# Patient Record
Sex: MALE | Race: WHITE | NOT HISPANIC OR LATINO | Employment: FULL TIME | URBAN - METROPOLITAN AREA
[De-identification: names, ages, dates, MRNs, and addresses within clinical notes are randomized per-mention and may not be internally consistent; named-entity substitution may affect disease eponyms.]

---

## 2017-01-04 ENCOUNTER — OFFICE VISIT (OUTPATIENT)
Dept: CARDIOLOGY | Facility: MEDICAL CENTER | Age: 52
End: 2017-01-04
Payer: COMMERCIAL

## 2017-01-04 VITALS
BODY MASS INDEX: 33.18 KG/M2 | OXYGEN SATURATION: 93 % | HEIGHT: 69 IN | HEART RATE: 82 BPM | WEIGHT: 224 LBS | DIASTOLIC BLOOD PRESSURE: 90 MMHG | SYSTOLIC BLOOD PRESSURE: 150 MMHG

## 2017-01-04 DIAGNOSIS — E78.00 PURE HYPERCHOLESTEROLEMIA: ICD-10-CM

## 2017-01-04 DIAGNOSIS — I21.4 NON-STEMI (NON-ST ELEVATED MYOCARDIAL INFARCTION) (HCC): ICD-10-CM

## 2017-01-04 DIAGNOSIS — I48.0 PAROXYSMAL ATRIAL FIBRILLATION (HCC): ICD-10-CM

## 2017-01-04 DIAGNOSIS — R06.02 SOB (SHORTNESS OF BREATH): ICD-10-CM

## 2017-01-04 DIAGNOSIS — R06.83 SNORING: ICD-10-CM

## 2017-01-04 DIAGNOSIS — K50.00 CROHN'S DISEASE OF SMALL INTESTINE WITHOUT COMPLICATION (HCC): ICD-10-CM

## 2017-01-04 DIAGNOSIS — I10 ESSENTIAL HYPERTENSION: ICD-10-CM

## 2017-01-04 DIAGNOSIS — I26.09 OTHER PULMONARY EMBOLISM WITH ACUTE COR PULMONALE (HCC): ICD-10-CM

## 2017-01-04 DIAGNOSIS — I50.21 ACUTE SYSTOLIC CHF (CONGESTIVE HEART FAILURE), NYHA CLASS 3 (HCC): ICD-10-CM

## 2017-01-04 DIAGNOSIS — I49.3 PVC (PREMATURE VENTRICULAR CONTRACTION): ICD-10-CM

## 2017-01-04 PROCEDURE — 94620 PR PULMONARY STRESS TESTING,SIMPLE: CPT | Performed by: INTERNAL MEDICINE

## 2017-01-04 PROCEDURE — 99214 OFFICE O/P EST MOD 30 MIN: CPT | Mod: 25 | Performed by: INTERNAL MEDICINE

## 2017-01-04 RX ORDER — ROSUVASTATIN CALCIUM 5 MG/1
5 TABLET, COATED ORAL EVERY EVENING
COMMUNITY
End: 2017-03-09

## 2017-01-04 RX ORDER — ASPIRIN 325 MG
325 TABLET ORAL DAILY
COMMUNITY
End: 2019-09-12

## 2017-01-04 NOTE — MR AVS SNAPSHOT
"        Shaka Riley   2017 3:00 PM   Office Visit   MRN: [id number]    Department:  [facility]   Dept Phone:  [phone]    Description:  Male : 1965   Provider:  Jorge A Le M.D.           Reason for Visit     New Patient           Allergies as of 2017     No Known Allergies      You were diagnosed with     Essential hypertension   [4726582]       Acute systolic CHF (congestive heart failure), NYHA class 3 ([facility])   [393138]       Non-STEMI (non-ST elevated myocardial infarction) ([facility])   [501197]       Other pulmonary embolism with acute cor pulmonale ([facility])   [3623136]       Paroxysmal atrial fibrillation ([facility])   [511719]       Pure hypercholesterolemia   [272.0.ICD-9-CM]       PVC (premature ventricular contraction)   [660358]       Crohn's disease of small intestine without complication ([facility])   [312062]       SOB (shortness of breath)   [523332]       Snoring   [389133]         Vital Signs     Blood Pressure Pulse Height Weight Body Mass Index Oxygen Saturation    150/90 mmHg 82 1.753 m (5' 9.02\") 101.606 kg (224 lb) 33.06 kg/m2 93%    Smoking Status                   Never Smoker            Basic Information     Date Of Birth Sex Race Ethnicity Preferred Language    1965 Male White Non- English      Your appointments     2017  2:00 PM   New Patient with Dylan Arreola RD   [employer] [facility])    [address]  [address]  [facility] [address]   [phone]           It is the patient's responsibility to check with your Insurance for benefit coverage for visit / visits.  24 hours notice is required for all appointment changes or cancellation.  Please arrive 20 min. before your appointment time  Please bring the following with you: 1)Picture Id 2) Insurance card 3) Completed Forms if New Patient  If scheduled for DIABETES VISIT please also brin) Medications 2) Meter 3) Blood glucose logs 4) Any recent labs if you have them  If " scheduled for NUTRITION VISIT please also brin) 2-3 days of detailed food intake logs 2) Blood glucose monitor and blood glucose logs (if you have them)            2017  8:00 AM   PET SCAN with PET TECH   [facility] (--)    [address] [address] [address], [address]  [place] [address]   [phone]           Cardiac PET Prep:  1. Testing Location is [address] [place] [address] 2. No caffeine products including decaf, chocolate and no medications that contain caffeine for 18 hours.  3. NPO for 4 hours, except water.  4. No Viagra, Levitra, or Cialis for 48 hours prior.  5. Arrive 15 minutes before appointment time for check in.  6. Wear comfortable clothing with no metal over the chest area. 7. The procedure will last approximately 45 minutes to an hour.            2017  2:20 PM   New Patient with Valery Jasmine M.D.   [facility] (--)    [address]  [place] [id number] [phone]           Please bring enclosed paperwork completed along with your insurance card and photo ID.              Problem List              ICD-10-CM Priority Class Noted - Resolved    HTN (hypertension) I10 Medium  2014 - Present    Snoring R06.83 Medium  2015 - Present    PVC (premature ventricular contraction) I49.3 Medium  2015 - Present    Acute systolic CHF (congestive heart failure), NYHA class 3 (HCC) I50.21 Medium  2015 - Present    Non-STEMI (non-ST elevated myocardial infarction) (HCC) I21.4 Medium  2015 - Present    Pleurisy R09.1 Low  2015 - Present    Pulmonary embolism (HCC) I26.99 Medium  2015 - Present    Crohn disease (HCC) K50.90 Low  2015 - Present    Paroxysmal atrial fibrillation (HCC) I48.0 Medium  8/3/2015 - Present    Syncope and collapse R55 Medium  2016 - Present    Elevated blood sugar R73.9 Medium  2016 - Present    Obesity (BMI 30-39.9) E66.9 Medium  2016 - Present    Pure hypercholesterolemia E78.00 Medium   12/6/2016 - Present    SOB (shortness of breath) R06.02   1/4/2017 - Present      Health Maintenance        Date Due Completion Dates    IMM DTaP/Tdap/Td Vaccine (1 - Tdap) 4/6/1983 ---    COLONOSCOPY 4/6/2014 ---    IMM INFLUENZA (1) 9/1/2016 5/5/2014            Current Immunizations     Influenza TIV (IM) 5/5/2014 10:30 PM    Pneumococcal polysaccharide vaccine (PPSV-23) 7/14/2015 10:57 AM      Below and/or attached are the medications your provider expects you to take. Review all of your home medications and newly ordered medications with your provider and/or pharmacist. Follow medication instructions as directed by your provider and/or pharmacist. Please keep your medication list with you and share with your provider. Update the information when medications are discontinued, doses are changed, or new medications (including over-the-counter products) are added; and carry medication information at all times in the event of emergency situations     Allergies:  No Known Allergies          Medications  Valid as of: January 04, 2017 -  4:01 PM    Generic Name Brand Name Tablet Size Instructions for use    Albuterol Sulfate (Aero Soln) albuterol 108 (90 BASE) MCG/ACT Inhale 2 Puffs by mouth every four hours as needed for Shortness of Breath.        Aspirin (Tab)  MG Take 325 mg by mouth every day.        Carvedilol (Tab) COREG 25 MG Take 1 Tab by mouth 2 times a day, with meals.        Furosemide (Tab) LASIX 40 MG Take 1 Tab by mouth 2 Times a Day.        Losartan Potassium (Tab) COZAAR 50 MG Take 1 Tab by mouth 2 Times a Day.        Potassium Chloride Carrie CR (Tab CR) K-DUR 20 MEQ Take 1 Tab by mouth every day.        Rosuvastatin Calcium (Tab) CRESTOR 5 MG Take 1 Tab by mouth every evening.        Rosuvastatin Calcium (Tab) CRESTOR 5 MG Take 5 mg by mouth every evening.        .                 Medicines prescribed today were sent to:     [facility] [address] [place] [address]    [address]  [place] [address]    Phone: [phone] Fax: [phone]    Open 24 Hours?: No      Medication refill instructions:       If your prescription bottle indicates you have medication refills left, it is not necessary to call your provider’s office. Please contact your pharmacy and they will refill your medication.    If your prescription bottle indicates you do not have any refills left, you may request refills at any time through one of the following ways: The online Paymate system (except Urgent Care), by calling your provider’s office, or by asking your pharmacy to contact your provider’s office with a refill request. Medication refills are processed only during regular business hours and may not be available until the next business day. Your provider may request additional information or to have a follow-up visit with you prior to refilling your medication.   *Please Note: Medication refills are assigned a new Rx number when refilled electronically. Your pharmacy may indicate that no refills were authorized even though a new prescription for the same medication is available at the pharmacy. Please request the medicine by name with the pharmacy before contacting your provider for a refill.        Your To Do List     Future Labs/Procedures Complete By Expires    HOLTER MONITOR STUDY  As directed 1/4/2018    NM-CARDIAC PET  As directed 1/4/2018      Referral     A referral request has been sent to our patient care coordination department. Please allow 3-5 business days for us to process this request and contact you either by phone or mail. If you do not hear from us by the 5th business day, please call us at [phone].           Paymate Status: Patient Declined

## 2017-01-04 NOTE — Clinical Note
[facility]   [address]  [place] [address]  Phone: [phone]  Fax: [phone]              Shaka Riley  1965    Encounter Date: 1/4/2017    Jorge A Le M.D.          PROGRESS NOTE:  Subjective:   Shaka Riley is a 51 y.o. male who presents today as a new consultation heart failure. His long-standing history of noncompliance and heart failure relating back to pulmonary was in atrial fibrillation were EF was found to be in 30s. He is also had issues with medical noncompliance EF returned to normal and  stopped all his cardiac medications and went back and heart failure. He's never had a workup for ischemia and his CT scan performed for the recent PE a few months back showed some calcifications in his LAD. He is mostly complaining of issues with polyuria and nocturia. This is one reason that he is poorly compliant with his medications. Lately he has become more compliant with his losartan and carvedilol.    Past Medical History   Diagnosis Date   • Crohn disease (HCC)    • Congestive heart failure (HCC) 7/2015     EF 30-35%; Dilated R atrium; LVH; Mild MR; Mild AI; Mild TR   • PAF (paroxysmal atrial fibrillation) ([facility])    • Hypertension    • Clotting disorder ([facility])      PE   • Sleep apnea      un-diagnosed and pending sleep study     History reviewed. No pertinent past surgical history.  Family History   Problem Relation Age of Onset   • Cancer Mother    • Heart Disease Paternal Grandmother    • Heart Disease Paternal Grandfather      History   Smoking status   • Never Smoker    Smokeless tobacco   • Never Used     No Known Allergies  Outpatient Encounter Prescriptions as of 1/4/2017   Medication Sig Dispense Refill   • aspirin (ASA) 325 MG Tab Take 325 mg by mouth every day.     • rosuvastatin (CRESTOR) 5 MG Tab Take 5 mg by mouth every evening.     • losartan (COZAAR) 50 MG Tab Take 1 Tab by mouth 2 Times a Day. 60 Tab 11    "  • carvedilol (COREG) 25 MG Tab Take 1 Tab by mouth 2 times a day, with meals. 180 Tab 3   • albuterol 108 (90 BASE) MCG/ACT Aero Soln inhalation aerosol Inhale 2 Puffs by mouth every four hours as needed for Shortness of Breath. 1 Inhaler 1   • furosemide (LASIX) 40 MG Tab Take 1 Tab by mouth 2 Times a Day. 180 Tab 3   • rosuvastatin (CRESTOR) 5 MG Tab Take 1 Tab by mouth every evening. 90 Tab 0   • potassium chloride SA (K-DUR) 20 MEQ Tab CR Take 1 Tab by mouth every day. (Patient not taking: Reported on 1/4/2017) 90 Tab 3     No facility-administered encounter medications on file as of 1/4/2017.     Review of Systems   Constitutional: Negative.  Negative for fever, chills and malaise/fatigue.   HENT: Negative.  Negative for sore throat.    Eyes: Negative.    Respiratory: Negative.  Negative for cough, hemoptysis, sputum production, shortness of breath, wheezing and stridor.    Cardiovascular: Negative.  Negative for chest pain, palpitations, orthopnea, claudication, leg swelling and PND.   Gastrointestinal: Negative.    Genitourinary: Negative.    Musculoskeletal: Negative.    Skin: Negative.    Neurological: Negative.  Negative for dizziness, loss of consciousness and weakness.   Endo/Heme/Allergies: Negative.  Does not bruise/bleed easily.   All other systems reviewed and are negative.       Objective:   /90 mmHg  Pulse 82  Ht 1.753 m (5' 9.02\")  Wt 101.606 kg (224 lb)  BMI 33.06 kg/m2  SpO2 93%    Physical Exam   Constitutional: He is oriented to person, place, and time. He appears well-developed and well-nourished. No distress.   HENT:   Head: Normocephalic.   Mouth/Throat: Oropharynx is clear and moist.   Eyes: EOM are normal. Pupils are equal, round, and reactive to light. Right eye exhibits no discharge. Left eye exhibits no discharge. No scleral icterus.   Neck: Normal range of motion. Neck supple. No JVD present. No tracheal deviation present.   Cardiovascular: Normal rate, regular rhythm, S1 " normal, S2 normal, normal heart sounds, intact distal pulses and normal pulses.  Exam reveals no gallop, no S3, no S4 and no friction rub.    No murmur heard.   No systolic murmur is present    No diastolic murmur is present   Pulses:       Carotid pulses are 2+ on the right side, and 2+ on the left side.       Radial pulses are 2+ on the right side, and 2+ on the left side.        Dorsalis pedis pulses are 2+ on the right side, and 2+ on the left side.        Posterior tibial pulses are 2+ on the right side, and 2+ on the left side.   Pulmonary/Chest: Effort normal and breath sounds normal. No respiratory distress. He has no wheezes. He has no rales.   Abdominal: Soft. Bowel sounds are normal. He exhibits no distension and no mass. There is no tenderness. There is no rebound and no guarding.   Musculoskeletal: He exhibits no edema.   Neurological: He is alert and oriented to person, place, and time. No cranial nerve deficit.   Skin: Skin is warm and dry. He is not diaphoretic. No pallor.   Psychiatric: He has a normal mood and affect. His behavior is normal. Judgment and thought content normal.   Nursing note and vitals reviewed.      Assessment:     1. Essential hypertension     2. Acute systolic CHF (congestive heart failure), NYHA class 3 ([facility])  NM-CARDIAC PET    HOLTER MONITOR STUDY   3. Non-STEMI (non-ST elevated myocardial infarction) ([facility])  REFERRAL TO INTENSIVE CARDIAC REHAB/CARDIAC REHAB    NM-CARDIAC PET   4. Other pulmonary embolism with acute cor pulmonale ([facility])  REFERRAL TO INTENSIVE CARDIAC REHAB/CARDIAC REHAB    NM-CARDIAC PET   5. Paroxysmal atrial fibrillation ([facility])  REFERRAL TO INTENSIVE CARDIAC REHAB/CARDIAC REHAB    HOLTER MONITOR STUDY   6. Pure hypercholesterolemia  REFERRAL TO INTENSIVE CARDIAC REHAB/CARDIAC REHAB    HOLTER MONITOR STUDY   7. PVC (premature ventricular contraction)  REFERRAL TO INTENSIVE CARDIAC REHAB/CARDIAC REHAB   8. Crohn's disease of small intestine without complication  ([facility])  HOLTER MONITOR STUDY   9. SOB (shortness of breath)     10. Snoring         Medical Decision Making:  Today's Assessment / Status / Plan:     50 y/o M with CHFrEF, cause unknown with poor compliance.  Today will go ahead and get a sleep study which we are awaiting. I will also obtain a nuclear stress test to rule out ischemia as a possible contributor factor for his heart failure. Otherwise we'll keep him on the same cardiac medications and see him back in a few weeks for further evaluation.    1. CHFrEF 40%, NYHA X, Stage X, Hemo Pro    - cont coreg 25 BID    - cont losartan 50 BID    - not on spironolactone    - ICD - not candidate    - BiV - not candidate    - cardiac PET    2. HTN    - consider starting spironolactone    3. HLD    - cont rosuva    Thank for you allowing me to take part in your patient's care, please call should you have any questions or would like to discuss this patient.      Qiana Howe M.D.  [address] [place] [address] [place] [address]  VIA In Basket

## 2017-01-05 ASSESSMENT — ENCOUNTER SYMPTOMS
ORTHOPNEA: 0
MUSCULOSKELETAL NEGATIVE: 1
DIZZINESS: 0
WEAKNESS: 0
FEVER: 0
CLAUDICATION: 0
SHORTNESS OF BREATH: 0
RESPIRATORY NEGATIVE: 1
GASTROINTESTINAL NEGATIVE: 1
WHEEZING: 0
PALPITATIONS: 0
CONSTITUTIONAL NEGATIVE: 1
HEMOPTYSIS: 0
SPUTUM PRODUCTION: 0
CARDIOVASCULAR NEGATIVE: 1
SORE THROAT: 0
NEUROLOGICAL NEGATIVE: 1
COUGH: 0
EYES NEGATIVE: 1
STRIDOR: 0
LOSS OF CONSCIOUSNESS: 0
CHILLS: 0
PND: 0
BRUISES/BLEEDS EASILY: 0

## 2017-01-05 NOTE — PROGRESS NOTES
Subjective:   Shaka Riley is a 51 y.o. male who presents today as a new consultation heart failure. His long-standing history of noncompliance and heart failure relating back to pulmonary was in atrial fibrillation were EF was found to be in 30s. He is also had issues with medical noncompliance EF returned to normal and  stopped all his cardiac medications and went back and heart failure. He's never had a workup for ischemia and his CT scan performed for the recent PE a few months back showed some calcifications in his LAD. He is mostly complaining of issues with polyuria and nocturia. This is one reason that he is poorly compliant with his medications. Lately he has become more compliant with his losartan and carvedilol.    Past Medical History   Diagnosis Date   • Crohn disease ([facility])    • Congestive heart failure ([facility]) 7/2015     EF 30-35%; Dilated R atrium; LVH; Mild MR; Mild AI; Mild TR   • PAF (paroxysmal atrial fibrillation) ([facility])    • Hypertension    • Clotting disorder ([facility])      PE   • Sleep apnea      un-diagnosed and pending sleep study     History reviewed. No pertinent past surgical history.  Family History   Problem Relation Age of Onset   • Cancer Mother    • Heart Disease Paternal Grandmother    • Heart Disease Paternal Grandfather      History   Smoking status   • Never Smoker    Smokeless tobacco   • Never Used     No Known Allergies  Outpatient Encounter Prescriptions as of 1/4/2017   Medication Sig Dispense Refill   • aspirin (ASA) 325 MG Tab Take 325 mg by mouth every day.     • rosuvastatin (CRESTOR) 5 MG Tab Take 5 mg by mouth every evening.     • losartan (COZAAR) 50 MG Tab Take 1 Tab by mouth 2 Times a Day. 60 Tab 11   • carvedilol (COREG) 25 MG Tab Take 1 Tab by mouth 2 times a day, with meals. 180 Tab 3   • albuterol 108 (90 BASE) MCG/ACT Aero Soln inhalation aerosol Inhale 2 Puffs by mouth every four hours as needed for Shortness of Breath. 1 Inhaler 1   • furosemide (LASIX) 40 MG  "Tab Take 1 Tab by mouth 2 Times a Day. 180 Tab 3   • rosuvastatin (CRESTOR) 5 MG Tab Take 1 Tab by mouth every evening. 90 Tab 0   • potassium chloride SA (K-DUR) 20 MEQ Tab CR Take 1 Tab by mouth every day. (Patient not taking: Reported on 1/4/2017) 90 Tab 3     No facility-administered encounter medications on file as of 1/4/2017.     Review of Systems   Constitutional: Negative.  Negative for fever, chills and malaise/fatigue.   HENT: Negative.  Negative for sore throat.    Eyes: Negative.    Respiratory: Negative.  Negative for cough, hemoptysis, sputum production, shortness of breath, wheezing and stridor.    Cardiovascular: Negative.  Negative for chest pain, palpitations, orthopnea, claudication, leg swelling and PND.   Gastrointestinal: Negative.    Genitourinary: Negative.    Musculoskeletal: Negative.    Skin: Negative.    Neurological: Negative.  Negative for dizziness, loss of consciousness and weakness.   Endo/Heme/Allergies: Negative.  Does not bruise/bleed easily.   All other systems reviewed and are negative.       Objective:   /90 mmHg  Pulse 82  Ht 1.753 m (5' 9.02\")  Wt 101.606 kg (224 lb)  BMI 33.06 kg/m2  SpO2 93%    Physical Exam   Constitutional: He is oriented to person, place, and time. He appears well-developed and well-nourished. No distress.   HENT:   Head: Normocephalic.   Mouth/Throat: Oropharynx is clear and moist.   Eyes: EOM are normal. Pupils are equal, round, and reactive to light. Right eye exhibits no discharge. Left eye exhibits no discharge. No scleral icterus.   Neck: Normal range of motion. Neck supple. No JVD present. No tracheal deviation present.   Cardiovascular: Normal rate, regular rhythm, S1 normal, S2 normal, normal heart sounds, intact distal pulses and normal pulses.  Exam reveals no gallop, no S3, no S4 and no friction rub.    No murmur heard.   No systolic murmur is present    No diastolic murmur is present   Pulses:       Carotid pulses are 2+ on the " right side, and 2+ on the left side.       Radial pulses are 2+ on the right side, and 2+ on the left side.        Dorsalis pedis pulses are 2+ on the right side, and 2+ on the left side.        Posterior tibial pulses are 2+ on the right side, and 2+ on the left side.   Pulmonary/Chest: Effort normal and breath sounds normal. No respiratory distress. He has no wheezes. He has no rales.   Abdominal: Soft. Bowel sounds are normal. He exhibits no distension and no mass. There is no tenderness. There is no rebound and no guarding.   Musculoskeletal: He exhibits no edema.   Neurological: He is alert and oriented to person, place, and time. No cranial nerve deficit.   Skin: Skin is warm and dry. He is not diaphoretic. No pallor.   Psychiatric: He has a normal mood and affect. His behavior is normal. Judgment and thought content normal.   Nursing note and vitals reviewed.      Assessment:     1. Essential hypertension     2. Acute systolic CHF (congestive heart failure), NYHA class 3 ([facility])  NM-CARDIAC PET    HOLTER MONITOR STUDY   3. Non-STEMI (non-ST elevated myocardial infarction) ([facility])  REFERRAL TO INTENSIVE CARDIAC REHAB/CARDIAC REHAB    NM-CARDIAC PET   4. Other pulmonary embolism with acute cor pulmonale ([facility])  REFERRAL TO INTENSIVE CARDIAC REHAB/CARDIAC REHAB    NM-CARDIAC PET   5. Paroxysmal atrial fibrillation ([facility])  REFERRAL TO INTENSIVE CARDIAC REHAB/CARDIAC REHAB    HOLTER MONITOR STUDY   6. Pure hypercholesterolemia  REFERRAL TO INTENSIVE CARDIAC REHAB/CARDIAC REHAB    HOLTER MONITOR STUDY   7. PVC (premature ventricular contraction)  REFERRAL TO INTENSIVE CARDIAC REHAB/CARDIAC REHAB   8. Crohn's disease of small intestine without complication ([facility])  HOLTER MONITOR STUDY   9. SOB (shortness of breath)     10. Snoring         Medical Decision Making:  Today's Assessment / Status / Plan:     52 y/o M with CHFrEF, cause unknown with poor compliance.  Today will go ahead and get a sleep study which we are  awaiting. I will also obtain a nuclear stress test to rule out ischemia as a possible contributor factor for his heart failure. Otherwise we'll keep him on the same cardiac medications and see him back in a few weeks for further evaluation.    1. CHFrEF 40%, NYHA X, Stage X, Hemo Pro    - cont coreg 25 BID    - cont losartan 50 BID    - not on spironolactone    - ICD - not candidate    - BiV - not candidate    - cardiac PET    2. HTN    - consider starting spironolactone    3. HLD    - cont rosuva    Thank for you allowing me to take part in your patient's care, please call should you have any questions or would like to discuss this patient.

## 2017-01-17 NOTE — PROGRESS NOTES
6MWT- 423 meters  MLWHF- 55        Reviewed anatomy and physiology of heart failure with patient. Went over heart failure worksheet and patient's individual HF diagnosis, EF, risk factors, general medication classes and indications, as well as personal goals.  Goals: Patient's personal goal is improve his EF.    Discussed daily weights, sodium restriction, worsening signs and symptoms to report to physician, heart medications, and importance of adherence to medication regimen. Patient understands the basic anatomy and physiology of heart failure, but still has trouble with compliance. He v/u for need to weigh daily, but will not commit at this time.     Reviewed dietary handouts, advance directive planning handout, and informed patient of HF new appointment follow-up phone call. Patient states he has does not salt his food, but eats a lot of processed food. Attempted to discuss simple ideas for cooking more whole, unprocessed foods, but he is not able to afford a lot of food at this time.     Patient states he is ready to be compliant with his medications, but isn't ready to change his diet at this time-he doesn't feel it is that bad and that as long as he takes his medications he will improve. Will attempt to reinforce further education at a later date.     Invited patient and family members/friends to HF support group and encouraged patient to call Heart Failure clinic during normal business hours with any questions.        Patient states full understanding of all information given.     Charlene HF RN  [phone]

## 2017-01-25 ENCOUNTER — HOSPITAL ENCOUNTER (OUTPATIENT)
Dept: CARDIOLOGY | Facility: MEDICAL CENTER | Age: 52
End: 2017-01-25
Attending: INTERNAL MEDICINE
Payer: COMMERCIAL

## 2017-01-25 DIAGNOSIS — I21.4 NON-STEMI (NON-ST ELEVATED MYOCARDIAL INFARCTION) (HCC): ICD-10-CM

## 2017-01-25 DIAGNOSIS — I26.09 OTHER PULMONARY EMBOLISM WITH ACUTE COR PULMONALE (HCC): ICD-10-CM

## 2017-01-25 DIAGNOSIS — I50.21 ACUTE SYSTOLIC CHF (CONGESTIVE HEART FAILURE), NYHA CLASS 3 (HCC): ICD-10-CM

## 2017-01-25 PROCEDURE — 700111 HCHG RX REV CODE 636 W/ 250 OVERRIDE (IP)

## 2017-01-25 PROCEDURE — 93017 CV STRESS TEST TRACING ONLY: CPT

## 2017-01-25 PROCEDURE — A9555 RB82 RUBIDIUM: HCPCS

## 2017-01-25 PROCEDURE — 78492 MYOCRD IMG PET MLT RST&STRS: CPT

## 2017-01-26 NOTE — PROCEDURES
REFERRING PHYSICIAN:  Jorge A Le MD    AGE:  51.  GENDER:  Male.  HEIGHT:  5 feet 9 inches tall.  WEIGHT:  224   pounds.  BMI:  33.1.    INDICATIONS:  Cardiomyopathy.    PROCEDURE:  The patient reviewed and signed the acknowledgement for testing   form.  The patient was in a fasting state and was properly prepared for   testing.  An intravenous line was inserted and a flush of normal saline   followed to insure line patency.     A transmission scan was acquired for attenuation correction using the internal   Germanium sources.  The patient was then administered 25.6 mCi at 0810 of   Rubidium-82.  Approximately 90 seconds after the infusion, resting imagine   were obtained with ECG-gating.  Following the resting series, the patient   administered 58 mg of dipyridamole over four minutes at 0820.  The blood   pressure, heart rate and ECG were monitored and recorded.  After the   dipyridamole infusion was completed, another transmission scan for attenuation   correction was obtained.  The patient was then administered 25.6 mCi of   Rubidium-82 at 0825.  Approximately 90 seconds after the infusion, Peak stress   images were obtained with ECG-gating.    CLINICAL RESPONSE:  Resting blood pressure was 163.106 with a heart rate of   74.  Immediately post-dipyridamole infusion the blood pressure was 149/71 with   a heart rate of 78.  After a recovery period the blood pressure was 151/85   with a heart rate of 76.    The patient experienced no symptoms during testing.    Aminophylline 0 mg was administered following the scan.    ELECTROCARDIOGRAPHIC FINDINGS:  Resting left ventricular ejection fraction was   39%.  Stress left ventricular ejection fraction of 48%.    SCINTOGRAPHIC FINDINGS:  The QC data for the scan was reviewed and was within   acceptable limits.      CONCLUSIONS AND IMPRESSIONS:  At baseline, EKG shows evidence of sinus rhythm.    As stress, there is no evidence of coronary ischemia seen on EKG  tracing.    In terms of myocardial PET scan stress test images, there is no evidence of   coronary ischemia seen.  Overall, this is a negative myocardial PET scan   stress test for coronary ischemia.       ____________________________________  CLARE Mcmillan / NORMAN    DD:  01/25/2017 19:37:17  DT:  01/25/2017 23:12:13    D#:  [id number]  Job#:  [id number]

## 2017-01-31 ENCOUNTER — ANTICOAGULATION MONITORING (OUTPATIENT)
Dept: VASCULAR LAB | Facility: MEDICAL CENTER | Age: 52
End: 2017-01-31

## 2017-01-31 DIAGNOSIS — I27.82 CHRONIC SEPTIC PULMONARY EMBOLISM WITH ACUTE COR PULMONALE (HCC): ICD-10-CM

## 2017-01-31 DIAGNOSIS — I48.0 PAROXYSMAL ATRIAL FIBRILLATION (HCC): ICD-10-CM

## 2017-01-31 DIAGNOSIS — I26.01 CHRONIC SEPTIC PULMONARY EMBOLISM WITH ACUTE COR PULMONALE (HCC): ICD-10-CM

## 2017-01-31 NOTE — PROGRESS NOTES
Discharged from [facility].  Therapy completed 12/14/2016 by Dr. Serena Albert.  Angelina Pedroza, PHARMD

## 2017-02-01 ENCOUNTER — OFFICE VISIT (OUTPATIENT)
Dept: CARDIOLOGY | Facility: MEDICAL CENTER | Age: 52
End: 2017-02-01
Payer: COMMERCIAL

## 2017-02-01 VITALS
WEIGHT: 227 LBS | HEART RATE: 84 BPM | OXYGEN SATURATION: 93 % | DIASTOLIC BLOOD PRESSURE: 104 MMHG | BODY MASS INDEX: 33.62 KG/M2 | HEIGHT: 69 IN | SYSTOLIC BLOOD PRESSURE: 180 MMHG

## 2017-02-01 DIAGNOSIS — I26.01 CHRONIC SEPTIC PULMONARY EMBOLISM WITH ACUTE COR PULMONALE (HCC): ICD-10-CM

## 2017-02-01 DIAGNOSIS — R55 SYNCOPE AND COLLAPSE: ICD-10-CM

## 2017-02-01 DIAGNOSIS — G25.81 RESTLESS LEG SYNDROME: ICD-10-CM

## 2017-02-01 DIAGNOSIS — I48.0 PAROXYSMAL ATRIAL FIBRILLATION (HCC): ICD-10-CM

## 2017-02-01 DIAGNOSIS — I49.3 PVC (PREMATURE VENTRICULAR CONTRACTION): ICD-10-CM

## 2017-02-01 DIAGNOSIS — I50.21 ACUTE SYSTOLIC CHF (CONGESTIVE HEART FAILURE), NYHA CLASS 3 (HCC): ICD-10-CM

## 2017-02-01 DIAGNOSIS — E78.00 PURE HYPERCHOLESTEROLEMIA: ICD-10-CM

## 2017-02-01 DIAGNOSIS — R06.02 SOB (SHORTNESS OF BREATH): ICD-10-CM

## 2017-02-01 DIAGNOSIS — I27.82 CHRONIC SEPTIC PULMONARY EMBOLISM WITH ACUTE COR PULMONALE (HCC): ICD-10-CM

## 2017-02-01 DIAGNOSIS — R06.83 SNORING: ICD-10-CM

## 2017-02-01 DIAGNOSIS — I21.4 NON-STEMI (NON-ST ELEVATED MYOCARDIAL INFARCTION) (HCC): ICD-10-CM

## 2017-02-01 DIAGNOSIS — I10 ESSENTIAL HYPERTENSION: ICD-10-CM

## 2017-02-01 PROCEDURE — 99214 OFFICE O/P EST MOD 30 MIN: CPT | Performed by: INTERNAL MEDICINE

## 2017-02-01 RX ORDER — ROPINIROLE 0.5 MG/1
0.5 TABLET, FILM COATED ORAL EVERY EVENING
Qty: 30 TAB | Refills: 11 | Status: SHIPPED | OUTPATIENT
Start: 2017-02-01 | End: 2017-03-21 | Stop reason: SDUPTHER

## 2017-02-01 RX ORDER — ROSUVASTATIN CALCIUM 5 MG/1
5 TABLET, COATED ORAL EVERY EVENING
Qty: 90 TAB | Refills: 3 | Status: SHIPPED | OUTPATIENT
Start: 2017-02-01 | End: 2018-01-12

## 2017-02-01 ASSESSMENT — ENCOUNTER SYMPTOMS
SORE THROAT: 0
BRUISES/BLEEDS EASILY: 0
SHORTNESS OF BREATH: 0
PND: 0
STRIDOR: 0
DIZZINESS: 0
GASTROINTESTINAL NEGATIVE: 1
CLAUDICATION: 0
CHILLS: 0
EYES NEGATIVE: 1
ORTHOPNEA: 0
RESPIRATORY NEGATIVE: 1
CONSTITUTIONAL NEGATIVE: 1
WHEEZING: 0
PALPITATIONS: 0
CARDIOVASCULAR NEGATIVE: 1
NEUROLOGICAL NEGATIVE: 1
LOSS OF CONSCIOUSNESS: 0
HEMOPTYSIS: 0
COUGH: 0
SPUTUM PRODUCTION: 0
FEVER: 0
MUSCULOSKELETAL NEGATIVE: 1
WEAKNESS: 0

## 2017-02-01 NOTE — MR AVS SNAPSHOT
"        Shaka Riley   2017 3:45 PM   Office Visit   MRN: [id number]    Department:  [facility]   Dept Phone:  [phone]    Description:  Male : 1965   Provider:  Jorge A Le M.D.           Reason for Visit     Follow-Up           Allergies as of 2017     No Known Allergies      You were diagnosed with     SOB (shortness of breath)   [921906]       Syncope and collapse   [780.2.ICD-9-CM]       Paroxysmal atrial fibrillation (CMS-HCC)   [179853]       Chronic septic pulmonary embolism with acute cor pulmonale (CMS-HCC)   [8290845]       Non-STEMI (non-ST elevated myocardial infarction) (CMS-HCC)   [156967]       Acute systolic CHF (congestive heart failure), NYHA class 3 (CMS-HCC)   [233679]       PVC (premature ventricular contraction)   [566860]       Snoring   [474946]       Essential hypertension   [1645141]       Pure hypercholesterolemia   [272.0.ICD-9-CM]       Restless leg syndrome   [916321]         Vital Signs     Blood Pressure Pulse Height Weight Body Mass Index Oxygen Saturation    180/104 mmHg 84 1.753 m (5' 9.02\") 102.967 kg (227 lb) 33.51 kg/m2 93%    Smoking Status                   Never Smoker            Basic Information     Date Of Birth Sex Race Ethnicity Preferred Language    1965 Male White Non- English      Your appointments     2017  2:20 PM   New Patient with Valery Jasmine M.D.   [facility] (--)    [address] [place] [address] [id number] [phone]           Please bring enclosed paperwork completed along with your insurance card and photo ID.              Problem List              ICD-10-CM Priority Class Noted - Resolved    HTN (hypertension) I10 Medium  2014 - Present    Snoring R06.83 Medium  2015 - Present    PVC (premature ventricular contraction) I49.3 Medium  2015 - Present    Acute systolic CHF (congestive heart failure), NYHA class 3 (CMS-HCC) I50.21 Medium  2015 - " Present    Non-STEMI (non-ST elevated myocardial infarction) (CMS-HCC) I21.4 Medium  7/13/2015 - Present    Pleurisy R09.1 Low  7/20/2015 - Present    Pulmonary embolism (CMS-HCC) I26.99 Medium  7/22/2015 - Present    Crohn disease (CMS-HCC) K50.90 Low  7/23/2015 - Present    Paroxysmal atrial fibrillation (CMS-HCC) I48.0 Medium  8/3/2015 - Present    Syncope and collapse R55 Medium  7/20/2016 - Present    Elevated blood sugar R73.9 Medium  7/20/2016 - Present    Obesity (BMI 30-39.9) E66.9 Medium  12/6/2016 - Present    Pure hypercholesterolemia E78.00 Medium  12/6/2016 - Present    SOB (shortness of breath) R06.02   1/4/2017 - Present    Restless leg syndrome G25.81   2/1/2017 - Present      Health Maintenance        Date Due Completion Dates    IMM DTaP/Tdap/Td Vaccine (1 - Tdap) 4/6/1983 ---    COLONOSCOPY 4/6/2014 ---    IMM INFLUENZA (1) 9/1/2016 5/5/2014            Current Immunizations     Influenza TIV (IM) 5/5/2014 10:30 PM    Pneumococcal polysaccharide vaccine (PPSV-23) 7/14/2015 10:57 AM      Below and/or attached are the medications your provider expects you to take. Review all of your home medications and newly ordered medications with your provider and/or pharmacist. Follow medication instructions as directed by your provider and/or pharmacist. Please keep your medication list with you and share with your provider. Update the information when medications are discontinued, doses are changed, or new medications (including over-the-counter products) are added; and carry medication information at all times in the event of emergency situations     Allergies:  No Known Allergies          Medications  Valid as of: February 01, 2017 -  4:16 PM    Generic Name Brand Name Tablet Size Instructions for use    Albuterol Sulfate (Aero Soln) albuterol 108 (90 BASE) MCG/ACT Inhale 2 Puffs by mouth every four hours as needed for Shortness of Breath.        Aspirin (Tab)  MG Take 325 mg by mouth every day.          Carvedilol (Tab) COREG 25 MG Take 1 Tab by mouth 2 times a day, with meals.        Furosemide (Tab) LASIX 40 MG Take 1 Tab by mouth 2 Times a Day.        Losartan Potassium (Tab) COZAAR 50 MG Take 1 Tab by mouth 2 Times a Day.        Potassium Chloride Carrie CR (Tab CR) Kdur 20 MEQ Take 1 Tab by mouth every day.        ROPINIRole HCl (Tab) REQUIP 0.5 MG Take 1 Tab by mouth every evening.        Rosuvastatin Calcium (Tab) CRESTOR 5 MG Take 5 mg by mouth every evening.        Rosuvastatin Calcium (Tab) CRESTOR 5 MG Take 1 Tab by mouth every evening.        .                 Medicines prescribed today were sent to:     [facility] [address] [place] [address] (S), [place] - [address]    [facility] [facility] ([place] [place] [address]    Phone: [phone] Fax: [phone]    Open 24 Hours?: No      Medication refill instructions:       If your prescription bottle indicates you have medication refills left, it is not necessary to call your provider’s office. Please contact your pharmacy and they will refill your medication.    If your prescription bottle indicates you do not have any refills left, you may request refills at any time through one of the following ways: The online [employer] system (except Urgent Care), by calling your provider’s office, or by asking your pharmacy to contact your provider’s office with a refill request. Medication refills are processed only during regular business hours and may not be available until the next business day. Your provider may request additional information or to have a follow-up visit with you prior to refilling your medication.   *Please Note: Medication refills are assigned a new Rx number when refilled electronically. Your pharmacy may indicate that no refills were authorized even though a new prescription for the same medication is available at the pharmacy. Please request the medicine by name with the pharmacy before contacting your provider for a refill.        Your To Do List     Future  Labs/Procedures Complete By Expires    IRON/TOTAL IRON BIND (FEIBC)  As directed 2/1/2018    METANEPHRINES PLASMA  As directed 2/1/2018    TSH WITH REFLEX TO FT4  As directed 2/2/2018         MyChart Status: Patient Declined

## 2017-02-01 NOTE — Clinical Note
[facility]   [address]  [place] [address]  Phone: [phone]  Fax: [phone]              Shaka Riley  1965    Encounter Date: 2/1/2017    Jorge A Le M.D.          PROGRESS NOTE:  Subjective:   Shaka Riley is a 51 y.o. male who presents today as follow-up for his nonischemic cardio myopathy. We are still awaiting his sleep study. That is scheduled to complete his every 22nd. He still notices that his blood pressures will spike throughout the day usually once per day in the 180s. He'll wake up in the morning or on days off and feel poorly with low energy and check his blood pressure notices that it's quite low. He denies illicit substances but does say he occasionally drinks alcohol. He's never been evaluated for secondary causes hypertension.    Past Medical History   Diagnosis Date   • Crohn disease (CMS-HCC)    • Congestive heart failure (CMS-HCC) 7/2015     EF 30-35%; Dilated R atrium; LVH; Mild MR; Mild AI; Mild TR   • PAF (paroxysmal atrial fibrillation) (CMS-HCC)    • Hypertension    • Clotting disorder (CMS-HCC)      PE   • Sleep apnea      un-diagnosed and pending sleep study     History reviewed. No pertinent past surgical history.  Family History   Problem Relation Age of Onset   • Cancer Mother    • Heart Disease Paternal Grandmother    • Heart Disease Paternal Grandfather      History   Smoking status   • Never Smoker    Smokeless tobacco   • Never Used     No Known Allergies  Outpatient Encounter Prescriptions as of 2/1/2017   Medication Sig Dispense Refill   • rosuvastatin (CRESTOR) 5 MG Tab Take 1 Tab by mouth every evening. 90 Tab 3   • ropinirole (REQUIP) 0.5 MG Tab Take 1 Tab by mouth every evening. 30 Tab 11   • aspirin (ASA) 325 MG Tab Take 325 mg by mouth every day.     • rosuvastatin (CRESTOR) 5 MG Tab Take 5 mg by mouth every evening.     • losartan (COZAAR) 50 MG Tab Take 1 Tab by mouth 2 Times a  "Day. 60 Tab 11   • carvedilol (COREG) 25 MG Tab Take 1 Tab by mouth 2 times a day, with meals. 180 Tab 3   • albuterol 108 (90 BASE) MCG/ACT Aero Soln inhalation aerosol Inhale 2 Puffs by mouth every four hours as needed for Shortness of Breath. 1 Inhaler 1   • furosemide (LASIX) 40 MG Tab Take 1 Tab by mouth 2 Times a Day. 180 Tab 3   • [DISCONTINUED] rosuvastatin (CRESTOR) 5 MG Tab Take 1 Tab by mouth every evening. 90 Tab 0   • potassium chloride SA (K-DUR) 20 MEQ Tab CR Take 1 Tab by mouth every day. (Patient not taking: Reported on 1/4/2017) 90 Tab 3     No facility-administered encounter medications on file as of 2/1/2017.     Review of Systems   Constitutional: Negative.  Negative for fever, chills and malaise/fatigue.   HENT: Negative.  Negative for sore throat.    Eyes: Negative.    Respiratory: Negative.  Negative for cough, hemoptysis, sputum production, shortness of breath, wheezing and stridor.    Cardiovascular: Negative.  Negative for chest pain, palpitations, orthopnea, claudication, leg swelling and PND.   Gastrointestinal: Negative.    Genitourinary: Negative.    Musculoskeletal: Negative.    Skin: Negative.    Neurological: Negative.  Negative for dizziness, loss of consciousness and weakness.   Endo/Heme/Allergies: Negative.  Does not bruise/bleed easily.   All other systems reviewed and are negative.       Objective:   /104 mmHg  Pulse 84  Ht 1.753 m (5' 9.02\")  Wt 102.967 kg (227 lb)  BMI 33.51 kg/m2  SpO2 93%    Physical Exam   Constitutional: He is oriented to person, place, and time. He appears well-developed and well-nourished. No distress.   HENT:   Head: Normocephalic.   Mouth/Throat: Oropharynx is clear and moist.   Eyes: EOM are normal. Pupils are equal, round, and reactive to light. Right eye exhibits no discharge. Left eye exhibits no discharge. No scleral icterus.   Neck: Normal range of motion. Neck supple. No JVD present. No tracheal deviation present.   Cardiovascular: " Normal rate, regular rhythm, S1 normal, S2 normal, normal heart sounds, intact distal pulses and normal pulses.  Exam reveals no gallop, no S3, no S4 and no friction rub.    No murmur heard.   No systolic murmur is present    No diastolic murmur is present   Pulses:       Carotid pulses are 2+ on the right side, and 2+ on the left side.       Radial pulses are 2+ on the right side, and 2+ on the left side.        Dorsalis pedis pulses are 2+ on the right side, and 2+ on the left side.        Posterior tibial pulses are 2+ on the right side, and 2+ on the left side.   Pulmonary/Chest: Effort normal and breath sounds normal. No respiratory distress. He has no wheezes. He has no rales.   Abdominal: Soft. Bowel sounds are normal. He exhibits no distension and no mass. There is no tenderness. There is no rebound and no guarding.   Musculoskeletal: He exhibits no edema.   Neurological: He is alert and oriented to person, place, and time. No cranial nerve deficit.   Skin: Skin is warm and dry. He is not diaphoretic. No pallor.   Psychiatric: He has a normal mood and affect. His behavior is normal. Judgment and thought content normal.   Nursing note and vitals reviewed.      Assessment:     1. SOB (shortness of breath)  TSH WITH REFLEX TO FT4    RENIN ACTIVITY AND ALDOSTERONE    METANEPHRINES PLASMA   2. Syncope and collapse  RENIN ACTIVITY AND ALDOSTERONE    IRON/TOTAL IRON BIND (FEIBC)   3. Paroxysmal atrial fibrillation (CMS-[facility])  METANEPHRINES PLASMA   4. Chronic septic pulmonary embolism with acute cor pulmonale (CMS-[facility])  TSH WITH REFLEX TO FT4    RENIN ACTIVITY AND ALDOSTERONE    METANEPHRINES PLASMA    IRON/TOTAL IRON BIND (FEIBC)    ropinirole (REQUIP) 0.5 MG Tab   5. Non-STEMI (non-ST elevated myocardial infarction) (CMS-[facility])  TSH WITH REFLEX TO FT4    RENIN ACTIVITY AND ALDOSTERONE   6. Acute systolic CHF (congestive heart failure), NYHA class 3 (CMS-[facility])  TSH WITH REFLEX TO FT4    ropinirole (REQUIP) 0.5 MG Tab     7. PVC (premature ventricular contraction)  METANEPHRINES PLASMA   8. Snoring  IRON/TOTAL IRON BIND (FEIBC)    ropinirole (REQUIP) 0.5 MG Tab   9. Essential hypertension  METANEPHRINES PLASMA    IRON/TOTAL IRON BIND (FEIBC)   10. Pure hypercholesterolemia  METANEPHRINES PLASMA    rosuvastatin (CRESTOR) 5 MG Tab   11. Restless leg syndrome         Medical Decision Making:  Today's Assessment / Status / Plan:     50 y/o M with CHFrEF, cause unknown with poor compliance. We are awaiting the results of the sleep study. I will also send some labs for evaluation of a secondary cause of hypertension. I will see him back in one month. In the meantime I did start him on ropinirole for his restless leg syndrome.     1. CHFrEF 40%, NYHA I, Stage C, Hemo Pro A    - cont coreg 25 BID    - cont losartan 50 BID    - not on spironolactone    - ICD - not candidate    - BiV - not candidate    - cardiac PET    2. HTN    - consider starting spironolactone    3. HLD    - cont rosuva    4. Restless leg syndrome    - iron studies    - start ropinerole 0.5 mg at bedtime    Thank for you allowing me to take part in your patient's care, please call should you have any questions or would like to discuss this patient.      Qiana Howe M.D.  [address] [place] [address] Dr Andrew NASSAR [id number]  VIA In Basket

## 2017-02-02 NOTE — PROGRESS NOTES
Subjective:   Shaka Riley is a 51 y.o. male who presents today as follow-up for his nonischemic cardio myopathy. We are still awaiting his sleep study. That is scheduled to complete his every 22nd. He still notices that his blood pressures will spike throughout the day usually once per day in the 180s. He'll wake up in the morning or on days off and feel poorly with low energy and check his blood pressure notices that it's quite low. He denies illicit substances but does say he occasionally drinks alcohol. He's never been evaluated for secondary causes hypertension.    Past Medical History   Diagnosis Date   • Crohn disease (CMS-HCC)    • Congestive heart failure (CMS-HCC) 7/2015     EF 30-35%; Dilated R atrium; LVH; Mild MR; Mild AI; Mild TR   • PAF (paroxysmal atrial fibrillation) (CMS-HCC)    • Hypertension    • Clotting disorder (CMS-HCC)      PE   • Sleep apnea      un-diagnosed and pending sleep study     History reviewed. No pertinent past surgical history.  Family History   Problem Relation Age of Onset   • Cancer Mother    • Heart Disease Paternal Grandmother    • Heart Disease Paternal Grandfather      History   Smoking status   • Never Smoker    Smokeless tobacco   • Never Used     No Known Allergies  Outpatient Encounter Prescriptions as of 2/1/2017   Medication Sig Dispense Refill   • rosuvastatin (CRESTOR) 5 MG Tab Take 1 Tab by mouth every evening. 90 Tab 3   • ropinirole (REQUIP) 0.5 MG Tab Take 1 Tab by mouth every evening. 30 Tab 11   • aspirin (ASA) 325 MG Tab Take 325 mg by mouth every day.     • rosuvastatin (CRESTOR) 5 MG Tab Take 5 mg by mouth every evening.     • losartan (COZAAR) 50 MG Tab Take 1 Tab by mouth 2 Times a Day. 60 Tab 11   • carvedilol (COREG) 25 MG Tab Take 1 Tab by mouth 2 times a day, with meals. 180 Tab 3   • albuterol 108 (90 BASE) MCG/ACT Aero Soln inhalation aerosol Inhale 2 Puffs by mouth every four hours as needed for Shortness of Breath. 1 Inhaler 1   •  "furosemide (LASIX) 40 MG Tab Take 1 Tab by mouth 2 Times a Day. 180 Tab 3   • [DISCONTINUED] rosuvastatin (CRESTOR) 5 MG Tab Take 1 Tab by mouth every evening. 90 Tab 0   • potassium chloride SA (K-DUR) 20 MEQ Tab CR Take 1 Tab by mouth every day. (Patient not taking: Reported on 1/4/2017) 90 Tab 3     No facility-administered encounter medications on file as of 2/1/2017.     Review of Systems   Constitutional: Negative.  Negative for fever, chills and malaise/fatigue.   HENT: Negative.  Negative for sore throat.    Eyes: Negative.    Respiratory: Negative.  Negative for cough, hemoptysis, sputum production, shortness of breath, wheezing and stridor.    Cardiovascular: Negative.  Negative for chest pain, palpitations, orthopnea, claudication, leg swelling and PND.   Gastrointestinal: Negative.    Genitourinary: Negative.    Musculoskeletal: Negative.    Skin: Negative.    Neurological: Negative.  Negative for dizziness, loss of consciousness and weakness.   Endo/Heme/Allergies: Negative.  Does not bruise/bleed easily.   All other systems reviewed and are negative.       Objective:   /104 mmHg  Pulse 84  Ht 1.753 m (5' 9.02\")  Wt 102.967 kg (227 lb)  BMI 33.51 kg/m2  SpO2 93%    Physical Exam   Constitutional: He is oriented to person, place, and time. He appears well-developed and well-nourished. No distress.   HENT:   Head: Normocephalic.   Mouth/Throat: Oropharynx is clear and moist.   Eyes: EOM are normal. Pupils are equal, round, and reactive to light. Right eye exhibits no discharge. Left eye exhibits no discharge. No scleral icterus.   Neck: Normal range of motion. Neck supple. No JVD present. No tracheal deviation present.   Cardiovascular: Normal rate, regular rhythm, S1 normal, S2 normal, normal heart sounds, intact distal pulses and normal pulses.  Exam reveals no gallop, no S3, no S4 and no friction rub.    No murmur heard.   No systolic murmur is present    No diastolic murmur is present "   Pulses:       Carotid pulses are 2+ on the right side, and 2+ on the left side.       Radial pulses are 2+ on the right side, and 2+ on the left side.        Dorsalis pedis pulses are 2+ on the right side, and 2+ on the left side.        Posterior tibial pulses are 2+ on the right side, and 2+ on the left side.   Pulmonary/Chest: Effort normal and breath sounds normal. No respiratory distress. He has no wheezes. He has no rales.   Abdominal: Soft. Bowel sounds are normal. He exhibits no distension and no mass. There is no tenderness. There is no rebound and no guarding.   Musculoskeletal: He exhibits no edema.   Neurological: He is alert and oriented to person, place, and time. No cranial nerve deficit.   Skin: Skin is warm and dry. He is not diaphoretic. No pallor.   Psychiatric: He has a normal mood and affect. His behavior is normal. Judgment and thought content normal.   Nursing note and vitals reviewed.      Assessment:     1. SOB (shortness of breath)  TSH WITH REFLEX TO FT4    RENIN ACTIVITY AND ALDOSTERONE    METANEPHRINES PLASMA   2. Syncope and collapse  RENIN ACTIVITY AND ALDOSTERONE    IRON/TOTAL IRON BIND (FEIBC)   3. Paroxysmal atrial fibrillation (CMS-[facility])  METANEPHRINES PLASMA   4. Chronic septic pulmonary embolism with acute cor pulmonale (CMS-[facility])  TSH WITH REFLEX TO FT4    RENIN ACTIVITY AND ALDOSTERONE    METANEPHRINES PLASMA    IRON/TOTAL IRON BIND (FEIBC)    ropinirole (REQUIP) 0.5 MG Tab   5. Non-STEMI (non-ST elevated myocardial infarction) (CMS-HCC)  TSH WITH REFLEX TO FT4    RENIN ACTIVITY AND ALDOSTERONE   6. Acute systolic CHF (congestive heart failure), NYHA class 3 (CMS-[facility])  TSH WITH REFLEX TO FT4    ropinirole (REQUIP) 0.5 MG Tab   7. PVC (premature ventricular contraction)  METANEPHRINES PLASMA   8. Snoring  IRON/TOTAL IRON BIND (FEIBC)    ropinirole (REQUIP) 0.5 MG Tab   9. Essential hypertension  METANEPHRINES PLASMA    IRON/TOTAL IRON BIND (FEIBC)   10. Pure hypercholesterolemia   METANEPHRINES PLASMA    rosuvastatin (CRESTOR) 5 MG Tab   11. Restless leg syndrome         Medical Decision Making:  Today's Assessment / Status / Plan:     50 y/o M with CHFrEF, cause unknown with poor compliance. We are awaiting the results of the sleep study. I will also send some labs for evaluation of a secondary cause of hypertension. I will see him back in one month. In the meantime I did start him on ropinirole for his restless leg syndrome.     1. CHFrEF 40%, NYHA I, Stage C, Hemo Pro A    - cont coreg 25 BID    - cont losartan 50 BID    - not on spironolactone    - ICD - not candidate    - BiV - not candidate    - cardiac PET    2. HTN    - consider starting spironolactone    3. HLD    - cont rosuva    4. Restless leg syndrome    - iron studies    - start ropinerole 0.5 mg at bedtime    Thank for you allowing me to take part in your patient's care, please call should you have any questions or would like to discuss this patient.

## 2017-02-22 ENCOUNTER — SLEEP CENTER VISIT (OUTPATIENT)
Dept: SLEEP MEDICINE | Facility: MEDICAL CENTER | Age: 52
End: 2017-02-22
Payer: COMMERCIAL

## 2017-02-22 VITALS
HEIGHT: 69 IN | SYSTOLIC BLOOD PRESSURE: 142 MMHG | HEART RATE: 85 BPM | RESPIRATION RATE: 16 BRPM | OXYGEN SATURATION: 92 % | WEIGHT: 227 LBS | DIASTOLIC BLOOD PRESSURE: 86 MMHG | BODY MASS INDEX: 33.62 KG/M2 | TEMPERATURE: 97.3 F

## 2017-02-22 DIAGNOSIS — I50.21 ACUTE SYSTOLIC CHF (CONGESTIVE HEART FAILURE), NYHA CLASS 3 (HCC): ICD-10-CM

## 2017-02-22 DIAGNOSIS — G25.81 RESTLESS LEG SYNDROME: ICD-10-CM

## 2017-02-22 DIAGNOSIS — G47.33 OSA (OBSTRUCTIVE SLEEP APNEA): ICD-10-CM

## 2017-02-22 DIAGNOSIS — I48.0 PAROXYSMAL ATRIAL FIBRILLATION (HCC): ICD-10-CM

## 2017-02-22 DIAGNOSIS — E66.9 OBESITY (BMI 30-39.9): ICD-10-CM

## 2017-02-22 PROCEDURE — 99204 OFFICE O/P NEW MOD 45 MIN: CPT | Performed by: INTERNAL MEDICINE

## 2017-02-22 RX ORDER — CARVEDILOL 12.5 MG/1
TABLET ORAL
COMMUNITY
Start: 2016-12-06 | End: 2017-02-28

## 2017-02-22 RX ORDER — DOXYCYCLINE HYCLATE 100 MG/1
CAPSULE ORAL
COMMUNITY
Start: 2016-11-28 | End: 2017-08-01

## 2017-02-22 RX ORDER — ZOLPIDEM TARTRATE 5 MG/1
5 TABLET ORAL NIGHTLY PRN
Qty: 3 TAB | Refills: 0 | Status: SHIPPED | OUTPATIENT
Start: 2017-02-22 | End: 2017-08-04 | Stop reason: SDUPTHER

## 2017-02-22 NOTE — MR AVS SNAPSHOT
"Shaka Riley   2017 2:20 PM   Sleep Center Visit   MRN: [id number]    Department:  [facility]   Dept Phone:  [phone]    Description:  Male : 1965   Provider:  Valery Jasmine M.D.           Reason for Visit     New Patient           Allergies as of 2017     No Known Allergies      You were diagnosed with     ANGELITA (obstructive sleep apnea)   [904471]       Acute systolic CHF (congestive heart failure), NYHA class 3 (CMS-[facility])   [537810]       Paroxysmal atrial fibrillation (CMS-[facility])   [815099]       Restless leg syndrome   [141144]       Obesity (BMI 30-39.9)   [282339]         Vital Signs     Blood Pressure Pulse Temperature Respirations Height Weight    142/86 mmHg 85 36.3 °C (97.3 °F) 16 1.753 m (5' 9.02\") 102.967 kg (227 lb)    Body Mass Index Oxygen Saturation Smoking Status             33.51 kg/m2 92% Never Smoker          Basic Information     Date Of Birth Sex Race Ethnicity Preferred Language    1965 Male White Non- English      Your appointments     Mar 02, 2017  2:15 PM   Heart Failure Established with Jorge A Le M.D.   [facility] (--)    [address], [address]  [place] [address]   [phone]            2017  8:00 PM   Sleep Study Diagnostic with SLEEP TECH   [facility] (--)    [address] [facility] [address]  [place] [address] [phone]            2017  2:00 PM   Follow UP with BEL Lamb   [facility] (--)    [address] [facility]  [address]  [place] [address]   [phone]              Problem List              ICD-10-CM Priority Class Noted - Resolved    HTN (hypertension) I10 Medium  2014 - Present    Snoring R06.83 Medium  2015 - Present    PVC (premature ventricular contraction) I49.3 Medium  2015 - Present    Acute systolic CHF (congestive heart failure), NYHA class 3 (CMS-HCC) I50.21 Medium  " 7/13/2015 - Present    Non-STEMI (non-ST elevated myocardial infarction) (CMS-HCC) I21.4 Medium  7/13/2015 - Present    Pleurisy R09.1 Low  7/20/2015 - Present    Pulmonary embolism (CMS-HCC) I26.99 Medium  7/22/2015 - Present    Crohn disease (CMS-HCC) K50.90 Low  7/23/2015 - Present    Paroxysmal atrial fibrillation (CMS-HCC) I48.0 Medium  8/3/2015 - Present    Syncope and collapse R55 Medium  7/20/2016 - Present    Elevated blood sugar R73.9 Medium  7/20/2016 - Present    Obesity (BMI 30-39.9) E66.9 Medium  12/6/2016 - Present    Pure hypercholesterolemia E78.00 Medium  12/6/2016 - Present    SOB (shortness of breath) R06.02   1/4/2017 - Present    Restless leg syndrome G25.81   2/1/2017 - Present      Health Maintenance        Date Due Completion Dates    IMM DTaP/Tdap/Td Vaccine (1 - Tdap) 4/6/1983 ---    COLONOSCOPY 4/6/2014 ---    IMM INFLUENZA (1) 9/1/2016 5/5/2014            Current Immunizations     Influenza TIV (IM) 5/5/2014 10:30 PM    Pneumococcal polysaccharide vaccine (PPSV-23) 7/14/2015 10:57 AM      Below and/or attached are the medications your provider expects you to take. Review all of your home medications and newly ordered medications with your provider and/or pharmacist. Follow medication instructions as directed by your provider and/or pharmacist. Please keep your medication list with you and share with your provider. Update the information when medications are discontinued, doses are changed, or new medications (including over-the-counter products) are added; and carry medication information at all times in the event of emergency situations     Allergies:  No Known Allergies          Medications  Valid as of: February 22, 2017 -  2:52 PM    Generic Name Brand Name Tablet Size Instructions for use    Albuterol Sulfate (Aero Soln) albuterol 108 (90 BASE) MCG/ACT Inhale 2 Puffs by mouth every four hours as needed for Shortness of Breath.        Aspirin (Tab)  MG Take 325 mg by mouth  every day.        Carvedilol (Tab) COREG 25 MG Take 1 Tab by mouth 2 times a day, with meals.        Carvedilol (Tab) COREG 12.5 MG         Doxycycline Hyclate (Cap) VIBRAMYCIN 100 MG         Furosemide (Tab) LASIX 40 MG Take 1 Tab by mouth 2 Times a Day.        Losartan Potassium (Tab) COZAAR 50 MG Take 1 Tab by mouth 2 Times a Day.        Potassium Chloride Carrie CR (Tab CR) Kdur 20 MEQ Take 1 Tab by mouth every day.        ROPINIRole HCl (Tab) REQUIP 0.5 MG Take 1 Tab by mouth every evening.        Rosuvastatin Calcium (Tab) CRESTOR 5 MG Take 5 mg by mouth every evening.        Rosuvastatin Calcium (Tab) CRESTOR 5 MG Take 1 Tab by mouth every evening.        Zolpidem Tartrate (Tab) AMBIEN 5 MG Take 1 Tab by mouth at bedtime as needed for Sleep (1 to 3 po qhs prn insomnia/sleep study. Bring to sleep study.).        .                 Medicines prescribed today were sent to:     [facility] [address] [place] [address] (S), [place] [employer] [address]    [facility] [place] ([place]) [place] [address]    Phone: [phone] Fax: [phone]    Open 24 Hours?: No      Medication refill instructions:       If your prescription bottle indicates you have medication refills left, it is not necessary to call your provider’s office. Please contact your pharmacy and they will refill your medication.    If your prescription bottle indicates you do not have any refills left, you may request refills at any time through one of the following ways: The online All My Data system (except Urgent Care), by calling your provider’s office, or by asking your pharmacy to contact your provider’s office with a refill request. Medication refills are processed only during regular business hours and may not be available until the next business day. Your provider may request additional information or to have a follow-up visit with you prior to refilling your medication.   *Please Note: Medication refills are assigned a new Rx number when refilled electronically. Your  pharmacy may indicate that no refills were authorized even though a new prescription for the same medication is available at the pharmacy. Please request the medicine by name with the pharmacy before contacting your provider for a refill.        Your To Do List     Future Labs/Procedures Complete By Expires    POLYSOMNOGRAPHY, 4 OR MORE  As directed 2/22/2018         [employer] Access Code: [email or web address]  Expires: 3/24/2017  2:52 PM    Engage Resources  A secure, online tool to manage your health information     [employer]’s Engage Resources® is a secure, online tool that connects you to your personalized health information from the privacy of your home -- day or night - making it very easy for you to manage your healthcare. Once the activation process is completed, you can even access your medical information using the Eng[employer] Resources stephani, which is available for free in the Apple Stephani store or Google Play store.     Eng[employer] Resources provides the following levels of access (as shown below):   My Chart Features   [facility] Primary Care Doctor [facility]  Specialists [facility]  Urgent  Care [facility]  Primary Care  Doctor   Email your healthcare team securely and privately 24/7 X X X    Manage appointments: schedule your next appointment; view details of past/upcoming appointments X      Request prescription refills. X      View recent personal medical records, including lab and immunizations X X X X   View health record, including health history, allergies, medications X X X X   Read reports about your outpatient visits, procedures, consult and ER notes X X X X   See your discharge summary, which is a recap of your hospital and/or ER visit that includes your diagnosis, lab results, and care plan. X X       How to register for Engage Resources:  1. Go to  [email or web address].  2. Click on the Sign Up Now box, which takes you to the New Member Sign Up page. You will need to provide the following information:  a. Enter your Eng[employer] Resources Access Code exactly as it  appears at the top of this page. (You will not need to use this code after you’ve completed the sign-up process. If you do not sign up before the expiration date, you must request a new code.)   b. Enter your date of birth.   c. Enter your home email address.   d. Click Submit, and follow the next screen’s instructions.  3. Create a [employer] ID. This will be your [employer] login ID and cannot be changed, so think of one that is secure and easy to remember.  4. Create a [employer] password. You can change your password at any time.  5. Enter your Password Reset Question and Answer. This can be used at a later time if you forget your password.   6. Enter your e-mail address. This allows you to receive e-mail notifications when new information is available in [employer].  7. Click Sign Up. You can now view your health information.    For assistance activating your [employer] account, call [phone]

## 2017-02-22 NOTE — PROGRESS NOTES
Chief Complaint   Patient presents with   • New Patient       HPI: This patient is a 51 y.o. Male who is referred for sleep apnea evaluation. He has a history of labile hypertension, systolic congestive heart failure, and paroxysmal atrial fibrillation, and follows closely with cardiology. The patient is unaware of any sleep issues however his wife notes snoring. He has restless legs symptoms which have improved since starting Requip 2 weeks ago. In terms of sleep habits, he typically goes to bed by 9 PM, estimates 2-3 nighttime awakenings for nocturia, and wakes up at 7 AM, feeling unrested. His wife notes that he is very restless in his sleep. He denies daytime napping or hypersomnolence.  He has gained 30 pounds over the past year with a BMI 33. He attributes this to decreased activity level following pulmonary embolism and congestive heart failure in October 2015.      Past Medical History   Diagnosis Date   • Crohn disease (CMS-HCC)    • Congestive heart failure (CMS-HCC) 7/2015     EF 30-35%; Dilated R atrium; LVH; Mild MR; Mild AI; Mild TR   • PAF (paroxysmal atrial fibrillation) (CMS-HCC)    • Hypertension    • Clotting disorder (CMS-HCC)      PE   • Sleep apnea      un-diagnosed and pending sleep study       Social History     Social History   • Marital Status:      Spouse Name: N/A   • Number of Children: N/A   • Years of Education: N/A     Occupational History   • Not on file.     Social History Main Topics   • Smoking status: Never Smoker    • Smokeless tobacco: Never Used   • Alcohol Use: 1.2 oz/week     2 Glasses of wine per week      Comment: Quit in 1994 - previous six beer after work   • Drug Use: No      Comment: Quit in 2012, used for 5 years   • Sexual Activity:     Partners: Female     Other Topics Concern   • Not on file     Social History Narrative       Family History   Problem Relation Age of Onset   • Cancer Mother    • Heart Disease Paternal Grandmother    • Heart Disease Paternal  "Grandfather        Current Outpatient Prescriptions on File Prior to Visit   Medication Sig Dispense Refill   • rosuvastatin (CRESTOR) 5 MG Tab Take 1 Tab by mouth every evening. 90 Tab 3   • ropinirole (REQUIP) 0.5 MG Tab Take 1 Tab by mouth every evening. 30 Tab 11   • aspirin (ASA) 325 MG Tab Take 325 mg by mouth every day.     • rosuvastatin (CRESTOR) 5 MG Tab Take 5 mg by mouth every evening.     • losartan (COZAAR) 50 MG Tab Take 1 Tab by mouth 2 Times a Day. 60 Tab 11   • carvedilol (COREG) 25 MG Tab Take 1 Tab by mouth 2 times a day, with meals. 180 Tab 3   • albuterol 108 (90 BASE) MCG/ACT Aero Soln inhalation aerosol Inhale 2 Puffs by mouth every four hours as needed for Shortness of Breath. 1 Inhaler 1   • furosemide (LASIX) 40 MG Tab Take 1 Tab by mouth 2 Times a Day. 180 Tab 3   • potassium chloride SA (K-DUR) 20 MEQ Tab CR Take 1 Tab by mouth every day. (Patient not taking: Reported on 1/4/2017) 90 Tab 3     No current facility-administered medications on file prior to visit.       Allergies: Review of patient's allergies indicates no known allergies.    ROS:   Constitutional: Denies fevers, chills, night sweats, fatigue or weight loss  Eyes: Denies vision loss, pain, drainage, double vision  Ears, Nose, Throat: Denies earache, difficulty hearing, tinnitus, nasal congestion, hoarseness  Cardiovascular: Denies chest pain, tightness, palpitations, orthopnea or edema  Respiratory: Denies shortness of breath, cough, wheezing, hemoptysis  Sleep: As in history of present illness  GI: Denies heartburn, dysphagia, nausea, abdominal pain, diarrhea or constipation  : Denies frequent urination, hematuria, discharge or painful urination  Musculoskeletal: Denies back pain, painful joints, sore muscles  Neurological: Denies weakness or headaches  Skin: No rashes    Blood pressure 142/86, pulse 85, temperature 36.3 °C (97.3 °F), resp. rate 16, height 1.753 m (5' 9.02\"), weight 102.967 kg (227 lb), SpO2 92 " %.  Multi-Ox Readings  Multi Ox #1     O2 sat % at rest     O2 sat % on exertion     O2 sat average on exertion     Multi Ox #2     O2 sat % at rest     O2 sat % on exertion     O2 sat average on exertion       Oxygen Use     Oxygen Frequency     Duration of need     Is the patient mobile within the home?     CPAP Use?     BIPAP Use?     Servo Titration         Physical Exam:  Appearance: Well-nourished, well-developed, in no acute distress  HEENT: Normocephalic, atraumatic, white sclera, PERRLA, oropharynx clear, Mallampati 3  Neck: No adenopathy or masses  Respiratory: no intercostal retractions or accessory muscle use  Lungs auscultation: Clear to auscultation bilaterally  Cardiovascular: Regular rate rhythm. No murmurs, rubs or gallops.  No LE edema  Abdomen: soft, nondistended  Gait: Normal  Digits: No clubbing, cyanosis  Motor: No focal deficits  Orientation: Oriented to time, person and place    Diagnosis:  1. ANGELITA (obstructive sleep apnea)  POLYSOMNOGRAPHY, 4 OR MORE    zolpidem (AMBIEN) 5 MG Tab   2. Acute systolic CHF (congestive heart failure), NYHA class 3 (CMS-[facility])     3. Paroxysmal atrial fibrillation (CMS-[facility])     4. Restless leg syndrome     5. Obesity (BMI 30-39.9)     6.      Hypertension      Plan:  The patient has snoring, a crowded airway, and obesity, with high clinical suspicion for obstructive sleep apnea syndrome. He also has significant cardiac comorbidities including labile hypertension, arrhythmias, and systolic heart failure, which can be exacerbated by untreated sleep-disordered breathing.  We discussed the pathophysiology of sleep apnea as well as treatment with CPAP/BIPAP therapy. Treatment would be expected to improve both sleep consolidation as well as cardiovascular comorbidities, and even his restless leg symptoms.  We will arrange for polysomnography in the sleep laboratory for evaluation with close follow-up.  Return for after testing.

## 2017-02-27 ENCOUNTER — HOSPITAL ENCOUNTER (OUTPATIENT)
Dept: LAB | Facility: MEDICAL CENTER | Age: 52
End: 2017-02-27
Attending: INTERNAL MEDICINE
Payer: COMMERCIAL

## 2017-02-27 DIAGNOSIS — I50.21 ACUTE SYSTOLIC CHF (CONGESTIVE HEART FAILURE), NYHA CLASS 3 (HCC): ICD-10-CM

## 2017-02-27 DIAGNOSIS — I26.01 CHRONIC SEPTIC PULMONARY EMBOLISM WITH ACUTE COR PULMONALE (HCC): ICD-10-CM

## 2017-02-27 DIAGNOSIS — R55 SYNCOPE AND COLLAPSE: ICD-10-CM

## 2017-02-27 DIAGNOSIS — R06.83 SNORING: ICD-10-CM

## 2017-02-27 DIAGNOSIS — I27.82 CHRONIC SEPTIC PULMONARY EMBOLISM WITH ACUTE COR PULMONALE (HCC): ICD-10-CM

## 2017-02-27 DIAGNOSIS — E78.00 PURE HYPERCHOLESTEROLEMIA: ICD-10-CM

## 2017-02-27 DIAGNOSIS — R06.02 SOB (SHORTNESS OF BREATH): ICD-10-CM

## 2017-02-27 DIAGNOSIS — I10 ESSENTIAL HYPERTENSION: ICD-10-CM

## 2017-02-27 DIAGNOSIS — I21.4 NON-STEMI (NON-ST ELEVATED MYOCARDIAL INFARCTION) (HCC): ICD-10-CM

## 2017-02-27 DIAGNOSIS — I48.0 PAROXYSMAL ATRIAL FIBRILLATION (HCC): ICD-10-CM

## 2017-02-27 DIAGNOSIS — I49.3 PVC (PREMATURE VENTRICULAR CONTRACTION): ICD-10-CM

## 2017-02-27 LAB
IRON SATN MFR SERPL: 26 % (ref 15–55)
IRON SERPL-MCNC: 113 UG/DL (ref 50–180)
TIBC SERPL-MCNC: 428 UG/DL (ref 250–450)
TSH SERPL DL<=0.005 MIU/L-ACNC: 0.64 UIU/ML (ref 0.3–3.7)

## 2017-02-27 PROCEDURE — 83540 ASSAY OF IRON: CPT

## 2017-02-27 PROCEDURE — 84443 ASSAY THYROID STIM HORMONE: CPT

## 2017-02-27 PROCEDURE — 36415 COLL VENOUS BLD VENIPUNCTURE: CPT

## 2017-02-27 PROCEDURE — 83550 IRON BINDING TEST: CPT

## 2017-02-27 PROCEDURE — 84244 ASSAY OF RENIN: CPT

## 2017-02-27 PROCEDURE — 82088 ASSAY OF ALDOSTERONE: CPT

## 2017-02-27 PROCEDURE — 83835 ASSAY OF METANEPHRINES: CPT

## 2017-02-28 ENCOUNTER — TELEPHONE (OUTPATIENT)
Dept: CARDIOLOGY | Facility: MEDICAL CENTER | Age: 52
End: 2017-02-28

## 2017-02-28 NOTE — TELEPHONE ENCOUNTER
"Spoke with Radha, spouse who reports that patients BP prior to medication running 180/115, 175/109 range.  After medication not much better.  140/95, 135/85 reported as the lowest.  Symptoms reported are \"anxiousness\" and right sided chest discomfort with rest.  Not exacerbated with activity.  Medications confirmed as per list.  He has an appt with Dr. Le 3/9 but requesting sooner follow up.  Scheduled with Layla JAIN for tomorrow 3/1/17 @ 3:40pm.  He will bring his medications or updated list to his appointment tomorrow.  "

## 2017-02-28 NOTE — TELEPHONE ENCOUNTER
----- Message from Candace Raya sent at 2/28/2017 10:39 AM PST -----  Regarding: wife wants to discuss changing his medication  BISI/Alejandrina        Patient's wife Radha called and wants to discuss changing his medications. She can be reached at [phone].

## 2017-03-01 LAB
ALDOST SERPL-MCNC: 5 NG/DL
RENIN PLAS-CCNC: 1.2 NG/ML/HR

## 2017-03-02 LAB
METANEPHS SERPL-SCNC: 0.22 NMOL/L (ref 0–0.49)
NORMETANEPHRINE SERPL-SCNC: 0.39 NMOL/L (ref 0–0.89)

## 2017-03-09 ENCOUNTER — TELEPHONE (OUTPATIENT)
Dept: CARDIOLOGY | Facility: MEDICAL CENTER | Age: 52
End: 2017-03-09

## 2017-03-09 ENCOUNTER — OFFICE VISIT (OUTPATIENT)
Dept: CARDIOLOGY | Facility: MEDICAL CENTER | Age: 52
End: 2017-03-09
Payer: COMMERCIAL

## 2017-03-09 VITALS
HEART RATE: 69 BPM | DIASTOLIC BLOOD PRESSURE: 110 MMHG | BODY MASS INDEX: 34.07 KG/M2 | SYSTOLIC BLOOD PRESSURE: 170 MMHG | WEIGHT: 230 LBS | OXYGEN SATURATION: 90 % | HEIGHT: 69 IN

## 2017-03-09 DIAGNOSIS — I48.0 PAROXYSMAL ATRIAL FIBRILLATION (HCC): ICD-10-CM

## 2017-03-09 DIAGNOSIS — G25.81 RESTLESS LEG SYNDROME: ICD-10-CM

## 2017-03-09 DIAGNOSIS — I26.01 CHRONIC SEPTIC PULMONARY EMBOLISM WITH ACUTE COR PULMONALE (HCC): ICD-10-CM

## 2017-03-09 DIAGNOSIS — I50.21 ACUTE SYSTOLIC CHF (CONGESTIVE HEART FAILURE), NYHA CLASS 3 (HCC): ICD-10-CM

## 2017-03-09 DIAGNOSIS — I27.82 CHRONIC SEPTIC PULMONARY EMBOLISM WITH ACUTE COR PULMONALE (HCC): ICD-10-CM

## 2017-03-09 DIAGNOSIS — K50.00 CROHN'S DISEASE OF SMALL INTESTINE WITHOUT COMPLICATION (HCC): ICD-10-CM

## 2017-03-09 DIAGNOSIS — I49.3 PVC (PREMATURE VENTRICULAR CONTRACTION): ICD-10-CM

## 2017-03-09 DIAGNOSIS — I50.22 STAGE C CHRONIC SYSTOLIC CONGESTIVE HEART FAILURE (HCC): ICD-10-CM

## 2017-03-09 DIAGNOSIS — R06.02 SOB (SHORTNESS OF BREATH): ICD-10-CM

## 2017-03-09 DIAGNOSIS — I21.4 NON-STEMI (NON-ST ELEVATED MYOCARDIAL INFARCTION) (HCC): ICD-10-CM

## 2017-03-09 DIAGNOSIS — E78.00 PURE HYPERCHOLESTEROLEMIA: ICD-10-CM

## 2017-03-09 DIAGNOSIS — I10 ESSENTIAL HYPERTENSION: ICD-10-CM

## 2017-03-09 PROCEDURE — 99215 OFFICE O/P EST HI 40 MIN: CPT | Performed by: INTERNAL MEDICINE

## 2017-03-09 RX ORDER — SPIRONOLACTONE 25 MG/1
25 TABLET ORAL DAILY
Qty: 30 TAB | Refills: 11 | Status: SHIPPED | OUTPATIENT
Start: 2017-03-09 | End: 2018-04-02 | Stop reason: SDUPTHER

## 2017-03-09 ASSESSMENT — ENCOUNTER SYMPTOMS
FEVER: 0
MUSCULOSKELETAL NEGATIVE: 1
BRUISES/BLEEDS EASILY: 0
DIZZINESS: 0
CARDIOVASCULAR NEGATIVE: 1
SHORTNESS OF BREATH: 0
GASTROINTESTINAL NEGATIVE: 1
SPUTUM PRODUCTION: 0
CHILLS: 0
WHEEZING: 0
HEMOPTYSIS: 0
ORTHOPNEA: 0
RESPIRATORY NEGATIVE: 1
CLAUDICATION: 0
STRIDOR: 0
PALPITATIONS: 0
LOSS OF CONSCIOUSNESS: 0
PND: 0
NEUROLOGICAL NEGATIVE: 1
EYES NEGATIVE: 1
WEAKNESS: 0
CONSTITUTIONAL NEGATIVE: 1
SORE THROAT: 0
COUGH: 0

## 2017-03-09 NOTE — TELEPHONE ENCOUNTER
PAR approved:  Shaka Riley Coredro: [id number] - PA Case ID: [id number]   Outcome   Approvedtoday   CaseId:[id number];Product Name:ST Entresto 75 - [place] National and Medicaid and Preferred and Essential and FAVIOLA;Status:Approved;Coverage Start Date:03/09/2017;Coverage End Date:03/09/2018;   DrugEntresto 49-51MG tablets   FormAnthem Blue Cross and Blue Shield Electronic PA Form

## 2017-03-09 NOTE — PROGRESS NOTES
Subjective:   Shaka Riley is a 51 y.o. male who presents today as a follow-up for his heart failure with reduced ejection fraction. In the interim he underwent a cardiac PET CT which was negative for ischemia. His EF was 39% during that test. His main complaint lately has been fatigue. We did start him on ropinirole for restless leg syndrome which seems to be working quite well. He is pending a sleep study in a couple of weeks. Otherwise he has no lower extremity edema and no nakul shortness of breath with exertion. He does report that he is rather sedentary.    Past Medical History   Diagnosis Date   • Crohn disease (CMS-HCC)    • Congestive heart failure (CMS-HCC) 7/2015     EF 30-35%; Dilated R atrium; LVH; Mild MR; Mild AI; Mild TR   • PAF (paroxysmal atrial fibrillation) (CMS-HCC)    • Hypertension    • Clotting disorder (CMS-HCC)      PE   • Sleep apnea      un-diagnosed and pending sleep study     History reviewed. No pertinent past surgical history.  Family History   Problem Relation Age of Onset   • Cancer Mother    • Heart Disease Paternal Grandmother    • Heart Disease Paternal Grandfather      History   Smoking status   • Never Smoker    Smokeless tobacco   • Never Used     No Known Allergies  Outpatient Encounter Prescriptions as of 3/9/2017   Medication Sig Dispense Refill   • spironolactone (ALDACTONE) 25 MG Tab Take 1 Tab by mouth every day. 30 Tab 11   • sacubitril-valsartan (ENTRESTO) 49-51 MG Tab tablet Take 1 Tab by mouth 2 Times a Day. 60 Tab 11   • ropinirole (REQUIP) 0.5 MG Tab Take 1 Tab by mouth every evening. 30 Tab 11   • aspirin (ASA) 325 MG Tab Take 325 mg by mouth every day.     • losartan (COZAAR) 50 MG Tab Take 1 Tab by mouth 2 Times a Day. 60 Tab 11   • carvedilol (COREG) 25 MG Tab Take 1 Tab by mouth 2 times a day, with meals. 180 Tab 3   • albuterol 108 (90 BASE) MCG/ACT Aero Soln inhalation aerosol Inhale 2 Puffs by mouth every four hours as needed for Shortness of Breath.  "1 Inhaler 1   • doxycycline (VIBRAMYCIN) 100 MG Cap      • zolpidem (AMBIEN) 5 MG Tab Take 1 Tab by mouth at bedtime as needed for Sleep (1 to 3 po qhs prn insomnia/sleep study. Bring to sleep study.). 3 Tab 0   • rosuvastatin (CRESTOR) 5 MG Tab Take 1 Tab by mouth every evening. 90 Tab 3   • [DISCONTINUED] rosuvastatin (CRESTOR) 5 MG Tab Take 5 mg by mouth every evening.     • [DISCONTINUED] furosemide (LASIX) 40 MG Tab Take 1 Tab by mouth 2 Times a Day. 180 Tab 3   • [DISCONTINUED] potassium chloride SA (K-DUR) 20 MEQ Tab CR Take 1 Tab by mouth every day. 90 Tab 3     No facility-administered encounter medications on file as of 3/9/2017.     Review of Systems   Constitutional: Negative.  Negative for fever, chills and malaise/fatigue.   HENT: Negative.  Negative for sore throat.    Eyes: Negative.    Respiratory: Negative.  Negative for cough, hemoptysis, sputum production, shortness of breath, wheezing and stridor.    Cardiovascular: Negative.  Negative for chest pain, palpitations, orthopnea, claudication, leg swelling and PND.   Gastrointestinal: Negative.    Genitourinary: Negative.    Musculoskeletal: Negative.    Skin: Negative.    Neurological: Negative.  Negative for dizziness, loss of consciousness and weakness.   Endo/Heme/Allergies: Negative.  Does not bruise/bleed easily.   All other systems reviewed and are negative.       Objective:   /110 mmHg  Pulse 69  Ht 1.753 m (5' 9.02\")  Wt 104.327 kg (230 lb)  BMI 33.95 kg/m2  SpO2 90%    Physical Exam   Constitutional: He is oriented to person, place, and time. He appears well-developed and well-nourished. No distress.   HENT:   Head: Normocephalic.   Mouth/Throat: Oropharynx is clear and moist.   Eyes: EOM are normal. Pupils are equal, round, and reactive to light. Right eye exhibits no discharge. Left eye exhibits no discharge. No scleral icterus.   Neck: Normal range of motion. Neck supple. No JVD present. No tracheal deviation present. "   Cardiovascular: Normal rate, regular rhythm, S1 normal, S2 normal, normal heart sounds, intact distal pulses and normal pulses.  Exam reveals no gallop, no S3, no S4 and no friction rub.    No murmur heard.   No systolic murmur is present    No diastolic murmur is present   Pulses:       Carotid pulses are 2+ on the right side, and 2+ on the left side.       Radial pulses are 2+ on the right side, and 2+ on the left side.        Dorsalis pedis pulses are 2+ on the right side, and 2+ on the left side.        Posterior tibial pulses are 2+ on the right side, and 2+ on the left side.   Pulmonary/Chest: Effort normal and breath sounds normal. No respiratory distress. He has no wheezes. He has no rales.   Abdominal: Soft. Bowel sounds are normal. He exhibits no distension and no mass. There is no tenderness. There is no rebound and no guarding.   Musculoskeletal: He exhibits no edema.   Neurological: He is alert and oriented to person, place, and time. No cranial nerve deficit.   Skin: Skin is warm and dry. He is not diaphoretic. No pallor.   Psychiatric: He has a normal mood and affect. His behavior is normal. Judgment and thought content normal.   Nursing note and vitals reviewed.      Assessment:     1. Acute systolic CHF (congestive heart failure), NYHA class 3 (CMS-HCC)  spironolactone (ALDACTONE) 25 MG Tab   2. Crohn's disease of small intestine without complication (CMS-HCC)     3. Essential hypertension     4. Non-STEMI (non-ST elevated myocardial infarction) (CMS-HCC)     5. Chronic septic pulmonary embolism with acute cor pulmonale (CMS-HCC)     6. SOB (shortness of breath)     7. Pure hypercholesterolemia     8. Paroxysmal atrial fibrillation (CMS-HCC)     9. Restless leg syndrome  spironolactone (ALDACTONE) 25 MG Tab   10. PVC (premature ventricular contraction)     11. Stage C chronic systolic congestive heart failure (CMS-HCC)  B TYPE NATRIURETIC    sacubitril-valsartan (ENTRESTO) 49-51 MG Tab tablet        Medical Decision Making:  Today's Assessment / Status / Plan:     50 y/o M with CHFrEF, cause unknown with poor compliance. We are awaiting the results of the sleep study. I will also send some labs for evaluation of a secondary cause of hypertension. I will see him back in one month. In the meantime I did start him on ropinirole for his restless leg syndrome.      1. CHFrEF 40%, non-ischemic, NYHA I, Stage C, Hemo Pro A    - cont coreg 25 BID    - stop losartan 50 BID    - start Entresto 49/51    - start spironolactone 25    - ICD - not candidate    - BiV - not candidate    - cardiac PET negative for ischemia    2. HTN    - start spironolactone 25    3. HLD, Cor Ca on CT    - hold rosuva for now    4. Restless leg syndrome    - iron studies    Thank for you allowing me to take part in your patient's care, please call should you have any questions or would like to discuss this patient.    - start ropinerole 0.5 mg at bedtime

## 2017-03-09 NOTE — MR AVS SNAPSHOT
"        Shaka Riley   3/9/2017 8:30 AM   Office Visit   MRN: [id number]    Department:  [facility]   Dept Phone:  [phone]    Description:  Male : 1965   Provider:  Jorge A Le M.D.           Reason for Visit     Follow-Up           Allergies as of 3/9/2017     No Known Allergies      You were diagnosed with     Acute systolic CHF (congestive heart failure), NYHA class 3 (CMS-HCC)   [839531]       Crohn's disease of small intestine without complication (CMS-HCC)   [087772]       Essential hypertension   [2332945]       Non-STEMI (non-ST elevated myocardial infarction) (CMS-HCC)   [948568]       Chronic septic pulmonary embolism with acute cor pulmonale (CMS-HCC)   [0772698]       SOB (shortness of breath)   [015759]       Pure hypercholesterolemia   [272.0.ICD-9-CM]       Paroxysmal atrial fibrillation (CMS-HCC)   [898610]       Restless leg syndrome   [090313]       PVC (premature ventricular contraction)   [588557]       Stage C chronic systolic congestive heart failure (CMS-HCC)   [9506671]         Vital Signs     Blood Pressure Pulse Height Weight Body Mass Index Oxygen Saturation    170/110 mmHg 69 1.753 m (5' 9.02\") 104.327 kg (230 lb) 33.95 kg/m2 90%    Smoking Status                   Never Smoker            Basic Information     Date Of Birth Sex Race Ethnicity Preferred Language    1965 Male White Non- English      Your appointments     2017  8:00 PM   Sleep Study Diagnostic with SLEEP TECH   [facility] (--)    [address]  [facility]  [place] [phone]   [phone]            2017  2:00 PM   Follow UP with BEL Lamb   [facility] (--)    [address]  [facility]  [place] [phone]   [phone]              Problem List              ICD-10-CM Priority Class Noted - Resolved    HTN (hypertension) I10 Medium  2014 - Present    Snoring R06.83 Medium  2015 - " Present    PVC (premature ventricular contraction) I49.3 Medium  7/7/2015 - Present    Acute systolic CHF (congestive heart failure), NYHA class 3 (CMS-HCC) I50.21 Medium  7/13/2015 - Present    Non-STEMI (non-ST elevated myocardial infarction) (CMS-HCC) I21.4 Medium  7/13/2015 - Present    Pleurisy R09.1 Low  7/20/2015 - Present    Pulmonary embolism (CMS-HCC) I26.99 Medium  7/22/2015 - Present    Crohn disease (CMS-HCC) K50.90 Low  7/23/2015 - Present    Paroxysmal atrial fibrillation (CMS-HCC) I48.0 Medium  8/3/2015 - Present    Syncope and collapse R55 Medium  7/20/2016 - Present    Elevated blood sugar R73.9 Medium  7/20/2016 - Present    Obesity (BMI 30-39.9) E66.9 Medium  12/6/2016 - Present    Pure hypercholesterolemia E78.00 Medium  12/6/2016 - Present    SOB (shortness of breath) R06.02   1/4/2017 - Present    Restless leg syndrome G25.81   2/1/2017 - Present    Stage C chronic systolic congestive heart failure (CMS-HCC) I50.22   3/9/2017 - Present      Health Maintenance        Date Due Completion Dates    IMM DTaP/Tdap/Td Vaccine (1 - Tdap) 4/6/1983 ---    COLONOSCOPY 4/6/2014 ---    IMM INFLUENZA (1) 9/1/2016 5/5/2014            Current Immunizations     Influenza TIV (IM) 5/5/2014 10:30 PM    Pneumococcal polysaccharide vaccine (PPSV-23) 7/14/2015 10:57 AM      Below and/or attached are the medications your provider expects you to take. Review all of your home medications and newly ordered medications with your provider and/or pharmacist. Follow medication instructions as directed by your provider and/or pharmacist. Please keep your medication list with you and share with your provider. Update the information when medications are discontinued, doses are changed, or new medications (including over-the-counter products) are added; and carry medication information at all times in the event of emergency situations     Allergies:  No Known Allergies          Medications  Valid as of: March 09, 2017 -  9:02  AM    Generic Name Brand Name Tablet Size Instructions for use    Albuterol Sulfate (Aero Soln) albuterol 108 (90 BASE) MCG/ACT Inhale 2 Puffs by mouth every four hours as needed for Shortness of Breath.        Aspirin (Tab)  MG Take 325 mg by mouth every day.        Carvedilol (Tab) COREG 25 MG Take 1 Tab by mouth 2 times a day, with meals.        Doxycycline Hyclate (Cap) VIBRAMYCIN 100 MG         Losartan Potassium (Tab) COZAAR 50 MG Take 1 Tab by mouth 2 Times a Day.        ROPINIRole HCl (Tab) REQUIP 0.5 MG Take 1 Tab by mouth every evening.        Rosuvastatin Calcium (Tab) CRESTOR 5 MG Take 1 Tab by mouth every evening.        Sacubitril-Valsartan (Tab) ENTRESTO 49-51 MG Take 1 Tab by mouth 2 Times a Day.        Spironolactone (Tab) ALDACTONE 25 MG Take 1 Tab by mouth every day.        Zolpidem Tartrate (Tab) AMBIEN 5 MG Take 1 Tab by mouth at bedtime as needed for Sleep (1 to 3 po qhs prn insomnia/sleep study. Bring to sleep study.).        .                 Medicines prescribed today were sent to:     [facility] [address] [place] [address] (S), [place] - [address] [address] [facility] (S) [place] [address]    Phone: [phone] Fax: [phone]    Open 24 Hours?: No      Medication refill instructions:       If your prescription bottle indicates you have medication refills left, it is not necessary to call your provider’s office. Please contact your pharmacy and they will refill your medication.    If your prescription bottle indicates you do not have any refills left, you may request refills at any time through one of the following ways: The online [employer] system (except Urgent Care), by calling your provider’s office, or by asking your pharmacy to contact your provider’s office with a refill request. Medication refills are processed only during regular business hours and may not be available until the next business day. Your provider may request additional information or to have a follow-up visit with you  prior to refilling your medication.   *Please Note: Medication refills are assigned a new Rx number when refilled electronically. Your pharmacy may indicate that no refills were authorized even though a new prescription for the same medication is available at the pharmacy. Please request the medicine by name with the pharmacy before contacting your provider for a refill.        Instructions    Stop losartan  Start Entresto  Stop lasix          Talend Access Code: [email or web address]  Expires: 3/24/2017  2:52 PM    Talend  A secure, online tool to manage your health information     [employer]’s Talend® is a secure, online tool that connects you to your personalized health information from the privacy of your home -- day or night - making it very easy for you to manage your healthcare. Once the activation process is completed, you can even access your medical information using the Talend stephani, which is available for free in the Apple Stephani store or Google Play store.     Talend provides the following levels of access (as shown below):   My Chart Features   [facility] Primary Care Doctor [facility]  Specialists [facility]  Urgent  Care Non-[facility]  Primary Care  Doctor   Email your healthcare team securely and privately 24/7 X X X    Manage appointments: schedule your next appointment; view details of past/upcoming appointments X      Request prescription refills. X      View recent personal medical records, including lab and immunizations X X X X   View health record, including health history, allergies, medications X X X X   Read reports about your outpatient visits, procedures, consult and ER notes X X X X   See your discharge summary, which is a recap of your hospital and/or ER visit that includes your diagnosis, lab results, and care plan. X X       How to register for Talend:  1. Go to  [email or web address].  2. Click on the Sign Up Now box, which takes you to the New Member Sign Up page. You will need to provide  the following information:  a. Enter your Expanite Access Code exactly as it appears at the top of this page. (You will not need to use this code after you’ve completed the sign-up process. If you do not sign up before the expiration date, you must request a new code.)   b. Enter your date of birth.   c. Enter your home email address.   d. Click Submit, and follow the next screen’s instructions.  3. Create a Expanite ID. This will be your Expanite login ID and cannot be changed, so think of one that is secure and easy to remember.  4. Create a Expanite password. You can change your password at any time.  5. Enter your Password Reset Question and Answer. This can be used at a later time if you forget your password.   6. Enter your e-mail address. This allows you to receive e-mail notifications when new information is available in Expanite.  7. Click Sign Up. You can now view your health information.    For assistance activating your Expanite account, call [phone]

## 2017-03-09 NOTE — Clinical Note
[facility]   [address]  [place] [address]  Phone: [phone]  Fax: [phone]              Shaka Riley  1965    Encounter Date: 3/9/2017    Jorge A Le M.D.          PROGRESS NOTE:  Subjective:   Shaka Riley is a 51 y.o. male who presents today as a follow-up for his heart failure with reduced ejection fraction. In the interim he underwent a cardiac PET CT which was negative for ischemia. His EF was 39% during that test. His main complaint lately has been fatigue. We did start him on ropinirole for restless leg syndrome which seems to be working quite well. He is pending a sleep study in a couple of weeks. Otherwise he has no lower extremity edema and no nakul shortness of breath with exertion. He does report that he is rather sedentary.    Past Medical History   Diagnosis Date   • Crohn disease (CMS-HCC)    • Congestive heart failure (CMS-HCC) 7/2015     EF 30-35%; Dilated R atrium; LVH; Mild MR; Mild AI; Mild TR   • PAF (paroxysmal atrial fibrillation) (CMS-HCC)    • Hypertension    • Clotting disorder (CMS-HCC)      PE   • Sleep apnea      un-diagnosed and pending sleep study     History reviewed. No pertinent past surgical history.  Family History   Problem Relation Age of Onset   • Cancer Mother    • Heart Disease Paternal Grandmother    • Heart Disease Paternal Grandfather      History   Smoking status   • Never Smoker    Smokeless tobacco   • Never Used     No Known Allergies  Outpatient Encounter Prescriptions as of 3/9/2017   Medication Sig Dispense Refill   • spironolactone (ALDACTONE) 25 MG Tab Take 1 Tab by mouth every day. 30 Tab 11   • sacubitril-valsartan (ENTRESTO) 49-51 MG Tab tablet Take 1 Tab by mouth 2 Times a Day. 60 Tab 11   • ropinirole (REQUIP) 0.5 MG Tab Take 1 Tab by mouth every evening. 30 Tab 11   • aspirin (ASA) 325 MG Tab Take 325 mg by mouth every day.     • losartan (COZAAR) 50 MG Tab Take 1  "Tab by mouth 2 Times a Day. 60 Tab 11   • carvedilol (COREG) 25 MG Tab Take 1 Tab by mouth 2 times a day, with meals. 180 Tab 3   • albuterol 108 (90 BASE) MCG/ACT Aero Soln inhalation aerosol Inhale 2 Puffs by mouth every four hours as needed for Shortness of Breath. 1 Inhaler 1   • doxycycline (VIBRAMYCIN) 100 MG Cap      • zolpidem (AMBIEN) 5 MG Tab Take 1 Tab by mouth at bedtime as needed for Sleep (1 to 3 po qhs prn insomnia/sleep study. Bring to sleep study.). 3 Tab 0   • rosuvastatin (CRESTOR) 5 MG Tab Take 1 Tab by mouth every evening. 90 Tab 3   • [DISCONTINUED] rosuvastatin (CRESTOR) 5 MG Tab Take 5 mg by mouth every evening.     • [DISCONTINUED] furosemide (LASIX) 40 MG Tab Take 1 Tab by mouth 2 Times a Day. 180 Tab 3   • [DISCONTINUED] potassium chloride SA (K-DUR) 20 MEQ Tab CR Take 1 Tab by mouth every day. 90 Tab 3     No facility-administered encounter medications on file as of 3/9/2017.     Review of Systems   Constitutional: Negative.  Negative for fever, chills and malaise/fatigue.   HENT: Negative.  Negative for sore throat.    Eyes: Negative.    Respiratory: Negative.  Negative for cough, hemoptysis, sputum production, shortness of breath, wheezing and stridor.    Cardiovascular: Negative.  Negative for chest pain, palpitations, orthopnea, claudication, leg swelling and PND.   Gastrointestinal: Negative.    Genitourinary: Negative.    Musculoskeletal: Negative.    Skin: Negative.    Neurological: Negative.  Negative for dizziness, loss of consciousness and weakness.   Endo/Heme/Allergies: Negative.  Does not bruise/bleed easily.   All other systems reviewed and are negative.       Objective:   /110 mmHg  Pulse 69  Ht 1.753 m (5' 9.02\")  Wt 104.327 kg (230 lb)  BMI 33.95 kg/m2  SpO2 90%    Physical Exam   Constitutional: He is oriented to person, place, and time. He appears well-developed and well-nourished. No distress.   HENT:   Head: Normocephalic.   Mouth/Throat: Oropharynx is " clear and moist.   Eyes: EOM are normal. Pupils are equal, round, and reactive to light. Right eye exhibits no discharge. Left eye exhibits no discharge. No scleral icterus.   Neck: Normal range of motion. Neck supple. No JVD present. No tracheal deviation present.   Cardiovascular: Normal rate, regular rhythm, S1 normal, S2 normal, normal heart sounds, intact distal pulses and normal pulses.  Exam reveals no gallop, no S3, no S4 and no friction rub.    No murmur heard.   No systolic murmur is present    No diastolic murmur is present   Pulses:       Carotid pulses are 2+ on the right side, and 2+ on the left side.       Radial pulses are 2+ on the right side, and 2+ on the left side.        Dorsalis pedis pulses are 2+ on the right side, and 2+ on the left side.        Posterior tibial pulses are 2+ on the right side, and 2+ on the left side.   Pulmonary/Chest: Effort normal and breath sounds normal. No respiratory distress. He has no wheezes. He has no rales.   Abdominal: Soft. Bowel sounds are normal. He exhibits no distension and no mass. There is no tenderness. There is no rebound and no guarding.   Musculoskeletal: He exhibits no edema.   Neurological: He is alert and oriented to person, place, and time. No cranial nerve deficit.   Skin: Skin is warm and dry. He is not diaphoretic. No pallor.   Psychiatric: He has a normal mood and affect. His behavior is normal. Judgment and thought content normal.   Nursing note and vitals reviewed.      Assessment:     1. Acute systolic CHF (congestive heart failure), NYHA class 3 (CMS-HCC)  spironolactone (ALDACTONE) 25 MG Tab   2. Crohn's disease of small intestine without complication (CMS-HCC)     3. Essential hypertension     4. Non-STEMI (non-ST elevated myocardial infarction) (CMS-HCC)     5. Chronic septic pulmonary embolism with acute cor pulmonale (CMS-HCC)     6. SOB (shortness of breath)     7. Pure hypercholesterolemia     8. Paroxysmal atrial fibrillation  (CMS-HCC)     9. Restless leg syndrome  spironolactone (ALDACTONE) 25 MG Tab   10. PVC (premature ventricular contraction)     11. Stage C chronic systolic congestive heart failure (CMS-[facility])  B TYPE NATRIURETIC    sacubitril-valsartan (ENTRESTO) 49-51 MG Tab tablet       Medical Decision Making:  Today's Assessment / Status / Plan:     50 y/o M with CHFrEF, cause unknown with poor compliance. We are awaiting the results of the sleep study. I will also send some labs for evaluation of a secondary cause of hypertension. I will see him back in one month. In the meantime I did start him on ropinirole for his restless leg syndrome.      1. CHFrEF 40%, non-ischemic, NYHA I, Stage C, Hemo Pro A    - cont coreg 25 BID    - stop losartan 50 BID    - start Entresto 49/51    - start spironolactone 25    - ICD - not candidate    - BiV - not candidate    - cardiac PET negative for ischemia    2. HTN    - start spironolactone 25    3. HLD, Cor Ca on CT    - hold rosuva for now    4. Restless leg syndrome    - iron studies    Thank for you allowing me to take part in your patient's care, please call should you have any questions or would like to discuss this patient.    - start ropinerole 0.5 mg at bedtime      Qiana Howe M.D.  [facility] [address] [place] [address]  VIA In Basket

## 2017-03-21 ENCOUNTER — TELEPHONE (OUTPATIENT)
Dept: CARDIOLOGY | Facility: MEDICAL CENTER | Age: 52
End: 2017-03-21

## 2017-03-21 DIAGNOSIS — I50.22 STAGE C CHRONIC SYSTOLIC CONGESTIVE HEART FAILURE (HCC): ICD-10-CM

## 2017-03-21 DIAGNOSIS — I26.01 CHRONIC SEPTIC PULMONARY EMBOLISM WITH ACUTE COR PULMONALE (HCC): ICD-10-CM

## 2017-03-21 DIAGNOSIS — R06.83 SNORING: ICD-10-CM

## 2017-03-21 DIAGNOSIS — I27.82 CHRONIC SEPTIC PULMONARY EMBOLISM WITH ACUTE COR PULMONALE (HCC): ICD-10-CM

## 2017-03-21 DIAGNOSIS — G25.81 RESTLESS LEG SYNDROME: ICD-10-CM

## 2017-03-21 DIAGNOSIS — I50.21 ACUTE SYSTOLIC CHF (CONGESTIVE HEART FAILURE), NYHA CLASS 3 (HCC): ICD-10-CM

## 2017-03-21 RX ORDER — ROPINIROLE 0.5 MG/1
1 TABLET, FILM COATED ORAL EVERY EVENING
Qty: 60 TAB | Refills: 11 | OUTPATIENT
Start: 2017-03-21 | End: 2018-08-22

## 2017-03-21 NOTE — TELEPHONE ENCOUNTER
Spoke with patients spouse who states he does not get paid for 2 weeks and just needs enough until then.  Samples left at  [address].

## 2017-03-21 NOTE — TELEPHONE ENCOUNTER
Message left on  for Pauline to call.  PAR for Entresto 49-51 approved 3/9/17 per notes from Radha Pantoja.

## 2017-03-21 NOTE — TELEPHONE ENCOUNTER
----- Message from Alejandrina Man L.P.N. sent at 3/21/2017  3:10 PM PDT -----  Regarding: FW: RX refill/change  Spoke with spouse who states he has been taking ropinerole 1.0 mg HS for his restless leg syndrome.  Rx called to [facility]   ----- Message -----     From: Alejandrina Man L.P.N.     Sent: 3/21/2017   1:46 PM       To: Alejandrina Man L.P.N.  Subject: FW: RX refill/change                             Message left for spouse to call.  Dr. Le's note from 3/9/17 states to start ropinerole 0.5 mg HS  ----- Message -----     From: Radha Pantoja, Med Ass't     Sent: 3/21/2017  12:48 PM       To: Alejandrina Man L.P.N.  Subject: FW: RX refill/change                             I didn't read that the patient is suppose to take the medication at a BID dose    ----- Message -----     From: Fermin Richardson     Sent: 3/21/2017   8:22 AM       To: Radha Pantoja, Med Ass't  Subject: RX refill/change                                   Terence Garcia,    Patient of RO is requesting a refill/ change on directions to his RX for ropinirole 0.5 mg to twice a day and changing quantity to 60 tabs. States last visit 3/9 RO told him he could take medication twice a day. Any question's he can be reached at [phone].      Thanks,  Kimberly

## 2017-03-21 NOTE — TELEPHONE ENCOUNTER
----- Message from Fermin Richardson sent at 3/21/2017  9:04 AM PDT -----  Regarding: Requesting more samples of Entresto  RO/Alejandrina,    Patient wife Pauline is calling to request samples of Entresto for . States he will be out tomorrow. She can be reached [phone] for a call back.

## 2017-04-07 ENCOUNTER — TELEPHONE (OUTPATIENT)
Dept: CARDIOLOGY | Facility: MEDICAL CENTER | Age: 52
End: 2017-04-07

## 2017-04-07 NOTE — TELEPHONE ENCOUNTER
LVM asking patient to call back into office to find out if he ever had the blood work done that was ordered by RO on 3/9/17. Patient has a FV with RO on 4/11/17 @ 9:15am*ISMAEL

## 2017-04-19 ENCOUNTER — TELEPHONE (OUTPATIENT)
Dept: CARDIOLOGY | Facility: MEDICAL CENTER | Age: 52
End: 2017-04-19

## 2017-04-19 DIAGNOSIS — I50.22 STAGE C CHRONIC SYSTOLIC CONGESTIVE HEART FAILURE (HCC): ICD-10-CM

## 2017-04-19 NOTE — TELEPHONE ENCOUNTER
Pt's spouse came in for samples.  States he cannot afford the co-pay this month.  Given # 56 samples.

## 2017-05-18 ENCOUNTER — TELEPHONE (OUTPATIENT)
Dept: CARDIOLOGY | Facility: MEDICAL CENTER | Age: 52
End: 2017-05-18

## 2017-05-18 DIAGNOSIS — I50.22 STAGE C CHRONIC SYSTOLIC CONGESTIVE HEART FAILURE (HCC): ICD-10-CM

## 2017-05-18 NOTE — TELEPHONE ENCOUNTER
Wife in office requesting Entresto samples. Month supply of samples given with coupon cards for month supply and $10 co-pay card. Wife very appreciative.

## 2017-06-05 ENCOUNTER — TELEPHONE (OUTPATIENT)
Dept: PULMONOLOGY | Facility: HOSPICE | Age: 52
End: 2017-06-05

## 2017-06-12 NOTE — TELEPHONE ENCOUNTER
Left message advised no showed SS and can call [phone] to reschedule or is more then welcome to come in and see Kita Delgado, APRN

## 2017-07-26 ENCOUNTER — TELEPHONE (OUTPATIENT)
Dept: CARDIOLOGY | Facility: MEDICAL CENTER | Age: 52
End: 2017-07-26

## 2017-07-26 NOTE — TELEPHONE ENCOUNTER
I called [phone] and spoke with the wife (Pauline)- okay to speak with & she's an emergency contact. The wife said the patient did have labs done either at [facility] or [facility] & the sleep study is scheduled for next month. I will check with [facility] (says collected in chart DOS 3/9/17) & [facility] (no labs there) to see where the labs were done at. I did confirm appointment with wife for CHANTAL Matt 8/1/17 @ 0900 check-in. SAH.

## 2017-07-31 ENCOUNTER — TELEPHONE (OUTPATIENT)
Dept: CARDIOLOGY | Facility: MEDICAL CENTER | Age: 52
End: 2017-07-31

## 2017-07-31 DIAGNOSIS — I10 ESSENTIAL HYPERTENSION, BENIGN: ICD-10-CM

## 2017-07-31 NOTE — TELEPHONE ENCOUNTER
Pts wife calls again.  warm transfers call to me. Discussion of Entresto samples. Pt took last dose last evening. Advised to always give 4-5 days lead time especially when a weekend is involved. Pauline states understanding. Informed the APRN that will see pt tomorrow will sign for samples and she can come  Rx and a map to [facility] pharmacy will be provided for her at that time. Pauline states understanding.

## 2017-07-31 NOTE — TELEPHONE ENCOUNTER
Question about getting samples of Entresto  Received: Today       VINCE Wyatt/Pauline     Patient's wife, Pauline, wants a call back at [phone]. She wants to find out if her  can get samples of Entresto.      Returned call. No answer. Mailbox full. Unable to leave voicemail. X 2

## 2017-07-31 NOTE — TELEPHONE ENCOUNTER
Patient's wife returning call  Received: Today       VINCE Wyatt     Patient's wife, Pauline, returned your call from earlier and can be reached at [phone].       Returned call. No answer. LVM with contact information.

## 2017-08-01 ENCOUNTER — OFFICE VISIT (OUTPATIENT)
Dept: CARDIOLOGY | Facility: MEDICAL CENTER | Age: 52
End: 2017-08-01
Payer: COMMERCIAL

## 2017-08-01 VITALS
OXYGEN SATURATION: 91 % | BODY MASS INDEX: 34.27 KG/M2 | DIASTOLIC BLOOD PRESSURE: 86 MMHG | HEART RATE: 70 BPM | RESPIRATION RATE: 12 BRPM | WEIGHT: 231.4 LBS | SYSTOLIC BLOOD PRESSURE: 138 MMHG | HEIGHT: 69 IN

## 2017-08-01 DIAGNOSIS — I50.21 ACUTE SYSTOLIC CHF (CONGESTIVE HEART FAILURE), NYHA CLASS 3 (HCC): ICD-10-CM

## 2017-08-01 DIAGNOSIS — I50.22 STAGE C CHRONIC SYSTOLIC CONGESTIVE HEART FAILURE (HCC): ICD-10-CM

## 2017-08-01 DIAGNOSIS — I42.8 NONISCHEMIC CARDIOMYOPATHY (HCC): ICD-10-CM

## 2017-08-01 PROCEDURE — 99214 OFFICE O/P EST MOD 30 MIN: CPT | Performed by: NURSE PRACTITIONER

## 2017-08-01 RX ORDER — FUROSEMIDE 40 MG/1
TABLET ORAL
Qty: 90 TAB | Refills: 3 | Status: SHIPPED | OUTPATIENT
Start: 2017-08-01 | End: 2018-08-22

## 2017-08-01 RX ORDER — FUROSEMIDE 40 MG/1
40 TABLET ORAL PRN
COMMUNITY
Start: 2017-06-06 | End: 2017-08-01

## 2017-08-01 ASSESSMENT — MINNESOTA LIVING WITH HEART FAILURE QUESTIONNAIRE (MLHF)
DIFFICULTY WORKING TO EARN A LIVING: 4
MAKING YOU FEEL DEPRESSED: 2
EATING LESS FOODS YOU LIKE: 1
GIVING YOU SIDE EFFECTS FROM TREATMENTS: 1
LOSS OF SELF CONTROL IN YOUR LIFE: 0
COSTING YOU MONEY FOR MEDICAL CARE: 2
MAKING YOU WORRY: 3
DIFFICULTY TO CONCENTRATE OR REMEMBERING THINGS: 4
FEELING LIKE A BURDEN TO FAMILY AND FRIENDS: 1
DIFFICULTY SOCIALIZING WITH FAMILY OR FRIENDS: 2
MAKING YOU SHORT OF BREATH: 3
WALKING ABOUT OR CLIMBING STAIRS DIFFICULT: 4
DIFFICULTY SLEEPING WELL AT NIGHT: 3
SWELLING IN ANKLES OR LEGS: 4
DIFFICULTY WITH RECREATIONAL PASTIMES, SPORTS, HOBBIES: 3
WORKING AROUND THE HOUSE OR YARD DIFFICULT: 4
DIFFICULTY WITH SEXUAL ACTIVITIES: 1
MAKING YOU STAY IN A HOSPITAL: 0
TOTAL_SCORE: 51
HAVING TO SIT OR LIE DOWN DURING THE DAY: 3
DIFFICULTY GOING AWAY FROM HOME: 2
TIRED, FATIGUED OR LOW ON ENERGY: 4

## 2017-08-01 ASSESSMENT — ENCOUNTER SYMPTOMS
COUGH: 0
DIZZINESS: 0
PND: 0
SHORTNESS OF BREATH: 1
ABDOMINAL PAIN: 0
CLAUDICATION: 0
PALPITATIONS: 0
MYALGIAS: 0
ORTHOPNEA: 0
FEVER: 0

## 2017-08-01 ASSESSMENT — NEW YORK HEART ASSOCIATION (NYHA) CLASSIFICATION: NYHA FUNCTIONAL CLASS: CLASS III

## 2017-08-01 ASSESSMENT — 6 MINUTE WALK TEST (6MWT): TOTAL DISTANCE WALKED (METERS): 411.4

## 2017-08-01 NOTE — MR AVS SNAPSHOT
"        Shaka Riley   2017 9:00 AM   Office Visit   MRN: [id number]    Department:  Heart [facility]   Dept Phone:  [phone]    Description:  Male : 1965   Provider:  BEL Talley           Reason for Visit     Follow-Up           Allergies as of 2017     No Known Allergies      You were diagnosed with     Stage C chronic systolic congestive heart failure (CMS-HCC)   [3973577]       Acute systolic CHF (congestive heart failure), NYHA class 3 (CMS-HCC)   [903740]         Vital Signs     Blood Pressure Pulse Respirations Height Weight Body Mass Index    138/86 mmHg 70 12 1.753 m (5' 9.02\") 104.962 kg (231 lb 6.4 oz) 34.16 kg/m2    Oxygen Saturation Smoking Status                91% Never Smoker           Basic Information     Date Of Birth Sex Race Ethnicity Preferred Language    1965 Male White Non- English      Your appointments     Aug 06, 2017  7:05 PM   Sleep Study Diagnostic with SLEEP TECH   [facility] (--)    [address]  [place] [address]   [phone]            Aug 16, 2017  2:40 PM   Follow UP with BEL Lamb   [facility] (--)    [address]  [place] [address]   [phone]              Problem List              ICD-10-CM Priority Class Noted - Resolved    HTN (hypertension) I10 Medium  2014 - Present    Snoring R06.83 Medium  2015 - Present    PVC (premature ventricular contraction) I49.3 Medium  2015 - Present    Acute systolic CHF (congestive heart failure), NYHA class 3 (CMS-HCC) I50.21 Medium  2015 - Present    Non-STEMI (non-ST elevated myocardial infarction) (CMS-[facility]) I21.4 Medium  2015 - Present    Pleurisy R09.1 Low  2015 - Present    Pulmonary embolism (CMS-[facility]) I26.99 Medium  2015 - Present    Crohn disease (CMS-[facility]) K50.90 Low  2015 - Present    Paroxysmal atrial fibrillation (CMS-[facility]) I48.0 Medium  8/3/2015 " - Present    Syncope and collapse R55 Medium  7/20/2016 - Present    Elevated blood sugar R73.9 Medium  7/20/2016 - Present    Obesity (BMI 30-39.9) E66.9 Medium  12/6/2016 - Present    Pure hypercholesterolemia E78.00 Medium  12/6/2016 - Present    SOB (shortness of breath) R06.02   1/4/2017 - Present    Restless leg syndrome G25.81   2/1/2017 - Present    Stage C chronic systolic congestive heart failure (CMS-HCC) I50.22   3/9/2017 - Present      Health Maintenance        Date Due Completion Dates    IMM DTaP/Tdap/Td Vaccine (1 - Tdap) 4/6/1983 ---    COLONOSCOPY 4/6/2014 ---    IMM INFLUENZA (1) 9/1/2017 5/5/2014            Current Immunizations     Influenza TIV (IM) 5/5/2014 10:30 PM    Pneumococcal polysaccharide vaccine (PPSV-23) 7/14/2015 10:57 AM      Below and/or attached are the medications your provider expects you to take. Review all of your home medications and newly ordered medications with your provider and/or pharmacist. Follow medication instructions as directed by your provider and/or pharmacist. Please keep your medication list with you and share with your provider. Update the information when medications are discontinued, doses are changed, or new medications (including over-the-counter products) are added; and carry medication information at all times in the event of emergency situations     Allergies:  No Known Allergies          Medications  Valid as of: August 01, 2017 - 10:11 AM    Generic Name Brand Name Tablet Size Instructions for use    Albuterol Sulfate (Aero Soln) albuterol 108 (90 BASE) MCG/ACT Inhale 2 Puffs by mouth every four hours as needed for Shortness of Breath.        Aspirin (Tab)  MG Take 325 mg by mouth every day.        Carvedilol (Tab) COREG 25 MG Take 1 Tab by mouth 2 times a day, with meals.        Furosemide (Tab) LASIX 40 MG Take 40 mg by mouth as needed. FOR EDEMA        ROPINIRole HCl (Tab) REQUIP 0.5 MG Take 2 Tabs by mouth every evening.         Rosuvastatin Calcium (Tab) CRESTOR 5 MG Take 1 Tab by mouth every evening.        Sacubitril-Valsartan (Tab) ENTRESTO  MG Take 1 Tab by mouth 2 Times a Day.        Spironolactone (Tab) ALDACTONE 25 MG Take 1 Tab by mouth every day.        Zolpidem Tartrate (Tab) AMBIEN 5 MG Take 1 Tab by mouth at bedtime as needed for Sleep (1 to 3 po qhs prn insomnia/sleep study. Bring to sleep study.).        .                 Medicines prescribed today were sent to:     [facility]  [place] [email or web address]    [address] [place] [address]    Phone: [phone] Fax: [phone]    Open 24 Hours?: No      Medication refill instructions:       If your prescription bottle indicates you have medication refills left, it is not necessary to call your provider’s office. Please contact your pharmacy and they will refill your medication.    If your prescription bottle indicates you do not have any refills left, you may request refills at any time through one of the following ways: The online Magazinga system (except Urgent Care), by calling your provider’s office, or by asking your pharmacy to contact your provider’s office with a refill request. Medication refills are processed only during regular business hours and may not be available until the next business day. Your provider may request additional information or to have a follow-up visit with you prior to refilling your medication.   *Please Note: Medication refills are assigned a new Rx number when refilled electronically. Your pharmacy may indicate that no refills were authorized even though a new prescription for the same medication is available at the pharmacy. Please request the medicine by name with the pharmacy before contacting your provider for a refill.        Your To Do List     Future Labs/Procedures Complete By Expires    BASIC METABOLIC PANEL  As directed 8/1/2018      Instructions    Increase Entresto  mg Twice a day.   BMP lab in  1.5 weeks          Genterpret Access Code: [email or web address]  Expires: 8/3/2017  4:14 AM    Genterpret  A secure, online tool to manage your health information     [employer]’s Genterpret® is a secure, online tool that connects you to your personalized health information from the privacy of your home -- day or night - making it very easy for you to manage your healthcare. Once the activation process is completed, you can even access your medical information using the Genterpret stephani, which is available for free in the Apple Stephani store or Google Play store.     Genterpret provides the following levels of access (as shown below):   My Chart Features   [facility] Primary Care Doctor [facility]  Specialists [facility]  Urgent  Care Non-[facility]  Primary Care  Doctor   Email your healthcare team securely and privately 24/7 X X X    Manage appointments: schedule your next appointment; view details of past/upcoming appointments X      Request prescription refills. X      View recent personal medical records, including lab and immunizations X X X X   View health record, including health history, allergies, medications X X X X   Read reports about your outpatient visits, procedures, consult and ER notes X X X X   See your discharge summary, which is a recap of your hospital and/or ER visit that includes your diagnosis, lab results, and care plan. X X       How to register for Genterpret:  1. Go to  [email or web address].  2. Click on the Sign Up Now box, which takes you to the New Member Sign Up page. You will need to provide the following information:  a. Enter your Genterpret Access Code exactly as it appears at the top of this page. (You will not need to use this code after you’ve completed the sign-up process. If you do not sign up before the expiration date, you must request a new code.)   b. Enter your date of birth.   c. Enter your home email address.   d. Click Submit, and follow the next screen’s instructions.  3. Create a Genterpret ID. This will  be your SVXR login ID and cannot be changed, so think of one that is secure and easy to remember.  4. Create a SVXR password. You can change your password at any time.  5. Enter your Password Reset Question and Answer. This can be used at a later time if you forget your password.   6. Enter your e-mail address. This allows you to receive e-mail notifications when new information is available in SVXR.  7. Click Sign Up. You can now view your health information.    For assistance activating your SVXR account, call [phone]

## 2017-08-01 NOTE — PROGRESS NOTES
Subjective:   Shaka Riley is a 52 y.o. male who presents today for Follow up on his Heart Failure.    Patient of Dr. Le. Patient was last seen 3/9/17. Over the past few months, Pt has been feeling tired. 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 Pt reports some SOB with exertion and at times with ADLs. Pt also has some LE edema in which he takes furosemide about 2-3 times per week.      He does not take his Home weights Regularly but ranges between 220-225 lbs.     Patient denies chest pain, shortness of breath at rest, palpitations, dizziness/lightheadedness, orthopnea, PND.    Pt would like to start riding his bike at home for exercise. Pt is scheduled for his sleep study next week.    At 6 minute walk test re-evaluation, patient was able to complete 411 m during his 6 minute walk test. His O2 saturation at baseline was 91 % and at the end of the test, the O2 saturation was 93 %. He reported level 3 of dyspnea on Dane scale. MLWHF 51    Additonally, patient has the following medical problems:    -Paroxysmal A fib    -Hyperlipidemia: Taking Crestor 5 mg daily.     -Restless leg syndrome    Past Medical History   Diagnosis Date   • Crohn disease (CMS-HCC)    • Congestive heart failure (CMS-HCC) 7/2015     EF 30-35%; Dilated R atrium; LVH; Mild MR; Mild AI; Mild TR   • PAF (paroxysmal atrial fibrillation) (CMS-HCC)    • Hypertension    • Clotting disorder (CMS-HCC)      PE   • Sleep apnea      un-diagnosed and pending sleep study     History reviewed. No pertinent past surgical history.  Family History   Problem Relation Age of Onset   • Cancer Mother    • Heart Disease Paternal Grandmother    • Heart Disease Paternal Grandfather    • Cancer Father      paralysis from accident     History   Smoking status   • Never Smoker    Smokeless tobacco   • Never Used     No Known Allergies  Outpatient Encounter Prescriptions as of 8/1/2017   Medication Sig Dispense Refill   • sacubitril-valsartan  "(ENTRESTO)  MG Tab tablet Take 1 Tab by mouth 2 Times a Day. 60 Tab 11   • [DISCONTINUED] furosemide (LASIX) 40 MG Tab Take 40 mg by mouth as needed. FOR EDEMA     • ropinirole (REQUIP) 0.5 MG Tab Take 2 Tabs by mouth every evening. 60 Tab 11   • spironolactone (ALDACTONE) 25 MG Tab Take 1 Tab by mouth every day. 30 Tab 11   • zolpidem (AMBIEN) 5 MG Tab Take 1 Tab by mouth at bedtime as needed for Sleep (1 to 3 po qhs prn insomnia/sleep study. Bring to sleep study.). 3 Tab 0   • aspirin (ASA) 325 MG Tab Take 325 mg by mouth every day.     • carvedilol (COREG) 25 MG Tab Take 1 Tab by mouth 2 times a day, with meals. 180 Tab 3   • albuterol 108 (90 BASE) MCG/ACT Aero Soln inhalation aerosol Inhale 2 Puffs by mouth every four hours as needed for Shortness of Breath. 1 Inhaler 1   • [DISCONTINUED] sacubitril-valsartan (ENTRESTO) 49-51 MG Tab tablet Take 1 Tab by mouth 2 Times a Day. 56 Tab 0   • [DISCONTINUED] doxycycline (VIBRAMYCIN) 100 MG Cap      • rosuvastatin (CRESTOR) 5 MG Tab Take 1 Tab by mouth every evening. (Patient not taking: Reported on 8/1/2017) 90 Tab 3   • [DISCONTINUED] losartan (COZAAR) 50 MG Tab Take 1 Tab by mouth 2 Times a Day. (Patient not taking: Reported on 8/1/2017) 60 Tab 11     No facility-administered encounter medications on file as of 8/1/2017.     Review of Systems   Constitutional: Positive for malaise/fatigue. Negative for fever.   Respiratory: Positive for shortness of breath. Negative for cough.    Cardiovascular: Positive for leg swelling. Negative for chest pain, palpitations, orthopnea, claudication and PND.   Gastrointestinal: Negative for abdominal pain.   Musculoskeletal: Negative for myalgias.   Neurological: Negative for dizziness.   All other systems reviewed and are negative.       Objective:   /86 mmHg  Pulse 70  Resp 12  Ht 1.753 m (5' 9.02\")  Wt 104.962 kg (231 lb 6.4 oz)  BMI 34.16 kg/m2  SpO2 91%    Physical Exam   Constitutional: He is oriented to " person, place, and time. He appears well-developed and well-nourished.   HENT:   Head: Normocephalic and atraumatic.   Eyes: EOM are normal.   Neck: Normal range of motion. Neck supple. No JVD present.   Cardiovascular: Normal rate, regular rhythm, normal heart sounds and intact distal pulses.    No murmur heard.  Pulmonary/Chest: Effort normal and breath sounds normal. No respiratory distress. He has no wheezes. He has no rales.   Abdominal: Soft. Bowel sounds are normal.   Musculoskeletal: Normal range of motion. He exhibits edema (trace Bilateral LE edema).   Neurological: He is alert and oriented to person, place, and time.   Skin: Skin is warm and dry.   Psychiatric: He has a normal mood and affect.   Nursing note and vitals reviewed.    Lab Results   Component Value Date/Time    CHOLESTEROL, 07/14/2015 03:41 AM    LDL 83 07/14/2015 03:41 AM    HDL 34* 07/14/2015 03:41 AM    TRIGLYCERIDES 192* 07/14/2015 03:41 AM       Lab Results   Component Value Date/Time    SODIUM 138 11/26/2016 09:54 AM    POTASSIUM 4.5 11/26/2016 09:54 AM    CHLORIDE 102 11/26/2016 09:54 AM    CO2 31 11/26/2016 09:54 AM    GLUCOSE 91 11/26/2016 09:54 AM    BUN 17 11/26/2016 09:54 AM    CREATININE 0.94 11/26/2016 09:54 AM     Lab Results   Component Value Date/Time    ALKALINE PHOSPHATASE 97 11/26/2016 09:54 AM    AST(SGOT) 30 11/26/2016 09:54 AM    ALT(SGPT) 38 11/26/2016 09:54 AM    TOTAL BILIRUBIN 0.5 11/26/2016 09:54 AM      PET stress test 1/25/17  ELECTROCARDIOGRAPHIC FINDINGS:  Resting left ventricular ejection fraction was   39%.  Stress left ventricular ejection fraction of 48%.    SCINTOGRAPHIC FINDINGS:  The QC data for the scan was reviewed and was within    acceptable limits.      CONCLUSIONS AND IMPRESSIONS:  At baseline, EKG shows evidence of sinus rhythm.    As stress, there is no evidence of coronary ischemia seen on EKG tracing.     In terms of myocardial PET scan stress test images, there is no evidence of     coronary ischemia seen.  Overall, this is a negative myocardial PET scan    stress test for coronary ischemia.    Transthoracic Echo Report 7/13/15  Moderate to severely reduced left ventricular systolic function.  Left ventricular ejection fraction is 30% to 35%.  Global hypokinesis.  Severely dilated left atrium.  Left atrial volume index is 46ml/sq m.  Mild mitral regurgitation.  Trace aortic insufficiency.  Mild tricuspid regurgitation.  Right ventricular systolic pressure is estimated to be 40-45 mmHg.  Trace pulmonic insufficiency.       Transthoracic Echo Report 12/12/16  Moderately reduced left ventricular systolic function.  Left ventricular ejection fraction is visually estimated to be 40%.  Global hypokinesis.  Grade I diastolic dysfunction.  The right ventricle was normal in size and function.  Mild mitral regurgitation.    Compared to the images of the prior study done - 07-, there has   been no significant change.    Assessment:     1. Stage C chronic systolic congestive heart failure (CMS-HCC)  sacubitril-valsartan (ENTRESTO)  MG Tab tablet    BASIC METABOLIC PANEL   2. Acute systolic CHF (congestive heart failure), NYHA class 3 (CMS-HCC)  sacubitril-valsartan (ENTRESTO)  MG Tab tablet    BASIC METABOLIC PANEL   3. Nonischemic cardiomyopathy (CMS-HCC)         Medical Decision Making:  Today's Assessment / Status / Plan:   1.HFrEF, Stage C, Class 2-3:   -Increase Entresto to  mg BID  -Continue Carvedilol 25 mg BID   -Continue Furosemide 40 mg daily/BID as needed  -Continue Spironolactone 25 mg daily  -BMP in 1.5 weeks  -Reinforced s/sx of worsening heart failure with patient and weight monitoring. Pt verbalizes understanding. Pt to call office or RTC if present.     FU in clinic in 2 weeks with Labs. Sooner if needed.    Patient verbalizes understanding and agrees with the plan of care.     Collaborating MD: Shubham Joshi MD

## 2017-08-02 ENCOUNTER — TELEPHONE (OUTPATIENT)
Dept: CARDIOLOGY | Facility: MEDICAL CENTER | Age: 52
End: 2017-08-02

## 2017-08-02 NOTE — TELEPHONE ENCOUNTER
PAR approved:  Shaka Riley Cordero: [id number] - PA Case ID: [id number]   Outcome   Approvedtoday   CaseId:[id number];Product Name:ST: Entresto - [place] National and Medicaid and Preferred and Essential and FAVIOLA;Status:Approved;Coverage Start Date:08/02/2017;Coverage End Date:08/02/2018;   DrugEntresto 97-103MG tablets   FormAnthem Blue Cross and Blue Shield Electronic PA Form

## 2017-08-04 ENCOUNTER — TELEPHONE (OUTPATIENT)
Dept: PULMONOLOGY | Facility: HOSPICE | Age: 52
End: 2017-08-04

## 2017-08-04 DIAGNOSIS — G47.33 OSA (OBSTRUCTIVE SLEEP APNEA): ICD-10-CM

## 2017-08-04 RX ORDER — ZOLPIDEM TARTRATE 5 MG/1
5 TABLET ORAL NIGHTLY PRN
Qty: 3 TAB | Refills: 0 | Status: SHIPPED
Start: 2017-08-04 | End: 2018-01-12

## 2017-08-04 NOTE — TELEPHONE ENCOUNTER
Pauline the pt's wife called requesting Ambien for the pt's ss on 8/6/17.    Last OV: 2/22/17  Next OV: 8/16/17  SOB

## 2017-08-05 NOTE — PROGRESS NOTES
"Reevaluation of 6MWT/MLWHF Questionnaire    Date: 2017  6MWT: 423 meters  MLWHF: 55    Date: 2017  6MWT: 411.4 meters  MLWHF: 51    OP Heart Failure  Vitals  Appointment Type: Heart Failure Established  Weight: 104.962 kg (231 lb 6.4 oz)  How Weight Obtained: Stand Up Scale  Height: 175.3 cm (5' 9.02\")  BMI (Calculated): 34.16  Blood Pressure: 138/86 mmHg (BP AFTER 6 MWT )  Pulse: 70    System Assessment  NYHA Functional Class Assessment: Class III (class 2-3)  ACC/AHA HF Stage: C    Smoking Hx  Have you Ever Smoked: Never     Alcohol Hx  Do you Drink?: No     Illicit Drug Hx  Illicit Drug History: No    [place] Living with Heart Failure  Swelling in Ankles or Legs: 4  Having to Sit or Lie Down During the Day: 3  Walking About or Climbing Stairs Difficult: 4  Working Around the House or Yard Difficult: 4  Difficulty Going Away from Home: 2  Difficulty Sleeping Well at Night: 3  Difficulty Socializing with Family or Friends: 2  Difficulty Working to Earn a Livin  Difficulty with Recreational Pastimes, Sports, Hobbies: 3  Difficulty with Sexual Activities: 1  Eating Less Foods You Like: 1  Making you Short of Breath: 3  Tired, Fatigued or Low on Energy: 4  Making you Stay in a Hospital: 0  Costing you Money for Medical Care?: 2  Giving you Side Effects from Treatments: 1  Feeling like a Leo to Family and Friends: 1  Loss of Self Control in your Life: 0  Making You Worry: 3  Difficulty to Concentrate or Remembering Things: 4  Making you Feel Depressed: 2  MLHF Total Score : 51    6 Minute Walk Test  Baseline to end of test: 6:00  Total meters walked: 411.4       CardioMEMS  CardioMEMS Implant: No       Charlene HF RN  [phone]  "

## 2017-08-06 ENCOUNTER — APPOINTMENT (OUTPATIENT)
Dept: SLEEP MEDICINE | Facility: MEDICAL CENTER | Age: 52
End: 2017-08-06
Payer: COMMERCIAL

## 2017-08-09 ENCOUNTER — TELEPHONE (OUTPATIENT)
Dept: CARDIOLOGY | Facility: MEDICAL CENTER | Age: 52
End: 2017-08-09

## 2017-08-09 NOTE — TELEPHONE ENCOUNTER
I called [phone] (H) and [phone] (M) left voicemails on both contact listed numbers re: labs for the upcoming appointment. I asked the patient to call me back with this information. KYA.

## 2017-08-23 ENCOUNTER — TELEPHONE (OUTPATIENT)
Dept: CARDIOLOGY | Facility: MEDICAL CENTER | Age: 52
End: 2017-08-23

## 2017-08-23 NOTE — TELEPHONE ENCOUNTER
I called the patient at [phone] and [phone] & left voicemails regarding visit with Dr. Le on 8/29/17 @ 8789 check-in to confirm the appointment and to see if the patient had the labs done. I asked the patient for a call back.KYA.

## 2017-08-28 ENCOUNTER — TELEPHONE (OUTPATIENT)
Dept: CARDIOLOGY | Facility: MEDICAL CENTER | Age: 52
End: 2017-08-28

## 2017-08-28 NOTE — TELEPHONE ENCOUNTER
----- Message -----   From: Yany Hansen   Sent: 8/28/2017   9:17 AM   To: Radha Pantoja, Med Ass't   Subject: entresto samples                                 RO/radha     Pt's wife Rosa calling for Entresto samples, insurance won't cover. Please send script and call Rosa when completed, [phone]    Returned patient call. Spoke with Rosa. She states they cannot afford medication so they want to stay at lower dose so they can get samples. I explained max benefit at max therapy and long term samples are not the answer. I encouraged her to come in and sign a patient support enrollment form. I also informed her that I would check with RO and find out what to do about dose and samples and would call her back.

## 2017-08-28 NOTE — TELEPHONE ENCOUNTER
Problem with headache   Received: Today   Message Contents   VINCE Wyatt/Pauline     Patient is having problems with a headache and wants to find out what she can take since she has heart problems. She can be reached at [phone].

## 2017-08-29 ENCOUNTER — TELEPHONE (OUTPATIENT)
Dept: CARDIOLOGY | Facility: MEDICAL CENTER | Age: 52
End: 2017-08-29

## 2017-08-29 NOTE — TELEPHONE ENCOUNTER
Pt wife calling to follow up in regards to entresto samples   Received: Today   Message Contents   Fermin Castellanos R.N.   Phone Number: [phone]             BISI/Pauline     Pt wife, Rosa is calling to follow up on yesterday's call in regards to 's entresto samples. Per wife he is out of medication, please call [phone].      Returned call. Informed pts wife of Rx waiting for her. Administration instructions. She states understanding by repeating back to me. Also asked her to complete an Entresto patient assistance enrollment form. Left both at  with Shyanne for patients arrival.

## 2017-09-25 ENCOUNTER — HOSPITAL ENCOUNTER (OUTPATIENT)
Dept: LAB | Facility: MEDICAL CENTER | Age: 52
End: 2017-09-25
Attending: NURSE PRACTITIONER
Payer: COMMERCIAL

## 2017-09-25 DIAGNOSIS — I50.21 ACUTE SYSTOLIC CHF (CONGESTIVE HEART FAILURE), NYHA CLASS 3 (HCC): ICD-10-CM

## 2017-09-25 DIAGNOSIS — I50.22 STAGE C CHRONIC SYSTOLIC CONGESTIVE HEART FAILURE (HCC): ICD-10-CM

## 2017-09-25 LAB
ANION GAP SERPL CALC-SCNC: 11 MMOL/L (ref 0–11.9)
BUN SERPL-MCNC: 20 MG/DL (ref 8–22)
CALCIUM SERPL-MCNC: 9.7 MG/DL (ref 8.5–10.5)
CHLORIDE SERPL-SCNC: 101 MMOL/L (ref 96–112)
CO2 SERPL-SCNC: 25 MMOL/L (ref 20–33)
CREAT SERPL-MCNC: 1.1 MG/DL (ref 0.5–1.4)
GFR SERPL CREATININE-BSD FRML MDRD: >60 ML/MIN/1.73 M 2
GLUCOSE SERPL-MCNC: 101 MG/DL (ref 65–99)
POTASSIUM SERPL-SCNC: 4 MMOL/L (ref 3.6–5.5)
SODIUM SERPL-SCNC: 137 MMOL/L (ref 135–145)

## 2017-09-25 PROCEDURE — 36415 COLL VENOUS BLD VENIPUNCTURE: CPT

## 2017-09-25 PROCEDURE — 80048 BASIC METABOLIC PNL TOTAL CA: CPT

## 2017-09-27 ENCOUNTER — TELEPHONE (OUTPATIENT)
Dept: CARDIOLOGY | Facility: MEDICAL CENTER | Age: 52
End: 2017-09-27

## 2017-09-27 ENCOUNTER — OFFICE VISIT (OUTPATIENT)
Dept: CARDIOLOGY | Facility: MEDICAL CENTER | Age: 52
End: 2017-09-27
Payer: COMMERCIAL

## 2017-09-27 VITALS
WEIGHT: 232 LBS | HEIGHT: 69 IN | HEART RATE: 82 BPM | BODY MASS INDEX: 34.36 KG/M2 | SYSTOLIC BLOOD PRESSURE: 102 MMHG | OXYGEN SATURATION: 90 % | DIASTOLIC BLOOD PRESSURE: 78 MMHG

## 2017-09-27 DIAGNOSIS — E78.00 PURE HYPERCHOLESTEROLEMIA: ICD-10-CM

## 2017-09-27 DIAGNOSIS — K50.00 CROHN'S DISEASE OF SMALL INTESTINE WITHOUT COMPLICATION (HCC): ICD-10-CM

## 2017-09-27 DIAGNOSIS — I42.8 NONISCHEMIC CARDIOMYOPATHY (HCC): ICD-10-CM

## 2017-09-27 DIAGNOSIS — R06.02 SOB (SHORTNESS OF BREATH): ICD-10-CM

## 2017-09-27 DIAGNOSIS — I50.22 STAGE C CHRONIC SYSTOLIC CONGESTIVE HEART FAILURE (HCC): ICD-10-CM

## 2017-09-27 DIAGNOSIS — I26.01 CHRONIC SEPTIC PULMONARY EMBOLISM WITH ACUTE COR PULMONALE (HCC): ICD-10-CM

## 2017-09-27 DIAGNOSIS — I21.4 NON-STEMI (NON-ST ELEVATED MYOCARDIAL INFARCTION) (HCC): ICD-10-CM

## 2017-09-27 DIAGNOSIS — R55 SYNCOPE AND COLLAPSE: ICD-10-CM

## 2017-09-27 DIAGNOSIS — I27.82 CHRONIC SEPTIC PULMONARY EMBOLISM WITH ACUTE COR PULMONALE (HCC): ICD-10-CM

## 2017-09-27 DIAGNOSIS — I48.0 PAROXYSMAL ATRIAL FIBRILLATION (HCC): ICD-10-CM

## 2017-09-27 DIAGNOSIS — R06.83 SNORING: ICD-10-CM

## 2017-09-27 DIAGNOSIS — R09.1 PLEURISY: ICD-10-CM

## 2017-09-27 DIAGNOSIS — I49.3 PVC (PREMATURE VENTRICULAR CONTRACTION): ICD-10-CM

## 2017-09-27 DIAGNOSIS — I50.21 ACUTE SYSTOLIC CHF (CONGESTIVE HEART FAILURE), NYHA CLASS 3 (HCC): ICD-10-CM

## 2017-09-27 DIAGNOSIS — I10 ESSENTIAL HYPERTENSION: ICD-10-CM

## 2017-09-27 PROCEDURE — 99215 OFFICE O/P EST HI 40 MIN: CPT | Performed by: INTERNAL MEDICINE

## 2017-09-27 RX ORDER — DIGOXIN 125 MCG
125 TABLET ORAL DAILY
Qty: 30 TAB | Refills: 11 | Status: SHIPPED | OUTPATIENT
Start: 2017-09-27 | End: 2018-08-22

## 2017-09-27 ASSESSMENT — ENCOUNTER SYMPTOMS
HEMOPTYSIS: 0
PND: 0
RESPIRATORY NEGATIVE: 1
LOSS OF CONSCIOUSNESS: 0
DIZZINESS: 0
BRUISES/BLEEDS EASILY: 0
PALPITATIONS: 0
SORE THROAT: 0
CHILLS: 0
SHORTNESS OF BREATH: 0
WEAKNESS: 0
ORTHOPNEA: 0
COUGH: 0
SPUTUM PRODUCTION: 0
FEVER: 0
NEUROLOGICAL NEGATIVE: 1
WHEEZING: 0
MUSCULOSKELETAL NEGATIVE: 1
CONSTITUTIONAL NEGATIVE: 1
EYES NEGATIVE: 1
CLAUDICATION: 0
GASTROINTESTINAL NEGATIVE: 1
CARDIOVASCULAR NEGATIVE: 1
STRIDOR: 0

## 2017-09-27 NOTE — PROGRESS NOTES
Subjective:   Shaka Riley is a 52 y.o. male who presents today his follow-up for his presumed nonischemic cardiac myopathy. He is only complaining of a cough. His blood pressure well controlled. He is tolerating the higher dose of the ARNI.  He is welcome plan with medical therapy.he thinks that his cough is perhaps related to spironolactone as he may have had this in the past.    Past Medical History:   Diagnosis Date   • Congestive heart failure (CMS-HCC) 7/2015    EF 30-35%; Dilated R atrium; LVH; Mild MR; Mild AI; Mild TR   • Clotting disorder (CMS-HCC)     PE   • Crohn disease (CMS-HCC)    • Hypertension    • PAF (paroxysmal atrial fibrillation) (CMS-HCC)    • Sleep apnea     un-diagnosed and pending sleep study     No past surgical history on file.  Family History   Problem Relation Age of Onset   • Cancer Mother    • Heart Disease Paternal Grandmother    • Heart Disease Paternal Grandfather    • Cancer Father      paralysis from accident     History   Smoking Status   • Never Smoker   Smokeless Tobacco   • Never Used     No Known Allergies  Outpatient Encounter Prescriptions as of 9/27/2017   Medication Sig Dispense Refill   • digoxin (LANOXIN) 125 MCG Tab Take 1 Tab by mouth every day. 30 Tab 11   • sacubitril-valsartan (ENTRESTO)  MG Tab tablet Take 1 Tab by mouth 2 Times a Day. 60 Tab 0   • zolpidem (AMBIEN) 5 MG Tab Take 1 Tab by mouth at bedtime as needed for Sleep (1 to 3 po qhs prn insomnia/sleep study. Bring to sleep study.). 3 Tab 0   • furosemide (LASIX) 40 MG Tab TAKE ONE TABLET BY MOUTH TWICE DAILY 90 Tab 3   • ropinirole (REQUIP) 0.5 MG Tab Take 2 Tabs by mouth every evening. 60 Tab 11   • spironolactone (ALDACTONE) 25 MG Tab Take 1 Tab by mouth every day. 30 Tab 11   • aspirin (ASA) 325 MG Tab Take 325 mg by mouth every day.     • carvedilol (COREG) 25 MG Tab Take 1 Tab by mouth 2 times a day, with meals. 180 Tab 3   • albuterol 108 (90 BASE) MCG/ACT Aero Soln inhalation aerosol  "Inhale 2 Puffs by mouth every four hours as needed for Shortness of Breath. 1 Inhaler 1   • [DISCONTINUED] sacubitril-valsartan (ENTRESTO)  MG Tab tablet Take 1 Tab by mouth 2 Times a Day. 60 Tab 11   • rosuvastatin (CRESTOR) 5 MG Tab Take 1 Tab by mouth every evening. (Patient not taking: Reported on 8/1/2017) 90 Tab 3     No facility-administered encounter medications on file as of 9/27/2017.      Review of Systems   Constitutional: Negative.  Negative for chills, fever and malaise/fatigue.   HENT: Negative.  Negative for sore throat.    Eyes: Negative.    Respiratory: Negative.  Negative for cough, hemoptysis, sputum production, shortness of breath, wheezing and stridor.    Cardiovascular: Negative.  Negative for chest pain, palpitations, orthopnea, claudication, leg swelling and PND.   Gastrointestinal: Negative.    Genitourinary: Negative.    Musculoskeletal: Negative.    Skin: Negative.    Neurological: Negative.  Negative for dizziness, loss of consciousness and weakness.   Endo/Heme/Allergies: Negative.  Does not bruise/bleed easily.   All other systems reviewed and are negative.       Objective:   /78   Pulse 82   Ht 1.753 m (5' 9\")   Wt 105.2 kg (232 lb)   SpO2 90%   BMI 34.26 kg/m²     Physical Exam   Constitutional: He is oriented to person, place, and time. He appears well-developed and well-nourished. No distress.   HENT:   Head: Normocephalic.   Mouth/Throat: Oropharynx is clear and moist.   Eyes: EOM are normal. Pupils are equal, round, and reactive to light. Right eye exhibits no discharge. Left eye exhibits no discharge. No scleral icterus.   Neck: Normal range of motion. Neck supple. No JVD present. No tracheal deviation present.   Cardiovascular: Normal rate, regular rhythm, S1 normal, S2 normal, normal heart sounds, intact distal pulses and normal pulses.  Exam reveals no gallop, no S3, no S4 and no friction rub.    No murmur heard.   No systolic murmur is present    No " diastolic murmur is present   Pulses:       Carotid pulses are 2+ on the right side, and 2+ on the left side.       Radial pulses are 2+ on the right side, and 2+ on the left side.        Dorsalis pedis pulses are 2+ on the right side, and 2+ on the left side.        Posterior tibial pulses are 2+ on the right side, and 2+ on the left side.   Pulmonary/Chest: Effort normal and breath sounds normal. No respiratory distress. He has no wheezes. He has no rales.   Abdominal: Soft. Bowel sounds are normal. He exhibits no distension and no mass. There is no tenderness. There is no rebound and no guarding.   Musculoskeletal: He exhibits no edema.   Neurological: He is alert and oriented to person, place, and time. No cranial nerve deficit.   Skin: Skin is warm and dry. He is not diaphoretic. No pallor.   Psychiatric: He has a normal mood and affect. His behavior is normal. Judgment and thought content normal.   Nursing note and vitals reviewed.      Assessment:     1. Acute systolic CHF (congestive heart failure), NYHA class 3 (CMS-HCC)  ECHOCARDIOGRAM COMP W/O CONT    digoxin (LANOXIN) 125 MCG Tab    sacubitril-valsartan (ENTRESTO)  MG Tab tablet   2. Crohn's disease of small intestine without complication (CMS-HCC)     3. Essential hypertension     4. Nonischemic cardiomyopathy (CMS-HCC)     5. Non-STEMI (non-ST elevated myocardial infarction) (CMS-HCC)  ECHOCARDIOGRAM COMP W/O CONT    digoxin (LANOXIN) 125 MCG Tab   6. Paroxysmal atrial fibrillation (CMS-HCC)     7. Chronic septic pulmonary embolism with acute cor pulmonale (CMS-HCC)  digoxin (LANOXIN) 125 MCG Tab   8. Pleurisy  digoxin (LANOXIN) 125 MCG Tab   9. Pure hypercholesterolemia     10. PVC (premature ventricular contraction)     11. Snoring     12. SOB (shortness of breath)     13. Stage C chronic systolic congestive heart failure (CMS-[facility])  sacubitril-valsartan (ENTRESTO)  MG Tab tablet   14. Syncope and collapse         Medical Decision Making:   Today's Assessment / Status / Plan:     51-year-old male with heart failure with reduced ejection fraction with no good medical compliance. At this point we will get a repeat echocardiogram. We will see him back in a couple of months.I did advise him that he could if he thinks that his cough  Is caused by the spironolactone, to stop this for one week.      1. CHFrEF 40%, non-ischemic, NYHA I, Stage C, Hemo Pro A    - cont coreg 25 BID    - start Entresto 97/103    - cont spironolactone 25    - start dig 0.125    - ICD - not candidate    - BiV - not candidate    - cardiac PET negative for ischemia     2. HTN    - start spironolactone 25     3. HLD, Cor Ca on CT    - hold rosuva for now     4. Restless leg syndrome    - iron studies

## 2017-09-27 NOTE — TELEPHONE ENCOUNTER
Pt called per Dr. Le's request re: Entresto     Pt informed of $10 co pay card available. Pt states he will have his wife .    Informed patient of sample Rx, that dose not available and pharmacy wont fill. Pt will have Rx filled through normal pharmacy with co-pay card and will call us for any difficulties in obtaining medication.

## 2017-09-27 NOTE — LETTER
[facility]   [address], [facility] [address]  [place] [address]  Phone: [phone]  Fax: [phone]              Shaka Riley  1965    Encounter Date: 9/27/2017    Jorge A Le M.D.          PROGRESS NOTE:  Subjective:   Shaka Riley is a 52 y.o. male who presents today his follow-up for his presumed nonischemic cardiac myopathy. He is only complaining of a cough. His blood pressure well controlled. He is tolerating the higher dose of the ARNI.  He is welcome plan with medical therapy.he thinks that his cough is perhaps related to spironolactone as he may have had this in the past.    Past Medical History:   Diagnosis Date   • Congestive heart failure (CMS-HCC) 7/2015    EF 30-35%; Dilated R atrium; LVH; Mild MR; Mild AI; Mild TR   • Clotting disorder (CMS-HCC)     PE   • Crohn disease (CMS-HCC)    • Hypertension    • PAF (paroxysmal atrial fibrillation) (CMS-HCC)    • Sleep apnea     un-diagnosed and pending sleep study     No past surgical history on file.  Family History   Problem Relation Age of Onset   • Cancer Mother    • Heart Disease Paternal Grandmother    • Heart Disease Paternal Grandfather    • Cancer Father      paralysis from accident     History   Smoking Status   • Never Smoker   Smokeless Tobacco   • Never Used     No Known Allergies  Outpatient Encounter Prescriptions as of 9/27/2017   Medication Sig Dispense Refill   • digoxin (LANOXIN) 125 MCG Tab Take 1 Tab by mouth every day. 30 Tab 11   • sacubitril-valsartan (ENTRESTO)  MG Tab tablet Take 1 Tab by mouth 2 Times a Day. 60 Tab 0   • zolpidem (AMBIEN) 5 MG Tab Take 1 Tab by mouth at bedtime as needed for Sleep (1 to 3 po qhs prn insomnia/sleep study. Bring to sleep study.). 3 Tab 0   • furosemide (LASIX) 40 MG Tab TAKE ONE TABLET BY MOUTH TWICE DAILY 90 Tab 3   • ropinirole (REQUIP) 0.5 MG Tab Take 2 Tabs by mouth every evening. 60 Tab 11   • spironolactone (ALDACTONE)  "25 MG Tab Take 1 Tab by mouth every day. 30 Tab 11   • aspirin (ASA) 325 MG Tab Take 325 mg by mouth every day.     • carvedilol (COREG) 25 MG Tab Take 1 Tab by mouth 2 times a day, with meals. 180 Tab 3   • albuterol 108 (90 BASE) MCG/ACT Aero Soln inhalation aerosol Inhale 2 Puffs by mouth every four hours as needed for Shortness of Breath. 1 Inhaler 1   • [DISCONTINUED] sacubitril-valsartan (ENTRESTO)  MG Tab tablet Take 1 Tab by mouth 2 Times a Day. 60 Tab 11   • rosuvastatin (CRESTOR) 5 MG Tab Take 1 Tab by mouth every evening. (Patient not taking: Reported on 8/1/2017) 90 Tab 3     No facility-administered encounter medications on file as of 9/27/2017.      Review of Systems   Constitutional: Negative.  Negative for chills, fever and malaise/fatigue.   HENT: Negative.  Negative for sore throat.    Eyes: Negative.    Respiratory: Negative.  Negative for cough, hemoptysis, sputum production, shortness of breath, wheezing and stridor.    Cardiovascular: Negative.  Negative for chest pain, palpitations, orthopnea, claudication, leg swelling and PND.   Gastrointestinal: Negative.    Genitourinary: Negative.    Musculoskeletal: Negative.    Skin: Negative.    Neurological: Negative.  Negative for dizziness, loss of consciousness and weakness.   Endo/Heme/Allergies: Negative.  Does not bruise/bleed easily.   All other systems reviewed and are negative.       Objective:   /78   Pulse 82   Ht 1.753 m (5' 9\")   Wt 105.2 kg (232 lb)   SpO2 90%   BMI 34.26 kg/m²      Physical Exam   Constitutional: He is oriented to person, place, and time. He appears well-developed and well-nourished. No distress.   HENT:   Head: Normocephalic.   Mouth/Throat: Oropharynx is clear and moist.   Eyes: EOM are normal. Pupils are equal, round, and reactive to light. Right eye exhibits no discharge. Left eye exhibits no discharge. No scleral icterus.   Neck: Normal range of motion. Neck supple. No JVD present. No tracheal " deviation present.   Cardiovascular: Normal rate, regular rhythm, S1 normal, S2 normal, normal heart sounds, intact distal pulses and normal pulses.  Exam reveals no gallop, no S3, no S4 and no friction rub.    No murmur heard.   No systolic murmur is present    No diastolic murmur is present   Pulses:       Carotid pulses are 2+ on the right side, and 2+ on the left side.       Radial pulses are 2+ on the right side, and 2+ on the left side.        Dorsalis pedis pulses are 2+ on the right side, and 2+ on the left side.        Posterior tibial pulses are 2+ on the right side, and 2+ on the left side.   Pulmonary/Chest: Effort normal and breath sounds normal. No respiratory distress. He has no wheezes. He has no rales.   Abdominal: Soft. Bowel sounds are normal. He exhibits no distension and no mass. There is no tenderness. There is no rebound and no guarding.   Musculoskeletal: He exhibits no edema.   Neurological: He is alert and oriented to person, place, and time. No cranial nerve deficit.   Skin: Skin is warm and dry. He is not diaphoretic. No pallor.   Psychiatric: He has a normal mood and affect. His behavior is normal. Judgment and thought content normal.   Nursing note and vitals reviewed.      Assessment:     1. Acute systolic CHF (congestive heart failure), NYHA class 3 (CMS-HCC)  ECHOCARDIOGRAM COMP W/O CONT    digoxin (LANOXIN) 125 MCG Tab    sacubitril-valsartan (ENTRESTO)  MG Tab tablet   2. Crohn's disease of small intestine without complication (CMS-HCC)     3. Essential hypertension     4. Nonischemic cardiomyopathy (CMS-HCC)     5. Non-STEMI (non-ST elevated myocardial infarction) (CMS-HCC)  ECHOCARDIOGRAM COMP W/O CONT    digoxin (LANOXIN) 125 MCG Tab   6. Paroxysmal atrial fibrillation (CMS-HCC)     7. Chronic septic pulmonary embolism with acute cor pulmonale (CMS-HCC)  digoxin (LANOXIN) 125 MCG Tab   8. Pleurisy  digoxin (LANOXIN) 125 MCG Tab   9. Pure hypercholesterolemia     10. PVC  (premature ventricular contraction)     11. Snoring     12. SOB (shortness of breath)     13. Stage C chronic systolic congestive heart failure (CMS-HCC)  sacubitril-valsartan (ENTRESTO)  MG Tab tablet   14. Syncope and collapse         Medical Decision Making:  Today's Assessment / Status / Plan:     51-year-old male with heart failure with reduced ejection fraction with no good medical compliance. At this point we will get a repeat echocardiogram. We will see him back in a couple of months.I did advise him that he could if he thinks that his cough  Is caused by the spironolactone, to stop this for one week.      1. CHFrEF 40%, non-ischemic, NYHA I, Stage C, Hemo Pro A    - cont coreg 25 BID    - start Entresto 97/103    - cont spironolactone 25    - start dig 0.125    - ICD - not candidate    - BiV - not candidate    - cardiac PET negative for ischemia     2. HTN    - start spironolactone 25     3. HLD, Cor Ca on CT    - hold rosuva for now     4. Restless leg syndrome    - iron studies      Qiana Howe M.D.  [address] [place] [address]  VIA In Basket

## 2017-11-29 ENCOUNTER — HOSPITAL ENCOUNTER (OUTPATIENT)
Dept: CARDIOLOGY | Facility: MEDICAL CENTER | Age: 52
End: 2017-11-29
Attending: INTERNAL MEDICINE
Payer: COMMERCIAL

## 2017-11-29 DIAGNOSIS — I21.4 NON-STEMI (NON-ST ELEVATED MYOCARDIAL INFARCTION) (HCC): ICD-10-CM

## 2017-11-29 DIAGNOSIS — I50.21 ACUTE SYSTOLIC CHF (CONGESTIVE HEART FAILURE), NYHA CLASS 3 (HCC): ICD-10-CM

## 2017-11-29 PROCEDURE — 93306 TTE W/DOPPLER COMPLETE: CPT | Mod: 26 | Performed by: INTERNAL MEDICINE

## 2017-11-29 PROCEDURE — 93306 TTE W/DOPPLER COMPLETE: CPT

## 2017-11-30 LAB
LV EJECT FRACT  99904: 65
LV EJECT FRACT MOD 2C 99903: 56.68
LV EJECT FRACT MOD 4C 99902: 73.57
LV EJECT FRACT MOD BP 99901: 65.72

## 2017-12-13 ENCOUNTER — OFFICE VISIT (OUTPATIENT)
Dept: MEDICAL GROUP | Age: 52
End: 2017-12-13
Payer: COMMERCIAL

## 2017-12-13 VITALS
OXYGEN SATURATION: 95 % | HEART RATE: 68 BPM | BODY MASS INDEX: 34.66 KG/M2 | TEMPERATURE: 97.7 F | DIASTOLIC BLOOD PRESSURE: 90 MMHG | HEIGHT: 69 IN | SYSTOLIC BLOOD PRESSURE: 148 MMHG | WEIGHT: 234 LBS

## 2017-12-13 DIAGNOSIS — L57.0 AK (ACTINIC KERATOSIS): ICD-10-CM

## 2017-12-13 DIAGNOSIS — M54.40 CHRONIC LOW BACK PAIN WITH SCIATICA, SCIATICA LATERALITY UNSPECIFIED, UNSPECIFIED BACK PAIN LATERALITY: ICD-10-CM

## 2017-12-13 DIAGNOSIS — G89.29 CHRONIC LOW BACK PAIN WITH SCIATICA, SCIATICA LATERALITY UNSPECIFIED, UNSPECIFIED BACK PAIN LATERALITY: ICD-10-CM

## 2017-12-13 DIAGNOSIS — Z23 NEED FOR VACCINATION: ICD-10-CM

## 2017-12-13 DIAGNOSIS — Z00.00 PREVENTATIVE HEALTH CARE: ICD-10-CM

## 2017-12-13 DIAGNOSIS — R06.83 SNORING: ICD-10-CM

## 2017-12-13 DIAGNOSIS — I50.22 STAGE C CHRONIC SYSTOLIC CONGESTIVE HEART FAILURE (HCC): ICD-10-CM

## 2017-12-13 DIAGNOSIS — I10 ESSENTIAL HYPERTENSION: ICD-10-CM

## 2017-12-13 DIAGNOSIS — Z12.11 SCREENING FOR COLON CANCER: ICD-10-CM

## 2017-12-13 PROBLEM — M54.9 CHRONIC BACK PAIN: Status: ACTIVE | Noted: 2017-12-13

## 2017-12-13 PROCEDURE — 90472 IMMUNIZATION ADMIN EACH ADD: CPT | Performed by: FAMILY MEDICINE

## 2017-12-13 PROCEDURE — 17000 DESTRUCT PREMALG LESION: CPT | Performed by: FAMILY MEDICINE

## 2017-12-13 PROCEDURE — 90686 IIV4 VACC NO PRSV 0.5 ML IM: CPT | Performed by: FAMILY MEDICINE

## 2017-12-13 PROCEDURE — 99214 OFFICE O/P EST MOD 30 MIN: CPT | Mod: 25 | Performed by: FAMILY MEDICINE

## 2017-12-13 PROCEDURE — 17003 DESTRUCT PREMALG LES 2-14: CPT | Performed by: FAMILY MEDICINE

## 2017-12-13 PROCEDURE — 90715 TDAP VACCINE 7 YRS/> IM: CPT | Performed by: FAMILY MEDICINE

## 2017-12-13 PROCEDURE — 90471 IMMUNIZATION ADMIN: CPT | Performed by: FAMILY MEDICINE

## 2017-12-13 RX ORDER — METHYLPREDNISOLONE 4 MG/1
TABLET ORAL
Qty: 21 TAB | Refills: 0 | Status: SHIPPED | OUTPATIENT
Start: 2017-12-13 | End: 2018-01-12

## 2017-12-13 RX ORDER — HYDROCODONE BITARTRATE AND ACETAMINOPHEN 10; 325 MG/1; MG/1
1 TABLET ORAL
Qty: 30 TAB | Refills: 0 | Status: SHIPPED | OUTPATIENT
Start: 2017-12-13 | End: 2018-01-12

## 2017-12-13 NOTE — ASSESSMENT & PLAN NOTE
The patient states that he's been seeing a chiropractor for over a year and a half now for chronic low back pain. He states that he's had several adjustments however the pain is not resolved pain in his mid back radiates down his right gluteus maximum is, sharp pain, relieved with rest and worsens with activity and bending. Denies any possible bilateral bowel function and no numbness feeling in the saddle region.

## 2017-12-13 NOTE — ASSESSMENT & PLAN NOTE
Carvedilol 25 mg by mouth twice a day  Aspirin 325 mg by mouth daily  Patient stopped taking rosuvastatin?  The patient has been tollerating the BP meds without any issues. No tunnel vision, no cough, no changes in vision, no lightheadedness, no fatigue, no syncopal or presyncopal episodes, no edema, no new rashes.

## 2017-12-13 NOTE — PROGRESS NOTES
This medical record contains text that has been entered with the assistance of computer voice recognition and dictation software.  Therefore, it may contain unintended errors in text, spelling, punctuation, or grammar    Chief Complaint   Patient presents with   • Back Pain     lower back pain x 6 months       Sheba Meraz is a 52 y.o. male here evaluation and management of: routine visit, HTN, CHF, AK, back pain, preventive medicine      HPI:     Stage C chronic systolic congestive heart failure (CMS-HCC)  Managed by cardiology  Significant improvement with medical management  Entresto 97/103mg   Carvedilol 25 mg 2 twice a day  Digoxin 120 9N by mouth daily  Lasix 40 mg by mouth daily  Aldactone 25 mg daily    Open Hard Copy Result Report (Order #[id number] - ECHOCARDIOGRAM COMP W/O CONT)   Narrative     Transthoracic  Echo Report      Echocardiography Laboratory    CONCLUSIONS  Prior Echo - 12/12/16; compared to the images of the study done - there   has been improvement of LVSF.   Normal left ventricular systolic function.  Left ventricular ejection fraction is visually estimated to be 65%.  Normal diastolic function.  No significant valve abnormalities.   Estimated right ventricular systolic pressure is 20 mmHg.    SHEBA MERAZ  Exam Date:         11/29/2017                      07:02  Exam Location:     Out Patient  Priority:          Routine    Ordering Physician:        REHANA GIBSON                               ([id number])  Referring Physician:       [id number] Rose         [facility]  The patient is due for colonoscopy  Tdap and flu vaccine          Chronic back pain  The patient states that he's been seeing a chiropractor for over a year and a half now for chronic low back pain. He states that he's had several adjustments however the pain is not resolved pain in his mid back radiates down his right gluteus maximum is, sharp pain, relieved with rest and worsens with activity and  bending. Denies any possible bilateral bowel function and no numbness feeling in the saddle region.    HTN (hypertension)  Carvedilol 25 mg by mouth twice a day  Aspirin 325 mg by mouth daily  Patient stopped taking rosuvastatin?  The patient has been tollerating the BP meds without any issues. No tunnel vision, no cough, no changes in vision, no lightheadedness, no fatigue, no syncopal or presyncopal episodes, no edema, no new rashes.       Current medicines (including changes today)  Current Outpatient Prescriptions   Medication Sig Dispense Refill   • hydrocodone/acetaminophen (NORCO)  MG Tab Take 1 Tab by mouth every 24 hours as needed for up to 60 days. 30 Tab 0   • MethylPREDNISolone (MEDROL DOSEPAK) 4 MG Tablet Therapy Pack As directed on the packaging label. 21 Tab 0   • zolpidem (AMBIEN) 5 MG Tab Take 1 Tab by mouth at bedtime as needed for Sleep (1 to 3 po qhs prn insomnia/sleep study. Bring to sleep study.). 3 Tab 0   • furosemide (LASIX) 40 MG Tab TAKE ONE TABLET BY MOUTH TWICE DAILY 90 Tab 3   • ropinirole (REQUIP) 0.5 MG Tab Take 2 Tabs by mouth every evening. 60 Tab 11   • spironolactone (ALDACTONE) 25 MG Tab Take 1 Tab by mouth every day. 30 Tab 11   • aspirin (ASA) 325 MG Tab Take 325 mg by mouth every day.     • carvedilol (COREG) 25 MG Tab Take 1 Tab by mouth 2 times a day, with meals. 180 Tab 3   • albuterol 108 (90 BASE) MCG/ACT Aero Soln inhalation aerosol Inhale 2 Puffs by mouth every four hours as needed for Shortness of Breath. 1 Inhaler 1   • digoxin (LANOXIN) 125 MCG Tab Take 1 Tab by mouth every day. 30 Tab 11   • sacubitril-valsartan (ENTRESTO)  MG Tab tablet Take 1 Tab by mouth 2 Times a Day. 60 Tab 0   • rosuvastatin (CRESTOR) 5 MG Tab Take 1 Tab by mouth every evening. (Patient not taking: Reported on 8/1/2017) 90 Tab 3     No current facility-administered medications for this visit.      He  has a past medical history of Clotting disorder (CMS-HCC); Congestive heart  "failure (CMS-HCC) (2015); Crohn disease (CMS-HCC); Hypertension; PAF (paroxysmal atrial fibrillation) (CMS-HCC); and Sleep apnea.  He  has no past surgical history on file.  Social History   Substance Use Topics   • Smoking status: Never Smoker   • Smokeless tobacco: Never Used   • Alcohol use 1.2 oz/week     2 Glasses of wine per week      Comment: Quit in  - previous six beer after work, 2-3 beers per day currently     Social History     Social History Narrative   • No narrative on file     Family History   Problem Relation Age of Onset   • Cancer Mother    • Heart Disease Paternal Grandmother    • Heart Disease Paternal Grandfather    • Cancer Father      paralysis from accident     Family Status   Relation Status   • Mother  at age 20s    Cancer   • Paternal Grandmother    • Paternal Grandfather    • Father Alive   • Brother  at age 40s    complications from DM         ROS    Please see hpi     All other systems reviewed and are negative     Objective:     Blood pressure 148/90, pulse 68, temperature 36.5 °C (97.7 °F), height 1.753 m (5' 9.02\"), weight 106.1 kg (234 lb), SpO2 95 %. Body mass index is 34.54 kg/m².  Physical Exam:    Constitutional: Alert, no distress.  Skin: Warm, dry, good turgor, no rashes in visible areas.  Eye: Equal, round and reactive, conjunctiva clear, lids normal.  ENMT: Lips without lesions, good dentition, oropharynx clear.  Neck: Trachea midline, no masses, no thyromegaly. No cervical or supraclavicular lymphadenopathy.  Respiratory: Unlabored respiratory effort, lungs clear to auscultation, no wheezes, no ronchi.  Cardiovascular: Normal S1, S2, no murmur, no edema.  Abdomen: Soft, non-tender, no masses, no hepatosplenomegaly.  Psych: Alert and oriented x3, normal affect and mood.      Thoracic and Lumbar Spine  Inspection of back and posture -revealed a normal exam  Range of motion = 180 degrees bilaterally  Palpation of the spine =NTT  no spasms " noted, no lumbar spine tenderness, no saddle anasthesia, able to dorsiflex bilateral toes, 2+DTRs (patellar) bilaterally,  negative straight leg raise bilaterally both supine and seated,  Nontender bilateral ere  SKIN EXAM      multiple lesions on bilateral forearms, hands, and chest, with evidence of of solar damage present , spotty hyperpigmentation, scattered telangiectasias, and  Xerosis      PROCEDURE: CRYOTHERAPY  Discussed risks and benefits of cryotherapy including but not limited to scarring, hyperpigmentation, hypopigmentation, hypertrophic scarring, keiloid scarring, incomplete or no resolution of lesions treated,pain, undesirable cosemetic result, blistering, potential need for additional treatment including more invasive treatment. Patient expresses understanding and verbally acknowledges risks and consent to treatment. 2  applications of cryotherapy with 3 second freeze thaw cycle was applied to  8 AK's . Patient tolerated procedure well. There were no complications. Aftercare instructions given.            Assessment and Plan:   The following treatment plan was discussed      1. Stage C chronic systolic congestive heart failure (CMS-HCC)  The patient was educated on the importance of  daily weights (obtain dry weight)  Limit fluid intake 2L per day   Limit Sodium intake <2 grams  Continue current regimen  Continue cardiac risk reduction    Obtain sleep study, ordered      2. Snoring  Clinically suspicious for obstructive sleep apnea  [place] Sleepiness Scale score (ESS) of 10  We will proceed with  Polysomnography    We discussed all pathologies related to untreated sleep apnea  Including but not limited to pulmonary hypertension, stroke, cardiovascular disease, difficulty losing weight, headaches, daytime somnolence, etc...    - REFERRAL TO PULMONOLOGY    3. Essential hypertension  Elevated today from Back pain  Patient was given instructions to make a two-week blood pressure log  To measure his  blood pressure morning and evening same time  Then send results back to us  We will consider specific management at that time      4. Chronic low back pain with sciatica, sciatica laterality unspecified, unspecified back pain laterality    Patient informed that overall prognosis of back pain is favorable, he is to use ICE x 2 days, then switch to heat,  Nsaids,  and to ease back into normal activity as quickly as possible,  also to use proper lifting techniques (use legs not back), no clinical indication for imaging. Also counseled patient on low back stretching, hamstring stretching, core strengthening once no longer in acute pain.     - DX-LUMBAR SPINE-2 OR 3 VIEWS; Future  - hydrocodone/acetaminophen (NORCO)  MG Tab; Take 1 Tab by mouth every 24 hours as needed for up to 60 days.  Dispense: 30 Tab; Refill: 0  - MethylPREDNISolone (MEDROL DOSEPAK) 4 MG Tablet Therapy Pack; As directed on the packaging label.  Dispense: 21 Tab; Refill: 0    5. Preventative health care      6. Screening for colon cancer  Due for colonoscopy    - REFERRAL TO GI FOR COLONOSCOPY    7. Need for vaccination  Given today  Up to date on PPSV 23  - TDAP VACCINE =>6YO IM  - INFLUENZA VACCINE QUAD INJ >3Y(PF)    8. AK (actinic keratosis)    Patient tollerrated procedure well  There were no adverse events  Patient was given post procedure precautions           HEALTH MAINTENANCE:    Instructed to Follow up in clinic or ER for worsening symptoms, difficulty breathing, lack of expected recovery, or should new symptoms or problems arise.    Followup: Return in about 4 weeks (around 1/10/2018) for Reevaluation.       Once again this medical record contains text that has been entered with the assistance of computer voice recognition and dictation software.  Therefore, it may contain unintended errors in text, spelling, punctuation, or grammar

## 2017-12-13 NOTE — ASSESSMENT & PLAN NOTE
Managed by cardiology  Significant improvement with medical management  Entresto 97/103mg   Carvedilol 25 mg 2 twice a day  Digoxin 120 9N by mouth daily  Lasix 40 mg by mouth daily  Aldactone 25 mg daily    Open Hard Copy Result Report (Order #[id number] - ECHOCARDIOGRAM COMP W/O CONT)   Narrative     Transthoracic  Echo Report      Echocardiography Laboratory    CONCLUSIONS  Prior Echo - 12/12/16; compared to the images of the study done - there   has been improvement of LVSF.   Normal left ventricular systolic function.  Left ventricular ejection fraction is visually estimated to be 65%.  Normal diastolic function.  No significant valve abnormalities.   Estimated right ventricular systolic pressure is 20 mmHg.    SHEBA MERAZ  Exam Date:         11/29/2017                      07:02  Exam Location:     Out Patient  Priority:          Routine    Ordering Physician:        REHANA GIBSON                               ([id number])  Referring Physician:       [id number] Rose

## 2017-12-26 ENCOUNTER — HOSPITAL ENCOUNTER (OUTPATIENT)
Dept: RADIOLOGY | Facility: MEDICAL CENTER | Age: 52
End: 2017-12-26
Attending: FAMILY MEDICINE
Payer: COMMERCIAL

## 2017-12-26 DIAGNOSIS — G89.29 CHRONIC LOW BACK PAIN WITH SCIATICA, SCIATICA LATERALITY UNSPECIFIED, UNSPECIFIED BACK PAIN LATERALITY: ICD-10-CM

## 2017-12-26 DIAGNOSIS — M54.40 CHRONIC LOW BACK PAIN WITH SCIATICA, SCIATICA LATERALITY UNSPECIFIED, UNSPECIFIED BACK PAIN LATERALITY: ICD-10-CM

## 2017-12-26 PROCEDURE — 72100 X-RAY EXAM L-S SPINE 2/3 VWS: CPT

## 2017-12-28 DIAGNOSIS — I10 ESSENTIAL HYPERTENSION: ICD-10-CM

## 2017-12-28 RX ORDER — CARVEDILOL 25 MG/1
25 TABLET ORAL 2 TIMES DAILY WITH MEALS
Qty: 180 TAB | Refills: 0 | OUTPATIENT
Start: 2017-12-28 | End: 2018-03-26 | Stop reason: SDUPTHER

## 2018-01-02 ENCOUNTER — OFFICE VISIT (OUTPATIENT)
Dept: MEDICAL GROUP | Age: 53
End: 2018-01-02
Payer: COMMERCIAL

## 2018-01-02 VITALS
TEMPERATURE: 97.9 F | OXYGEN SATURATION: 95 % | HEART RATE: 72 BPM | HEIGHT: 69 IN | WEIGHT: 233 LBS | DIASTOLIC BLOOD PRESSURE: 84 MMHG | SYSTOLIC BLOOD PRESSURE: 140 MMHG | BODY MASS INDEX: 34.51 KG/M2

## 2018-01-02 DIAGNOSIS — M54.40 CHRONIC LOW BACK PAIN WITH SCIATICA, SCIATICA LATERALITY UNSPECIFIED, UNSPECIFIED BACK PAIN LATERALITY: ICD-10-CM

## 2018-01-02 DIAGNOSIS — M54.16 LUMBAR RADICULOPATHY: ICD-10-CM

## 2018-01-02 DIAGNOSIS — G89.29 CHRONIC LOW BACK PAIN WITH SCIATICA, SCIATICA LATERALITY UNSPECIFIED, UNSPECIFIED BACK PAIN LATERALITY: ICD-10-CM

## 2018-01-02 PROCEDURE — 20552 NJX 1/MLT TRIGGER POINT 1/2: CPT | Performed by: FAMILY MEDICINE

## 2018-01-02 RX ORDER — TRAMADOL HYDROCHLORIDE 100 MG/1
TABLET, EXTENDED RELEASE ORAL
Qty: 60 TAB | Refills: 0 | Status: SHIPPED | OUTPATIENT
Start: 2018-01-02 | End: 2018-01-12

## 2018-01-02 RX ORDER — ALPRAZOLAM 0.5 MG/1
0.5 TABLET ORAL NIGHTLY PRN
Qty: 30 TAB | Refills: 0 | Status: SHIPPED | OUTPATIENT
Start: 2018-01-02 | End: 2018-02-01

## 2018-01-03 ENCOUNTER — TELEPHONE (OUTPATIENT)
Dept: MEDICAL GROUP | Age: 53
End: 2018-01-03

## 2018-01-03 DIAGNOSIS — G89.29 CHRONIC LOW BACK PAIN WITH SCIATICA, SCIATICA LATERALITY UNSPECIFIED, UNSPECIFIED BACK PAIN LATERALITY: ICD-10-CM

## 2018-01-03 DIAGNOSIS — M54.40 CHRONIC LOW BACK PAIN WITH SCIATICA, SCIATICA LATERALITY UNSPECIFIED, UNSPECIFIED BACK PAIN LATERALITY: ICD-10-CM

## 2018-01-03 RX ORDER — TRAMADOL HYDROCHLORIDE 100 MG/1
100 TABLET, EXTENDED RELEASE ORAL
Qty: 30 TAB | Refills: 0 | Status: SHIPPED
Start: 2018-01-03 | End: 2018-01-15 | Stop reason: SDUPTHER

## 2018-01-03 NOTE — ASSESSMENT & PLAN NOTE
The patient is a 52-year-old male who returns to clinic with a chief complaint of low back pain which radiates down his right gluteus jameson. The pain is sharp 5 out of 10 radiates down to his gluteus jameson. He was given prednisone in the past Norco 10 mg which barely this to pain. The pain is worse when sitting down or bending, specifically when he is walking uphill he notices worse. 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 He denies any loss of bladder or bowel function and he has seen a chiropractor for this for over a year and a half now with little benefit.

## 2018-01-03 NOTE — TELEPHONE ENCOUNTER
Fax Received From:   [facility] [address] [place] [address] (S), [place] - [address]  [facility]  [place] ([place]) [place] [address]  Phone: [phone] Fax: [phone]    Message: Fax from pharmacy states that for tramadol (ULTRAM-ER) 100 MG tablet insurance will only cover 1 tab per day. Other wise a prior authorization will need to be started.    MEDICATION PRIOR AUTHORIZATION NEEDED:    1. Name of Medication: tramadol (ULTRAM-ER) 100 MG tablet    2. Requested By (Name of Pharmacy):   [facility] [address] [place] [address] ([place]), [place] - [address]  [address] [place]) [place] [address]  Phone: [phone] Fax: [phone]     3. Is insurance on file current? Yes     4. What is the name & phone number of the 3rd party payor? [phone]    Would you like a PAR started?

## 2018-01-03 NOTE — TELEPHONE ENCOUNTER
1. Caller Name: Shaka Riley                                         Call Back Number: [phone] (home)         Patient approves a detailed voicemail message: no    Patient called stating the pharmacy was charging $205 for tramadol (ULTRAM-ER) 100 MG tablet prescribed yesterday, he is asking if it can be changed to regular tramadol, please advise.

## 2018-01-04 NOTE — TELEPHONE ENCOUNTER
Quinten Kiran M.D.  [facility]   Caller: Unspecified (Today, 10:35 AM)             Ultram 100mg once daily ordered, and printed out for faxing.       Quinten Howe MD   Diplomat, [facility]   [address] [place] [id number]

## 2018-01-12 ENCOUNTER — TELEPHONE (OUTPATIENT)
Dept: MEDICAL GROUP | Age: 53
End: 2018-01-12

## 2018-01-12 ENCOUNTER — OFFICE VISIT (OUTPATIENT)
Dept: CARDIOLOGY | Facility: MEDICAL CENTER | Age: 53
End: 2018-01-12
Payer: COMMERCIAL

## 2018-01-12 VITALS
DIASTOLIC BLOOD PRESSURE: 92 MMHG | SYSTOLIC BLOOD PRESSURE: 154 MMHG | HEART RATE: 76 BPM | HEIGHT: 69 IN | WEIGHT: 235 LBS | OXYGEN SATURATION: 99 % | BODY MASS INDEX: 34.8 KG/M2

## 2018-01-12 DIAGNOSIS — I42.8 NONISCHEMIC CARDIOMYOPATHY (HCC): ICD-10-CM

## 2018-01-12 DIAGNOSIS — I27.82 CHRONIC SEPTIC PULMONARY EMBOLISM WITH ACUTE COR PULMONALE (HCC): ICD-10-CM

## 2018-01-12 DIAGNOSIS — K50.00 CROHN'S DISEASE OF SMALL INTESTINE WITHOUT COMPLICATION (HCC): ICD-10-CM

## 2018-01-12 DIAGNOSIS — Z79.899 HIGH RISK MEDICATION USE: ICD-10-CM

## 2018-01-12 DIAGNOSIS — I21.4 NON-STEMI (NON-ST ELEVATED MYOCARDIAL INFARCTION) (HCC): ICD-10-CM

## 2018-01-12 DIAGNOSIS — I50.22 STAGE C CHRONIC SYSTOLIC CONGESTIVE HEART FAILURE (HCC): ICD-10-CM

## 2018-01-12 DIAGNOSIS — I50.21 ACUTE SYSTOLIC CHF (CONGESTIVE HEART FAILURE), NYHA CLASS 3 (HCC): ICD-10-CM

## 2018-01-12 DIAGNOSIS — I48.0 PAROXYSMAL ATRIAL FIBRILLATION (HCC): ICD-10-CM

## 2018-01-12 DIAGNOSIS — R06.02 SOB (SHORTNESS OF BREATH): ICD-10-CM

## 2018-01-12 DIAGNOSIS — I10 ESSENTIAL HYPERTENSION: ICD-10-CM

## 2018-01-12 DIAGNOSIS — I26.01 CHRONIC SEPTIC PULMONARY EMBOLISM WITH ACUTE COR PULMONALE (HCC): ICD-10-CM

## 2018-01-12 DIAGNOSIS — E78.00 PURE HYPERCHOLESTEROLEMIA: ICD-10-CM

## 2018-01-12 PROBLEM — Z23 NEED FOR VACCINATION: Status: RESOLVED | Noted: 2017-12-13 | Resolved: 2018-01-12

## 2018-01-12 PROCEDURE — 99214 OFFICE O/P EST MOD 30 MIN: CPT | Performed by: INTERNAL MEDICINE

## 2018-01-12 ASSESSMENT — ENCOUNTER SYMPTOMS
RESPIRATORY NEGATIVE: 1
SPUTUM PRODUCTION: 0
EYES NEGATIVE: 1
PND: 0
PALPITATIONS: 0
SORE THROAT: 0
CLAUDICATION: 0
HEMOPTYSIS: 0
DIZZINESS: 0
LOSS OF CONSCIOUSNESS: 0
CHILLS: 0
STRIDOR: 0
NEUROLOGICAL NEGATIVE: 1
CARDIOVASCULAR NEGATIVE: 1
WEAKNESS: 0
FEVER: 0
WHEEZING: 0
ORTHOPNEA: 0
MUSCULOSKELETAL NEGATIVE: 1
COUGH: 0
SHORTNESS OF BREATH: 0
GASTROINTESTINAL NEGATIVE: 1
BRUISES/BLEEDS EASILY: 0
CONSTITUTIONAL NEGATIVE: 1

## 2018-01-12 NOTE — TELEPHONE ENCOUNTER
----- Message from Allison Reddy sent at 1/11/2018  3:19 PM PST -----  Contact: [phone]  Pts needs his prescription to be sent to [facility] [address] [facility]. The medication is tramadol (ULTRAM ER) 100 MG tablet  Pt is in a lot of pain and would like this done today.

## 2018-01-12 NOTE — LETTER
[facility]   [address] [address]  [place] [address]  Phone: [phone]  Fax: [phone]              Shaka Riley  1965    Encounter Date: 1/12/2018    Jorge A Le M.D.          PROGRESS NOTE:  Subjective:   Shaka Riley is a 52 y.o. male who presents today as a follow-up for his nonischemic cardiac myopathy. His EF is now normalized. He is doing extremely well. He is having pain from his back which is causing his blood pressure be elevated but otherwise his average systolic is running in the 130s. He is not having lower extremity edema chest pain palpitations or PND. He is contemplating lower back surgery for mechanical back pain.    Chief Complaint: CHF, HTN, HLD, P-AFIb    Past Medical History:   Diagnosis Date   • Clotting disorder (CMS-HCC)     PE   • Congestive heart failure (CMS-HCC) 7/2015    EF 30-35%; Dilated R atrium; LVH; Mild MR; Mild AI; Mild TR   • Crohn disease (CMS-HCC)    • Hypertension    • PAF (paroxysmal atrial fibrillation) (CMS-HCC)    • Sleep apnea     un-diagnosed and pending sleep study     History reviewed. No pertinent surgical history.  Family History   Problem Relation Age of Onset   • Cancer Mother    • Heart Disease Paternal Grandmother    • Heart Disease Paternal Grandfather    • Cancer Father      paralysis from accident     History   Smoking Status   • Never Smoker   Smokeless Tobacco   • Never Used     No Known Allergies  Outpatient Encounter Prescriptions as of 1/12/2018   Medication Sig Dispense Refill   • tramadol (ULTRAM ER) 100 MG tablet Take 1 Tab by mouth 1 time daily as needed for up to 30 days. 30 Tab 0   • alprazolam (XANAX) 0.5 MG Tab Take 1 Tab by mouth at bedtime as needed for Sleep for up to 30 days. 30 Tab 0   • carvedilol (COREG) 25 MG Tab Take 1 Tab by mouth 2 times a day, with meals. 180 Tab 0   • digoxin (LANOXIN) 125 MCG Tab Take 1 Tab by mouth every day. 30 Tab 11   •  sacubitril-valsartan (ENTRESTO)  MG Tab tablet Take 1 Tab by mouth 2 Times a Day. 60 Tab 0   • furosemide (LASIX) 40 MG Tab TAKE ONE TABLET BY MOUTH TWICE DAILY 90 Tab 3   • ropinirole (REQUIP) 0.5 MG Tab Take 2 Tabs by mouth every evening. 60 Tab 11   • spironolactone (ALDACTONE) 25 MG Tab Take 1 Tab by mouth every day. 30 Tab 11   • aspirin (ASA) 325 MG Tab Take 325 mg by mouth every day.     • albuterol 108 (90 BASE) MCG/ACT Aero Soln inhalation aerosol Inhale 2 Puffs by mouth every four hours as needed for Shortness of Breath. 1 Inhaler 1   • [DISCONTINUED] tramadol (ULTRAM-ER) 100 MG tablet TAKE ONE TAB PO BID PRN 60 Tab 0   • [DISCONTINUED] hydrocodone/acetaminophen (NORCO)  MG Tab Take 1 Tab by mouth every 24 hours as needed for up to 60 days. (Patient not taking: Reported on 1/12/2018) 30 Tab 0   • [DISCONTINUED] MethylPREDNISolone (MEDROL DOSEPAK) 4 MG Tablet Therapy Pack As directed on the packaging label. (Patient not taking: Reported on 1/12/2018) 21 Tab 0   • [DISCONTINUED] zolpidem (AMBIEN) 5 MG Tab Take 1 Tab by mouth at bedtime as needed for Sleep (1 to 3 po qhs prn insomnia/sleep study. Bring to sleep study.). (Patient not taking: Reported on 1/12/2018) 3 Tab 0   • [DISCONTINUED] rosuvastatin (CRESTOR) 5 MG Tab Take 1 Tab by mouth every evening. (Patient not taking: Reported on 8/1/2017) 90 Tab 3     No facility-administered encounter medications on file as of 1/12/2018.      Review of Systems   Constitutional: Negative.  Negative for chills, fever and malaise/fatigue.   HENT: Negative.  Negative for sore throat.    Eyes: Negative.    Respiratory: Negative.  Negative for cough, hemoptysis, sputum production, shortness of breath, wheezing and stridor.    Cardiovascular: Negative.  Negative for chest pain, palpitations, orthopnea, claudication, leg swelling and PND.   Gastrointestinal: Negative.    Genitourinary: Negative.    Musculoskeletal: Negative.    Skin: Negative.    Neurological:  "Negative.  Negative for dizziness, loss of consciousness and weakness.   Endo/Heme/Allergies: Negative.  Does not bruise/bleed easily.   All other systems reviewed and are negative.       Objective:   /92   Pulse 76   Ht 1.753 m (5' 9\")   Wt 106.6 kg (235 lb)   SpO2 99%   BMI 34.70 kg/m²      Physical Exam    Assessment:     1. Acute systolic CHF (congestive heart failure), NYHA class 3 (CMS-[facility])     2. Crohn's disease of small intestine without complication (CMS-[facility])     3. Essential hypertension     4. Non-STEMI (non-ST elevated myocardial infarction) (CMS-[facility])     5. Nonischemic cardiomyopathy (CMS-[facility])     6. Paroxysmal atrial fibrillation (CMS-[facility])     7. Chronic septic pulmonary embolism with acute cor pulmonale (CMS-[facility])     8. Pure hypercholesterolemia     9. SOB (shortness of breath)     10. Stage C chronic systolic congestive heart failure (CMS-[facility])     11. High risk medication use         Medical Decision Making:  Today's Assessment / Status / Plan:     51-year-old male with heart failure with reduced ejection fraction.  His EF is now normalized and I congratulated him on his good medical compliance and good outcome. We did discuss the risks and benefits of stopping the medication stopped his EF is normalized. I would not recommend doing this and he agreed. I will see him back in 6 months.        1. CHFrEF 40%, non-ischemic, NYHA I, Stage C, Hemo Pro A    - cont coreg 25 BID    - cont Entresto 97/103    - cont spironolactone 25    - start dig 0.125    - ICD - not candidate    - BiV - not candidate    - cardiac PET negative for ischemia     2. HTN    - cont spironolactone 25     3. HLD, Cor Ca on CT    - hold rosuva for now    Thank for you allowing me to take part in your patient's care, please call should you have any questions or would like to discuss this patient.      Quinten Kiran M.D.  [address] [place] [address] Dr Andrew NASSAR [id number]  VIA In Basket                 "

## 2018-01-13 NOTE — PROGRESS NOTES
Subjective:   Shaka Riley is a 52 y.o. male who presents today as a follow-up for his nonischemic cardiac myopathy. His EF is now normalized. He is doing extremely well. He is having pain from his back which is causing his blood pressure be elevated but otherwise his average systolic is running in the 130s. He is not having lower extremity edema chest pain palpitations or PND. He is contemplating lower back surgery for mechanical back pain.    Chief Complaint: CHF, HTN, HLD, P-AFIb    Past Medical History:   Diagnosis Date   • Clotting disorder (CMS-HCC)     PE   • Congestive heart failure (CMS-HCC) 7/2015    EF 30-35%; Dilated R atrium; LVH; Mild MR; Mild AI; Mild TR   • Crohn disease (CMS-HCC)    • Hypertension    • PAF (paroxysmal atrial fibrillation) (CMS-HCC)    • Sleep apnea     un-diagnosed and pending sleep study     History reviewed. No pertinent surgical history.  Family History   Problem Relation Age of Onset   • Cancer Mother    • Heart Disease Paternal Grandmother    • Heart Disease Paternal Grandfather    • Cancer Father      paralysis from accident     History   Smoking Status   • Never Smoker   Smokeless Tobacco   • Never Used     No Known Allergies  Outpatient Encounter Prescriptions as of 1/12/2018   Medication Sig Dispense Refill   • tramadol (ULTRAM ER) 100 MG tablet Take 1 Tab by mouth 1 time daily as needed for up to 30 days. 30 Tab 0   • alprazolam (XANAX) 0.5 MG Tab Take 1 Tab by mouth at bedtime as needed for Sleep for up to 30 days. 30 Tab 0   • carvedilol (COREG) 25 MG Tab Take 1 Tab by mouth 2 times a day, with meals. 180 Tab 0   • digoxin (LANOXIN) 125 MCG Tab Take 1 Tab by mouth every day. 30 Tab 11   • sacubitril-valsartan (ENTRESTO)  MG Tab tablet Take 1 Tab by mouth 2 Times a Day. 60 Tab 0   • furosemide (LASIX) 40 MG Tab TAKE ONE TABLET BY MOUTH TWICE DAILY 90 Tab 3   • ropinirole (REQUIP) 0.5 MG Tab Take 2 Tabs by mouth every evening. 60 Tab 11   • spironolactone  "(ALDACTONE) 25 MG Tab Take 1 Tab by mouth every day. 30 Tab 11   • aspirin (ASA) 325 MG Tab Take 325 mg by mouth every day.     • albuterol 108 (90 BASE) MCG/ACT Aero Soln inhalation aerosol Inhale 2 Puffs by mouth every four hours as needed for Shortness of Breath. 1 Inhaler 1   • [DISCONTINUED] tramadol (ULTRAM-ER) 100 MG tablet TAKE ONE TAB PO BID PRN 60 Tab 0   • [DISCONTINUED] hydrocodone/acetaminophen (NORCO)  MG Tab Take 1 Tab by mouth every 24 hours as needed for up to 60 days. (Patient not taking: Reported on 1/12/2018) 30 Tab 0   • [DISCONTINUED] MethylPREDNISolone (MEDROL DOSEPAK) 4 MG Tablet Therapy Pack As directed on the packaging label. (Patient not taking: Reported on 1/12/2018) 21 Tab 0   • [DISCONTINUED] zolpidem (AMBIEN) 5 MG Tab Take 1 Tab by mouth at bedtime as needed for Sleep (1 to 3 po qhs prn insomnia/sleep study. Bring to sleep study.). (Patient not taking: Reported on 1/12/2018) 3 Tab 0   • [DISCONTINUED] rosuvastatin (CRESTOR) 5 MG Tab Take 1 Tab by mouth every evening. (Patient not taking: Reported on 8/1/2017) 90 Tab 3     No facility-administered encounter medications on file as of 1/12/2018.      Review of Systems   Constitutional: Negative.  Negative for chills, fever and malaise/fatigue.   HENT: Negative.  Negative for sore throat.    Eyes: Negative.    Respiratory: Negative.  Negative for cough, hemoptysis, sputum production, shortness of breath, wheezing and stridor.    Cardiovascular: Negative.  Negative for chest pain, palpitations, orthopnea, claudication, leg swelling and PND.   Gastrointestinal: Negative.    Genitourinary: Negative.    Musculoskeletal: Negative.    Skin: Negative.    Neurological: Negative.  Negative for dizziness, loss of consciousness and weakness.   Endo/Heme/Allergies: Negative.  Does not bruise/bleed easily.   All other systems reviewed and are negative.       Objective:   /92   Pulse 76   Ht 1.753 m (5' 9\")   Wt 106.6 kg (235 lb)   SpO2 " 99%   BMI 34.70 kg/m²     Physical Exam    Assessment:     1. Acute systolic CHF (congestive heart failure), NYHA class 3 (CMS-HCC)     2. Crohn's disease of small intestine without complication (CMS-HCC)     3. Essential hypertension     4. Non-STEMI (non-ST elevated myocardial infarction) (CMS-HCC)     5. Nonischemic cardiomyopathy (CMS-HCC)     6. Paroxysmal atrial fibrillation (CMS-HCC)     7. Chronic septic pulmonary embolism with acute cor pulmonale (CMS-HCC)     8. Pure hypercholesterolemia     9. SOB (shortness of breath)     10. Stage C chronic systolic congestive heart failure (CMS-[facility])     11. High risk medication use         Medical Decision Making:  Today's Assessment / Status / Plan:     51-year-old male with heart failure with reduced ejection fraction.  His EF is now normalized and I congratulated him on his good medical compliance and good outcome. We did discuss the risks and benefits of stopping the medication stopped his EF is normalized. I would not recommend doing this and he agreed. I will see him back in 6 months.        1. CHFrEF 40%, non-ischemic, NYHA I, Stage C, Hemo Pro A    - cont coreg 25 BID    - cont Entresto 97/103    - cont spironolactone 25    - start dig 0.125    - ICD - not candidate    - BiV - not candidate    - cardiac PET negative for ischemia     2. HTN    - cont spironolactone 25     3. HLD, Cor Ca on CT    - hold sandrauvcullen for now    Thank for you allowing me to take part in your patient's care, please call should you have any questions or would like to discuss this patient.

## 2018-01-15 DIAGNOSIS — G89.29 CHRONIC LOW BACK PAIN WITH SCIATICA, SCIATICA LATERALITY UNSPECIFIED, UNSPECIFIED BACK PAIN LATERALITY: ICD-10-CM

## 2018-01-15 DIAGNOSIS — M54.40 CHRONIC LOW BACK PAIN WITH SCIATICA, SCIATICA LATERALITY UNSPECIFIED, UNSPECIFIED BACK PAIN LATERALITY: ICD-10-CM

## 2018-01-16 ENCOUNTER — HOSPITAL ENCOUNTER (OUTPATIENT)
Dept: RADIOLOGY | Facility: MEDICAL CENTER | Age: 53
End: 2018-01-16
Attending: FAMILY MEDICINE
Payer: COMMERCIAL

## 2018-01-16 DIAGNOSIS — M54.16 LUMBAR RADICULOPATHY: ICD-10-CM

## 2018-01-16 PROCEDURE — 72158 MRI LUMBAR SPINE W/O & W/DYE: CPT

## 2018-01-16 PROCEDURE — 700117 HCHG RX CONTRAST REV CODE 255: Performed by: FAMILY MEDICINE

## 2018-01-16 PROCEDURE — A9579 GAD-BASE MR CONTRAST NOS,1ML: HCPCS | Performed by: FAMILY MEDICINE

## 2018-01-16 RX ORDER — TRAMADOL HYDROCHLORIDE 100 MG/1
100 TABLET, EXTENDED RELEASE ORAL
Qty: 30 TAB | Refills: 0 | Status: SHIPPED
Start: 2018-01-16 | End: 2018-02-15

## 2018-01-16 RX ADMIN — GADODIAMIDE 20 ML: 287 INJECTION INTRAVENOUS at 14:21

## 2018-01-17 DIAGNOSIS — M51.26 POSTERIOR LUMBAR DISC PROLAPSE: ICD-10-CM

## 2018-01-17 NOTE — PROGRESS NOTES
I discussed results and plan with patient, who stated understanding.   SPINE NEVADA  Consult placed.      Quinten Howe MD  [employer], [address] [address]

## 2018-01-30 ENCOUNTER — HOSPITAL ENCOUNTER (OUTPATIENT)
Dept: LAB | Facility: MEDICAL CENTER | Age: 53
End: 2018-01-30
Attending: ANESTHESIOLOGY
Payer: COMMERCIAL

## 2018-01-30 LAB
ANION GAP SERPL CALC-SCNC: 8 MMOL/L (ref 0–11.9)
APPEARANCE UR: CLEAR
APTT PPP: 26.9 SEC (ref 24.7–36)
BASOPHILS # BLD AUTO: 0.4 % (ref 0–1.8)
BASOPHILS # BLD: 0.03 K/UL (ref 0–0.12)
BILIRUB UR QL STRIP.AUTO: NEGATIVE
BUN SERPL-MCNC: 29 MG/DL (ref 8–22)
CALCIUM SERPL-MCNC: 9.4 MG/DL (ref 8.5–10.5)
CHLORIDE SERPL-SCNC: 104 MMOL/L (ref 96–112)
CO2 SERPL-SCNC: 24 MMOL/L (ref 20–33)
COLOR UR: YELLOW
CREAT SERPL-MCNC: 1.07 MG/DL (ref 0.5–1.4)
CULTURE IF INDICATED INDCX: NO UA CULTURE
EOSINOPHIL # BLD AUTO: 0.18 K/UL (ref 0–0.51)
EOSINOPHIL NFR BLD: 2.7 % (ref 0–6.9)
ERYTHROCYTE [DISTWIDTH] IN BLOOD BY AUTOMATED COUNT: 41.2 FL (ref 35.9–50)
GLUCOSE SERPL-MCNC: 104 MG/DL (ref 65–99)
GLUCOSE UR STRIP.AUTO-MCNC: NEGATIVE MG/DL
HCT VFR BLD AUTO: 40.9 % (ref 42–52)
HGB BLD-MCNC: 14 G/DL (ref 14–18)
IMM GRANULOCYTES # BLD AUTO: 0.03 K/UL (ref 0–0.11)
IMM GRANULOCYTES NFR BLD AUTO: 0.4 % (ref 0–0.9)
INR PPP: 1 (ref 0.87–1.13)
KETONES UR STRIP.AUTO-MCNC: NEGATIVE MG/DL
LEUKOCYTE ESTERASE UR QL STRIP.AUTO: NEGATIVE
LYMPHOCYTES # BLD AUTO: 1.13 K/UL (ref 1–4.8)
LYMPHOCYTES NFR BLD: 16.7 % (ref 22–41)
MCH RBC QN AUTO: 30.8 PG (ref 27–33)
MCHC RBC AUTO-ENTMCNC: 34.2 G/DL (ref 33.7–35.3)
MCV RBC AUTO: 90.1 FL (ref 81.4–97.8)
MICRO URNS: NORMAL
MONOCYTES # BLD AUTO: 0.46 K/UL (ref 0–0.85)
MONOCYTES NFR BLD AUTO: 6.8 % (ref 0–13.4)
NEUTROPHILS # BLD AUTO: 4.94 K/UL (ref 1.82–7.42)
NEUTROPHILS NFR BLD: 73 % (ref 44–72)
NITRITE UR QL STRIP.AUTO: NEGATIVE
NRBC # BLD AUTO: 0 K/UL
NRBC BLD-RTO: 0 /100 WBC
PH UR STRIP.AUTO: 5.5 [PH]
PLATELET # BLD AUTO: 243 K/UL (ref 164–446)
PMV BLD AUTO: 11.1 FL (ref 9–12.9)
POTASSIUM SERPL-SCNC: 4 MMOL/L (ref 3.6–5.5)
PROT UR QL STRIP: NEGATIVE MG/DL
PROTHROMBIN TIME: 12.9 SEC (ref 12–14.6)
RBC # BLD AUTO: 4.54 M/UL (ref 4.7–6.1)
RBC UR QL AUTO: NEGATIVE
SODIUM SERPL-SCNC: 136 MMOL/L (ref 135–145)
SP GR UR STRIP.AUTO: 1.01
UROBILINOGEN UR STRIP.AUTO-MCNC: 0.2 MG/DL
WBC # BLD AUTO: 6.8 K/UL (ref 4.8–10.8)

## 2018-01-30 PROCEDURE — 81003 URINALYSIS AUTO W/O SCOPE: CPT

## 2018-01-30 PROCEDURE — 80048 BASIC METABOLIC PNL TOTAL CA: CPT

## 2018-01-30 PROCEDURE — 36415 COLL VENOUS BLD VENIPUNCTURE: CPT

## 2018-01-30 PROCEDURE — 85610 PROTHROMBIN TIME: CPT

## 2018-01-30 PROCEDURE — 85025 COMPLETE CBC W/AUTO DIFF WBC: CPT

## 2018-01-30 PROCEDURE — 85730 THROMBOPLASTIN TIME PARTIAL: CPT

## 2018-04-02 DIAGNOSIS — G25.81 RESTLESS LEG SYNDROME: ICD-10-CM

## 2018-04-02 DIAGNOSIS — I50.21 ACUTE SYSTOLIC CHF (CONGESTIVE HEART FAILURE), NYHA CLASS 3 (HCC): ICD-10-CM

## 2018-04-02 RX ORDER — SPIRONOLACTONE 25 MG/1
TABLET ORAL
Qty: 90 TAB | Refills: 3 | Status: SHIPPED | OUTPATIENT
Start: 2018-04-02 | End: 2018-08-22

## 2018-07-23 ENCOUNTER — TELEPHONE (OUTPATIENT)
Dept: CARDIOLOGY | Facility: MEDICAL CENTER | Age: 53
End: 2018-07-23

## 2018-07-23 NOTE — TELEPHONE ENCOUNTER
No PAR needed at this time:  Shaka Riley (Cordero: [id number])   Entresto 97-103MG OR TABS   Form  [facility] and [employer] Electronic PA Form     Submit Clinical Questions   Determination   N/A   Message from Plan  Drug is covered by current benefit plan. No further PA activity needed.

## 2018-08-22 ENCOUNTER — OFFICE VISIT (OUTPATIENT)
Dept: CARDIOLOGY | Facility: MEDICAL CENTER | Age: 53
End: 2018-08-22
Payer: COMMERCIAL

## 2018-08-22 VITALS
OXYGEN SATURATION: 90 % | BODY MASS INDEX: 34.21 KG/M2 | SYSTOLIC BLOOD PRESSURE: 128 MMHG | DIASTOLIC BLOOD PRESSURE: 84 MMHG | HEIGHT: 69 IN | HEART RATE: 70 BPM | WEIGHT: 231 LBS

## 2018-08-22 DIAGNOSIS — Z79.899 HIGH RISK MEDICATION USE: ICD-10-CM

## 2018-08-22 DIAGNOSIS — G25.81 RESTLESS LEG SYNDROME: ICD-10-CM

## 2018-08-22 DIAGNOSIS — R55 SYNCOPE AND COLLAPSE: ICD-10-CM

## 2018-08-22 DIAGNOSIS — I50.22 STAGE C CHRONIC SYSTOLIC CONGESTIVE HEART FAILURE (HCC): ICD-10-CM

## 2018-08-22 DIAGNOSIS — I42.8 NONISCHEMIC CARDIOMYOPATHY (HCC): ICD-10-CM

## 2018-08-22 DIAGNOSIS — E78.00 PURE HYPERCHOLESTEROLEMIA: ICD-10-CM

## 2018-08-22 DIAGNOSIS — I27.82 CHRONIC SEPTIC PULMONARY EMBOLISM WITH ACUTE COR PULMONALE (HCC): ICD-10-CM

## 2018-08-22 DIAGNOSIS — I10 ESSENTIAL HYPERTENSION: ICD-10-CM

## 2018-08-22 DIAGNOSIS — I26.01 CHRONIC SEPTIC PULMONARY EMBOLISM WITH ACUTE COR PULMONALE (HCC): ICD-10-CM

## 2018-08-22 DIAGNOSIS — I50.21 ACUTE SYSTOLIC CHF (CONGESTIVE HEART FAILURE), NYHA CLASS 3 (HCC): ICD-10-CM

## 2018-08-22 DIAGNOSIS — R06.02 SOB (SHORTNESS OF BREATH): ICD-10-CM

## 2018-08-22 DIAGNOSIS — I21.4 NON-STEMI (NON-ST ELEVATED MYOCARDIAL INFARCTION) (HCC): ICD-10-CM

## 2018-08-22 DIAGNOSIS — R06.83 SNORING: ICD-10-CM

## 2018-08-22 PROCEDURE — 99214 OFFICE O/P EST MOD 30 MIN: CPT | Mod: 25 | Performed by: INTERNAL MEDICINE

## 2018-08-22 PROCEDURE — 94618 PULMONARY STRESS TESTING: CPT | Performed by: INTERNAL MEDICINE

## 2018-08-22 RX ORDER — SPIRONOLACTONE 25 MG/1
25 TABLET ORAL DAILY
Qty: 90 TAB | Refills: 3
Start: 2018-08-22 | End: 2019-04-17 | Stop reason: SDUPTHER

## 2018-08-22 RX ORDER — ROPINIROLE 0.5 MG/1
1 TABLET, FILM COATED ORAL EVERY EVENING
Qty: 60 TAB | Refills: 11 | Status: SHIPPED | OUTPATIENT
Start: 2018-08-22 | End: 2019-08-09 | Stop reason: SDUPTHER

## 2018-08-22 RX ORDER — LOSARTAN POTASSIUM 100 MG/1
100 TABLET ORAL DAILY
Qty: 30 TAB | Refills: 11 | Status: SHIPPED | OUTPATIENT
Start: 2018-08-22 | End: 2018-12-21

## 2018-08-22 ASSESSMENT — MINNESOTA LIVING WITH HEART FAILURE QUESTIONNAIRE (MLHF)
MAKING YOU FEEL DEPRESSED: 2
DIFFICULTY SOCIALIZING WITH FAMILY OR FRIENDS: 3
WALKING ABOUT OR CLIMBING STAIRS DIFFICULT: 3
DIFFICULTY GOING AWAY FROM HOME: 3
MAKING YOU STAY IN A HOSPITAL: 0
DIFFICULTY WITH RECREATIONAL PASTIMES, SPORTS, HOBBIES: 3
MAKING YOU WORRY: 2
TIRED, FATIGUED OR LOW ON ENERGY: 3
COSTING YOU MONEY FOR MEDICAL CARE: 2
DIFFICULTY WORKING TO EARN A LIVING: 3
FEELING LIKE A BURDEN TO FAMILY AND FRIENDS: 1
DIFFICULTY TO CONCENTRATE OR REMEMBERING THINGS: 3
MAKING YOU SHORT OF BREATH: 2
DIFFICULTY WITH SEXUAL ACTIVITIES: 3
WORKING AROUND THE HOUSE OR YARD DIFFICULT: 3
SWELLING IN ANKLES OR LEGS: 0
DIFFICULTY SLEEPING WELL AT NIGHT: 3
GIVING YOU SIDE EFFECTS FROM TREATMENTS: 0
HAVING TO SIT OR LIE DOWN DURING THE DAY: 3
EATING LESS FOODS YOU LIKE: 1
LOSS OF SELF CONTROL IN YOUR LIFE: 1
TOTAL_SCORE: 44

## 2018-08-22 ASSESSMENT — ENCOUNTER SYMPTOMS
COUGH: 1
SORE THROAT: 0
EYES NEGATIVE: 1
PND: 0
HEMOPTYSIS: 0
CONSTITUTIONAL NEGATIVE: 1
STRIDOR: 0
SPUTUM PRODUCTION: 0
WEAKNESS: 0
DIZZINESS: 0
WHEEZING: 0
GASTROINTESTINAL NEGATIVE: 1
CHILLS: 0
LOSS OF CONSCIOUSNESS: 0
ORTHOPNEA: 0
NEUROLOGICAL NEGATIVE: 1
CARDIOVASCULAR NEGATIVE: 1
FEVER: 0
MUSCULOSKELETAL NEGATIVE: 1
CLAUDICATION: 0
BRUISES/BLEEDS EASILY: 0
PALPITATIONS: 0
SHORTNESS OF BREATH: 0

## 2018-08-22 ASSESSMENT — 6 MINUTE WALK TEST (6MWT): TOTAL DISTANCE WALKED (METERS): 424

## 2018-08-22 NOTE — LETTER
[facility]   [address], [address]  [place] [address]  Phone: [phone]  Fax: [phone]              Shaka Riley  1965    Encounter Date: 8/22/2018    Jorge A Le M.D.          PROGRESS NOTE:  Chief Complaint   Patient presents with   • HTN (Controlled)       Subjective:   Shaka Riley is a 53 y.o. male who presents today as a follow-up for his heart failure with reduced ejection fraction now with normalized EF.  Since he was last seen he continues to do well.  He has NYHA class I symptoms.  He did very well and completed 424 m of his 6 minute walk test today.  His big issue has been some shortness of breath when he climbs stairs which is stable.  He is also been having a cough.  The cough is worse at the getting in the morning but present all day.  Otherwise his blood pressure is well controlled.  He continues on digoxin.    Past Medical History:   Diagnosis Date   • Clotting disorder ([facility])     PE   • Congestive heart failure (HCC) 7/2015    EF 30-35%; Dilated R atrium; LVH; Mild MR; Mild AI; Mild TR   • Crohn disease ([facility])    • Hypertension    • PAF (paroxysmal atrial fibrillation) ([facility])    • Sleep apnea     un-diagnosed and pending sleep study     History reviewed. No pertinent surgical history.  Family History   Problem Relation Age of Onset   • Cancer Mother    • Heart Disease Paternal Grandmother    • Heart Disease Paternal Grandfather    • Cancer Father         paralysis from accident     Social History     Social History   • Marital status:      Spouse name: N/A   • Number of children: N/A   • Years of education: N/A     Occupational History   • Not on file.     Social History Main Topics   • Smoking status: Never Smoker   • Smokeless tobacco: Never Used   • Alcohol use 1.2 oz/week     2 Glasses of wine per week      Comment: Quit in 1994 - previous six beer after work, 2-3 beers per day currently   • Drug use: No    Comment: Quit in 2012, used for 5 years   • Sexual activity: Yes     Partners: Female     Other Topics Concern   • Not on file     Social History Narrative   • No narrative on file     No Known Allergies  Outpatient Encounter Prescriptions as of 8/22/2018   Medication Sig Dispense Refill   • Methocarbamol (ROBAXIN-750 PO) Take 750 mg by mouth 2 Times a Day.     • GABAPENTIN PO Take  by mouth 2 Times a Day. Unsure of dose     • spironolactone (ALDACTONE) 25 MG Tab Take 1 Tab by mouth every day. 90 Tab 3   • losartan (COZAAR) 100 MG Tab Take 1 Tab by mouth every day. 30 Tab 11   • ROPINIRole (REQUIP) 0.5 MG Tab Take 2 Tabs by mouth every evening. 60 Tab 11   • carvedilol (COREG) 25 MG Tab Take 1 Tab by mouth 2 times a day, with meals. 180 Tab 3   • aspirin (ASA) 325 MG Tab Take 325 mg by mouth every day.     • [DISCONTINUED] spironolactone (ALDACTONE) 25 MG Tab TAKE ONE TABLET BY MOUTH ONCE DAILY 90 Tab 3   • [DISCONTINUED] digoxin (LANOXIN) 125 MCG Tab Take 1 Tab by mouth every day. 30 Tab 11   • [DISCONTINUED] sacubitril-valsartan (ENTRESTO)  MG Tab tablet Take 1 Tab by mouth 2 Times a Day. 60 Tab 0   • [DISCONTINUED] furosemide (LASIX) 40 MG Tab TAKE ONE TABLET BY MOUTH TWICE DAILY 90 Tab 3   • [DISCONTINUED] ropinirole (REQUIP) 0.5 MG Tab Take 2 Tabs by mouth every evening. 60 Tab 11   • albuterol 108 (90 BASE) MCG/ACT Aero Soln inhalation aerosol Inhale 2 Puffs by mouth every four hours as needed for Shortness of Breath. 1 Inhaler 1     No facility-administered encounter medications on file as of 8/22/2018.      Review of Systems   Constitutional: Negative.  Negative for chills, fever and malaise/fatigue.   HENT: Negative.  Negative for sore throat.    Eyes: Negative.    Respiratory: Positive for cough. Negative for hemoptysis, sputum production, shortness of breath, wheezing and stridor.    Cardiovascular: Negative.  Negative for chest pain, palpitations, orthopnea, claudication, leg swelling and PND.    "  Gastrointestinal: Negative.    Genitourinary: Negative.    Musculoskeletal: Negative.    Skin: Negative.    Neurological: Negative.  Negative for dizziness, loss of consciousness and weakness.   Endo/Heme/Allergies: Negative.  Does not bruise/bleed easily.   All other systems reviewed and are negative.       Objective:   /84   Pulse 70   Ht 1.753 m (5' 9\")   Wt 104.8 kg (231 lb)   SpO2 90%   BMI 34.11 kg/m²      Physical Exam   Constitutional: He appears well-developed and well-nourished. No distress.   HENT:   Head: Normocephalic and atraumatic.   Right Ear: External ear normal.   Left Ear: External ear normal.   Nose: Nose normal.   Mouth/Throat: No oropharyngeal exudate.   Eyes: Pupils are equal, round, and reactive to light. Conjunctivae and EOM are normal. Right eye exhibits no discharge. Left eye exhibits no discharge. No scleral icterus.   Neck: Neck supple. No JVD present.   Cardiovascular: Normal rate, regular rhythm and intact distal pulses.  Exam reveals no gallop and no friction rub.    No murmur heard.  Pulmonary/Chest: Effort normal. No stridor. No respiratory distress. He has no wheezes. He has no rales. He exhibits no tenderness.   Abdominal: Soft. He exhibits no distension. There is no guarding.   Musculoskeletal: Normal range of motion. He exhibits no edema, tenderness or deformity.   Neurological: He is alert. He has normal reflexes. He displays normal reflexes. No cranial nerve deficit. He exhibits normal muscle tone. Coordination normal.   Skin: Skin is warm and dry. No rash noted. He is not diaphoretic. No erythema. No pallor.   Psychiatric: He has a normal mood and affect. His behavior is normal. Judgment and thought content normal.   Nursing note and vitals reviewed.      Assessment:     1. High risk medication use     2. Essential hypertension     3. Acute systolic CHF (congestive heart failure), NYHA class 3 ([facility])  ECHOCARDIOGRAM COMP W/O CONT    spironolactone (ALDACTONE) 25 " MG Tab    losartan (COZAAR) 100 MG Tab    ROPINIRole (REQUIP) 0.5 MG Tab   4. Nonischemic cardiomyopathy (HCC)     5. Non-STEMI (non-ST elevated myocardial infarction) ([facility])  ECHOCARDIOGRAM COMP W/O CONT   6. Chronic septic pulmonary embolism with acute cor pulmonale (HCC)  ROPINIRole (REQUIP) 0.5 MG Tab   7. Pure hypercholesterolemia     8. Restless leg syndrome  spironolactone (ALDACTONE) 25 MG Tab    ROPINIRole (REQUIP) 0.5 MG Tab   9. SOB (shortness of breath)     10. Stage C chronic systolic congestive heart failure (HCC)     11. Syncope and collapse     12. Snoring  ROPINIRole (REQUIP) 0.5 MG Tab       Medical Decision Making:  Today's Assessment / Status / Plan:     53-year-old male with heart failure with reduced ejection fraction now with normalized function.  We will stop the digoxin today.  I will also stop the Entresto given his cough to see if this helps with resolution of it.  In the meantime I will switch him to losartan 100 mg per day.  We will also repeat an echocardiogram.  I will see him back in 3 months.    1. CHFrEF 40%, non-ischemic, NYHA I, Stage C, Hemo Pro A, now normalized    - cont coreg 25 BID    - stop Entresto 97/103    - start losartan 100    - cont spironolactone 25    - stop dig 0.125    - ICD - not candidate    - BiV - not candidate    - repeat TTE     2. HTN    - cont spironolactone 25     3. HLD, Cor Ca on CT    - hold rosuva for now    Thank for you allowing me to take part in your patient's care, please call should you have any questions or would like to discuss this patient.      Quinten Kiran M.D.  [place] [address]  VIA In Basket

## 2018-08-22 NOTE — PROGRESS NOTES
Chief Complaint   Patient presents with   • HTN (Controlled)       Subjective:   Shaka Riley is a 53 y.o. male who presents today as a follow-up for his heart failure with reduced ejection fraction now with normalized EF.  Since he was last seen he continues to do well.  He has NYHA class I symptoms.  He did very well and completed 424 m of his 6 minute walk test today.  His big issue has been some shortness of breath when he climbs stairs which is stable.  He is also been having a cough.  The cough is worse at the getting in the morning but present all day.  Otherwise his blood pressure is well controlled.  He continues on digoxin.    Past Medical History:   Diagnosis Date   • Clotting disorder ([facility])     PE   • Congestive heart failure ([facility]) 7/2015    EF 30-35%; Dilated R atrium; LVH; Mild MR; Mild AI; Mild TR   • Crohn disease ([facility])    • Hypertension    • PAF (paroxysmal atrial fibrillation) ([facility])    • Sleep apnea     un-diagnosed and pending sleep study     History reviewed. No pertinent surgical history.  Family History   Problem Relation Age of Onset   • Cancer Mother    • Heart Disease Paternal Grandmother    • Heart Disease Paternal Grandfather    • Cancer Father         paralysis from accident     Social History     Social History   • Marital status:      Spouse name: N/A   • Number of children: N/A   • Years of education: N/A     Occupational History   • Not on file.     Social History Main Topics   • Smoking status: Never Smoker   • Smokeless tobacco: Never Used   • Alcohol use 1.2 oz/week     2 Glasses of wine per week      Comment: Quit in 1994 - previous six beer after work, 2-3 beers per day currently   • Drug use: No      Comment: Quit in 2012, used for 5 years   • Sexual activity: Yes     Partners: Female     Other Topics Concern   • Not on file     Social History Narrative   • No narrative on file     No Known Allergies  Outpatient Encounter Prescriptions as of 8/22/2018   Medication  Sig Dispense Refill   • Methocarbamol (ROBAXIN-750 PO) Take 750 mg by mouth 2 Times a Day.     • GABAPENTIN PO Take  by mouth 2 Times a Day. Unsure of dose     • spironolactone (ALDACTONE) 25 MG Tab Take 1 Tab by mouth every day. 90 Tab 3   • losartan (COZAAR) 100 MG Tab Take 1 Tab by mouth every day. 30 Tab 11   • ROPINIRole (REQUIP) 0.5 MG Tab Take 2 Tabs by mouth every evening. 60 Tab 11   • carvedilol (COREG) 25 MG Tab Take 1 Tab by mouth 2 times a day, with meals. 180 Tab 3   • aspirin (ASA) 325 MG Tab Take 325 mg by mouth every day.     • [DISCONTINUED] spironolactone (ALDACTONE) 25 MG Tab TAKE ONE TABLET BY MOUTH ONCE DAILY 90 Tab 3   • [DISCONTINUED] digoxin (LANOXIN) 125 MCG Tab Take 1 Tab by mouth every day. 30 Tab 11   • [DISCONTINUED] sacubitril-valsartan (ENTRESTO)  MG Tab tablet Take 1 Tab by mouth 2 Times a Day. 60 Tab 0   • [DISCONTINUED] furosemide (LASIX) 40 MG Tab TAKE ONE TABLET BY MOUTH TWICE DAILY 90 Tab 3   • [DISCONTINUED] ropinirole (REQUIP) 0.5 MG Tab Take 2 Tabs by mouth every evening. 60 Tab 11   • albuterol 108 (90 BASE) MCG/ACT Aero Soln inhalation aerosol Inhale 2 Puffs by mouth every four hours as needed for Shortness of Breath. 1 Inhaler 1     No facility-administered encounter medications on file as of 8/22/2018.      Review of Systems   Constitutional: Negative.  Negative for chills, fever and malaise/fatigue.   HENT: Negative.  Negative for sore throat.    Eyes: Negative.    Respiratory: Positive for cough. Negative for hemoptysis, sputum production, shortness of breath, wheezing and stridor.    Cardiovascular: Negative.  Negative for chest pain, palpitations, orthopnea, claudication, leg swelling and PND.   Gastrointestinal: Negative.    Genitourinary: Negative.    Musculoskeletal: Negative.    Skin: Negative.    Neurological: Negative.  Negative for dizziness, loss of consciousness and weakness.   Endo/Heme/Allergies: Negative.  Does not bruise/bleed easily.   All other  "systems reviewed and are negative.       Objective:   /84   Pulse 70   Ht 1.753 m (5' 9\")   Wt 104.8 kg (231 lb)   SpO2 90%   BMI 34.11 kg/m²     Physical Exam   Constitutional: He appears well-developed and well-nourished. No distress.   HENT:   Head: Normocephalic and atraumatic.   Right Ear: External ear normal.   Left Ear: External ear normal.   Nose: Nose normal.   Mouth/Throat: No oropharyngeal exudate.   Eyes: Pupils are equal, round, and reactive to light. Conjunctivae and EOM are normal. Right eye exhibits no discharge. Left eye exhibits no discharge. No scleral icterus.   Neck: Neck supple. No JVD present.   Cardiovascular: Normal rate, regular rhythm and intact distal pulses.  Exam reveals no gallop and no friction rub.    No murmur heard.  Pulmonary/Chest: Effort normal. No stridor. No respiratory distress. He has no wheezes. He has no rales. He exhibits no tenderness.   Abdominal: Soft. He exhibits no distension. There is no guarding.   Musculoskeletal: Normal range of motion. He exhibits no edema, tenderness or deformity.   Neurological: He is alert. He has normal reflexes. He displays normal reflexes. No cranial nerve deficit. He exhibits normal muscle tone. Coordination normal.   Skin: Skin is warm and dry. No rash noted. He is not diaphoretic. No erythema. No pallor.   Psychiatric: He has a normal mood and affect. His behavior is normal. Judgment and thought content normal.   Nursing note and vitals reviewed.      Assessment:     1. High risk medication use     2. Essential hypertension     3. Acute systolic CHF (congestive heart failure), NYHA class 3 ([facility])  ECHOCARDIOGRAM COMP W/O CONT    spironolactone (ALDACTONE) 25 MG Tab    losartan (COZAAR) 100 MG Tab    ROPINIRole (REQUIP) 0.5 MG Tab   4. Nonischemic cardiomyopathy ([facility])     5. Non-STEMI (non-ST elevated myocardial infarction) ([facility])  ECHOCARDIOGRAM COMP W/O CONT   6. Chronic septic pulmonary embolism with acute cor pulmonale ([facility]) "  ROPINIRole (REQUIP) 0.5 MG Tab   7. Pure hypercholesterolemia     8. Restless leg syndrome  spironolactone (ALDACTONE) 25 MG Tab    ROPINIRole (REQUIP) 0.5 MG Tab   9. SOB (shortness of breath)     10. Stage C chronic systolic congestive heart failure (HCC)     11. Syncope and collapse     12. Snoring  ROPINIRole (REQUIP) 0.5 MG Tab       Medical Decision Making:  Today's Assessment / Status / Plan:     53-year-old male with heart failure with reduced ejection fraction now with normalized function.  We will stop the digoxin today.  I will also stop the Entresto given his cough to see if this helps with resolution of it.  In the meantime I will switch him to losartan 100 mg per day.  We will also repeat an echocardiogram.  I will see him back in 3 months.    1. CHFrEF 40%, non-ischemic, NYHA I, Stage C, Hemo Pro A, now normalized    - cont coreg 25 BID    - stop Entresto 97/103    - start losartan 100    - cont spironolactone 25    - stop dig 0.125    - ICD - not candidate    - BiV - not candidate    - repeat TTE     2. HTN    - cont spironolactone 25     3. HLD, Cor Ca on CT    - hold rosuva for now    Thank for you allowing me to take part in your patient's care, please call should you have any questions or would like to discuss this patient.

## 2018-09-05 ENCOUNTER — HOSPITAL ENCOUNTER (OUTPATIENT)
Dept: CARDIOLOGY | Facility: MEDICAL CENTER | Age: 53
End: 2018-09-05
Attending: INTERNAL MEDICINE
Payer: COMMERCIAL

## 2018-09-05 DIAGNOSIS — I21.4 NON-STEMI (NON-ST ELEVATED MYOCARDIAL INFARCTION) (HCC): ICD-10-CM

## 2018-09-05 DIAGNOSIS — I50.21 ACUTE SYSTOLIC CHF (CONGESTIVE HEART FAILURE), NYHA CLASS 3 (HCC): ICD-10-CM

## 2018-09-05 PROCEDURE — 93306 TTE W/DOPPLER COMPLETE: CPT

## 2018-09-05 PROCEDURE — 93308 TTE F-UP OR LMTD: CPT | Mod: 26 | Performed by: INTERNAL MEDICINE

## 2018-09-06 LAB
LV EJECT FRACT  99904: 65
LV EJECT FRACT MOD 2C 99903: 68.61
LV EJECT FRACT MOD 4C 99902: 62.95
LV EJECT FRACT MOD BP 99901: 62.14

## 2018-09-07 ENCOUNTER — TELEPHONE (OUTPATIENT)
Dept: CARDIOLOGY | Facility: MEDICAL CENTER | Age: 53
End: 2018-09-07

## 2018-09-07 NOTE — TELEPHONE ENCOUNTER
Received Cardiac Clearance from [facility] for Bilateral lower extremity vein ablations of the great and small saphenous veins.    To RO for recommendations and advise.    Placed in RO's folder.

## 2018-10-30 ENCOUNTER — DOCUMENTATION (OUTPATIENT)
Dept: CARDIOLOGY | Facility: MEDICAL CENTER | Age: 53
End: 2018-10-30

## 2018-10-30 ENCOUNTER — OFFICE VISIT (OUTPATIENT)
Dept: CARDIOLOGY | Facility: MEDICAL CENTER | Age: 53
End: 2018-10-30
Payer: COMMERCIAL

## 2018-10-30 VITALS
HEIGHT: 69 IN | HEART RATE: 72 BPM | DIASTOLIC BLOOD PRESSURE: 80 MMHG | SYSTOLIC BLOOD PRESSURE: 140 MMHG | OXYGEN SATURATION: 91 % | BODY MASS INDEX: 35.4 KG/M2 | WEIGHT: 239 LBS

## 2018-10-30 DIAGNOSIS — R73.9 ELEVATED BLOOD SUGAR: ICD-10-CM

## 2018-10-30 DIAGNOSIS — I27.82 CHRONIC SEPTIC PULMONARY EMBOLISM WITH ACUTE COR PULMONALE (HCC): ICD-10-CM

## 2018-10-30 DIAGNOSIS — I50.22 STAGE C CHRONIC SYSTOLIC CONGESTIVE HEART FAILURE (HCC): ICD-10-CM

## 2018-10-30 DIAGNOSIS — I21.4 NON-STEMI (NON-ST ELEVATED MYOCARDIAL INFARCTION) (HCC): ICD-10-CM

## 2018-10-30 DIAGNOSIS — I50.21 ACUTE SYSTOLIC CHF (CONGESTIVE HEART FAILURE), NYHA CLASS 3 (HCC): ICD-10-CM

## 2018-10-30 DIAGNOSIS — I48.0 PAROXYSMAL ATRIAL FIBRILLATION (HCC): ICD-10-CM

## 2018-10-30 DIAGNOSIS — R06.02 SOB (SHORTNESS OF BREATH): ICD-10-CM

## 2018-10-30 DIAGNOSIS — I42.8 NONISCHEMIC CARDIOMYOPATHY (HCC): ICD-10-CM

## 2018-10-30 DIAGNOSIS — Z79.899 HIGH RISK MEDICATION USE: ICD-10-CM

## 2018-10-30 DIAGNOSIS — I26.01 CHRONIC SEPTIC PULMONARY EMBOLISM WITH ACUTE COR PULMONALE (HCC): ICD-10-CM

## 2018-10-30 DIAGNOSIS — E78.00 PURE HYPERCHOLESTEROLEMIA: ICD-10-CM

## 2018-10-30 DIAGNOSIS — R06.83 SNORING: ICD-10-CM

## 2018-10-30 DIAGNOSIS — I10 ESSENTIAL HYPERTENSION: ICD-10-CM

## 2018-10-30 DIAGNOSIS — R55 SYNCOPE AND COLLAPSE: ICD-10-CM

## 2018-10-30 PROCEDURE — 99214 OFFICE O/P EST MOD 30 MIN: CPT | Performed by: INTERNAL MEDICINE

## 2018-10-30 ASSESSMENT — ENCOUNTER SYMPTOMS
WEAKNESS: 0
NEUROLOGICAL NEGATIVE: 1
PND: 0
SORE THROAT: 0
CLAUDICATION: 0
CARDIOVASCULAR NEGATIVE: 1
DIZZINESS: 0
WHEEZING: 0
STRIDOR: 0
EYES NEGATIVE: 1
HEMOPTYSIS: 0
SPUTUM PRODUCTION: 0
MUSCULOSKELETAL NEGATIVE: 1
CONSTITUTIONAL NEGATIVE: 1
FEVER: 0
CHILLS: 0
ORTHOPNEA: 0
LOSS OF CONSCIOUSNESS: 0
PALPITATIONS: 0
COUGH: 0
SHORTNESS OF BREATH: 0
RESPIRATORY NEGATIVE: 1
GASTROINTESTINAL NEGATIVE: 1
BRUISES/BLEEDS EASILY: 0

## 2018-10-30 NOTE — PROGRESS NOTES
Chief Complaint   Patient presents with   • CHF (Systolic)     F/V: 2 MO       Subjective:   Shaka Riley is a 53 y.o. male who presents today follows up today for his heart failure with reduced ejection fraction.  Since we last saw him his ejection fraction is normalized.  We had to switch him at his last visit to valsartan however his blood pressures been higher than before and he would like to switch back.  His cough never really resolved with this.  Otherwise he is able to do whatever he needs to do.  He does snore.  He continues to have elevated blood pressures in the morning which normalized by the early morning to afternoon.  He only has trace lower extremity edema occasionally.      Past Medical History:   Diagnosis Date   • Clotting disorder ([facility])     PE   • Congestive heart failure (HCC) 7/2015    EF 30-35%; Dilated R atrium; LVH; Mild MR; Mild AI; Mild TR   • Crohn disease ([facility])    • Hypertension    • PAF (paroxysmal atrial fibrillation) ([facility])    • Sleep apnea     un-diagnosed and pending sleep study     History reviewed. No pertinent surgical history.  Family History   Problem Relation Age of Onset   • Cancer Mother    • Heart Disease Paternal Grandmother    • Heart Disease Paternal Grandfather    • Cancer Father         paralysis from accident     Social History     Social History   • Marital status:      Spouse name: N/A   • Number of children: N/A   • Years of education: N/A     Occupational History   • Not on file.     Social History Main Topics   • Smoking status: Never Smoker   • Smokeless tobacco: Never Used   • Alcohol use 1.2 oz/week     2 Glasses of wine per week      Comment: Quit in 1994 - previous six beer after work, 2-3 beers per day currently   • Drug use: No      Comment: Quit in 2012, used for 5 years   • Sexual activity: Yes     Partners: Female     Other Topics Concern   • Not on file     Social History Narrative   • No narrative on file     No Known  "Allergies  Outpatient Encounter Prescriptions as of 10/30/2018   Medication Sig Dispense Refill   • sacubitril-valsartan (ENTRESTO)  MG Tab tablet Take 1 Tab by mouth 2 Times a Day. 60 Tab 11   • Methocarbamol (ROBAXIN-750 PO) Take 750 mg by mouth 2 Times a Day.     • GABAPENTIN PO Take  by mouth 2 Times a Day. Unsure of dose     • spironolactone (ALDACTONE) 25 MG Tab Take 1 Tab by mouth every day. 90 Tab 3   • losartan (COZAAR) 100 MG Tab Take 1 Tab by mouth every day. 30 Tab 11   • ROPINIRole (REQUIP) 0.5 MG Tab Take 2 Tabs by mouth every evening. 60 Tab 11   • carvedilol (COREG) 25 MG Tab Take 1 Tab by mouth 2 times a day, with meals. 180 Tab 3   • aspirin (ASA) 325 MG Tab Take 325 mg by mouth every day.     • albuterol 108 (90 BASE) MCG/ACT Aero Soln inhalation aerosol Inhale 2 Puffs by mouth every four hours as needed for Shortness of Breath. (Patient not taking: Reported on 10/30/2018) 1 Inhaler 1     No facility-administered encounter medications on file as of 10/30/2018.      Review of Systems   Constitutional: Negative.  Negative for chills, fever and malaise/fatigue.   HENT: Negative.  Negative for sore throat.    Eyes: Negative.    Respiratory: Negative.  Negative for cough, hemoptysis, sputum production, shortness of breath, wheezing and stridor.    Cardiovascular: Negative.  Negative for chest pain, palpitations, orthopnea, claudication, leg swelling and PND.   Gastrointestinal: Negative.    Genitourinary: Negative.    Musculoskeletal: Negative.    Skin: Negative.    Neurological: Negative.  Negative for dizziness, loss of consciousness and weakness.   Endo/Heme/Allergies: Negative.  Does not bruise/bleed easily.   All other systems reviewed and are negative.       Objective:   /80 (BP Location: Left arm, Patient Position: Sitting, BP Cuff Size: Adult)   Pulse 72   Ht 1.753 m (5' 9\")   Wt 108.4 kg (239 lb)   SpO2 91%   BMI 35.29 kg/m²     Physical Exam   Constitutional: He appears " well-developed and well-nourished. No distress.   HENT:   Head: Normocephalic and atraumatic.   Right Ear: External ear normal.   Left Ear: External ear normal.   Nose: Nose normal.   Mouth/Throat: No oropharyngeal exudate.   Eyes: Pupils are equal, round, and reactive to light. Conjunctivae and EOM are normal. Right eye exhibits no discharge. Left eye exhibits no discharge. No scleral icterus.   Neck: Neck supple. No JVD present.   Cardiovascular: Normal rate, regular rhythm and intact distal pulses.  Exam reveals no gallop and no friction rub.    No murmur heard.  Pulmonary/Chest: Effort normal. No stridor. No respiratory distress. He has no wheezes. He has no rales. He exhibits no tenderness.   Abdominal: Soft. He exhibits no distension. There is no guarding.   Musculoskeletal: Normal range of motion. He exhibits no edema, tenderness or deformity.   Neurological: He is alert. He has normal reflexes. He displays normal reflexes. No cranial nerve deficit. He exhibits normal muscle tone. Coordination normal.   Skin: Skin is warm and dry. No rash noted. He is not diaphoretic. No erythema. No pallor.   Psychiatric: He has a normal mood and affect. His behavior is normal. Judgment and thought content normal.   Nursing note and vitals reviewed.      Assessment:     1. Acute systolic CHF (congestive heart failure), NYHA class 3 ([facility])  sacubitril-valsartan (ENTRESTO)  MG Tab tablet   2. Elevated blood sugar     3. High risk medication use     4. Essential hypertension     5. Nonischemic cardiomyopathy (HCC)  sacubitril-valsartan (ENTRESTO)  MG Tab tablet   6. Non-STEMI (non-ST elevated myocardial infarction) ([facility])     7. Paroxysmal atrial fibrillation ([facility])     8. Chronic septic pulmonary embolism with acute cor pulmonale ([facility])     9. Pure hypercholesterolemia     10. SOB (shortness of breath)     11. Snoring  OVERNIGHT PULSE OXIMETRY   12. Stage C chronic systolic congestive heart failure (HCC)   sacubitril-valsartan (ENTRESTO)  MG Tab tablet    OVERNIGHT PULSE OXIMETRY   13. Syncope and collapse  OVERNIGHT PULSE OXIMETRY       Medical Decision Making:  Today's Assessment / Status / Plan:     53-year-old male with heart failure with reduced ejection fraction with mostly normalized function now.  We will go ahead and switch him back to Entresto.  1. CHFrEF 40%, non-ischemic, NYHA I, Stage C, Hemo Pro A, now normalized    - cont coreg 25 BID    - restart Entresto 97/103    - stop losartan 100    - cont spironolactone 25    - stop dig 0.125    - ICD - not candidate    - BiV - not candidate       2. HTN    - cont spironolactone 25     3. HLD, Cor Ca on CT    - hold rosuva for now    4. Snoring    - OPO    Thank for you allowing me to take part in your patient's care, please call should you have any questions or would like to discuss this patient.

## 2018-10-30 NOTE — LETTER
[facility]   [address]  [place] [address]  Phone: [phone]  Fax: [phone]              Shaka Riley  1965    Encounter Date: 10/30/2018    Jorge A Le M.D.          PROGRESS NOTE:  Chief Complaint   Patient presents with   • CHF (Systolic)     F/V: 2 MO       Subjective:   Shaka Riley is a 53 y.o. male who presents today follows up today for his heart failure with reduced ejection fraction.  Since we last saw him his ejection fraction is normalized.  We had to switch him at his last visit to valsartan however his blood pressures been higher than before and he would like to switch back.  His cough never really resolved with this.  Otherwise he is able to do whatever he needs to do.  He does snore.  He continues to have elevated blood pressures in the morning which normalized by the early morning to afternoon.  He only has trace lower extremity edema occasionally.      Past Medical History:   Diagnosis Date   • Clotting disorder ([facility])     PE   • Congestive heart failure (HCC) 7/2015    EF 30-35%; Dilated R atrium; LVH; Mild MR; Mild AI; Mild TR   • Crohn disease ([facility])    • Hypertension    • PAF (paroxysmal atrial fibrillation) ([facility])    • Sleep apnea     un-diagnosed and pending sleep study     History reviewed. No pertinent surgical history.  Family History   Problem Relation Age of Onset   • Cancer Mother    • Heart Disease Paternal Grandmother    • Heart Disease Paternal Grandfather    • Cancer Father         paralysis from accident     Social History     Social History   • Marital status:      Spouse name: N/A   • Number of children: N/A   • Years of education: N/A     Occupational History   • Not on file.     Social History Main Topics   • Smoking status: Never Smoker   • Smokeless tobacco: Never Used   • Alcohol use 1.2 oz/week     2 Glasses of wine per week      Comment: Quit in 1994 - previous six beer after  work, 2-3 beers per day currently   • Drug use: No      Comment: Quit in 2012, used for 5 years   • Sexual activity: Yes     Partners: Female     Other Topics Concern   • Not on file     Social History Narrative   • No narrative on file     No Known Allergies  Outpatient Encounter Prescriptions as of 10/30/2018   Medication Sig Dispense Refill   • sacubitril-valsartan (ENTRESTO)  MG Tab tablet Take 1 Tab by mouth 2 Times a Day. 60 Tab 11   • Methocarbamol (ROBAXIN-750 PO) Take 750 mg by mouth 2 Times a Day.     • GABAPENTIN PO Take  by mouth 2 Times a Day. Unsure of dose     • spironolactone (ALDACTONE) 25 MG Tab Take 1 Tab by mouth every day. 90 Tab 3   • losartan (COZAAR) 100 MG Tab Take 1 Tab by mouth every day. 30 Tab 11   • ROPINIRole (REQUIP) 0.5 MG Tab Take 2 Tabs by mouth every evening. 60 Tab 11   • carvedilol (COREG) 25 MG Tab Take 1 Tab by mouth 2 times a day, with meals. 180 Tab 3   • aspirin (ASA) 325 MG Tab Take 325 mg by mouth every day.     • albuterol 108 (90 BASE) MCG/ACT Aero Soln inhalation aerosol Inhale 2 Puffs by mouth every four hours as needed for Shortness of Breath. (Patient not taking: Reported on 10/30/2018) 1 Inhaler 1     No facility-administered encounter medications on file as of 10/30/2018.      Review of Systems   Constitutional: Negative.  Negative for chills, fever and malaise/fatigue.   HENT: Negative.  Negative for sore throat.    Eyes: Negative.    Respiratory: Negative.  Negative for cough, hemoptysis, sputum production, shortness of breath, wheezing and stridor.    Cardiovascular: Negative.  Negative for chest pain, palpitations, orthopnea, claudication, leg swelling and PND.   Gastrointestinal: Negative.    Genitourinary: Negative.    Musculoskeletal: Negative.    Skin: Negative.    Neurological: Negative.  Negative for dizziness, loss of consciousness and weakness.   Endo/Heme/Allergies: Negative.  Does not bruise/bleed easily.   All other systems reviewed and are  "negative.       Objective:   /80 (BP Location: Left arm, Patient Position: Sitting, BP Cuff Size: Adult)   Pulse 72   Ht 1.753 m (5' 9\")   Wt 108.4 kg (239 lb)   SpO2 91%   BMI 35.29 kg/m²      Physical Exam   Constitutional: He appears well-developed and well-nourished. No distress.   HENT:   Head: Normocephalic and atraumatic.   Right Ear: External ear normal.   Left Ear: External ear normal.   Nose: Nose normal.   Mouth/Throat: No oropharyngeal exudate.   Eyes: Pupils are equal, round, and reactive to light. Conjunctivae and EOM are normal. Right eye exhibits no discharge. Left eye exhibits no discharge. No scleral icterus.   Neck: Neck supple. No JVD present.   Cardiovascular: Normal rate, regular rhythm and intact distal pulses.  Exam reveals no gallop and no friction rub.    No murmur heard.  Pulmonary/Chest: Effort normal. No stridor. No respiratory distress. He has no wheezes. He has no rales. He exhibits no tenderness.   Abdominal: Soft. He exhibits no distension. There is no guarding.   Musculoskeletal: Normal range of motion. He exhibits no edema, tenderness or deformity.   Neurological: He is alert. He has normal reflexes. He displays normal reflexes. No cranial nerve deficit. He exhibits normal muscle tone. Coordination normal.   Skin: Skin is warm and dry. No rash noted. He is not diaphoretic. No erythema. No pallor.   Psychiatric: He has a normal mood and affect. His behavior is normal. Judgment and thought content normal.   Nursing note and vitals reviewed.      Assessment:     1. Acute systolic CHF (congestive heart failure), NYHA class 3 ([facility])  sacubitril-valsartan (ENTRESTO)  MG Tab tablet   2. Elevated blood sugar     3. High risk medication use     4. Essential hypertension     5. Nonischemic cardiomyopathy (HCC)  sacubitril-valsartan (ENTRESTO)  MG Tab tablet   6. Non-STEMI (non-ST elevated myocardial infarction) ([facility])     7. Paroxysmal atrial fibrillation ([facility])     8. " Chronic septic pulmonary embolism with acute cor pulmonale (HCC)     9. Pure hypercholesterolemia     10. SOB (shortness of breath)     11. Snoring  OVERNIGHT PULSE OXIMETRY   12. Stage C chronic systolic congestive heart failure (HCC)  sacubitril-valsartan (ENTRESTO)  MG Tab tablet    OVERNIGHT PULSE OXIMETRY   13. Syncope and collapse  OVERNIGHT PULSE OXIMETRY       Medical Decision Making:  Today's Assessment / Status / Plan:     53-year-old male with heart failure with reduced ejection fraction with mostly normalized function now.  We will go ahead and switch him back to Entresto.  1. CHFrEF 40%, non-ischemic, NYHA I, Stage C, Hemo Pro A, now normalized    - cont coreg 25 BID    - restart Entresto 97/103    -  stop losartan 100    - cont spironolactone 25    - stop dig 0.125    - ICD - not candidate    - BiV - not candidate       2. HTN    - cont spironolactone 25     3. HLD, Cor Ca on CT    - hold rosuva for now    4. Snoring    - OPO    Thank for you allowing me to take part in your patient's care, please call should you have any questions or would like to discuss this patient.      Quinten Kiran M.D.  [address] [place] [address] [place] [address]  VIA In Basket

## 2018-11-07 ENCOUNTER — APPOINTMENT (OUTPATIENT)
Dept: MEDICAL GROUP | Age: 53
End: 2018-11-07
Payer: COMMERCIAL

## 2018-11-20 ENCOUNTER — TELEPHONE (OUTPATIENT)
Dept: CARDIOLOGY | Facility: MEDICAL CENTER | Age: 53
End: 2018-11-20

## 2018-11-20 DIAGNOSIS — R06.02 SOB (SHORTNESS OF BREATH): ICD-10-CM

## 2018-11-20 DIAGNOSIS — R06.83 SNORING: ICD-10-CM

## 2018-11-20 NOTE — TELEPHONE ENCOUNTER
----- Message from Jorge A Le M.D. sent at 11/20/2018 12:06 PM PST -----  Pretty frankly positive for ANGELITA.  Can you call him and let him know we will be referring to a sleep study.  Thanks    ----- Message -----  From: uLis Reina R.N.  Sent: 11/20/2018  11:35 AM  To: Jorge A Le M.D.    FV 12/21/18 with Dr. Le

## 2018-11-20 NOTE — TELEPHONE ENCOUNTER
Called pt with results and recommendations. Pt states understanding. Referral to sleep study and pulmonology placed.

## 2018-11-30 DIAGNOSIS — I10 ESSENTIAL HYPERTENSION, BENIGN: ICD-10-CM

## 2018-11-30 RX ORDER — FUROSEMIDE 40 MG/1
TABLET ORAL
Qty: 180 TAB | Refills: 3 | Status: SHIPPED | OUTPATIENT
Start: 2018-11-30 | End: 2019-04-17

## 2018-12-12 ENCOUNTER — APPOINTMENT (OUTPATIENT)
Dept: MEDICAL GROUP | Age: 53
End: 2018-12-12
Payer: COMMERCIAL

## 2018-12-21 ENCOUNTER — OFFICE VISIT (OUTPATIENT)
Dept: CARDIOLOGY | Facility: MEDICAL CENTER | Age: 53
End: 2018-12-21
Payer: COMMERCIAL

## 2018-12-21 VITALS
WEIGHT: 241 LBS | HEART RATE: 68 BPM | BODY MASS INDEX: 35.7 KG/M2 | DIASTOLIC BLOOD PRESSURE: 96 MMHG | SYSTOLIC BLOOD PRESSURE: 150 MMHG | OXYGEN SATURATION: 94 % | HEIGHT: 69 IN

## 2018-12-21 DIAGNOSIS — I48.0 PAROXYSMAL ATRIAL FIBRILLATION (HCC): ICD-10-CM

## 2018-12-21 DIAGNOSIS — I49.3 PVC (PREMATURE VENTRICULAR CONTRACTION): ICD-10-CM

## 2018-12-21 DIAGNOSIS — Z79.899 HIGH RISK MEDICATION USE: ICD-10-CM

## 2018-12-21 DIAGNOSIS — I50.22 STAGE C CHRONIC SYSTOLIC CONGESTIVE HEART FAILURE (HCC): ICD-10-CM

## 2018-12-21 DIAGNOSIS — I26.01 CHRONIC SEPTIC PULMONARY EMBOLISM WITH ACUTE COR PULMONALE (HCC): ICD-10-CM

## 2018-12-21 DIAGNOSIS — I10 ESSENTIAL HYPERTENSION: ICD-10-CM

## 2018-12-21 DIAGNOSIS — R55 SYNCOPE AND COLLAPSE: ICD-10-CM

## 2018-12-21 DIAGNOSIS — I21.4 NON-STEMI (NON-ST ELEVATED MYOCARDIAL INFARCTION) (HCC): ICD-10-CM

## 2018-12-21 DIAGNOSIS — G25.81 RESTLESS LEG SYNDROME: ICD-10-CM

## 2018-12-21 DIAGNOSIS — E78.00 PURE HYPERCHOLESTEROLEMIA: ICD-10-CM

## 2018-12-21 DIAGNOSIS — K50.00 CROHN'S DISEASE OF SMALL INTESTINE WITHOUT COMPLICATION (HCC): ICD-10-CM

## 2018-12-21 DIAGNOSIS — I50.21 ACUTE SYSTOLIC CHF (CONGESTIVE HEART FAILURE), NYHA CLASS 3 (HCC): ICD-10-CM

## 2018-12-21 DIAGNOSIS — I27.82 CHRONIC SEPTIC PULMONARY EMBOLISM WITH ACUTE COR PULMONALE (HCC): ICD-10-CM

## 2018-12-21 DIAGNOSIS — R06.02 SOB (SHORTNESS OF BREATH): ICD-10-CM

## 2018-12-21 DIAGNOSIS — I42.8 NONISCHEMIC CARDIOMYOPATHY (HCC): ICD-10-CM

## 2018-12-21 PROCEDURE — 99214 OFFICE O/P EST MOD 30 MIN: CPT | Performed by: INTERNAL MEDICINE

## 2018-12-21 ASSESSMENT — ENCOUNTER SYMPTOMS
BRUISES/BLEEDS EASILY: 0
FEVER: 0
GASTROINTESTINAL NEGATIVE: 1
DIZZINESS: 0
WHEEZING: 0
RESPIRATORY NEGATIVE: 1
PND: 0
CONSTITUTIONAL NEGATIVE: 1
HEMOPTYSIS: 0
EYES NEGATIVE: 1
LOSS OF CONSCIOUSNESS: 0
SORE THROAT: 0
PALPITATIONS: 0
NEUROLOGICAL NEGATIVE: 1
CHILLS: 0
MUSCULOSKELETAL NEGATIVE: 1
CLAUDICATION: 0
STRIDOR: 0
SHORTNESS OF BREATH: 0
WEAKNESS: 0
COUGH: 0
ORTHOPNEA: 0
SPUTUM PRODUCTION: 0
CARDIOVASCULAR NEGATIVE: 1

## 2018-12-21 NOTE — PROGRESS NOTES
Chief Complaint   Patient presents with   • Congestive Heart Failure     F/V: 2 MO       Subjective:   Shaka Riley is a 53 y.o. male who presents today as a follow-up for his nonischemic myopathy.  He continues to struggle with issues of blood pressure.  He notices it with the up and down throughout the day.  He also has a variable fluid intake as well as a variable salt intake.  He is admitting is not following a low-salt diet.  His blood pressures at time will be 150-160 in the next day or 2 will drift down to the 80s over 50s.  He is wondering if he should stop or change some of his medications.  Otherwise he has no shortness of breath chest pain palpitations PND or lower extremity edema.    Past Medical History:   Diagnosis Date   • Clotting disorder ([facility])     PE   • Congestive heart failure ([facility]) 7/2015    EF 30-35%; Dilated R atrium; LVH; Mild MR; Mild AI; Mild TR   • Crohn disease ([facility])    • Hypertension    • PAF (paroxysmal atrial fibrillation) ([facility])    • Sleep apnea     un-diagnosed and pending sleep study     History reviewed. No pertinent surgical history.  Family History   Problem Relation Age of Onset   • Cancer Mother    • Heart Disease Paternal Grandmother    • Heart Disease Paternal Grandfather    • Cancer Father         paralysis from accident     Social History     Social History   • Marital status:      Spouse name: N/A   • Number of children: N/A   • Years of education: N/A     Occupational History   • Not on file.     Social History Main Topics   • Smoking status: Never Smoker   • Smokeless tobacco: Never Used   • Alcohol use 1.2 oz/week     2 Glasses of wine per week      Comment: Quit in 1994 - previous six beer after work, 2-3 beers per day currently   • Drug use: No      Comment: Quit in 2012, used for 5 years   • Sexual activity: Yes     Partners: Female     Other Topics Concern   • Not on file     Social History Narrative   • No narrative on file     No Known  Allergies  Outpatient Encounter Prescriptions as of 12/21/2018   Medication Sig Dispense Refill   • furosemide (LASIX) 40 MG Tab TAKE ONE TABLET BY MOUTH TWICE DAILY (Patient taking differently: TAKE ONE TABLET BY MOUTH DAILY) 180 Tab 3   • sacubitril-valsartan (ENTRESTO)  MG Tab tablet Take 1 Tab by mouth 2 Times a Day. 60 Tab 11   • Methocarbamol (ROBAXIN-750 PO) Take 750 mg by mouth 2 Times a Day.     • GABAPENTIN PO Take  by mouth 2 Times a Day. Unsure of dose     • spironolactone (ALDACTONE) 25 MG Tab Take 1 Tab by mouth every day. 90 Tab 3   • ROPINIRole (REQUIP) 0.5 MG Tab Take 2 Tabs by mouth every evening. 60 Tab 11   • carvedilol (COREG) 25 MG Tab Take 1 Tab by mouth 2 times a day, with meals. 180 Tab 3   • aspirin (ASA) 325 MG Tab Take 325 mg by mouth every day.     • [DISCONTINUED] losartan (COZAAR) 100 MG Tab Take 1 Tab by mouth every day. (Patient not taking: Reported on 12/21/2018) 30 Tab 11   • albuterol 108 (90 BASE) MCG/ACT Aero Soln inhalation aerosol Inhale 2 Puffs by mouth every four hours as needed for Shortness of Breath. (Patient not taking: Reported on 10/30/2018) 1 Inhaler 1     No facility-administered encounter medications on file as of 12/21/2018.      Review of Systems   Constitutional: Negative.  Negative for chills, fever and malaise/fatigue.   HENT: Negative.  Negative for sore throat.    Eyes: Negative.    Respiratory: Negative.  Negative for cough, hemoptysis, sputum production, shortness of breath, wheezing and stridor.    Cardiovascular: Negative.  Negative for chest pain, palpitations, orthopnea, claudication, leg swelling and PND.   Gastrointestinal: Negative.    Genitourinary: Negative.    Musculoskeletal: Negative.    Skin: Negative.    Neurological: Negative.  Negative for dizziness, loss of consciousness and weakness.   Endo/Heme/Allergies: Negative.  Does not bruise/bleed easily.   All other systems reviewed and are negative.       Objective:   /96 (BP  "Location: Left arm, Patient Position: Sitting, BP Cuff Size: Adult)   Pulse 68   Ht 1.753 m (5' 9\")   Wt 109.3 kg (241 lb)   SpO2 94%   BMI 35.59 kg/m²     Physical Exam   Constitutional: He appears well-developed and well-nourished. No distress.   HENT:   Head: Normocephalic and atraumatic.   Right Ear: External ear normal.   Left Ear: External ear normal.   Nose: Nose normal.   Mouth/Throat: No oropharyngeal exudate.   Eyes: Pupils are equal, round, and reactive to light. Conjunctivae and EOM are normal. Right eye exhibits no discharge. Left eye exhibits no discharge. No scleral icterus.   Neck: Neck supple. No JVD present.   Cardiovascular: Normal rate, regular rhythm and intact distal pulses.  Exam reveals no gallop and no friction rub.    No murmur heard.  Pulmonary/Chest: Effort normal. No stridor. No respiratory distress. He has no wheezes. He has no rales. He exhibits no tenderness.   Abdominal: Soft. He exhibits no distension. There is no guarding.   Musculoskeletal: Normal range of motion. He exhibits no edema, tenderness or deformity.   Neurological: He is alert. He has normal reflexes. He displays normal reflexes. No cranial nerve deficit. He exhibits normal muscle tone. Coordination normal.   Skin: Skin is warm and dry. No rash noted. He is not diaphoretic. No erythema. No pallor.   Psychiatric: He has a normal mood and affect. His behavior is normal. Judgment and thought content normal.   Nursing note and vitals reviewed.      Assessment:     1. Acute systolic CHF (congestive heart failure), NYHA class 3 ([facility])     2. Crohn's disease of small intestine without complication ([facility])     3. High risk medication use  BASIC METABOLIC PANEL   4. Essential hypertension     5. Nonischemic cardiomyopathy (HCC)  BASIC METABOLIC PANEL   6. Non-STEMI (non-ST elevated myocardial infarction) ([facility])     7. Paroxysmal atrial fibrillation (HCC)     8. Chronic septic pulmonary embolism with acute cor pulmonale ([facility])   "   9. Pure hypercholesterolemia     10. PVC (premature ventricular contraction)     11. Restless leg syndrome     12. SOB (shortness of breath)     13. Stage C chronic systolic congestive heart failure (HCC)     14. Syncope and collapse         Medical Decision Making:  Today's Assessment / Status / Plan:     53-year-old male with heart failure with reduced ejection fraction with normalized function.  For his blood pressure I told him to follow a low-salt diet and keep track of what his salt and fluid intake.  He will attempt this and check his blood pressures.  I do not want to increase his medications because he is been having paroxysms of low blood pressure.  We will see him back in 3 months.  We are awaiting a sleep study.    1. CHFrEF 40%, non-ischemic, NYHA I, Stage C, Hemo Pro A, now normalized.      - cont coreg 25 BID    - cont Entresto 97/103    - cont spironolactone 25    - stop dig 0.125    - ICD - not candidate    - BiV - not candidate     2. HTN    - cont spironolactone 25     3. HLD, Cor Ca on CT    - hold rosuva for now     4. Snoring    - awaiting sleep study    Thank for you allowing me to take part in your patient's care, please call should you have any questions or would like to discuss this patient.

## 2018-12-21 NOTE — LETTER
[facility]   [address] [place] [address] [address]  [place] [address]  Phone: [phone]  Fax: [phone]              Shaka Riley  1965    Encounter Date: 12/21/2018    Jorge A Le M.D.          PROGRESS NOTE:  Chief Complaint   Patient presents with   • Congestive Heart Failure     F/V: 2 MO       Subjective:   Shaka Riley is a 53 y.o. male who presents today as a follow-up for his nonischemic myopathy.  He continues to struggle with issues of blood pressure.  He notices it with the up and down throughout the day.  He also has a variable fluid intake as well as a variable salt intake.  He is admitting is not following a low-salt diet.  His blood pressures at time will be 150-160 in the next day or 2 will drift down to the 80s over 50s.  He is wondering if he should stop or change some of his medications.  Otherwise he has no shortness of breath chest pain palpitations PND or lower extremity edema.    Past Medical History:   Diagnosis Date   • Clotting disorder (HCC)     PE   • Congestive heart failure (HCC) 7/2015    EF 30-35%; Dilated R atrium; LVH; Mild MR; Mild AI; Mild TR   • Crohn disease (HCC)    • Hypertension    • PAF (paroxysmal atrial fibrillation) ([facility])    • Sleep apnea     un-diagnosed and pending sleep study     History reviewed. No pertinent surgical history.  Family History   Problem Relation Age of Onset   • Cancer Mother    • Heart Disease Paternal Grandmother    • Heart Disease Paternal Grandfather    • Cancer Father         paralysis from accident     Social History     Social History   • Marital status:      Spouse name: N/A   • Number of children: N/A   • Years of education: N/A     Occupational History   • Not on file.     Social History Main Topics   • Smoking status: Never Smoker   • Smokeless tobacco: Never Used   • Alcohol use 1.2 oz/week     2 Glasses of wine per week      Comment: Quit in 1994 - previous six beer  after work, 2-3 beers per day currently   • Drug use: No      Comment: Quit in 2012, used for 5 years   • Sexual activity: Yes     Partners: Female     Other Topics Concern   • Not on file     Social History Narrative   • No narrative on file     No Known Allergies  Outpatient Encounter Prescriptions as of 12/21/2018   Medication Sig Dispense Refill   • furosemide (LASIX) 40 MG Tab TAKE ONE TABLET BY MOUTH TWICE DAILY (Patient taking differently: TAKE ONE TABLET BY MOUTH DAILY) 180 Tab 3   • sacubitril-valsartan (ENTRESTO)  MG Tab tablet Take 1 Tab by mouth 2 Times a Day. 60 Tab 11   • Methocarbamol (ROBAXIN-750 PO) Take 750 mg by mouth 2 Times a Day.     • GABAPENTIN PO Take  by mouth 2 Times a Day. Unsure of dose     • spironolactone (ALDACTONE) 25 MG Tab Take 1 Tab by mouth every day. 90 Tab 3   • ROPINIRole (REQUIP) 0.5 MG Tab Take 2 Tabs by mouth every evening. 60 Tab 11   • carvedilol (COREG) 25 MG Tab Take 1 Tab by mouth 2 times a day, with meals. 180 Tab 3   • aspirin (ASA) 325 MG Tab Take 325 mg by mouth every day.     • [DISCONTINUED] losartan (COZAAR) 100 MG Tab Take 1 Tab by mouth every day. (Patient not taking: Reported on 12/21/2018) 30 Tab 11   • albuterol 108 (90 BASE) MCG/ACT Aero Soln inhalation aerosol Inhale 2 Puffs by mouth every four hours as needed for Shortness of Breath. (Patient not taking: Reported on 10/30/2018) 1 Inhaler 1     No facility-administered encounter medications on file as of 12/21/2018.      Review of Systems   Constitutional: Negative.  Negative for chills, fever and malaise/fatigue.   HENT: Negative.  Negative for sore throat.    Eyes: Negative.    Respiratory: Negative.  Negative for cough, hemoptysis, sputum production, shortness of breath, wheezing and stridor.    Cardiovascular: Negative.  Negative for chest pain, palpitations, orthopnea, claudication, leg swelling and PND.   Gastrointestinal: Negative.    Genitourinary: Negative.    Musculoskeletal: Negative.     "  Skin: Negative.    Neurological: Negative.  Negative for dizziness, loss of consciousness and weakness.   Endo/Heme/Allergies: Negative.  Does not bruise/bleed easily.   All other systems reviewed and are negative.       Objective:   /96 (BP Location: Left arm, Patient Position: Sitting, BP Cuff Size: Adult)   Pulse 68   Ht 1.753 m (5' 9\")   Wt 109.3 kg (241 lb)   SpO2 94%   BMI 35.59 kg/m²      Physical Exam   Constitutional: He appears well-developed and well-nourished. No distress.   HENT:   Head: Normocephalic and atraumatic.   Right Ear: External ear normal.   Left Ear: External ear normal.   Nose: Nose normal.   Mouth/Throat: No oropharyngeal exudate.   Eyes: Pupils are equal, round, and reactive to light. Conjunctivae and EOM are normal. Right eye exhibits no discharge. Left eye exhibits no discharge. No scleral icterus.   Neck: Neck supple. No JVD present.   Cardiovascular: Normal rate, regular rhythm and intact distal pulses.  Exam reveals no gallop and no friction rub.    No murmur heard.  Pulmonary/Chest: Effort normal. No stridor. No respiratory distress. He has no wheezes. He has no rales. He exhibits no tenderness.   Abdominal: Soft. He exhibits no distension. There is no guarding.   Musculoskeletal: Normal range of motion. He exhibits no edema, tenderness or deformity.   Neurological: He is alert. He has normal reflexes. He displays normal reflexes. No cranial nerve deficit. He exhibits normal muscle tone. Coordination normal.   Skin: Skin is warm and dry. No rash noted. He is not diaphoretic. No erythema. No pallor.   Psychiatric: He has a normal mood and affect. His behavior is normal. Judgment and thought content normal.   Nursing note and vitals reviewed.      Assessment:     1. Acute systolic CHF (congestive heart failure), NYHA class 3 ([facility])     2. Crohn's disease of small intestine without complication (HCC)     3. High risk medication use  BASIC METABOLIC PANEL   4. Essential " hypertension     5. Nonischemic cardiomyopathy (HCC)  BASIC METABOLIC PANEL   6. Non-STEMI (non-ST elevated myocardial infarction) (HCC)     7. Paroxysmal atrial fibrillation (HCC)     8. Chronic septic pulmonary embolism with acute cor pulmonale (HCC)     9. Pure hypercholesterolemia     10. PVC (premature ventricular contraction)     11. Restless leg syndrome     12. SOB (shortness of breath)     13. Stage C chronic systolic congestive heart failure (HCC)     14. Syncope and collapse         Medical Decision Making:  Today's Assessment / Status / Plan:     53-year-old male with heart failure with reduced ejection fraction with normalized function.  For his blood pressure I told him to follow a low-salt diet and keep track of what his salt and fluid intake.  He will attempt this and check his blood pressures.  I do not want to increase his medications because he is been having paroxysms of low blood pressure.  We will see him back in 3 months.  We are awaiting a sleep study.    1. CHFrEF 40%, non-ischemic, NYHA I, Stage C, Hemo Pro A, now normalized.      - cont coreg 25 BID    - cont Entresto 97/103    - cont spironolactone 25    - stop dig 0.125    - ICD - not candidate    - BiV - not candidate     2. HTN    - cont spironolactone 25     3. HLD, Cor Ca on CT    - hold rosuva for now     4. Snoring    -  awaiting sleep study    Thank for you allowing me to take part in your patient's care, please call should you have any questions or would like to discuss this patient.      [place] [address] [id number]  VIA In Basket

## 2019-01-28 ENCOUNTER — TELEPHONE (OUTPATIENT)
Dept: CARDIOLOGY | Facility: MEDICAL CENTER | Age: 54
End: 2019-01-28

## 2019-01-28 DIAGNOSIS — I50.22 STAGE C CHRONIC SYSTOLIC CONGESTIVE HEART FAILURE (HCC): ICD-10-CM

## 2019-01-28 DIAGNOSIS — I50.21 ACUTE SYSTOLIC CHF (CONGESTIVE HEART FAILURE), NYHA CLASS 3 (HCC): ICD-10-CM

## 2019-01-28 DIAGNOSIS — I42.8 NONISCHEMIC CARDIOMYOPATHY (HCC): ICD-10-CM

## 2019-01-28 NOTE — TELEPHONE ENCOUNTER
Rx for samples sent to [facility]. Attempted to call pt's wife. No answer and voicemail full. Will attempt to call again.

## 2019-01-28 NOTE — TELEPHONE ENCOUNTER
----- Message from Candace Raya sent at 1/28/2019 10:55 AM PST -----  Regarding: samples of Entresto  RO/Luis      Patient's wife Melina is requesting samples of Entresto. She said pharmacy is trying to charge him $75 with his insurance for the medication, and they can't afford it. She can be reached at [phone].

## 2019-02-01 ENCOUNTER — TELEPHONE (OUTPATIENT)
Dept: CARDIOLOGY | Facility: MEDICAL CENTER | Age: 54
End: 2019-02-01

## 2019-02-01 DIAGNOSIS — I50.21 ACUTE SYSTOLIC CHF (CONGESTIVE HEART FAILURE), NYHA CLASS 3 (HCC): ICD-10-CM

## 2019-02-01 DIAGNOSIS — I50.22 STAGE C CHRONIC SYSTOLIC CONGESTIVE HEART FAILURE (HCC): ICD-10-CM

## 2019-02-01 RX ORDER — SACUBITRIL AND VALSARTAN 97; 103 MG/1; MG/1
TABLET, FILM COATED ORAL
Qty: 60 TAB | Refills: 0 | Status: SHIPPED | OUTPATIENT
Start: 2019-02-01 | End: 2019-04-17

## 2019-02-02 NOTE — TELEPHONE ENCOUNTER
Received call from [facility] informing us that they do not have samples of Entresto for the pt. They advised the pt try the Entresto Co-pay card.

## 2019-04-08 DIAGNOSIS — I10 ESSENTIAL HYPERTENSION: ICD-10-CM

## 2019-04-08 RX ORDER — CARVEDILOL 25 MG/1
25 TABLET ORAL 2 TIMES DAILY WITH MEALS
Qty: 180 TAB | Refills: 3 | Status: SHIPPED | OUTPATIENT
Start: 2019-04-08 | End: 2020-01-01 | Stop reason: SDUPTHER

## 2019-04-10 ENCOUNTER — TELEPHONE (OUTPATIENT)
Dept: SLEEP MEDICINE | Facility: MEDICAL CENTER | Age: 54
End: 2019-04-10

## 2019-04-10 NOTE — TELEPHONE ENCOUNTER
"Vm; received message from wife regarding cost of co pay. Call back number [phone]     I spoke to the patient's wife informed we will not have copay cost until check in encouraged to look at insurance care \"specialty care.\" informed reason for visit reevaluate sleep, consult   "

## 2019-04-16 DIAGNOSIS — I50.21 ACUTE SYSTOLIC CHF (CONGESTIVE HEART FAILURE), NYHA CLASS 3 (HCC): ICD-10-CM

## 2019-04-16 DIAGNOSIS — G25.81 RESTLESS LEG SYNDROME: ICD-10-CM

## 2019-04-17 ENCOUNTER — OFFICE VISIT (OUTPATIENT)
Dept: CARDIOLOGY | Facility: MEDICAL CENTER | Age: 54
End: 2019-04-17
Payer: COMMERCIAL

## 2019-04-17 VITALS
WEIGHT: 234.4 LBS | HEART RATE: 73 BPM | BODY MASS INDEX: 34.72 KG/M2 | HEIGHT: 69 IN | SYSTOLIC BLOOD PRESSURE: 124 MMHG | OXYGEN SATURATION: 94 % | DIASTOLIC BLOOD PRESSURE: 80 MMHG

## 2019-04-17 DIAGNOSIS — I42.8 NONISCHEMIC CARDIOMYOPATHY (HCC): ICD-10-CM

## 2019-04-17 DIAGNOSIS — I48.0 PAROXYSMAL ATRIAL FIBRILLATION (HCC): ICD-10-CM

## 2019-04-17 DIAGNOSIS — R55 SYNCOPE AND COLLAPSE: ICD-10-CM

## 2019-04-17 DIAGNOSIS — E78.00 PURE HYPERCHOLESTEROLEMIA: ICD-10-CM

## 2019-04-17 DIAGNOSIS — Z79.899 HIGH RISK MEDICATION USE: ICD-10-CM

## 2019-04-17 DIAGNOSIS — I27.82 CHRONIC SEPTIC PULMONARY EMBOLISM WITH ACUTE COR PULMONALE (HCC): ICD-10-CM

## 2019-04-17 DIAGNOSIS — I50.22 STAGE C CHRONIC SYSTOLIC CONGESTIVE HEART FAILURE (HCC): ICD-10-CM

## 2019-04-17 DIAGNOSIS — I50.21 ACUTE SYSTOLIC CHF (CONGESTIVE HEART FAILURE), NYHA CLASS 3 (HCC): ICD-10-CM

## 2019-04-17 DIAGNOSIS — G25.81 RESTLESS LEG SYNDROME: ICD-10-CM

## 2019-04-17 DIAGNOSIS — I26.01 CHRONIC SEPTIC PULMONARY EMBOLISM WITH ACUTE COR PULMONALE (HCC): ICD-10-CM

## 2019-04-17 DIAGNOSIS — I10 ESSENTIAL HYPERTENSION: ICD-10-CM

## 2019-04-17 DIAGNOSIS — I21.4 NON-STEMI (NON-ST ELEVATED MYOCARDIAL INFARCTION) (HCC): ICD-10-CM

## 2019-04-17 DIAGNOSIS — R73.9 ELEVATED BLOOD SUGAR: ICD-10-CM

## 2019-04-17 PROCEDURE — 99214 OFFICE O/P EST MOD 30 MIN: CPT | Performed by: INTERNAL MEDICINE

## 2019-04-17 RX ORDER — VALSARTAN 160 MG/1
160 TABLET ORAL DAILY
Qty: 30 TAB | Refills: 11 | Status: SHIPPED | OUTPATIENT
Start: 2019-04-17 | End: 2019-07-26

## 2019-04-17 RX ORDER — SPIRONOLACTONE 25 MG/1
25 TABLET ORAL DAILY
Qty: 90 TAB | Refills: 3 | Status: SHIPPED | OUTPATIENT
Start: 2019-04-17 | End: 2020-01-01 | Stop reason: SDUPTHER

## 2019-04-17 RX ORDER — SPIRONOLACTONE 25 MG/1
TABLET ORAL
Qty: 90 TAB | Refills: 2 | Status: SHIPPED | OUTPATIENT
Start: 2019-04-17 | End: 2019-04-17

## 2019-04-17 ASSESSMENT — ENCOUNTER SYMPTOMS
CONSTITUTIONAL NEGATIVE: 1
CHILLS: 0
SPUTUM PRODUCTION: 0
WHEEZING: 0
LOSS OF CONSCIOUSNESS: 0
NEUROLOGICAL NEGATIVE: 1
PALPITATIONS: 0
EYES NEGATIVE: 1
SORE THROAT: 0
SHORTNESS OF BREATH: 0
WEAKNESS: 0
PND: 0
DIZZINESS: 0
MUSCULOSKELETAL NEGATIVE: 1
COUGH: 1
FEVER: 0
ORTHOPNEA: 0
CARDIOVASCULAR NEGATIVE: 1
CLAUDICATION: 0
BRUISES/BLEEDS EASILY: 0
GASTROINTESTINAL NEGATIVE: 1
STRIDOR: 0
HEMOPTYSIS: 0

## 2019-04-17 NOTE — LETTER
[facility]   [address] [facility] [address]  [place] [address]  Phone: [phone]  Fax: [phone]              Shaka Riley  1965    Encounter Date: 4/17/2019    Jorge A Le M.D.          PROGRESS NOTE:  Chief Complaint   Patient presents with   • Congestive Heart Failure       Subjective:   Shaka Riley is a 54 y.o. male who presents today as a follow-up for his A. fib and heart failure with reduced ejection fraction with normalized function.  He continues on the Entresto.  He is been having some paroxysms of low blood pressure.  He is also been having dizziness.  He is been complaining of a cough ever since his dose of Entresto was increased.  His blood pressures otherwise now better controlled.  He started the DASH diet and significantly reduced his salt intake.  He reports that his blood pressure is now averaging anywhere from the 130s to the 140s instead of spiking to the 200s as before.    Past Medical History:   Diagnosis Date   • Clotting disorder (HCC)     PE   • Congestive heart failure (HCC) 7/2015    EF 30-35%; Dilated R atrium; LVH; Mild MR; Mild AI; Mild TR   • Crohn disease (HCC)    • Hypertension    • PAF (paroxysmal atrial fibrillation) (HCC)    • Sleep apnea     un-diagnosed and pending sleep study     History reviewed. No pertinent surgical history.  Family History   Problem Relation Age of Onset   • Cancer Mother    • Heart Disease Paternal Grandmother    • Heart Disease Paternal Grandfather    • Cancer Father         paralysis from accident     Social History     Social History   • Marital status:      Spouse name: N/A   • Number of children: N/A   • Years of education: N/A     Occupational History   • Not on file.     Social History Main Topics   • Smoking status: Never Smoker   • Smokeless tobacco: Never Used   • Alcohol use 1.2 oz/week     2 Glasses of wine per week      Comment: Quit in 1994 - previous six beer  after work, 2-3 beers per day currently   • Drug use: No      Comment: Quit in 2012, used for 5 years   • Sexual activity: Yes     Partners: Female     Other Topics Concern   • Not on file     Social History Narrative   • No narrative on file     No Known Allergies  Outpatient Encounter Prescriptions as of 4/17/2019   Medication Sig Dispense Refill   • valsartan (DIOVAN) 160 MG Tab Take 1 Tab by mouth every day. 30 Tab 11   • spironolactone (ALDACTONE) 25 MG Tab Take 1 Tab by mouth every day. 90 Tab 3   • carvedilol (COREG) 25 MG Tab Take 1 Tab by mouth 2 times a day, with meals. 180 Tab 3   • GABAPENTIN PO Take  by mouth 2 Times a Day. Unsure of dose     • ROPINIRole (REQUIP) 0.5 MG Tab Take 2 Tabs by mouth every evening. 60 Tab 11   • aspirin (ASA) 325 MG Tab Take 325 mg by mouth every day.     • [DISCONTINUED] spironolactone (ALDACTONE) 25 MG Tab TAKE 1 TABLET BY MOUTH ONCE DAILY (Patient not taking: Reported on 4/17/2019) 90 Tab 2   • [DISCONTINUED] ENTRESTO  MG Tab tablet TAKE 1 TABLET ORALLY 2 TIMES DAILY 60 Tab 0   • [DISCONTINUED] sacubitril-valsartan (ENTRESTO) 49-51 MG Tab tablet Take 2 Tabs by mouth 2 Times a Day. (Patient not taking: Reported on 4/17/2019) 120 Tab 0   • [DISCONTINUED] furosemide (LASIX) 40 MG Tab TAKE ONE TABLET BY MOUTH TWICE DAILY (Patient taking differently: TAKE ONE TABLET BY MOUTH DAILY) 180 Tab 3   • [DISCONTINUED] sacubitril-valsartan (ENTRESTO)  MG Tab tablet Take 1 Tab by mouth 2 Times a Day. 60 Tab 11   • Methocarbamol (ROBAXIN-750 PO) Take 750 mg by mouth 2 Times a Day.     • [DISCONTINUED] spironolactone (ALDACTONE) 25 MG Tab Take 1 Tab by mouth every day. 90 Tab 3   • [DISCONTINUED] albuterol 108 (90 BASE) MCG/ACT Aero Soln inhalation aerosol Inhale 2 Puffs by mouth every four hours as needed for Shortness of Breath. (Patient not taking: Reported on 10/30/2018) 1 Inhaler 1     No facility-administered encounter medications on file as of 4/17/2019.      Review of  "Systems   Constitutional: Negative.  Negative for chills, fever and malaise/fatigue.   HENT: Negative.  Negative for sore throat.    Eyes: Negative.    Respiratory: Positive for cough. Negative for hemoptysis, sputum production, shortness of breath, wheezing and stridor.    Cardiovascular: Negative.  Negative for chest pain, palpitations, orthopnea, claudication, leg swelling and PND.   Gastrointestinal: Negative.    Genitourinary: Negative.    Musculoskeletal: Negative.    Skin: Negative.    Neurological: Negative.  Negative for dizziness, loss of consciousness and weakness.   Endo/Heme/Allergies: Negative.  Does not bruise/bleed easily.   All other systems reviewed and are negative.       Objective:   /80 (BP Location: Left arm, Patient Position: Sitting, BP Cuff Size: Adult)   Pulse 73   Ht 1.753 m (5' 9\")   Wt 106.3 kg (234 lb 6.4 oz)   SpO2 94%   BMI 34.61 kg/m²      Physical Exam   Constitutional: He appears well-developed and well-nourished. No distress.   HENT:   Head: Normocephalic and atraumatic.   Right Ear: External ear normal.   Left Ear: External ear normal.   Nose: Nose normal.   Mouth/Throat: No oropharyngeal exudate.   Eyes: Pupils are equal, round, and reactive to light. Conjunctivae and EOM are normal. Right eye exhibits no discharge. Left eye exhibits no discharge. No scleral icterus.   Neck: Neck supple. No JVD present.   Cardiovascular: Normal rate, regular rhythm and intact distal pulses.  Exam reveals no gallop and no friction rub.    No murmur heard.  Pulmonary/Chest: Effort normal. No stridor. No respiratory distress. He has no wheezes. He has no rales. He exhibits no tenderness.   Abdominal: Soft. He exhibits no distension. There is no guarding.   Musculoskeletal: Normal range of motion. He exhibits no edema, tenderness or deformity.   Neurological: He is alert. He has normal reflexes. He displays normal reflexes. No cranial nerve deficit. He exhibits normal muscle tone. " Coordination normal.   Skin: Skin is warm and dry. No rash noted. He is not diaphoretic. No erythema. No pallor.   Psychiatric: He has a normal mood and affect. His behavior is normal. Judgment and thought content normal.   Nursing note and vitals reviewed.      Assessment:     1. Acute systolic CHF (congestive heart failure), NYHA class 3 ([facility])  spironolactone (ALDACTONE) 25 MG Tab   2. Elevated blood sugar     3. High risk medication use     4. Essential hypertension     5. Nonischemic cardiomyopathy (HCC)  valsartan (DIOVAN) 160 MG Tab   6. Non-STEMI (non-ST elevated myocardial infarction) ([facility])     7. Paroxysmal atrial fibrillation (HCC)     8. Chronic septic pulmonary embolism with acute cor pulmonale ([facility])     9. Pure hypercholesterolemia     10. Stage C chronic systolic congestive heart failure (HCC)     11. Syncope and collapse     12. Restless leg syndrome  spironolactone (ALDACTONE) 25 MG Tab       Medical Decision Making:  Today's Assessment / Status / Plan:     54-year-old male with heart failure with reduced ejection fraction as well as hypertension hyperlipidemia and likely sleep apnea.  I have stopped his Entresto due to the cough.  We will switch him to valsartan at 160 mg/day.  I have asked him to watch his blood pressure and possibly reduce this to half a pill at 80 mg/day if he gets any symptomatic low blood pressure.  We will also await his sleep study.  If his cough is not resolved off the Entresto we will consider a referral however for now he is doing fine.       1. CHFrEF 40%, non-ischemic, NYHA I, Stage C, Hemo Pro A, now normalized.      - cont coreg 25 BID    - stop Entresto 97/103    - start valsartan 160 daily    - cont spironolactone 25    - ICD - not candidate    - BiV - not candidate     2. HTN    - cont spironolactone 25     3. HLD, Cor Ca on CT    - hold rosuva for now     4. Snoring    - awaiting sleep study    Thank for you allowing me to take part in your patient's care, please  call should you have any questions or would like to discuss this patient.      Quinten Kiran M.D.  [address] [place] [address] [address] [place] [id number]  VIA In Basket

## 2019-04-17 NOTE — PROGRESS NOTES
Chief Complaint   Patient presents with   • Congestive Heart Failure       Subjective:   Shaka Riley is a 54 y.o. male who presents today as a follow-up for his A. fib and heart failure with reduced ejection fraction with normalized function.  He continues on the Entresto.  He is been having some paroxysms of low blood pressure.  He is also been having dizziness.  He is been complaining of a cough ever since his dose of Entresto was increased.  His blood pressures otherwise now better controlled.  He started the DASH diet and significantly reduced his salt intake.  He reports that his blood pressure is now averaging anywhere from the 130s to the 140s instead of spiking to the 200s as before.    Past Medical History:   Diagnosis Date   • Clotting disorder (HCC)     PE   • Congestive heart failure (HCC) 7/2015    EF 30-35%; Dilated R atrium; LVH; Mild MR; Mild AI; Mild TR   • Crohn disease (HCC)    • Hypertension    • PAF (paroxysmal atrial fibrillation) ([facility])    • Sleep apnea     un-diagnosed and pending sleep study     History reviewed. No pertinent surgical history.  Family History   Problem Relation Age of Onset   • Cancer Mother    • Heart Disease Paternal Grandmother    • Heart Disease Paternal Grandfather    • Cancer Father         paralysis from accident     Social History     Social History   • Marital status:      Spouse name: N/A   • Number of children: N/A   • Years of education: N/A     Occupational History   • Not on file.     Social History Main Topics   • Smoking status: Never Smoker   • Smokeless tobacco: Never Used   • Alcohol use 1.2 oz/week     2 Glasses of wine per week      Comment: Quit in 1994 - previous six beer after work, 2-3 beers per day currently   • Drug use: No      Comment: Quit in 2012, used for 5 years   • Sexual activity: Yes     Partners: Female     Other Topics Concern   • Not on file     Social History Narrative   • No narrative on file     No Known  Allergies  Outpatient Encounter Prescriptions as of 4/17/2019   Medication Sig Dispense Refill   • valsartan (DIOVAN) 160 MG Tab Take 1 Tab by mouth every day. 30 Tab 11   • spironolactone (ALDACTONE) 25 MG Tab Take 1 Tab by mouth every day. 90 Tab 3   • carvedilol (COREG) 25 MG Tab Take 1 Tab by mouth 2 times a day, with meals. 180 Tab 3   • GABAPENTIN PO Take  by mouth 2 Times a Day. Unsure of dose     • ROPINIRole (REQUIP) 0.5 MG Tab Take 2 Tabs by mouth every evening. 60 Tab 11   • aspirin (ASA) 325 MG Tab Take 325 mg by mouth every day.     • [DISCONTINUED] spironolactone (ALDACTONE) 25 MG Tab TAKE 1 TABLET BY MOUTH ONCE DAILY (Patient not taking: Reported on 4/17/2019) 90 Tab 2   • [DISCONTINUED] ENTRESTO  MG Tab tablet TAKE 1 TABLET ORALLY 2 TIMES DAILY 60 Tab 0   • [DISCONTINUED] sacubitril-valsartan (ENTRESTO) 49-51 MG Tab tablet Take 2 Tabs by mouth 2 Times a Day. (Patient not taking: Reported on 4/17/2019) 120 Tab 0   • [DISCONTINUED] furosemide (LASIX) 40 MG Tab TAKE ONE TABLET BY MOUTH TWICE DAILY (Patient taking differently: TAKE ONE TABLET BY MOUTH DAILY) 180 Tab 3   • [DISCONTINUED] sacubitril-valsartan (ENTRESTO)  MG Tab tablet Take 1 Tab by mouth 2 Times a Day. 60 Tab 11   • Methocarbamol (ROBAXIN-750 PO) Take 750 mg by mouth 2 Times a Day.     • [DISCONTINUED] spironolactone (ALDACTONE) 25 MG Tab Take 1 Tab by mouth every day. 90 Tab 3   • [DISCONTINUED] albuterol 108 (90 BASE) MCG/ACT Aero Soln inhalation aerosol Inhale 2 Puffs by mouth every four hours as needed for Shortness of Breath. (Patient not taking: Reported on 10/30/2018) 1 Inhaler 1     No facility-administered encounter medications on file as of 4/17/2019.      Review of Systems   Constitutional: Negative.  Negative for chills, fever and malaise/fatigue.   HENT: Negative.  Negative for sore throat.    Eyes: Negative.    Respiratory: Positive for cough. Negative for hemoptysis, sputum production, shortness of breath,  "wheezing and stridor.    Cardiovascular: Negative.  Negative for chest pain, palpitations, orthopnea, claudication, leg swelling and PND.   Gastrointestinal: Negative.    Genitourinary: Negative.    Musculoskeletal: Negative.    Skin: Negative.    Neurological: Negative.  Negative for dizziness, loss of consciousness and weakness.   Endo/Heme/Allergies: Negative.  Does not bruise/bleed easily.   All other systems reviewed and are negative.       Objective:   /80 (BP Location: Left arm, Patient Position: Sitting, BP Cuff Size: Adult)   Pulse 73   Ht 1.753 m (5' 9\")   Wt 106.3 kg (234 lb 6.4 oz)   SpO2 94%   BMI 34.61 kg/m²     Physical Exam   Constitutional: He appears well-developed and well-nourished. No distress.   HENT:   Head: Normocephalic and atraumatic.   Right Ear: External ear normal.   Left Ear: External ear normal.   Nose: Nose normal.   Mouth/Throat: No oropharyngeal exudate.   Eyes: Pupils are equal, round, and reactive to light. Conjunctivae and EOM are normal. Right eye exhibits no discharge. Left eye exhibits no discharge. No scleral icterus.   Neck: Neck supple. No JVD present.   Cardiovascular: Normal rate, regular rhythm and intact distal pulses.  Exam reveals no gallop and no friction rub.    No murmur heard.  Pulmonary/Chest: Effort normal. No stridor. No respiratory distress. He has no wheezes. He has no rales. He exhibits no tenderness.   Abdominal: Soft. He exhibits no distension. There is no guarding.   Musculoskeletal: Normal range of motion. He exhibits no edema, tenderness or deformity.   Neurological: He is alert. He has normal reflexes. He displays normal reflexes. No cranial nerve deficit. He exhibits normal muscle tone. Coordination normal.   Skin: Skin is warm and dry. No rash noted. He is not diaphoretic. No erythema. No pallor.   Psychiatric: He has a normal mood and affect. His behavior is normal. Judgment and thought content normal.   Nursing note and vitals " reviewed.      Assessment:     1. Acute systolic CHF (congestive heart failure), NYHA class 3 (HCC)  spironolactone (ALDACTONE) 25 MG Tab   2. Elevated blood sugar     3. High risk medication use     4. Essential hypertension     5. Nonischemic cardiomyopathy (HCC)  valsartan (DIOVAN) 160 MG Tab   6. Non-STEMI (non-ST elevated myocardial infarction) (HCC)     7. Paroxysmal atrial fibrillation (HCC)     8. Chronic septic pulmonary embolism with acute cor pulmonale (HCC)     9. Pure hypercholesterolemia     10. Stage C chronic systolic congestive heart failure (HCC)     11. Syncope and collapse     12. Restless leg syndrome  spironolactone (ALDACTONE) 25 MG Tab       Medical Decision Making:  Today's Assessment / Status / Plan:     54-year-old male with heart failure with reduced ejection fraction as well as hypertension hyperlipidemia and likely sleep apnea.  I have stopped his Entresto due to the cough.  We will switch him to valsartan at 160 mg/day.  I have asked him to watch his blood pressure and possibly reduce this to half a pill at 80 mg/day if he gets any symptomatic low blood pressure.  We will also await his sleep study.  If his cough is not resolved off the Entresto we will consider a referral however for now he is doing fine.       1. CHFrEF 40%, non-ischemic, NYHA I, Stage C, Hemo Pro A, now normalized.      - cont coreg 25 BID    - stop Entresto 97/103    - start valsartan 160 daily    - cont spironolactone 25    - ICD - not candidate    - BiV - not candidate     2. HTN    - cont spironolactone 25     3. HLD, Cor Ca on CT    - hold rosuva for now     4. Snoring    - awaiting sleep study    Thank for you allowing me to take part in your patient's care, please call should you have any questions or would like to discuss this patient.

## 2019-04-19 ENCOUNTER — APPOINTMENT (OUTPATIENT)
Dept: SLEEP MEDICINE | Facility: MEDICAL CENTER | Age: 54
End: 2019-04-19
Payer: COMMERCIAL

## 2019-05-31 ENCOUNTER — APPOINTMENT (OUTPATIENT)
Dept: MEDICAL GROUP | Age: 54
End: 2019-05-31
Payer: COMMERCIAL

## 2019-07-26 ENCOUNTER — OFFICE VISIT (OUTPATIENT)
Dept: CARDIOLOGY | Facility: MEDICAL CENTER | Age: 54
End: 2019-07-26
Payer: COMMERCIAL

## 2019-07-26 VITALS
HEIGHT: 69 IN | HEART RATE: 76 BPM | DIASTOLIC BLOOD PRESSURE: 80 MMHG | BODY MASS INDEX: 35.25 KG/M2 | OXYGEN SATURATION: 91 % | WEIGHT: 238 LBS | SYSTOLIC BLOOD PRESSURE: 132 MMHG

## 2019-07-26 DIAGNOSIS — E78.00 PURE HYPERCHOLESTEROLEMIA: ICD-10-CM

## 2019-07-26 DIAGNOSIS — I42.8 NONISCHEMIC CARDIOMYOPATHY (HCC): ICD-10-CM

## 2019-07-26 DIAGNOSIS — I50.22 STAGE C CHRONIC SYSTOLIC CONGESTIVE HEART FAILURE (HCC): ICD-10-CM

## 2019-07-26 DIAGNOSIS — I50.21 ACUTE SYSTOLIC CHF (CONGESTIVE HEART FAILURE), NYHA CLASS 3 (HCC): ICD-10-CM

## 2019-07-26 DIAGNOSIS — I48.0 PAROXYSMAL ATRIAL FIBRILLATION (HCC): ICD-10-CM

## 2019-07-26 DIAGNOSIS — I26.01 CHRONIC SEPTIC PULMONARY EMBOLISM WITH ACUTE COR PULMONALE (HCC): ICD-10-CM

## 2019-07-26 DIAGNOSIS — Z79.899 HIGH RISK MEDICATION USE: ICD-10-CM

## 2019-07-26 DIAGNOSIS — I10 ESSENTIAL HYPERTENSION: ICD-10-CM

## 2019-07-26 DIAGNOSIS — I27.82 CHRONIC SEPTIC PULMONARY EMBOLISM WITH ACUTE COR PULMONALE (HCC): ICD-10-CM

## 2019-07-26 DIAGNOSIS — K50.00 CROHN'S DISEASE OF SMALL INTESTINE WITHOUT COMPLICATION (HCC): ICD-10-CM

## 2019-07-26 DIAGNOSIS — R55 SYNCOPE AND COLLAPSE: ICD-10-CM

## 2019-07-26 DIAGNOSIS — I49.3 PVC (PREMATURE VENTRICULAR CONTRACTION): ICD-10-CM

## 2019-07-26 PROCEDURE — 99214 OFFICE O/P EST MOD 30 MIN: CPT | Performed by: INTERNAL MEDICINE

## 2019-07-26 RX ORDER — SACUBITRIL AND VALSARTAN 97; 103 MG/1; MG/1
TABLET, FILM COATED ORAL
COMMUNITY
Start: 2019-07-12 | End: 2019-07-26

## 2019-07-26 RX ORDER — FUROSEMIDE 40 MG/1
TABLET ORAL
COMMUNITY
Start: 2019-07-18 | End: 2019-07-26

## 2019-07-26 ASSESSMENT — ENCOUNTER SYMPTOMS
DIZZINESS: 0
CHILLS: 0
GASTROINTESTINAL NEGATIVE: 1
BRUISES/BLEEDS EASILY: 0
LOSS OF CONSCIOUSNESS: 0
CLAUDICATION: 0
EYES NEGATIVE: 1
STRIDOR: 0
MUSCULOSKELETAL NEGATIVE: 1
COUGH: 1
PALPITATIONS: 0
SPUTUM PRODUCTION: 0
SORE THROAT: 0
PND: 0
CARDIOVASCULAR NEGATIVE: 1
SHORTNESS OF BREATH: 0
FEVER: 0
ORTHOPNEA: 0
NEUROLOGICAL NEGATIVE: 1
WHEEZING: 0
WEAKNESS: 0
HEMOPTYSIS: 0
CONSTITUTIONAL NEGATIVE: 1

## 2019-07-26 ASSESSMENT — MINNESOTA LIVING WITH HEART FAILURE QUESTIONNAIRE (MLHF)
MAKING YOU WORRY: 4
EATING LESS FOODS YOU LIKE: 3
WORKING AROUND THE HOUSE OR YARD DIFFICULT: 4
MAKING YOU FEEL DEPRESSED: 4
TOTAL_SCORE: 58
WALKING ABOUT OR CLIMBING STAIRS DIFFICULT: 4
DIFFICULTY TO CONCENTRATE OR REMEMBERING THINGS: 4
COSTING YOU MONEY FOR MEDICAL CARE: 3
DIFFICULTY WORKING TO EARN A LIVING: 4
MAKING YOU STAY IN A HOSPITAL: 0
SWELLING IN ANKLES OR LEGS: 1
HAVING TO SIT OR LIE DOWN DURING THE DAY: 4
DIFFICULTY SOCIALIZING WITH FAMILY OR FRIENDS: 1
DIFFICULTY SLEEPING WELL AT NIGHT: 3
GIVING YOU SIDE EFFECTS FROM TREATMENTS: 0
FEELING LIKE A BURDEN TO FAMILY AND FRIENDS: 1
DIFFICULTY WITH RECREATIONAL PASTIMES, SPORTS, HOBBIES: 3
DIFFICULTY WITH SEXUAL ACTIVITIES: 3
MAKING YOU SHORT OF BREATH: 4
LOSS OF SELF CONTROL IN YOUR LIFE: 1
DIFFICULTY GOING AWAY FROM HOME: 2
TIRED, FATIGUED OR LOW ON ENERGY: 5

## 2019-07-26 ASSESSMENT — 6 MINUTE WALK TEST (6MWT): TOTAL DISTANCE WALKED (METERS): 371.85

## 2019-07-26 NOTE — LETTER
[facility]   [address] [place] [address] [address]  [address] [address]  Phone: [phone]  Fax: [phone]              Shaka Riley  1965    Encounter Date: 7/26/2019    Jorge A Le M.D.          PROGRESS NOTE:  Chief Complaint   Patient presents with   • CHF (Systolic)       Subjective:   Shaka Riley is a 54 y.o. male who presents today as a follow-up for his heart rhythm with reduced ejection fraction with normal heart function.  Now that he is EF is back to normal.  H he is checking his blood pressure if worse in the morning which is elevated.  He does think that perhaps he has sleep apnea.  After he takes medication his blood pressure goes into the 70s over 30s to 80s over 40s.  He does not do that every day of the week.  He sometimes following a high salt diet.  He is complaining mostly of a cough.  Otherwise he is having no lower extremity edema no chest pain or PND.    Past Medical History:   Diagnosis Date   • Clotting disorder (HCC)     PE   • Congestive heart failure (HCC) 7/2015    EF 30-35%; Dilated R atrium; LVH; Mild MR; Mild AI; Mild TR   • Crohn disease (HCC)    • Hypertension    • PAF (paroxysmal atrial fibrillation) (HCC)    • Sleep apnea     un-diagnosed and pending sleep study     History reviewed. No pertinent surgical history.  Family History   Problem Relation Age of Onset   • Cancer Mother    • Heart Disease Paternal Grandmother    • Heart Disease Paternal Grandfather    • Cancer Father         paralysis from accident     Social History     Social History   • Marital status:      Spouse name: N/A   • Number of children: N/A   • Years of education: N/A     Occupational History   • Not on file.     Social History Main Topics   • Smoking status: Never Smoker   • Smokeless tobacco: Never Used   • Alcohol use 1.2 oz/week     2 Glasses of wine per week      Comment: Quit in 1994 - previous six beer after work, 2-3 beers per  day currently   • Drug use: No      Comment: Quit in 2012, used for 5 years   • Sexual activity: Yes     Partners: Female     Other Topics Concern   • Not on file     Social History Narrative   • No narrative on file     No Known Allergies  Outpatient Encounter Prescriptions as of 7/26/2019   Medication Sig Dispense Refill   • sacubitril-valsartan (ENTRESTO) 49-51 MG Tab tablet Take 1 Tab by mouth 2 Times a Day. 60 Tab 11   • spironolactone (ALDACTONE) 25 MG Tab Take 1 Tab by mouth every day. 90 Tab 3   • carvedilol (COREG) 25 MG Tab Take 1 Tab by mouth 2 times a day, with meals. 180 Tab 3   • Methocarbamol (ROBAXIN-750 PO) Take 750 mg by mouth 2 Times a Day.     • GABAPENTIN PO Take  by mouth 2 Times a Day. Unsure of dose     • ROPINIRole (REQUIP) 0.5 MG Tab Take 2 Tabs by mouth every evening. 60 Tab 11   • aspirin (ASA) 325 MG Tab Take 325 mg by mouth every day.     • [DISCONTINUED] furosemide (LASIX) 40 MG Tab      • [DISCONTINUED] ENTRESTO  MG Tab tablet      • [DISCONTINUED] valsartan (DIOVAN) 160 MG Tab Take 1 Tab by mouth every day. 30 Tab 11     No facility-administered encounter medications on file as of 7/26/2019.      Review of Systems   Constitutional: Negative.  Negative for chills, fever and malaise/fatigue.   HENT: Negative.  Negative for sore throat.    Eyes: Negative.    Respiratory: Positive for cough. Negative for hemoptysis, sputum production, shortness of breath, wheezing and stridor.    Cardiovascular: Negative.  Negative for chest pain, palpitations, orthopnea, claudication, leg swelling and PND.   Gastrointestinal: Negative.    Genitourinary: Negative.    Musculoskeletal: Negative.    Skin: Negative.    Neurological: Negative.  Negative for dizziness, loss of consciousness and weakness.   Endo/Heme/Allergies: Negative.  Does not bruise/bleed easily.   All other systems reviewed and are negative.       Objective:   /80 (BP Location: Left arm, Patient Position: Sitting, BP Cuff  "Size: Adult)   Pulse 76   Ht 1.753 m (5' 9\")   Wt 108 kg (238 lb)   SpO2 91%   BMI 35.15 kg/m²      Physical Exam   Constitutional: He appears well-developed and well-nourished. No distress.   HENT:   Head: Normocephalic and atraumatic.   Right Ear: External ear normal.   Left Ear: External ear normal.   Nose: Nose normal.   Mouth/Throat: No oropharyngeal exudate.   Eyes: Pupils are equal, round, and reactive to light. Conjunctivae and EOM are normal. Right eye exhibits no discharge. Left eye exhibits no discharge. No scleral icterus.   Neck: Neck supple. No JVD present.   Cardiovascular: Normal rate, regular rhythm and intact distal pulses.  Exam reveals no gallop and no friction rub.    No murmur heard.  Pulmonary/Chest: Effort normal. No stridor. No respiratory distress. He has no wheezes. He has no rales. He exhibits no tenderness.   Abdominal: Soft. He exhibits no distension. There is no guarding.   Musculoskeletal: Normal range of motion. He exhibits no edema, tenderness or deformity.   Neurological: He is alert. He has normal reflexes. He displays normal reflexes. No cranial nerve deficit. He exhibits normal muscle tone. Coordination normal.   Skin: Skin is warm and dry. No rash noted. He is not diaphoretic. No erythema. No pallor.   Psychiatric: He has a normal mood and affect. His behavior is normal. Judgment and thought content normal.   Nursing note and vitals reviewed.      Assessment:     1. Acute systolic CHF (congestive heart failure), NYHA class 3 (HCC)  Basic Metabolic Panel    proBrain Natriuretic Peptide, NT    CBC WITH DIFFERENTIAL   2. Crohn's disease of small intestine without complication (HCC)     3. High risk medication use     4. Essential hypertension     5. Nonischemic cardiomyopathy (HCC)  Basic Metabolic Panel    sacubitril-valsartan (ENTRESTO) 49-51 MG Tab tablet   6. Paroxysmal atrial fibrillation (HCC)  Basic Metabolic Panel    proBrain Natriuretic Peptide, NT    CBC WITH " DIFFERENTIAL   7. Chronic septic pulmonary embolism with acute cor pulmonale (HCC)  proBrain Natriuretic Peptide, NT    CBC WITH DIFFERENTIAL   8. Pure hypercholesterolemia     9. PVC (premature ventricular contraction)     10. Stage C chronic systolic congestive heart failure (HCC)     11. Syncope and collapse         Medical Decision Making:  Today's Assessment / Status / Plan:     54-year-old male with heart failure with reduced ejection fraction now with normalized function with a history of paroxysmal A. fib and PEs.  Given his cough low blood pressure and symptoms of fatigue we will reduce his medications taking to his low blood pressure throughout the day is probably overmedication.  Therefore I have asked him to stop his Lasix.  Have also cut his Entresto in half.  I have also asked him to stop his valsartan.  He reports he was not taking this anyway.  He will check his blood pressures throughout the day.  We will see him back in a month.  If his cough is continuing we will consider a cardiac MRI for possible sarcoid.    Thank for you allowing me to take part in your patient's care, please call should you have any questions or would like to discuss this patient.      Quinten Kiran M.D.  [address] [place] [address] Dr [place] [id number]  VIA In Basket

## 2019-07-27 NOTE — PROGRESS NOTES
Chief Complaint   Patient presents with   • CHF (Systolic)       Subjective:   Shaka Riley is a 54 y.o. male who presents today as a follow-up for his heart rhythm with reduced ejection fraction with normal heart function.  Now that he is EF is back to normal.  H he is checking his blood pressure if worse in the morning which is elevated.  He does think that perhaps he has sleep apnea.  After he takes medication his blood pressure goes into the 70s over 30s to 80s over 40s.  He does not do that every day of the week.  He sometimes following a high salt diet.  He is complaining mostly of a cough.  Otherwise he is having no lower extremity edema no chest pain or PND.    Past Medical History:   Diagnosis Date   • Clotting disorder (HCC)     PE   • Congestive heart failure (HCC) 7/2015    EF 30-35%; Dilated R atrium; LVH; Mild MR; Mild AI; Mild TR   • Crohn disease (HCC)    • Hypertension    • PAF (paroxysmal atrial fibrillation) ([facility])    • Sleep apnea     un-diagnosed and pending sleep study     History reviewed. No pertinent surgical history.  Family History   Problem Relation Age of Onset   • Cancer Mother    • Heart Disease Paternal Grandmother    • Heart Disease Paternal Grandfather    • Cancer Father         paralysis from accident     Social History     Social History   • Marital status:      Spouse name: N/A   • Number of children: N/A   • Years of education: N/A     Occupational History   • Not on file.     Social History Main Topics   • Smoking status: Never Smoker   • Smokeless tobacco: Never Used   • Alcohol use 1.2 oz/week     2 Glasses of wine per week      Comment: Quit in 1994 - previous six beer after work, 2-3 beers per day currently   • Drug use: No      Comment: Quit in 2012, used for 5 years   • Sexual activity: Yes     Partners: Female     Other Topics Concern   • Not on file     Social History Narrative   • No narrative on file     No Known Allergies  Outpatient Encounter  "Prescriptions as of 7/26/2019   Medication Sig Dispense Refill   • sacubitril-valsartan (ENTRESTO) 49-51 MG Tab tablet Take 1 Tab by mouth 2 Times a Day. 60 Tab 11   • spironolactone (ALDACTONE) 25 MG Tab Take 1 Tab by mouth every day. 90 Tab 3   • carvedilol (COREG) 25 MG Tab Take 1 Tab by mouth 2 times a day, with meals. 180 Tab 3   • Methocarbamol (ROBAXIN-750 PO) Take 750 mg by mouth 2 Times a Day.     • GABAPENTIN PO Take  by mouth 2 Times a Day. Unsure of dose     • ROPINIRole (REQUIP) 0.5 MG Tab Take 2 Tabs by mouth every evening. 60 Tab 11   • aspirin (ASA) 325 MG Tab Take 325 mg by mouth every day.     • [DISCONTINUED] furosemide (LASIX) 40 MG Tab      • [DISCONTINUED] ENTRESTO  MG Tab tablet      • [DISCONTINUED] valsartan (DIOVAN) 160 MG Tab Take 1 Tab by mouth every day. 30 Tab 11     No facility-administered encounter medications on file as of 7/26/2019.      Review of Systems   Constitutional: Negative.  Negative for chills, fever and malaise/fatigue.   HENT: Negative.  Negative for sore throat.    Eyes: Negative.    Respiratory: Positive for cough. Negative for hemoptysis, sputum production, shortness of breath, wheezing and stridor.    Cardiovascular: Negative.  Negative for chest pain, palpitations, orthopnea, claudication, leg swelling and PND.   Gastrointestinal: Negative.    Genitourinary: Negative.    Musculoskeletal: Negative.    Skin: Negative.    Neurological: Negative.  Negative for dizziness, loss of consciousness and weakness.   Endo/Heme/Allergies: Negative.  Does not bruise/bleed easily.   All other systems reviewed and are negative.       Objective:   /80 (BP Location: Left arm, Patient Position: Sitting, BP Cuff Size: Adult)   Pulse 76   Ht 1.753 m (5' 9\")   Wt 108 kg (238 lb)   SpO2 91%   BMI 35.15 kg/m²     Physical Exam   Constitutional: He appears well-developed and well-nourished. No distress.   HENT:   Head: Normocephalic and atraumatic.   Right Ear: External ear " normal.   Left Ear: External ear normal.   Nose: Nose normal.   Mouth/Throat: No oropharyngeal exudate.   Eyes: Pupils are equal, round, and reactive to light. Conjunctivae and EOM are normal. Right eye exhibits no discharge. Left eye exhibits no discharge. No scleral icterus.   Neck: Neck supple. No JVD present.   Cardiovascular: Normal rate, regular rhythm and intact distal pulses.  Exam reveals no gallop and no friction rub.    No murmur heard.  Pulmonary/Chest: Effort normal. No stridor. No respiratory distress. He has no wheezes. He has no rales. He exhibits no tenderness.   Abdominal: Soft. He exhibits no distension. There is no guarding.   Musculoskeletal: Normal range of motion. He exhibits no edema, tenderness or deformity.   Neurological: He is alert. He has normal reflexes. He displays normal reflexes. No cranial nerve deficit. He exhibits normal muscle tone. Coordination normal.   Skin: Skin is warm and dry. No rash noted. He is not diaphoretic. No erythema. No pallor.   Psychiatric: He has a normal mood and affect. His behavior is normal. Judgment and thought content normal.   Nursing note and vitals reviewed.      Assessment:     1. Acute systolic CHF (congestive heart failure), NYHA class 3 ([facility])  Basic Metabolic Panel    proBrain Natriuretic Peptide, NT    CBC WITH DIFFERENTIAL   2. Crohn's disease of small intestine without complication ([facility])     3. High risk medication use     4. Essential hypertension     5. Nonischemic cardiomyopathy ([facility])  Basic Metabolic Panel    sacubitril-valsartan (ENTRESTO) 49-51 MG Tab tablet   6. Paroxysmal atrial fibrillation ([facility])  Basic Metabolic Panel    proBrain Natriuretic Peptide, NT    CBC WITH DIFFERENTIAL   7. Chronic septic pulmonary embolism with acute cor pulmonale ([facility])  proBrain Natriuretic Peptide, NT    CBC WITH DIFFERENTIAL   8. Pure hypercholesterolemia     9. PVC (premature ventricular contraction)     10. Stage C chronic systolic congestive heart  failure (HCC)     11. Syncope and collapse         Medical Decision Making:  Today's Assessment / Status / Plan:     54-year-old male with heart failure with reduced ejection fraction now with normalized function with a history of paroxysmal A. fib and PEs.  Given his cough low blood pressure and symptoms of fatigue we will reduce his medications taking to his low blood pressure throughout the day is probably overmedication.  Therefore I have asked him to stop his Lasix.  Have also cut his Entresto in half.  I have also asked him to stop his valsartan.  He reports he was not taking this anyway.  He will check his blood pressures throughout the day.  We will see him back in a month.  If his cough is continuing we will consider a cardiac MRI for possible sarcoid.    Thank for you allowing me to take part in your patient's care, please call should you have any questions or would like to discuss this patient.

## 2019-08-09 DIAGNOSIS — I26.01 CHRONIC SEPTIC PULMONARY EMBOLISM WITH ACUTE COR PULMONALE (HCC): ICD-10-CM

## 2019-08-09 DIAGNOSIS — I50.21 ACUTE SYSTOLIC CHF (CONGESTIVE HEART FAILURE), NYHA CLASS 3 (HCC): ICD-10-CM

## 2019-08-09 DIAGNOSIS — I27.82 CHRONIC SEPTIC PULMONARY EMBOLISM WITH ACUTE COR PULMONALE (HCC): ICD-10-CM

## 2019-08-09 DIAGNOSIS — R06.83 SNORING: ICD-10-CM

## 2019-08-09 DIAGNOSIS — G25.81 RESTLESS LEG SYNDROME: ICD-10-CM

## 2019-08-09 RX ORDER — ROPINIROLE 0.5 MG/1
TABLET, FILM COATED ORAL
Qty: 60 TAB | Refills: 11 | Status: SHIPPED | OUTPATIENT
Start: 2019-08-09 | End: 2020-01-01 | Stop reason: SDUPTHER

## 2019-08-20 ENCOUNTER — TELEPHONE (OUTPATIENT)
Dept: CARDIOLOGY | Facility: MEDICAL CENTER | Age: 54
End: 2019-08-20

## 2019-08-20 NOTE — TELEPHONE ENCOUNTER
S/w patient about non-fasting labs, he stated that he has not yet, but he will be completing them before his appt with Deneen Hartley PA-C on 08/27.     Patient thanked me for the call.

## 2019-09-04 ENCOUNTER — TELEPHONE (OUTPATIENT)
Dept: CARDIOLOGY | Facility: MEDICAL CENTER | Age: 54
End: 2019-09-04

## 2019-09-09 ENCOUNTER — HOSPITAL ENCOUNTER (OUTPATIENT)
Dept: LAB | Facility: MEDICAL CENTER | Age: 54
End: 2019-09-09
Attending: INTERNAL MEDICINE
Payer: COMMERCIAL

## 2019-09-09 DIAGNOSIS — I50.21 ACUTE SYSTOLIC CHF (CONGESTIVE HEART FAILURE), NYHA CLASS 3 (HCC): ICD-10-CM

## 2019-09-09 DIAGNOSIS — I42.8 NONISCHEMIC CARDIOMYOPATHY (HCC): ICD-10-CM

## 2019-09-09 DIAGNOSIS — I48.0 PAROXYSMAL ATRIAL FIBRILLATION (HCC): ICD-10-CM

## 2019-09-09 DIAGNOSIS — I26.01 CHRONIC SEPTIC PULMONARY EMBOLISM WITH ACUTE COR PULMONALE (HCC): ICD-10-CM

## 2019-09-09 DIAGNOSIS — I27.82 CHRONIC SEPTIC PULMONARY EMBOLISM WITH ACUTE COR PULMONALE (HCC): ICD-10-CM

## 2019-09-09 LAB
ANION GAP SERPL CALC-SCNC: 9 MMOL/L (ref 0–11.9)
BASOPHILS # BLD AUTO: 0.7 % (ref 0–1.8)
BASOPHILS # BLD: 0.04 K/UL (ref 0–0.12)
BUN SERPL-MCNC: 18 MG/DL (ref 8–22)
CALCIUM SERPL-MCNC: 9.8 MG/DL (ref 8.5–10.5)
CHLORIDE SERPL-SCNC: 102 MMOL/L (ref 96–112)
CO2 SERPL-SCNC: 28 MMOL/L (ref 20–33)
CREAT SERPL-MCNC: 1.09 MG/DL (ref 0.5–1.4)
EOSINOPHIL # BLD AUTO: 0.19 K/UL (ref 0–0.51)
EOSINOPHIL NFR BLD: 3.4 % (ref 0–6.9)
ERYTHROCYTE [DISTWIDTH] IN BLOOD BY AUTOMATED COUNT: 42.5 FL (ref 35.9–50)
GLUCOSE SERPL-MCNC: 106 MG/DL (ref 65–99)
HCT VFR BLD AUTO: 44.8 % (ref 42–52)
HGB BLD-MCNC: 14.6 G/DL (ref 14–18)
IMM GRANULOCYTES # BLD AUTO: 0.01 K/UL (ref 0–0.11)
IMM GRANULOCYTES NFR BLD AUTO: 0.2 % (ref 0–0.9)
LYMPHOCYTES # BLD AUTO: 1.15 K/UL (ref 1–4.8)
LYMPHOCYTES NFR BLD: 20.3 % (ref 22–41)
MCH RBC QN AUTO: 30.9 PG (ref 27–33)
MCHC RBC AUTO-ENTMCNC: 32.6 G/DL (ref 33.7–35.3)
MCV RBC AUTO: 94.7 FL (ref 81.4–97.8)
MONOCYTES # BLD AUTO: 0.42 K/UL (ref 0–0.85)
MONOCYTES NFR BLD AUTO: 7.4 % (ref 0–13.4)
NEUTROPHILS # BLD AUTO: 3.86 K/UL (ref 1.82–7.42)
NEUTROPHILS NFR BLD: 68 % (ref 44–72)
NRBC # BLD AUTO: 0 K/UL
NRBC BLD-RTO: 0 /100 WBC
NT-PROBNP SERPL IA-MCNC: 122 PG/ML (ref 0–125)
PLATELET # BLD AUTO: 243 K/UL (ref 164–446)
PMV BLD AUTO: 11.2 FL (ref 9–12.9)
POTASSIUM SERPL-SCNC: 4.3 MMOL/L (ref 3.6–5.5)
RBC # BLD AUTO: 4.73 M/UL (ref 4.7–6.1)
SODIUM SERPL-SCNC: 139 MMOL/L (ref 135–145)
WBC # BLD AUTO: 5.7 K/UL (ref 4.8–10.8)

## 2019-09-09 PROCEDURE — 83880 ASSAY OF NATRIURETIC PEPTIDE: CPT

## 2019-09-09 PROCEDURE — 85025 COMPLETE CBC W/AUTO DIFF WBC: CPT

## 2019-09-09 PROCEDURE — 80048 BASIC METABOLIC PNL TOTAL CA: CPT

## 2019-09-09 PROCEDURE — 36415 COLL VENOUS BLD VENIPUNCTURE: CPT

## 2019-09-12 ENCOUNTER — OFFICE VISIT (OUTPATIENT)
Dept: CARDIOLOGY | Facility: MEDICAL CENTER | Age: 54
End: 2019-09-12
Payer: COMMERCIAL

## 2019-09-12 VITALS
SYSTOLIC BLOOD PRESSURE: 122 MMHG | BODY MASS INDEX: 35.25 KG/M2 | OXYGEN SATURATION: 94 % | HEIGHT: 69 IN | WEIGHT: 238 LBS | HEART RATE: 81 BPM | DIASTOLIC BLOOD PRESSURE: 80 MMHG

## 2019-09-12 DIAGNOSIS — I26.99 PULMONARY INFARCTION (HCC): ICD-10-CM

## 2019-09-12 DIAGNOSIS — R06.89 DYSPNEA AND RESPIRATORY ABNORMALITIES: ICD-10-CM

## 2019-09-12 DIAGNOSIS — I42.8 NONISCHEMIC CARDIOMYOPATHY (HCC): ICD-10-CM

## 2019-09-12 DIAGNOSIS — R06.00 DYSPNEA AND RESPIRATORY ABNORMALITIES: ICD-10-CM

## 2019-09-12 DIAGNOSIS — I10 ESSENTIAL HYPERTENSION: ICD-10-CM

## 2019-09-12 DIAGNOSIS — R05.9 COUGH: ICD-10-CM

## 2019-09-12 PROCEDURE — 99214 OFFICE O/P EST MOD 30 MIN: CPT | Performed by: PHYSICIAN ASSISTANT

## 2019-09-12 ASSESSMENT — ENCOUNTER SYMPTOMS
CHILLS: 0
SHORTNESS OF BREATH: 1
SPUTUM PRODUCTION: 0
NAUSEA: 0
PND: 0
FEVER: 0
WEIGHT LOSS: 0
DIZZINESS: 0
PALPITATIONS: 0
HEMOPTYSIS: 0
BLOOD IN STOOL: 0
ORTHOPNEA: 0
COUGH: 1
DEPRESSION: 0

## 2019-09-12 NOTE — PROGRESS NOTES
Chief Complaint   Patient presents with   • Hypertension       Subjective:   Shaka Riley is a 54 y.o. male who presents today for follow up.     Patient of Dr. Le.  He has a history of heart failure with reduced ejection fraction, 25 to 30%, etiology is not clear.  About 1 year later in 2015 he presented to the hospital with acute PE, subsegmental branch of right lower lobe with evidence of pulmonary infarction and atrial fibrillation.  He has since had a recovery in his ejection fraction, most recent echo September 2018 showed normal LV systolic function.  He was seen recently and was having problems with hypotension, his Entresto was cut in half.    Today he tells me his blood pressures are much better, systolic usually in the 130s.  He has not had any more dizzy spells since reducing the Entresto.    He continues to have a persistent dry cough.  This is been ongoing since his pulmonary embolism, before he started Entresto.  The cough occurs during the day or at night.  He becomes very winded with minimal activity, he feels he should be able to do much more however his breathing limits him.  He does not have any orthopnea or PND.    He has lower extremity edema, mostly at the end of the day.  He has been told he has venous insufficiency in the past.        Past Medical History:   Diagnosis Date   • Clotting disorder (HCC)     PE   • Congestive heart failure (HCC) 7/2015    EF 30-35%; Dilated R atrium; LVH; Mild MR; Mild AI; Mild TR   • Crohn disease (HCC)    • Hypertension    • PAF (paroxysmal atrial fibrillation) ([facility])    • Sleep apnea     un-diagnosed and pending sleep study     History reviewed. No pertinent surgical history.  Family History   Problem Relation Age of Onset   • Cancer Mother    • Heart Disease Paternal Grandmother    • Heart Disease Paternal Grandfather    • Cancer Father         paralysis from accident     Social History     Socioeconomic History   • Marital status:       Spouse name: Not on file   • Number of children: Not on file   • Years of education: Not on file   • Highest education level: Not on file   Occupational History   • Not on file   Social Needs   • Financial resource strain: Not on file   • Food insecurity:     Worry: Not on file     Inability: Not on file   • Transportation needs:     Medical: Not on file     Non-medical: Not on file   Tobacco Use   • Smoking status: Never Smoker   • Smokeless tobacco: Never Used   Substance and Sexual Activity   • Alcohol use: Yes     Alcohol/week: 1.2 oz     Types: 2 Glasses of wine per week     Comment: Quit in 1994 - previous six beer after work, 2-3 beers per day currently   • Drug use: No     Types: Methamphetamines     Comment: Quit in 2012, used for 5 years   • Sexual activity: Yes     Partners: Female   Lifestyle   • Physical activity:     Days per week: Not on file     Minutes per session: Not on file   • Stress: Not on file   Relationships   • Social connections:     Talks on phone: Not on file     Gets together: Not on file     Attends Scientologist service: Not on file     Active member of club or organization: Not on file     Attends meetings of clubs or organizations: Not on file     Relationship status: Not on file   • Intimate partner violence:     Fear of current or ex partner: Not on file     Emotionally abused: Not on file     Physically abused: Not on file     Forced sexual activity: Not on file   Other Topics Concern   • Not on file   Social History Narrative   • Not on file     No Known Allergies  Outpatient Encounter Medications as of 9/12/2019   Medication Sig Dispense Refill   • ROPINIRole (REQUIP) 0.5 MG Tab TAKE 2 TABLETS BY MOUTH IN THE EVENING 60 Tab 11   • sacubitril-valsartan (ENTRESTO) 49-51 MG Tab tablet Take 1 Tab by mouth 2 Times a Day. 60 Tab 11   • spironolactone (ALDACTONE) 25 MG Tab Take 1 Tab by mouth every day. 90 Tab 3   • carvedilol (COREG) 25 MG Tab Take 1 Tab by mouth 2 times a day, with  "meals. 180 Tab 3   • Methocarbamol (ROBAXIN-750 PO) Take 750 mg by mouth 2 Times a Day.     • GABAPENTIN PO Take  by mouth 2 Times a Day. Unsure of dose     • [DISCONTINUED] aspirin (ASA) 325 MG Tab Take 325 mg by mouth every day.       No facility-administered encounter medications on file as of 9/12/2019.      Review of Systems   Constitutional: Negative for chills, fever and weight loss.   HENT: Negative for nosebleeds.    Respiratory: Positive for cough and shortness of breath. Negative for hemoptysis and sputum production.    Cardiovascular: Positive for leg swelling. Negative for chest pain, palpitations, orthopnea and PND.   Gastrointestinal: Negative for blood in stool, melena and nausea.   Genitourinary: Negative for hematuria.   Skin: Negative for rash.   Neurological: Negative for dizziness.        Restless legs   Endo/Heme/Allergies:        Negative work-up for hypercoagulability   Psychiatric/Behavioral: Negative for depression.        Objective:   /80 (BP Location: Left arm, Patient Position: Sitting, BP Cuff Size: Adult)   Pulse 81   Ht 1.753 m (5' 9\")   Wt 108 kg (238 lb)   SpO2 94%   BMI 35.15 kg/m²     Physical Exam   Constitutional: He is oriented to person, place, and time. He appears well-developed and well-nourished. No distress.   Obese male, BMI 35   HENT:   Head: Normocephalic and atraumatic.   Eyes: Conjunctivae are normal. No scleral icterus.   Neck: No JVD present.   Large neck circumference   Cardiovascular: Normal rate, regular rhythm and intact distal pulses.   No murmur heard.  Pulmonary/Chest: Effort normal and breath sounds normal. No respiratory distress. He has no wheezes. He has no rales.   Abdominal: Soft. Bowel sounds are normal. He exhibits no distension. There is no tenderness.   Musculoskeletal: He exhibits no edema.   Neurological: He is alert and oriented to person, place, and time. Gait normal.   Skin: Skin is warm and dry. He is not diaphoretic. No cyanosis. "   Psychiatric: Judgment normal.   Vitals reviewed.      Cardiac PET 2/6/17  CONCLUSIONS AND IMPRESSIONS:  At baseline, EKG shows evidence of sinus rhythm.    As stress, there is no evidence of coronary ischemia seen on EKG tracing.    In terms of myocardial PET scan stress test images, there is no evidence of   coronary ischemia seen.  Overall, this is a negative myocardial PET scan   stress test for coronary ischemia.    TTE 9/5/18  Normal TTE  Normal left ventricular size and systolic function. Moderate concentric   left ventricular hypertrophy. Normal left ventricular systolic   function. Normal regional wall motion. Left ventricular ejection   fraction is visually estimated to be 65%. Normal diastolic function.    Normal right ventricular size and systolic function.    Normal right atrial size. Normal inferior vena cava size and   inspiratory collapse.    Normal left atrial size.    Structurally normal mitral valve. Mild mitral regurgitation. No mitral   stenosis.    Structurally normal aortic valve without significant stenosis or   regurgitation.    Structurally normal tricuspid valve. Right atrial pressure is estimated   to be 3 mmHg. Estimated right ventricular systolic pressure  is 35   mmHg.    Structurally normal pulmonic valve. Trace pulmonic insufficiency. No   pulmonic stenosis.    Normal pericardium without effusion.    The aortic root is normal.    Assessment:     1. Cough  PULMONARY FUNCTION TESTS -Test requested: Spirometry with-out & with Bronchodilator (plus cardiopulmonary exercise trest testing); Include MIPS/MEPS? No   2. Dyspnea and respiratory abnormalities  PULMONARY FUNCTION TESTS -Test requested: Spirometry with-out & with Bronchodilator (plus cardiopulmonary exercise trest testing); Include MIPS/MEPS? No   3. Essential hypertension     4. Pulmonary infarction (HCC)     5. Nonischemic cardiomyopathy (HCC)         Medical Decision Making:  Today's Assessment / Status / Plan:    Cough  Dyspnea out of proportion to exam findings  -I will order pulmonary function test and cardiopulmonary exercise testing to establish an etiology of his dyspnea, whether it is due to obstructive/restrictive disease or deconditioning.  Dr. Le did mention concern for sarcoid which we can prefer to pursue this work-up depending on PFTs.  He does report heavy dust insulation which may indicate a restrictive process.  I cannot tell if the pulmonary infarct was clinically signficant  but is possible this is affecting him as well.  -His cough was still present when he was on losartan.    Nonischemic cardiomyopathy, now normalized LV systolic function  -Continue Entresto 49-51 twice daily  -Continue Coreg 25 mg twice daily  -Continue spironolactone 25 mg daily    Hypertension, controlled  -Labs as above    Primary prevention  -Condition continue aspirin as there is no history of atherosclerosis    Plan: PFTs, cardiopulmonary testing. Return in 6 weeks to review, sooner if problems.     Collaborating MD: Dr. Le.

## 2019-12-27 ENCOUNTER — APPOINTMENT (OUTPATIENT)
Dept: PULMONOLOGY | Facility: MEDICAL CENTER | Age: 54
End: 2019-12-27
Attending: PHYSICIAN ASSISTANT
Payer: COMMERCIAL

## 2019-12-31 ENCOUNTER — APPOINTMENT (OUTPATIENT)
Dept: PULMONOLOGY | Facility: MEDICAL CENTER | Age: 54
End: 2019-12-31
Attending: PHYSICIAN ASSISTANT
Payer: COMMERCIAL

## 2020-01-01 ENCOUNTER — APPOINTMENT (OUTPATIENT)
Dept: RADIOLOGY | Facility: MEDICAL CENTER | Age: 55
End: 2020-01-01
Attending: EMERGENCY MEDICINE
Payer: COMMERCIAL

## 2020-01-01 ENCOUNTER — ANESTHESIA (OUTPATIENT)
Dept: SURGERY | Facility: MEDICAL CENTER | Age: 55
End: 2020-01-01
Payer: COMMERCIAL

## 2020-01-01 ENCOUNTER — TELEMEDICINE (OUTPATIENT)
Dept: MEDICAL GROUP | Age: 55
End: 2020-01-01
Payer: COMMERCIAL

## 2020-01-01 ENCOUNTER — ANESTHESIA EVENT (OUTPATIENT)
Dept: SURGERY | Facility: MEDICAL CENTER | Age: 55
End: 2020-01-01
Payer: COMMERCIAL

## 2020-01-01 ENCOUNTER — OFFICE VISIT (OUTPATIENT)
Dept: CARDIOLOGY | Facility: MEDICAL CENTER | Age: 55
End: 2020-01-01
Payer: COMMERCIAL

## 2020-01-01 ENCOUNTER — HOSPITAL ENCOUNTER (EMERGENCY)
Facility: MEDICAL CENTER | Age: 55
End: 2020-07-29
Attending: EMERGENCY MEDICINE | Admitting: SURGERY
Payer: COMMERCIAL

## 2020-01-01 VITALS
SYSTOLIC BLOOD PRESSURE: 104 MMHG | RESPIRATION RATE: 18 BRPM | HEART RATE: 78 BPM | TEMPERATURE: 97.9 F | DIASTOLIC BLOOD PRESSURE: 62 MMHG | WEIGHT: 236.99 LBS | BODY MASS INDEX: 35.1 KG/M2 | OXYGEN SATURATION: 95 % | HEIGHT: 69 IN

## 2020-01-01 VITALS — HEIGHT: 69 IN | BODY MASS INDEX: 33.33 KG/M2 | WEIGHT: 225 LBS

## 2020-01-01 VITALS
SYSTOLIC BLOOD PRESSURE: 120 MMHG | BODY MASS INDEX: 36.83 KG/M2 | WEIGHT: 243 LBS | HEART RATE: 80 BPM | OXYGEN SATURATION: 94 % | DIASTOLIC BLOOD PRESSURE: 68 MMHG | HEIGHT: 68 IN

## 2020-01-01 DIAGNOSIS — I50.21 ACUTE SYSTOLIC CHF (CONGESTIVE HEART FAILURE), NYHA CLASS 3 (HCC): ICD-10-CM

## 2020-01-01 DIAGNOSIS — I26.01 CHRONIC SEPTIC PULMONARY EMBOLISM WITH ACUTE COR PULMONALE (HCC): ICD-10-CM

## 2020-01-01 DIAGNOSIS — G25.81 RESTLESS LEG SYNDROME: ICD-10-CM

## 2020-01-01 DIAGNOSIS — D72.828 OTHER ELEVATED WHITE BLOOD CELL (WBC) COUNT: ICD-10-CM

## 2020-01-01 DIAGNOSIS — R06.83 SNORING: ICD-10-CM

## 2020-01-01 DIAGNOSIS — I10 ESSENTIAL HYPERTENSION, BENIGN: ICD-10-CM

## 2020-01-01 DIAGNOSIS — Z79.899 HIGH RISK MEDICATION USE: ICD-10-CM

## 2020-01-01 DIAGNOSIS — E66.9 OBESITY (BMI 30-39.9): ICD-10-CM

## 2020-01-01 DIAGNOSIS — Z00.00 ANNUAL PHYSICAL EXAM: ICD-10-CM

## 2020-01-01 DIAGNOSIS — Z13.220 SCREENING FOR HYPERLIPIDEMIA: ICD-10-CM

## 2020-01-01 DIAGNOSIS — I50.22 STAGE C CHRONIC SYSTOLIC CONGESTIVE HEART FAILURE (HCC): ICD-10-CM

## 2020-01-01 DIAGNOSIS — I27.82 CHRONIC SEPTIC PULMONARY EMBOLISM WITH ACUTE COR PULMONALE (HCC): ICD-10-CM

## 2020-01-01 DIAGNOSIS — Z11.59 NEED FOR HEPATITIS C SCREENING TEST: ICD-10-CM

## 2020-01-01 DIAGNOSIS — I50.9 HEART FAILURE, NYHA CLASS 1 (HCC): ICD-10-CM

## 2020-01-01 DIAGNOSIS — K81.9 CHOLECYSTITIS: ICD-10-CM

## 2020-01-01 DIAGNOSIS — I42.8 NONISCHEMIC CARDIOMYOPATHY (HCC): ICD-10-CM

## 2020-01-01 LAB
ALBUMIN SERPL BCP-MCNC: 3.8 G/DL (ref 3.2–4.9)
ALBUMIN/GLOB SERPL: 1 G/DL
ALP SERPL-CCNC: 61 U/L (ref 30–99)
ALT SERPL-CCNC: 9 U/L (ref 2–50)
ANION GAP SERPL CALC-SCNC: 14 MMOL/L (ref 7–16)
APPEARANCE UR: CLEAR
APTT PPP: 26.6 SEC (ref 24.7–36)
AST SERPL-CCNC: 13 U/L (ref 12–45)
BASOPHILS # BLD AUTO: 0.3 % (ref 0–1.8)
BASOPHILS # BLD: 0.05 K/UL (ref 0–0.12)
BILIRUB SERPL-MCNC: 0.5 MG/DL (ref 0.1–1.5)
BILIRUB UR QL STRIP.AUTO: NEGATIVE
BUN SERPL-MCNC: 7 MG/DL (ref 8–22)
CALCIUM SERPL-MCNC: 9.4 MG/DL (ref 8.5–10.5)
CHLORIDE SERPL-SCNC: 93 MMOL/L (ref 96–112)
CO2 SERPL-SCNC: 23 MMOL/L (ref 20–33)
COLOR UR: YELLOW
COVID ORDER STATUS COVID19: NORMAL
CREAT SERPL-MCNC: 0.87 MG/DL (ref 0.5–1.4)
EOSINOPHIL # BLD AUTO: 0.28 K/UL (ref 0–0.51)
EOSINOPHIL NFR BLD: 1.9 % (ref 0–6.9)
ERYTHROCYTE [DISTWIDTH] IN BLOOD BY AUTOMATED COUNT: 40.2 FL (ref 35.9–50)
EXPOSED MRN EXMRN: NORMAL
GLOBULIN SER CALC-MCNC: 3.7 G/DL (ref 1.9–3.5)
GLUCOSE SERPL-MCNC: 111 MG/DL (ref 65–99)
GLUCOSE UR STRIP.AUTO-MCNC: NEGATIVE MG/DL
HBV SURFACE AG SER QL: NORMAL
HCT VFR BLD AUTO: 43.1 % (ref 42–52)
HCV AB SER QL: NORMAL
HGB BLD-MCNC: 14.7 G/DL (ref 14–18)
HIV 1+2 AB+HIV1 P24 AG SERPL QL IA: NORMAL
IMM GRANULOCYTES # BLD AUTO: 0.06 K/UL (ref 0–0.11)
IMM GRANULOCYTES NFR BLD AUTO: 0.4 % (ref 0–0.9)
INR PPP: 0.98 (ref 0.87–1.13)
KETONES UR STRIP.AUTO-MCNC: 15 MG/DL
LEUKOCYTE ESTERASE UR QL STRIP.AUTO: NEGATIVE
LIPASE SERPL-CCNC: 19 U/L (ref 11–82)
LYMPHOCYTES # BLD AUTO: 1.13 K/UL (ref 1–4.8)
LYMPHOCYTES NFR BLD: 7.8 % (ref 22–41)
MCH RBC QN AUTO: 29.8 PG (ref 27–33)
MCHC RBC AUTO-ENTMCNC: 34.1 G/DL (ref 33.7–35.3)
MCV RBC AUTO: 87.4 FL (ref 81.4–97.8)
MICRO URNS: ABNORMAL
MONOCYTES # BLD AUTO: 0.94 K/UL (ref 0–0.85)
MONOCYTES NFR BLD AUTO: 6.5 % (ref 0–13.4)
NEUTROPHILS # BLD AUTO: 12.02 K/UL (ref 1.82–7.42)
NEUTROPHILS NFR BLD: 83.1 % (ref 44–72)
NITRITE UR QL STRIP.AUTO: NEGATIVE
NRBC # BLD AUTO: 0 K/UL
NRBC BLD-RTO: 0 /100 WBC
PATHOLOGY CONSULT NOTE: NORMAL
PH UR STRIP.AUTO: 6 [PH] (ref 5–8)
PLATELET # BLD AUTO: 280 K/UL (ref 164–446)
PMV BLD AUTO: 10.2 FL (ref 9–12.9)
POTASSIUM SERPL-SCNC: 3.6 MMOL/L (ref 3.6–5.5)
PROT SERPL-MCNC: 7.5 G/DL (ref 6–8.2)
PROT UR QL STRIP: NEGATIVE MG/DL
PROTHROMBIN TIME: 13.3 SEC (ref 12–14.6)
RBC # BLD AUTO: 4.93 M/UL (ref 4.7–6.1)
RBC UR QL AUTO: NEGATIVE
SARS-COV-2 RDRP RESP QL NAA+PROBE: NOTDETECTED
SODIUM SERPL-SCNC: 130 MMOL/L (ref 135–145)
SP GR UR STRIP.AUTO: <=1.005
SPECIMEN SOURCE: NORMAL
UROBILINOGEN UR STRIP.AUTO-MCNC: 0.2 MG/DL
WBC # BLD AUTO: 14.5 K/UL (ref 4.8–10.8)

## 2020-01-01 PROCEDURE — 700105 HCHG RX REV CODE 258: Performed by: EMERGENCY MEDICINE

## 2020-01-01 PROCEDURE — 160041 HCHG SURGERY MINUTES - EA ADDL 1 MIN LEVEL 4: Performed by: SURGERY

## 2020-01-01 PROCEDURE — 85610 PROTHROMBIN TIME: CPT

## 2020-01-01 PROCEDURE — 700105 HCHG RX REV CODE 258: Performed by: ANESTHESIOLOGY

## 2020-01-01 PROCEDURE — 501838 HCHG SUTURE GENERAL: Performed by: SURGERY

## 2020-01-01 PROCEDURE — 80053 COMPREHEN METABOLIC PANEL: CPT

## 2020-01-01 PROCEDURE — 99291 CRITICAL CARE FIRST HOUR: CPT

## 2020-01-01 PROCEDURE — 700111 HCHG RX REV CODE 636 W/ 250 OVERRIDE (IP): Performed by: EMERGENCY MEDICINE

## 2020-01-01 PROCEDURE — 96367 TX/PROPH/DG ADDL SEQ IV INF: CPT

## 2020-01-01 PROCEDURE — 85025 COMPLETE CBC W/AUTO DIFF WBC: CPT

## 2020-01-01 PROCEDURE — 700102 HCHG RX REV CODE 250 W/ 637 OVERRIDE(OP): Performed by: ANESTHESIOLOGY

## 2020-01-01 PROCEDURE — 93005 ELECTROCARDIOGRAM TRACING: CPT | Performed by: EMERGENCY MEDICINE

## 2020-01-01 PROCEDURE — 700111 HCHG RX REV CODE 636 W/ 250 OVERRIDE (IP)

## 2020-01-01 PROCEDURE — 160048 HCHG OR STATISTICAL LEVEL 1-5: Performed by: SURGERY

## 2020-01-01 PROCEDURE — 96365 THER/PROPH/DIAG IV INF INIT: CPT

## 2020-01-01 PROCEDURE — 160002 HCHG RECOVERY MINUTES (STAT): Performed by: SURGERY

## 2020-01-01 PROCEDURE — 501582 HCHG TROCAR, THRD BLADED: Performed by: SURGERY

## 2020-01-01 PROCEDURE — 700111 HCHG RX REV CODE 636 W/ 250 OVERRIDE (IP): Performed by: ANESTHESIOLOGY

## 2020-01-01 PROCEDURE — 71045 X-RAY EXAM CHEST 1 VIEW: CPT

## 2020-01-01 PROCEDURE — 160029 HCHG SURGERY MINUTES - 1ST 30 MINS LEVEL 4: Performed by: SURGERY

## 2020-01-01 PROCEDURE — 700111 HCHG RX REV CODE 636 W/ 250 OVERRIDE (IP): Performed by: SURGERY

## 2020-01-01 PROCEDURE — 700101 HCHG RX REV CODE 250: Performed by: ANESTHESIOLOGY

## 2020-01-01 PROCEDURE — A9270 NON-COVERED ITEM OR SERVICE: HCPCS | Performed by: ANESTHESIOLOGY

## 2020-01-01 PROCEDURE — 85730 THROMBOPLASTIN TIME PARTIAL: CPT

## 2020-01-01 PROCEDURE — 500514 HCHG ENDOCLIP: Performed by: SURGERY

## 2020-01-01 PROCEDURE — 501574 HCHG TROCAR, SMTH CAN&SEAL 5: Performed by: SURGERY

## 2020-01-01 PROCEDURE — 81003 URINALYSIS AUTO W/O SCOPE: CPT

## 2020-01-01 PROCEDURE — U0004 COV-19 TEST NON-CDC HGH THRU: HCPCS

## 2020-01-01 PROCEDURE — 96375 TX/PRO/DX INJ NEW DRUG ADDON: CPT

## 2020-01-01 PROCEDURE — 36415 COLL VENOUS BLD VENIPUNCTURE: CPT

## 2020-01-01 PROCEDURE — 83690 ASSAY OF LIPASE: CPT

## 2020-01-01 PROCEDURE — 99214 OFFICE O/P EST MOD 30 MIN: CPT | Performed by: PHYSICIAN ASSISTANT

## 2020-01-01 PROCEDURE — 160025 RECOVERY II MINUTES (STATS): Performed by: SURGERY

## 2020-01-01 PROCEDURE — 502571 HCHG PACK, LAP CHOLE: Performed by: SURGERY

## 2020-01-01 PROCEDURE — 700105 HCHG RX REV CODE 258: Performed by: SURGERY

## 2020-01-01 PROCEDURE — 160009 HCHG ANES TIME/MIN: Performed by: SURGERY

## 2020-01-01 PROCEDURE — C9803 HOPD COVID-19 SPEC COLLECT: HCPCS | Performed by: EMERGENCY MEDICINE

## 2020-01-01 PROCEDURE — 500868 HCHG NEEDLE, SURGI(VARES): Performed by: SURGERY

## 2020-01-01 PROCEDURE — 88304 TISSUE EXAM BY PATHOLOGIST: CPT

## 2020-01-01 PROCEDURE — 501583 HCHG TROCAR, THRD CAN&SEAL 5X100: Performed by: SURGERY

## 2020-01-01 PROCEDURE — 160046 HCHG PACU - 1ST 60 MINS PHASE II: Performed by: SURGERY

## 2020-01-01 PROCEDURE — 700101 HCHG RX REV CODE 250: Performed by: EMERGENCY MEDICINE

## 2020-01-01 PROCEDURE — 160035 HCHG PACU - 1ST 60 MINS PHASE I: Performed by: SURGERY

## 2020-01-01 PROCEDURE — 76705 ECHO EXAM OF ABDOMEN: CPT

## 2020-01-01 PROCEDURE — 99212 OFFICE O/P EST SF 10 MIN: CPT | Mod: 95,CR | Performed by: FAMILY MEDICINE

## 2020-01-01 PROCEDURE — 96376 TX/PRO/DX INJ SAME DRUG ADON: CPT

## 2020-01-01 RX ORDER — ONDANSETRON 2 MG/ML
4 INJECTION INTRAMUSCULAR; INTRAVENOUS
Status: DISCONTINUED | OUTPATIENT
Start: 2020-01-01 | End: 2020-01-01 | Stop reason: HOSPADM

## 2020-01-01 RX ORDER — SODIUM CHLORIDE, SODIUM LACTATE, POTASSIUM CHLORIDE, CALCIUM CHLORIDE 600; 310; 30; 20 MG/100ML; MG/100ML; MG/100ML; MG/100ML
INJECTION, SOLUTION INTRAVENOUS
Status: COMPLETED | OUTPATIENT
Start: 2020-01-01 | End: 2020-01-01

## 2020-01-01 RX ORDER — LIDOCAINE HYDROCHLORIDE 20 MG/ML
INJECTION, SOLUTION EPIDURAL; INFILTRATION; INTRACAUDAL; PERINEURAL PRN
Status: DISCONTINUED | OUTPATIENT
Start: 2020-01-01 | End: 2020-01-01 | Stop reason: HOSPADM

## 2020-01-01 RX ORDER — ROPINIROLE 0.5 MG/1
1.5 TABLET, FILM COATED ORAL
COMMUNITY
End: 2020-01-01 | Stop reason: SDUPTHER

## 2020-01-01 RX ORDER — DEXTROSE AND SODIUM CHLORIDE 5; .45 G/100ML; G/100ML
INJECTION, SOLUTION INTRAVENOUS CONTINUOUS
Status: DISCONTINUED | OUTPATIENT
Start: 2020-01-01 | End: 2020-01-01

## 2020-01-01 RX ORDER — ROPINIROLE 0.5 MG/1
1 TABLET, FILM COATED ORAL EVERY EVENING
Qty: 60 TAB | Refills: 11 | Status: SHIPPED | OUTPATIENT
Start: 2020-01-01 | End: 2020-01-01 | Stop reason: SDUPTHER

## 2020-01-01 RX ORDER — HYDROXYZINE HYDROCHLORIDE 25 MG/1
25 TABLET, FILM COATED ORAL
COMMUNITY
End: 2020-01-01 | Stop reason: SDUPTHER

## 2020-01-01 RX ORDER — ONDANSETRON 2 MG/ML
INJECTION INTRAMUSCULAR; INTRAVENOUS PRN
Status: DISCONTINUED | OUTPATIENT
Start: 2020-01-01 | End: 2020-01-01 | Stop reason: SURG

## 2020-01-01 RX ORDER — SPIRONOLACTONE 25 MG/1
25 TABLET ORAL DAILY
Qty: 30 TAB | Refills: 2 | Status: SHIPPED | OUTPATIENT
Start: 2020-01-01 | End: 2020-01-01 | Stop reason: SDUPTHER

## 2020-01-01 RX ORDER — CARVEDILOL 25 MG/1
25 TABLET ORAL EVERY EVENING
Status: ON HOLD | COMMUNITY
End: 2021-01-01

## 2020-01-01 RX ORDER — HYDROXYZINE HYDROCHLORIDE 25 MG/1
25 TABLET, FILM COATED ORAL
Qty: 90 TAB | Refills: 0 | Status: ON HOLD | OUTPATIENT
Start: 2020-01-01 | End: 2021-01-01

## 2020-01-01 RX ORDER — ROPINIROLE 0.5 MG/1
1.5 TABLET, FILM COATED ORAL
Qty: 90 TAB | Refills: 0 | Status: SHIPPED | OUTPATIENT
Start: 2020-01-01 | End: 2020-01-01

## 2020-01-01 RX ORDER — ROPINIROLE 0.5 MG/1
TABLET, FILM COATED ORAL
Qty: 180 TAB | Refills: 0 | Status: SHIPPED | OUTPATIENT
Start: 2020-01-01 | End: 2020-01-01

## 2020-01-01 RX ORDER — ROPINIROLE 0.5 MG/1
TABLET, FILM COATED ORAL
Qty: 90 TAB | Refills: 0 | Status: ON HOLD | OUTPATIENT
Start: 2020-01-01 | End: 2021-01-01

## 2020-01-01 RX ORDER — ROPINIROLE 0.5 MG/1
TABLET, FILM COATED ORAL
Qty: 120 TAB | Refills: 3 | Status: ON HOLD | OUTPATIENT
Start: 2020-01-01 | End: 2021-01-01

## 2020-01-01 RX ORDER — CEFOTETAN DISODIUM 2 G/20ML
INJECTION, POWDER, FOR SOLUTION INTRAMUSCULAR; INTRAVENOUS PRN
Status: DISCONTINUED | OUTPATIENT
Start: 2020-01-01 | End: 2020-01-01 | Stop reason: SURG

## 2020-01-01 RX ORDER — PHENYLEPHRINE HYDROCHLORIDE 10 MG/ML
INJECTION, SOLUTION INTRAMUSCULAR; INTRAVENOUS; SUBCUTANEOUS PRN
Status: DISCONTINUED | OUTPATIENT
Start: 2020-01-01 | End: 2020-01-01 | Stop reason: SURG

## 2020-01-01 RX ORDER — MORPHINE SULFATE 4 MG/ML
INJECTION, SOLUTION INTRAMUSCULAR; INTRAVENOUS
Status: COMPLETED
Start: 2020-01-01 | End: 2020-01-01

## 2020-01-01 RX ORDER — SODIUM CHLORIDE, SODIUM LACTATE, POTASSIUM CHLORIDE, CALCIUM CHLORIDE 600; 310; 30; 20 MG/100ML; MG/100ML; MG/100ML; MG/100ML
INJECTION, SOLUTION INTRAVENOUS
Status: DISCONTINUED | OUTPATIENT
Start: 2020-01-01 | End: 2020-01-01 | Stop reason: SURG

## 2020-01-01 RX ORDER — MORPHINE SULFATE 4 MG/ML
4 INJECTION, SOLUTION INTRAMUSCULAR; INTRAVENOUS ONCE
Status: COMPLETED | OUTPATIENT
Start: 2020-01-01 | End: 2020-01-01

## 2020-01-01 RX ORDER — ROPINIROLE 0.5 MG/1
1 TABLET, FILM COATED ORAL 3 TIMES DAILY
Qty: 90 TAB | Refills: 3 | Status: SHIPPED | OUTPATIENT
Start: 2020-01-01 | End: 2020-01-01

## 2020-01-01 RX ORDER — SACUBITRIL AND VALSARTAN 49; 51 MG/1; MG/1
1 TABLET, FILM COATED ORAL 2 TIMES DAILY
Qty: 60 TAB | Refills: 11 | Status: ON HOLD | OUTPATIENT
Start: 2020-01-01 | End: 2021-01-01

## 2020-01-01 RX ORDER — SPIRONOLACTONE 25 MG/1
25 TABLET ORAL
COMMUNITY
End: 2020-01-01 | Stop reason: SDUPTHER

## 2020-01-01 RX ORDER — BUPIVACAINE HYDROCHLORIDE 2.5 MG/ML
INJECTION, SOLUTION EPIDURAL; INFILTRATION; INTRACAUDAL
Status: DISCONTINUED | OUTPATIENT
Start: 2020-01-01 | End: 2020-01-01 | Stop reason: HOSPADM

## 2020-01-01 RX ORDER — CARVEDILOL 25 MG/1
25 TABLET ORAL 2 TIMES DAILY WITH MEALS
Qty: 180 TAB | Refills: 3 | Status: SHIPPED | OUTPATIENT
Start: 2020-01-01 | End: 2020-01-01

## 2020-01-01 RX ORDER — FUROSEMIDE 40 MG/1
TABLET ORAL
Qty: 90 TAB | Refills: 3 | Status: SHIPPED | OUTPATIENT
Start: 2020-01-01 | End: 2020-01-01

## 2020-01-01 RX ORDER — DEXMEDETOMIDINE HYDROCHLORIDE 100 UG/ML
INJECTION, SOLUTION INTRAVENOUS PRN
Status: DISCONTINUED | OUTPATIENT
Start: 2020-01-01 | End: 2020-01-01 | Stop reason: SURG

## 2020-01-01 RX ORDER — SPIRONOLACTONE 25 MG/1
25 TABLET ORAL
Qty: 30 TAB | Refills: 11 | Status: ON HOLD | OUTPATIENT
Start: 2020-01-01 | End: 2021-01-01

## 2020-01-01 RX ORDER — SODIUM CHLORIDE 9 MG/ML
INJECTION, SOLUTION INTRAVENOUS CONTINUOUS
Status: DISCONTINUED | OUTPATIENT
Start: 2020-01-01 | End: 2020-01-01 | Stop reason: HOSPADM

## 2020-01-01 RX ORDER — ONDANSETRON 2 MG/ML
4 INJECTION INTRAMUSCULAR; INTRAVENOUS ONCE
Status: COMPLETED | OUTPATIENT
Start: 2020-01-01 | End: 2020-01-01

## 2020-01-01 RX ORDER — HALOPERIDOL 5 MG/ML
1 INJECTION INTRAMUSCULAR
Status: DISCONTINUED | OUTPATIENT
Start: 2020-01-01 | End: 2020-01-01 | Stop reason: HOSPADM

## 2020-01-01 RX ORDER — SPIRONOLACTONE 25 MG/1
25 TABLET ORAL DAILY
Qty: 90 TAB | Refills: 3 | Status: SHIPPED | OUTPATIENT
Start: 2020-01-01 | End: 2020-01-01

## 2020-01-01 RX ORDER — FUROSEMIDE 40 MG/1
40 TABLET ORAL EVERY MORNING
Status: ON HOLD | COMMUNITY
End: 2021-01-01

## 2020-01-01 RX ORDER — HYDROCODONE BITARTRATE AND ACETAMINOPHEN 5; 325 MG/1; MG/1
1-2 TABLET ORAL EVERY 6 HOURS PRN
Status: DISCONTINUED | OUTPATIENT
Start: 2020-01-01 | End: 2020-01-01 | Stop reason: HOSPADM

## 2020-01-01 RX ORDER — ROPINIROLE 0.5 MG/1
TABLET, FILM COATED ORAL
Qty: 90 TAB | Refills: 0 | Status: SHIPPED | OUTPATIENT
Start: 2020-01-01 | End: 2020-01-01 | Stop reason: SDUPTHER

## 2020-01-01 RX ORDER — DEXAMETHASONE SODIUM PHOSPHATE 4 MG/ML
INJECTION, SOLUTION INTRA-ARTICULAR; INTRALESIONAL; INTRAMUSCULAR; INTRAVENOUS; SOFT TISSUE PRN
Status: DISCONTINUED | OUTPATIENT
Start: 2020-01-01 | End: 2020-01-01 | Stop reason: SURG

## 2020-01-01 RX ADMIN — MORPHINE SULFATE 4 MG: 4 INJECTION, SOLUTION INTRAMUSCULAR; INTRAVENOUS at 11:09

## 2020-01-01 RX ADMIN — FENTANYL CITRATE 100 MCG: 50 INJECTION INTRAMUSCULAR; INTRAVENOUS at 14:27

## 2020-01-01 RX ADMIN — METRONIDAZOLE 500 MG: 500 INJECTION, SOLUTION INTRAVENOUS at 12:05

## 2020-01-01 RX ADMIN — PHENYLEPHRINE HYDROCHLORIDE 200 MCG: 10 INJECTION INTRAVENOUS at 13:56

## 2020-01-01 RX ADMIN — LIDOCAINE HYDROCHLORIDE 100 MG: 20 INJECTION, SOLUTION EPIDURAL; INFILTRATION; INTRACAUDAL at 13:49

## 2020-01-01 RX ADMIN — SODIUM CHLORIDE, POTASSIUM CHLORIDE, SODIUM LACTATE AND CALCIUM CHLORIDE: 600; 310; 30; 20 INJECTION, SOLUTION INTRAVENOUS at 13:44

## 2020-01-01 RX ADMIN — ONDANSETRON 4 MG: 2 INJECTION INTRAMUSCULAR; INTRAVENOUS at 09:54

## 2020-01-01 RX ADMIN — DEXMEDETOMIDINE HYDROCHLORIDE 50 MCG: 100 INJECTION, SOLUTION INTRAVENOUS at 13:52

## 2020-01-01 RX ADMIN — HYDROCODONE BITARTRATE AND ACETAMINOPHEN 15 ML: 7.5; 325 SOLUTION ORAL at 14:44

## 2020-01-01 RX ADMIN — DEXAMETHASONE SODIUM PHOSPHATE 4 MG: 4 INJECTION, SOLUTION INTRA-ARTICULAR; INTRALESIONAL; INTRAMUSCULAR; INTRAVENOUS; SOFT TISSUE at 14:14

## 2020-01-01 RX ADMIN — ONDANSETRON 4 MG: 2 INJECTION INTRAMUSCULAR; INTRAVENOUS at 14:14

## 2020-01-01 RX ADMIN — MORPHINE SULFATE 4 MG: 4 INJECTION INTRAVENOUS at 09:54

## 2020-01-01 RX ADMIN — CEFOTETAN DISODIUM 2 G: 2 INJECTION, POWDER, FOR SOLUTION INTRAMUSCULAR; INTRAVENOUS at 13:49

## 2020-01-01 RX ADMIN — CEFTRIAXONE SODIUM 2 G: 2 INJECTION, POWDER, FOR SOLUTION INTRAMUSCULAR; INTRAVENOUS at 11:32

## 2020-01-01 RX ADMIN — SUGAMMADEX 200 MG: 100 INJECTION, SOLUTION INTRAVENOUS at 14:27

## 2020-01-01 RX ADMIN — MORPHINE SULFATE 4 MG: 4 INJECTION INTRAVENOUS at 11:09

## 2020-01-01 RX ADMIN — ROCURONIUM BROMIDE 50 MG: 10 INJECTION, SOLUTION INTRAVENOUS at 13:49

## 2020-01-01 RX ADMIN — PROPOFOL 300 MG: 10 INJECTION, EMULSION INTRAVENOUS at 13:49

## 2020-01-01 ASSESSMENT — ENCOUNTER SYMPTOMS
COUGH: 1
PND: 0
DIZZINESS: 0
PALPITATIONS: 0
SHORTNESS OF BREATH: 0
ORTHOPNEA: 0
HEARTBURN: 1
WEIGHT LOSS: 0
SPUTUM PRODUCTION: 0

## 2020-01-01 ASSESSMENT — LIFESTYLE VARIABLES
TOTAL SCORE: 0
EVER HAD A DRINK FIRST THING IN THE MORNING TO STEADY YOUR NERVES TO GET RID OF A HANGOVER: NO
DO YOU DRINK ALCOHOL: YES
HAVE PEOPLE ANNOYED YOU BY CRITICIZING YOUR DRINKING: NO
HAVE YOU EVER FELT YOU SHOULD CUT DOWN ON YOUR DRINKING: NO
CONSUMPTION TOTAL: INCOMPLETE
EVER FELT BAD OR GUILTY ABOUT YOUR DRINKING: NO

## 2020-01-01 ASSESSMENT — FIBROSIS 4 INDEX: FIB4 SCORE: .8666666666666666667

## 2020-02-21 ENCOUNTER — OFFICE VISIT (OUTPATIENT)
Dept: MEDICAL GROUP | Age: 55
End: 2020-02-21
Payer: COMMERCIAL

## 2020-02-21 VITALS
OXYGEN SATURATION: 97 % | TEMPERATURE: 98.2 F | HEART RATE: 75 BPM | WEIGHT: 242.6 LBS | BODY MASS INDEX: 36.77 KG/M2 | DIASTOLIC BLOOD PRESSURE: 80 MMHG | HEIGHT: 68 IN | SYSTOLIC BLOOD PRESSURE: 152 MMHG

## 2020-02-21 DIAGNOSIS — F41.9 ANXIETY: ICD-10-CM

## 2020-02-21 DIAGNOSIS — I50.22 STAGE C CHRONIC SYSTOLIC CONGESTIVE HEART FAILURE (HCC): ICD-10-CM

## 2020-02-21 DIAGNOSIS — Z00.00 ANNUAL PHYSICAL EXAM: ICD-10-CM

## 2020-02-21 DIAGNOSIS — I48.0 PAROXYSMAL ATRIAL FIBRILLATION (HCC): ICD-10-CM

## 2020-02-21 DIAGNOSIS — G25.81 RESTLESS LEG SYNDROME: ICD-10-CM

## 2020-02-21 PROCEDURE — 99396 PREV VISIT EST AGE 40-64: CPT | Performed by: FAMILY MEDICINE

## 2020-02-21 RX ORDER — HYDROXYZINE HYDROCHLORIDE 25 MG/1
25 TABLET, FILM COATED ORAL 3 TIMES DAILY PRN
Qty: 90 TAB | Refills: 0 | Status: SHIPPED | OUTPATIENT
Start: 2020-02-21 | End: 2020-01-01

## 2020-02-21 ASSESSMENT — PATIENT HEALTH QUESTIONNAIRE - PHQ9: CLINICAL INTERPRETATION OF PHQ2 SCORE: 0

## 2020-02-21 NOTE — PROGRESS NOTES
This medical record contains text that has been entered with the assistance of computer voice recognition and dictation software. Therefore, it may contain unintended errors in text, spelling, punctuation or grammar.    Chief Complaint   Patient presents with   • Annual Exam   • Requesting Labs       HPI:   Shaka Riley is a 54 y.o. male here evaluation and management of:     Annual physical exam    Today, the patient denied chest pain, shortness of breath, abdominal pain, bowel changes, hematochezia, melena or urinary symptoms.     Past medical history--- Patient has a history of hypertension, paroxysmal atrial fibrillation, elevated blood sugar, obesity, hypercholesterolemia, restless leg syndrome, congestive heart failure, nonischemic cardiomyopathy and Crohn's disease.    Family History of Cancer--- Prior history of cancer in mother and father.    Family History of CAD--- History of cardiac disease in paternal grandmother and grandfather.      Social History  Occupation---- Patient is employed at a [employer] group.    Exercise--- Patient denies regular exercise.    Colonoscopy--- Colonoscopy completed on 05/09/18 by Dr. Isacc [facility], was normal.    Paroxysmal atrial fibrillation (HCC)  Stage C chronic systolic congestive heart failure (HCC)  Chronic history    Patient is followed by Dr. Le, Cardiology, for a chronic history of paroxysmal atrial fibrillation and congestive heart failure. Patient is taking Entresto 49--51 mg twice daily, Coreg 25 mg twice daily and Spironolactone 25 mg once daily. No medication side effects were reported and denies any acute cardiac symptoms.    Restless leg syndrome  Chronic history    Patient has a history of chronic restless leg syndrome which is treated with Requip 0.5 mg twice daily. The medications are no longer effective as he is experiencing recurrent movement of his lower extremities throughout the day and night. His symptoms are  causing difficulty sleeping.     Anxiety  New diagnosis    The patient complains of increased anxiety which is causing difficulty sleeping. Currently, he is not treating his symptoms with any medications but would like to start medications. Negative for suicidal ideations or depression.    Current medicines (including changes today)  Current Outpatient Medications   Medication Sig Dispense Refill   • hydrOXYzine HCl (ATARAX) 25 MG Tab Take 1 Tab by mouth 3 times a day as needed for Itching. 90 Tab 0   • furosemide (LASIX) 40 MG Tab TAKE ONE TABLET BY MOUTH DAILY 30 Tab 0   • ROPINIRole (REQUIP) 0.5 MG Tab TAKE 2 TABLETS BY MOUTH IN THE EVENING 60 Tab 11   • sacubitril-valsartan (ENTRESTO) 49-51 MG Tab tablet Take 1 Tab by mouth 2 Times a Day. 60 Tab 11   • spironolactone (ALDACTONE) 25 MG Tab Take 1 Tab by mouth every day. 90 Tab 3   • carvedilol (COREG) 25 MG Tab Take 1 Tab by mouth 2 times a day, with meals. 180 Tab 3   • Methocarbamol (ROBAXIN-750 PO) Take 750 mg by mouth 2 Times a Day.     • GABAPENTIN PO Take  by mouth 2 Times a Day. Unsure of dose       No current facility-administered medications for this visit.      He  has a past medical history of Clotting disorder ([facility]), Congestive heart failure (HCC) (7/2015), Crohn disease (HCC), Hypertension, PAF (paroxysmal atrial fibrillation) ([facility]), and Sleep apnea.  He  has no past surgical history on file.  Social History     Tobacco Use   • Smoking status: Never Smoker   • Smokeless tobacco: Never Used   Substance Use Topics   • Alcohol use: Yes     Alcohol/week: 1.2 oz     Types: 2 Glasses of wine per week     Comment: Quit in 1994 - previous six beer after work, 2-3 beers per day currently   • Drug use: No     Types: Methamphetamines     Comment: Quit in 2012, used for 5 years     Social History     Social History Narrative   • Not on file     Family History   Problem Relation Age of Onset   • Cancer Mother    • Heart Disease Paternal Grandmother    • Heart  "Disease Paternal Grandfather    • Cancer Father         paralysis from accident     Family Status   Relation Name Status   • Mo   at age 20s        Cancer   • PGMo     • PGFa     • Fa  Alive   • Bro   at age 40s        complications from DM       ROS    Please see HPI for further details.     All other systems reviewed and are negative.     Objective:     /80 (BP Location: Left arm, Patient Position: Sitting, BP Cuff Size: Adult)   Pulse 75   Temp 36.8 °C (98.2 °F) (Temporal)   Ht 1.727 m (5' 8\")   Wt 110 kg (242 lb 9.6 oz)   SpO2 97%  Body mass index is 36.89 kg/m².    Physical Exam:    Constitutional: Alert, no distress.  Skin: Warm, dry, good turgor, no rashes in visible areas.  Eye: Equal, round and reactive, conjunctiva clear, lids normal.  ENMT: Lips without lesions, good dentition, oropharynx clear.  Neck: Trachea midline, no masses, no thyromegaly. No cervical or supraclavicular lymphadenopathy.  Respiratory: Unlabored respiratory effort, lungs clear to auscultation, no wheezes, no ronchi.  Cardiovascular: Normal S1, S2, no murmur, no edema.  Psych: Alert and oriented x3, normal affect and mood.      Assessment and Plan:   The following treatment plan was discussed:    1. Annual physical exam    This is a 54 year old male here today for a preventative exam.  Previous medical history, healthcare maintenance and immunization status reviewed.  Patient is up to date. Annual labwork ordered. See discussion of anticipatory guidance below.     Care has been established  We new baseline labs to establish a clinical profile  We will then consider daily prophylactic aspirin   In order to weigh  the benefits vs risk of GI bleed    Age-appropriate preventive services recommended by USPTF guidelines and ACIP were discussed today.  These services included influenza vaccination Shingrix vaccination updated labs including hep C screening.    The USPSTF recommends initiating " low-dose aspirin use for the primary prevention of cardiovascular disease (CVD) and colorectal cancer (CRC) in adults aged 50 to 59 years who have a 10% or greater 10-year CVD risk, are not at increased risk for bleeding, have a life expectancy of at least 10 years, and are willing to take low-dose aspirin daily for at least 10 years.    Requested any outside Medical records to be sent to us  Denies intimate partner viloence  Discussed seat belt safety    - Comp Metabolic Panel; Future  - CBC WITHOUT DIFFERENTIAL; Future  - Lipid Profile; Future  - PROSTATE SPECIFIC AG DIAGNOSTIC; Future    2. Paroxysmal atrial fibrillation (HCC)    Chronic in nature and stable. Followed by Dr. Le, Cardiology. Plan to continue current medication regimen of Entresto 49--51 mg twice daily, Coreg 25 mg twice daily and Spironolactone 25 mg once daily. No changes today.    3. Stage C chronic systolic congestive heart failure (HCC)    Chronic in nature and stable. Followed by Dr. Le, Cardiology. Plan to continue current medication regimen of Entresto 49--51 mg twice daily, Coreg 25 mg twice daily and Spironolactone 25 mg once daily. No changes today.    4. Restless leg syndrome    Chronic in nature. Poorly controlled with current medication regimen of Requip 0.5 mg twice daily. Plan to continue Requip and will be started on Atarax 25 mg three times daily. Medication side effects, limitations and benefits were discussed with the patient.    5. Anxiety    New diagnosis. Patient was prescribed Atarax 25 mg three times daily. Medication side effects, limitations and benefits were discussed with the patient. No acute suicidal ideations. Patient agrees to seek immediate medical attention for worsening anxiety, depression or suicidal thoughts.    - hydrOXYzine HCl (ATARAX) 25 MG Tab; Take 1 Tab by mouth 3 times a day as needed for Itching.  Dispense: 90 Tab; Refill: 0       HEALTH MAINTENANCE: Due for hepatitis C screening, zoster and  influenza vaccines.    Instructed to follow up in clinic or ER for worsening symptoms, difficulty breathing, lack of expected recovery or should new symptoms or problems arise.    -- Follow up in six months with routine labs. Instructed to follow up in clinic or ER for worsening symptoms, difficulty breathing, lack of expected recovery or should new symptoms or problems arise.      Once again this medical record contains text that has been entered with the assistance of computer voice recognition, dictation software and medical scribes. Therefore, it may contain unintended errors in text, spelling, punctuation or grammar.    IElizabeth (Scribe), am scribing for, and in the presence of, Quinten Howe M.D.    Electronically signed by: Elizabeth Vásquez (Scribe), 2/21/2020     Quinten ZENG M.D. personally performed the services described in this documentation, as scribed by Elizabeth Vásquez in my presence, and it is both accurate and complete.

## 2020-04-09 NOTE — TELEPHONE ENCOUNTER
Received request via: Patient    Was the patient seen in the last year in this department? Yes    Does the patient have an active prescription (recently filled or refills available) for medication(s) requested? No       PT HAS BEEN TAKING 3 A DAY AND STATES THIS IS WHAT YOU ADVISED HIM TO DO. CAN YOU PLEASE CONFIRM THIS RX AND FIX THE SIG

## 2020-07-07 NOTE — PROGRESS NOTES
Chief Complaint   Patient presents with   • Premature Ventricular Contractions (PVCs)   • Atrial Fibrillation   • MI (Non ST Segment Elevation MI)       Subjective:   Shaka Riley is a 54 y.o. male who presents today for follow up.     Patient of Dr. Le.  Chronic medical problems include heart failure with reduced ejection fraction, 25 to 30%, nonischemic cardiomyopathy, etiology is not clear, diagnosed in 2014 with recovery in EF. He also has a history of PE (?provoked from hospitalization) and atrial fibrillation at the time of acute PE diagnosis, without recurrence and not on OAC.      Last seen Sept 2019, no med changes were made, reported NYHA class I symptoms.     Since stopping the gabapentin his breathing has been much better.  He is active at his job walking around job sites all day and can walk up a flight of stairs without dyspnea or chest pressure.    No orthopnea, PND, leg swelling, weight gain.  Blood pressures are elevated in the morning but after taking his medications very low sometimes systolic in the 100s.    No snoring or apneic episodes per his wife.    He is interested in weight loss.     Past Medical History:   Diagnosis Date   • Clotting disorder (HCC)     PE   • Congestive heart failure (HCC) 7/2015    EF 30-35%; Dilated R atrium; LVH; Mild MR; Mild AI; Mild TR   • Crohn disease ([facility])    • Hypertension    • PAF (paroxysmal atrial fibrillation) ([facility])    • Sleep apnea     un-diagnosed and pending sleep study     History reviewed. No pertinent surgical history.  Family History   Problem Relation Age of Onset   • Cancer Mother    • Heart Disease Paternal Grandmother    • Heart Disease Paternal Grandfather    • Cancer Father         paralysis from accident     Social History     Socioeconomic History   • Marital status:      Spouse name: Not on file   • Number of children: Not on file   • Years of education: Not on file   • Highest education level: Not on file   Occupational  History   • Not on file   Social Needs   • Financial resource strain: Not on file   • Food insecurity     Worry: Not on file     Inability: Not on file   • Transportation needs     Medical: Not on file     Non-medical: Not on file   Tobacco Use   • Smoking status: Never Smoker   • Smokeless tobacco: Never Used   Substance and Sexual Activity   • Alcohol use: Yes     Alcohol/week: 1.2 oz     Types: 2 Glasses of wine per week     Comment: Quit in 1994 - previous six beer after work, 2-3 beers per day currently   • Drug use: No     Types: Methamphetamines     Comment: Quit in 2012, used for 5 years   • Sexual activity: Yes     Partners: Female   Lifestyle   • Physical activity     Days per week: Not on file     Minutes per session: Not on file   • Stress: Not on file   Relationships   • Social connections     Talks on phone: Not on file     Gets together: Not on file     Attends Hinduism service: Not on file     Active member of club or organization: Not on file     Attends meetings of clubs or organizations: Not on file     Relationship status: Not on file   • Intimate partner violence     Fear of current or ex partner: Not on file     Emotionally abused: Not on file     Physically abused: Not on file     Forced sexual activity: Not on file   Other Topics Concern   • Not on file   Social History Narrative   • Not on file     No Known Allergies  Outpatient Encounter Medications as of 7/9/2020   Medication Sig Dispense Refill   • sacubitril-valsartan (ENTRESTO) 49-51 MG Tab tablet Take 1 Tab by mouth 2 Times a Day. 60 Tab 11   • furosemide (LASIX) 40 MG Tab Take 1 tablet by mouth once daily 90 Tab 3   • carvedilol (COREG) 25 MG Tab Take 1 Tab by mouth 2 times a day, with meals. 180 Tab 3   • spironolactone (ALDACTONE) 25 MG Tab Take 1 Tab by mouth every day. 90 Tab 3   • ROPINIRole (REQUIP) 0.5 MG Tab TAKE 2 TABLETS BY MOUTH THREE TIMES DAILY 180 Tab 0   • hydrOXYzine HCl (ATARAX) 25 MG Tab TAKE 1 TABLET BY MOUTH THREE  "TIMES DAILY AS NEEDED FOR ITCHING 90 Tab 3   • [DISCONTINUED] spironolactone (ALDACTONE) 25 MG Tab Take 1 Tab by mouth every day. Needs to be seen for further refills. Thank you 30 Tab 2   • [DISCONTINUED] furosemide (LASIX) 40 MG Tab Take 1 tablet by mouth once daily 90 Tab 0   • [DISCONTINUED] sacubitril-valsartan (ENTRESTO) 49-51 MG Tab tablet Take 1 Tab by mouth 2 Times a Day. 60 Tab 11   • [DISCONTINUED] carvedilol (COREG) 25 MG Tab Take 1 Tab by mouth 2 times a day, with meals. 180 Tab 3   • [DISCONTINUED] Methocarbamol (ROBAXIN-750 PO) Take 750 mg by mouth 2 Times a Day.     • [DISCONTINUED] GABAPENTIN PO Take  by mouth 2 Times a Day. Unsure of dose       No facility-administered encounter medications on file as of 7/9/2020.      Review of Systems   Constitutional: Negative for malaise/fatigue and weight loss.   Respiratory: Positive for cough (Dry cough). Negative for sputum production and shortness of breath.    Cardiovascular: Negative for chest pain, palpitations, orthopnea, leg swelling and PND.   Gastrointestinal: Positive for heartburn.   Genitourinary: Positive for frequency (With diuretics).   Neurological: Negative for dizziness.        Objective:   /68 (BP Location: Left arm, Patient Position: Sitting, BP Cuff Size: Adult)   Pulse 80   Ht 1.727 m (5' 8\")   Wt 110.2 kg (243 lb)   SpO2 94%   BMI 36.95 kg/m²     Physical Exam   Constitutional: He is oriented to person, place, and time. He appears well-developed and well-nourished. No distress.   Obese male, BMI 36   HENT:   Head: Normocephalic and atraumatic.   Eyes: Conjunctivae are normal. No scleral icterus.   Neck: No JVD present.   Large neck circumference   Cardiovascular: Normal rate, regular rhythm and intact distal pulses.   No murmur heard.  Radial pulses 2+ bilaterally  PT pulses 2+ bilaterally     Pulmonary/Chest: Effort normal and breath sounds normal. No respiratory distress. He has no rales.   Rhonchi in upper lung fields " bilaterally   Abdominal: Soft. He exhibits no distension.   Central adiposity   Musculoskeletal:         General: No edema.   Neurological: He is alert and oriented to person, place, and time.   Skin: Skin is warm and dry. He is not diaphoretic.   Psychiatric: Judgment normal.   Vitals reviewed.      Cardiac PET 2/6/17  CONCLUSIONS AND IMPRESSIONS:  At baseline, EKG shows evidence of sinus rhythm.    As stress, there is no evidence of coronary ischemia seen on EKG tracing.    In terms of myocardial PET scan stress test images, there is no evidence of   coronary ischemia seen.  Overall, this is a negative myocardial PET scan   stress test for coronary ischemia.    TTE 9/5/18  Normal TTE  Normal left ventricular size and systolic function. Moderate concentric   left ventricular hypertrophy. Normal left ventricular systolic   function. Normal regional wall motion. Left ventricular ejection   fraction is visually estimated to be 65%. Normal diastolic function.    Normal right ventricular size and systolic function.    Normal right atrial size. Normal inferior vena cava size and   inspiratory collapse.    Normal left atrial size.    Structurally normal mitral valve. Mild mitral regurgitation. No mitral   stenosis.    Structurally normal aortic valve without significant stenosis or   regurgitation.    Structurally normal tricuspid valve. Right atrial pressure is estimated   to be 3 mmHg. Estimated right ventricular systolic pressure  is 35   mmHg.    Structurally normal pulmonic valve. Trace pulmonic insufficiency. No   pulmonic stenosis.    Normal pericardium without effusion.    The aortic root is normal.    Assessment:     1. High risk medication use  Basic Metabolic Panel    MAGNESIUM   2. Screening for hyperlipidemia  LIPID PANEL   3. Stage C chronic systolic congestive heart failure (HCC)     4. Nonischemic cardiomyopathy (HCC)  sacubitril-valsartan (ENTRESTO) 49-51 MG Tab tablet   5. Heart failure, NYHA class 1  (HCC)     6. Essential hypertension, benign  furosemide (LASIX) 40 MG Tab   7. Obesity (BMI 30-39.9)         Medical Decision Making:  Today's Assessment / Status / Plan:   Stage C HFrEF, now normalized LV systolic function, NYHA class I  Nonischemic cardiomyopathy   -refilled Entresto 49-51 twice daily  -refilled Coreg 25 mg twice daily  -refilled spironolactone 25 mg daily  - refilled lasix 40mg daily  - check BMP and Mag    Hypertension, controlled  -Labs as above    Primary prevention  Obesity  -check lipid panel to assess ASCVD risk   - weight loss counseling, goal to lose 8lbs  - denies symptoms of ANGELITA, previously referred for sleep study but this is too expensive    History of Atrial fibrillation in the setting of acute PE  - denies palpitations, rapid heart rates    Plan: labs, RTC in 6mo, sooner if problems  Collaborating MD: Dr. Le.

## 2020-07-29 PROBLEM — K81.0 ACUTE CHOLECYSTITIS: Status: ACTIVE | Noted: 2020-01-01

## 2020-07-29 NOTE — ANESTHESIA PREPROCEDURE EVALUATION
Relevant Problems   PULMONARY   (+) SOB (shortness of breath)      CARDIAC   (+) HTN (hypertension)   (+) Paroxysmal atrial fibrillation (HCC)   (+) Stage C chronic systolic congestive heart failure (HCC)       Physical Exam    Airway   Mallampati: III  TM distance: <3 FB  Neck ROM: full       Cardiovascular   Rhythm: regular  Rate: normal     Dental    Pulmonary   Breath sounds clear to auscultation     Abdominal    Neurological - normal exam                 Anesthesia Plan    ASA 2       Plan - general       Airway plan will be ETT        Induction: intravenous      Pertinent diagnostic labs and testing reviewed    Informed Consent:    Anesthetic plan and risks discussed with patient.

## 2020-07-29 NOTE — DISCHARGE INSTRUCTIONS
ACTIVITY: Rest and take it easy for the first 24 hours.  A responsible adult is recommended to remain with you during that time.  It is normal to feel sleepy.  We encourage you to not do anything that requires balance, judgment or coordination.    MILD FLU-LIKE SYMPTOMS ARE NORMAL. YOU MAY EXPERIENCE GENERALIZED MUSCLE ACHES, THROAT IRRITATION, HEADACHE AND/OR SOME NAUSEA.    FOR 24 HOURS DO NOT:  Drive, operate machinery or run household appliances.  Drink beer or alcoholic beverages.   Make important decisions or sign legal documents.    SPECIAL INSTRUCTIONS:   Laparoscopic Cholecystectomy D/C instructions:    DIET: Upon discharge from the hospital you may resume your normal preoperative diet. Depending on how you are feeling and whether you have nausea or not, you may wish to stay with a bland diet for the first few days. However, you can advance this as quickly as you feel ready.    ACTIVITIES: After discharge from the hospital, you may resume full routine activities. However, there should be no heavy lifting (greater than 15 pounds) and no strenuous activities until after your follow-up visit. Otherwise, routine activities of daily living are acceptable.    DRIVING: You may drive whenever you are off pain medications and are able to perform the activities needed to drive, i.e. turning, bending, twisting, etc.    BATHING: You may get the wound wet at any time after leaving the hospital. You may shower, but do not submerge in a bath for at least a week. Dressings may come off after 48 hours.    BOWEL FUNCTION: A few patients, after this operation, will develop either frequent or loose stools after meals. This usually corrects itself after a few days, to a few weeks. If this occurs, do not worry; it is not unusual and will resolve. Much more common than loose stools, is constipation. The combination of pain medication and decreased activity level can cause constipation in otherwise normal patients. If you feel  this is occurring, take a laxative (Milk of Magnesia, Ex-Lax, Senokot, etc.) until the problem has resolved.    PAIN MEDICATION: You will be given a prescription for pain medication at discharge. Please take these as directed. It is important to remember not to take medications on an empty stomach as this may cause nausea.    CALL IF YOU HAVE: (1) Fevers to more than 101 F, (2) Unusual chest or leg pain, (3) Drainage or fluid from incision that may be foul smelling, increased tenderness or soreness at the wound or the wound edges are no longer together, redness or swelling at the incision site. Please do not hesitate to call with any other questions.     APPOINTMENT: Contact our office at [phone] for a follow-up appointment in 1 to 2 weeks following your procedure.    If you have any additional questions, please do not hesitate to call the office.     Office address:  [address] [place] [address], [address] [place] [address]    DIET: To avoid nausea, slowly advance diet as tolerated, avoiding spicy or greasy foods for the first day.  Add more substantial food to your diet according to your physician's instructions.  Babies can be fed formula or breast milk as soon as they are hungry.  INCREASE FLUIDS AND FIBER TO AVOID CONSTIPATION.    SURGICAL DRESSING/BATHING: See above instructions.    FOLLOW-UP APPOINTMENT:  A follow-up appointment should be arranged with your doctor; call to schedule.    You should CALL YOUR PHYSICIAN if you develop:  Fever greater than 101 degrees F.  Pain not relieved by medication, or persistent nausea or vomiting.  Excessive bleeding (blood soaking through dressing) or unexpected drainage from the wound.  Extreme redness or swelling around the incision site, drainage of pus or foul smelling drainage.  Inability to urinate or empty your bladder within 8 hours.  Problems with breathing or chest pain.    You should call 911 if you develop problems with breathing or chest pain.  If you are unable to  contact your doctor or surgical center, you should go to the nearest emergency room or urgent care center.  Physician's telephone #: Dr. Skelton [phone].    If any questions arise, call your doctor.  If your doctor is not available, please feel free to call the Surgical Center at [phone].  The Center is open Monday through Friday from 7AM to 7PM.  You can also call the HEALTH HOTLINE open 24 hours/day, 7 days/week and speak to a nurse at [phone], or toll free at [phone].    A registered nurse may call you a few days after your surgery to see how you are doing after your procedure.    MEDICATIONS: Resume taking daily medication.  Take prescribed pain medication with food.  If no medication is prescribed, you may take non-aspirin pain medication if needed.  PAIN MEDICATION CAN BE VERY CONSTIPATING.  Take a stool softener or laxative such as senokot, pericolace, or milk of magnesia if needed.    Prescription given for Norco.  Last pain medication given at 2:44pm.    If your physician has prescribed pain medication that includes Acetaminophen (Tylenol), do not take additional Acetaminophen (Tylenol) while taking the prescribed medication.    Depression / Suicide Risk    As you are discharged from this [facility] facility, it is important to learn how to keep safe from harming yourself.    Recognize the warning signs:  · Abrupt changes in personality, positive or negative- including increase in energy   · Giving away possessions  · Change in eating patterns- significant weight changes-  positive or negative  · Change in sleeping patterns- unable to sleep or sleeping all the time   · Unwillingness or inability to communicate  · Depression  · Unusual sadness, discouragement and loneliness  · Talk of wanting to die  · Neglect of personal appearance   · Rebelliousness- reckless behavior  · Withdrawal from people/activities they love  · Confusion- inability to concentrate     If you or a loved one  observes any of these behaviors or has concerns about self-harm, here's what you can do:  · Talk about it- your feelings and reasons for harming yourself  · Remove any means that you might use to hurt yourself (examples: pills, rope, extension cords, firearm)  · Get professional help from the community (Mental Health, Substance Abuse, psychological counseling)  · Do not be alone:Call your Safe Contact- someone whom you trust who will be there for you.  · Call your local CRISIS HOTLINE 653-7155 or 195-840-5623  · Call your local Children's Mobile Crisis Response Team [place] (937) 011-9813 or www.IntroMaps  · Call the toll free National Suicide Prevention Hotlines   · National Suicide Prevention Lifeline 309-949-CTMI (8782)  · National Hope Line Network 800-SUICIDE (715-1003)

## 2020-07-29 NOTE — ED NOTES
Patient arrives with cc of epigastric abdominal pain x 3 days with nausea and vomiting. Pt took pepto bismol and his wifes tramadol without any relief from symptoms. Pt also reporting increased abdominal distention and firmness, reports normal BMs and denies  complaints.

## 2020-07-29 NOTE — ED TRIAGE NOTES
Chief Complaint   Patient presents with   • Abdominal Pain     upper abdominal pain onset Sunday   • N/V   pt reports constant upper abd pain, he is in obvious discomfort in triage.

## 2020-07-29 NOTE — ED NOTES
Pt updated on poc and admit plans. PIV remains CDI and patent.   Room checked and all pt belongings transported with pt. Pt to pre-op now with transport via gurney.

## 2020-07-29 NOTE — ED NOTES
Pt swabbed for covid and sent to lab using rapid test. Pt reports pain improved, 4/10 and he is comfortable.

## 2020-07-29 NOTE — OR NURSING
Assumed care of patient from PACU, see flow sheets for vital signs. Patient alert and oriented times four. Assessment completed. Surgical incision assessed, dressing clean, dry and intact. Pain is controlled and tolerable for patient. Patient updated of plan of care for discharge. Family at bedside with patient. Discharge instructions reviewed and all questions answered. All needs met, patient dressed and safe to discharge home.   Patient tolerating fluids and food with no nausea.    1615: Patient ready to go, last vital signs in flow sheet. PIV removed. Patient taken to car in wheelchair. Patient safe to discharge.

## 2020-07-29 NOTE — OP REPORT
DATE OF SERVICE:  07/29/2020    PREOPERATIVE DIAGNOSIS:  Acute cholecystitis with cholelithiasis.    POSTOPERATIVE DIAGNOSIS:  Acute cholecystitis with cholelithiasis.    PROCEDURE:  Laparoscopic cholecystectomy.    SURGEON:  Caroline Skelton MD.    ASSISTANT:  SUSY Weiss.    ANESTHESIA:  General endotracheal.    ANESTHESIOLOGIST:  Horacio Nur MD.    INDICATIONS:  The patient is a 55-year-old gentleman who presented to the   emergency room with a 3-day history of right upper quadrant epigastric   abdominal pain and his workup found him to have a mildly elevated white count   and ultrasound showing cholecystitis, but his liver functions are normal.  He   is being brought at this time for laparoscopic cholecystectomy.    FINDINGS:  Acute cholecystitis with cholelithiasis and there is a small area   of necrosis on the fundus of the gallbladder; single duct, single artery   identified.    DESCRIPTION OF PROCEDURE:  After the patient was identified and consented, he   was brought to the operating room and placed in supine position.  The patient   underwent general endotracheal anesthetic clearance.  The patient's abdomen   was prepped and draped in sterile fashion.  Periumbilical area was   anesthetized with 0.25% Marcaine and 1 cm incision was made.  The abdominal   wall was lifted up and Veress needle inserted into the abdominal cavity.    After positive drop test, pneumoperitoneum was obtained.  The Veress needle   was removed.  A 5 mm trocar was placed.  Under laparoscopic guidance, a 10 mm   trocar was placed in epigastric position and two 5 mm trocars were placed in   right subcostal position.  The gallbladder required needle decompression with   needle cystostomy, which was performed and the gallbladder was lifted up.  The   duct, artery, and surrounding tissues were doubly clipped and transected.    The gallbladder was excised from the liver bed using electrocautery.  It was   entered in the process  of excision.  It was brought out through the epigastric   port through an EndoCatch and liver bed was irrigated and hemostasis secured.    The port sites were visualized.  The epigastric site was closed in 0 Vicryl   for the fascia.  All skin incisions closed with 4-0 Vicryls.  Op-Site dressing   placed on the wounds.  The patient was extubated and taken to recovery in   stable condition.  All sponge and needle counts were correct.       ____________________________________     MELLO CHARLES MD    [facility] / \[facility]\""    DD:  07/29/2020 14:21:18  DT:  07/29/2020 16:57:14    D#:  [id number]  Job#:  [id number]    cc: JOSR GREENE MD

## 2020-07-29 NOTE — ED PROVIDER NOTES
ED Provider Note    CHIEF COMPLAINT  Chief Complaint   Patient presents with   • Abdominal Pain     upper abdominal pain onset Sunday   • N/V       HPI  Shaka Riley is a 55 y.o. male who presents history of CHF, Crohn's disease, hypertension, paroxysmal A. fib, sleep apnea, the patient reports he has severe epigastric pain that radiates to his back for the last 3 days.  The pain is worse after eating.  He reports never having pain like this before.  He had a normal bowel movement yesterday, he denies diarrhea.  He denies fever cough.  He reports he drinks about 2 alcoholic drinks per day.    REVIEW OF SYSTEMS  See HPI for further details. All other systems are negative.     PAST MEDICAL HISTORY   has a past medical history of Clotting disorder (HCC), Congestive heart failure (HCC) (7/2015), Crohn disease (HCC), Hypertension, PAF (paroxysmal atrial fibrillation) ([facility]), and Sleep apnea.    SOCIAL HISTORY  Social History     Tobacco Use   • Smoking status: Never Smoker   • Smokeless tobacco: Never Used   Substance and Sexual Activity   • Alcohol use: Yes     Alcohol/week: 1.2 oz     Types: 2 Glasses of wine per week     Comment: Quit in 1994 - previous six beer after work, 2-3 beers per day currently   • Drug use: No     Types: Methamphetamines     Comment: Quit in 2012, used for 5 years   • Sexual activity: Yes     Partners: Female       SURGICAL HISTORY  patient denies any surgical history    CURRENT MEDICATIONS  Home Medications     Reviewed by Barbara Rogers (Pharmacy Tech) on 07/29/20 at 1138  Med List Status: Complete   Medication Last Dose Status   carvedilol (COREG) 25 MG Tab 7/28/2020 Active   furosemide (LASIX) 40 MG Tab 7/28/2020 Active   hydrOXYzine HCl (ATARAX) 25 MG Tab 7/28/2020 Active   ROPINIRole (REQUIP) 0.5 MG Tab 7/28/2020 Active   sacubitril-valsartan (ENTRESTO) 49-51 MG Tab tablet 7/28/2020 Active   spironolactone (ALDACTONE) 25 MG Tab 7/28/2020 Active                ALLERGIES  No  "Known Allergies    PHYSICAL EXAM  VITAL SIGNS: /78   Pulse 93   Temp 36.4 °C (97.5 °F) (Temporal)   Resp 16   Ht 1.753 m (5' 9\")   Wt 107.5 kg (236 lb 15.9 oz)   SpO2 93%   BMI 35.00 kg/m²  @RONY[[id number]::@   Pulse ox interpretation: I interpret this pulse ox as normal.  Constitutional: Alert.  HENT: No signs of trauma, Bilateral external ears normal, Nose normal.   Eyes: Pupils are equal and reactive, Conjunctiva normal, Non-icteric.   Neck: Normal range of motion, No tenderness, Supple, No stridor.   Lymphatic: No lymphadenopathy noted.   Cardiovascular: Regular rate and rhythm, no murmurs.   Thorax & Lungs: Normal breath sounds, No respiratory distress, No wheezing, No chest tenderness.   Abdomen: Bowel sounds normal, Soft, right upper quadrant tenderness, No masses, No pulsatile masses. No peritoneal signs.  Skin: Warm, Dry, No erythema, No rash.   Back: No bony tenderness, No CVA tenderness.   Extremities: Intact distal pulses, No edema, No tenderness, No cyanosis.  Musculoskeletal: Good range of motion in all major joints. No tenderness to palpation or major deformities noted.   Neurologic: Alert , Normal motor function, Normal sensory function, No focal deficits noted.   Psychiatric: Affect normal, Judgment normal, Mood normal.       DIAGNOSTIC STUDIES / PROCEDURES    EKG  This is a 12-lead EKG interpretation by myself.  It is normal sinus rhythm at a rate of 88.  The axis is LAD -50 degrees.  The intervals are normal.  There is no ST elevation or depression.  There is poor R wave progression anteriorly.  My impression of this EKG, it does not meet STEMI criteria at this time.    LABS  Labs Reviewed   CBC WITH DIFFERENTIAL - Abnormal; Notable for the following components:       Result Value    WBC 14.5 (*)     Neutrophils-Polys 83.10 (*)     Lymphocytes 7.80 (*)     Neutrophils (Absolute) 12.02 (*)     Monos (Absolute) 0.94 (*)     All other components within normal limits   COMP METABOLIC PANEL - " Abnormal; Notable for the following components:    Sodium 130 (*)     Chloride 93 (*)     Glucose 111 (*)     Bun 7 (*)     Globulin 3.7 (*)     All other components within normal limits   URINALYSIS,CULTURE IF INDICATED - Abnormal; Notable for the following components:    Ketones 15 (*)     All other components within normal limits   LIPASE   ESTIMATED GFR   APTT    Narrative:     Indicate which anticoagulants the patient is on:->NONE   COVID/SARS COV-2   PROTHROMBIN TIME    Narrative:     Indicate which anticoagulants the patient is on:->NONE         RADIOLOGY  US-RUQ   Final Result      Cholelithiasis and findings suggesting acute cholecystitis.      DX-CHEST-PORTABLE (1 VIEW)   Final Result      No acute cardiopulmonary abnormality.              COURSE & MEDICAL DECISION MAKING  Pertinent Labs & Imaging studies reviewed. (See chart for details)    Differential diagnosis: Pancreatitis, gallbladder disease, gastritis    Patient was given IV morphine 4 mg and IV Zofran 4 mg.  He is kept n.p.o.    The patient's ultrasound is consistent with cholecystitis.  His bilirubin is normal, he does not have evidence of common duct stone.  He has an elevated white blood count of 14,000.  I have ordered Rocephin and Flagyl IV.    11:36 AM I have paged the general surgeon on-call.    11:39 AM I spoke with Dr. Caroline Skelton who is on-call for general surgery, she will take the patient to the OR today.  I have asked the nurse to have the lab run the stat COVID test.  The patient reports that he is not on any blood thinning medications.  I will order maintenance fluids for the patient, he is n.p.o. and this will maximize him for surgery.        FINAL IMPRESSION  1. Cholecystitis     2. Other elevated white blood cell (WBC) count                Electronically signed by: Maikel Patel M.D., 7/29/2020 9:50 AM

## 2020-07-29 NOTE — OR NURSING
Pt to PACU, resting with eyes closed, on room air, O2Sat 92%, pt educated on deep breathing/coughting technique. Dressings x4 with scant drainage intact, ice pack to the site. Family updated on Pt status.

## 2020-07-29 NOTE — ANESTHESIA TIME REPORT
Anesthesia Start and Stop Event Times     Date Time Event    7/29/2020 1340 Ready for Procedure     1344 Anesthesia Start     1434 Anesthesia Stop        Responsible Staff  07/29/20    Name Role Begin End    Horacio Nur M.D. Anesth 1344 1434        Preop Diagnosis (Free Text):  Pre-op Diagnosis     Acute cholecystitis with cholelithiasis        Preop Diagnosis (Codes):    Post op Diagnosis  Acute cholecystitis      Premium Reason  Non-Premium    Comments:

## 2020-07-29 NOTE — ED NOTES
Pain level improved from 8 down to a 4, worsening however with ultrasound in progress pain increasing again. Will reassess when exam is complete.

## 2020-07-29 NOTE — H&P
CHIEF COMPLAINT: cholelithiasis     HISTORY OF PRESENT ILLNESS: The patient is a 55 y.o. male, who presents to the Emergency Department a 3 day history of severe epigastric abdominal pain. The pain is associated with eating. He admits any food intolerance.  He has had an attack or two in the past that were self limited. The patient denies any recent or intercurrent illness. The patient denies any history of previous abdominal surgery.    PAST MEDICAL HISTORY:  has a past medical history of Clotting disorder ([facility]), Congestive heart failure ([facility]) (7/2015), Crohn disease ([facility]), Hypertension, PAF (paroxysmal atrial fibrillation) ([facility]), and Sleep apnea.    PAST SURGICAL HISTORY:   Lumbar minidiscectomy     ALLERGIES: No Known Allergies     CURRENT MEDICATIONS:   Home Medications     Reviewed by Barbara Rogers (Pharmacy Tech) on 07/29/20 at 1138  Med List Status: Complete   Medication Last Dose Status   carvedilol (COREG) 25 MG Tab 7/28/2020 Active   furosemide (LASIX) 40 MG Tab 7/28/2020 Active   hydrOXYzine HCl (ATARAX) 25 MG Tab 7/28/2020 Active   ROPINIRole (REQUIP) 0.5 MG Tab 7/28/2020 Active   sacubitril-valsartan (ENTRESTO) 49-51 MG Tab tablet 7/28/2020 Active   spironolactone (ALDACTONE) 25 MG Tab 7/28/2020 Active                FAMILY HISTORY:   Family History   Problem Relation Age of Onset   • Cancer Mother    • Heart Disease Paternal Grandmother    • Heart Disease Paternal Grandfather    • Cancer Father         paralysis from accident       SOCIAL HISTORY:   Social History     Tobacco Use   • Smoking status: Never Smoker   • Smokeless tobacco: Never Used   Substance and Sexual Activity   • Alcohol use: Yes     Alcohol/week: 1.2 oz     Types: 2 Glasses of wine per week     Comment: Quit in 1994 - previous six beer after work, 2-3 beers per day currently   • Drug use: No     Types: Methamphetamines     Comment: Quit in 2012, used for 5 years   • Sexual activity: Yes     Partners: Female       REVIEW OF  "SYSTEMS: Comprehensive review of systems is negative with the exception of the aforementioned HPI, PMH, and PSH bullets in accordance with CMS guidelines.     PHYSICAL EXAMINATION:   CONSTITUTIONAL:     Vital Signs: /92   Pulse 86   Temp 36.4 °C (97.5 °F) (Temporal)   Resp 18   Ht 1.753 m (5' 9\")   Wt 107.5 kg (236 lb 15.9 oz)   SpO2 97%   General Appearance: appears stated age.  HEAD AND NECK: Aniceteric    NECK: Supple.     RESPIRATORY:   Inspection: Unlabored respirations, no intercostal retractions, paradoxical motion, or accessory muscle use.    CARDIOVASCULAR:   Inspection: The skin is warm.    ABDOMEN:   Inspection: Abdominal inspection reveals no scars.   Palpation: Palpation is remarkable for moderate tenderness in the right upper quadrant  region Positive Garfield sign..   EXTREMITIES: Examination of the upper and lower extremities demonstrates no cyanosis edema or clubbing.  NEUROLOGIC: Alert & oriented x 3, Normal motor function, Normal sensory function, No focal deficits noted.      LABORATORY VALUES:   Recent Labs     07/29/20  0929   WBC 14.5*   RBC 4.93   HEMOGLOBIN 14.7   HEMATOCRIT 43.1   MCV 87.4   MCH 29.8   MCHC 34.1   RDW 40.2   PLATELETCT 280   MPV 10.2     Recent Labs     07/29/20  0929   SODIUM 130*   POTASSIUM 3.6   CHLORIDE 93*   CO2 23   GLUCOSE 111*   BUN 7*   CREATININE 0.87   CALCIUM 9.4     Recent Labs     07/29/20  0929   ASTSGOT 13   ALTSGPT 9   TBILIRUBIN 0.5   ALKPHOSPHAT 61   GLOBULIN 3.7*            IMAGING:   US-RUQ   Final Result      Cholelithiasis and findings suggesting acute cholecystitis.      DX-CHEST-PORTABLE (1 VIEW)   Final Result      No acute cardiopulmonary abnormality.           IMPRESSION AND PLAN:  Acute cholecystitis with cholelithiasis    The patient has Grade III (severe) acute cholecystitis. Plan: laparoscopic cholecystectomy     The patient will be taken to the operating room for laparoscopic cholecystectomy.  The surgical conduct was discussed " in detail. Potential complications including, but not limited to, infection, bleeding, damage to adjacent structures, bile duct injury, need to convert to an open procedure, and anesthetic complications were discussed. Questions were elicited and answered to the patient's satisfaction. Operative consent signed.     ____________________________________     Caroline Skelton M.D.    DD: 7/29/2020  11:41 AM

## 2020-07-29 NOTE — ED NOTES
Pharmacy Medication Reconciliation    Medication reconciliation has been completed by interviewing patient with consent to do so with family/visitors in the room.   Allergies were reviewed  No ORAL antibiotics within the past 14 days  Pt home pharmacy:[facility]

## 2020-07-29 NOTE — ED NOTES
IMPRESSION:   viral wheeze/wheezing associated respiratory infections    PLAN:  CXR today  Control Medication:  Flovent 44 mcg, 1 puffs, twice a day (with chamber)    Rescue medication: For wheeze and difficulty breathing:  As needed Albuterol 90, 1-2 puffs, every four hours as needed (with chamber) OR  As needed Albuterol 1 vial, every four hours as needed, by nebulization    Additional:  Avoidance of viral contacts and respiratory irritants (including cigarette smoke) as much as reasonably possible.     FUTURE:  Follow Up Dr Toño Emerson 5 months or earlier if required (repeated exacerbations, concerns) Pt was medicated per ERP order for pain and nausea.

## 2020-07-29 NOTE — ANESTHESIA POSTPROCEDURE EVALUATION
Patient: Shaka Riley    Procedure Summary     Date:  07/29/20 Room / Location:  [place] [address] / SURGERY [facility]    Anesthesia Start:  1344 Anesthesia Stop:  1434    Procedure:  CHOLECYSTECTOMY, LAPAROSCOPIC (Abdomen) Diagnosis:  (Acute cholecystitis with cholelithiasis)    Surgeon:  Caroline Skelton M.D. Responsible Provider:  Horacio Nur M.D.    Anesthesia Type:  general ASA Status:  2          Final Anesthesia Type: general  Last vitals  BP   Blood Pressure: 135/89    Temp   36.2 °C (97.1 °F)    Pulse   Pulse: 75   Resp   16    SpO2   95 %      Anesthesia Post Evaluation    Patient location during evaluation: PACU  Patient participation: complete - patient participated  Level of consciousness: awake and alert    Airway patency: patent  Anesthetic complications: no  Cardiovascular status: hemodynamically stable  Respiratory status: acceptable  Hydration status: euvolemic    PONV: none           Nurse Pain Score: 8 (NPRS)

## 2020-07-29 NOTE — ANESTHESIA PROCEDURE NOTES
Airway    Date/Time: 7/29/2020 1:49 PM  Performed by: Horacio Nur M.D.  Authorized by: Horacio Nur M.D.     Location:  OR  Urgency:  Elective  Indications for Airway Management:  Anesthesia      Spontaneous Ventilation: absent    Sedation Level:  Deep  Preoxygenated: Yes    Patient Position:  Sniffing  Final Airway Type:  Endotracheal airway  Final Endotracheal Airway:  ETT  Cuffed: Yes    Technique Used for Successful ETT Placement:  Direct laryngoscopy    Insertion Site:  Oral  Blade Type:  Riley  Laryngoscope Blade/Videolaryngoscope Blade Size:  3  ETT Size (mm):  7.0  Measured from:  Teeth  ETT to Teeth (cm):  24  Placement Verified by: auscultation and capnometry    Cormack-Lehane Classification:  Grade I - full view of glottis  Number of Attempts at Approach:  1

## 2020-08-19 NOTE — TELEPHONE ENCOUNTER
Phone Number Called: [phone]    Call outcome: Spoke to patient regarding message below.    Message: Patient is scheduled for 11/12 at 10:00am.

## 2020-11-12 NOTE — PROGRESS NOTES
Discharge and f/u instructions discussed and understanding verbalized.  Ambulatory out of ED.  RX given.   Virtual Visit: Established Patient   This visit was conducted via Zoom using secure and encrypted videoconferencing technology. The patient was in a private location in the state of [place].    The patient's identity was confirmed and verbal consent was obtained for this virtual visit.    Subjective:   CC:   Chief Complaint   Patient presents with   • Medication Refill     Ropinirole refill   • Follow-Up       Shaka Riley is a 55 y.o. male presenting for evaluation and management of:    Subjective:     CC:   Chief Complaint   Patient presents with   • Medication Refill     Ropinirole refill   • Follow-Up       HPI:   Shaka Riley is a 55 y.o. male who presents for annual exam    Last Colorectal Cancer Screenin  Last Tdap: 2017  Received HPV series: Aged out  Hx STDs: No    Exercise: no regular exercise, sedentary  Diet: regular      He  has a past medical history of Clotting disorder (HCC), Congestive heart failure (HCC) (2015), Crohn disease (HCC), Hypertension, PAF (paroxysmal atrial fibrillation) (HCC), and Sleep apnea.  He  has a past surgical history that includes monico by laparoscopy (2020).    Family History   Problem Relation Age of Onset   • Cancer Mother    • Heart Disease Paternal Grandmother    • Heart Disease Paternal Grandfather    • Cancer Father         paralysis from accident     Social History     Tobacco Use   • Smoking status: Never Smoker   • Smokeless tobacco: Never Used   Substance Use Topics   • Alcohol use: Yes     Alcohol/week: 1.2 oz     Types: 2 Glasses of wine per week     Comment: Quit in  - previous six beer after work, 2-3 beers per day currently   • Drug use: No     Types: Methamphetamines     Comment: Quit in , used for 5 years     He  reports being sexually active and has had partner(s) who are Female.    Patient Active Problem List    Diagnosis Date Noted   • Acute cholecystitis 2020     Priority: High   • Obesity (BMI 30-39.9) 2016      Priority: Medium   • Pure hypercholesterolemia 12/06/2016     Priority: Medium   • Syncope and collapse 07/20/2016     Priority: Medium   • Elevated blood sugar 07/20/2016     Priority: Medium   • Paroxysmal atrial fibrillation (HCC) 08/03/2015     Priority: Medium   • Snoring 07/07/2015     Priority: Medium   • HTN (hypertension) 05/06/2014     Priority: Medium   • Crohn disease (HCC) 07/23/2015     Priority: Low   • Pleurisy 07/20/2015     Priority: Low   • Anxiety 02/21/2020   • High risk medication use 01/12/2018   • Lumbar radiculopathy 01/02/2018   • Chronic back pain 12/13/2017   • Preventative health care 12/13/2017   • Screening for colon cancer 12/13/2017   • AK (actinic keratosis) 12/13/2017   • Nonischemic cardiomyopathy (HCC) 08/01/2017   • Stage C chronic systolic congestive heart failure (HCC) 03/09/2017   • Restless leg syndrome 02/01/2017   • SOB (shortness of breath) 01/04/2017     Current Outpatient Medications   Medication Sig Dispense Refill   • ROPINIRole (REQUIP) 0.5 MG Tab Take 4 tablets po qhs 120 Tab 3   • hydrOXYzine HCl (ATARAX) 25 MG Tab Take 1 Tab by mouth every bedtime. 90 Tab 0   • ROPINIRole (REQUIP) 0.5 MG Tab TAKE 2 TABLETS BY MOUTH THREE TIMES DAILY 90 Tab 0   • spironolactone (ALDACTONE) 25 MG Tab Take 1 Tab by mouth every bedtime. 30 Tab 11   • carvedilol (COREG) 25 MG Tab Take 25 mg by mouth 2 Times a Day.     • furosemide (LASIX) 40 MG Tab Take 40 mg by mouth every morning.     • sacubitril-valsartan (ENTRESTO) 49-51 MG Tab tablet Take 1 Tab by mouth 2 Times a Day. 60 Tab 11     No current facility-administered medications for this visit.      No Known Allergies    Review of Systems   Constitutional: Negative for fever, chills and malaise/fatigue.   HENT: Negative for congestion.    Eyes: Negative for pain.   Respiratory: Negative for cough and shortness of breath.    Cardiovascular: Negative for chest pain and leg swelling.   Gastrointestinal: Negative for nausea,  "vomiting, abdominal pain and diarrhea.   Genitourinary: Negative for dysuria and hematuria.   Skin: Negative for rash.   Neurological: Negative for dizziness, focal weakness and headaches.   Endo/Heme/Allergies: Does not bruise/bleed easily.   Psychiatric/Behavioral: Negative for depression.  The patient is not nervous/anxious.      Objective:   Ht 1.753 m (5' 9\") Comment: pt states  Wt 102.1 kg (225 lb) Comment: pt states  BMI 33.23 kg/m²      Wt Readings from Last 4 Encounters:   20 102.1 kg (225 lb)   20 107.5 kg (236 lb 15.9 oz)   20 110.2 kg (243 lb)   20 110 kg (242 lb 9.6 oz)     Physical Exam:  Constitutional: Alert, no distress, well-groomed.  Skin: No rashes in visible areas.  Eye: Round. Conjunctiva clear, lids normal. No icterus.   ENMT: Lips pink without lesions, good dentition, moist mucous membranes. Phonation normal.  Neck: No masses, no thyromegaly. Moves freely without pain.  Respiratory: Unlabored respiratory effort, no cough or audible wheeze  Psych: Alert and oriented x3, normal affect and mood.       Assessment and Plan:     1. Annual physical exam    Health maintenance:     Labs per orders  Immunizations per orders  Patient counseled about skin care, diet, supplements, and exercise.  Discussed diet and exercise- Comp Metabolic Panel; Future      - CBC WITHOUT DIFFERENTIAL; Future  - Lipid Profile; Future  - PROSTATE SPECIFIC AG DIAGNOSTIC; Future  - TSH+FREE T4  - T3 FREE; Future  - VITAMIN D,25 HYDROXY; Future    2. Need for hepatitis C screening test  - HCV Scrn ( [id number] [employer]); Future    3. Restless leg syndrome  - ROPINIRole (REQUIP) 0.5 MG Tab; Take 4 tablets po qhs  Dispense: 120 Tab; Refill: 3          Follow-up: Return in about 6 months (around 2021) for Reevaluation, labs.    "

## 2021-01-01 ENCOUNTER — APPOINTMENT (OUTPATIENT)
Dept: RADIOLOGY | Facility: MEDICAL CENTER | Age: 56
DRG: 438 | End: 2021-01-01
Attending: INTERNAL MEDICINE
Payer: COMMERCIAL

## 2021-01-01 ENCOUNTER — APPOINTMENT (OUTPATIENT)
Dept: RADIOLOGY | Facility: MEDICAL CENTER | Age: 56
DRG: 438 | End: 2021-01-01
Attending: NURSE PRACTITIONER
Payer: COMMERCIAL

## 2021-01-01 ENCOUNTER — HOSPITAL ENCOUNTER (INPATIENT)
Facility: MEDICAL CENTER | Age: 56
LOS: 2 days | DRG: 377 | End: 2021-03-29
Attending: EMERGENCY MEDICINE | Admitting: INTERNAL MEDICINE
Payer: COMMERCIAL

## 2021-01-01 ENCOUNTER — APPOINTMENT (OUTPATIENT)
Dept: RADIOLOGY | Facility: MEDICAL CENTER | Age: 56
DRG: 438 | End: 2021-01-01
Attending: STUDENT IN AN ORGANIZED HEALTH CARE EDUCATION/TRAINING PROGRAM
Payer: COMMERCIAL

## 2021-01-01 ENCOUNTER — APPOINTMENT (OUTPATIENT)
Dept: RADIOLOGY | Facility: MEDICAL CENTER | Age: 56
DRG: 438 | End: 2021-01-01
Attending: PSYCHIATRY & NEUROLOGY
Payer: COMMERCIAL

## 2021-01-01 ENCOUNTER — HOSPITAL ENCOUNTER (INPATIENT)
Facility: REHABILITATION | Age: 56
End: 2021-01-01
Attending: PHYSICAL MEDICINE & REHABILITATION | Admitting: PHYSICAL MEDICINE & REHABILITATION
Payer: COMMERCIAL

## 2021-01-01 ENCOUNTER — APPOINTMENT (OUTPATIENT)
Dept: RADIOLOGY | Facility: MEDICAL CENTER | Age: 56
DRG: 377 | End: 2021-01-01
Attending: EMERGENCY MEDICINE
Payer: COMMERCIAL

## 2021-01-01 ENCOUNTER — APPOINTMENT (OUTPATIENT)
Dept: RADIOLOGY | Facility: MEDICAL CENTER | Age: 56
DRG: 438 | End: 2021-01-01
Attending: EMERGENCY MEDICINE
Payer: COMMERCIAL

## 2021-01-01 ENCOUNTER — APPOINTMENT (OUTPATIENT)
Dept: CARDIOLOGY | Facility: MEDICAL CENTER | Age: 56
DRG: 438 | End: 2021-01-01
Attending: STUDENT IN AN ORGANIZED HEALTH CARE EDUCATION/TRAINING PROGRAM
Payer: COMMERCIAL

## 2021-01-01 ENCOUNTER — APPOINTMENT (OUTPATIENT)
Dept: RADIOLOGY | Facility: MEDICAL CENTER | Age: 56
DRG: 438 | End: 2021-01-01
Attending: HOSPITALIST
Payer: COMMERCIAL

## 2021-01-01 ENCOUNTER — HOSPITAL ENCOUNTER (INPATIENT)
Facility: MEDICAL CENTER | Age: 56
LOS: 22 days | DRG: 438 | End: 2021-02-05
Attending: EMERGENCY MEDICINE | Admitting: STUDENT IN AN ORGANIZED HEALTH CARE EDUCATION/TRAINING PROGRAM
Payer: COMMERCIAL

## 2021-01-01 VITALS
TEMPERATURE: 96.8 F | BODY MASS INDEX: 36.59 KG/M2 | SYSTOLIC BLOOD PRESSURE: 144 MMHG | RESPIRATION RATE: 18 BRPM | HEART RATE: 84 BPM | WEIGHT: 241.4 LBS | DIASTOLIC BLOOD PRESSURE: 69 MMHG | OXYGEN SATURATION: 94 % | HEIGHT: 68 IN

## 2021-01-01 VITALS
OXYGEN SATURATION: 100 % | HEIGHT: 69 IN | TEMPERATURE: 97.7 F | SYSTOLIC BLOOD PRESSURE: 116 MMHG | DIASTOLIC BLOOD PRESSURE: 33 MMHG | WEIGHT: 205.91 LBS | BODY MASS INDEX: 30.5 KG/M2

## 2021-01-01 DIAGNOSIS — N17.9 SEPSIS DUE TO METHICILLIN SUSCEPTIBLE STAPHYLOCOCCUS AUREUS (MSSA) WITH ACUTE RENAL FAILURE WITHOUT SEPTIC SHOCK, UNSPECIFIED ACUTE RENAL FAILURE TYPE (HCC): ICD-10-CM

## 2021-01-01 DIAGNOSIS — K92.2 GASTROINTESTINAL HEMORRHAGE, UNSPECIFIED GASTROINTESTINAL HEMORRHAGE TYPE: ICD-10-CM

## 2021-01-01 DIAGNOSIS — Z23 NEED FOR VACCINATION: ICD-10-CM

## 2021-01-01 DIAGNOSIS — N17.0 ACUTE RENAL FAILURE WITH TUBULAR NECROSIS (HCC): ICD-10-CM

## 2021-01-01 DIAGNOSIS — J18.9 PNEUMONIA OF RIGHT LOWER LOBE DUE TO INFECTIOUS ORGANISM: ICD-10-CM

## 2021-01-01 DIAGNOSIS — J96.02 ACUTE HYPERCAPNIC RESPIRATORY FAILURE (HCC): ICD-10-CM

## 2021-01-01 DIAGNOSIS — A41.9 SEPTIC SHOCK (HCC): ICD-10-CM

## 2021-01-01 DIAGNOSIS — J96.02 ACUTE RESPIRATORY FAILURE WITH HYPOXIA AND HYPERCAPNIA (HCC): ICD-10-CM

## 2021-01-01 DIAGNOSIS — R65.20 SEPSIS DUE TO METHICILLIN SUSCEPTIBLE STAPHYLOCOCCUS AUREUS (MSSA) WITH ACUTE RENAL FAILURE WITHOUT SEPTIC SHOCK, UNSPECIFIED ACUTE RENAL FAILURE TYPE (HCC): ICD-10-CM

## 2021-01-01 DIAGNOSIS — R65.21 SEPTIC SHOCK (HCC): ICD-10-CM

## 2021-01-01 DIAGNOSIS — K85.90 ACUTE PANCREATITIS, UNSPECIFIED COMPLICATION STATUS, UNSPECIFIED PANCREATITIS TYPE: ICD-10-CM

## 2021-01-01 DIAGNOSIS — K85.80 OTHER ACUTE PANCREATITIS, UNSPECIFIED COMPLICATION STATUS: ICD-10-CM

## 2021-01-01 DIAGNOSIS — J96.01 ACUTE RESPIRATORY FAILURE WITH HYPOXIA AND HYPERCAPNIA (HCC): ICD-10-CM

## 2021-01-01 DIAGNOSIS — A41.01 SEPSIS DUE TO METHICILLIN SUSCEPTIBLE STAPHYLOCOCCUS AUREUS (MSSA) WITH ACUTE RENAL FAILURE WITHOUT SEPTIC SHOCK, UNSPECIFIED ACUTE RENAL FAILURE TYPE (HCC): ICD-10-CM

## 2021-01-01 LAB
ABO + RH BLD: NORMAL
ABO GROUP BLD: NORMAL
ACTION RANGE TRIGGERED IACRT: NO
ACTION RANGE TRIGGERED IACRT: NO
ACTION RANGE TRIGGERED IACRT: YES
ACTION RANGE TRIGGERED IACRT: YES
ALBUMIN SERPL BCP-MCNC: 1.5 G/DL (ref 3.2–4.9)
ALBUMIN SERPL BCP-MCNC: 1.7 G/DL (ref 3.2–4.9)
ALBUMIN SERPL BCP-MCNC: 1.9 G/DL (ref 3.2–4.9)
ALBUMIN SERPL BCP-MCNC: 2 G/DL (ref 3.2–4.9)
ALBUMIN SERPL BCP-MCNC: 2.1 G/DL (ref 3.2–4.9)
ALBUMIN SERPL BCP-MCNC: 2.2 G/DL (ref 3.2–4.9)
ALBUMIN SERPL BCP-MCNC: 2.3 G/DL (ref 3.2–4.9)
ALBUMIN SERPL BCP-MCNC: 2.5 G/DL (ref 3.2–4.9)
ALBUMIN SERPL BCP-MCNC: 2.5 G/DL (ref 3.2–4.9)
ALBUMIN SERPL BCP-MCNC: 2.6 G/DL (ref 3.2–4.9)
ALBUMIN SERPL BCP-MCNC: 2.8 G/DL (ref 3.2–4.9)
ALBUMIN SERPL BCP-MCNC: 3 G/DL (ref 3.2–4.9)
ALBUMIN SERPL BCP-MCNC: 3 G/DL (ref 3.2–4.9)
ALBUMIN SERPL BCP-MCNC: 3.1 G/DL (ref 3.2–4.9)
ALBUMIN SERPL BCP-MCNC: 3.4 G/DL (ref 3.2–4.9)
ALBUMIN SERPL BCP-MCNC: 3.5 G/DL (ref 3.2–4.9)
ALBUMIN SERPL BCP-MCNC: 4.1 G/DL (ref 3.2–4.9)
ALBUMIN SERPL BCP-MCNC: 4.2 G/DL (ref 3.2–4.9)
ALBUMIN/GLOB SERPL: 0.4 G/DL
ALBUMIN/GLOB SERPL: 0.5 G/DL
ALBUMIN/GLOB SERPL: 0.6 G/DL
ALBUMIN/GLOB SERPL: 0.7 G/DL
ALBUMIN/GLOB SERPL: 0.8 G/DL
ALBUMIN/GLOB SERPL: 0.9 G/DL
ALBUMIN/GLOB SERPL: 1 G/DL
ALBUMIN/GLOB SERPL: 1.2 G/DL
ALBUMIN/GLOB SERPL: 1.3 G/DL
ALP SERPL-CCNC: 100 U/L (ref 30–99)
ALP SERPL-CCNC: 102 U/L (ref 30–99)
ALP SERPL-CCNC: 103 U/L (ref 30–99)
ALP SERPL-CCNC: 106 U/L (ref 30–99)
ALP SERPL-CCNC: 112 U/L (ref 30–99)
ALP SERPL-CCNC: 113 U/L (ref 30–99)
ALP SERPL-CCNC: 119 U/L (ref 30–99)
ALP SERPL-CCNC: 122 U/L (ref 30–99)
ALP SERPL-CCNC: 128 U/L (ref 30–99)
ALP SERPL-CCNC: 59 U/L (ref 30–99)
ALP SERPL-CCNC: 61 U/L (ref 30–99)
ALP SERPL-CCNC: 66 U/L (ref 30–99)
ALP SERPL-CCNC: 67 U/L (ref 30–99)
ALP SERPL-CCNC: 68 U/L (ref 30–99)
ALP SERPL-CCNC: 70 U/L (ref 30–99)
ALP SERPL-CCNC: 72 U/L (ref 30–99)
ALP SERPL-CCNC: 72 U/L (ref 30–99)
ALP SERPL-CCNC: 74 U/L (ref 30–99)
ALP SERPL-CCNC: 78 U/L (ref 30–99)
ALP SERPL-CCNC: 81 U/L (ref 30–99)
ALP SERPL-CCNC: 82 U/L (ref 30–99)
ALP SERPL-CCNC: 83 U/L (ref 30–99)
ALP SERPL-CCNC: 83 U/L (ref 30–99)
ALP SERPL-CCNC: 85 U/L (ref 30–99)
ALP SERPL-CCNC: 86 U/L (ref 30–99)
ALP SERPL-CCNC: 87 U/L (ref 30–99)
ALP SERPL-CCNC: 92 U/L (ref 30–99)
ALP SERPL-CCNC: 94 U/L (ref 30–99)
ALP SERPL-CCNC: 98 U/L (ref 30–99)
ALT SERPL-CCNC: 11 U/L (ref 2–50)
ALT SERPL-CCNC: 122 U/L (ref 2–50)
ALT SERPL-CCNC: 13 U/L (ref 2–50)
ALT SERPL-CCNC: 16 U/L (ref 2–50)
ALT SERPL-CCNC: 17 U/L (ref 2–50)
ALT SERPL-CCNC: 170 U/L (ref 2–50)
ALT SERPL-CCNC: 18 U/L (ref 2–50)
ALT SERPL-CCNC: 18 U/L (ref 2–50)
ALT SERPL-CCNC: 20 U/L (ref 2–50)
ALT SERPL-CCNC: 20 U/L (ref 2–50)
ALT SERPL-CCNC: 24 U/L (ref 2–50)
ALT SERPL-CCNC: 26 U/L (ref 2–50)
ALT SERPL-CCNC: 26 U/L (ref 2–50)
ALT SERPL-CCNC: 27 U/L (ref 2–50)
ALT SERPL-CCNC: 27 U/L (ref 2–50)
ALT SERPL-CCNC: 31 U/L (ref 2–50)
ALT SERPL-CCNC: 32 U/L (ref 2–50)
ALT SERPL-CCNC: 35 U/L (ref 2–50)
ALT SERPL-CCNC: 37 U/L (ref 2–50)
ALT SERPL-CCNC: 39 U/L (ref 2–50)
ALT SERPL-CCNC: 6 U/L (ref 2–50)
ALT SERPL-CCNC: 62 U/L (ref 2–50)
ALT SERPL-CCNC: 62 U/L (ref 2–50)
ALT SERPL-CCNC: 76 U/L (ref 2–50)
ALT SERPL-CCNC: <5 U/L (ref 2–50)
AMMONIA PLAS-SCNC: 36 UMOL/L (ref 11–45)
AMORPH CRY #/AREA URNS HPF: PRESENT /HPF
AMYLASE SERPL-CCNC: 827 U/L (ref 20–103)
ANION GAP SERPL CALC-SCNC: 10 MMOL/L (ref 7–16)
ANION GAP SERPL CALC-SCNC: 11 MMOL/L (ref 7–16)
ANION GAP SERPL CALC-SCNC: 12 MMOL/L (ref 7–16)
ANION GAP SERPL CALC-SCNC: 13 MMOL/L (ref 7–16)
ANION GAP SERPL CALC-SCNC: 14 MMOL/L (ref 7–16)
ANION GAP SERPL CALC-SCNC: 15 MMOL/L (ref 7–16)
ANION GAP SERPL CALC-SCNC: 15 MMOL/L (ref 7–16)
ANION GAP SERPL CALC-SCNC: 7 MMOL/L (ref 7–16)
ANION GAP SERPL CALC-SCNC: 7 MMOL/L (ref 7–16)
ANION GAP SERPL CALC-SCNC: 8 MMOL/L (ref 7–16)
ANION GAP SERPL CALC-SCNC: 9 MMOL/L (ref 7–16)
ANISOCYTOSIS BLD QL SMEAR: ABNORMAL
ANISOCYTOSIS BLD QL SMEAR: NORMAL
APPEARANCE UR: ABNORMAL
APTT PPP: 29.4 SEC (ref 24.7–36)
AST SERPL-CCNC: 12 U/L (ref 12–45)
AST SERPL-CCNC: 15 U/L (ref 12–45)
AST SERPL-CCNC: 15 U/L (ref 12–45)
AST SERPL-CCNC: 17 U/L (ref 12–45)
AST SERPL-CCNC: 19 U/L (ref 12–45)
AST SERPL-CCNC: 19 U/L (ref 12–45)
AST SERPL-CCNC: 20 U/L (ref 12–45)
AST SERPL-CCNC: 20 U/L (ref 12–45)
AST SERPL-CCNC: 22 U/L (ref 12–45)
AST SERPL-CCNC: 23 U/L (ref 12–45)
AST SERPL-CCNC: 23 U/L (ref 12–45)
AST SERPL-CCNC: 27 U/L (ref 12–45)
AST SERPL-CCNC: 28 U/L (ref 12–45)
AST SERPL-CCNC: 28 U/L (ref 12–45)
AST SERPL-CCNC: 30 U/L (ref 12–45)
AST SERPL-CCNC: 328 U/L (ref 12–45)
AST SERPL-CCNC: 38 U/L (ref 12–45)
AST SERPL-CCNC: 39 U/L (ref 12–45)
AST SERPL-CCNC: 40 U/L (ref 12–45)
AST SERPL-CCNC: 41 U/L (ref 12–45)
AST SERPL-CCNC: 44 U/L (ref 12–45)
AST SERPL-CCNC: 45 U/L (ref 12–45)
AST SERPL-CCNC: 47 U/L (ref 12–45)
AST SERPL-CCNC: 48 U/L (ref 12–45)
AST SERPL-CCNC: 5 U/L (ref 12–45)
AST SERPL-CCNC: 53 U/L (ref 12–45)
AST SERPL-CCNC: 82 U/L (ref 12–45)
AST SERPL-CCNC: <5 U/L (ref 12–45)
AST SERPL-CCNC: <5 U/L (ref 12–45)
BACTERIA #/AREA URNS HPF: NEGATIVE /HPF
BACTERIA BLD CULT: ABNORMAL
BACTERIA BLD CULT: NORMAL
BARCODED ABORH UBTYP: 5100
BARCODED ABORH UBTYP: 6200
BARCODED ABORH UBTYP: 8400
BARCODED PRD CODE UBPRD: NORMAL
BARCODED UNIT NUM UBUNT: NORMAL
BASE EXCESS BLDA CALC-SCNC: -2 MMOL/L (ref -4–3)
BASE EXCESS BLDA CALC-SCNC: -3 MMOL/L (ref -4–3)
BASE EXCESS BLDA CALC-SCNC: 2 MMOL/L (ref -4–3)
BASE EXCESS BLDA CALC-SCNC: 4 MMOL/L (ref -4–3)
BASE EXCESS BLDA CALC-SCNC: 5 MMOL/L (ref -4–3)
BASE EXCESS BLDA CALC-SCNC: 6 MMOL/L (ref -4–3)
BASOPHILS # BLD AUTO: 0 % (ref 0–1.8)
BASOPHILS # BLD AUTO: 0.3 % (ref 0–1.8)
BASOPHILS # BLD AUTO: 0.7 % (ref 0–1.8)
BASOPHILS # BLD AUTO: 0.9 % (ref 0–1.8)
BASOPHILS # BLD AUTO: 0.9 % (ref 0–1.8)
BASOPHILS # BLD AUTO: 1.8 % (ref 0–1.8)
BASOPHILS # BLD AUTO: 2.6 % (ref 0–1.8)
BASOPHILS # BLD: 0 K/UL (ref 0–0.12)
BASOPHILS # BLD: 0.03 K/UL (ref 0–0.12)
BASOPHILS # BLD: 0.05 K/UL (ref 0–0.12)
BASOPHILS # BLD: 0.05 K/UL (ref 0–0.12)
BASOPHILS # BLD: 0.06 K/UL (ref 0–0.12)
BASOPHILS # BLD: 0.08 K/UL (ref 0–0.12)
BASOPHILS # BLD: 0.17 K/UL (ref 0–0.12)
BILIRUB SERPL-MCNC: 0.3 MG/DL (ref 0.1–1.5)
BILIRUB SERPL-MCNC: 0.4 MG/DL (ref 0.1–1.5)
BILIRUB SERPL-MCNC: 0.5 MG/DL (ref 0.1–1.5)
BILIRUB SERPL-MCNC: 0.6 MG/DL (ref 0.1–1.5)
BILIRUB SERPL-MCNC: 0.7 MG/DL (ref 0.1–1.5)
BILIRUB SERPL-MCNC: 0.7 MG/DL (ref 0.1–1.5)
BILIRUB SERPL-MCNC: 0.8 MG/DL (ref 0.1–1.5)
BILIRUB SERPL-MCNC: 0.9 MG/DL (ref 0.1–1.5)
BILIRUB SERPL-MCNC: 0.9 MG/DL (ref 0.1–1.5)
BILIRUB SERPL-MCNC: 1.1 MG/DL (ref 0.1–1.5)
BILIRUB UR QL STRIP.AUTO: NEGATIVE
BLD GP AB SCN SERPL QL: NORMAL
BODY TEMPERATURE: ABNORMAL CENTIGRADE
BODY TEMPERATURE: ABNORMAL CENTIGRADE
BODY TEMPERATURE: ABNORMAL DEGREES
BUN SERPL-MCNC: 17 MG/DL (ref 8–22)
BUN SERPL-MCNC: 20 MG/DL (ref 8–22)
BUN SERPL-MCNC: 23 MG/DL (ref 8–22)
BUN SERPL-MCNC: 28 MG/DL (ref 8–22)
BUN SERPL-MCNC: 30 MG/DL (ref 8–22)
BUN SERPL-MCNC: 31 MG/DL (ref 8–22)
BUN SERPL-MCNC: 38 MG/DL (ref 8–22)
BUN SERPL-MCNC: 41 MG/DL (ref 8–22)
BUN SERPL-MCNC: 42 MG/DL (ref 8–22)
BUN SERPL-MCNC: 45 MG/DL (ref 8–22)
BUN SERPL-MCNC: 48 MG/DL (ref 8–22)
BUN SERPL-MCNC: 50 MG/DL (ref 8–22)
BUN SERPL-MCNC: 52 MG/DL (ref 8–22)
BUN SERPL-MCNC: 56 MG/DL (ref 8–22)
BUN SERPL-MCNC: 58 MG/DL (ref 8–22)
BUN SERPL-MCNC: 62 MG/DL (ref 8–22)
BUN SERPL-MCNC: 62 MG/DL (ref 8–22)
BUN SERPL-MCNC: 64 MG/DL (ref 8–22)
BUN SERPL-MCNC: 64 MG/DL (ref 8–22)
BUN SERPL-MCNC: 65 MG/DL (ref 8–22)
BUN SERPL-MCNC: 67 MG/DL (ref 8–22)
BUN SERPL-MCNC: 67 MG/DL (ref 8–22)
BUN SERPL-MCNC: 70 MG/DL (ref 8–22)
BUN SERPL-MCNC: 70 MG/DL (ref 8–22)
BUN SERPL-MCNC: 72 MG/DL (ref 8–22)
BUN SERPL-MCNC: 74 MG/DL (ref 8–22)
BUN SERPL-MCNC: 75 MG/DL (ref 8–22)
BUN SERPL-MCNC: 80 MG/DL (ref 8–22)
BUN SERPL-MCNC: 84 MG/DL (ref 8–22)
BUN SERPL-MCNC: 88 MG/DL (ref 8–22)
C3 SERPL-MCNC: 95.8 MG/DL (ref 87–200)
C4 SERPL-MCNC: 6.5 MG/DL (ref 19–52)
CA-I BLD ISE-SCNC: 1.18 MMOL/L (ref 1.1–1.3)
CA-I SERPL-SCNC: 0.9 MMOL/L (ref 1.1–1.3)
CA-I SERPL-SCNC: 0.9 MMOL/L (ref 1.1–1.3)
CA-I SERPL-SCNC: 1.1 MMOL/L (ref 1.1–1.3)
CA-I SERPL-SCNC: 1.2 MMOL/L (ref 1.1–1.3)
CALCIUM SERPL-MCNC: 6.8 MG/DL (ref 8.5–10.5)
CALCIUM SERPL-MCNC: 6.9 MG/DL (ref 8.5–10.5)
CALCIUM SERPL-MCNC: 7 MG/DL (ref 8.5–10.5)
CALCIUM SERPL-MCNC: 7 MG/DL (ref 8.5–10.5)
CALCIUM SERPL-MCNC: 7.2 MG/DL (ref 8.5–10.5)
CALCIUM SERPL-MCNC: 7.3 MG/DL (ref 8.5–10.5)
CALCIUM SERPL-MCNC: 7.5 MG/DL (ref 8.5–10.5)
CALCIUM SERPL-MCNC: 7.6 MG/DL (ref 8.5–10.5)
CALCIUM SERPL-MCNC: 7.8 MG/DL (ref 8.5–10.5)
CALCIUM SERPL-MCNC: 8 MG/DL (ref 8.5–10.5)
CALCIUM SERPL-MCNC: 8.1 MG/DL (ref 8.5–10.5)
CALCIUM SERPL-MCNC: 8.1 MG/DL (ref 8.5–10.5)
CALCIUM SERPL-MCNC: 8.2 MG/DL (ref 8.5–10.5)
CALCIUM SERPL-MCNC: 8.3 MG/DL (ref 8.5–10.5)
CALCIUM SERPL-MCNC: 8.4 MG/DL (ref 8.5–10.5)
CALCIUM SERPL-MCNC: 8.6 MG/DL (ref 8.5–10.5)
CALCIUM SERPL-MCNC: 8.7 MG/DL (ref 8.5–10.5)
CALCIUM SERPL-MCNC: 8.8 MG/DL (ref 8.5–10.5)
CALCIUM SERPL-MCNC: 8.9 MG/DL (ref 8.5–10.5)
CALCIUM SERPL-MCNC: 9.2 MG/DL (ref 8.5–10.5)
CALCIUM SERPL-MCNC: 9.3 MG/DL (ref 8.5–10.5)
CALCIUM SERPL-MCNC: 9.5 MG/DL (ref 8.5–10.5)
CALCIUM SERPL-MCNC: 9.6 MG/DL (ref 8.5–10.5)
CFT BLD TEG: 4.5 MIN (ref 5–10)
CHLORIDE SERPL-SCNC: 100 MMOL/L (ref 96–112)
CHLORIDE SERPL-SCNC: 101 MMOL/L (ref 96–112)
CHLORIDE SERPL-SCNC: 104 MMOL/L (ref 96–112)
CHLORIDE SERPL-SCNC: 104 MMOL/L (ref 96–112)
CHLORIDE SERPL-SCNC: 106 MMOL/L (ref 96–112)
CHLORIDE SERPL-SCNC: 109 MMOL/L (ref 96–112)
CHLORIDE SERPL-SCNC: 91 MMOL/L (ref 96–112)
CHLORIDE SERPL-SCNC: 92 MMOL/L (ref 96–112)
CHLORIDE SERPL-SCNC: 93 MMOL/L (ref 96–112)
CHLORIDE SERPL-SCNC: 94 MMOL/L (ref 96–112)
CHLORIDE SERPL-SCNC: 95 MMOL/L (ref 96–112)
CHLORIDE SERPL-SCNC: 95 MMOL/L (ref 96–112)
CHLORIDE SERPL-SCNC: 96 MMOL/L (ref 96–112)
CHLORIDE SERPL-SCNC: 97 MMOL/L (ref 96–112)
CHLORIDE SERPL-SCNC: 98 MMOL/L (ref 96–112)
CHLORIDE SERPL-SCNC: 99 MMOL/L (ref 96–112)
CHOLEST SERPL-MCNC: 119 MG/DL (ref 100–199)
CHOLEST SERPL-MCNC: 224 MG/DL (ref 100–199)
CK SERPL-CCNC: 70 U/L (ref 0–154)
CLOT ANGLE BLD TEG: 58.7 DEGREES (ref 53–72)
CLOT LYSIS 30M P MA LENFR BLD TEG: 0 % (ref 0–8)
CO2 BLDA-SCNC: 26 MMOL/L (ref 20–33)
CO2 BLDA-SCNC: 30 MMOL/L (ref 20–33)
CO2 BLDA-SCNC: 30 MMOL/L (ref 20–33)
CO2 BLDA-SCNC: 31 MMOL/L (ref 20–33)
CO2 SERPL-SCNC: 18 MMOL/L (ref 20–33)
CO2 SERPL-SCNC: 21 MMOL/L (ref 20–33)
CO2 SERPL-SCNC: 21 MMOL/L (ref 20–33)
CO2 SERPL-SCNC: 22 MMOL/L (ref 20–33)
CO2 SERPL-SCNC: 23 MMOL/L (ref 20–33)
CO2 SERPL-SCNC: 24 MMOL/L (ref 20–33)
CO2 SERPL-SCNC: 25 MMOL/L (ref 20–33)
CO2 SERPL-SCNC: 25 MMOL/L (ref 20–33)
CO2 SERPL-SCNC: 26 MMOL/L (ref 20–33)
CO2 SERPL-SCNC: 27 MMOL/L (ref 20–33)
CO2 SERPL-SCNC: 28 MMOL/L (ref 20–33)
CO2 SERPL-SCNC: 28 MMOL/L (ref 20–33)
CO2 SERPL-SCNC: 29 MMOL/L (ref 20–33)
CO2 SERPL-SCNC: 29 MMOL/L (ref 20–33)
CO2 SERPL-SCNC: 30 MMOL/L (ref 20–33)
CO2 SERPL-SCNC: 30 MMOL/L (ref 20–33)
CO2 SERPL-SCNC: 31 MMOL/L (ref 20–33)
CO2 SERPL-SCNC: 32 MMOL/L (ref 20–33)
COLOR UR: ABNORMAL
COMPONENT F 8504F: NORMAL
COMPONENT P 8504P: NORMAL
COMPONENT R 8504R: NORMAL
CREAT SERPL-MCNC: 1.13 MG/DL (ref 0.5–1.4)
CREAT SERPL-MCNC: 1.58 MG/DL (ref 0.5–1.4)
CREAT SERPL-MCNC: 1.58 MG/DL (ref 0.5–1.4)
CREAT SERPL-MCNC: 1.61 MG/DL (ref 0.5–1.4)
CREAT SERPL-MCNC: 1.64 MG/DL (ref 0.5–1.4)
CREAT SERPL-MCNC: 1.73 MG/DL (ref 0.5–1.4)
CREAT SERPL-MCNC: 1.81 MG/DL (ref 0.5–1.4)
CREAT SERPL-MCNC: 2.05 MG/DL (ref 0.5–1.4)
CREAT SERPL-MCNC: 2.09 MG/DL (ref 0.5–1.4)
CREAT SERPL-MCNC: 2.24 MG/DL (ref 0.5–1.4)
CREAT SERPL-MCNC: 2.46 MG/DL (ref 0.5–1.4)
CREAT SERPL-MCNC: 2.48 MG/DL (ref 0.5–1.4)
CREAT SERPL-MCNC: 2.54 MG/DL (ref 0.5–1.4)
CREAT SERPL-MCNC: 2.62 MG/DL (ref 0.5–1.4)
CREAT SERPL-MCNC: 2.75 MG/DL (ref 0.5–1.4)
CREAT SERPL-MCNC: 3.05 MG/DL (ref 0.5–1.4)
CREAT SERPL-MCNC: 3.16 MG/DL (ref 0.5–1.4)
CREAT SERPL-MCNC: 3.36 MG/DL (ref 0.5–1.4)
CREAT SERPL-MCNC: 3.54 MG/DL (ref 0.5–1.4)
CREAT SERPL-MCNC: 3.61 MG/DL (ref 0.5–1.4)
CREAT SERPL-MCNC: 3.84 MG/DL (ref 0.5–1.4)
CREAT SERPL-MCNC: 3.85 MG/DL (ref 0.5–1.4)
CREAT SERPL-MCNC: 3.9 MG/DL (ref 0.5–1.4)
CREAT SERPL-MCNC: 3.92 MG/DL (ref 0.5–1.4)
CREAT SERPL-MCNC: 4.04 MG/DL (ref 0.5–1.4)
CREAT SERPL-MCNC: 4.16 MG/DL (ref 0.5–1.4)
CREAT SERPL-MCNC: 4.17 MG/DL (ref 0.5–1.4)
CREAT SERPL-MCNC: 4.94 MG/DL (ref 0.5–1.4)
CREAT SERPL-MCNC: 4.95 MG/DL (ref 0.5–1.4)
CREAT SERPL-MCNC: 5.93 MG/DL (ref 0.5–1.4)
CREAT SERPL-MCNC: 6.09 MG/DL (ref 0.5–1.4)
CREAT SERPL-MCNC: 6.3 MG/DL (ref 0.5–1.4)
CREAT UR-MCNC: 274.12 MG/DL
CREAT UR-MCNC: 297.96 MG/DL
CT.EXTRINSIC BLD ROTEM: 2.7 MIN (ref 1–3)
D DIMER PPP IA.FEU-MCNC: 4.84 UG/ML (FEU) (ref 0–0.5)
D DIMER PPP IA.FEU-MCNC: 6.35 UG/ML (FEU) (ref 0–0.5)
DOHLE BOD BLD QL SMEAR: NORMAL
EKG IMPRESSION: NORMAL
EOSINOPHIL # BLD AUTO: 0 K/UL (ref 0–0.51)
EOSINOPHIL # BLD AUTO: 0.02 K/UL (ref 0–0.51)
EOSINOPHIL # BLD AUTO: 0.03 K/UL (ref 0–0.51)
EOSINOPHIL # BLD AUTO: 0.04 K/UL (ref 0–0.51)
EOSINOPHIL # BLD AUTO: 0.05 K/UL (ref 0–0.51)
EOSINOPHIL # BLD AUTO: 0.05 K/UL (ref 0–0.51)
EOSINOPHIL # BLD AUTO: 0.06 K/UL (ref 0–0.51)
EOSINOPHIL # BLD AUTO: 0.06 K/UL (ref 0–0.51)
EOSINOPHIL # BLD AUTO: 0.07 K/UL (ref 0–0.51)
EOSINOPHIL # BLD AUTO: 0.08 K/UL (ref 0–0.51)
EOSINOPHIL # BLD AUTO: 0.09 K/UL (ref 0–0.51)
EOSINOPHIL # BLD AUTO: 0.09 K/UL (ref 0–0.51)
EOSINOPHIL # BLD AUTO: 0.11 K/UL (ref 0–0.51)
EOSINOPHIL # BLD AUTO: 0.12 K/UL (ref 0–0.51)
EOSINOPHIL # BLD AUTO: 0.13 K/UL (ref 0–0.51)
EOSINOPHIL # BLD AUTO: 0.19 K/UL (ref 0–0.51)
EOSINOPHIL # BLD AUTO: 0.29 K/UL (ref 0–0.51)
EOSINOPHIL # BLD AUTO: 0.32 K/UL (ref 0–0.51)
EOSINOPHIL # BLD AUTO: 0.35 K/UL (ref 0–0.51)
EOSINOPHIL NFR BLD: 0 % (ref 0–6.9)
EOSINOPHIL NFR BLD: 0.2 % (ref 0–6.9)
EOSINOPHIL NFR BLD: 0.9 % (ref 0–6.9)
EOSINOPHIL NFR BLD: 1.7 % (ref 0–6.9)
EOSINOPHIL NFR BLD: 1.7 % (ref 0–6.9)
EOSINOPHIL NFR BLD: 1.8 % (ref 0–6.9)
EOSINOPHIL NFR BLD: 1.8 % (ref 0–6.9)
EOSINOPHIL NFR BLD: 2.6 % (ref 0–6.9)
EOSINOPHIL NFR BLD: 3.5 % (ref 0–6.9)
EOSINOPHIL NFR BLD: 4.3 % (ref 0–6.9)
EOSINOPHIL NFR BLD: 4.4 % (ref 0–6.9)
EPI CELLS #/AREA URNS HPF: ABNORMAL /HPF
ERYTHROCYTE [DISTWIDTH] IN BLOOD BY AUTOMATED COUNT: 39.4 FL (ref 35.9–50)
ERYTHROCYTE [DISTWIDTH] IN BLOOD BY AUTOMATED COUNT: 43.8 FL (ref 35.9–50)
ERYTHROCYTE [DISTWIDTH] IN BLOOD BY AUTOMATED COUNT: 44.3 FL (ref 35.9–50)
ERYTHROCYTE [DISTWIDTH] IN BLOOD BY AUTOMATED COUNT: 44.6 FL (ref 35.9–50)
ERYTHROCYTE [DISTWIDTH] IN BLOOD BY AUTOMATED COUNT: 44.8 FL (ref 35.9–50)
ERYTHROCYTE [DISTWIDTH] IN BLOOD BY AUTOMATED COUNT: 44.9 FL (ref 35.9–50)
ERYTHROCYTE [DISTWIDTH] IN BLOOD BY AUTOMATED COUNT: 46.5 FL (ref 35.9–50)
ERYTHROCYTE [DISTWIDTH] IN BLOOD BY AUTOMATED COUNT: 47.2 FL (ref 35.9–50)
ERYTHROCYTE [DISTWIDTH] IN BLOOD BY AUTOMATED COUNT: 47.6 FL (ref 35.9–50)
ERYTHROCYTE [DISTWIDTH] IN BLOOD BY AUTOMATED COUNT: 47.7 FL (ref 35.9–50)
ERYTHROCYTE [DISTWIDTH] IN BLOOD BY AUTOMATED COUNT: 48.3 FL (ref 35.9–50)
ERYTHROCYTE [DISTWIDTH] IN BLOOD BY AUTOMATED COUNT: 48.3 FL (ref 35.9–50)
ERYTHROCYTE [DISTWIDTH] IN BLOOD BY AUTOMATED COUNT: 49 FL (ref 35.9–50)
ERYTHROCYTE [DISTWIDTH] IN BLOOD BY AUTOMATED COUNT: 49.1 FL (ref 35.9–50)
ERYTHROCYTE [DISTWIDTH] IN BLOOD BY AUTOMATED COUNT: 49.6 FL (ref 35.9–50)
ERYTHROCYTE [DISTWIDTH] IN BLOOD BY AUTOMATED COUNT: 50.1 FL (ref 35.9–50)
ERYTHROCYTE [DISTWIDTH] IN BLOOD BY AUTOMATED COUNT: 50.4 FL (ref 35.9–50)
ERYTHROCYTE [DISTWIDTH] IN BLOOD BY AUTOMATED COUNT: 50.8 FL (ref 35.9–50)
ERYTHROCYTE [DISTWIDTH] IN BLOOD BY AUTOMATED COUNT: 52.3 FL (ref 35.9–50)
ERYTHROCYTE [DISTWIDTH] IN BLOOD BY AUTOMATED COUNT: 52.4 FL (ref 35.9–50)
ERYTHROCYTE [DISTWIDTH] IN BLOOD BY AUTOMATED COUNT: 53.1 FL (ref 35.9–50)
ERYTHROCYTE [DISTWIDTH] IN BLOOD BY AUTOMATED COUNT: 55.1 FL (ref 35.9–50)
ERYTHROCYTE [DISTWIDTH] IN BLOOD BY AUTOMATED COUNT: 56.6 FL (ref 35.9–50)
ERYTHROCYTE [DISTWIDTH] IN BLOOD BY AUTOMATED COUNT: 57.1 FL (ref 35.9–50)
EST. AVERAGE GLUCOSE BLD GHB EST-MCNC: 111 MG/DL
ETHANOL BLD-MCNC: <10.1 MG/DL (ref 0–10)
FERRITIN SERPL-MCNC: 636 NG/ML (ref 22–322)
FIBRINOGEN PPP-MCNC: 559 MG/DL (ref 215–460)
FUNGUS BLD CULT: NORMAL
GIANT PLATELETS BLD QL SMEAR: NORMAL
GLOBULIN SER CALC-MCNC: 3.2 G/DL (ref 1.9–3.5)
GLOBULIN SER CALC-MCNC: 3.3 G/DL (ref 1.9–3.5)
GLOBULIN SER CALC-MCNC: 3.4 G/DL (ref 1.9–3.5)
GLOBULIN SER CALC-MCNC: 3.5 G/DL (ref 1.9–3.5)
GLOBULIN SER CALC-MCNC: 3.5 G/DL (ref 1.9–3.5)
GLOBULIN SER CALC-MCNC: 3.6 G/DL (ref 1.9–3.5)
GLOBULIN SER CALC-MCNC: 3.7 G/DL (ref 1.9–3.5)
GLOBULIN SER CALC-MCNC: 3.8 G/DL (ref 1.9–3.5)
GLOBULIN SER CALC-MCNC: 3.9 G/DL (ref 1.9–3.5)
GLOBULIN SER CALC-MCNC: 3.9 G/DL (ref 1.9–3.5)
GLOBULIN SER CALC-MCNC: 4 G/DL (ref 1.9–3.5)
GLOBULIN SER CALC-MCNC: 4.1 G/DL (ref 1.9–3.5)
GLOBULIN SER CALC-MCNC: 4.3 G/DL (ref 1.9–3.5)
GLOBULIN SER CALC-MCNC: 4.3 G/DL (ref 1.9–3.5)
GLOBULIN SER CALC-MCNC: 4.6 G/DL (ref 1.9–3.5)
GLOBULIN SER CALC-MCNC: 4.7 G/DL (ref 1.9–3.5)
GLOBULIN SER CALC-MCNC: 4.7 G/DL (ref 1.9–3.5)
GLUCOSE BLD-MCNC: 103 MG/DL (ref 65–99)
GLUCOSE BLD-MCNC: 104 MG/DL (ref 65–99)
GLUCOSE BLD-MCNC: 106 MG/DL (ref 65–99)
GLUCOSE BLD-MCNC: 108 MG/DL (ref 65–99)
GLUCOSE BLD-MCNC: 111 MG/DL (ref 65–99)
GLUCOSE BLD-MCNC: 112 MG/DL (ref 65–99)
GLUCOSE BLD-MCNC: 112 MG/DL (ref 65–99)
GLUCOSE BLD-MCNC: 114 MG/DL (ref 65–99)
GLUCOSE BLD-MCNC: 114 MG/DL (ref 65–99)
GLUCOSE BLD-MCNC: 115 MG/DL (ref 65–99)
GLUCOSE BLD-MCNC: 115 MG/DL (ref 65–99)
GLUCOSE BLD-MCNC: 118 MG/DL (ref 65–99)
GLUCOSE BLD-MCNC: 119 MG/DL (ref 65–99)
GLUCOSE BLD-MCNC: 121 MG/DL (ref 65–99)
GLUCOSE BLD-MCNC: 126 MG/DL (ref 65–99)
GLUCOSE BLD-MCNC: 131 MG/DL (ref 65–99)
GLUCOSE BLD-MCNC: 131 MG/DL (ref 65–99)
GLUCOSE BLD-MCNC: 132 MG/DL (ref 65–99)
GLUCOSE BLD-MCNC: 133 MG/DL (ref 65–99)
GLUCOSE BLD-MCNC: 133 MG/DL (ref 65–99)
GLUCOSE BLD-MCNC: 135 MG/DL (ref 65–99)
GLUCOSE BLD-MCNC: 135 MG/DL (ref 65–99)
GLUCOSE BLD-MCNC: 136 MG/DL (ref 65–99)
GLUCOSE BLD-MCNC: 139 MG/DL (ref 65–99)
GLUCOSE BLD-MCNC: 139 MG/DL (ref 65–99)
GLUCOSE BLD-MCNC: 140 MG/DL (ref 65–99)
GLUCOSE BLD-MCNC: 141 MG/DL (ref 65–99)
GLUCOSE BLD-MCNC: 141 MG/DL (ref 65–99)
GLUCOSE BLD-MCNC: 142 MG/DL (ref 65–99)
GLUCOSE BLD-MCNC: 143 MG/DL (ref 65–99)
GLUCOSE BLD-MCNC: 144 MG/DL (ref 65–99)
GLUCOSE BLD-MCNC: 145 MG/DL (ref 65–99)
GLUCOSE BLD-MCNC: 147 MG/DL (ref 65–99)
GLUCOSE BLD-MCNC: 149 MG/DL (ref 65–99)
GLUCOSE BLD-MCNC: 149 MG/DL (ref 65–99)
GLUCOSE BLD-MCNC: 150 MG/DL (ref 65–99)
GLUCOSE BLD-MCNC: 150 MG/DL (ref 65–99)
GLUCOSE BLD-MCNC: 152 MG/DL (ref 65–99)
GLUCOSE BLD-MCNC: 155 MG/DL (ref 65–99)
GLUCOSE BLD-MCNC: 155 MG/DL (ref 65–99)
GLUCOSE BLD-MCNC: 158 MG/DL (ref 65–99)
GLUCOSE BLD-MCNC: 160 MG/DL (ref 65–99)
GLUCOSE BLD-MCNC: 165 MG/DL (ref 65–99)
GLUCOSE BLD-MCNC: 166 MG/DL (ref 65–99)
GLUCOSE BLD-MCNC: 167 MG/DL (ref 65–99)
GLUCOSE BLD-MCNC: 168 MG/DL (ref 65–99)
GLUCOSE BLD-MCNC: 170 MG/DL (ref 65–99)
GLUCOSE BLD-MCNC: 170 MG/DL (ref 65–99)
GLUCOSE BLD-MCNC: 175 MG/DL (ref 65–99)
GLUCOSE BLD-MCNC: 176 MG/DL (ref 65–99)
GLUCOSE BLD-MCNC: 178 MG/DL (ref 65–99)
GLUCOSE BLD-MCNC: 180 MG/DL (ref 65–99)
GLUCOSE BLD-MCNC: 181 MG/DL (ref 65–99)
GLUCOSE BLD-MCNC: 184 MG/DL (ref 65–99)
GLUCOSE BLD-MCNC: 187 MG/DL (ref 65–99)
GLUCOSE BLD-MCNC: 187 MG/DL (ref 65–99)
GLUCOSE BLD-MCNC: 189 MG/DL (ref 65–99)
GLUCOSE BLD-MCNC: 190 MG/DL (ref 65–99)
GLUCOSE BLD-MCNC: 190 MG/DL (ref 65–99)
GLUCOSE BLD-MCNC: 192 MG/DL (ref 65–99)
GLUCOSE BLD-MCNC: 197 MG/DL (ref 65–99)
GLUCOSE BLD-MCNC: 199 MG/DL (ref 65–99)
GLUCOSE BLD-MCNC: 218 MG/DL (ref 65–99)
GLUCOSE BLD-MCNC: 98 MG/DL (ref 65–99)
GLUCOSE BLD-MCNC: 98 MG/DL (ref 65–99)
GLUCOSE BLD-MCNC: 99 MG/DL (ref 65–99)
GLUCOSE SERPL-MCNC: 104 MG/DL (ref 65–99)
GLUCOSE SERPL-MCNC: 108 MG/DL (ref 65–99)
GLUCOSE SERPL-MCNC: 113 MG/DL (ref 65–99)
GLUCOSE SERPL-MCNC: 113 MG/DL (ref 65–99)
GLUCOSE SERPL-MCNC: 120 MG/DL (ref 65–99)
GLUCOSE SERPL-MCNC: 121 MG/DL (ref 65–99)
GLUCOSE SERPL-MCNC: 123 MG/DL (ref 65–99)
GLUCOSE SERPL-MCNC: 134 MG/DL (ref 65–99)
GLUCOSE SERPL-MCNC: 136 MG/DL (ref 65–99)
GLUCOSE SERPL-MCNC: 138 MG/DL (ref 65–99)
GLUCOSE SERPL-MCNC: 138 MG/DL (ref 65–99)
GLUCOSE SERPL-MCNC: 140 MG/DL (ref 65–99)
GLUCOSE SERPL-MCNC: 140 MG/DL (ref 65–99)
GLUCOSE SERPL-MCNC: 141 MG/DL (ref 65–99)
GLUCOSE SERPL-MCNC: 142 MG/DL (ref 65–99)
GLUCOSE SERPL-MCNC: 145 MG/DL (ref 65–99)
GLUCOSE SERPL-MCNC: 147 MG/DL (ref 65–99)
GLUCOSE SERPL-MCNC: 148 MG/DL (ref 65–99)
GLUCOSE SERPL-MCNC: 150 MG/DL (ref 65–99)
GLUCOSE SERPL-MCNC: 155 MG/DL (ref 65–99)
GLUCOSE SERPL-MCNC: 157 MG/DL (ref 65–99)
GLUCOSE SERPL-MCNC: 158 MG/DL (ref 65–99)
GLUCOSE SERPL-MCNC: 173 MG/DL (ref 65–99)
GLUCOSE SERPL-MCNC: 173 MG/DL (ref 65–99)
GLUCOSE SERPL-MCNC: 177 MG/DL (ref 65–99)
GLUCOSE SERPL-MCNC: 178 MG/DL (ref 65–99)
GLUCOSE SERPL-MCNC: 185 MG/DL (ref 65–99)
GLUCOSE SERPL-MCNC: 198 MG/DL (ref 65–99)
GLUCOSE SERPL-MCNC: 213 MG/DL (ref 65–99)
GLUCOSE UR STRIP.AUTO-MCNC: NEGATIVE MG/DL
GRAM STN SPEC: NORMAL
GRAN CASTS #/AREA URNS LPF: ABNORMAL /LPF
HAV IGM SERPL QL IA: NORMAL
HBA1C MFR BLD: 5.5 % (ref 0–5.6)
HBV CORE IGM SER QL: NORMAL
HBV SURFACE AB SERPL IA-ACNC: <3.5 MIU/ML (ref 0–10)
HBV SURFACE AG SER QL: NORMAL
HCO3 BLDA-SCNC: 24.6 MMOL/L (ref 17–25)
HCO3 BLDA-SCNC: 28.5 MMOL/L (ref 17–25)
HCO3 BLDA-SCNC: 28.9 MMOL/L (ref 17–25)
HCO3 BLDA-SCNC: 29.1 MMOL/L (ref 17–25)
HCO3 BLDA-SCNC: 31 MMOL/L (ref 17–25)
HCO3 BLDA-SCNC: 31 MMOL/L (ref 17–25)
HCT VFR BLD AUTO: 19.5 % (ref 42–52)
HCT VFR BLD AUTO: 24.9 % (ref 42–52)
HCT VFR BLD AUTO: 25 % (ref 42–52)
HCT VFR BLD AUTO: 25.1 % (ref 42–52)
HCT VFR BLD AUTO: 25.2 % (ref 42–52)
HCT VFR BLD AUTO: 25.3 % (ref 42–52)
HCT VFR BLD AUTO: 26 % (ref 42–52)
HCT VFR BLD AUTO: 26.5 % (ref 42–52)
HCT VFR BLD AUTO: 26.9 % (ref 42–52)
HCT VFR BLD AUTO: 27.3 % (ref 42–52)
HCT VFR BLD AUTO: 27.4 % (ref 42–52)
HCT VFR BLD AUTO: 27.8 % (ref 42–52)
HCT VFR BLD AUTO: 28.1 % (ref 42–52)
HCT VFR BLD AUTO: 28.7 % (ref 42–52)
HCT VFR BLD AUTO: 29.2 % (ref 42–52)
HCT VFR BLD AUTO: 29.5 % (ref 42–52)
HCT VFR BLD AUTO: 29.6 % (ref 42–52)
HCT VFR BLD AUTO: 30.5 % (ref 42–52)
HCT VFR BLD AUTO: 34.8 % (ref 42–52)
HCT VFR BLD AUTO: 35.4 % (ref 42–52)
HCT VFR BLD AUTO: 38.7 % (ref 42–52)
HCT VFR BLD AUTO: 42.3 % (ref 42–52)
HCT VFR BLD AUTO: 44.9 % (ref 42–52)
HCT VFR BLD AUTO: 47 % (ref 42–52)
HCT VFR BLD AUTO: 51.8 % (ref 42–52)
HCT VFR BLD CALC: 26 % (ref 42–52)
HCV AB SER QL: NORMAL
HDLC SERPL-MCNC: 46 MG/DL
HGB BLD-MCNC: 10.3 G/DL (ref 14–18)
HGB BLD-MCNC: 11.4 G/DL (ref 14–18)
HGB BLD-MCNC: 12.5 G/DL (ref 14–18)
HGB BLD-MCNC: 13.6 G/DL (ref 14–18)
HGB BLD-MCNC: 14.8 G/DL (ref 14–18)
HGB BLD-MCNC: 15.1 G/DL (ref 14–18)
HGB BLD-MCNC: 16.5 G/DL (ref 14–18)
HGB BLD-MCNC: 6 G/DL (ref 14–18)
HGB BLD-MCNC: 7 G/DL (ref 14–18)
HGB BLD-MCNC: 7.6 G/DL (ref 14–18)
HGB BLD-MCNC: 7.6 G/DL (ref 14–18)
HGB BLD-MCNC: 7.7 G/DL (ref 14–18)
HGB BLD-MCNC: 7.8 G/DL (ref 14–18)
HGB BLD-MCNC: 7.9 G/DL (ref 14–18)
HGB BLD-MCNC: 8.1 G/DL (ref 14–18)
HGB BLD-MCNC: 8.2 G/DL (ref 14–18)
HGB BLD-MCNC: 8.5 G/DL (ref 14–18)
HGB BLD-MCNC: 8.5 G/DL (ref 14–18)
HGB BLD-MCNC: 8.8 G/DL (ref 14–18)
HGB BLD-MCNC: 8.9 G/DL (ref 14–18)
HGB BLD-MCNC: 8.9 G/DL (ref 14–18)
HGB BLD-MCNC: 9 G/DL (ref 14–18)
HGB BLD-MCNC: 9 G/DL (ref 14–18)
HGB BLD-MCNC: 9.8 G/DL (ref 14–18)
HOROWITZ INDEX BLDA+IHG-RTO: 115 MM[HG]
HOROWITZ INDEX BLDA+IHG-RTO: 120 MM[HG]
HOROWITZ INDEX BLDA+IHG-RTO: 331 MM[HG]
HOROWITZ INDEX BLDA+IHG-RTO: 75 MM[HG]
HYALINE CASTS #/AREA URNS LPF: ABNORMAL /LPF
HYPOCHROMIA BLD QL SMEAR: ABNORMAL
IMM GRANULOCYTES # BLD AUTO: 0.03 K/UL (ref 0–0.11)
IMM GRANULOCYTES # BLD AUTO: 0.05 K/UL (ref 0–0.11)
IMM GRANULOCYTES NFR BLD AUTO: 0.4 % (ref 0–0.9)
IMM GRANULOCYTES NFR BLD AUTO: 0.5 % (ref 0–0.9)
INR PPP: 1.03 (ref 0.87–1.13)
INR PPP: 1.11 (ref 0.87–1.13)
INR PPP: 1.67 (ref 0.87–1.13)
INR PPP: 2.22 (ref 0.87–1.13)
INST. QUALIFIED PATIENT IIQPT: YES
IRON SATN MFR SERPL: ABNORMAL % (ref 15–55)
IRON SERPL-MCNC: 18 UG/DL (ref 50–180)
KETONES UR STRIP.AUTO-MCNC: NEGATIVE MG/DL
LACTATE BLD-SCNC: 0.7 MMOL/L (ref 0.5–2)
LACTATE BLD-SCNC: 0.8 MMOL/L (ref 0.5–2)
LACTATE BLD-SCNC: 1.2 MMOL/L (ref 0.5–2)
LACTATE BLD-SCNC: 1.2 MMOL/L (ref 0.5–2)
LACTATE BLD-SCNC: 1.4 MMOL/L (ref 0.5–2)
LACTATE BLD-SCNC: 1.5 MMOL/L (ref 0.5–2)
LACTATE BLD-SCNC: 1.8 MMOL/L (ref 0.5–2)
LACTATE BLD-SCNC: 2 MMOL/L (ref 0.5–2)
LACTATE BLD-SCNC: 2.2 MMOL/L (ref 0.5–2)
LACTATE BLD-SCNC: 2.2 MMOL/L (ref 0.5–2)
LACTATE BLD-SCNC: 2.4 MMOL/L (ref 0.5–2)
LDLC SERPL CALC-MCNC: ABNORMAL MG/DL
LEUKOCYTE ESTERASE UR QL STRIP.AUTO: NEGATIVE
LG PLATELETS BLD QL SMEAR: NORMAL
LIPASE SERPL-CCNC: 124 U/L (ref 11–82)
LIPASE SERPL-CCNC: 375 U/L (ref 11–82)
LIPASE SERPL-CCNC: 62 U/L (ref 11–82)
LIPASE SERPL-CCNC: 67 U/L (ref 11–82)
LIPASE SERPL-CCNC: 860 U/L (ref 11–82)
LIPASE SERPL-CCNC: >3000 U/L (ref 11–82)
LV EJECT FRACT  99904: 60
LV EJECT FRACT MOD 2C 99903: 53.41
LV EJECT FRACT MOD 4C 99902: 67.31
LV EJECT FRACT MOD BP 99901: 60.44
LYMPHOCYTES # BLD AUTO: 0.08 K/UL (ref 1–4.8)
LYMPHOCYTES # BLD AUTO: 0.09 K/UL (ref 1–4.8)
LYMPHOCYTES # BLD AUTO: 0.11 K/UL (ref 1–4.8)
LYMPHOCYTES # BLD AUTO: 0.12 K/UL (ref 1–4.8)
LYMPHOCYTES # BLD AUTO: 0.12 K/UL (ref 1–4.8)
LYMPHOCYTES # BLD AUTO: 0.14 K/UL (ref 1–4.8)
LYMPHOCYTES # BLD AUTO: 0.16 K/UL (ref 1–4.8)
LYMPHOCYTES # BLD AUTO: 0.19 K/UL (ref 1–4.8)
LYMPHOCYTES # BLD AUTO: 0.2 K/UL (ref 1–4.8)
LYMPHOCYTES # BLD AUTO: 0.22 K/UL (ref 1–4.8)
LYMPHOCYTES # BLD AUTO: 0.23 K/UL (ref 1–4.8)
LYMPHOCYTES # BLD AUTO: 0.34 K/UL (ref 1–4.8)
LYMPHOCYTES # BLD AUTO: 0.35 K/UL (ref 1–4.8)
LYMPHOCYTES # BLD AUTO: 0.42 K/UL (ref 1–4.8)
LYMPHOCYTES # BLD AUTO: 0.44 K/UL (ref 1–4.8)
LYMPHOCYTES # BLD AUTO: 0.48 K/UL (ref 1–4.8)
LYMPHOCYTES # BLD AUTO: 0.58 K/UL (ref 1–4.8)
LYMPHOCYTES # BLD AUTO: 0.65 K/UL (ref 1–4.8)
LYMPHOCYTES # BLD AUTO: 0.67 K/UL (ref 1–4.8)
LYMPHOCYTES # BLD AUTO: 0.74 K/UL (ref 1–4.8)
LYMPHOCYTES # BLD AUTO: 0.92 K/UL (ref 1–4.8)
LYMPHOCYTES # BLD AUTO: 0.99 K/UL (ref 1–4.8)
LYMPHOCYTES # BLD AUTO: 1 K/UL (ref 1–4.8)
LYMPHOCYTES # BLD AUTO: 1.24 K/UL (ref 1–4.8)
LYMPHOCYTES NFR BLD: 1.7 % (ref 22–41)
LYMPHOCYTES NFR BLD: 1.8 % (ref 22–41)
LYMPHOCYTES NFR BLD: 16.3 % (ref 22–41)
LYMPHOCYTES NFR BLD: 18.4 % (ref 22–41)
LYMPHOCYTES NFR BLD: 2.6 % (ref 22–41)
LYMPHOCYTES NFR BLD: 2.6 % (ref 22–41)
LYMPHOCYTES NFR BLD: 2.7 % (ref 22–41)
LYMPHOCYTES NFR BLD: 3.5 % (ref 22–41)
LYMPHOCYTES NFR BLD: 3.5 % (ref 22–41)
LYMPHOCYTES NFR BLD: 4.3 % (ref 22–41)
LYMPHOCYTES NFR BLD: 4.3 % (ref 22–41)
LYMPHOCYTES NFR BLD: 4.4 % (ref 22–41)
LYMPHOCYTES NFR BLD: 4.4 % (ref 22–41)
LYMPHOCYTES NFR BLD: 4.5 % (ref 22–41)
LYMPHOCYTES NFR BLD: 6 % (ref 22–41)
LYMPHOCYTES NFR BLD: 6.1 % (ref 22–41)
LYMPHOCYTES NFR BLD: 7 % (ref 22–41)
LYMPHOCYTES NFR BLD: 7.8 % (ref 22–41)
LYMPHOCYTES NFR BLD: 8.7 % (ref 22–41)
LYMPHOCYTES NFR BLD: 9.6 % (ref 22–41)
MACROCYTES BLD QL SMEAR: ABNORMAL
MACROCYTES BLD QL SMEAR: NORMAL
MAGNESIUM SERPL-MCNC: 1.6 MG/DL (ref 1.5–2.5)
MAGNESIUM SERPL-MCNC: 1.8 MG/DL (ref 1.5–2.5)
MAGNESIUM SERPL-MCNC: 1.8 MG/DL (ref 1.5–2.5)
MAGNESIUM SERPL-MCNC: 1.9 MG/DL (ref 1.5–2.5)
MAGNESIUM SERPL-MCNC: 2 MG/DL (ref 1.5–2.5)
MAGNESIUM SERPL-MCNC: 2.1 MG/DL (ref 1.5–2.5)
MAGNESIUM SERPL-MCNC: 2.1 MG/DL (ref 1.5–2.5)
MAGNESIUM SERPL-MCNC: 2.2 MG/DL (ref 1.5–2.5)
MAGNESIUM SERPL-MCNC: 2.3 MG/DL (ref 1.5–2.5)
MAGNESIUM SERPL-MCNC: 2.4 MG/DL (ref 1.5–2.5)
MAGNESIUM SERPL-MCNC: 2.5 MG/DL (ref 1.5–2.5)
MAGNESIUM SERPL-MCNC: 2.6 MG/DL (ref 1.5–2.5)
MAGNESIUM SERPL-MCNC: 2.6 MG/DL (ref 1.5–2.5)
MAGNESIUM SERPL-MCNC: 2.8 MG/DL (ref 1.5–2.5)
MAGNESIUM SERPL-MCNC: 2.9 MG/DL (ref 1.5–2.5)
MANUAL DIFF BLD: ABNORMAL
MANUAL DIFF BLD: NORMAL
MCF BLD TEG: 57.6 MM (ref 50–70)
MCH RBC QN AUTO: 26.6 PG (ref 27–33)
MCH RBC QN AUTO: 26.9 PG (ref 27–33)
MCH RBC QN AUTO: 26.9 PG (ref 27–33)
MCH RBC QN AUTO: 27.4 PG (ref 27–33)
MCH RBC QN AUTO: 27.6 PG (ref 27–33)
MCH RBC QN AUTO: 29.2 PG (ref 27–33)
MCH RBC QN AUTO: 29.3 PG (ref 27–33)
MCH RBC QN AUTO: 29.4 PG (ref 27–33)
MCH RBC QN AUTO: 29.7 PG (ref 27–33)
MCH RBC QN AUTO: 29.7 PG (ref 27–33)
MCH RBC QN AUTO: 29.8 PG (ref 27–33)
MCH RBC QN AUTO: 29.8 PG (ref 27–33)
MCH RBC QN AUTO: 29.9 PG (ref 27–33)
MCH RBC QN AUTO: 30 PG (ref 27–33)
MCH RBC QN AUTO: 30 PG (ref 27–33)
MCH RBC QN AUTO: 30.2 PG (ref 27–33)
MCH RBC QN AUTO: 30.2 PG (ref 27–33)
MCH RBC QN AUTO: 30.3 PG (ref 27–33)
MCH RBC QN AUTO: 30.4 PG (ref 27–33)
MCH RBC QN AUTO: 30.5 PG (ref 27–33)
MCH RBC QN AUTO: 30.7 PG (ref 27–33)
MCH RBC QN AUTO: 30.8 PG (ref 27–33)
MCH RBC QN AUTO: 30.8 PG (ref 27–33)
MCH RBC QN AUTO: 30.9 PG (ref 27–33)
MCHC RBC AUTO-ENTMCNC: 29.6 G/DL (ref 33.7–35.3)
MCHC RBC AUTO-ENTMCNC: 29.9 G/DL (ref 33.7–35.3)
MCHC RBC AUTO-ENTMCNC: 30.1 G/DL (ref 33.7–35.3)
MCHC RBC AUTO-ENTMCNC: 30.2 G/DL (ref 33.7–35.3)
MCHC RBC AUTO-ENTMCNC: 30.2 G/DL (ref 33.7–35.3)
MCHC RBC AUTO-ENTMCNC: 30.4 G/DL (ref 33.7–35.3)
MCHC RBC AUTO-ENTMCNC: 30.5 G/DL (ref 33.7–35.3)
MCHC RBC AUTO-ENTMCNC: 30.5 G/DL (ref 33.7–35.3)
MCHC RBC AUTO-ENTMCNC: 30.8 G/DL (ref 33.7–35.3)
MCHC RBC AUTO-ENTMCNC: 31 G/DL (ref 33.7–35.3)
MCHC RBC AUTO-ENTMCNC: 31.1 G/DL (ref 33.7–35.3)
MCHC RBC AUTO-ENTMCNC: 31.2 G/DL (ref 33.7–35.3)
MCHC RBC AUTO-ENTMCNC: 31.6 G/DL (ref 33.7–35.3)
MCHC RBC AUTO-ENTMCNC: 31.6 G/DL (ref 33.7–35.3)
MCHC RBC AUTO-ENTMCNC: 31.7 G/DL (ref 33.7–35.3)
MCHC RBC AUTO-ENTMCNC: 31.9 G/DL (ref 33.7–35.3)
MCHC RBC AUTO-ENTMCNC: 32.1 G/DL (ref 33.7–35.3)
MCHC RBC AUTO-ENTMCNC: 32.2 G/DL (ref 33.7–35.3)
MCHC RBC AUTO-ENTMCNC: 32.3 G/DL (ref 33.7–35.3)
MCHC RBC AUTO-ENTMCNC: 33 G/DL (ref 33.7–35.3)
MCV RBC AUTO: 100.7 FL (ref 81.4–97.8)
MCV RBC AUTO: 102.1 FL (ref 81.4–97.8)
MCV RBC AUTO: 88.5 FL (ref 81.4–97.8)
MCV RBC AUTO: 88.7 FL (ref 81.4–97.8)
MCV RBC AUTO: 88.7 FL (ref 81.4–97.8)
MCV RBC AUTO: 89 FL (ref 81.4–97.8)
MCV RBC AUTO: 89.9 FL (ref 81.4–97.8)
MCV RBC AUTO: 90.3 FL (ref 81.4–97.8)
MCV RBC AUTO: 91.6 FL (ref 81.4–97.8)
MCV RBC AUTO: 92.2 FL (ref 81.4–97.8)
MCV RBC AUTO: 92.6 FL (ref 81.4–97.8)
MCV RBC AUTO: 93.7 FL (ref 81.4–97.8)
MCV RBC AUTO: 93.9 FL (ref 81.4–97.8)
MCV RBC AUTO: 94.1 FL (ref 81.4–97.8)
MCV RBC AUTO: 94.6 FL (ref 81.4–97.8)
MCV RBC AUTO: 95 FL (ref 81.4–97.8)
MCV RBC AUTO: 95.1 FL (ref 81.4–97.8)
MCV RBC AUTO: 95.9 FL (ref 81.4–97.8)
MCV RBC AUTO: 96.6 FL (ref 81.4–97.8)
MCV RBC AUTO: 96.6 FL (ref 81.4–97.8)
MCV RBC AUTO: 97.3 FL (ref 81.4–97.8)
MCV RBC AUTO: 97.5 FL (ref 81.4–97.8)
MCV RBC AUTO: 97.7 FL (ref 81.4–97.8)
MCV RBC AUTO: 98.9 FL (ref 81.4–97.8)
METAMYELOCYTES NFR BLD MANUAL: 0.9 %
METAMYELOCYTES NFR BLD MANUAL: 1.7 %
METAMYELOCYTES NFR BLD MANUAL: 2.6 %
METAMYELOCYTES NFR BLD MANUAL: 2.6 %
METAMYELOCYTES NFR BLD MANUAL: 3.5 %
METAMYELOCYTES NFR BLD MANUAL: 4.3 %
MICRO URNS: ABNORMAL
MICROCYTES BLD QL SMEAR: ABNORMAL
MONOCYTES # BLD AUTO: 0 K/UL (ref 0–0.85)
MONOCYTES # BLD AUTO: 0 K/UL (ref 0–0.85)
MONOCYTES # BLD AUTO: 0.03 K/UL (ref 0–0.85)
MONOCYTES # BLD AUTO: 0.06 K/UL (ref 0–0.85)
MONOCYTES # BLD AUTO: 0.11 K/UL (ref 0–0.85)
MONOCYTES # BLD AUTO: 0.14 K/UL (ref 0–0.85)
MONOCYTES # BLD AUTO: 0.16 K/UL (ref 0–0.85)
MONOCYTES # BLD AUTO: 0.24 K/UL (ref 0–0.85)
MONOCYTES # BLD AUTO: 0.26 K/UL (ref 0–0.85)
MONOCYTES # BLD AUTO: 0.27 K/UL (ref 0–0.85)
MONOCYTES # BLD AUTO: 0.28 K/UL (ref 0–0.85)
MONOCYTES # BLD AUTO: 0.29 K/UL (ref 0–0.85)
MONOCYTES # BLD AUTO: 0.32 K/UL (ref 0–0.85)
MONOCYTES # BLD AUTO: 0.35 K/UL (ref 0–0.85)
MONOCYTES # BLD AUTO: 0.38 K/UL (ref 0–0.85)
MONOCYTES # BLD AUTO: 0.39 K/UL (ref 0–0.85)
MONOCYTES # BLD AUTO: 0.41 K/UL (ref 0–0.85)
MONOCYTES # BLD AUTO: 0.42 K/UL (ref 0–0.85)
MONOCYTES # BLD AUTO: 0.54 K/UL (ref 0–0.85)
MONOCYTES # BLD AUTO: 0.69 K/UL (ref 0–0.85)
MONOCYTES # BLD AUTO: 0.74 K/UL (ref 0–0.85)
MONOCYTES # BLD AUTO: 0.75 K/UL (ref 0–0.85)
MONOCYTES # BLD AUTO: 0.83 K/UL (ref 0–0.85)
MONOCYTES # BLD AUTO: 0.92 K/UL (ref 0–0.85)
MONOCYTES NFR BLD AUTO: 0 % (ref 0–13.4)
MONOCYTES NFR BLD AUTO: 0 % (ref 0–13.4)
MONOCYTES NFR BLD AUTO: 0.9 % (ref 0–13.4)
MONOCYTES NFR BLD AUTO: 0.9 % (ref 0–13.4)
MONOCYTES NFR BLD AUTO: 1.7 % (ref 0–13.4)
MONOCYTES NFR BLD AUTO: 14.9 % (ref 0–13.4)
MONOCYTES NFR BLD AUTO: 2.6 % (ref 0–13.4)
MONOCYTES NFR BLD AUTO: 2.7 % (ref 0–13.4)
MONOCYTES NFR BLD AUTO: 3.5 % (ref 0–13.4)
MONOCYTES NFR BLD AUTO: 3.5 % (ref 0–13.4)
MONOCYTES NFR BLD AUTO: 4.1 % (ref 0–13.4)
MONOCYTES NFR BLD AUTO: 4.3 % (ref 0–13.4)
MONOCYTES NFR BLD AUTO: 4.3 % (ref 0–13.4)
MONOCYTES NFR BLD AUTO: 4.4 % (ref 0–13.4)
MONOCYTES NFR BLD AUTO: 4.4 % (ref 0–13.4)
MONOCYTES NFR BLD AUTO: 5.2 % (ref 0–13.4)
MONOCYTES NFR BLD AUTO: 5.2 % (ref 0–13.4)
MONOCYTES NFR BLD AUTO: 6.1 % (ref 0–13.4)
MONOCYTES NFR BLD AUTO: 6.2 % (ref 0–13.4)
MONOCYTES NFR BLD AUTO: 6.3 % (ref 0–13.4)
MONOCYTES NFR BLD AUTO: 7.8 % (ref 0–13.4)
MONOCYTES NFR BLD AUTO: 9.1 % (ref 0–13.4)
MORPHOLOGY BLD-IMP: NORMAL
MYELOCYTES NFR BLD MANUAL: 0.9 %
MYELOCYTES NFR BLD MANUAL: 1.7 %
NEUTROPHILS # BLD AUTO: 17.36 K/UL (ref 1.82–7.42)
NEUTROPHILS # BLD AUTO: 2.95 K/UL (ref 1.82–7.42)
NEUTROPHILS # BLD AUTO: 3.73 K/UL (ref 1.82–7.42)
NEUTROPHILS # BLD AUTO: 3.91 K/UL (ref 1.82–7.42)
NEUTROPHILS # BLD AUTO: 3.98 K/UL (ref 1.82–7.42)
NEUTROPHILS # BLD AUTO: 4.17 K/UL (ref 1.82–7.42)
NEUTROPHILS # BLD AUTO: 4.38 K/UL (ref 1.82–7.42)
NEUTROPHILS # BLD AUTO: 4.46 K/UL (ref 1.82–7.42)
NEUTROPHILS # BLD AUTO: 4.79 K/UL (ref 1.82–7.42)
NEUTROPHILS # BLD AUTO: 4.84 K/UL (ref 1.82–7.42)
NEUTROPHILS # BLD AUTO: 4.87 K/UL (ref 1.82–7.42)
NEUTROPHILS # BLD AUTO: 5.42 K/UL (ref 1.82–7.42)
NEUTROPHILS # BLD AUTO: 5.48 K/UL (ref 1.82–7.42)
NEUTROPHILS # BLD AUTO: 5.7 K/UL (ref 1.82–7.42)
NEUTROPHILS # BLD AUTO: 6.04 K/UL (ref 1.82–7.42)
NEUTROPHILS # BLD AUTO: 6.11 K/UL (ref 1.82–7.42)
NEUTROPHILS # BLD AUTO: 6.26 K/UL (ref 1.82–7.42)
NEUTROPHILS # BLD AUTO: 6.87 K/UL (ref 1.82–7.42)
NEUTROPHILS # BLD AUTO: 6.92 K/UL (ref 1.82–7.42)
NEUTROPHILS # BLD AUTO: 7.76 K/UL (ref 1.82–7.42)
NEUTROPHILS # BLD AUTO: 8.85 K/UL (ref 1.82–7.42)
NEUTROPHILS # BLD AUTO: 8.87 K/UL (ref 1.82–7.42)
NEUTROPHILS # BLD AUTO: 9.88 K/UL (ref 1.82–7.42)
NEUTROPHILS # BLD AUTO: 9.93 K/UL (ref 1.82–7.42)
NEUTROPHILS NFR BLD: 57 % (ref 44–72)
NEUTROPHILS NFR BLD: 57.4 % (ref 44–72)
NEUTROPHILS NFR BLD: 65.2 % (ref 44–72)
NEUTROPHILS NFR BLD: 70.4 % (ref 44–72)
NEUTROPHILS NFR BLD: 70.8 % (ref 44–72)
NEUTROPHILS NFR BLD: 71.8 % (ref 44–72)
NEUTROPHILS NFR BLD: 73.9 % (ref 44–72)
NEUTROPHILS NFR BLD: 75.4 % (ref 44–72)
NEUTROPHILS NFR BLD: 75.9 % (ref 44–72)
NEUTROPHILS NFR BLD: 76.1 % (ref 44–72)
NEUTROPHILS NFR BLD: 76.5 % (ref 44–72)
NEUTROPHILS NFR BLD: 77.2 % (ref 44–72)
NEUTROPHILS NFR BLD: 77.4 % (ref 44–72)
NEUTROPHILS NFR BLD: 77.4 % (ref 44–72)
NEUTROPHILS NFR BLD: 80 % (ref 44–72)
NEUTROPHILS NFR BLD: 80.9 % (ref 44–72)
NEUTROPHILS NFR BLD: 81.4 % (ref 44–72)
NEUTROPHILS NFR BLD: 81.6 % (ref 44–72)
NEUTROPHILS NFR BLD: 81.7 % (ref 44–72)
NEUTROPHILS NFR BLD: 82.8 % (ref 44–72)
NEUTROPHILS NFR BLD: 83 % (ref 44–72)
NEUTROPHILS NFR BLD: 83.5 % (ref 44–72)
NEUTROPHILS NFR BLD: 85.2 % (ref 44–72)
NEUTROPHILS NFR BLD: 85.3 % (ref 44–72)
NEUTS BAND NFR BLD MANUAL: 1.8 % (ref 0–10)
NEUTS BAND NFR BLD MANUAL: 10.6 % (ref 0–10)
NEUTS BAND NFR BLD MANUAL: 11.5 % (ref 0–10)
NEUTS BAND NFR BLD MANUAL: 12.2 % (ref 0–10)
NEUTS BAND NFR BLD MANUAL: 14 % (ref 0–10)
NEUTS BAND NFR BLD MANUAL: 14.8 % (ref 0–10)
NEUTS BAND NFR BLD MANUAL: 14.8 % (ref 0–10)
NEUTS BAND NFR BLD MANUAL: 15.7 % (ref 0–10)
NEUTS BAND NFR BLD MANUAL: 21.1 % (ref 0–10)
NEUTS BAND NFR BLD MANUAL: 23.9 % (ref 0–10)
NEUTS BAND NFR BLD MANUAL: 24.3 % (ref 0–10)
NEUTS BAND NFR BLD MANUAL: 27 % (ref 0–10)
NEUTS BAND NFR BLD MANUAL: 4.5 % (ref 0–10)
NEUTS BAND NFR BLD MANUAL: 5.2 % (ref 0–10)
NEUTS BAND NFR BLD MANUAL: 5.3 % (ref 0–10)
NEUTS BAND NFR BLD MANUAL: 6.1 % (ref 0–10)
NEUTS BAND NFR BLD MANUAL: 7.8 % (ref 0–10)
NEUTS BAND NFR BLD MANUAL: 8.6 % (ref 0–10)
NEUTS BAND NFR BLD MANUAL: 9.6 % (ref 0–10)
NITRITE UR QL STRIP.AUTO: NEGATIVE
NRBC # BLD AUTO: 0 K/UL
NRBC # BLD AUTO: 0.02 K/UL
NRBC # BLD AUTO: 0.02 K/UL
NRBC # BLD AUTO: 0.03 K/UL
NRBC BLD-RTO: 0 /100 WBC
NRBC BLD-RTO: 0.3 /100 WBC
NRBC BLD-RTO: 0.4 /100 WBC
NRBC BLD-RTO: 0.5 /100 WBC
NT-PROBNP SERPL IA-MCNC: 243 PG/ML (ref 0–125)
NT-PROBNP SERPL IA-MCNC: 338 PG/ML (ref 0–125)
O2/TOTAL GAS SETTING VFR VENT: 100 %
O2/TOTAL GAS SETTING VFR VENT: 36 %
O2/TOTAL GAS SETTING VFR VENT: 55 %
O2/TOTAL GAS SETTING VFR VENT: 85 %
PA AA BLD-ACNC: 19.9 %
PA ADP BLD-ACNC: 44.9 %
PCO2 BLDA: 114.2 MMHG (ref 26–37)
PCO2 BLDA: 33 MMHG (ref 26–37)
PCO2 BLDA: 35.5 MMHG (ref 26–37)
PCO2 BLDA: 54.2 MMHG (ref 26–37)
PCO2 BLDA: 54.9 MMHG (ref 26–37)
PCO2 BLDA: 58.8 MMHG (ref 26–37)
PCO2 TEMP ADJ BLDA: 35.9 MMHG (ref 26–37)
PCO2 TEMP ADJ BLDA: 52.6 MMHG (ref 26–37)
PCO2 TEMP ADJ BLDA: 52.8 MMHG (ref 26–37)
PF4 HEPARIN CMPLX IGG SER-IMP: NEGATIVE
PF4 HEPARIN CMPLX IGG SERPL IA: 0.21 OD
PH BLDA: 7.05 [PH] (ref 7.4–7.5)
PH BLDA: 7.26 [PH] (ref 7.4–7.5)
PH BLDA: 7.33 [PH] (ref 7.4–7.5)
PH BLDA: 7.34 [PH] (ref 7.4–7.5)
PH BLDA: 7.51 [PH] (ref 7.4–7.5)
PH BLDA: 7.55 [PH] (ref 7.4–7.5)
PH TEMP ADJ BLDA: 7.27 [PH] (ref 7.4–7.5)
PH TEMP ADJ BLDA: 7.34 [PH] (ref 7.4–7.5)
PH TEMP ADJ BLDA: 7.51 [PH] (ref 7.4–7.5)
PH UR STRIP.AUTO: 5 [PH] (ref 5–8)
PHOSPHATE SERPL-MCNC: 1.7 MG/DL (ref 2.5–4.5)
PHOSPHATE SERPL-MCNC: 1.9 MG/DL (ref 2.5–4.5)
PHOSPHATE SERPL-MCNC: 3.2 MG/DL (ref 2.5–4.5)
PHOSPHATE SERPL-MCNC: 3.3 MG/DL (ref 2.5–4.5)
PHOSPHATE SERPL-MCNC: 3.5 MG/DL (ref 2.5–4.5)
PHOSPHATE SERPL-MCNC: 3.5 MG/DL (ref 2.5–4.5)
PHOSPHATE SERPL-MCNC: 3.6 MG/DL (ref 2.5–4.5)
PHOSPHATE SERPL-MCNC: 3.6 MG/DL (ref 2.5–4.5)
PHOSPHATE SERPL-MCNC: 3.7 MG/DL (ref 2.5–4.5)
PHOSPHATE SERPL-MCNC: 3.9 MG/DL (ref 2.5–4.5)
PHOSPHATE SERPL-MCNC: 3.9 MG/DL (ref 2.5–4.5)
PHOSPHATE SERPL-MCNC: 4 MG/DL (ref 2.5–4.5)
PHOSPHATE SERPL-MCNC: 4.1 MG/DL (ref 2.5–4.5)
PHOSPHATE SERPL-MCNC: 4.3 MG/DL (ref 2.5–4.5)
PHOSPHATE SERPL-MCNC: 4.3 MG/DL (ref 2.5–4.5)
PHOSPHATE SERPL-MCNC: 4.8 MG/DL (ref 2.5–4.5)
PHOSPHATE SERPL-MCNC: 4.9 MG/DL (ref 2.5–4.5)
PHOSPHATE SERPL-MCNC: 5.1 MG/DL (ref 2.5–4.5)
PHOSPHATE SERPL-MCNC: 5.4 MG/DL (ref 2.5–4.5)
PHOSPHATE SERPL-MCNC: 7.4 MG/DL (ref 2.5–4.5)
PLATELET # BLD AUTO: 101 K/UL (ref 164–446)
PLATELET # BLD AUTO: 107 K/UL (ref 164–446)
PLATELET # BLD AUTO: 113 K/UL (ref 164–446)
PLATELET # BLD AUTO: 119 K/UL (ref 164–446)
PLATELET # BLD AUTO: 172 K/UL (ref 164–446)
PLATELET # BLD AUTO: 174 K/UL (ref 164–446)
PLATELET # BLD AUTO: 176 K/UL (ref 164–446)
PLATELET # BLD AUTO: 183 K/UL (ref 164–446)
PLATELET # BLD AUTO: 202 K/UL (ref 164–446)
PLATELET # BLD AUTO: 208 K/UL (ref 164–446)
PLATELET # BLD AUTO: 209 K/UL (ref 164–446)
PLATELET # BLD AUTO: 211 K/UL (ref 164–446)
PLATELET # BLD AUTO: 231 K/UL (ref 164–446)
PLATELET # BLD AUTO: 241 K/UL (ref 164–446)
PLATELET # BLD AUTO: 250 K/UL (ref 164–446)
PLATELET # BLD AUTO: 273 K/UL (ref 164–446)
PLATELET # BLD AUTO: 293 K/UL (ref 164–446)
PLATELET # BLD AUTO: 303 K/UL (ref 164–446)
PLATELET # BLD AUTO: 67 K/UL (ref 164–446)
PLATELET # BLD AUTO: 71 K/UL (ref 164–446)
PLATELET # BLD AUTO: 74 K/UL (ref 164–446)
PLATELET # BLD AUTO: 80 K/UL (ref 164–446)
PLATELET # BLD AUTO: 88 K/UL (ref 164–446)
PLATELET # BLD AUTO: 92 K/UL (ref 164–446)
PLATELET BLD QL SMEAR: NORMAL
PMV BLD AUTO: 10.8 FL (ref 9–12.9)
PMV BLD AUTO: 11 FL (ref 9–12.9)
PMV BLD AUTO: 11.2 FL (ref 9–12.9)
PMV BLD AUTO: 11.3 FL (ref 9–12.9)
PMV BLD AUTO: 11.4 FL (ref 9–12.9)
PMV BLD AUTO: 11.5 FL (ref 9–12.9)
PMV BLD AUTO: 11.5 FL (ref 9–12.9)
PMV BLD AUTO: 11.6 FL (ref 9–12.9)
PMV BLD AUTO: 11.7 FL (ref 9–12.9)
PMV BLD AUTO: 11.9 FL (ref 9–12.9)
PMV BLD AUTO: 12 FL (ref 9–12.9)
PMV BLD AUTO: 12.1 FL (ref 9–12.9)
PMV BLD AUTO: 12.2 FL (ref 9–12.9)
PMV BLD AUTO: 12.3 FL (ref 9–12.9)
PMV BLD AUTO: 12.4 FL (ref 9–12.9)
PMV BLD AUTO: 12.4 FL (ref 9–12.9)
PMV BLD AUTO: 12.8 FL (ref 9–12.9)
PMV BLD AUTO: 13.5 FL (ref 9–12.9)
PMV BLD AUTO: 13.6 FL (ref 9–12.9)
PMV BLD AUTO: 13.7 FL (ref 9–12.9)
PMV BLD AUTO: 13.9 FL (ref 9–12.9)
PMV BLD AUTO: 14.3 FL (ref 9–12.9)
PO2 BLDA: 102 MMHG (ref 64–87)
PO2 BLDA: 119 MMHG (ref 64–87)
PO2 BLDA: 136.1 MMHG (ref 64–87)
PO2 BLDA: 63 MMHG (ref 64–87)
PO2 BLDA: 75 MMHG (ref 64–87)
PO2 BLDA: 94.3 MMHG (ref 64–87)
PO2 TEMP ADJ BLDA: 104 MMHG (ref 64–87)
PO2 TEMP ADJ BLDA: 115 MMHG (ref 64–87)
PO2 TEMP ADJ BLDA: 71 MMHG (ref 64–87)
POIKILOCYTOSIS BLD QL SMEAR: NORMAL
POLYCHROMASIA BLD QL SMEAR: NORMAL
POTASSIUM BLD-SCNC: 4.9 MMOL/L (ref 3.6–5.5)
POTASSIUM SERPL-SCNC: 3.6 MMOL/L (ref 3.6–5.5)
POTASSIUM SERPL-SCNC: 3.8 MMOL/L (ref 3.6–5.5)
POTASSIUM SERPL-SCNC: 3.9 MMOL/L (ref 3.6–5.5)
POTASSIUM SERPL-SCNC: 3.9 MMOL/L (ref 3.6–5.5)
POTASSIUM SERPL-SCNC: 4 MMOL/L (ref 3.6–5.5)
POTASSIUM SERPL-SCNC: 4.2 MMOL/L (ref 3.6–5.5)
POTASSIUM SERPL-SCNC: 4.3 MMOL/L (ref 3.6–5.5)
POTASSIUM SERPL-SCNC: 4.3 MMOL/L (ref 3.6–5.5)
POTASSIUM SERPL-SCNC: 4.4 MMOL/L (ref 3.6–5.5)
POTASSIUM SERPL-SCNC: 4.5 MMOL/L (ref 3.6–5.5)
POTASSIUM SERPL-SCNC: 4.5 MMOL/L (ref 3.6–5.5)
POTASSIUM SERPL-SCNC: 4.8 MMOL/L (ref 3.6–5.5)
POTASSIUM SERPL-SCNC: 4.8 MMOL/L (ref 3.6–5.5)
POTASSIUM SERPL-SCNC: 5 MMOL/L (ref 3.6–5.5)
POTASSIUM SERPL-SCNC: 5 MMOL/L (ref 3.6–5.5)
POTASSIUM SERPL-SCNC: 5.1 MMOL/L (ref 3.6–5.5)
POTASSIUM SERPL-SCNC: 5.1 MMOL/L (ref 3.6–5.5)
POTASSIUM SERPL-SCNC: 5.3 MMOL/L (ref 3.6–5.5)
POTASSIUM SERPL-SCNC: 5.5 MMOL/L (ref 3.6–5.5)
POTASSIUM SERPL-SCNC: 6 MMOL/L (ref 3.6–5.5)
POTASSIUM SERPL-SCNC: 6 MMOL/L (ref 3.6–5.5)
POTASSIUM SERPL-SCNC: 6.1 MMOL/L (ref 3.6–5.5)
PREALB SERPL-MCNC: 5.6 MG/DL (ref 18–38)
PROCALCITONIN SERPL-MCNC: 4.71 NG/ML
PRODUCT TYPE UPROD: NORMAL
PROT SERPL-MCNC: 5.5 G/DL (ref 6–8.2)
PROT SERPL-MCNC: 5.6 G/DL (ref 6–8.2)
PROT SERPL-MCNC: 5.6 G/DL (ref 6–8.2)
PROT SERPL-MCNC: 5.9 G/DL (ref 6–8.2)
PROT SERPL-MCNC: 6 G/DL (ref 6–8.2)
PROT SERPL-MCNC: 6 G/DL (ref 6–8.2)
PROT SERPL-MCNC: 6.1 G/DL (ref 6–8.2)
PROT SERPL-MCNC: 6.1 G/DL (ref 6–8.2)
PROT SERPL-MCNC: 6.2 G/DL (ref 6–8.2)
PROT SERPL-MCNC: 6.3 G/DL (ref 6–8.2)
PROT SERPL-MCNC: 6.3 G/DL (ref 6–8.2)
PROT SERPL-MCNC: 6.4 G/DL (ref 6–8.2)
PROT SERPL-MCNC: 6.4 G/DL (ref 6–8.2)
PROT SERPL-MCNC: 6.5 G/DL (ref 6–8.2)
PROT SERPL-MCNC: 6.7 G/DL (ref 6–8.2)
PROT SERPL-MCNC: 6.7 G/DL (ref 6–8.2)
PROT SERPL-MCNC: 6.8 G/DL (ref 6–8.2)
PROT SERPL-MCNC: 6.9 G/DL (ref 6–8.2)
PROT SERPL-MCNC: 7.2 G/DL (ref 6–8.2)
PROT SERPL-MCNC: 7.2 G/DL (ref 6–8.2)
PROT SERPL-MCNC: 7.3 G/DL (ref 6–8.2)
PROT SERPL-MCNC: 7.4 G/DL (ref 6–8.2)
PROT SERPL-MCNC: 7.5 G/DL (ref 6–8.2)
PROT SERPL-MCNC: 7.6 G/DL (ref 6–8.2)
PROT UR QL STRIP: 100 MG/DL
PROT UR-MCNC: 189 MG/DL (ref 0–15)
PROTHROMBIN TIME: 13.9 SEC (ref 12–14.6)
PROTHROMBIN TIME: 14.7 SEC (ref 12–14.6)
PROTHROMBIN TIME: 20.2 SEC (ref 12–14.6)
PROTHROMBIN TIME: 25.3 SEC (ref 12–14.6)
RBC # BLD AUTO: 2.22 M/UL (ref 4.7–6.1)
RBC # BLD AUTO: 2.7 M/UL (ref 4.7–6.1)
RBC # BLD AUTO: 2.77 M/UL (ref 4.7–6.1)
RBC # BLD AUTO: 2.77 M/UL (ref 4.7–6.1)
RBC # BLD AUTO: 2.83 M/UL (ref 4.7–6.1)
RBC # BLD AUTO: 2.83 M/UL (ref 4.7–6.1)
RBC # BLD AUTO: 2.84 M/UL (ref 4.7–6.1)
RBC # BLD AUTO: 2.85 M/UL (ref 4.7–6.1)
RBC # BLD AUTO: 2.86 M/UL (ref 4.7–6.1)
RBC # BLD AUTO: 2.86 M/UL (ref 4.7–6.1)
RBC # BLD AUTO: 2.94 M/UL (ref 4.7–6.1)
RBC # BLD AUTO: 2.96 M/UL (ref 4.7–6.1)
RBC # BLD AUTO: 2.97 M/UL (ref 4.7–6.1)
RBC # BLD AUTO: 2.99 M/UL (ref 4.7–6.1)
RBC # BLD AUTO: 3 M/UL (ref 4.7–6.1)
RBC # BLD AUTO: 3.02 M/UL (ref 4.7–6.1)
RBC # BLD AUTO: 3.21 M/UL (ref 4.7–6.1)
RBC # BLD AUTO: 3.41 M/UL (ref 4.7–6.1)
RBC # BLD AUTO: 3.78 M/UL (ref 4.7–6.1)
RBC # BLD AUTO: 4.07 M/UL (ref 4.7–6.1)
RBC # BLD AUTO: 4.47 M/UL (ref 4.7–6.1)
RBC # BLD AUTO: 4.9 M/UL (ref 4.7–6.1)
RBC # BLD AUTO: 4.97 M/UL (ref 4.7–6.1)
RBC # BLD AUTO: 5.36 M/UL (ref 4.7–6.1)
RBC # URNS HPF: ABNORMAL /HPF
RBC BLD AUTO: NORMAL
RBC BLD AUTO: PRESENT
RBC UR QL AUTO: ABNORMAL
RENAL EPI CELLS #/AREA URNS HPF: ABNORMAL /HPF
RH BLD: NORMAL
SAO2 % BLDA: 93 % (ref 93–99)
SAO2 % BLDA: 94.2 % (ref 93–99)
SAO2 % BLDA: 95 % (ref 93–99)
SAO2 % BLDA: 97.7 % (ref 93–99)
SAO2 % BLDA: 98 % (ref 93–99)
SAO2 % BLDA: 98 % (ref 93–99)
SARS-COV+SARS-COV-2 AG RESP QL IA.RAPID: NOTDETECTED
SARS-COV-2 RNA RESP QL NAA+PROBE: NOTDETECTED
SARS-COV-2 RNA RESP QL NAA+PROBE: NOTDETECTED
SIGNIFICANT IND 70042: ABNORMAL
SIGNIFICANT IND 70042: ABNORMAL
SIGNIFICANT IND 70042: NORMAL
SITE SITE: ABNORMAL
SITE SITE: ABNORMAL
SITE SITE: NORMAL
SMUDGE CELLS BLD QL SMEAR: NORMAL
SODIUM BLD-SCNC: 138 MMOL/L (ref 135–145)
SODIUM SERPL-SCNC: 129 MMOL/L (ref 135–145)
SODIUM SERPL-SCNC: 131 MMOL/L (ref 135–145)
SODIUM SERPL-SCNC: 132 MMOL/L (ref 135–145)
SODIUM SERPL-SCNC: 133 MMOL/L (ref 135–145)
SODIUM SERPL-SCNC: 134 MMOL/L (ref 135–145)
SODIUM SERPL-SCNC: 135 MMOL/L (ref 135–145)
SODIUM SERPL-SCNC: 136 MMOL/L (ref 135–145)
SODIUM SERPL-SCNC: 137 MMOL/L (ref 135–145)
SODIUM SERPL-SCNC: 138 MMOL/L (ref 135–145)
SODIUM SERPL-SCNC: 142 MMOL/L (ref 135–145)
SODIUM SERPL-SCNC: 143 MMOL/L (ref 135–145)
SODIUM SERPL-SCNC: 146 MMOL/L (ref 135–145)
SODIUM SERPL-SCNC: 147 MMOL/L (ref 135–145)
SODIUM UR-SCNC: 23 MMOL/L
SODIUM UR-SCNC: 27 MMOL/L
SOURCE SOURCE: ABNORMAL
SOURCE SOURCE: ABNORMAL
SOURCE SOURCE: NORMAL
SP GR UR STRIP.AUTO: 1.02
SPECIMEN DRAWN FROM PATIENT: ABNORMAL
SPECIMEN SOURCE: NORMAL
STOMATOCYTES BLD QL SMEAR: NORMAL
TARGETS BLD QL SMEAR: NORMAL
TARGETS BLD QL SMEAR: NORMAL
TEG ALGORITHM TGALG: ABNORMAL
TIBC SERPL-MCNC: 67 UG/DL (ref 250–450)
TOXIC GRANULES BLD QL SMEAR: SLIGHT
TRIGL SERPL-MCNC: 167 MG/DL (ref 0–149)
TRIGL SERPL-MCNC: 211 MG/DL (ref 0–149)
TRIGL SERPL-MCNC: 571 MG/DL (ref 0–149)
TRIGL SERPL-MCNC: 94 MG/DL (ref 0–149)
TROPONIN T SERPL-MCNC: 11 NG/L (ref 6–19)
TROPONIN T SERPL-MCNC: 80 NG/L (ref 6–19)
TROPONIN T SERPL-MCNC: 94 NG/L (ref 6–19)
TROPONIN T SERPL-MCNC: 94 NG/L (ref 6–19)
TROPONIN T SERPL-MCNC: <6 NG/L (ref 6–19)
TSH SERPL DL<=0.005 MIU/L-ACNC: 1.24 UIU/ML (ref 0.38–5.33)
UIBC SERPL-MCNC: 49 UG/DL (ref 110–370)
UNIT STATUS USTAT: NORMAL
UROBILINOGEN UR STRIP.AUTO-MCNC: 0.2 MG/DL
WBC # BLD AUTO: 10.4 K/UL (ref 4.8–10.8)
WBC # BLD AUTO: 11.1 K/UL (ref 4.8–10.8)
WBC # BLD AUTO: 11.8 K/UL (ref 4.8–10.8)
WBC # BLD AUTO: 19.2 K/UL (ref 4.8–10.8)
WBC # BLD AUTO: 3.2 K/UL (ref 4.8–10.8)
WBC # BLD AUTO: 4.2 K/UL (ref 4.8–10.8)
WBC # BLD AUTO: 4.3 K/UL (ref 4.8–10.8)
WBC # BLD AUTO: 4.8 K/UL (ref 4.8–10.8)
WBC # BLD AUTO: 5 K/UL (ref 4.8–10.8)
WBC # BLD AUTO: 5.1 K/UL (ref 4.8–10.8)
WBC # BLD AUTO: 5.2 K/UL (ref 4.8–10.8)
WBC # BLD AUTO: 5.4 K/UL (ref 4.8–10.8)
WBC # BLD AUTO: 5.4 K/UL (ref 4.8–10.8)
WBC # BLD AUTO: 5.6 K/UL (ref 4.8–10.8)
WBC # BLD AUTO: 6.3 K/UL (ref 4.8–10.8)
WBC # BLD AUTO: 6.7 K/UL (ref 4.8–10.8)
WBC # BLD AUTO: 6.9 K/UL (ref 4.8–10.8)
WBC # BLD AUTO: 6.9 K/UL (ref 4.8–10.8)
WBC # BLD AUTO: 7.5 K/UL (ref 4.8–10.8)
WBC # BLD AUTO: 7.6 K/UL (ref 4.8–10.8)
WBC # BLD AUTO: 7.8 K/UL (ref 4.8–10.8)
WBC # BLD AUTO: 7.9 K/UL (ref 4.8–10.8)
WBC # BLD AUTO: 9.5 K/UL (ref 4.8–10.8)
WBC # BLD AUTO: 9.7 K/UL (ref 4.8–10.8)
WBC #/AREA URNS HPF: ABNORMAL /HPF

## 2021-01-01 PROCEDURE — 700105 HCHG RX REV CODE 258: Performed by: INTERNAL MEDICINE

## 2021-01-01 PROCEDURE — 86706 HEP B SURFACE ANTIBODY: CPT

## 2021-01-01 PROCEDURE — 84478 ASSAY OF TRIGLYCERIDES: CPT

## 2021-01-01 PROCEDURE — 700111 HCHG RX REV CODE 636 W/ 250 OVERRIDE (IP): Performed by: INTERNAL MEDICINE

## 2021-01-01 PROCEDURE — 770006 HCHG ROOM/CARE - MED/SURG/GYN SEMI*

## 2021-01-01 PROCEDURE — 85027 COMPLETE CBC AUTOMATED: CPT

## 2021-01-01 PROCEDURE — 700102 HCHG RX REV CODE 250 W/ 637 OVERRIDE(OP): Performed by: INTERNAL MEDICINE

## 2021-01-01 PROCEDURE — 700101 HCHG RX REV CODE 250: Performed by: NURSE PRACTITIONER

## 2021-01-01 PROCEDURE — 700102 HCHG RX REV CODE 250 W/ 637 OVERRIDE(OP): Performed by: HOSPITALIST

## 2021-01-01 PROCEDURE — 700105 HCHG RX REV CODE 258: Performed by: HOSPITALIST

## 2021-01-01 PROCEDURE — 700101 HCHG RX REV CODE 250: Performed by: INTERNAL MEDICINE

## 2021-01-01 PROCEDURE — 86022 PLATELET ANTIBODIES: CPT

## 2021-01-01 PROCEDURE — A9270 NON-COVERED ITEM OR SERVICE: HCPCS | Performed by: PSYCHIATRY & NEUROLOGY

## 2021-01-01 PROCEDURE — A9270 NON-COVERED ITEM OR SERVICE: HCPCS | Performed by: INTERNAL MEDICINE

## 2021-01-01 PROCEDURE — 84484 ASSAY OF TROPONIN QUANT: CPT

## 2021-01-01 PROCEDURE — 700111 HCHG RX REV CODE 636 W/ 250 OVERRIDE (IP): Performed by: STUDENT IN AN ORGANIZED HEALTH CARE EDUCATION/TRAINING PROGRAM

## 2021-01-01 PROCEDURE — 51798 US URINE CAPACITY MEASURE: CPT

## 2021-01-01 PROCEDURE — 82330 ASSAY OF CALCIUM: CPT

## 2021-01-01 PROCEDURE — 80053 COMPREHEN METABOLIC PANEL: CPT

## 2021-01-01 PROCEDURE — 82803 BLOOD GASES ANY COMBINATION: CPT

## 2021-01-01 PROCEDURE — 5A1945Z RESPIRATORY VENTILATION, 24-96 CONSECUTIVE HOURS: ICD-10-PCS | Performed by: PSYCHIATRY & NEUROLOGY

## 2021-01-01 PROCEDURE — 84100 ASSAY OF PHOSPHORUS: CPT

## 2021-01-01 PROCEDURE — 83735 ASSAY OF MAGNESIUM: CPT

## 2021-01-01 PROCEDURE — 700101 HCHG RX REV CODE 250: Performed by: EMERGENCY MEDICINE

## 2021-01-01 PROCEDURE — A9270 NON-COVERED ITEM OR SERVICE: HCPCS | Performed by: STUDENT IN AN ORGANIZED HEALTH CARE EDUCATION/TRAINING PROGRAM

## 2021-01-01 PROCEDURE — 700102 HCHG RX REV CODE 250 W/ 637 OVERRIDE(OP): Performed by: NURSE PRACTITIONER

## 2021-01-01 PROCEDURE — 99233 SBSQ HOSP IP/OBS HIGH 50: CPT | Performed by: INTERNAL MEDICINE

## 2021-01-01 PROCEDURE — C9113 INJ PANTOPRAZOLE SODIUM, VIA: HCPCS | Performed by: INTERNAL MEDICINE

## 2021-01-01 PROCEDURE — 770022 HCHG ROOM/CARE - ICU (200)

## 2021-01-01 PROCEDURE — 83690 ASSAY OF LIPASE: CPT

## 2021-01-01 PROCEDURE — 84295 ASSAY OF SERUM SODIUM: CPT

## 2021-01-01 PROCEDURE — 36620 INSERTION CATHETER ARTERY: CPT | Performed by: EMERGENCY MEDICINE

## 2021-01-01 PROCEDURE — C1751 CATH, INF, PER/CENT/MIDLINE: HCPCS

## 2021-01-01 PROCEDURE — 85007 BL SMEAR W/DIFF WBC COUNT: CPT

## 2021-01-01 PROCEDURE — 83605 ASSAY OF LACTIC ACID: CPT

## 2021-01-01 PROCEDURE — 5A1D70Z PERFORMANCE OF URINARY FILTRATION, INTERMITTENT, LESS THAN 6 HOURS PER DAY: ICD-10-PCS | Performed by: STUDENT IN AN ORGANIZED HEALTH CARE EDUCATION/TRAINING PROGRAM

## 2021-01-01 PROCEDURE — 700102 HCHG RX REV CODE 250 W/ 637 OVERRIDE(OP): Performed by: PSYCHIATRY & NEUROLOGY

## 2021-01-01 PROCEDURE — 36600 WITHDRAWAL OF ARTERIAL BLOOD: CPT

## 2021-01-01 PROCEDURE — 99232 SBSQ HOSP IP/OBS MODERATE 35: CPT | Performed by: INTERNAL MEDICINE

## 2021-01-01 PROCEDURE — 85384 FIBRINOGEN ACTIVITY: CPT

## 2021-01-01 PROCEDURE — 80053 COMPREHEN METABOLIC PANEL: CPT | Mod: 91

## 2021-01-01 PROCEDURE — 87103 BLOOD FUNGUS CULTURE: CPT

## 2021-01-01 PROCEDURE — 82570 ASSAY OF URINE CREATININE: CPT

## 2021-01-01 PROCEDURE — 74176 CT ABD & PELVIS W/O CONTRAST: CPT

## 2021-01-01 PROCEDURE — 770001 HCHG ROOM/CARE - MED/SURG/GYN PRIV*

## 2021-01-01 PROCEDURE — 71045 X-RAY EXAM CHEST 1 VIEW: CPT

## 2021-01-01 PROCEDURE — 85347 COAGULATION TIME ACTIVATED: CPT

## 2021-01-01 PROCEDURE — 99232 SBSQ HOSP IP/OBS MODERATE 35: CPT | Mod: GC | Performed by: INTERNAL MEDICINE

## 2021-01-01 PROCEDURE — 82077 ASSAY SPEC XCP UR&BREATH IA: CPT

## 2021-01-01 PROCEDURE — 99233 SBSQ HOSP IP/OBS HIGH 50: CPT | Mod: GC | Performed by: INTERNAL MEDICINE

## 2021-01-01 PROCEDURE — 82962 GLUCOSE BLOOD TEST: CPT

## 2021-01-01 PROCEDURE — 700117 HCHG RX CONTRAST REV CODE 255: Performed by: HOSPITALIST

## 2021-01-01 PROCEDURE — A9270 NON-COVERED ITEM OR SERVICE: HCPCS | Performed by: HOSPITALIST

## 2021-01-01 PROCEDURE — A9270 NON-COVERED ITEM OR SERVICE: HCPCS | Performed by: PHYSICAL MEDICINE & REHABILITATION

## 2021-01-01 PROCEDURE — 85379 FIBRIN DEGRADATION QUANT: CPT

## 2021-01-01 PROCEDURE — 82962 GLUCOSE BLOOD TEST: CPT | Mod: 91

## 2021-01-01 PROCEDURE — 700105 HCHG RX REV CODE 258: Performed by: STUDENT IN AN ORGANIZED HEALTH CARE EDUCATION/TRAINING PROGRAM

## 2021-01-01 PROCEDURE — 700117 HCHG RX CONTRAST REV CODE 255: Performed by: EMERGENCY MEDICINE

## 2021-01-01 PROCEDURE — 74177 CT ABD & PELVIS W/CONTRAST: CPT

## 2021-01-01 PROCEDURE — P9016 RBC LEUKOCYTES REDUCED: HCPCS | Mod: 91

## 2021-01-01 PROCEDURE — 700111 HCHG RX REV CODE 636 W/ 250 OVERRIDE (IP): Performed by: HOSPITALIST

## 2021-01-01 PROCEDURE — 86901 BLOOD TYPING SEROLOGIC RH(D): CPT

## 2021-01-01 PROCEDURE — 160202 HCHG ENDO MINUTES - 1ST 30 MINS LEVEL 3: Performed by: INTERNAL MEDICINE

## 2021-01-01 PROCEDURE — 87040 BLOOD CULTURE FOR BACTERIA: CPT | Mod: 91

## 2021-01-01 PROCEDURE — 82140 ASSAY OF AMMONIA: CPT

## 2021-01-01 PROCEDURE — 70450 CT HEAD/BRAIN W/O DYE: CPT

## 2021-01-01 PROCEDURE — 36415 COLL VENOUS BLD VENIPUNCTURE: CPT

## 2021-01-01 PROCEDURE — 30233R1 TRANSFUSION OF NONAUTOLOGOUS PLATELETS INTO PERIPHERAL VEIN, PERCUTANEOUS APPROACH: ICD-10-PCS | Performed by: EMERGENCY MEDICINE

## 2021-01-01 PROCEDURE — 36620 INSERTION CATHETER ARTERY: CPT

## 2021-01-01 PROCEDURE — 85610 PROTHROMBIN TIME: CPT

## 2021-01-01 PROCEDURE — 99233 SBSQ HOSP IP/OBS HIGH 50: CPT | Performed by: HOSPITALIST

## 2021-01-01 PROCEDURE — 74018 RADEX ABDOMEN 1 VIEW: CPT

## 2021-01-01 PROCEDURE — 97116 GAIT TRAINING THERAPY: CPT | Mod: CQ

## 2021-01-01 PROCEDURE — 700111 HCHG RX REV CODE 636 W/ 250 OVERRIDE (IP): Performed by: NURSE PRACTITIONER

## 2021-01-01 PROCEDURE — 84132 ASSAY OF SERUM POTASSIUM: CPT

## 2021-01-01 PROCEDURE — 96374 THER/PROPH/DIAG INJ IV PUSH: CPT

## 2021-01-01 PROCEDURE — 83550 IRON BINDING TEST: CPT

## 2021-01-01 PROCEDURE — 700111 HCHG RX REV CODE 636 W/ 250 OVERRIDE (IP)

## 2021-01-01 PROCEDURE — 700102 HCHG RX REV CODE 250 W/ 637 OVERRIDE(OP): Performed by: STUDENT IN AN ORGANIZED HEALTH CARE EDUCATION/TRAINING PROGRAM

## 2021-01-01 PROCEDURE — 97530 THERAPEUTIC ACTIVITIES: CPT

## 2021-01-01 PROCEDURE — 81001 URINALYSIS AUTO W/SCOPE: CPT

## 2021-01-01 PROCEDURE — 770020 HCHG ROOM/CARE - TELE (206)

## 2021-01-01 PROCEDURE — 80074 ACUTE HEPATITIS PANEL: CPT

## 2021-01-01 PROCEDURE — A9270 NON-COVERED ITEM OR SERVICE: HCPCS | Performed by: NURSE PRACTITIONER

## 2021-01-01 PROCEDURE — 700101 HCHG RX REV CODE 250: Performed by: PSYCHIATRY & NEUROLOGY

## 2021-01-01 PROCEDURE — 02HV33Z INSERTION OF INFUSION DEVICE INTO SUPERIOR VENA CAVA, PERCUTANEOUS APPROACH: ICD-10-PCS | Performed by: EMERGENCY MEDICINE

## 2021-01-01 PROCEDURE — C1752 CATH,HEMODIALYSIS,SHORT-TERM: HCPCS

## 2021-01-01 PROCEDURE — 700111 HCHG RX REV CODE 636 W/ 250 OVERRIDE (IP): Performed by: EMERGENCY MEDICINE

## 2021-01-01 PROCEDURE — 94799 UNLISTED PULMONARY SVC/PX: CPT

## 2021-01-01 PROCEDURE — 99292 CRITICAL CARE ADDL 30 MIN: CPT | Mod: 25,GC | Performed by: EMERGENCY MEDICINE

## 2021-01-01 PROCEDURE — 94760 N-INVAS EAR/PLS OXIMETRY 1: CPT

## 2021-01-01 PROCEDURE — 93306 TTE W/DOPPLER COMPLETE: CPT | Mod: 26 | Performed by: INTERNAL MEDICINE

## 2021-01-01 PROCEDURE — 99291 CRITICAL CARE FIRST HOUR: CPT | Performed by: INTERNAL MEDICINE

## 2021-01-01 PROCEDURE — 99239 HOSP IP/OBS DSCHRG MGMT >30: CPT | Mod: GC | Performed by: INTERNAL MEDICINE

## 2021-01-01 PROCEDURE — 82550 ASSAY OF CK (CPK): CPT

## 2021-01-01 PROCEDURE — 84300 ASSAY OF URINE SODIUM: CPT

## 2021-01-01 PROCEDURE — 31500 INSERT EMERGENCY AIRWAY: CPT | Performed by: PSYCHIATRY & NEUROLOGY

## 2021-01-01 PROCEDURE — P9016 RBC LEUKOCYTES REDUCED: HCPCS

## 2021-01-01 PROCEDURE — 99222 1ST HOSP IP/OBS MODERATE 55: CPT | Performed by: STUDENT IN AN ORGANIZED HEALTH CARE EDUCATION/TRAINING PROGRAM

## 2021-01-01 PROCEDURE — 84145 PROCALCITONIN (PCT): CPT

## 2021-01-01 PROCEDURE — 02HV33Z INSERTION OF INFUSION DEVICE INTO SUPERIOR VENA CAVA, PERCUTANEOUS APPROACH: ICD-10-PCS | Performed by: RADIOLOGY

## 2021-01-01 PROCEDURE — 03HY32Z INSERTION OF MONITORING DEVICE INTO UPPER ARTERY, PERCUTANEOUS APPROACH: ICD-10-PCS | Performed by: EMERGENCY MEDICINE

## 2021-01-01 PROCEDURE — 87040 BLOOD CULTURE FOR BACTERIA: CPT

## 2021-01-01 PROCEDURE — 99233 SBSQ HOSP IP/OBS HIGH 50: CPT | Performed by: STUDENT IN AN ORGANIZED HEALTH CARE EDUCATION/TRAINING PROGRAM

## 2021-01-01 PROCEDURE — 93005 ELECTROCARDIOGRAM TRACING: CPT | Performed by: EMERGENCY MEDICINE

## 2021-01-01 PROCEDURE — 85730 THROMBOPLASTIN TIME PARTIAL: CPT

## 2021-01-01 PROCEDURE — 36556 INSERT NON-TUNNEL CV CATH: CPT

## 2021-01-01 PROCEDURE — 94003 VENT MGMT INPAT SUBQ DAY: CPT

## 2021-01-01 PROCEDURE — 83036 HEMOGLOBIN GLYCOSYLATED A1C: CPT

## 2021-01-01 PROCEDURE — 86923 COMPATIBILITY TEST ELECTRIC: CPT

## 2021-01-01 PROCEDURE — 85025 COMPLETE CBC W/AUTO DIFF WBC: CPT

## 2021-01-01 PROCEDURE — 97530 THERAPEUTIC ACTIVITIES: CPT | Mod: CQ

## 2021-01-01 PROCEDURE — 99292 CRITICAL CARE ADDL 30 MIN: CPT | Performed by: INTERNAL MEDICINE

## 2021-01-01 PROCEDURE — 99233 SBSQ HOSP IP/OBS HIGH 50: CPT | Mod: GC | Performed by: HOSPITALIST

## 2021-01-01 PROCEDURE — 93010 ELECTROCARDIOGRAM REPORT: CPT | Performed by: INTERNAL MEDICINE

## 2021-01-01 PROCEDURE — 85027 COMPLETE CBC AUTOMATED: CPT | Mod: 91

## 2021-01-01 PROCEDURE — 90935 HEMODIALYSIS ONE EVALUATION: CPT

## 2021-01-01 PROCEDURE — 700111 HCHG RX REV CODE 636 W/ 250 OVERRIDE (IP): Performed by: PSYCHIATRY & NEUROLOGY

## 2021-01-01 PROCEDURE — 5A1D70Z PERFORMANCE OF URINARY FILTRATION, INTERMITTENT, LESS THAN 6 HOURS PER DAY: ICD-10-PCS | Performed by: INTERNAL MEDICINE

## 2021-01-01 PROCEDURE — 30233K1 TRANSFUSION OF NONAUTOLOGOUS FROZEN PLASMA INTO PERIPHERAL VEIN, PERCUTANEOUS APPROACH: ICD-10-PCS | Performed by: EMERGENCY MEDICINE

## 2021-01-01 PROCEDURE — 87186 SC STD MICRODIL/AGAR DIL: CPT

## 2021-01-01 PROCEDURE — 99291 CRITICAL CARE FIRST HOUR: CPT | Mod: 25,GC | Performed by: EMERGENCY MEDICINE

## 2021-01-01 PROCEDURE — 700102 HCHG RX REV CODE 250 W/ 637 OVERRIDE(OP): Performed by: PHYSICAL MEDICINE & REHABILITATION

## 2021-01-01 PROCEDURE — 99223 1ST HOSP IP/OBS HIGH 75: CPT | Performed by: PHYSICAL MEDICINE & REHABILITATION

## 2021-01-01 PROCEDURE — 97535 SELF CARE MNGMENT TRAINING: CPT

## 2021-01-01 PROCEDURE — 84134 ASSAY OF PREALBUMIN: CPT

## 2021-01-01 PROCEDURE — 99232 SBSQ HOSP IP/OBS MODERATE 35: CPT | Mod: GC | Performed by: HOSPITALIST

## 2021-01-01 PROCEDURE — 92610 EVALUATE SWALLOWING FUNCTION: CPT

## 2021-01-01 PROCEDURE — 90686 IIV4 VACC NO PRSV 0.5 ML IM: CPT | Performed by: STUDENT IN AN ORGANIZED HEALTH CARE EDUCATION/TRAINING PROGRAM

## 2021-01-01 PROCEDURE — 87070 CULTURE OTHR SPECIMN AEROBIC: CPT

## 2021-01-01 PROCEDURE — 700101 HCHG RX REV CODE 250: Performed by: STUDENT IN AN ORGANIZED HEALTH CARE EDUCATION/TRAINING PROGRAM

## 2021-01-01 PROCEDURE — C9113 INJ PANTOPRAZOLE SODIUM, VIA: HCPCS | Performed by: STUDENT IN AN ORGANIZED HEALTH CARE EDUCATION/TRAINING PROGRAM

## 2021-01-01 PROCEDURE — 3E0436Z INTRODUCTION OF NUTRITIONAL SUBSTANCE INTO CENTRAL VEIN, PERCUTANEOUS APPROACH: ICD-10-PCS | Performed by: RADIOLOGY

## 2021-01-01 PROCEDURE — 93005 ELECTROCARDIOGRAM TRACING: CPT | Performed by: NURSE PRACTITIONER

## 2021-01-01 PROCEDURE — 87205 SMEAR GRAM STAIN: CPT

## 2021-01-01 PROCEDURE — 97162 PT EVAL MOD COMPLEX 30 MIN: CPT

## 2021-01-01 PROCEDURE — 99292 CRITICAL CARE ADDL 30 MIN: CPT | Mod: 25 | Performed by: INTERNAL MEDICINE

## 2021-01-01 PROCEDURE — 80061 LIPID PANEL: CPT

## 2021-01-01 PROCEDURE — 85018 HEMOGLOBIN: CPT

## 2021-01-01 PROCEDURE — 93970 EXTREMITY STUDY: CPT

## 2021-01-01 PROCEDURE — 85576 BLOOD PLATELET AGGREGATION: CPT | Mod: 91

## 2021-01-01 PROCEDURE — P9047 ALBUMIN (HUMAN), 25%, 50ML: HCPCS | Performed by: INTERNAL MEDICINE

## 2021-01-01 PROCEDURE — 99285 EMERGENCY DEPT VISIT HI MDM: CPT

## 2021-01-01 PROCEDURE — 97166 OT EVAL MOD COMPLEX 45 MIN: CPT

## 2021-01-01 PROCEDURE — 83605 ASSAY OF LACTIC ACID: CPT | Mod: 91

## 2021-01-01 PROCEDURE — 87426 SARSCOV CORONAVIRUS AG IA: CPT

## 2021-01-01 PROCEDURE — 85014 HEMATOCRIT: CPT

## 2021-01-01 PROCEDURE — U0003 INFECTIOUS AGENT DETECTION BY NUCLEIC ACID (DNA OR RNA); SEVERE ACUTE RESPIRATORY SYNDROME CORONAVIRUS 2 (SARS-COV-2) (CORONAVIRUS DISEASE [COVID-19]), AMPLIFIED PROBE TECHNIQUE, MAKING USE OF HIGH THROUGHPUT TECHNOLOGIES AS DESCRIBED BY CMS-2020-01-R: HCPCS

## 2021-01-01 PROCEDURE — 700105 HCHG RX REV CODE 258: Performed by: NURSE PRACTITIONER

## 2021-01-01 PROCEDURE — 86160 COMPLEMENT ANTIGEN: CPT | Mod: 91

## 2021-01-01 PROCEDURE — 93005 ELECTROCARDIOGRAM TRACING: CPT | Performed by: INTERNAL MEDICINE

## 2021-01-01 PROCEDURE — 94002 VENT MGMT INPAT INIT DAY: CPT

## 2021-01-01 PROCEDURE — 83880 ASSAY OF NATRIURETIC PEPTIDE: CPT

## 2021-01-01 PROCEDURE — 84156 ASSAY OF PROTEIN URINE: CPT

## 2021-01-01 PROCEDURE — 80048 BASIC METABOLIC PNL TOTAL CA: CPT

## 2021-01-01 PROCEDURE — 92526 ORAL FUNCTION THERAPY: CPT

## 2021-01-01 PROCEDURE — 82728 ASSAY OF FERRITIN: CPT

## 2021-01-01 PROCEDURE — 37799 UNLISTED PX VASCULAR SURGERY: CPT

## 2021-01-01 PROCEDURE — 160048 HCHG OR STATISTICAL LEVEL 1-5: Performed by: INTERNAL MEDICINE

## 2021-01-01 PROCEDURE — 87077 CULTURE AEROBIC IDENTIFY: CPT | Mod: 91

## 2021-01-01 PROCEDURE — P9017 PLASMA 1 DONOR FRZ W/IN 8 HR: HCPCS | Mod: 91

## 2021-01-01 PROCEDURE — U0005 INFEC AGEN DETEC AMPLI PROBE: HCPCS

## 2021-01-01 PROCEDURE — 93306 TTE W/DOPPLER COMPLETE: CPT

## 2021-01-01 PROCEDURE — 99223 1ST HOSP IP/OBS HIGH 75: CPT | Performed by: INTERNAL MEDICINE

## 2021-01-01 PROCEDURE — 302106 OSTOMY POWDER: Performed by: HOSPITALIST

## 2021-01-01 PROCEDURE — 86850 RBC ANTIBODY SCREEN: CPT

## 2021-01-01 PROCEDURE — 86900 BLOOD TYPING SEROLOGIC ABO: CPT

## 2021-01-01 PROCEDURE — 99153 MOD SED SAME PHYS/QHP EA: CPT

## 2021-01-01 PROCEDURE — 74181 MRI ABDOMEN W/O CONTRAST: CPT

## 2021-01-01 PROCEDURE — 84443 ASSAY THYROID STIM HORMONE: CPT

## 2021-01-01 PROCEDURE — 94150 VITAL CAPACITY TEST: CPT

## 2021-01-01 PROCEDURE — 0BH18EZ INSERTION OF ENDOTRACHEAL AIRWAY INTO TRACHEA, VIA NATURAL OR ARTIFICIAL OPENING ENDOSCOPIC: ICD-10-PCS | Performed by: PSYCHIATRY & NEUROLOGY

## 2021-01-01 PROCEDURE — 36556 INSERT NON-TUNNEL CV CATH: CPT | Mod: LT | Performed by: EMERGENCY MEDICINE

## 2021-01-01 PROCEDURE — 700105 HCHG RX REV CODE 258: Performed by: PSYCHIATRY & NEUROLOGY

## 2021-01-01 PROCEDURE — 96375 TX/PRO/DX INJ NEW DRUG ADDON: CPT

## 2021-01-01 PROCEDURE — 30233N1 TRANSFUSION OF NONAUTOLOGOUS RED BLOOD CELLS INTO PERIPHERAL VEIN, PERCUTANEOUS APPROACH: ICD-10-PCS | Performed by: EMERGENCY MEDICINE

## 2021-01-01 PROCEDURE — 99232 SBSQ HOSP IP/OBS MODERATE 35: CPT | Performed by: STUDENT IN AN ORGANIZED HEALTH CARE EDUCATION/TRAINING PROGRAM

## 2021-01-01 PROCEDURE — 83540 ASSAY OF IRON: CPT

## 2021-01-01 PROCEDURE — 96376 TX/PRO/DX INJ SAME DRUG ADON: CPT

## 2021-01-01 PROCEDURE — 80048 BASIC METABOLIC PNL TOTAL CA: CPT | Mod: 91

## 2021-01-01 PROCEDURE — 0DJ68ZZ INSPECTION OF STOMACH, VIA NATURAL OR ARTIFICIAL OPENING ENDOSCOPIC: ICD-10-PCS | Performed by: INTERNAL MEDICINE

## 2021-01-01 PROCEDURE — 82150 ASSAY OF AMYLASE: CPT

## 2021-01-01 PROCEDURE — 02HV33Z INSERTION OF INFUSION DEVICE INTO SUPERIOR VENA CAVA, PERCUTANEOUS APPROACH: ICD-10-PCS | Performed by: NURSE PRACTITIONER

## 2021-01-01 PROCEDURE — P9034 PLATELETS, PHERESIS: HCPCS

## 2021-01-01 PROCEDURE — 36430 TRANSFUSION BLD/BLD COMPNT: CPT

## 2021-01-01 PROCEDURE — 700101 HCHG RX REV CODE 250: Performed by: HOSPITALIST

## 2021-01-01 PROCEDURE — 87150 DNA/RNA AMPLIFIED PROBE: CPT | Mod: 91

## 2021-01-01 PROCEDURE — B548ZZA ULTRASONOGRAPHY OF SUPERIOR VENA CAVA, GUIDANCE: ICD-10-PCS | Performed by: EMERGENCY MEDICINE

## 2021-01-01 PROCEDURE — 36556 INSERT NON-TUNNEL CV CATH: CPT | Mod: RT | Performed by: EMERGENCY MEDICINE

## 2021-01-01 PROCEDURE — 700105 HCHG RX REV CODE 258: Performed by: EMERGENCY MEDICINE

## 2021-01-01 PROCEDURE — 82465 ASSAY BLD/SERUM CHOLESTEROL: CPT

## 2021-01-01 RX ORDER — FAMOTIDINE 20 MG/1
10 TABLET, FILM COATED ORAL ONCE
Status: COMPLETED | OUTPATIENT
Start: 2021-01-01 | End: 2021-01-01

## 2021-01-01 RX ORDER — LABETALOL HYDROCHLORIDE 5 MG/ML
10-20 INJECTION, SOLUTION INTRAVENOUS EVERY 4 HOURS PRN
Status: DISCONTINUED | OUTPATIENT
Start: 2021-01-01 | End: 2021-01-01

## 2021-01-01 RX ORDER — HYDROMORPHONE HYDROCHLORIDE 1 MG/ML
.5-1 INJECTION, SOLUTION INTRAMUSCULAR; INTRAVENOUS; SUBCUTANEOUS EVERY 4 HOURS PRN
Status: DISCONTINUED | OUTPATIENT
Start: 2021-01-01 | End: 2021-01-01

## 2021-01-01 RX ORDER — CARVEDILOL 25 MG/1
25 TABLET ORAL 2 TIMES DAILY
COMMUNITY

## 2021-01-01 RX ORDER — POLYETHYLENE GLYCOL 3350 17 G/17G
1 POWDER, FOR SOLUTION ORAL
Status: DISCONTINUED | OUTPATIENT
Start: 2021-01-01 | End: 2021-01-01 | Stop reason: HOSPADM

## 2021-01-01 RX ORDER — SODIUM CHLORIDE 9 MG/ML
INJECTION, SOLUTION INTRAVENOUS CONTINUOUS
Status: DISCONTINUED | OUTPATIENT
Start: 2021-01-01 | End: 2021-01-01

## 2021-01-01 RX ORDER — MIDODRINE HYDROCHLORIDE 5 MG/1
10 TABLET ORAL
Status: DISCONTINUED | OUTPATIENT
Start: 2021-01-01 | End: 2021-01-01

## 2021-01-01 RX ORDER — ACETAMINOPHEN 325 MG/1
650 TABLET ORAL EVERY 4 HOURS PRN
Status: DISCONTINUED | OUTPATIENT
Start: 2021-01-01 | End: 2021-01-01

## 2021-01-01 RX ORDER — HEPARIN SODIUM 1000 [USP'U]/ML
2600 INJECTION, SOLUTION INTRAVENOUS; SUBCUTANEOUS
Status: COMPLETED | OUTPATIENT
Start: 2021-01-01 | End: 2021-01-01

## 2021-01-01 RX ORDER — ALBUMIN (HUMAN) 12.5 G/50ML
50 SOLUTION INTRAVENOUS ONCE
Status: COMPLETED | OUTPATIENT
Start: 2021-01-01 | End: 2021-01-01

## 2021-01-01 RX ORDER — OMEPRAZOLE 20 MG/1
20 CAPSULE, DELAYED RELEASE ORAL DAILY
Status: DISCONTINUED | OUTPATIENT
Start: 2021-01-01 | End: 2021-01-01

## 2021-01-01 RX ORDER — CARVEDILOL 12.5 MG/1
12.5 TABLET ORAL EVERY EVENING
Status: DISCONTINUED | OUTPATIENT
Start: 2021-01-01 | End: 2021-01-01

## 2021-01-01 RX ORDER — SODIUM CHLORIDE, SODIUM LACTATE, POTASSIUM CHLORIDE, AND CALCIUM CHLORIDE .6; .31; .03; .02 G/100ML; G/100ML; G/100ML; G/100ML
1000 INJECTION, SOLUTION INTRAVENOUS ONCE
Status: COMPLETED | OUTPATIENT
Start: 2021-01-01 | End: 2021-01-01

## 2021-01-01 RX ORDER — HYDROXYZINE HYDROCHLORIDE 25 MG/1
25 TABLET, FILM COATED ORAL
Status: DISCONTINUED | OUTPATIENT
Start: 2021-01-01 | End: 2021-01-01

## 2021-01-01 RX ORDER — ROPINIROLE 0.5 MG/1
2 TABLET, FILM COATED ORAL
COMMUNITY

## 2021-01-01 RX ORDER — HEPARIN SODIUM 5000 [USP'U]/ML
5000 INJECTION, SOLUTION INTRAVENOUS; SUBCUTANEOUS EVERY 12 HOURS
Status: DISCONTINUED | OUTPATIENT
Start: 2021-01-01 | End: 2021-01-01

## 2021-01-01 RX ORDER — ROPINIROLE 0.5 MG/1
2 TABLET, FILM COATED ORAL
Status: DISCONTINUED | OUTPATIENT
Start: 2021-01-01 | End: 2021-01-01 | Stop reason: HOSPADM

## 2021-01-01 RX ORDER — FUROSEMIDE 80 MG
80 TABLET ORAL
Status: SHIPPED
Start: 2021-01-01

## 2021-01-01 RX ORDER — PHENYLEPHRINE HCL IN 0.9% NACL 0.5 MG/5ML
SYRINGE (ML) INTRAVENOUS
Status: COMPLETED | OUTPATIENT
Start: 2021-01-01 | End: 2021-01-01

## 2021-01-01 RX ORDER — CHOLECALCIFEROL (VITAMIN D3) 125 MCG
5 CAPSULE ORAL DAILY
Qty: 30 TAB | Status: SHIPPED
Start: 2021-01-01

## 2021-01-01 RX ORDER — DEXMEDETOMIDINE HYDROCHLORIDE 4 UG/ML
0-1.5 INJECTION, SOLUTION INTRAVENOUS CONTINUOUS
Status: DISCONTINUED | OUTPATIENT
Start: 2021-01-01 | End: 2021-01-01

## 2021-01-01 RX ORDER — ONDANSETRON 2 MG/ML
4 INJECTION INTRAMUSCULAR; INTRAVENOUS EVERY 4 HOURS PRN
Status: DISCONTINUED | OUTPATIENT
Start: 2021-01-01 | End: 2021-01-01 | Stop reason: HOSPADM

## 2021-01-01 RX ORDER — HEPARIN SODIUM 5000 [USP'U]/ML
5000 INJECTION, SOLUTION INTRAVENOUS; SUBCUTANEOUS EVERY 8 HOURS
Status: DISCONTINUED | OUTPATIENT
Start: 2021-01-01 | End: 2021-01-01 | Stop reason: HOSPADM

## 2021-01-01 RX ORDER — CEFAZOLIN SODIUM 2 G/100ML
2 INJECTION, SOLUTION INTRAVENOUS EVERY 24 HOURS
Status: DISCONTINUED | OUTPATIENT
Start: 2021-01-01 | End: 2021-01-01

## 2021-01-01 RX ORDER — ACETAMINOPHEN 325 MG/1
650 TABLET ORAL EVERY 4 HOURS PRN
Status: DISCONTINUED | OUTPATIENT
Start: 2021-01-01 | End: 2021-01-01 | Stop reason: HOSPADM

## 2021-01-01 RX ORDER — IBUPROFEN 400 MG/1
400 TABLET ORAL EVERY 6 HOURS PRN
Status: DISCONTINUED | OUTPATIENT
Start: 2021-01-01 | End: 2021-01-01

## 2021-01-01 RX ORDER — DEXTROSE MONOHYDRATE 25 G/50ML
50 INJECTION, SOLUTION INTRAVENOUS
Status: DISCONTINUED | OUTPATIENT
Start: 2021-01-01 | End: 2021-01-01

## 2021-01-01 RX ORDER — KETOROLAC TROMETHAMINE 30 MG/ML
15 INJECTION, SOLUTION INTRAMUSCULAR; INTRAVENOUS EVERY 6 HOURS PRN
Status: DISPENSED | OUTPATIENT
Start: 2021-01-01 | End: 2021-01-01

## 2021-01-01 RX ORDER — HYDROMORPHONE HYDROCHLORIDE 2 MG/ML
2 INJECTION, SOLUTION INTRAMUSCULAR; INTRAVENOUS; SUBCUTANEOUS ONCE
Status: DISCONTINUED | OUTPATIENT
Start: 2021-01-01 | End: 2021-01-01

## 2021-01-01 RX ORDER — LORAZEPAM 2 MG/ML
0.5 INJECTION INTRAMUSCULAR EVERY 4 HOURS PRN
Status: DISCONTINUED | OUTPATIENT
Start: 2021-01-01 | End: 2021-01-01

## 2021-01-01 RX ORDER — SIMETHICONE 80 MG
80 TABLET,CHEWABLE ORAL 3 TIMES DAILY
Status: DISCONTINUED | OUTPATIENT
Start: 2021-01-01 | End: 2021-01-01

## 2021-01-01 RX ORDER — FUROSEMIDE 10 MG/ML
40 INJECTION INTRAMUSCULAR; INTRAVENOUS ONCE
Status: COMPLETED | OUTPATIENT
Start: 2021-01-01 | End: 2021-01-01

## 2021-01-01 RX ORDER — HYDROMORPHONE HYDROCHLORIDE 1 MG/ML
0.5 INJECTION, SOLUTION INTRAMUSCULAR; INTRAVENOUS; SUBCUTANEOUS
Status: DISCONTINUED | OUTPATIENT
Start: 2021-01-01 | End: 2021-01-01

## 2021-01-01 RX ORDER — DEXTROSE AND SODIUM CHLORIDE 5; .45 G/100ML; G/100ML
INJECTION, SOLUTION INTRAVENOUS CONTINUOUS
Status: DISCONTINUED | OUTPATIENT
Start: 2021-01-01 | End: 2021-01-01

## 2021-01-01 RX ORDER — IPRATROPIUM BROMIDE AND ALBUTEROL SULFATE 2.5; .5 MG/3ML; MG/3ML
3 SOLUTION RESPIRATORY (INHALATION)
Status: DISCONTINUED | OUTPATIENT
Start: 2021-01-01 | End: 2021-01-01 | Stop reason: HOSPADM

## 2021-01-01 RX ORDER — SPIRONOLACTONE 25 MG/1
25 TABLET ORAL DAILY
Status: DISCONTINUED | OUTPATIENT
Start: 2021-01-01 | End: 2021-01-01

## 2021-01-01 RX ORDER — ALBUMIN (HUMAN) 12.5 G/50ML
12.5 SOLUTION INTRAVENOUS
Status: COMPLETED | OUTPATIENT
Start: 2021-01-01 | End: 2021-01-01

## 2021-01-01 RX ORDER — HEPARIN SODIUM 1000 [USP'U]/ML
INJECTION, SOLUTION INTRAVENOUS; SUBCUTANEOUS
Status: COMPLETED
Start: 2021-01-01 | End: 2021-01-01

## 2021-01-01 RX ORDER — CALCIUM GLUCONATE 94 MG/ML
1 INJECTION, SOLUTION INTRAVENOUS ONCE
Status: DISCONTINUED | OUTPATIENT
Start: 2021-01-01 | End: 2021-01-01

## 2021-01-01 RX ORDER — POLYETHYLENE GLYCOL 3350 17 G/17G
1 POWDER, FOR SOLUTION ORAL
Status: DISCONTINUED | OUTPATIENT
Start: 2021-01-01 | End: 2021-01-01

## 2021-01-01 RX ORDER — HEPARIN SODIUM,PORCINE 10 UNIT/ML
VIAL (ML) INTRAVENOUS
Status: DISPENSED
Start: 2021-01-01 | End: 2021-01-01

## 2021-01-01 RX ORDER — KETOROLAC TROMETHAMINE 30 MG/ML
10 INJECTION, SOLUTION INTRAMUSCULAR; INTRAVENOUS ONCE
Status: COMPLETED | OUTPATIENT
Start: 2021-01-01 | End: 2021-01-01

## 2021-01-01 RX ORDER — TRAZODONE HYDROCHLORIDE 100 MG/1
100 TABLET ORAL
Status: DISCONTINUED | OUTPATIENT
Start: 2021-01-01 | End: 2021-01-01

## 2021-01-01 RX ORDER — SODIUM CHLORIDE 9 MG/ML
500 INJECTION, SOLUTION INTRAVENOUS ONCE
Status: COMPLETED | OUTPATIENT
Start: 2021-01-01 | End: 2021-01-01

## 2021-01-01 RX ORDER — PROPOFOL 10 MG/ML
200 INJECTION, EMULSION INTRAVENOUS ONCE
Status: DISCONTINUED | OUTPATIENT
Start: 2021-01-01 | End: 2021-01-01 | Stop reason: HOSPADM

## 2021-01-01 RX ORDER — AMOXICILLIN 250 MG
2 CAPSULE ORAL
Status: DISCONTINUED | OUTPATIENT
Start: 2021-01-01 | End: 2021-01-01 | Stop reason: HOSPADM

## 2021-01-01 RX ORDER — NOREPINEPHRINE BITARTRATE 0.03 MG/ML
0-30 INJECTION, SOLUTION INTRAVENOUS CONTINUOUS
Status: DISCONTINUED | OUTPATIENT
Start: 2021-01-01 | End: 2021-01-01 | Stop reason: HOSPADM

## 2021-01-01 RX ORDER — FUROSEMIDE 10 MG/ML
20 INJECTION INTRAMUSCULAR; INTRAVENOUS
Status: DISCONTINUED | OUTPATIENT
Start: 2021-01-01 | End: 2021-01-01

## 2021-01-01 RX ORDER — DEXTROSE MONOHYDRATE, SODIUM CHLORIDE, AND POTASSIUM CHLORIDE 50; 1.49; 4.5 G/1000ML; G/1000ML; G/1000ML
INJECTION, SOLUTION INTRAVENOUS CONTINUOUS
Status: DISCONTINUED | OUTPATIENT
Start: 2021-01-01 | End: 2021-01-01

## 2021-01-01 RX ORDER — HEPARIN SODIUM 1000 [USP'U]/ML
2600 INJECTION, SOLUTION INTRAVENOUS; SUBCUTANEOUS PRN
Refills: 0 | Status: SHIPPED
Start: 2021-01-01

## 2021-01-01 RX ORDER — PANTOPRAZOLE SODIUM 40 MG/10ML
40 INJECTION, POWDER, LYOPHILIZED, FOR SOLUTION INTRAVENOUS 2 TIMES DAILY
Status: DISCONTINUED | OUTPATIENT
Start: 2021-01-01 | End: 2021-01-01

## 2021-01-01 RX ORDER — DEXTROSE AND SODIUM CHLORIDE 5; .9 G/100ML; G/100ML
INJECTION, SOLUTION INTRAVENOUS CONTINUOUS
Status: DISCONTINUED | OUTPATIENT
Start: 2021-01-01 | End: 2021-01-01

## 2021-01-01 RX ORDER — HEPARIN SODIUM 5000 [USP'U]/ML
5000 INJECTION, SOLUTION INTRAVENOUS; SUBCUTANEOUS EVERY 8 HOURS
Refills: 0 | Status: SHIPPED
Start: 2021-01-01

## 2021-01-01 RX ORDER — SODIUM CHLORIDE 9 MG/ML
500 INJECTION, SOLUTION INTRAVENOUS
Status: ACTIVE | OUTPATIENT
Start: 2021-01-01 | End: 2021-01-01

## 2021-01-01 RX ORDER — ROPINIROLE 2 MG/1
2 TABLET, FILM COATED ORAL
Status: DISCONTINUED | OUTPATIENT
Start: 2021-01-01 | End: 2021-01-01

## 2021-01-01 RX ORDER — KETAMINE HYDROCHLORIDE 50 MG/ML
200 INJECTION, SOLUTION INTRAMUSCULAR; INTRAVENOUS ONCE
Status: COMPLETED | OUTPATIENT
Start: 2021-01-01 | End: 2021-01-01

## 2021-01-01 RX ORDER — IPRATROPIUM BROMIDE AND ALBUTEROL SULFATE 2.5; .5 MG/3ML; MG/3ML
3 SOLUTION RESPIRATORY (INHALATION) EVERY 4 HOURS PRN
Status: SHIPPED
Start: 2021-01-01

## 2021-01-01 RX ORDER — AMOXICILLIN 250 MG
2 CAPSULE ORAL 2 TIMES DAILY
Status: DISCONTINUED | OUTPATIENT
Start: 2021-01-01 | End: 2021-01-01

## 2021-01-01 RX ORDER — ROCURONIUM BROMIDE 10 MG/ML
INJECTION, SOLUTION INTRAVENOUS
Status: COMPLETED | OUTPATIENT
Start: 2021-01-01 | End: 2021-01-01

## 2021-01-01 RX ORDER — SODIUM CHLORIDE, SODIUM LACTATE, POTASSIUM CHLORIDE, CALCIUM CHLORIDE 600; 310; 30; 20 MG/100ML; MG/100ML; MG/100ML; MG/100ML
INJECTION, SOLUTION INTRAVENOUS CONTINUOUS
Status: DISCONTINUED | OUTPATIENT
Start: 2021-01-01 | End: 2021-01-01

## 2021-01-01 RX ORDER — CHOLECALCIFEROL (VITAMIN D3) 125 MCG
5 CAPSULE ORAL NIGHTLY
Status: DISCONTINUED | OUTPATIENT
Start: 2021-01-01 | End: 2021-01-01 | Stop reason: HOSPADM

## 2021-01-01 RX ORDER — ONDANSETRON 2 MG/ML
4 INJECTION INTRAMUSCULAR; INTRAVENOUS ONCE
Status: COMPLETED | OUTPATIENT
Start: 2021-01-01 | End: 2021-01-01

## 2021-01-01 RX ORDER — CARVEDILOL 6.25 MG/1
12.5 TABLET ORAL EVERY EVENING
Status: DISCONTINUED | OUTPATIENT
Start: 2021-01-01 | End: 2021-01-01

## 2021-01-01 RX ORDER — SACUBITRIL AND VALSARTAN 49; 51 MG/1; MG/1
1 TABLET, FILM COATED ORAL 2 TIMES DAILY
COMMUNITY

## 2021-01-01 RX ORDER — ONDANSETRON 2 MG/ML
INJECTION INTRAMUSCULAR; INTRAVENOUS
Status: DISCONTINUED
Start: 2021-01-01 | End: 2021-01-01 | Stop reason: HOSPADM

## 2021-01-01 RX ORDER — ACETAMINOPHEN 500 MG
1000 TABLET ORAL EVERY 8 HOURS
Status: DISCONTINUED | OUTPATIENT
Start: 2021-01-01 | End: 2021-01-01 | Stop reason: HOSPADM

## 2021-01-01 RX ORDER — FAMOTIDINE 20 MG/1
10 TABLET, FILM COATED ORAL DAILY
Status: DISCONTINUED | OUTPATIENT
Start: 2021-01-01 | End: 2021-01-01

## 2021-01-01 RX ORDER — CALCIUM CHLORIDE 100 MG/ML
2 INJECTION INTRAVENOUS; INTRAVENTRICULAR ONCE
Status: DISCONTINUED | OUTPATIENT
Start: 2021-01-01 | End: 2021-01-01

## 2021-01-01 RX ORDER — MIDAZOLAM HYDROCHLORIDE 1 MG/ML
2 INJECTION INTRAMUSCULAR; INTRAVENOUS ONCE
Status: COMPLETED | OUTPATIENT
Start: 2021-01-01 | End: 2021-01-01

## 2021-01-01 RX ORDER — BISACODYL 10 MG
10 SUPPOSITORY, RECTAL RECTAL
Status: DISCONTINUED | OUTPATIENT
Start: 2021-01-01 | End: 2021-01-01 | Stop reason: HOSPADM

## 2021-01-01 RX ORDER — MIDODRINE HYDROCHLORIDE 10 MG/1
10 TABLET ORAL
Qty: 60 TAB | Status: SHIPPED
Start: 2021-01-01

## 2021-01-01 RX ORDER — INSULIN GLARGINE 100 [IU]/ML
5 INJECTION, SOLUTION SUBCUTANEOUS
Status: DISCONTINUED | OUTPATIENT
Start: 2021-01-01 | End: 2021-01-01

## 2021-01-01 RX ORDER — HYDROMORPHONE HYDROCHLORIDE 1 MG/ML
0.5 INJECTION, SOLUTION INTRAMUSCULAR; INTRAVENOUS; SUBCUTANEOUS EVERY 6 HOURS PRN
Status: DISCONTINUED | OUTPATIENT
Start: 2021-01-01 | End: 2021-01-01

## 2021-01-01 RX ORDER — MAGNESIUM SULFATE HEPTAHYDRATE 40 MG/ML
2 INJECTION, SOLUTION INTRAVENOUS ONCE
Status: COMPLETED | OUTPATIENT
Start: 2021-01-01 | End: 2021-01-01

## 2021-01-01 RX ORDER — EPINEPHRINE IN SOD CHLOR,ISO 1 MG/10 ML
SYRINGE (ML) INTRAVENOUS
Status: COMPLETED | OUTPATIENT
Start: 2021-01-01 | End: 2021-01-01

## 2021-01-01 RX ORDER — CARVEDILOL 25 MG/1
25 TABLET ORAL 2 TIMES DAILY WITH MEALS
Status: DISCONTINUED | OUTPATIENT
Start: 2021-01-01 | End: 2021-01-01

## 2021-01-01 RX ORDER — FUROSEMIDE 10 MG/ML
80 INJECTION INTRAMUSCULAR; INTRAVENOUS ONCE
Status: COMPLETED | OUTPATIENT
Start: 2021-01-01 | End: 2021-01-01

## 2021-01-01 RX ORDER — PANTOPRAZOLE SODIUM 40 MG/10ML
40 INJECTION, POWDER, LYOPHILIZED, FOR SOLUTION INTRAVENOUS DAILY
Status: DISCONTINUED | OUTPATIENT
Start: 2021-01-01 | End: 2021-01-01

## 2021-01-01 RX ORDER — GEMFIBROZIL 600 MG/1
600 TABLET, FILM COATED ORAL
Status: DISCONTINUED | OUTPATIENT
Start: 2021-01-01 | End: 2021-01-01

## 2021-01-01 RX ORDER — HEPARIN SODIUM (PORCINE) LOCK FLUSH IV SOLN 100 UNIT/ML 100 UNIT/ML
SOLUTION INTRAVENOUS
Status: COMPLETED
Start: 2021-01-01 | End: 2021-01-01

## 2021-01-01 RX ORDER — CARVEDILOL 6.25 MG/1
25 TABLET ORAL EVERY EVENING
Status: DISCONTINUED | OUTPATIENT
Start: 2021-01-01 | End: 2021-01-01

## 2021-01-01 RX ORDER — KETOROLAC TROMETHAMINE 30 MG/ML
30 INJECTION, SOLUTION INTRAMUSCULAR; INTRAVENOUS EVERY 6 HOURS PRN
Status: DISCONTINUED | OUTPATIENT
Start: 2021-01-01 | End: 2021-01-01

## 2021-01-01 RX ORDER — LINEZOLID 2 MG/ML
600 INJECTION, SOLUTION INTRAVENOUS EVERY 12 HOURS
Status: DISCONTINUED | OUTPATIENT
Start: 2021-01-01 | End: 2021-01-01

## 2021-01-01 RX ORDER — ALBUMIN, HUMAN INJ 5% 5 %
25 SOLUTION INTRAVENOUS ONCE
Status: DISCONTINUED | OUTPATIENT
Start: 2021-01-01 | End: 2021-01-01

## 2021-01-01 RX ORDER — HYDROMORPHONE HYDROCHLORIDE 1 MG/ML
1 INJECTION, SOLUTION INTRAMUSCULAR; INTRAVENOUS; SUBCUTANEOUS ONCE
Status: COMPLETED | OUTPATIENT
Start: 2021-01-01 | End: 2021-01-01

## 2021-01-01 RX ORDER — CALCIUM CHLORIDE 100 MG/ML
INJECTION INTRAVENOUS; INTRAVENTRICULAR
Status: DISCONTINUED
Start: 2021-01-01 | End: 2021-01-01 | Stop reason: HOSPADM

## 2021-01-01 RX ORDER — HYDROMORPHONE HYDROCHLORIDE 1 MG/ML
.5-2 INJECTION, SOLUTION INTRAMUSCULAR; INTRAVENOUS; SUBCUTANEOUS
Status: DISCONTINUED | OUTPATIENT
Start: 2021-01-01 | End: 2021-01-01

## 2021-01-01 RX ORDER — MIDAZOLAM HYDROCHLORIDE 1 MG/ML
.5-2 INJECTION INTRAMUSCULAR; INTRAVENOUS PRN
Status: ACTIVE | OUTPATIENT
Start: 2021-01-01 | End: 2021-01-01

## 2021-01-01 RX ORDER — SODIUM POLYSTYRENE SULFONATE 15 G/60ML
15 SUSPENSION ORAL; RECTAL ONCE
Status: COMPLETED | OUTPATIENT
Start: 2021-01-01 | End: 2021-01-01

## 2021-01-01 RX ORDER — KETOROLAC TROMETHAMINE 30 MG/ML
15 INJECTION, SOLUTION INTRAMUSCULAR; INTRAVENOUS EVERY 6 HOURS PRN
Status: DISCONTINUED | OUTPATIENT
Start: 2021-01-01 | End: 2021-01-01

## 2021-01-01 RX ORDER — SPIRONOLACTONE 25 MG/1
25 TABLET ORAL DAILY
COMMUNITY

## 2021-01-01 RX ORDER — HYDROMORPHONE HYDROCHLORIDE 1 MG/ML
.5-1 INJECTION, SOLUTION INTRAMUSCULAR; INTRAVENOUS; SUBCUTANEOUS
Status: DISCONTINUED | OUTPATIENT
Start: 2021-01-01 | End: 2021-01-01

## 2021-01-01 RX ORDER — SIMETHICONE 80 MG
80 TABLET,CHEWABLE ORAL 3 TIMES DAILY
Status: DISCONTINUED | OUTPATIENT
Start: 2021-01-01 | End: 2021-01-01 | Stop reason: HOSPADM

## 2021-01-01 RX ORDER — HEPARIN SODIUM 1000 [USP'U]/ML
2600 INJECTION, SOLUTION INTRAVENOUS; SUBCUTANEOUS
Status: DISCONTINUED | OUTPATIENT
Start: 2021-01-01 | End: 2021-01-01 | Stop reason: HOSPADM

## 2021-01-01 RX ORDER — HEPARIN SODIUM 5000 [USP'U]/ML
5000 INJECTION, SOLUTION INTRAVENOUS; SUBCUTANEOUS EVERY 8 HOURS
Status: DISCONTINUED | OUTPATIENT
Start: 2021-01-01 | End: 2021-01-01

## 2021-01-01 RX ORDER — ACETAMINOPHEN 500 MG
1000 TABLET ORAL EVERY 8 HOURS
Qty: 30 TAB | Refills: 0 | Status: SHIPPED
Start: 2021-01-01

## 2021-01-01 RX ORDER — DEXMEDETOMIDINE HYDROCHLORIDE 4 UG/ML
.1-1.5 INJECTION, SOLUTION INTRAVENOUS CONTINUOUS
Status: DISCONTINUED | OUTPATIENT
Start: 2021-01-01 | End: 2021-01-01

## 2021-01-01 RX ORDER — CARVEDILOL 25 MG/1
25 TABLET ORAL EVERY EVENING
Status: DISCONTINUED | OUTPATIENT
Start: 2021-01-01 | End: 2021-01-01

## 2021-01-01 RX ORDER — BISACODYL 10 MG
10 SUPPOSITORY, RECTAL RECTAL
Status: DISCONTINUED | OUTPATIENT
Start: 2021-01-01 | End: 2021-01-01

## 2021-01-01 RX ORDER — MORPHINE SULFATE 4 MG/ML
2 INJECTION, SOLUTION INTRAMUSCULAR; INTRAVENOUS
Status: DISCONTINUED | OUTPATIENT
Start: 2021-01-01 | End: 2021-01-01 | Stop reason: HOSPADM

## 2021-01-01 RX ORDER — DEXTROSE MONOHYDRATE 25 G/50ML
50 INJECTION, SOLUTION INTRAVENOUS
Status: DISCONTINUED | OUTPATIENT
Start: 2021-01-01 | End: 2021-01-01 | Stop reason: HOSPADM

## 2021-01-01 RX ORDER — MORPHINE SULFATE 10 MG/ML
5 INJECTION, SOLUTION INTRAMUSCULAR; INTRAVENOUS ONCE
Status: DISCONTINUED | OUTPATIENT
Start: 2021-01-01 | End: 2021-01-01

## 2021-01-01 RX ORDER — HYDROMORPHONE HYDROCHLORIDE 1 MG/ML
1-5 INJECTION, SOLUTION INTRAMUSCULAR; INTRAVENOUS; SUBCUTANEOUS ONCE
Status: COMPLETED | OUTPATIENT
Start: 2021-01-01 | End: 2021-01-01

## 2021-01-01 RX ORDER — DEXTROSE MONOHYDRATE 25 G/50ML
50 INJECTION, SOLUTION INTRAVENOUS PRN
Status: DISCONTINUED | OUTPATIENT
Start: 2021-01-01 | End: 2021-01-01

## 2021-01-01 RX ORDER — CALCIUM CHLORIDE 100 MG/ML
1 INJECTION INTRAVENOUS; INTRAVENTRICULAR ONCE
Status: DISCONTINUED | OUTPATIENT
Start: 2021-01-01 | End: 2021-01-01

## 2021-01-01 RX ORDER — FUROSEMIDE 10 MG/ML
80 INJECTION INTRAMUSCULAR; INTRAVENOUS
Status: DISCONTINUED | OUTPATIENT
Start: 2021-01-01 | End: 2021-01-01

## 2021-01-01 RX ORDER — IBUPROFEN 600 MG/1
600 TABLET ORAL EVERY 6 HOURS PRN
Status: ON HOLD | COMMUNITY
End: 2021-01-01

## 2021-01-01 RX ORDER — CALCIUM CHLORIDE 100 MG/ML
INJECTION INTRAVENOUS; INTRAVENTRICULAR
Status: COMPLETED | OUTPATIENT
Start: 2021-01-01 | End: 2021-01-01

## 2021-01-01 RX ORDER — EPINEPHRINE IN SOD CHLOR,ISO 1 MG/10 ML
SYRINGE (ML) INTRAVENOUS
Status: DISCONTINUED
Start: 2021-01-01 | End: 2021-01-01 | Stop reason: HOSPADM

## 2021-01-01 RX ORDER — FUROSEMIDE 10 MG/ML
20 INJECTION INTRAMUSCULAR; INTRAVENOUS ONCE
Status: COMPLETED | OUTPATIENT
Start: 2021-01-01 | End: 2021-01-01

## 2021-01-01 RX ORDER — HYDROMORPHONE HYDROCHLORIDE 1 MG/ML
1 INJECTION, SOLUTION INTRAMUSCULAR; INTRAVENOUS; SUBCUTANEOUS EVERY 4 HOURS PRN
Status: DISCONTINUED | OUTPATIENT
Start: 2021-01-01 | End: 2021-01-01

## 2021-01-01 RX ORDER — CALCIUM CHLORIDE 100 MG/ML
1 INJECTION INTRAVENOUS; INTRAVENTRICULAR ONCE
Status: DISCONTINUED | OUTPATIENT
Start: 2021-01-01 | End: 2021-01-01 | Stop reason: HOSPADM

## 2021-01-01 RX ORDER — ETOMIDATE 2 MG/ML
INJECTION INTRAVENOUS
Status: COMPLETED | OUTPATIENT
Start: 2021-01-01 | End: 2021-01-01

## 2021-01-01 RX ORDER — MIDAZOLAM HYDROCHLORIDE 1 MG/ML
.5-2 INJECTION INTRAMUSCULAR; INTRAVENOUS PRN
Status: DISCONTINUED | OUTPATIENT
Start: 2021-01-01 | End: 2021-01-01

## 2021-01-01 RX ORDER — ONDANSETRON 2 MG/ML
4 INJECTION INTRAMUSCULAR; INTRAVENOUS PRN
Status: DISCONTINUED | OUTPATIENT
Start: 2021-01-01 | End: 2021-01-01

## 2021-01-01 RX ORDER — AMLODIPINE BESYLATE 5 MG/1
5 TABLET ORAL
Status: DISCONTINUED | OUTPATIENT
Start: 2021-01-01 | End: 2021-01-01

## 2021-01-01 RX ORDER — SODIUM CHLORIDE 9 MG/ML
300 INJECTION, SOLUTION INTRAVENOUS ONCE
Status: COMPLETED | OUTPATIENT
Start: 2021-01-01 | End: 2021-01-01

## 2021-01-01 RX ORDER — HYDROCODONE BITARTRATE AND ACETAMINOPHEN 5; 325 MG/1; MG/1
1 TABLET ORAL EVERY 4 HOURS PRN
Status: DISCONTINUED | OUTPATIENT
Start: 2021-01-01 | End: 2021-01-01

## 2021-01-01 RX ORDER — ONDANSETRON 2 MG/ML
4 INJECTION INTRAMUSCULAR; INTRAVENOUS EVERY 4 HOURS PRN
Status: DISCONTINUED | OUTPATIENT
Start: 2021-01-01 | End: 2021-01-01

## 2021-01-01 RX ORDER — MORPHINE SULFATE 4 MG/ML
2 INJECTION, SOLUTION INTRAMUSCULAR; INTRAVENOUS ONCE
Status: DISCONTINUED | OUTPATIENT
Start: 2021-01-01 | End: 2021-01-01

## 2021-01-01 RX ORDER — MIDODRINE HYDROCHLORIDE 5 MG/1
10 TABLET ORAL
Status: DISCONTINUED | OUTPATIENT
Start: 2021-01-01 | End: 2021-01-01 | Stop reason: HOSPADM

## 2021-01-01 RX ORDER — LIDOCAINE 50 MG/G
1 PATCH TOPICAL EVERY 24 HOURS
Qty: 10 PATCH | Status: SHIPPED
Start: 2021-01-01

## 2021-01-01 RX ORDER — LIDOCAINE 50 MG/G
1 PATCH TOPICAL EVERY 24 HOURS
Status: DISCONTINUED | OUTPATIENT
Start: 2021-01-01 | End: 2021-01-01 | Stop reason: HOSPADM

## 2021-01-01 RX ORDER — ONDANSETRON 2 MG/ML
4 INJECTION INTRAMUSCULAR; INTRAVENOUS PRN
Status: ACTIVE | OUTPATIENT
Start: 2021-01-01 | End: 2021-01-01

## 2021-01-01 RX ORDER — SODIUM CHLORIDE 9 MG/ML
500 INJECTION, SOLUTION INTRAVENOUS
Status: DISCONTINUED | OUTPATIENT
Start: 2021-01-01 | End: 2021-01-01

## 2021-01-01 RX ORDER — FAMOTIDINE 20 MG/1
20 TABLET, FILM COATED ORAL EVERY 12 HOURS
Status: DISCONTINUED | OUTPATIENT
Start: 2021-01-01 | End: 2021-01-01

## 2021-01-01 RX ADMIN — CALCIUM GLUCONATE: 98 INJECTION, SOLUTION INTRAVENOUS at 20:24

## 2021-01-01 RX ADMIN — HEPARIN SODIUM 5000 UNITS: 5000 INJECTION, SOLUTION INTRAVENOUS; SUBCUTANEOUS at 05:44

## 2021-01-01 RX ADMIN — HYDROMORPHONE HYDROCHLORIDE 1 MG: 1 INJECTION, SOLUTION INTRAMUSCULAR; INTRAVENOUS; SUBCUTANEOUS at 16:23

## 2021-01-01 RX ADMIN — INSULIN HUMAN 2 UNITS: 100 INJECTION, SOLUTION PARENTERAL at 00:06

## 2021-01-01 RX ADMIN — CALCIUM GLUCONATE: 98 INJECTION, SOLUTION INTRAVENOUS at 20:00

## 2021-01-01 RX ADMIN — ACETAMINOPHEN 650 MG: 325 TABLET ORAL at 00:51

## 2021-01-01 RX ADMIN — SODIUM CHLORIDE: 9 INJECTION, SOLUTION INTRAVENOUS at 22:25

## 2021-01-01 RX ADMIN — HYDROCODONE BITARTRATE AND ACETAMINOPHEN 1 TABLET: 5; 325 TABLET ORAL at 10:50

## 2021-01-01 RX ADMIN — LIDOCAINE 1 PATCH: 50 PATCH TOPICAL at 17:37

## 2021-01-01 RX ADMIN — ROPINIROLE HYDROCHLORIDE 2 MG: 2 TABLET, FILM COATED ORAL at 21:28

## 2021-01-01 RX ADMIN — SPIRONOLACTONE 25 MG: 25 TABLET ORAL at 05:28

## 2021-01-01 RX ADMIN — AMPICILLIN SODIUM AND SULBACTAM SODIUM 3 G: 2; 1 INJECTION, POWDER, FOR SOLUTION INTRAMUSCULAR; INTRAVENOUS at 20:47

## 2021-01-01 RX ADMIN — ROPINIROLE HYDROCHLORIDE 2 MG: 2 TABLET, FILM COATED ORAL at 21:56

## 2021-01-01 RX ADMIN — DEXTROSE AND SODIUM CHLORIDE: 5; 450 INJECTION, SOLUTION INTRAVENOUS at 13:12

## 2021-01-01 RX ADMIN — HYDROMORPHONE HYDROCHLORIDE 0.5 MG: 1 INJECTION, SOLUTION INTRAMUSCULAR; INTRAVENOUS; SUBCUTANEOUS at 09:35

## 2021-01-01 RX ADMIN — NOREPINEPHRINE BITARTRATE 15 MCG/MIN: 1 INJECTION INTRAVENOUS at 09:15

## 2021-01-01 RX ADMIN — MIDODRINE HYDROCHLORIDE 10 MG: 5 TABLET ORAL at 18:18

## 2021-01-01 RX ADMIN — HEPARIN 5 ML: 100 SYRINGE at 15:47

## 2021-01-01 RX ADMIN — HEPARIN SODIUM 5000 UNITS: 5000 INJECTION, SOLUTION INTRAVENOUS; SUBCUTANEOUS at 20:09

## 2021-01-01 RX ADMIN — SIMETHICONE CHEW TAB 80 MG 80 MG: 80 TABLET ORAL at 18:05

## 2021-01-01 RX ADMIN — HYDROMORPHONE HYDROCHLORIDE 1 MG: 1 INJECTION, SOLUTION INTRAMUSCULAR; INTRAVENOUS; SUBCUTANEOUS at 18:22

## 2021-01-01 RX ADMIN — ACETAMINOPHEN 650 MG: 325 TABLET ORAL at 20:57

## 2021-01-01 RX ADMIN — HYDROMORPHONE HYDROCHLORIDE 0.5 MG: 1 INJECTION, SOLUTION INTRAMUSCULAR; INTRAVENOUS; SUBCUTANEOUS at 20:38

## 2021-01-01 RX ADMIN — HEPARIN SODIUM 5000 UNITS: 5000 INJECTION, SOLUTION INTRAVENOUS; SUBCUTANEOUS at 05:00

## 2021-01-01 RX ADMIN — AMLODIPINE BESYLATE 5 MG: 5 TABLET ORAL at 06:22

## 2021-01-01 RX ADMIN — LIDOCAINE 1 PATCH: 50 PATCH TOPICAL at 14:35

## 2021-01-01 RX ADMIN — CARVEDILOL 25 MG: 25 TABLET, FILM COATED ORAL at 22:54

## 2021-01-01 RX ADMIN — HEPARIN SODIUM 5000 UNITS: 5000 INJECTION, SOLUTION INTRAVENOUS; SUBCUTANEOUS at 14:02

## 2021-01-01 RX ADMIN — HYDROMORPHONE HYDROCHLORIDE 1 MG: 1 INJECTION, SOLUTION INTRAMUSCULAR; INTRAVENOUS; SUBCUTANEOUS at 10:43

## 2021-01-01 RX ADMIN — ONDANSETRON 4 MG: 2 INJECTION INTRAMUSCULAR; INTRAVENOUS at 10:42

## 2021-01-01 RX ADMIN — ONDANSETRON 4 MG: 2 INJECTION INTRAMUSCULAR; INTRAVENOUS at 19:23

## 2021-01-01 RX ADMIN — MIDODRINE HYDROCHLORIDE 10 MG: 5 TABLET ORAL at 14:03

## 2021-01-01 RX ADMIN — DEXMEDETOMIDINE HYDROCHLORIDE 1 MCG/KG/HR: 100 INJECTION, SOLUTION INTRAVENOUS at 02:08

## 2021-01-01 RX ADMIN — HEPARIN SODIUM 5000 UNITS: 5000 INJECTION, SOLUTION INTRAVENOUS; SUBCUTANEOUS at 13:49

## 2021-01-01 RX ADMIN — SACUBITRIL AND VALSARTAN 1 TABLET: 49; 51 TABLET, FILM COATED ORAL at 18:47

## 2021-01-01 RX ADMIN — MAGNESIUM SULFATE 2 G: 2 INJECTION INTRAVENOUS at 08:51

## 2021-01-01 RX ADMIN — SACUBITRIL AND VALSARTAN 1 TABLET: 49; 51 TABLET, FILM COATED ORAL at 05:28

## 2021-01-01 RX ADMIN — IPRATROPIUM BROMIDE AND ALBUTEROL SULFATE 3 ML: .5; 3 SOLUTION RESPIRATORY (INHALATION) at 22:25

## 2021-01-01 RX ADMIN — MIDODRINE HYDROCHLORIDE 10 MG: 5 TABLET ORAL at 08:09

## 2021-01-01 RX ADMIN — HYDROXYZINE HYDROCHLORIDE 25 MG: 25 TABLET, FILM COATED ORAL at 21:21

## 2021-01-01 RX ADMIN — CARVEDILOL 25 MG: 25 TABLET, FILM COATED ORAL at 17:12

## 2021-01-01 RX ADMIN — INSULIN GLARGINE 5 UNITS: 100 INJECTION, SOLUTION SUBCUTANEOUS at 08:39

## 2021-01-01 RX ADMIN — HEPARIN SODIUM 5000 UNITS: 5000 INJECTION, SOLUTION INTRAVENOUS; SUBCUTANEOUS at 06:00

## 2021-01-01 RX ADMIN — INSULIN HUMAN 1 UNITS: 100 INJECTION, SOLUTION PARENTERAL at 13:26

## 2021-01-01 RX ADMIN — HYDROMORPHONE HYDROCHLORIDE 0.5 MG: 1 INJECTION, SOLUTION INTRAMUSCULAR; INTRAVENOUS; SUBCUTANEOUS at 14:02

## 2021-01-01 RX ADMIN — MORPHINE SULFATE 2 MG: 4 INJECTION INTRAVENOUS at 05:50

## 2021-01-01 RX ADMIN — EPINEPHRINE 0.5 MG: 0.1 INJECTION, SOLUTION ENDOTRACHEAL; INTRACARDIAC; INTRAVENOUS at 13:40

## 2021-01-01 RX ADMIN — ACETAMINOPHEN 650 MG: 325 TABLET ORAL at 17:37

## 2021-01-01 RX ADMIN — HYDROMORPHONE HYDROCHLORIDE 1 MG: 1 INJECTION, SOLUTION INTRAMUSCULAR; INTRAVENOUS; SUBCUTANEOUS at 09:53

## 2021-01-01 RX ADMIN — LIDOCAINE 1 PATCH: 50 PATCH TOPICAL at 16:30

## 2021-01-01 RX ADMIN — ROPINIROLE HYDROCHLORIDE 2 MG: 2 TABLET, FILM COATED ORAL at 21:00

## 2021-01-01 RX ADMIN — CARVEDILOL 25 MG: 25 TABLET, FILM COATED ORAL at 17:54

## 2021-01-01 RX ADMIN — KETOROLAC TROMETHAMINE 30 MG: 30 INJECTION, SOLUTION INTRAMUSCULAR at 14:35

## 2021-01-01 RX ADMIN — HEPARIN SODIUM 5000 UNITS: 5000 INJECTION, SOLUTION INTRAVENOUS; SUBCUTANEOUS at 04:31

## 2021-01-01 RX ADMIN — EPINEPHRINE 0.1 MG: 0.1 INJECTION, SOLUTION ENDOTRACHEAL; INTRACARDIAC; INTRAVENOUS at 13:43

## 2021-01-01 RX ADMIN — PIPERACILLIN AND TAZOBACTAM 3.38 G: 3; .375 INJECTION, POWDER, LYOPHILIZED, FOR SOLUTION INTRAVENOUS; PARENTERAL at 10:01

## 2021-01-01 RX ADMIN — SODIUM CHLORIDE, POTASSIUM CHLORIDE, SODIUM LACTATE AND CALCIUM CHLORIDE: 600; 310; 30; 20 INJECTION, SOLUTION INTRAVENOUS at 01:46

## 2021-01-01 RX ADMIN — INSULIN HUMAN 2 UNITS: 100 INJECTION, SOLUTION PARENTERAL at 14:37

## 2021-01-01 RX ADMIN — HYDROMORPHONE HYDROCHLORIDE 1 MG: 1 INJECTION, SOLUTION INTRAMUSCULAR; INTRAVENOUS; SUBCUTANEOUS at 21:47

## 2021-01-01 RX ADMIN — EPINEPHRINE 0.5 MG: 0.1 INJECTION, SOLUTION ENDOTRACHEAL; INTRACARDIAC; INTRAVENOUS at 13:39

## 2021-01-01 RX ADMIN — SIMETHICONE 80 MG: 80 TABLET, CHEWABLE ORAL at 13:49

## 2021-01-01 RX ADMIN — HYDROMORPHONE HYDROCHLORIDE 0.5 MG: 1 INJECTION, SOLUTION INTRAMUSCULAR; INTRAVENOUS; SUBCUTANEOUS at 03:06

## 2021-01-01 RX ADMIN — HYDROMORPHONE HYDROCHLORIDE 1 MG: 1 INJECTION, SOLUTION INTRAMUSCULAR; INTRAVENOUS; SUBCUTANEOUS at 04:26

## 2021-01-01 RX ADMIN — HYDROMORPHONE HYDROCHLORIDE 1 MG: 1 INJECTION, SOLUTION INTRAMUSCULAR; INTRAVENOUS; SUBCUTANEOUS at 17:45

## 2021-01-01 RX ADMIN — HYDROMORPHONE HYDROCHLORIDE 1 MG: 1 INJECTION, SOLUTION INTRAMUSCULAR; INTRAVENOUS; SUBCUTANEOUS at 15:30

## 2021-01-01 RX ADMIN — INSULIN LISPRO 1 UNITS: 100 INJECTION, SOLUTION INTRAVENOUS; SUBCUTANEOUS at 00:18

## 2021-01-01 RX ADMIN — INSULIN HUMAN 1 UNITS: 100 INJECTION, SOLUTION PARENTERAL at 08:38

## 2021-01-01 RX ADMIN — HEPARIN SODIUM 5000 UNITS: 5000 INJECTION, SOLUTION INTRAVENOUS; SUBCUTANEOUS at 13:03

## 2021-01-01 RX ADMIN — SIMETHICONE 80 MG: 80 TABLET, CHEWABLE ORAL at 12:27

## 2021-01-01 RX ADMIN — HEPARIN SODIUM 5000 UNITS: 5000 INJECTION, SOLUTION INTRAVENOUS; SUBCUTANEOUS at 14:06

## 2021-01-01 RX ADMIN — INSULIN HUMAN 1 UNITS: 100 INJECTION, SOLUTION PARENTERAL at 19:17

## 2021-01-01 RX ADMIN — HYDROMORPHONE HYDROCHLORIDE 1 MG: 1 INJECTION, SOLUTION INTRAMUSCULAR; INTRAVENOUS; SUBCUTANEOUS at 23:22

## 2021-01-01 RX ADMIN — CARVEDILOL 25 MG: 25 TABLET, FILM COATED ORAL at 17:24

## 2021-01-01 RX ADMIN — DEXMEDETOMIDINE HYDROCHLORIDE 1 MCG/KG/HR: 100 INJECTION, SOLUTION INTRAVENOUS at 02:33

## 2021-01-01 RX ADMIN — HEPARIN SODIUM 5000 UNITS: 5000 INJECTION, SOLUTION INTRAVENOUS; SUBCUTANEOUS at 13:34

## 2021-01-01 RX ADMIN — HEPARIN SODIUM 5000 UNITS: 5000 INJECTION, SOLUTION INTRAVENOUS; SUBCUTANEOUS at 13:36

## 2021-01-01 RX ADMIN — IBUPROFEN 400 MG: 800 TABLET, FILM COATED ORAL at 17:18

## 2021-01-01 RX ADMIN — HYDROMORPHONE HYDROCHLORIDE 1 MG: 1 INJECTION, SOLUTION INTRAMUSCULAR; INTRAVENOUS; SUBCUTANEOUS at 17:42

## 2021-01-01 RX ADMIN — HYDROMORPHONE HYDROCHLORIDE 0.5 MG: 1 INJECTION, SOLUTION INTRAMUSCULAR; INTRAVENOUS; SUBCUTANEOUS at 15:28

## 2021-01-01 RX ADMIN — DOCUSATE SODIUM 50 MG AND SENNOSIDES 8.6 MG 2 TABLET: 8.6; 5 TABLET, FILM COATED ORAL at 05:28

## 2021-01-01 RX ADMIN — EPINEPHRINE 0.05 MG: 0.1 INJECTION, SOLUTION ENDOTRACHEAL; INTRACARDIAC; INTRAVENOUS at 13:38

## 2021-01-01 RX ADMIN — ENOXAPARIN SODIUM 40 MG: 40 INJECTION SUBCUTANEOUS at 06:34

## 2021-01-01 RX ADMIN — INSULIN HUMAN 1 UNITS: 100 INJECTION, SOLUTION PARENTERAL at 23:45

## 2021-01-01 RX ADMIN — ACETAMINOPHEN 650 MG: 325 TABLET, FILM COATED ORAL at 02:31

## 2021-01-01 RX ADMIN — HEPARIN SODIUM 5000 UNITS: 5000 INJECTION, SOLUTION INTRAVENOUS; SUBCUTANEOUS at 05:35

## 2021-01-01 RX ADMIN — HYDROMORPHONE HYDROCHLORIDE 1 MG: 1 INJECTION, SOLUTION INTRAMUSCULAR; INTRAVENOUS; SUBCUTANEOUS at 09:21

## 2021-01-01 RX ADMIN — ACETAMINOPHEN 650 MG: 325 TABLET, FILM COATED ORAL at 15:45

## 2021-01-01 RX ADMIN — HYDROMORPHONE HYDROCHLORIDE 1 MG: 1 INJECTION, SOLUTION INTRAMUSCULAR; INTRAVENOUS; SUBCUTANEOUS at 21:35

## 2021-01-01 RX ADMIN — CALCIUM GLUCONATE: 98 INJECTION, SOLUTION INTRAVENOUS at 19:41

## 2021-01-01 RX ADMIN — HYDROMORPHONE HYDROCHLORIDE 1 MG: 1 INJECTION, SOLUTION INTRAMUSCULAR; INTRAVENOUS; SUBCUTANEOUS at 13:56

## 2021-01-01 RX ADMIN — MIDODRINE HYDROCHLORIDE 10 MG: 5 TABLET ORAL at 17:45

## 2021-01-01 RX ADMIN — DEXMEDETOMIDINE HYDROCHLORIDE 1 MCG/KG/HR: 100 INJECTION, SOLUTION INTRAVENOUS at 06:38

## 2021-01-01 RX ADMIN — MIDODRINE HYDROCHLORIDE 10 MG: 5 TABLET ORAL at 13:49

## 2021-01-01 RX ADMIN — SODIUM BICARBONATE 50 MEQ: 84 INJECTION INTRAVENOUS at 15:30

## 2021-01-01 RX ADMIN — HYDROXYZINE HYDROCHLORIDE 25 MG: 25 TABLET, FILM COATED ORAL at 20:09

## 2021-01-01 RX ADMIN — HEPARIN SODIUM 5000 UNITS: 5000 INJECTION, SOLUTION INTRAVENOUS; SUBCUTANEOUS at 05:28

## 2021-01-01 RX ADMIN — DEXMEDETOMIDINE HYDROCHLORIDE 1 MCG/KG/HR: 100 INJECTION, SOLUTION INTRAVENOUS at 21:59

## 2021-01-01 RX ADMIN — HEPARIN SODIUM 5000 UNITS: 5000 INJECTION, SOLUTION INTRAVENOUS; SUBCUTANEOUS at 22:57

## 2021-01-01 RX ADMIN — INSULIN GLARGINE 5 UNITS: 100 INJECTION, SOLUTION SUBCUTANEOUS at 05:06

## 2021-01-01 RX ADMIN — KETOROLAC TROMETHAMINE 30 MG: 30 INJECTION, SOLUTION INTRAMUSCULAR at 11:55

## 2021-01-01 RX ADMIN — PROPOFOL 25 MG: 10 INJECTION, EMULSION INTRAVENOUS at 13:32

## 2021-01-01 RX ADMIN — HEPARIN SODIUM 5000 UNITS: 5000 INJECTION, SOLUTION INTRAVENOUS; SUBCUTANEOUS at 05:32

## 2021-01-01 RX ADMIN — CALCIUM CHLORIDE 1 G: 100 INJECTION INTRAVENOUS; INTRAVENTRICULAR at 13:37

## 2021-01-01 RX ADMIN — SIMETHICONE CHEW TAB 80 MG 80 MG: 80 TABLET ORAL at 13:49

## 2021-01-01 RX ADMIN — FAMOTIDINE 10 MG: 20 TABLET ORAL at 13:49

## 2021-01-01 RX ADMIN — HYDROMORPHONE HYDROCHLORIDE 0.5 MG: 1 INJECTION, SOLUTION INTRAMUSCULAR; INTRAVENOUS; SUBCUTANEOUS at 21:33

## 2021-01-01 RX ADMIN — KETOROLAC TROMETHAMINE 30 MG: 30 INJECTION, SOLUTION INTRAMUSCULAR at 22:34

## 2021-01-01 RX ADMIN — CALCIUM CHLORIDE 1000 MG: 100 INJECTION, SOLUTION INTRAVENOUS at 06:41

## 2021-01-01 RX ADMIN — DEXTROSE AND SODIUM CHLORIDE: 5; 450 INJECTION, SOLUTION INTRAVENOUS at 03:23

## 2021-01-01 RX ADMIN — HEPARIN SODIUM 5000 UNITS: 5000 INJECTION, SOLUTION INTRAVENOUS; SUBCUTANEOUS at 14:41

## 2021-01-01 RX ADMIN — ROCURONIUM BROMIDE 80 MG: 10 INJECTION, SOLUTION INTRAVENOUS at 21:39

## 2021-01-01 RX ADMIN — HEPARIN SODIUM 5000 UNITS: 5000 INJECTION, SOLUTION INTRAVENOUS; SUBCUTANEOUS at 20:35

## 2021-01-01 RX ADMIN — LIDOCAINE 1 PATCH: 50 PATCH TOPICAL at 14:06

## 2021-01-01 RX ADMIN — HEPARIN SODIUM 5000 UNITS: 5000 INJECTION, SOLUTION INTRAVENOUS; SUBCUTANEOUS at 21:57

## 2021-01-01 RX ADMIN — ACETAMINOPHEN 650 MG: 325 TABLET ORAL at 16:50

## 2021-01-01 RX ADMIN — LORAZEPAM 0.5 MG: 2 INJECTION INTRAMUSCULAR; INTRAVENOUS at 21:20

## 2021-01-01 RX ADMIN — SIMETHICONE CHEW TAB 80 MG 80 MG: 80 TABLET ORAL at 17:18

## 2021-01-01 RX ADMIN — DAPTOMYCIN 910 MG: 350 INJECTION, POWDER, LYOPHILIZED, FOR SOLUTION INTRAVENOUS at 04:48

## 2021-01-01 RX ADMIN — MIDODRINE HYDROCHLORIDE 10 MG: 5 TABLET ORAL at 06:20

## 2021-01-01 RX ADMIN — INSULIN HUMAN 2 UNITS: 100 INJECTION, SOLUTION PARENTERAL at 14:11

## 2021-01-01 RX ADMIN — MIDODRINE HYDROCHLORIDE 10 MG: 5 TABLET ORAL at 13:31

## 2021-01-01 RX ADMIN — FENTANYL CITRATE 100 MCG: 50 INJECTION, SOLUTION INTRAMUSCULAR; INTRAVENOUS at 19:23

## 2021-01-01 RX ADMIN — INSULIN GLARGINE 5 UNITS: 100 INJECTION, SOLUTION SUBCUTANEOUS at 06:07

## 2021-01-01 RX ADMIN — LABETALOL HYDROCHLORIDE 10 MG: 5 INJECTION INTRAVENOUS at 16:30

## 2021-01-01 RX ADMIN — LIDOCAINE 1 PATCH: 50 PATCH TOPICAL at 13:08

## 2021-01-01 RX ADMIN — ROPINIROLE HYDROCHLORIDE 2 MG: 2 TABLET, FILM COATED ORAL at 20:57

## 2021-01-01 RX ADMIN — KETAMINE HYDROCHLORIDE 25 MG: 50 INJECTION INTRAMUSCULAR; INTRAVENOUS at 13:28

## 2021-01-01 RX ADMIN — HYDROMORPHONE HYDROCHLORIDE 1 MG: 1 INJECTION, SOLUTION INTRAMUSCULAR; INTRAVENOUS; SUBCUTANEOUS at 21:55

## 2021-01-01 RX ADMIN — MIDODRINE HYDROCHLORIDE 10 MG: 5 TABLET ORAL at 11:16

## 2021-01-01 RX ADMIN — SIMETHICONE CHEW TAB 80 MG 80 MG: 80 TABLET ORAL at 14:06

## 2021-01-01 RX ADMIN — LIDOCAINE 1 PATCH: 50 PATCH TOPICAL at 13:36

## 2021-01-01 RX ADMIN — CEFAZOLIN SODIUM 2 G: 2 INJECTION, SOLUTION INTRAVENOUS at 11:13

## 2021-01-01 RX ADMIN — HEPARIN SODIUM 5000 UNITS: 5000 INJECTION, SOLUTION INTRAVENOUS; SUBCUTANEOUS at 21:04

## 2021-01-01 RX ADMIN — SIMETHICONE CHEW TAB 80 MG 80 MG: 80 TABLET ORAL at 08:51

## 2021-01-01 RX ADMIN — HEPARIN SODIUM 5000 UNITS: 5000 INJECTION, SOLUTION INTRAVENOUS; SUBCUTANEOUS at 13:07

## 2021-01-01 RX ADMIN — ROPINIROLE HYDROCHLORIDE 2 MG: 2 TABLET, FILM COATED ORAL at 21:04

## 2021-01-01 RX ADMIN — HYDROMORPHONE HYDROCHLORIDE 1 MG: 1 INJECTION, SOLUTION INTRAMUSCULAR; INTRAVENOUS; SUBCUTANEOUS at 20:35

## 2021-01-01 RX ADMIN — HYDROMORPHONE HYDROCHLORIDE 1 MG: 1 INJECTION, SOLUTION INTRAMUSCULAR; INTRAVENOUS; SUBCUTANEOUS at 05:36

## 2021-01-01 RX ADMIN — LABETALOL HYDROCHLORIDE 10 MG: 5 INJECTION INTRAVENOUS at 11:34

## 2021-01-01 RX ADMIN — CEFAZOLIN SODIUM 2 G: 2 INJECTION, SOLUTION INTRAVENOUS at 05:25

## 2021-01-01 RX ADMIN — HEPARIN SODIUM 5000 UNITS: 5000 INJECTION, SOLUTION INTRAVENOUS; SUBCUTANEOUS at 05:36

## 2021-01-01 RX ADMIN — MAGNESIUM SULFATE 2 G: 2 INJECTION INTRAVENOUS at 10:44

## 2021-01-01 RX ADMIN — CARVEDILOL 12.5 MG: 12.5 TABLET, FILM COATED ORAL at 19:43

## 2021-01-01 RX ADMIN — HYDROMORPHONE HYDROCHLORIDE 1 MG: 1 INJECTION, SOLUTION INTRAMUSCULAR; INTRAVENOUS; SUBCUTANEOUS at 13:28

## 2021-01-01 RX ADMIN — FUROSEMIDE 20 MG: 10 INJECTION, SOLUTION INTRAMUSCULAR; INTRAVENOUS at 22:25

## 2021-01-01 RX ADMIN — HYDROMORPHONE HYDROCHLORIDE 1 MG: 1 INJECTION, SOLUTION INTRAMUSCULAR; INTRAVENOUS; SUBCUTANEOUS at 08:42

## 2021-01-01 RX ADMIN — SIMETHICONE CHEW TAB 80 MG 80 MG: 80 TABLET ORAL at 13:00

## 2021-01-01 RX ADMIN — HEPARIN SODIUM 2600 UNITS: 1000 INJECTION, SOLUTION INTRAVENOUS; SUBCUTANEOUS at 17:40

## 2021-01-01 RX ADMIN — EPINEPHRINE 1 MG: 0.1 INJECTION, SOLUTION ENDOTRACHEAL; INTRACARDIAC; INTRAVENOUS at 13:41

## 2021-01-01 RX ADMIN — SIMETHICONE CHEW TAB 80 MG 80 MG: 80 TABLET ORAL at 18:19

## 2021-01-01 RX ADMIN — ENOXAPARIN SODIUM 40 MG: 40 INJECTION SUBCUTANEOUS at 14:12

## 2021-01-01 RX ADMIN — ROPINIROLE HYDROCHLORIDE 2 MG: 0.5 TABLET, FILM COATED ORAL at 20:47

## 2021-01-01 RX ADMIN — DEXTROSE AND SODIUM CHLORIDE: 5; 450 INJECTION, SOLUTION INTRAVENOUS at 08:00

## 2021-01-01 RX ADMIN — HYDROMORPHONE HYDROCHLORIDE 1 MG: 1 INJECTION, SOLUTION INTRAMUSCULAR; INTRAVENOUS; SUBCUTANEOUS at 05:28

## 2021-01-01 RX ADMIN — HYDROMORPHONE HYDROCHLORIDE 1 MG: 1 INJECTION, SOLUTION INTRAMUSCULAR; INTRAVENOUS; SUBCUTANEOUS at 07:47

## 2021-01-01 RX ADMIN — ACETAMINOPHEN 650 MG: 325 TABLET ORAL at 10:13

## 2021-01-01 RX ADMIN — HYDROMORPHONE HYDROCHLORIDE 0.5 MG: 1 INJECTION, SOLUTION INTRAMUSCULAR; INTRAVENOUS; SUBCUTANEOUS at 12:55

## 2021-01-01 RX ADMIN — SIMETHICONE CHEW TAB 80 MG 80 MG: 80 TABLET ORAL at 17:32

## 2021-01-01 RX ADMIN — SODIUM CHLORIDE: 234 INJECTION INTRAMUSCULAR; INTRAVENOUS; SUBCUTANEOUS at 20:20

## 2021-01-01 RX ADMIN — MIDAZOLAM HYDROCHLORIDE 2 MG: 1 INJECTION, SOLUTION INTRAMUSCULAR; INTRAVENOUS at 20:23

## 2021-01-01 RX ADMIN — HEPARIN SODIUM 5000 UNITS: 5000 INJECTION, SOLUTION INTRAVENOUS; SUBCUTANEOUS at 13:58

## 2021-01-01 RX ADMIN — SODIUM CHLORIDE: 9 INJECTION, SOLUTION INTRAVENOUS at 05:00

## 2021-01-01 RX ADMIN — LIDOCAINE HYDROCHLORIDE 2 ML: 10 INJECTION, SOLUTION INFILTRATION; PERINEURAL at 00:00

## 2021-01-01 RX ADMIN — INSULIN HUMAN 1 UNITS: 100 INJECTION, SOLUTION PARENTERAL at 18:00

## 2021-01-01 RX ADMIN — SIMETHICONE CHEW TAB 80 MG 80 MG: 80 TABLET ORAL at 05:32

## 2021-01-01 RX ADMIN — INSULIN HUMAN 1 UNITS: 100 INJECTION, SOLUTION PARENTERAL at 05:06

## 2021-01-01 RX ADMIN — HYDROMORPHONE HYDROCHLORIDE 0.5 MG: 1 INJECTION, SOLUTION INTRAMUSCULAR; INTRAVENOUS; SUBCUTANEOUS at 20:20

## 2021-01-01 RX ADMIN — LIDOCAINE 1 PATCH: 50 PATCH TOPICAL at 14:38

## 2021-01-01 RX ADMIN — SODIUM CHLORIDE: 234 INJECTION INTRAMUSCULAR; INTRAVENOUS; SUBCUTANEOUS at 19:24

## 2021-01-01 RX ADMIN — LABETALOL HYDROCHLORIDE 10 MG: 5 INJECTION INTRAVENOUS at 16:58

## 2021-01-01 RX ADMIN — Medication 200 MCG: at 21:46

## 2021-01-01 RX ADMIN — KETAMINE HYDROCHLORIDE 25 MG: 10 INJECTION, SOLUTION INTRAMUSCULAR; INTRAVENOUS at 21:17

## 2021-01-01 RX ADMIN — MIDODRINE HYDROCHLORIDE 10 MG: 5 TABLET ORAL at 16:49

## 2021-01-01 RX ADMIN — HEPARIN SODIUM 5000 UNITS: 5000 INJECTION, SOLUTION INTRAVENOUS; SUBCUTANEOUS at 04:41

## 2021-01-01 RX ADMIN — CEFAZOLIN SODIUM 2 G: 2 INJECTION, SOLUTION INTRAVENOUS at 05:45

## 2021-01-01 RX ADMIN — HYDROMORPHONE HYDROCHLORIDE 1 MG: 1 INJECTION, SOLUTION INTRAMUSCULAR; INTRAVENOUS; SUBCUTANEOUS at 00:46

## 2021-01-01 RX ADMIN — HEPARIN SODIUM 5000 UNITS: 5000 INJECTION, SOLUTION INTRAVENOUS; SUBCUTANEOUS at 21:29

## 2021-01-01 RX ADMIN — ROPINIROLE HYDROCHLORIDE 2 MG: 2 TABLET, FILM COATED ORAL at 21:29

## 2021-01-01 RX ADMIN — HEPARIN SODIUM 5000 UNITS: 5000 INJECTION, SOLUTION INTRAVENOUS; SUBCUTANEOUS at 05:56

## 2021-01-01 RX ADMIN — SODIUM CHLORIDE 250 ML: 9 INJECTION, SOLUTION INTRAVENOUS at 08:18

## 2021-01-01 RX ADMIN — CALCIUM GLUCONATE: 98 INJECTION, SOLUTION INTRAVENOUS at 20:30

## 2021-01-01 RX ADMIN — DESMOPRESSIN ACETATE 28 MCG: 4 SOLUTION INTRAVENOUS at 12:29

## 2021-01-01 RX ADMIN — CARVEDILOL 12.5 MG: 12.5 TABLET, FILM COATED ORAL at 17:42

## 2021-01-01 RX ADMIN — HYDROMORPHONE HYDROCHLORIDE 1 MG: 1 INJECTION, SOLUTION INTRAMUSCULAR; INTRAVENOUS; SUBCUTANEOUS at 10:01

## 2021-01-01 RX ADMIN — SODIUM CHLORIDE: 9 INJECTION, SOLUTION INTRAVENOUS at 15:45

## 2021-01-01 RX ADMIN — HYDROMORPHONE HYDROCHLORIDE 0.5 MG: 1 INJECTION, SOLUTION INTRAMUSCULAR; INTRAVENOUS; SUBCUTANEOUS at 23:41

## 2021-01-01 RX ADMIN — LORAZEPAM 0.5 MG: 2 INJECTION INTRAMUSCULAR; INTRAVENOUS at 01:35

## 2021-01-01 RX ADMIN — HEPARIN SODIUM 5000 UNITS: 5000 INJECTION, SOLUTION INTRAVENOUS; SUBCUTANEOUS at 13:42

## 2021-01-01 RX ADMIN — ALBUMIN (HUMAN) 12.5 G: 5 SOLUTION INTRAVENOUS at 20:01

## 2021-01-01 RX ADMIN — HYDROXYZINE HYDROCHLORIDE 25 MG: 25 TABLET, FILM COATED ORAL at 21:34

## 2021-01-01 RX ADMIN — HEPARIN SODIUM 5000 UNITS: 5000 INJECTION, SOLUTION INTRAVENOUS; SUBCUTANEOUS at 14:08

## 2021-01-01 RX ADMIN — HYDROMORPHONE HYDROCHLORIDE 0.5 MG: 1 INJECTION, SOLUTION INTRAMUSCULAR; INTRAVENOUS; SUBCUTANEOUS at 13:13

## 2021-01-01 RX ADMIN — DOCUSATE SODIUM 50 MG AND SENNOSIDES 8.6 MG 2 TABLET: 8.6; 5 TABLET, FILM COATED ORAL at 06:19

## 2021-01-01 RX ADMIN — LORAZEPAM 0.5 MG: 2 INJECTION INTRAMUSCULAR; INTRAVENOUS at 05:32

## 2021-01-01 RX ADMIN — ACETAMINOPHEN 650 MG: 325 TABLET ORAL at 09:58

## 2021-01-01 RX ADMIN — CARVEDILOL 25 MG: 25 TABLET, FILM COATED ORAL at 17:43

## 2021-01-01 RX ADMIN — ROPINIROLE HYDROCHLORIDE 2 MG: 2 TABLET, FILM COATED ORAL at 20:34

## 2021-01-01 RX ADMIN — HEPARIN SODIUM 5000 UNITS: 5000 INJECTION, SOLUTION INTRAVENOUS; SUBCUTANEOUS at 06:20

## 2021-01-01 RX ADMIN — HEPARIN SODIUM 5000 UNITS: 5000 INJECTION, SOLUTION INTRAVENOUS; SUBCUTANEOUS at 16:37

## 2021-01-01 RX ADMIN — HYDROMORPHONE HYDROCHLORIDE 1 MG: 1 INJECTION, SOLUTION INTRAMUSCULAR; INTRAVENOUS; SUBCUTANEOUS at 06:03

## 2021-01-01 RX ADMIN — ROPINIROLE HYDROCHLORIDE 2 MG: 2 TABLET, FILM COATED ORAL at 21:12

## 2021-01-01 RX ADMIN — DOCUSATE SODIUM 50 MG AND SENNOSIDES 8.6 MG 2 TABLET: 8.6; 5 TABLET, FILM COATED ORAL at 17:12

## 2021-01-01 RX ADMIN — KETOROLAC TROMETHAMINE 15 MG: 30 INJECTION, SOLUTION INTRAMUSCULAR at 05:34

## 2021-01-01 RX ADMIN — DOCUSATE SODIUM 50 MG AND SENNOSIDES 8.6 MG 2 TABLET: 8.6; 5 TABLET, FILM COATED ORAL at 18:19

## 2021-01-01 RX ADMIN — LABETALOL HYDROCHLORIDE 10 MG: 5 INJECTION INTRAVENOUS at 02:39

## 2021-01-01 RX ADMIN — HEPARIN SODIUM 5000 UNITS: 5000 INJECTION, SOLUTION INTRAVENOUS; SUBCUTANEOUS at 05:34

## 2021-01-01 RX ADMIN — SIMETHICONE CHEW TAB 80 MG 80 MG: 80 TABLET ORAL at 05:24

## 2021-01-01 RX ADMIN — SIMETHICONE CHEW TAB 80 MG 80 MG: 80 TABLET ORAL at 18:00

## 2021-01-01 RX ADMIN — INSULIN GLARGINE 5 UNITS: 100 INJECTION, SOLUTION SUBCUTANEOUS at 06:00

## 2021-01-01 RX ADMIN — SODIUM CHLORIDE, POTASSIUM CHLORIDE, SODIUM LACTATE AND CALCIUM CHLORIDE 1000 ML: 600; 310; 30; 20 INJECTION, SOLUTION INTRAVENOUS at 08:10

## 2021-01-01 RX ADMIN — POTASSIUM CHLORIDE, DEXTROSE MONOHYDRATE AND SODIUM CHLORIDE: 150; 5; 450 INJECTION, SOLUTION INTRAVENOUS at 10:56

## 2021-01-01 RX ADMIN — HYDROMORPHONE HYDROCHLORIDE 0.5 MG: 1 INJECTION, SOLUTION INTRAMUSCULAR; INTRAVENOUS; SUBCUTANEOUS at 16:55

## 2021-01-01 RX ADMIN — HEPARIN SODIUM 5000 UNITS: 5000 INJECTION, SOLUTION INTRAVENOUS; SUBCUTANEOUS at 21:56

## 2021-01-01 RX ADMIN — EPINEPHRINE 0.03 MG: 0.1 INJECTION, SOLUTION ENDOTRACHEAL; INTRACARDIAC; INTRAVENOUS at 13:36

## 2021-01-01 RX ADMIN — DOCUSATE SODIUM 50 MG AND SENNOSIDES 8.6 MG 2 TABLET: 8.6; 5 TABLET, FILM COATED ORAL at 05:57

## 2021-01-01 RX ADMIN — HEPARIN SODIUM 2600 UNITS: 1000 INJECTION, SOLUTION INTRAVENOUS; SUBCUTANEOUS at 18:25

## 2021-01-01 RX ADMIN — HYDROMORPHONE HYDROCHLORIDE 1 MG: 1 INJECTION, SOLUTION INTRAMUSCULAR; INTRAVENOUS; SUBCUTANEOUS at 14:21

## 2021-01-01 RX ADMIN — MIDODRINE HYDROCHLORIDE 10 MG: 5 TABLET ORAL at 17:26

## 2021-01-01 RX ADMIN — Medication 200 MCG: at 21:43

## 2021-01-01 RX ADMIN — HYDROMORPHONE HYDROCHLORIDE 0.5 MG: 1 INJECTION, SOLUTION INTRAMUSCULAR; INTRAVENOUS; SUBCUTANEOUS at 03:08

## 2021-01-01 RX ADMIN — SIMETHICONE 80 MG: 80 TABLET, CHEWABLE ORAL at 17:26

## 2021-01-01 RX ADMIN — HYDROMORPHONE HYDROCHLORIDE 1 MG: 1 INJECTION, SOLUTION INTRAMUSCULAR; INTRAVENOUS; SUBCUTANEOUS at 16:32

## 2021-01-01 RX ADMIN — HYDROMORPHONE HYDROCHLORIDE 0.5 MG: 1 INJECTION, SOLUTION INTRAMUSCULAR; INTRAVENOUS; SUBCUTANEOUS at 06:15

## 2021-01-01 RX ADMIN — ROPINIROLE HYDROCHLORIDE 2 MG: 0.5 TABLET, FILM COATED ORAL at 01:03

## 2021-01-01 RX ADMIN — DOCUSATE SODIUM 50 MG AND SENNOSIDES 8.6 MG 2 TABLET: 8.6; 5 TABLET, FILM COATED ORAL at 05:32

## 2021-01-01 RX ADMIN — FUROSEMIDE 40 MG: 10 INJECTION, SOLUTION INTRAMUSCULAR; INTRAVENOUS at 11:13

## 2021-01-01 RX ADMIN — MIDODRINE HYDROCHLORIDE 10 MG: 5 TABLET ORAL at 08:41

## 2021-01-01 RX ADMIN — SODIUM CHLORIDE, POTASSIUM CHLORIDE, SODIUM LACTATE AND CALCIUM CHLORIDE: 600; 310; 30; 20 INJECTION, SOLUTION INTRAVENOUS at 13:29

## 2021-01-01 RX ADMIN — HYDROMORPHONE HYDROCHLORIDE 1 MG: 1 INJECTION, SOLUTION INTRAMUSCULAR; INTRAVENOUS; SUBCUTANEOUS at 02:24

## 2021-01-01 RX ADMIN — HEPARIN SODIUM 5000 UNITS: 5000 INJECTION, SOLUTION INTRAVENOUS; SUBCUTANEOUS at 21:19

## 2021-01-01 RX ADMIN — HYDROMORPHONE HYDROCHLORIDE 1 MG: 1 INJECTION, SOLUTION INTRAMUSCULAR; INTRAVENOUS; SUBCUTANEOUS at 05:04

## 2021-01-01 RX ADMIN — INSULIN HUMAN 1 UNITS: 100 INJECTION, SOLUTION PARENTERAL at 13:05

## 2021-01-01 RX ADMIN — INSULIN HUMAN 2 UNITS: 100 INJECTION, SOLUTION PARENTERAL at 06:39

## 2021-01-01 RX ADMIN — HYDROMORPHONE HYDROCHLORIDE 0.5 MG: 1 INJECTION, SOLUTION INTRAMUSCULAR; INTRAVENOUS; SUBCUTANEOUS at 06:11

## 2021-01-01 RX ADMIN — ACETAMINOPHEN 650 MG: 325 TABLET, FILM COATED ORAL at 20:57

## 2021-01-01 RX ADMIN — AMLODIPINE BESYLATE 5 MG: 5 TABLET ORAL at 04:36

## 2021-01-01 RX ADMIN — HEPARIN SODIUM 5000 UNITS: 5000 INJECTION, SOLUTION INTRAVENOUS; SUBCUTANEOUS at 05:40

## 2021-01-01 RX ADMIN — IOHEXOL 25 ML: 240 INJECTION, SOLUTION INTRATHECAL; INTRAVASCULAR; INTRAVENOUS; ORAL at 12:30

## 2021-01-01 RX ADMIN — FAMOTIDINE 20 MG: 20 TABLET ORAL at 05:56

## 2021-01-01 RX ADMIN — LIDOCAINE 1 PATCH: 50 PATCH TOPICAL at 18:35

## 2021-01-01 RX ADMIN — KETOROLAC TROMETHAMINE 30 MG: 30 INJECTION, SOLUTION INTRAMUSCULAR at 17:43

## 2021-01-01 RX ADMIN — INSULIN HUMAN 2 UNITS: 100 INJECTION, SOLUTION PARENTERAL at 06:00

## 2021-01-01 RX ADMIN — INSULIN HUMAN 2 UNITS: 100 INJECTION, SOLUTION PARENTERAL at 17:16

## 2021-01-01 RX ADMIN — KETOROLAC TROMETHAMINE 30 MG: 30 INJECTION, SOLUTION INTRAMUSCULAR at 04:36

## 2021-01-01 RX ADMIN — HEPARIN SODIUM 5000 UNITS: 5000 INJECTION, SOLUTION INTRAVENOUS; SUBCUTANEOUS at 04:36

## 2021-01-01 RX ADMIN — ALBUMIN (HUMAN) 50 G: 5 SOLUTION INTRAVENOUS at 12:26

## 2021-01-01 RX ADMIN — DEXMEDETOMIDINE HYDROCHLORIDE 1 MCG/KG/HR: 100 INJECTION, SOLUTION INTRAVENOUS at 18:36

## 2021-01-01 RX ADMIN — FENTANYL CITRATE 25 MCG: 50 INJECTION, SOLUTION INTRAMUSCULAR; INTRAVENOUS at 15:37

## 2021-01-01 RX ADMIN — FUROSEMIDE 80 MG: 10 INJECTION, SOLUTION INTRAMUSCULAR; INTRAVENOUS at 15:11

## 2021-01-01 RX ADMIN — FENTANYL CITRATE 25 MCG: 50 INJECTION, SOLUTION INTRAMUSCULAR; INTRAVENOUS at 12:33

## 2021-01-01 RX ADMIN — SODIUM CHLORIDE 8 MG/HR: 9 INJECTION, SOLUTION INTRAVENOUS at 12:54

## 2021-01-01 RX ADMIN — HEPARIN SODIUM 5000 UNITS: 5000 INJECTION, SOLUTION INTRAVENOUS; SUBCUTANEOUS at 13:57

## 2021-01-01 RX ADMIN — HEPARIN SODIUM 5000 UNITS: 5000 INJECTION, SOLUTION INTRAVENOUS; SUBCUTANEOUS at 21:47

## 2021-01-01 RX ADMIN — CARVEDILOL 25 MG: 25 TABLET, FILM COATED ORAL at 17:36

## 2021-01-01 RX ADMIN — FENTANYL CITRATE 50 MCG: 50 INJECTION, SOLUTION INTRAMUSCULAR; INTRAVENOUS at 14:50

## 2021-01-01 RX ADMIN — HYDROMORPHONE HYDROCHLORIDE 1 MG: 1 INJECTION, SOLUTION INTRAMUSCULAR; INTRAVENOUS; SUBCUTANEOUS at 02:38

## 2021-01-01 RX ADMIN — ACETAMINOPHEN 650 MG: 325 TABLET, FILM COATED ORAL at 03:11

## 2021-01-01 RX ADMIN — HYDROMORPHONE HYDROCHLORIDE 0.5 MG: 1 INJECTION, SOLUTION INTRAMUSCULAR; INTRAVENOUS; SUBCUTANEOUS at 23:31

## 2021-01-01 RX ADMIN — INSULIN LISPRO 1 UNITS: 100 INJECTION, SOLUTION INTRAVENOUS; SUBCUTANEOUS at 13:49

## 2021-01-01 RX ADMIN — ALBUMIN (HUMAN) 12.5 G: 5 SOLUTION INTRAVENOUS at 09:41

## 2021-01-01 RX ADMIN — HYDROMORPHONE HYDROCHLORIDE 0.5 MG: 1 INJECTION, SOLUTION INTRAMUSCULAR; INTRAVENOUS; SUBCUTANEOUS at 21:23

## 2021-01-01 RX ADMIN — CALCIUM GLUCONATE: 98 INJECTION, SOLUTION INTRAVENOUS at 21:23

## 2021-01-01 RX ADMIN — FUROSEMIDE 40 MG: 10 INJECTION, SOLUTION INTRAMUSCULAR; INTRAVENOUS at 10:49

## 2021-01-01 RX ADMIN — SIMETHICONE CHEW TAB 80 MG 80 MG: 80 TABLET ORAL at 11:47

## 2021-01-01 RX ADMIN — PIPERACILLIN SODIUM AND TAZOBACTAM SODIUM 3.38 G: 3; .375 INJECTION, POWDER, LYOPHILIZED, FOR SOLUTION INTRAVENOUS at 06:27

## 2021-01-01 RX ADMIN — SIMETHICONE CHEW TAB 80 MG 80 MG: 80 TABLET ORAL at 06:22

## 2021-01-01 RX ADMIN — ACETAMINOPHEN 650 MG: 325 TABLET ORAL at 01:46

## 2021-01-01 RX ADMIN — ACETAMINOPHEN 650 MG: 325 TABLET, FILM COATED ORAL at 15:29

## 2021-01-01 RX ADMIN — SIMETHICONE CHEW TAB 80 MG 80 MG: 80 TABLET ORAL at 04:17

## 2021-01-01 RX ADMIN — HYDROMORPHONE HYDROCHLORIDE 1 MG: 1 INJECTION, SOLUTION INTRAMUSCULAR; INTRAVENOUS; SUBCUTANEOUS at 00:20

## 2021-01-01 RX ADMIN — SIMETHICONE CHEW TAB 80 MG 80 MG: 80 TABLET ORAL at 04:41

## 2021-01-01 RX ADMIN — OMEPRAZOLE 20 MG: 20 CAPSULE, DELAYED RELEASE ORAL at 06:02

## 2021-01-01 RX ADMIN — ETOMIDATE 20 MG: 2 INJECTION INTRAVENOUS at 21:39

## 2021-01-01 RX ADMIN — EPINEPHRINE 0.05 MG: 0.1 INJECTION, SOLUTION ENDOTRACHEAL; INTRACARDIAC; INTRAVENOUS at 13:37

## 2021-01-01 RX ADMIN — KETOROLAC TROMETHAMINE 30 MG: 30 INJECTION, SOLUTION INTRAMUSCULAR at 23:50

## 2021-01-01 RX ADMIN — HYDROCODONE BITARTRATE AND ACETAMINOPHEN 1 TABLET: 5; 325 TABLET ORAL at 20:09

## 2021-01-01 RX ADMIN — ACETAMINOPHEN 650 MG: 325 TABLET ORAL at 22:29

## 2021-01-01 RX ADMIN — AMLODIPINE BESYLATE 5 MG: 5 TABLET ORAL at 06:27

## 2021-01-01 RX ADMIN — FUROSEMIDE 40 MG: 10 INJECTION, SOLUTION INTRAMUSCULAR; INTRAVENOUS at 02:11

## 2021-01-01 RX ADMIN — LIDOCAINE 1 PATCH: 50 PATCH TOPICAL at 14:14

## 2021-01-01 RX ADMIN — Medication 5 MG: at 20:17

## 2021-01-01 RX ADMIN — INSULIN LISPRO 1 UNITS: 100 INJECTION, SOLUTION INTRAVENOUS; SUBCUTANEOUS at 06:39

## 2021-01-01 RX ADMIN — KETOROLAC TROMETHAMINE 30 MG: 30 INJECTION, SOLUTION INTRAMUSCULAR at 10:32

## 2021-01-01 RX ADMIN — PIPERACILLIN SODIUM AND TAZOBACTAM SODIUM 3.38 G: 3; .375 INJECTION, POWDER, LYOPHILIZED, FOR SOLUTION INTRAVENOUS at 12:33

## 2021-01-01 RX ADMIN — INSULIN GLARGINE 5 UNITS: 100 INJECTION, SOLUTION SUBCUTANEOUS at 06:49

## 2021-01-01 RX ADMIN — HEPARIN SODIUM 5000 UNITS: 5000 INJECTION, SOLUTION INTRAVENOUS; SUBCUTANEOUS at 06:22

## 2021-01-01 RX ADMIN — EPINEPHRINE 0.02 MG: 0.1 INJECTION, SOLUTION ENDOTRACHEAL; INTRACARDIAC; INTRAVENOUS at 13:35

## 2021-01-01 RX ADMIN — SODIUM CHLORIDE: 9 INJECTION, SOLUTION INTRAVENOUS at 12:19

## 2021-01-01 RX ADMIN — INSULIN HUMAN 2 UNITS: 100 INJECTION, SOLUTION PARENTERAL at 17:38

## 2021-01-01 RX ADMIN — SODIUM CHLORIDE, POTASSIUM CHLORIDE, SODIUM LACTATE AND CALCIUM CHLORIDE 1000 ML: 600; 310; 30; 20 INJECTION, SOLUTION INTRAVENOUS at 15:30

## 2021-01-01 RX ADMIN — CARVEDILOL 25 MG: 25 TABLET, FILM COATED ORAL at 07:54

## 2021-01-01 RX ADMIN — LIDOCAINE 1 PATCH: 50 PATCH TOPICAL at 13:02

## 2021-01-01 RX ADMIN — FUROSEMIDE 20 MG: 10 INJECTION, SOLUTION INTRAMUSCULAR; INTRAVENOUS at 09:37

## 2021-01-01 RX ADMIN — HYDROXYZINE HYDROCHLORIDE 25 MG: 25 TABLET, FILM COATED ORAL at 21:00

## 2021-01-01 RX ADMIN — DAPTOMYCIN 910 MG: 350 INJECTION, POWDER, LYOPHILIZED, FOR SOLUTION INTRAVENOUS at 04:31

## 2021-01-01 RX ADMIN — ROPINIROLE HYDROCHLORIDE 2 MG: 2 TABLET, FILM COATED ORAL at 21:48

## 2021-01-01 RX ADMIN — DOCUSATE SODIUM 50 MG AND SENNOSIDES 8.6 MG 2 TABLET: 8.6; 5 TABLET, FILM COATED ORAL at 17:43

## 2021-01-01 RX ADMIN — KETOROLAC TROMETHAMINE 15 MG: 30 INJECTION, SOLUTION INTRAMUSCULAR at 13:08

## 2021-01-01 RX ADMIN — HEPARIN SODIUM 5000 UNITS: 5000 INJECTION, SOLUTION INTRAVENOUS; SUBCUTANEOUS at 04:17

## 2021-01-01 RX ADMIN — CARVEDILOL 25 MG: 25 TABLET, FILM COATED ORAL at 18:03

## 2021-01-01 RX ADMIN — INSULIN GLARGINE 5 UNITS: 100 INJECTION, SOLUTION SUBCUTANEOUS at 05:40

## 2021-01-01 RX ADMIN — HYDROMORPHONE HYDROCHLORIDE 1 MG: 1 INJECTION, SOLUTION INTRAMUSCULAR; INTRAVENOUS; SUBCUTANEOUS at 05:54

## 2021-01-01 RX ADMIN — HEPARIN SODIUM 5000 UNITS: 5000 INJECTION, SOLUTION INTRAVENOUS; SUBCUTANEOUS at 22:01

## 2021-01-01 RX ADMIN — SIMETHICONE CHEW TAB 80 MG 80 MG: 80 TABLET ORAL at 18:30

## 2021-01-01 RX ADMIN — HEPARIN SODIUM 5000 UNITS: 5000 INJECTION, SOLUTION INTRAVENOUS; SUBCUTANEOUS at 13:29

## 2021-01-01 RX ADMIN — HEPARIN SODIUM 5000 UNITS: 5000 INJECTION, SOLUTION INTRAVENOUS; SUBCUTANEOUS at 22:23

## 2021-01-01 RX ADMIN — INSULIN GLARGINE 5 UNITS: 100 INJECTION, SOLUTION SUBCUTANEOUS at 13:26

## 2021-01-01 RX ADMIN — FUROSEMIDE 80 MG: 10 INJECTION, SOLUTION INTRAMUSCULAR; INTRAVENOUS at 04:41

## 2021-01-01 RX ADMIN — HEPARIN SODIUM 5000 UNITS: 5000 INJECTION, SOLUTION INTRAVENOUS; SUBCUTANEOUS at 21:03

## 2021-01-01 RX ADMIN — MIDODRINE HYDROCHLORIDE 10 MG: 5 TABLET ORAL at 12:27

## 2021-01-01 RX ADMIN — FUROSEMIDE 80 MG: 10 INJECTION, SOLUTION INTRAMUSCULAR; INTRAVENOUS at 04:31

## 2021-01-01 RX ADMIN — SIMETHICONE CHEW TAB 80 MG 80 MG: 80 TABLET ORAL at 11:16

## 2021-01-01 RX ADMIN — HEPARIN SODIUM 5000 UNITS: 5000 INJECTION, SOLUTION INTRAVENOUS; SUBCUTANEOUS at 06:26

## 2021-01-01 RX ADMIN — SODIUM CHLORIDE, POTASSIUM CHLORIDE, SODIUM LACTATE AND CALCIUM CHLORIDE 1000 ML: 600; 310; 30; 20 INJECTION, SOLUTION INTRAVENOUS at 19:00

## 2021-01-01 RX ADMIN — DEXMEDETOMIDINE HYDROCHLORIDE 1 MCG/KG/HR: 100 INJECTION, SOLUTION INTRAVENOUS at 10:41

## 2021-01-01 RX ADMIN — SODIUM CHLORIDE: 9 INJECTION, SOLUTION INTRAVENOUS at 11:44

## 2021-01-01 RX ADMIN — HEPARIN SODIUM 5000 UNITS: 5000 INJECTION, SOLUTION INTRAVENOUS; SUBCUTANEOUS at 21:35

## 2021-01-01 RX ADMIN — HYDROMORPHONE HYDROCHLORIDE 1 MG: 1 INJECTION, SOLUTION INTRAMUSCULAR; INTRAVENOUS; SUBCUTANEOUS at 00:13

## 2021-01-01 RX ADMIN — HYDROMORPHONE HYDROCHLORIDE 1 MG: 1 INJECTION, SOLUTION INTRAMUSCULAR; INTRAVENOUS; SUBCUTANEOUS at 10:44

## 2021-01-01 RX ADMIN — MIDODRINE HYDROCHLORIDE 10 MG: 5 TABLET ORAL at 18:05

## 2021-01-01 RX ADMIN — IOHEXOL 100 ML: 350 INJECTION, SOLUTION INTRAVENOUS at 22:02

## 2021-01-01 RX ADMIN — HYDROMORPHONE HYDROCHLORIDE 1 MG: 1 INJECTION, SOLUTION INTRAMUSCULAR; INTRAVENOUS; SUBCUTANEOUS at 00:04

## 2021-01-01 RX ADMIN — CALCIUM GLUCONATE 1 G: 98 INJECTION, SOLUTION INTRAVENOUS at 09:44

## 2021-01-01 RX ADMIN — ONDANSETRON 4 MG: 2 INJECTION INTRAMUSCULAR; INTRAVENOUS at 19:21

## 2021-01-01 RX ADMIN — HYDROMORPHONE HYDROCHLORIDE 1 MG: 1 INJECTION, SOLUTION INTRAMUSCULAR; INTRAVENOUS; SUBCUTANEOUS at 04:54

## 2021-01-01 RX ADMIN — HEPARIN SODIUM 5000 UNITS: 5000 INJECTION, SOLUTION INTRAVENOUS; SUBCUTANEOUS at 22:25

## 2021-01-01 RX ADMIN — INSULIN HUMAN 2 UNITS: 100 INJECTION, SOLUTION PARENTERAL at 18:18

## 2021-01-01 RX ADMIN — HYDROMORPHONE HYDROCHLORIDE 1 MG: 1 INJECTION, SOLUTION INTRAMUSCULAR; INTRAVENOUS; SUBCUTANEOUS at 14:08

## 2021-01-01 RX ADMIN — SIMETHICONE CHEW TAB 80 MG 80 MG: 80 TABLET ORAL at 17:50

## 2021-01-01 RX ADMIN — LIDOCAINE 1 PATCH: 50 PATCH TOPICAL at 15:28

## 2021-01-01 RX ADMIN — MIDODRINE HYDROCHLORIDE 10 MG: 5 TABLET ORAL at 08:11

## 2021-01-01 RX ADMIN — ACETAMINOPHEN 650 MG: 325 TABLET ORAL at 04:31

## 2021-01-01 RX ADMIN — ROPINIROLE HYDROCHLORIDE 2 MG: 2 TABLET, FILM COATED ORAL at 22:23

## 2021-01-01 RX ADMIN — SODIUM CHLORIDE 300 ML: 9 INJECTION, SOLUTION INTRAVENOUS at 18:00

## 2021-01-01 RX ADMIN — SIMETHICONE 80 MG: 80 TABLET, CHEWABLE ORAL at 04:31

## 2021-01-01 RX ADMIN — CALCIUM CHLORIDE 1000 MG: 100 INJECTION, SOLUTION INTRAVENOUS at 02:38

## 2021-01-01 RX ADMIN — CARVEDILOL 25 MG: 25 TABLET, FILM COATED ORAL at 17:35

## 2021-01-01 RX ADMIN — DOXYCYCLINE 100 MG: 100 INJECTION, POWDER, LYOPHILIZED, FOR SOLUTION INTRAVENOUS at 06:21

## 2021-01-01 RX ADMIN — SIMETHICONE CHEW TAB 80 MG 80 MG: 80 TABLET ORAL at 06:34

## 2021-01-01 RX ADMIN — HEPARIN SODIUM 5000 UNITS: 5000 INJECTION, SOLUTION INTRAVENOUS; SUBCUTANEOUS at 13:01

## 2021-01-01 RX ADMIN — HYDROMORPHONE HYDROCHLORIDE 1 MG: 1 INJECTION, SOLUTION INTRAMUSCULAR; INTRAVENOUS; SUBCUTANEOUS at 19:43

## 2021-01-01 RX ADMIN — HEPARIN SODIUM 5000 UNITS: 5000 INJECTION, SOLUTION INTRAVENOUS; SUBCUTANEOUS at 20:57

## 2021-01-01 RX ADMIN — FUROSEMIDE 80 MG: 10 INJECTION, SOLUTION INTRAMUSCULAR; INTRAVENOUS at 17:25

## 2021-01-01 RX ADMIN — PANTOPRAZOLE SODIUM 40 MG: 40 INJECTION, POWDER, LYOPHILIZED, FOR SOLUTION INTRAVENOUS at 05:36

## 2021-01-01 RX ADMIN — Medication 5 MG: at 21:03

## 2021-01-01 RX ADMIN — SIMETHICONE CHEW TAB 80 MG 80 MG: 80 TABLET ORAL at 05:28

## 2021-01-01 RX ADMIN — MIDODRINE HYDROCHLORIDE 10 MG: 5 TABLET ORAL at 11:30

## 2021-01-01 RX ADMIN — HEPARIN SODIUM 5000 UNITS: 5000 INJECTION, SOLUTION INTRAVENOUS; SUBCUTANEOUS at 05:24

## 2021-01-01 RX ADMIN — SODIUM CHLORIDE, POTASSIUM CHLORIDE, SODIUM LACTATE AND CALCIUM CHLORIDE: 600; 310; 30; 20 INJECTION, SOLUTION INTRAVENOUS at 01:43

## 2021-01-01 RX ADMIN — ACETAMINOPHEN 1000 MG: 500 TABLET ORAL at 13:32

## 2021-01-01 RX ADMIN — SIMETHICONE CHEW TAB 80 MG 80 MG: 80 TABLET ORAL at 06:00

## 2021-01-01 RX ADMIN — ACETAMINOPHEN 650 MG: 325 TABLET, FILM COATED ORAL at 19:55

## 2021-01-01 RX ADMIN — KETOROLAC TROMETHAMINE 15 MG: 30 INJECTION, SOLUTION INTRAMUSCULAR at 08:43

## 2021-01-01 RX ADMIN — ROPINIROLE HYDROCHLORIDE 2 MG: 2 TABLET, FILM COATED ORAL at 21:21

## 2021-01-01 RX ADMIN — FUROSEMIDE 80 MG: 10 INJECTION, SOLUTION INTRAMUSCULAR; INTRAVENOUS at 14:02

## 2021-01-01 RX ADMIN — DEXMEDETOMIDINE HYDROCHLORIDE 1 MCG/KG/HR: 100 INJECTION, SOLUTION INTRAVENOUS at 06:00

## 2021-01-01 RX ADMIN — SODIUM CHLORIDE, POTASSIUM CHLORIDE, SODIUM LACTATE AND CALCIUM CHLORIDE: 600; 310; 30; 20 INJECTION, SOLUTION INTRAVENOUS at 09:41

## 2021-01-01 RX ADMIN — HYDROMORPHONE HYDROCHLORIDE 1 MG: 1 INJECTION, SOLUTION INTRAMUSCULAR; INTRAVENOUS; SUBCUTANEOUS at 07:55

## 2021-01-01 RX ADMIN — HYDROMORPHONE HYDROCHLORIDE 1 MG: 1 INJECTION, SOLUTION INTRAMUSCULAR; INTRAVENOUS; SUBCUTANEOUS at 22:14

## 2021-01-01 RX ADMIN — HYDROMORPHONE HYDROCHLORIDE 0.5 MG: 1 INJECTION, SOLUTION INTRAMUSCULAR; INTRAVENOUS; SUBCUTANEOUS at 15:33

## 2021-01-01 RX ADMIN — HYDROMORPHONE HYDROCHLORIDE 0.5 MG: 1 INJECTION, SOLUTION INTRAMUSCULAR; INTRAVENOUS; SUBCUTANEOUS at 03:25

## 2021-01-01 RX ADMIN — FUROSEMIDE 80 MG: 10 INJECTION, SOLUTION INTRAMUSCULAR; INTRAVENOUS at 18:05

## 2021-01-01 RX ADMIN — ACETAMINOPHEN 650 MG: 325 TABLET ORAL at 11:16

## 2021-01-01 RX ADMIN — SODIUM PHOSPHATE, MONOBASIC, MONOHYDRATE AND SODIUM PHOSPHATE, DIBASIC, ANHYDROUS 30 MMOL: 276; 142 INJECTION, SOLUTION INTRAVENOUS at 08:53

## 2021-01-01 RX ADMIN — SIMETHICONE CHEW TAB 80 MG 80 MG: 80 TABLET ORAL at 06:20

## 2021-01-01 RX ADMIN — OMEPRAZOLE 20 MG: 20 CAPSULE, DELAYED RELEASE ORAL at 10:44

## 2021-01-01 RX ADMIN — HEPARIN SODIUM 5000 UNITS: 5000 INJECTION, SOLUTION INTRAVENOUS; SUBCUTANEOUS at 21:12

## 2021-01-01 RX ADMIN — SIMETHICONE CHEW TAB 80 MG 80 MG: 80 TABLET ORAL at 13:01

## 2021-01-01 RX ADMIN — INSULIN HUMAN 2 UNITS: 100 INJECTION, SOLUTION PARENTERAL at 00:21

## 2021-01-01 RX ADMIN — TRAZODONE HYDROCHLORIDE 100 MG: 100 TABLET ORAL at 21:03

## 2021-01-01 RX ADMIN — SODIUM POLYSTYRENE SULFONATE 15 G: 15 SUSPENSION ORAL; RECTAL at 09:45

## 2021-01-01 RX ADMIN — HEPARIN SODIUM 2600 UNITS: 1000 INJECTION, SOLUTION INTRAVENOUS; SUBCUTANEOUS at 12:52

## 2021-01-01 RX ADMIN — HEPARIN SODIUM 5000 UNITS: 5000 INJECTION, SOLUTION INTRAVENOUS; SUBCUTANEOUS at 10:01

## 2021-01-01 RX ADMIN — ALBUMIN (HUMAN) 12.5 G: 5 SOLUTION INTRAVENOUS at 20:57

## 2021-01-01 RX ADMIN — IOHEXOL 100 ML: 350 INJECTION, SOLUTION INTRAVENOUS at 19:44

## 2021-01-01 RX ADMIN — HYDROXYZINE HYDROCHLORIDE 25 MG: 25 TABLET, FILM COATED ORAL at 21:12

## 2021-01-01 RX ADMIN — HYDROMORPHONE HYDROCHLORIDE 0.5 MG: 1 INJECTION, SOLUTION INTRAMUSCULAR; INTRAVENOUS; SUBCUTANEOUS at 03:34

## 2021-01-01 RX ADMIN — INSULIN HUMAN 2 UNITS: 100 INJECTION, SOLUTION PARENTERAL at 00:12

## 2021-01-01 RX ADMIN — DOXYCYCLINE 100 MG: 100 INJECTION, POWDER, LYOPHILIZED, FOR SOLUTION INTRAVENOUS at 10:40

## 2021-01-01 RX ADMIN — ROPINIROLE HYDROCHLORIDE 2 MG: 2 TABLET, FILM COATED ORAL at 20:09

## 2021-01-01 RX ADMIN — HYDROMORPHONE HYDROCHLORIDE 1 MG: 1 INJECTION, SOLUTION INTRAMUSCULAR; INTRAVENOUS; SUBCUTANEOUS at 02:58

## 2021-01-01 RX ADMIN — DEXMEDETOMIDINE HYDROCHLORIDE 0.2 MCG/KG/HR: 100 INJECTION, SOLUTION INTRAVENOUS at 22:17

## 2021-01-01 RX ADMIN — PIPERACILLIN SODIUM AND TAZOBACTAM SODIUM 3.38 G: 3; .375 INJECTION, POWDER, LYOPHILIZED, FOR SOLUTION INTRAVENOUS at 21:04

## 2021-01-01 RX ADMIN — SODIUM CHLORIDE: 9 INJECTION, SOLUTION INTRAVENOUS at 12:11

## 2021-01-01 RX ADMIN — DOCUSATE SODIUM 50 MG AND SENNOSIDES 8.6 MG 2 TABLET: 8.6; 5 TABLET, FILM COATED ORAL at 05:21

## 2021-01-01 RX ADMIN — HEPARIN SODIUM 5000 UNITS: 5000 INJECTION, SOLUTION INTRAVENOUS; SUBCUTANEOUS at 15:28

## 2021-01-01 RX ADMIN — SIMETHICONE CHEW TAB 80 MG 80 MG: 80 TABLET ORAL at 17:43

## 2021-01-01 RX ADMIN — ACETAMINOPHEN 650 MG: 325 TABLET ORAL at 04:17

## 2021-01-01 RX ADMIN — MIDODRINE HYDROCHLORIDE 10 MG: 5 TABLET ORAL at 17:59

## 2021-01-01 RX ADMIN — HYDROMORPHONE HYDROCHLORIDE 1 MG: 1 INJECTION, SOLUTION INTRAMUSCULAR; INTRAVENOUS; SUBCUTANEOUS at 17:24

## 2021-01-01 RX ADMIN — KETOROLAC TROMETHAMINE 9.99 MG: 30 INJECTION, SOLUTION INTRAMUSCULAR at 00:34

## 2021-01-01 RX ADMIN — HYDROMORPHONE HYDROCHLORIDE 1 MG: 1 INJECTION, SOLUTION INTRAMUSCULAR; INTRAVENOUS; SUBCUTANEOUS at 18:11

## 2021-01-01 RX ADMIN — HEPARIN SODIUM 5000 UNITS: 5000 INJECTION, SOLUTION INTRAVENOUS; SUBCUTANEOUS at 13:48

## 2021-01-01 RX ADMIN — KETOROLAC TROMETHAMINE 15 MG: 30 INJECTION, SOLUTION INTRAMUSCULAR at 03:23

## 2021-01-01 RX ADMIN — SODIUM CHLORIDE, POTASSIUM CHLORIDE, SODIUM LACTATE AND CALCIUM CHLORIDE: 600; 310; 30; 20 INJECTION, SOLUTION INTRAVENOUS at 02:02

## 2021-01-01 RX ADMIN — CEFTRIAXONE SODIUM 1 G: 1 INJECTION, POWDER, FOR SOLUTION INTRAMUSCULAR; INTRAVENOUS at 01:05

## 2021-01-01 RX ADMIN — DOCUSATE SODIUM 50 MG AND SENNOSIDES 8.6 MG 2 TABLET: 8.6; 5 TABLET, FILM COATED ORAL at 17:32

## 2021-01-01 RX ADMIN — SIMETHICONE 80 MG: 80 TABLET, CHEWABLE ORAL at 17:45

## 2021-01-01 RX ADMIN — ROPINIROLE HYDROCHLORIDE 2 MG: 2 TABLET, FILM COATED ORAL at 20:44

## 2021-01-01 RX ADMIN — LABETALOL HYDROCHLORIDE 10 MG: 5 INJECTION INTRAVENOUS at 09:48

## 2021-01-01 RX ADMIN — DEXMEDETOMIDINE HYDROCHLORIDE 1 MCG/KG/HR: 100 INJECTION, SOLUTION INTRAVENOUS at 14:50

## 2021-01-01 RX ADMIN — SODIUM CHLORIDE 8 MG/HR: 9 INJECTION, SOLUTION INTRAVENOUS at 06:27

## 2021-01-01 RX ADMIN — FUROSEMIDE 80 MG: 10 INJECTION, SOLUTION INTRAMUSCULAR; INTRAVENOUS at 04:17

## 2021-01-01 RX ADMIN — SIMETHICONE CHEW TAB 80 MG 80 MG: 80 TABLET ORAL at 17:12

## 2021-01-01 RX ADMIN — INFLUENZA A VIRUS A/GUANGDONG-MAONAN/SWL1536/2019 CNIC-1909 (H1N1) ANTIGEN (FORMALDEHYDE INACTIVATED), INFLUENZA A VIRUS A/HONG KONG/2671/2019 (H3N2) ANTIGEN (FORMALDEHYDE INACTIVATED), INFLUENZA B VIRUS B/PHUKET/3073/2013 ANTIGEN (FORMALDEHYDE INACTIVATED), AND INFLUENZA B VIRUS B/WASHINGTON/02/2019 ANTIGEN (FORMALDEHYDE INACTIVATED) 0.5 ML: 15; 15; 15; 15 INJECTION, SUSPENSION INTRAMUSCULAR at 04:04

## 2021-01-01 RX ADMIN — DOXYCYCLINE 100 MG: 100 INJECTION, POWDER, LYOPHILIZED, FOR SOLUTION INTRAVENOUS at 18:19

## 2021-01-01 RX ADMIN — AMLODIPINE BESYLATE 5 MG: 5 TABLET ORAL at 17:24

## 2021-01-01 RX ADMIN — INSULIN HUMAN 2 UNITS: 100 INJECTION, SOLUTION PARENTERAL at 05:50

## 2021-01-01 RX ADMIN — KETOROLAC TROMETHAMINE 30 MG: 30 INJECTION, SOLUTION INTRAMUSCULAR at 07:52

## 2021-01-01 RX ADMIN — ROPINIROLE HYDROCHLORIDE 2 MG: 2 TABLET, FILM COATED ORAL at 21:57

## 2021-01-01 RX ADMIN — AMLODIPINE BESYLATE 5 MG: 5 TABLET ORAL at 05:35

## 2021-01-01 RX ADMIN — LIDOCAINE HYDROCHLORIDE 2 ML: 10 INJECTION, SOLUTION INFILTRATION; PERINEURAL at 23:14

## 2021-01-01 RX ADMIN — HYDROMORPHONE HYDROCHLORIDE 0.5 MG: 1 INJECTION, SOLUTION INTRAMUSCULAR; INTRAVENOUS; SUBCUTANEOUS at 09:36

## 2021-01-01 RX ADMIN — HEPARIN SODIUM 5000 UNITS: 5000 INJECTION, SOLUTION INTRAVENOUS; SUBCUTANEOUS at 21:00

## 2021-01-01 RX ADMIN — ALBUMIN (HUMAN) 12.5 G: 5 SOLUTION INTRAVENOUS at 09:00

## 2021-01-01 RX ADMIN — POTASSIUM PHOSPHATE, MONOBASIC AND POTASSIUM PHOSPHATE, DIBASIC 30 MMOL: 224; 236 INJECTION, SOLUTION, CONCENTRATE INTRAVENOUS at 10:49

## 2021-01-01 RX ADMIN — HYDROMORPHONE HYDROCHLORIDE 1 MG: 1 INJECTION, SOLUTION INTRAMUSCULAR; INTRAVENOUS; SUBCUTANEOUS at 19:59

## 2021-01-01 RX ADMIN — INSULIN HUMAN 2 UNITS: 100 INJECTION, SOLUTION PARENTERAL at 11:46

## 2021-01-01 RX ADMIN — AMLODIPINE BESYLATE 5 MG: 5 TABLET ORAL at 06:00

## 2021-01-01 RX ADMIN — LABETALOL HYDROCHLORIDE 20 MG: 5 INJECTION INTRAVENOUS at 15:38

## 2021-01-01 RX ADMIN — EPINEPHRINE 0.01 MG: 0.1 INJECTION, SOLUTION ENDOTRACHEAL; INTRACARDIAC; INTRAVENOUS at 13:34

## 2021-01-01 ASSESSMENT — COGNITIVE AND FUNCTIONAL STATUS - GENERAL
SUGGESTED CMS G CODE MODIFIER DAILY ACTIVITY: CK
MOVING TO AND FROM BED TO CHAIR: A LITTLE
TURNING FROM BACK TO SIDE WHILE IN FLAT BAD: A LOT
MOVING FROM LYING ON BACK TO SITTING ON SIDE OF FLAT BED: UNABLE
MOVING TO AND FROM BED TO CHAIR: A LOT
HELP NEEDED FOR BATHING: A LOT
CLIMB 3 TO 5 STEPS WITH RAILING: A LOT
TURNING FROM BACK TO SIDE WHILE IN FLAT BAD: A LITTLE
SUGGESTED CMS G CODE MODIFIER MOBILITY: CL
TOILETING: A LOT
WALKING IN HOSPITAL ROOM: A LITTLE
SUGGESTED CMS G CODE MODIFIER MOBILITY: CM
TOILETING: A LOT
MOVING TO AND FROM BED TO CHAIR: UNABLE
CLIMB 3 TO 5 STEPS WITH RAILING: A LITTLE
DRESSING REGULAR LOWER BODY CLOTHING: A LOT
DRESSING REGULAR LOWER BODY CLOTHING: A LOT
TOILETING: TOTAL
SUGGESTED CMS G CODE MODIFIER MOBILITY: CL
STANDING UP FROM CHAIR USING ARMS: A LITTLE
CLIMB 3 TO 5 STEPS WITH RAILING: A LOT
WALKING IN HOSPITAL ROOM: A LOT
DRESSING REGULAR UPPER BODY CLOTHING: A LITTLE
DAILY ACTIVITIY SCORE: 14
MOVING TO AND FROM BED TO CHAIR: UNABLE
PERSONAL GROOMING: A LITTLE
SUGGESTED CMS G CODE MODIFIER DAILY ACTIVITY: CK
CLIMB 3 TO 5 STEPS WITH RAILING: TOTAL
HELP NEEDED FOR BATHING: A LOT
SUGGESTED CMS G CODE MODIFIER MOBILITY: CK
MOBILITY SCORE: 9
EATING MEALS: A LITTLE
STANDING UP FROM CHAIR USING ARMS: A LITTLE
HELP NEEDED FOR BATHING: A LOT
STANDING UP FROM CHAIR USING ARMS: A LITTLE
DAILY ACTIVITIY SCORE: 15
CLIMB 3 TO 5 STEPS WITH RAILING: A LOT
PERSONAL GROOMING: A LITTLE
HELP NEEDED FOR BATHING: A LOT
DRESSING REGULAR LOWER BODY CLOTHING: A LOT
MOVING FROM LYING ON BACK TO SITTING ON SIDE OF FLAT BED: A LITTLE
STANDING UP FROM CHAIR USING ARMS: A LITTLE
DRESSING REGULAR LOWER BODY CLOTHING: A LITTLE
SUGGESTED CMS G CODE MODIFIER DAILY ACTIVITY: CJ
DAILY ACTIVITIY SCORE: 20
WALKING IN HOSPITAL ROOM: A LOT
SUGGESTED CMS G CODE MODIFIER DAILY ACTIVITY: CK
MOVING FROM LYING ON BACK TO SITTING ON SIDE OF FLAT BED: A LITTLE
STANDING UP FROM CHAIR USING ARMS: A LOT
DAILY ACTIVITIY SCORE: 15
EATING MEALS: A LITTLE
MOBILITY SCORE: 14
MOBILITY SCORE: 18
EATING MEALS: A LITTLE
TOILETING: A LITTLE
MOBILITY SCORE: 12
SUGGESTED CMS G CODE MODIFIER MOBILITY: CL
PERSONAL GROOMING: A LITTLE
HELP NEEDED FOR BATHING: A LITTLE
DRESSING REGULAR UPPER BODY CLOTHING: A LOT
DRESSING REGULAR UPPER BODY CLOTHING: A LITTLE
TOILETING: A LOT
WALKING IN HOSPITAL ROOM: A LOT
MOVING FROM LYING ON BACK TO SITTING ON SIDE OF FLAT BED: A LOT
SUGGESTED CMS G CODE MODIFIER DAILY ACTIVITY: CK
DRESSING REGULAR UPPER BODY CLOTHING: A LOT
MOVING FROM LYING ON BACK TO SITTING ON SIDE OF FLAT BED: A LOT
MOBILITY SCORE: 13
MOVING TO AND FROM BED TO CHAIR: A LOT
TURNING FROM BACK TO SIDE WHILE IN FLAT BAD: UNABLE
DRESSING REGULAR LOWER BODY CLOTHING: A LOT
PERSONAL GROOMING: A LITTLE
WALKING IN HOSPITAL ROOM: A LITTLE
TURNING FROM BACK TO SIDE WHILE IN FLAT BAD: A LITTLE
DRESSING REGULAR UPPER BODY CLOTHING: A LITTLE
DAILY ACTIVITIY SCORE: 14
TURNING FROM BACK TO SIDE WHILE IN FLAT BAD: UNABLE

## 2021-01-01 ASSESSMENT — PAIN DESCRIPTION - PAIN TYPE
TYPE: CHRONIC PAIN
TYPE: ACUTE PAIN;CHRONIC PAIN
TYPE: ACUTE PAIN;CHRONIC PAIN
TYPE: ACUTE PAIN
TYPE: ACUTE PAIN;CHRONIC PAIN
TYPE: ACUTE PAIN
TYPE: ACUTE PAIN;CHRONIC PAIN
TYPE: ACUTE PAIN
TYPE: ACUTE PAIN;CHRONIC PAIN
TYPE: ACUTE PAIN
TYPE: ACUTE PAIN;CHRONIC PAIN
TYPE: ACUTE PAIN
TYPE: ACUTE PAIN;CHRONIC PAIN
TYPE: ACUTE PAIN
TYPE: ACUTE PAIN
TYPE: ACUTE PAIN;CHRONIC PAIN
TYPE: ACUTE PAIN
TYPE: ACUTE PAIN;CHRONIC PAIN
TYPE: ACUTE PAIN
TYPE: ACUTE PAIN;CHRONIC PAIN
TYPE: ACUTE PAIN
TYPE: ACUTE PAIN;CHRONIC PAIN
TYPE: ACUTE PAIN
TYPE: CHRONIC PAIN
TYPE: ACUTE PAIN;CHRONIC PAIN
TYPE: ACUTE PAIN
TYPE: ACUTE PAIN;CHRONIC PAIN
TYPE: ACUTE PAIN
TYPE: ACUTE PAIN
TYPE: CHRONIC PAIN;ACUTE PAIN
TYPE: ACUTE PAIN
TYPE: ACUTE PAIN

## 2021-01-01 ASSESSMENT — ENCOUNTER SYMPTOMS
RESPIRATORY NEGATIVE: 1
FALLS: 0
DIZZINESS: 0
FOCAL WEAKNESS: 0
SHORTNESS OF BREATH: 0
NERVOUS/ANXIOUS: 0
DIZZINESS: 0
FEVER: 0
PALPITATIONS: 0
BACK PAIN: 1
DIZZINESS: 0
NAUSEA: 0
NEUROLOGICAL NEGATIVE: 1
HEADACHES: 0
SORE THROAT: 0
CARDIOVASCULAR NEGATIVE: 1
DIARRHEA: 0
FEVER: 0
ABDOMINAL PAIN: 1
EYES NEGATIVE: 1
DOUBLE VISION: 0
PSYCHIATRIC NEGATIVE: 1
DIARRHEA: 0
VOMITING: 0
VOMITING: 0
NERVOUS/ANXIOUS: 0
CHILLS: 0
DOUBLE VISION: 0
VOMITING: 0
ABDOMINAL PAIN: 0
EYES NEGATIVE: 1
COUGH: 0
DIARRHEA: 0
WEAKNESS: 1
FOCAL WEAKNESS: 0
EYE DISCHARGE: 0
ABDOMINAL PAIN: 1
DIZZINESS: 0
PALPITATIONS: 0
DIAPHORESIS: 0
DOUBLE VISION: 0
BACK PAIN: 1
BLURRED VISION: 0
BLOOD IN STOOL: 0
SORE THROAT: 0
WEAKNESS: 1
BRUISES/BLEEDS EASILY: 0
MYALGIAS: 1
PALPITATIONS: 0
HEADACHES: 0
COUGH: 0
COUGH: 0
NAUSEA: 0
COUGH: 0
EYES NEGATIVE: 1
STRIDOR: 0
DIZZINESS: 0
COUGH: 0
SHORTNESS OF BREATH: 0
CHILLS: 0
VOMITING: 0
TINGLING: 0
CHILLS: 1
NAUSEA: 0
SHORTNESS OF BREATH: 0
FEVER: 0
BLURRED VISION: 0
DIARRHEA: 0
BLURRED VISION: 0
CHILLS: 0
HEADACHES: 0
DIZZINESS: 0
NAUSEA: 0
DIARRHEA: 1
NAUSEA: 0
WEAKNESS: 1
NAUSEA: 0
ABDOMINAL PAIN: 0
DOUBLE VISION: 0
BLURRED VISION: 0
CONSTIPATION: 0
MYALGIAS: 0
DOUBLE VISION: 0
SHORTNESS OF BREATH: 0
DIZZINESS: 0
NAUSEA: 0
NAUSEA: 0
FALLS: 0
CONSTIPATION: 0
NAUSEA: 1
WEIGHT LOSS: 0
VOMITING: 0
EYE DISCHARGE: 0
NAUSEA: 0
CHILLS: 0
MYALGIAS: 1
SEIZURES: 0
ABDOMINAL PAIN: 1
NAUSEA: 0
RESPIRATORY NEGATIVE: 1
DIZZINESS: 0
SORE THROAT: 0
CHILLS: 0
HEADACHES: 0
FEVER: 0
PALPITATIONS: 0
BACK PAIN: 1
BRUISES/BLEEDS EASILY: 1
CHILLS: 0
DOUBLE VISION: 0
CONSTIPATION: 0
VOMITING: 0
PALPITATIONS: 0
COUGH: 0
BACK PAIN: 1
NERVOUS/ANXIOUS: 0
COUGH: 0
WHEEZING: 0
DIZZINESS: 0
FOCAL WEAKNESS: 0
COUGH: 0
FALLS: 0
DIARRHEA: 1
DIARRHEA: 0
HEADACHES: 0
SORE THROAT: 0
HEADACHES: 0
HEADACHES: 0
WEAKNESS: 0
CONSTIPATION: 0
MYALGIAS: 0
VOMITING: 0
ABDOMINAL PAIN: 1
FEVER: 0
MUSCULOSKELETAL NEGATIVE: 1
WEAKNESS: 0
PALPITATIONS: 0
CHILLS: 0
ABDOMINAL PAIN: 1
FEVER: 0
CONSTIPATION: 0
DIARRHEA: 1
SHORTNESS OF BREATH: 1
FALLS: 0
BLOOD IN STOOL: 1
PALPITATIONS: 0
BLURRED VISION: 0
HEADACHES: 0
DOUBLE VISION: 0
DEPRESSION: 0
DOUBLE VISION: 0
DIARRHEA: 0
VOMITING: 0
SHORTNESS OF BREATH: 0
BACK PAIN: 1
DIARRHEA: 0
HEADACHES: 0
BLOOD IN STOOL: 1
NERVOUS/ANXIOUS: 0
SORE THROAT: 0
NAUSEA: 0
CONSTIPATION: 0
DIARRHEA: 0
ABDOMINAL PAIN: 0
SORE THROAT: 0
FEVER: 0
FALLS: 0
VOMITING: 0
FEVER: 0
NAUSEA: 0
MYALGIAS: 1
HALLUCINATIONS: 0
BACK PAIN: 1
VOMITING: 0
SHORTNESS OF BREATH: 0
ABDOMINAL PAIN: 1
DOUBLE VISION: 0
NAUSEA: 0
FEVER: 0
MYALGIAS: 0
HEADACHES: 0
ABDOMINAL PAIN: 0
NERVOUS/ANXIOUS: 0
BLURRED VISION: 0
VOMITING: 1
SHORTNESS OF BREATH: 1
DIZZINESS: 0
SENSORY CHANGE: 0
FOCAL WEAKNESS: 0
HEADACHES: 0
FOCAL WEAKNESS: 0
WEIGHT LOSS: 0
PALPITATIONS: 0
ABDOMINAL PAIN: 0
HEARTBURN: 0
DIZZINESS: 0
NERVOUS/ANXIOUS: 0
ABDOMINAL PAIN: 1
DIZZINESS: 0
BLOOD IN STOOL: 0
EYES NEGATIVE: 1
FEVER: 1
VOMITING: 0
NERVOUS/ANXIOUS: 0
BLURRED VISION: 0
WEAKNESS: 0
HEADACHES: 0
FEVER: 0
DIARRHEA: 0
BRUISES/BLEEDS EASILY: 1
FEVER: 0
DOUBLE VISION: 0
ABDOMINAL PAIN: 0
CHILLS: 0
DIZZINESS: 0
CHILLS: 1
BLURRED VISION: 0
BACK PAIN: 1
VOMITING: 0
BLURRED VISION: 0
DIARRHEA: 0
FOCAL WEAKNESS: 0
DIARRHEA: 1
SHORTNESS OF BREATH: 0
CONSTIPATION: 0
RESPIRATORY NEGATIVE: 1
WEAKNESS: 0
SPEECH CHANGE: 0
SHORTNESS OF BREATH: 0
NERVOUS/ANXIOUS: 0
SORE THROAT: 0
DIARRHEA: 0
BLURRED VISION: 0
SHORTNESS OF BREATH: 0
SPUTUM PRODUCTION: 0
NEUROLOGICAL NEGATIVE: 1
BACK PAIN: 1
DOUBLE VISION: 0
VOMITING: 0
FEVER: 1
FEVER: 0
HEADACHES: 0
BLURRED VISION: 0
WEAKNESS: 1
SORE THROAT: 0
NAUSEA: 0
CHILLS: 0
CHILLS: 0
NAUSEA: 0
COUGH: 0
NERVOUS/ANXIOUS: 0
SORE THROAT: 0
DIZZINESS: 0
BLURRED VISION: 0
MYALGIAS: 1
NERVOUS/ANXIOUS: 0
CHILLS: 0
FOCAL WEAKNESS: 0
FEVER: 0
HEARTBURN: 1
PALPITATIONS: 0
FEVER: 0
SHORTNESS OF BREATH: 0
COUGH: 0
ABDOMINAL PAIN: 1
SORE THROAT: 0
SORE THROAT: 0
VOMITING: 0
DIARRHEA: 1
DIARRHEA: 0
SORE THROAT: 0
PHOTOPHOBIA: 0
BACK PAIN: 1
MYALGIAS: 0
NAUSEA: 0
FOCAL WEAKNESS: 0
MUSCULOSKELETAL NEGATIVE: 1
SHORTNESS OF BREATH: 0
MYALGIAS: 0
ABDOMINAL PAIN: 0
FEVER: 0
VOMITING: 0
NAUSEA: 0
NAUSEA: 0
HEMOPTYSIS: 0
NECK PAIN: 0
BLURRED VISION: 0
ABDOMINAL PAIN: 1
CONSTIPATION: 0
FEVER: 0
PALPITATIONS: 0
SORE THROAT: 0
WHEEZING: 0
BACK PAIN: 1
DOUBLE VISION: 0
FEVER: 0
FEVER: 0
COUGH: 0
HEADACHES: 0
DOUBLE VISION: 0
CONSTIPATION: 0
VOMITING: 0
BLOOD IN STOOL: 0
PSYCHIATRIC NEGATIVE: 1
HEADACHES: 0
VOMITING: 0
CHILLS: 0
CHILLS: 0
SHORTNESS OF BREATH: 0
HEADACHES: 0
CHILLS: 0
NAUSEA: 0
FEVER: 0
DOUBLE VISION: 0
DIZZINESS: 0
SORE THROAT: 0
CHILLS: 0
COUGH: 0
CARDIOVASCULAR NEGATIVE: 1
NAUSEA: 0
CHILLS: 0
DIZZINESS: 0
FOCAL WEAKNESS: 0
SHORTNESS OF BREATH: 1
BLURRED VISION: 0
MYALGIAS: 1
SHORTNESS OF BREATH: 0
SPEECH CHANGE: 0
BLURRED VISION: 0
SHORTNESS OF BREATH: 0
WEAKNESS: 0
FEVER: 0
CHILLS: 0
CONSTIPATION: 0
NERVOUS/ANXIOUS: 0
NERVOUS/ANXIOUS: 0
PALPITATIONS: 0
PALPITATIONS: 0
MYALGIAS: 1
ABDOMINAL PAIN: 1
ABDOMINAL PAIN: 1
DIZZINESS: 0
CONSTIPATION: 0
SHORTNESS OF BREATH: 0
DIZZINESS: 0
ABDOMINAL PAIN: 0
ABDOMINAL PAIN: 1
SORE THROAT: 0
BLURRED VISION: 0
NAUSEA: 0
FEVER: 0
DEPRESSION: 0
SEIZURES: 0
ABDOMINAL PAIN: 0
COUGH: 0
FOCAL WEAKNESS: 0
ABDOMINAL PAIN: 0
BLURRED VISION: 0
CLAUDICATION: 0
SHORTNESS OF BREATH: 0
COUGH: 0
PALPITATIONS: 0
HEADACHES: 0
BACK PAIN: 1
SPUTUM PRODUCTION: 0
ABDOMINAL PAIN: 1
DIARRHEA: 0
DIZZINESS: 0
DOUBLE VISION: 0
VOMITING: 0
VOMITING: 0
BACK PAIN: 1
NERVOUS/ANXIOUS: 0
VOMITING: 0
PALPITATIONS: 0
MUSCULOSKELETAL NEGATIVE: 1
FOCAL WEAKNESS: 0
SORE THROAT: 0
HEADACHES: 0
CHILLS: 0
FEVER: 0
PALPITATIONS: 0
FOCAL WEAKNESS: 0

## 2021-01-01 ASSESSMENT — LIFESTYLE VARIABLES
DOES PATIENT WANT TO STOP DRINKING: NO
HAVE YOU EVER FELT YOU SHOULD CUT DOWN ON YOUR DRINKING: NO
SUBSTANCE_ABUSE: 1
AVERAGE NUMBER OF DAYS PER WEEK YOU HAVE A DRINK CONTAINING ALCOHOL: 0
ALCOHOL_USE: YES
TOTAL SCORE: 0
HOW MANY TIMES IN THE PAST YEAR HAVE YOU HAD 5 OR MORE DRINKS IN A DAY: 0
EVER FELT BAD OR GUILTY ABOUT YOUR DRINKING: NO
ON A TYPICAL DAY WHEN YOU DRINK ALCOHOL HOW MANY DRINKS DO YOU HAVE: 0
ON A TYPICAL DAY WHEN YOU DRINK ALCOHOL HOW MANY DRINKS DO YOU HAVE: 3
HAVE PEOPLE ANNOYED YOU BY CRITICIZING YOUR DRINKING: NO
HOW MANY TIMES IN THE PAST YEAR HAVE YOU HAD 5 OR MORE DRINKS IN A DAY: 0
ALCOHOL_USE: NO
TOTAL SCORE: 0
EVER HAD A DRINK FIRST THING IN THE MORNING TO STEADY YOUR NERVES TO GET RID OF A HANGOVER: NO
AVERAGE NUMBER OF DAYS PER WEEK YOU HAVE A DRINK CONTAINING ALCOHOL: 6
TOTAL SCORE: 0
EVER FELT BAD OR GUILTY ABOUT YOUR DRINKING: NO
CONSUMPTION TOTAL: POSITIVE
HAVE PEOPLE ANNOYED YOU BY CRITICIZING YOUR DRINKING: NO
EVER HAD A DRINK FIRST THING IN THE MORNING TO STEADY YOUR NERVES TO GET RID OF A HANGOVER: NO
SUBSTANCE_ABUSE: 0
TOTAL SCORE: 0
HAVE YOU EVER FELT YOU SHOULD CUT DOWN ON YOUR DRINKING: NO
CONSUMPTION TOTAL: NEGATIVE

## 2021-01-01 ASSESSMENT — FIBROSIS 4 INDEX
FIB4 SCORE: 1.18
FIB4 SCORE: 2.09
FIB4 SCORE: .8666666666666666667
FIB4 SCORE: 0.98
FIB4 SCORE: 5.2
FIB4 SCORE: 0.72
FIB4 SCORE: 1.19
FIB4 SCORE: 6.85
FIB4 SCORE: 7.28
FIB4 SCORE: 1.10918710774360396
FIB4 SCORE: 5.91
FIB4 SCORE: 2.59
FIB4 SCORE: 2.09
FIB4 SCORE: 3.97
FIB4 SCORE: 6.85
FIB4 SCORE: 7.28

## 2021-01-01 ASSESSMENT — PATIENT HEALTH QUESTIONNAIRE - PHQ9
2. FEELING DOWN, DEPRESSED, IRRITABLE, OR HOPELESS: NOT AT ALL
SUM OF ALL RESPONSES TO PHQ9 QUESTIONS 1 AND 2: 0
2. FEELING DOWN, DEPRESSED, IRRITABLE, OR HOPELESS: NOT AT ALL
SUM OF ALL RESPONSES TO PHQ9 QUESTIONS 1 AND 2: 0
SUM OF ALL RESPONSES TO PHQ9 QUESTIONS 1 AND 2: 0
1. LITTLE INTEREST OR PLEASURE IN DOING THINGS: NOT AT ALL
SUM OF ALL RESPONSES TO PHQ9 QUESTIONS 1 AND 2: 0
SUM OF ALL RESPONSES TO PHQ9 QUESTIONS 1 AND 2: 0
1. LITTLE INTEREST OR PLEASURE IN DOING THINGS: NOT AT ALL
SUM OF ALL RESPONSES TO PHQ9 QUESTIONS 1 AND 2: 0
2. FEELING DOWN, DEPRESSED, IRRITABLE, OR HOPELESS: NOT AT ALL
1. LITTLE INTEREST OR PLEASURE IN DOING THINGS: NOT AT ALL
2. FEELING DOWN, DEPRESSED, IRRITABLE, OR HOPELESS: NOT AT ALL
1. LITTLE INTEREST OR PLEASURE IN DOING THINGS: NOT AT ALL

## 2021-01-01 ASSESSMENT — ACTIVITIES OF DAILY LIVING (ADL): TOILETING: INDEPENDENT

## 2021-01-01 ASSESSMENT — GAIT ASSESSMENTS
DISTANCE (FEET): 3
DEVIATION: INCREASED BASE OF SUPPORT;BRADYKINETIC
GAIT LEVEL OF ASSIST: MINIMAL ASSIST
ASSISTIVE DEVICE: FRONT WHEEL WALKER
ASSISTIVE DEVICE: FRONT WHEEL WALKER
DISTANCE (FEET): 5
DEVIATION: INCREASED BASE OF SUPPORT;BRADYKINETIC
GAIT LEVEL OF ASSIST: UNABLE TO PARTICIPATE
GAIT LEVEL OF ASSIST: MINIMAL ASSIST

## 2021-01-01 ASSESSMENT — COPD QUESTIONNAIRES
DURING THE PAST 4 WEEKS HOW MUCH DID YOU FEEL SHORT OF BREATH: NONE/LITTLE OF THE TIME
DO YOU EVER COUGH UP ANY MUCUS OR PHLEGM?: NO/ONLY WITH OCCASIONAL COLDS OR INFECTIONS
HAVE YOU SMOKED AT LEAST 100 CIGARETTES IN YOUR ENTIRE LIFE: YES
COPD SCREENING SCORE: 4

## 2021-01-01 ASSESSMENT — PULMONARY FUNCTION TESTS
FVC: 1.6
FVC: 1.4

## 2021-01-01 ASSESSMENT — PAIN SCALES - WONG BAKER: WONGBAKER_NUMERICALRESPONSE: HURTS A WHOLE LOT

## 2021-01-14 PROBLEM — K85.90 ACUTE PANCREATITIS: Status: ACTIVE | Noted: 2021-01-01

## 2021-01-15 PROBLEM — E78.1 HYPERTRIGLYCERIDEMIA: Status: ACTIVE | Noted: 2021-01-01

## 2021-01-15 NOTE — PROGRESS NOTES
Pt arrived on unit in severe abd pain, but stable. Pharmacy called to fill ordered Ketamine to administer prior to MRI    0200: Called MRI for update, they notified me that they will not be able to get pt in tonight and will have to be later on in the day (since they need an open slot for a STAT MRI). Ketamine rescheduled, verified expiration time of 4 days

## 2021-01-15 NOTE — ED PROVIDER NOTES
ED Provider Note    CHIEF COMPLAINT  Chief Complaint   Patient presents with   • Abdominal Pain     Starting at noon today. Pt states it feels very similar to when he had gallbladder issues and gallbladder was removed 3 months ago here at [facility].        HPI  Shaka Riley is a 55 y.o. male who presents with right upper quadrant abdominal pain.  The patient states the pain started around noon.  He states the pain is severe and sharp and located to the epigastric and right upper quadrant region.  The patient states that has affected his breathing.  He has not had any vomiting does have some nausea.  He has not had any diarrhea.  He had a cholecystectomy out 3 months ago with similar symptoms and also some pain right after surgery that similar.  He also has a history of Crohn's disease.    Historian was the patient    REVIEW OF SYSTEMS  See HPI for further details. All other systems are negative.     PAST MEDICAL HISTORY  Past Medical History:   Diagnosis Date   • Clotting disorder (HCC)     PE   • Congestive heart failure (HCC) 7/2015    EF 30-35%; Dilated R atrium; LVH; Mild MR; Mild AI; Mild TR   • Crohn disease (HCC)    • Hypertension    • PAF (paroxysmal atrial fibrillation) (HCC)    • Sleep apnea     un-diagnosed and pending sleep study       FAMILY HISTORY  Family History   Problem Relation Age of Onset   • Cancer Mother    • Heart Disease Paternal Grandmother    • Heart Disease Paternal Grandfather    • Cancer Father         paralysis from accident       SOCIAL HISTORY  Social History     Socioeconomic History   • Marital status:      Spouse name: Not on file   • Number of children: Not on file   • Years of education: Not on file   • Highest education level: Not on file   Occupational History   • Not on file   Social Needs   • Financial resource strain: Not on file   • Food insecurity     Worry: Not on file     Inability: Not on file   • Transportation needs     Medical: Not on file      Non-medical: Not on file   Tobacco Use   • Smoking status: Never Smoker   • Smokeless tobacco: Never Used   Substance and Sexual Activity   • Alcohol use: Yes     Alcohol/week: 1.2 oz     Types: 2 Glasses of wine per week     Comment: Quit in 1994 - previous six beer after work, 2-3 beers per day currently   • Drug use: No     Types: Methamphetamines     Comment: Quit in 2012, used for 5 years   • Sexual activity: Yes     Partners: Female   Lifestyle   • Physical activity     Days per week: Not on file     Minutes per session: Not on file   • Stress: Not on file   Relationships   • Social connections     Talks on phone: Not on file     Gets together: Not on file     Attends Zoroastrian service: Not on file     Active member of club or organization: Not on file     Attends meetings of clubs or organizations: Not on file     Relationship status: Not on file   • Intimate partner violence     Fear of current or ex partner: Not on file     Emotionally abused: Not on file     Physically abused: Not on file     Forced sexual activity: Not on file   Other Topics Concern   • Not on file   Social History Narrative   • Not on file       SURGICAL HISTORY  Past Surgical History:   Procedure Laterality Date   • CRYSTAL BY LAPAROSCOPY  7/29/2020    Procedure: CHOLECYSTECTOMY, LAPAROSCOPIC;  Surgeon: Caroline Skelton M.D.;  Location: SURGERY [facility];  Service: General       CURRENT MEDICATIONS  Home Medications     Reviewed by Maru Vallecillo R.N. (Registered Nurse) on 01/14/21 at 1848  Med List Status: Complete   Medication Last Dose Status   carvedilol (COREG) 25 MG Tab  Active   furosemide (LASIX) 40 MG Tab  Active   hydrOXYzine HCl (ATARAX) 25 MG Tab  Active   ROPINIRole (REQUIP) 0.5 MG Tab  Active   ROPINIRole (REQUIP) 0.5 MG Tab  Active   sacubitril-valsartan (ENTRESTO) 49-51 MG Tab tablet  Active   spironolactone (ALDACTONE) 25 MG Tab  Active                ALLERGIES  No Known Allergies    PHYSICAL EXAM  VITAL SIGNS: BP  "115/60   Pulse 60   Temp (!) 35.7 °C (96.2 °F) (Temporal)   Resp 18   Ht 1.753 m (5' 9\")   Wt 104.3 kg (230 lb)   SpO2 90%   BMI 33.97 kg/m²   Constitutional: In acute distress, Non-toxic appearance.   HENT: Normocephalic, Atraumatic, Bilateral external ears normal, Oropharynx moist, No oral exudates, Nose normal.   Eyes: PERRLA, EOMI, Conjunctiva normal, No discharge.   Neck: Normal range of motion, No tenderness, Supple, No stridor.   Lymphatic: No lymphadenopathy noted.   Cardiovascular: Normal heart rate, Normal rhythm, No murmurs, No rubs, No gallops.   Thorax & Lungs: Normal breath sounds, No respiratory distress, No wheezing, No chest tenderness.   Skin: Warm, Dry, No erythema, No rash.   Abdomen: Bowel sounds normal, Soft, epigastric and right upper quadrant tenderness, No masses.  Extremities: Intact distal pulses, No edema, No tenderness, No cyanosis, No clubbing.   Neurologic: Alert & oriented, Normal motor function, Normal sensory function, No focal deficits noted.     RADIOLOGY/PROCEDURES  CT-ABDOMEN-PELVIS WITH   Final Result      1.  Inflammatory stranding involving the pancreas with fluid within the anterior pararenal space consistent with pancreatitis.      2.  Fatty liver.      3.  Prior cholecystectomy.      4.  Bibasilar atelectasis/consolidation.      DX-CHEST-PORTABLE (1 VIEW)   Final Result      No evidence of acute cardiopulmonary process.        EKG interpretation  Twelve-lead EKG shows a normal sinus rhythm with a ventricular to 63, first-degree AV block, QRS has poor R wave progression, no ST segment elevation or depression, T waves inverted in 3 and aVF    COURSE & MEDICAL DECISION MAKING  Pertinent Labs & Imaging studies reviewed. (See chart for details)  This a 55-year-old male who presents the emerge part with abdominal discomfort.  Laboratory analysis does show evidence of pancreatitis and I suspect this could be from an obstructive process from a stone.  The patient had a " cholecystectomy and after surgery at similar symptoms.  The patient has received IV fentanyl and Zofran.  On repeat exam he does feel significantly better.  The patient's abdomen is nonsurgical.  CT scan otherwise does not show any evidence of a postsurgical infection nor evidence of an obstructive process.  The patient will be admitted to the hospital in stable condition.    FINAL IMPRESSION  1.  Acute pancreatitis    Disposition  The patient will be admitted in stable condition    Electronically signed by: Jin Lee M.D., 1/14/2021 7:09 PM  Results for orders placed or performed during the hospital encounter of 01/14/21   CBC with Differential   Result Value Ref Range    WBC 10.4 4.8 - 10.8 K/uL    RBC 4.97 4.70 - 6.10 M/uL    Hemoglobin 14.8 14.0 - 18.0 g/dL    Hematocrit 44.9 42.0 - 52.0 %    MCV 90.3 81.4 - 97.8 fL    MCH 29.8 27.0 - 33.0 pg    MCHC 33.0 (L) 33.7 - 35.3 g/dL    RDW 39.4 35.9 - 50.0 fL    Platelet Count 273 164 - 446 K/uL    MPV 10.8 9.0 - 12.9 fL    Neutrophils-Polys 85.30 (H) 44.00 - 72.00 %    Lymphocytes 9.60 (L) 22.00 - 41.00 %    Monocytes 4.10 0.00 - 13.40 %    Eosinophils 0.20 0.00 - 6.90 %    Basophils 0.30 0.00 - 1.80 %    Immature Granulocytes 0.50 0.00 - 0.90 %    Nucleated RBC 0.00 /100 WBC    Neutrophils (Absolute) 8.85 (H) 1.82 - 7.42 K/uL    Lymphs (Absolute) 1.00 1.00 - 4.80 K/uL    Monos (Absolute) 0.42 0.00 - 0.85 K/uL    Eos (Absolute) 0.02 0.00 - 0.51 K/uL    Baso (Absolute) 0.03 0.00 - 0.12 K/uL    Immature Granulocytes (abs) 0.05 0.00 - 0.11 K/uL    NRBC (Absolute) 0.00 K/uL   Complete Metabolic Panel (CMP)   Result Value Ref Range    Sodium 131 (L) 135 - 145 mmol/L    Potassium 4.0 3.6 - 5.5 mmol/L    Chloride 98 96 - 112 mmol/L    Co2 22 20 - 33 mmol/L    Anion Gap 11.0 7.0 - 16.0    Glucose 213 (H) 65 - 99 mg/dL    Bun 23 (H) 8 - 22 mg/dL    Creatinine 1.13 0.50 - 1.40 mg/dL    Calcium 9.5 8.5 - 10.5 mg/dL    AST(SGOT) 328 (H) 12 - 45 U/L    ALT(SGPT) 170 (H) 2  - 50 U/L    Alkaline Phosphatase 122 (H) 30 - 99 U/L    Total Bilirubin 0.8 0.1 - 1.5 mg/dL    Albumin 4.2 3.2 - 4.9 g/dL    Total Protein 7.4 6.0 - 8.2 g/dL    Globulin 3.2 1.9 - 3.5 g/dL    A-G Ratio 1.3 g/dL   Troponin   Result Value Ref Range    Troponin T <6 6 - 19 ng/L   LIPASE   Result Value Ref Range    Lipase >3000 (H) 11 - 82 U/L   ESTIMATED GFR   Result Value Ref Range    GFR If African American >60 >60 mL/min/1.73 m 2    GFR If Non African American >60 >60 mL/min/1.73 m 2

## 2021-01-15 NOTE — ASSESSMENT & PLAN NOTE
This is likely alcohol related pancreatitis (the patient was drinking 7 beers per day prior to admission) that is very severe by [place] criteria with worsening creatinine.  The patient also had other complications of pancreatitis including respiratory failure (not ARDS however, ?opiod related) and ileus. The patient currently has an NG tube in place.     Plan:  - Continue monitoring CMP and replacing electrolytes as needed.  - Diet has been advanced.

## 2021-01-15 NOTE — CARE PLAN
Problem: Safety  Goal: Will remain free from injury  Outcome: PROGRESSING AS EXPECTED  Note: SBA from gurney to bed, tolerates well     Problem: Communication  Goal: The ability to communicate needs accurately and effectively will improve  Outcome: PROGRESSING AS EXPECTED  Note: Pt calls appropriately

## 2021-01-15 NOTE — ASSESSMENT & PLAN NOTE
The patient is on Carvedilol 25mg bid, Lasix 40mg, Entresto (49-51), and Spironolactone at home. He was hypotensive and septic on 1/30. He has remained hypotensive and is now on midodrine. Therefore, we will hold these medications and re-evaluate at discharge.     Plan:  - Continue Midodrine and hold for SBP>110   - Continue holding home medications

## 2021-01-15 NOTE — DISCHARGE PLANNING
Bedside assessment completed with information obtained from patient and wife at bedside. Pt has no perceived needs at this time. Pt continues to work and uses the [employer] on [address]. Pt's wife Melina will be available once pt is DC for transportation home. [phone]    Care Transition Team Assessment    Information Source  Orientation : Oriented x 4  Information Given By: Patient, Spouse  Informant's Name: Melina (spouse)  Who is responsible for making decisions for patient? : Patient    Readmission Evaluation  Is this a readmission?: No         Interdisciplinary Discharge Planning  Does Admitting Nurse Feel This Could be a Complex Discharge?: No  Primary Care Physician: Quinten Howe MD  Lives with - Patient's Self Care Capacity: Spouse  Support Systems: Family Member(s), Spouse / Significant Other  Housing / Facility: [address]  Do You Take your Prescribed Medications Regularly: Yes(Christina, Dejuan Wilburn)  Able to Return to Previous ADL's: Yes  Mobility Issues: No  Prior Services: None  Patient Prefers to be Discharged to:: Home  Assistance Needed: No  Durable Medical Equipment: Not Applicable    Discharge Preparedness  What are your discharge supports?: Spouse  Prior Functional Level: Ambulatory, Drives Self, Independent with Activities of Daily Living, Independent with Medication Management  Difficulity with ADLs: None  Difficulity with IADLs: None    Functional Assesment  Prior Functional Level: Ambulatory, Drives Self, Independent with Activities of Daily Living, Independent with Medication Management    Finances  Financial Barriers to Discharge: No  Prescription Coverage: Yes    Vision / Hearing Impairment  Vision Impairment : No  Hearing Impairment : No    Values / Beliefs / Concerns  Values / Beliefs Concerns : No    Advance Directive  Advance Directive?: None  Advance Directive offered?: AD Booklet given    Domestic Abuse  Have you ever been the victim of abuse or violence?:  No    Psychological Assessment  History of Substance Abuse: None  History of Psychiatric Problems: No    Discharge Risks or Barriers  Discharge risks or barriers?: No    Anticipated Discharge Information  Discharge Disposition: Still a Patient (30)  Discharge Address: [address] [place] [address]  Discharge Contact Phone Number: Melina (spouse) [phone]

## 2021-01-15 NOTE — ED NOTES
Assist RN note: Covid swabs obtained, sent to lab. Pt comfort level checked denies needs. Updated pt and family on POC. Call light in reach.

## 2021-01-15 NOTE — ASSESSMENT & PLAN NOTE
The patient is not a diabetic with last A1c 5.5. However, he has been having episodes of hyperglycemia likely from pancreatitis.     Plan:  - Continue to monitor with finger sticks  - Continue SSI for now since diet has been advanced

## 2021-01-15 NOTE — PROGRESS NOTES
Received report of patient at start of shift. Patient is AOx4, PRN medications administered for complaints of pain. Assessment complete, on 2L O2 via NC. Patient previously transported off unit for MRCP, resting in bed at this time. Patient encouraged to use call light for assistance. Plan of care discussed. Safety precautions in place.

## 2021-01-15 NOTE — PROGRESS NOTES
2 RN Skin check:    LFA cu,t scabbed  L middle finger scab  L calf cut, scabbed  Old abd lap sites x2 healed    All other areas and bony prominences CDI

## 2021-01-15 NOTE — ASSESSMENT & PLAN NOTE
RESOLVED.   The patient was noted to have a triglyceride level of 571. While this is moderate, a triglyceride level >500 increases risk for pancreatitis. His most recent triglyceride level was in the 90s.

## 2021-01-15 NOTE — PROGRESS NOTES
Hospital Medicine Daily Progress Note    Date of Service  1/15/2021    Chief Complaint  55 y.o. male admitted 1/14/2021 with h/o cholecystectomy and Crohn's disease with h/o severe systolic CHF with severe TR and severe MR (?viral in etiology, most recent in ECHO 2018 normal) comes in with pancreatitis.     Hospital Course  No notes on file    Interval Problem Update  Pancreatitis-pain quite severe, but frequent IV dilaudid is helping. Urinating some per patient but not a lot. Feels his belly is tight. MRCP negative for stones, but does show edematous pancreas. Desires some water now.    Consultants/Specialty  NONE    Code Status  Full Code    Disposition  Medical    Review of Systems  Review of Systems   Constitutional: Negative for chills and fever.   Respiratory: Negative for cough and shortness of breath.    Cardiovascular: Negative for chest pain and palpitations.   Gastrointestinal: Positive for abdominal pain. Negative for nausea and vomiting.        Swollen belly   All other systems reviewed and are negative.       Physical Exam  Temp:  [35.7 °C (96.2 °F)-36.3 °C (97.3 °F)] 36.3 °C (97.3 °F)  Pulse:  [60-93] 91  Resp:  [14-19] 15  BP: (115-163)/() 132/91  SpO2:  [89 %-97 %] 91 %    Physical Exam  Vitals signs and nursing note reviewed.   Constitutional:       General: He is not in acute distress.     Appearance: He is well-developed.      Comments: Sleepy and uncomfortable   HENT:      Head: Normocephalic and atraumatic.      Mouth/Throat:      Pharynx: No oropharyngeal exudate.   Eyes:      General: No scleral icterus.     Pupils: Pupils are equal, round, and reactive to light.   Neck:      Musculoskeletal: Normal range of motion and neck supple.      Thyroid: No thyromegaly.   Cardiovascular:      Rate and Rhythm: Normal rate and regular rhythm.      Heart sounds: Normal heart sounds. No murmur.   Pulmonary:      Effort: Pulmonary effort is normal. No respiratory distress.      Breath sounds: Normal  breath sounds. No wheezing.   Abdominal:      General: Bowel sounds are normal. There is distension.      Palpations: Abdomen is soft.      Tenderness: There is abdominal tenderness (epigastric).   Musculoskeletal: Normal range of motion.         General: No tenderness.      Right lower leg: No edema.      Left lower leg: No edema.   Skin:     General: Skin is warm and dry.      Findings: No rash.   Neurological:      Mental Status: He is alert and oriented to person, place, and time.      Cranial Nerves: No cranial nerve deficit.         Fluids    Intake/Output Summary (Last 24 hours) at 1/15/2021 1235  Last data filed at 1/15/2021 1140  Gross per 24 hour   Intake 0 ml   Output 250 ml   Net -250 ml       Laboratory  Recent Labs     01/14/21  1850   WBC 10.4   RBC 4.97   HEMOGLOBIN 14.8   HEMATOCRIT 44.9   MCV 90.3   MCH 29.8   MCHC 33.0*   RDW 39.4   PLATELETCT 273   MPV 10.8     Recent Labs     01/14/21  1850   SODIUM 131*   POTASSIUM 4.0   CHLORIDE 98   CO2 22   GLUCOSE 213*   BUN 23*   CREATININE 1.13   CALCIUM 9.5             Recent Labs     01/14/21  1850   TRIGLYCERIDE 571*   HDL 46   LDL see below       Imaging  [id number]   Final Result      1.  Pancreatic edema with significant peripancreatic stranding and ill-defined fluid, consistent with acute interstitial edematous pancreatitis.      2.  Small amount of ascites.      3.  No choledocholithiasis is identified.      4.  Status post cholecystectomy.      CT-ABDOMEN-PELVIS WITH   Final Result      1.  Inflammatory stranding involving the pancreas with fluid within the anterior pararenal space consistent with pancreatitis.      2.  Fatty liver.      3.  Prior cholecystectomy.      4.  Bibasilar atelectasis/consolidation.      DX-CHEST-PORTABLE (1 VIEW)   Final Result      No evidence of acute cardiopulmonary process.           Assessment/Plan  * Acute pancreatitis  Assessment & Plan  -unclear etiology, no CBD stone seen on MRCP and TBILI normal, but  elevated LFT's  -?passed stone, no clear etoh abuse, no clear offending medications  -triglycerides noted at 570, but usually need much higher levels to cause pancreatitis, but consider treatment with lopid etc.  -conservative treatment, belly is quite distended but no peritoneal signs on exam  -continue IVF's and pain control, allow ice chips and sips of water today  -daily labs  -mobilize as much as possible, pain is still quite severe  -continue IV dilaudid 0.5 mg Q3 hours PRN        Obesity (BMI 30-39.9)- (present on admission)  Assessment & Plan  15 minutes spent with patient counseling on weight loss nutrition and healthy lifestyle    Elevated blood sugar- (present on admission)  Assessment & Plan  Sliding scale as needed is added  -check A1c    HTN (hypertension)- (present on admission)  Assessment & Plan  Continue Coreg 25 mg nightly  Can hold diuretics for now      Hypertriglyceridemia- (present on admission)  Assessment & Plan  Levels are 571, considered moderate, >500 risks increase somewhat for inducing pancreatitis  -check triglyceride levels daily  -will start lantus 5 units daily to help bring down levels  -see below to check A1c  -will need outpatient lopid, etc.       VTE prophylaxis: heparin

## 2021-01-15 NOTE — ED NOTES
Med rec complete via interview with pt at bedside. Pt states that he takes all his medications once in the evening. Allergies reviewed. Pt denies antibiotic use in past 14 days.

## 2021-01-15 NOTE — H&P
Hospital Medicine History & Physical Note    Date of Service  1/14/2021    Primary Care Physician  Quinten Howe M.D.    Consultants  None    Code Status  Full Code    Chief Complaint  Chief Complaint   Patient presents with   • Abdominal Pain     Starting at noon today. Pt states it feels very similar to when he had gallbladder issues and gallbladder was removed 3 months ago here at [facility].        History of Presenting Illness  55 y.o. male who presented 1/14/2021 with generalized abdominal pain is started today.  He describes as diffuse sharp with radiation to the back.  Noted to have vomited several times.  Denies blood in vomitus.    In July 2020, patient had cholelithiasis which led to cholecystectomy.    In the ED today, patient found abnormal vital signs.  Remarkable labs include neutrophilia without leukocytosis, hyponatremia, hyperglycemia, prerenal azotemia, transaminitis, lipase over 3000.  CT abdomen showing inflammatory stranding involving pancreas with fluid within the anterior pararenal space consistent with pancreatitis.     Review of Systems  Review of Systems   Constitutional: Positive for chills. Negative for diaphoresis, fever, malaise/fatigue and weight loss.   HENT: Negative.    Eyes: Negative.    Respiratory: Negative.    Cardiovascular: Negative.    Gastrointestinal: Positive for abdominal pain, nausea and vomiting. Negative for blood in stool, constipation, diarrhea, heartburn and melena.   Genitourinary: Negative.    Musculoskeletal: Negative.    Skin: Negative.    Neurological: Negative.    Endo/Heme/Allergies: Negative.    Psychiatric/Behavioral: Negative.          Past Medical History   has a past medical history of Clotting disorder (HCC), Congestive heart failure (HCC) (7/2015), Crohn disease (HCC), Hypertension, PAF (paroxysmal atrial fibrillation) ([facility]), and Sleep apnea.    Surgical History   has a past surgical history that includes monico by laparoscopy (7/29/2020).     Family  History  family history includes Cancer in his father and mother; Heart Disease in his paternal grandfather and paternal grandmother.     Social History   reports that he has never smoked. He has never used smokeless tobacco. He reports current alcohol use of about 1.2 oz of alcohol per week. He reports that he does not use drugs.    Allergies  No Known Allergies    Medications  Prior to Admission Medications   Prescriptions Last Dose Informant Patient Reported? Taking?   ROPINIRole (REQUIP) 0.5 MG Tab Not Taking at Unknown time Patient No No   Sig: TAKE 2 TABLETS BY MOUTH THREE TIMES DAILY   Patient not taking: Reported on 1/14/2021   ROPINIRole (REQUIP) 0.5 MG Tab 1/13/2021 at PM Patient No No   Sig: Take 4 tablets po qhs   carvedilol (COREG) 25 MG Tab 1/13/2021 at PM Patient Yes No   Sig: Take 25 mg by mouth every evening.   furosemide (LASIX) 40 MG Tab >3 days ago at unknown Patient Yes No   Sig: Take 40 mg by mouth every morning.   hydrOXYzine HCl (ATARAX) 25 MG Tab 1/13/2021 at PM Patient No No   Sig: Take 1 Tab by mouth every bedtime.   ibuprofen (MOTRIN) 600 MG Tab 1/14/2021 at 1400 Patient Yes Yes   Sig: Take 600 mg by mouth every 6 hours as needed.   sacubitril-valsartan (ENTRESTO) 49-51 MG Tab tablet 1/13/2021 at PM Patient No No   Sig: Take 1 Tab by mouth 2 Times a Day.   spironolactone (ALDACTONE) 25 MG Tab 1/13/2021 at PM Patient No No   Sig: Take 1 Tab by mouth every bedtime.      Facility-Administered Medications: None       Physical Exam  Temp:  [35.7 °C (96.2 °F)] 35.7 °C (96.2 °F)  Pulse:  [60] 60  Resp:  [18] 18  BP: (115)/(60) 115/60  SpO2:  [90 %] 90 %    Physical Exam  Constitutional:       Appearance: Normal appearance. He is obese.      Comments: Complaining of pain describes it as 8 out of 10  In distress due to pain   Cardiovascular:      Rate and Rhythm: Normal rate and regular rhythm.      Pulses: Normal pulses.   Pulmonary:      Effort: Pulmonary effort is normal.      Breath sounds:  Normal breath sounds.   Abdominal:      Comments: Refuse abdominal exam due to pain   Musculoskeletal: Normal range of motion.   Skin:     General: Skin is warm.   Neurological:      General: No focal deficit present.      Mental Status: He is alert and oriented to person, place, and time.           Laboratory:  Recent Labs     01/14/21  1850   WBC 10.4   RBC 4.97   HEMOGLOBIN 14.8   HEMATOCRIT 44.9   MCV 90.3   MCH 29.8   MCHC 33.0*   RDW 39.4   PLATELETCT 273   MPV 10.8     Recent Labs     01/14/21  1850   SODIUM 131*   POTASSIUM 4.0   CHLORIDE 98   CO2 22   GLUCOSE 213*   BUN 23*   CREATININE 1.13   CALCIUM 9.5     Recent Labs     01/14/21  1850   ALTSGPT 170*   ASTSGOT 328*   ALKPHOSPHAT 122*   TBILIRUBIN 0.8   LIPASE >3000*   GLUCOSE 213*         No results for input(s): NTPROBNP in the last 72 hours.      Recent Labs     01/14/21  1850   TROPONINT <6       Imaging:  CT-ABDOMEN-PELVIS WITH   Final Result      1.  Inflammatory stranding involving the pancreas with fluid within the anterior pararenal space consistent with pancreatitis.      2.  Fatty liver.      3.  Prior cholecystectomy.      4.  Bibasilar atelectasis/consolidation.      DX-CHEST-PORTABLE (1 VIEW)   Final Result      No evidence of acute cardiopulmonary process.            Assessment/Plan:  I anticipate this patient will require at least two midnights for appropriate medical management, necessitating inpatient admission.    * Acute pancreatitis  Assessment & Plan  Admit patient to medical floor  With recent history of cholelithiasis leading to cholecystectomy and transaminitis, suspect residual stone remaining in the bile duct    mL/h  MRCP   Dilaudid .5 mg every 3 hours as needed  Will send lipid panel to r/o triglyceride induced pancreatitis  GI consult in a.m.        Obesity (BMI 30-39.9)- (present on admission)  Assessment & Plan  15 minutes spent with patient counseling on weight loss nutrition and healthy lifestyle    Elevated blood  sugar- (present on admission)  Assessment & Plan  Sliding scale as needed    HTN (hypertension)- (present on admission)  Assessment & Plan  Continue Coreg 25 mg nightly  Can hold diuretics for now

## 2021-01-16 PROBLEM — E87.5 HYPERKALEMIA: Status: ACTIVE | Noted: 2021-01-01

## 2021-01-16 PROBLEM — E83.51 HYPOCALCEMIA: Status: ACTIVE | Noted: 2021-01-01

## 2021-01-16 PROBLEM — R09.02 HYPOXIA: Status: ACTIVE | Noted: 2021-01-01

## 2021-01-16 NOTE — ASSESSMENT & PLAN NOTE
Laparoscopic cholecystectomy on or about 7/25/2020 by Dr. Skelton  CT with worsening acute pancreatitis and possible distal small bowel obstruction on 1/25  Complete bowel rest  NG to suction  TPN for nutritional support

## 2021-01-16 NOTE — PROGRESS NOTES
Patient transferring to [id number] with ICU RNs. Report given to BRIGID Benítez. Belongings and paper chart with patient at time of transfer.

## 2021-01-16 NOTE — CONSULTS
Critical Care Consultation    Date of consult: 1/16/2021    Referring Physician  CLARE Quiroga    Reason for Consultation  Severe pancreatitis, ONOFRE with hyperkalemia    History of Presenting Illness  55 y.o. male who presented 1/14/2021 with PVC, Afib due to pulmonary emboli, NICM EF 25-30% that recovered, NSTEMI, hypertension, obesity, crohns, restless leg, dyslipidemia, acute cholecystitis s/p lap monico 7/2020 Dr Skelton. That was admitted on 1/14 for acute pancreatitis, onofre. Today patient has been oliguric with some response to fluids after lasix last night with worsening renal function hyperkalemia to 6 and tense abdomen and Dr Mota had called by for concerns of abdominal compartment syndrome and high risk of decompensation based off [place]  Criteria for mortality and ICU was consulted. Patient has been afebrile with resting mild tachycardia , -150's and 5-6l n/c sat 91-96%. I met the patient at the bedside with nephrology consulting and the patient describes diffuse abdomina pain, chills and feels feverish, mild labored breathing. He describes a taught felt abdomen and was able to have BM yesterday. Bedside US shows no fluid pocket of ascites and concerns for SBO on abdominal US that I preformed.     Code Status  Full Code    Review of Systems  Review of Systems   Constitutional: Positive for chills and fever. Negative for malaise/fatigue.   HENT: Negative for nosebleeds and sore throat.    Eyes: Negative for double vision.   Respiratory: Positive for shortness of breath. Negative for cough and sputum production.    Cardiovascular: Negative for chest pain, claudication and leg swelling.   Gastrointestinal: Positive for abdominal pain. Negative for blood in stool, constipation, diarrhea, nausea and vomiting.   Genitourinary: Negative for dysuria, hematuria and urgency.   Musculoskeletal: Negative for joint pain and myalgias.   Neurological: Negative for dizziness, tingling, sensory change,  speech change, focal weakness, seizures and weakness.   Psychiatric/Behavioral: Positive for substance abuse. Negative for depression and suicidal ideas. The patient is not nervous/anxious.        Past Medical History   has a past medical history of Clotting disorder (HCC), Congestive heart failure (HCC) (7/2015), Crohn disease (HCC), Hypertension, PAF (paroxysmal atrial fibrillation) (HCC), and Sleep apnea.    Surgical History   has a past surgical history that includes monico by laparoscopy (7/29/2020).    Family History  family history includes Cancer in his father and mother; Heart Disease in his paternal grandfather and paternal grandmother.    Social History   reports that he has never smoked. He has never used smokeless tobacco. He reports current alcohol use of about 1.2 oz of alcohol per week. He reports that he does not use drugs.    Medications  Home Medications     Reviewed by Barbara Low (Pharmacy Tech) on 01/14/21 at 1916  Med List Status: Complete   Medication Last Dose Status   carvedilol (COREG) 25 MG Tab 1/13/2021 Active   furosemide (LASIX) 40 MG Tab >3 days ago Active   hydrOXYzine HCl (ATARAX) 25 MG Tab 1/13/2021 Active   ibuprofen (MOTRIN) 600 MG Tab 1/14/2021 Active   ROPINIRole (REQUIP) 0.5 MG Tab Not Taking Active   ROPINIRole (REQUIP) 0.5 MG Tab 1/13/2021 Active   sacubitril-valsartan (ENTRESTO) 49-51 MG Tab tablet 1/13/2021 Active   spironolactone (ALDACTONE) 25 MG Tab 1/13/2021 Active              Current Facility-Administered Medications   Medication Dose Route Frequency Provider Last Rate Last Admin   • HYDROmorphone pf (DILAUDID) injection 0.5-1 mg  0.5-1 mg Intravenous Q2HRS PRN Levar Tapia M.D.   1 mg at 01/16/21 1328   • furosemide (LASIX) injection 80 mg  80 mg Intravenous Once Jorge Luis Michael M.D.       • carvedilol (COREG) tablet 12.5 mg  12.5 mg Oral Q EVENING Jorge Luis Michael M.D.       • labetalol (NORMODYNE/TRANDATE) injection 10-20 mg  10-20 mg  Intravenous Q4HRS PRN Jorge Luis Michael M.D.       • albumin human 5% solution 25 g  25 g Intravenous Once Jorge Luis Michael M.D.       • sodium bicarbonate 8.4 % injection 50 mEq  50 mEq Intravenous Once Jorge Luis Michael M.D.       • sodium bicarbonate 8.4 % injection 50 mEq  50 mEq Intravenous Once Jorge Luis Michael M.D.       • insulin regular (HumuLIN R,NovoLIN R) injection  1-6 Units Subcutaneous Q6HRS Zaid Scott M.D.   1 Units at 01/16/21 1305    And   • glucose 4 g chewable tablet 16 g  16 g Oral Q15 MIN PRN Zaid Scott M.D.        And   • dextrose 50% (D50W) injection 50 mL  50 mL Intravenous Q15 MIN PRN Zaid Scott M.D.       • insulin glargine (Lantus) injection  5 Units Subcutaneous QAM INSULIN Zaid Scott M.D.   5 Units at 01/16/21 0506   • heparin injection 5,000 Units  5,000 Units Subcutaneous Q8HRS Johnson Hodge M.D.   5,000 Units at 01/16/21 0500       Allergies  No Known Allergies    Vital Signs last 24 hours  Temp:  [36.1 °C (97 °F)-36.4 °C (97.6 °F)] 36.3 °C (97.4 °F)  Pulse:  [] 94  Resp:  [16-20] 20  BP: ()/() 119/89  SpO2:  [90 %-96 %] 96 %    Physical Exam  Physical Exam  Vitals signs and nursing note reviewed.   Constitutional:       General: He is not in acute distress.     Appearance: Normal appearance. He is not ill-appearing.      Comments: Not ill appearing   HENT:      Head: Normocephalic.      Mouth/Throat:      Mouth: Mucous membranes are dry.   Eyes:      Pupils: Pupils are equal, round, and reactive to light.   Cardiovascular:      Rate and Rhythm: Normal rate.      Heart sounds: No murmur.   Pulmonary:      Effort: No respiratory distress.      Breath sounds: No stridor. No wheezing or rhonchi.      Comments: Some splinting  Abdominal:      General: There is distension.      Palpations: There is no mass.      Tenderness: There is abdominal tenderness. There is no guarding or rebound.      Hernia: No hernia is present.    Musculoskeletal:         General: No swelling or tenderness.   Skin:     Coloration: Skin is pale. Skin is not jaundiced.      Findings: No bruising or erythema.   Neurological:      Mental Status: He is alert and oriented to person, place, and time.      Cranial Nerves: No cranial nerve deficit.      Sensory: No sensory deficit.      Motor: No weakness.      Coordination: Coordination normal.   Psychiatric:         Mood and Affect: Mood normal.         Fluids    Intake/Output Summary (Last 24 hours) at 1/16/2021 1501  Last data filed at 1/16/2021 1303  Gross per 24 hour   Intake 1287.5 ml   Output 330 ml   Net 957.5 ml       Laboratory  Recent Results (from the past 48 hour(s))   CBC with Differential    Collection Time: 01/14/21  6:50 PM   Result Value Ref Range    WBC 10.4 4.8 - 10.8 K/uL    RBC 4.97 4.70 - 6.10 M/uL    Hemoglobin 14.8 14.0 - 18.0 g/dL    Hematocrit 44.9 42.0 - 52.0 %    MCV 90.3 81.4 - 97.8 fL    MCH 29.8 27.0 - 33.0 pg    MCHC 33.0 (L) 33.7 - 35.3 g/dL    RDW 39.4 35.9 - 50.0 fL    Platelet Count 273 164 - 446 K/uL    MPV 10.8 9.0 - 12.9 fL    Neutrophils-Polys 85.30 (H) 44.00 - 72.00 %    Lymphocytes 9.60 (L) 22.00 - 41.00 %    Monocytes 4.10 0.00 - 13.40 %    Eosinophils 0.20 0.00 - 6.90 %    Basophils 0.30 0.00 - 1.80 %    Immature Granulocytes 0.50 0.00 - 0.90 %    Nucleated RBC 0.00 /100 WBC    Neutrophils (Absolute) 8.85 (H) 1.82 - 7.42 K/uL    Lymphs (Absolute) 1.00 1.00 - 4.80 K/uL    Monos (Absolute) 0.42 0.00 - 0.85 K/uL    Eos (Absolute) 0.02 0.00 - 0.51 K/uL    Baso (Absolute) 0.03 0.00 - 0.12 K/uL    Immature Granulocytes (abs) 0.05 0.00 - 0.11 K/uL    NRBC (Absolute) 0.00 K/uL   Complete Metabolic Panel (CMP)    Collection Time: 01/14/21  6:50 PM   Result Value Ref Range    Sodium 131 (L) 135 - 145 mmol/L    Potassium 4.0 3.6 - 5.5 mmol/L    Chloride 98 96 - 112 mmol/L    Co2 22 20 - 33 mmol/L    Anion Gap 11.0 7.0 - 16.0    Glucose 213 (H) 65 - 99 mg/dL    Bun 23 (H) 8 - 22  mg/dL    Creatinine 1.13 0.50 - 1.40 mg/dL    Calcium 9.5 8.5 - 10.5 mg/dL    AST(SGOT) 328 (H) 12 - 45 U/L    ALT(SGPT) 170 (H) 2 - 50 U/L    Alkaline Phosphatase 122 (H) 30 - 99 U/L    Total Bilirubin 0.8 0.1 - 1.5 mg/dL    Albumin 4.2 3.2 - 4.9 g/dL    Total Protein 7.4 6.0 - 8.2 g/dL    Globulin 3.2 1.9 - 3.5 g/dL    A-G Ratio 1.3 g/dL   Troponin    Collection Time: 01/14/21  6:50 PM   Result Value Ref Range    Troponin T <6 6 - 19 ng/L   LIPASE    Collection Time: 01/14/21  6:50 PM   Result Value Ref Range    Lipase >3000 (H) 11 - 82 U/L   ESTIMATED GFR    Collection Time: 01/14/21  6:50 PM   Result Value Ref Range    GFR If African American >60 >60 mL/min/1.73 m 2    GFR If Non African American >60 >60 mL/min/1.73 m 2   Lipid Profile    Collection Time: 01/14/21  6:50 PM   Result Value Ref Range    Cholesterol,Tot 224 (H) 100 - 199 mg/dL    Triglycerides 571 (H) 0 - 149 mg/dL    HDL 46 >=40 mg/dL    LDL see below <100 mg/dL   SARS-COV Antigen ABDOULAYE: Collect dry nasal swab AND NP swab in VTM    Collection Time: 01/14/21 10:29 PM   Result Value Ref Range    SARS-CoV-2 Source Nasal Swab     SARS-COV ANTIGEN ABDOULAYE NotDetected Not-Detected   SARS-CoV-2 PCR (24 hour In-House): Collect NP swab in VTM    Collection Time: 01/14/21 10:29 PM    Specimen: Respirate   Result Value Ref Range    SARS-CoV-2 Source NP Swab     SARS-CoV-2 by PCR NotDetected    Comp Metabolic Panel    Collection Time: 01/15/21 12:59 PM   Result Value Ref Range    Sodium 135 135 - 145 mmol/L    Potassium 5.0 3.6 - 5.5 mmol/L    Chloride 98 96 - 112 mmol/L    Co2 24 20 - 33 mmol/L    Anion Gap 13.0 7.0 - 16.0    Glucose 173 (H) 65 - 99 mg/dL    Bun 28 (H) 8 - 22 mg/dL    Creatinine 1.64 (H) 0.50 - 1.40 mg/dL    Calcium 8.2 (L) 8.5 - 10.5 mg/dL    AST(SGOT) 82 (H) 12 - 45 U/L    ALT(SGPT) 122 (H) 2 - 50 U/L    Alkaline Phosphatase 119 (H) 30 - 99 U/L    Total Bilirubin 0.7 0.1 - 1.5 mg/dL    Albumin 4.1 3.2 - 4.9 g/dL    Total Protein 7.6 6.0 - 8.2  g/dL    Globulin 3.5 1.9 - 3.5 g/dL    A-G Ratio 1.2 g/dL   HEMOGLOBIN A1C    Collection Time: 01/15/21 12:59 PM   Result Value Ref Range    Glycohemoglobin 5.5 0.0 - 5.6 %    Est Avg Glucose 111 mg/dL   ESTIMATED GFR    Collection Time: 01/15/21 12:59 PM   Result Value Ref Range    GFR If  53 (A) >60 mL/min/1.73 m 2    GFR If Non  44 (A) >60 mL/min/1.73 m 2   ACCU-CHEK GLUCOSE    Collection Time: 01/15/21  1:17 PM   Result Value Ref Range    Glucose - Accu-Ck 181 (H) 65 - 99 mg/dL   ACCU-CHEK GLUCOSE    Collection Time: 01/15/21  7:14 PM   Result Value Ref Range    Glucose - Accu-Ck 184 (H) 65 - 99 mg/dL   ACCU-CHEK GLUCOSE    Collection Time: 01/15/21 11:44 PM   Result Value Ref Range    Glucose - Accu-Ck 166 (H) 65 - 99 mg/dL   CBC WITH DIFFERENTIAL    Collection Time: 01/16/21  3:45 AM   Result Value Ref Range    WBC 19.2 (H) 4.8 - 10.8 K/uL    RBC 5.36 4.70 - 6.10 M/uL    Hemoglobin 16.5 14.0 - 18.0 g/dL    Hematocrit 51.8 42.0 - 52.0 %    MCV 96.6 81.4 - 97.8 fL    MCH 30.8 27.0 - 33.0 pg    MCHC 31.9 (L) 33.7 - 35.3 g/dL    RDW 44.9 35.9 - 50.0 fL    Platelet Count 241 164 - 446 K/uL    MPV 11.0 9.0 - 12.9 fL    Neutrophils-Polys 85.20 (H) 44.00 - 72.00 %    Lymphocytes 3.50 (L) 22.00 - 41.00 %    Monocytes 4.30 0.00 - 13.40 %    Eosinophils 0.00 0.00 - 6.90 %    Basophils 0.00 0.00 - 1.80 %    Nucleated RBC 0.00 /100 WBC    Neutrophils (Absolute) 17.36 (H) 1.82 - 7.42 K/uL    Lymphs (Absolute) 0.67 (L) 1.00 - 4.80 K/uL    Monos (Absolute) 0.83 0.00 - 0.85 K/uL    Eos (Absolute) 0.00 0.00 - 0.51 K/uL    Baso (Absolute) 0.00 0.00 - 0.12 K/uL    NRBC (Absolute) 0.00 K/uL   Comp Metabolic Panel    Collection Time: 01/16/21  3:45 AM   Result Value Ref Range    Sodium 131 (L) 135 - 145 mmol/L    Potassium 6.0 (H) 3.6 - 5.5 mmol/L    Chloride 99 96 - 112 mmol/L    Co2 21 20 - 33 mmol/L    Anion Gap 11.0 7.0 - 16.0    Glucose 155 (H) 65 - 99 mg/dL    Bun 42 (H) 8 - 22 mg/dL     Creatinine 3.36 (H) 0.50 - 1.40 mg/dL    Calcium 7.3 (L) 8.5 - 10.5 mg/dL    AST(SGOT) 44 12 - 45 U/L    ALT(SGPT) 76 (H) 2 - 50 U/L    Alkaline Phosphatase 94 30 - 99 U/L    Total Bilirubin 0.6 0.1 - 1.5 mg/dL    Albumin 3.5 3.2 - 4.9 g/dL    Total Protein 6.9 6.0 - 8.2 g/dL    Globulin 3.4 1.9 - 3.5 g/dL    A-G Ratio 1.0 g/dL   MAGNESIUM    Collection Time: 01/16/21  3:45 AM   Result Value Ref Range    Magnesium 1.9 1.5 - 2.5 mg/dL   DIFFERENTIAL MANUAL    Collection Time: 01/16/21  3:45 AM   Result Value Ref Range    Bands-Stabs 5.20 0.00 - 10.00 %    Metamyelocytes 1.70 %    Manual Diff Status PERFORMED    PERIPHERAL SMEAR REVIEW    Collection Time: 01/16/21  3:45 AM   Result Value Ref Range    Peripheral Smear Review see below    PLATELET ESTIMATE    Collection Time: 01/16/21  3:45 AM   Result Value Ref Range    Plt Estimation Normal    MORPHOLOGY    Collection Time: 01/16/21  3:45 AM   Result Value Ref Range    RBC Morphology Normal    ESTIMATED GFR    Collection Time: 01/16/21  3:45 AM   Result Value Ref Range    GFR If  23 (A) >60 mL/min/1.73 m 2    GFR If Non  19 (A) >60 mL/min/1.73 m 2   ACCU-CHEK GLUCOSE    Collection Time: 01/16/21  5:04 AM   Result Value Ref Range    Glucose - Accu-Ck 160 (H) 65 - 99 mg/dL   AMYLASE    Collection Time: 01/16/21  8:24 AM   Result Value Ref Range    Amylase 827 (H) 20 - 103 U/L   LIPASE    Collection Time: 01/16/21  8:24 AM   Result Value Ref Range    Lipase 860 (H) 11 - 82 U/L   Comp Metabolic Panel    Collection Time: 01/16/21  8:24 AM   Result Value Ref Range    Sodium 129 (L) 135 - 145 mmol/L    Potassium 6.1 (H) 3.6 - 5.5 mmol/L    Chloride 97 96 - 112 mmol/L    Co2 21 20 - 33 mmol/L    Anion Gap 11.0 7.0 - 16.0    Glucose 147 (H) 65 - 99 mg/dL    Bun 48 (H) 8 - 22 mg/dL    Creatinine 3.54 (H) 0.50 - 1.40 mg/dL    Calcium 7.0 (L) 8.5 - 10.5 mg/dL    AST(SGOT) 38 12 - 45 U/L    ALT(SGPT) 62 (H) 2 - 50 U/L    Alkaline Phosphatase 98 30 -  99 U/L    Total Bilirubin 0.5 0.1 - 1.5 mg/dL    Albumin 3.5 3.2 - 4.9 g/dL    Total Protein 7.2 6.0 - 8.2 g/dL    Globulin 3.7 (H) 1.9 - 3.5 g/dL    A-G Ratio 0.9 g/dL   ESTIMATED GFR    Collection Time: 01/16/21  8:24 AM   Result Value Ref Range    GFR If  22 (A) >60 mL/min/1.73 m 2    GFR If Non  18 (A) >60 mL/min/1.73 m 2   Comp Metabolic Panel    Collection Time: 01/16/21  1:10 PM   Result Value Ref Range    Sodium 129 (L) 135 - 145 mmol/L    Potassium 6.0 (H) 3.6 - 5.5 mmol/L    Chloride 98 96 - 112 mmol/L    Co2 18 (L) 20 - 33 mmol/L    Anion Gap 13.0 7.0 - 16.0    Glucose 173 (H) 65 - 99 mg/dL    Bun 50 (H) 8 - 22 mg/dL    Creatinine 3.92 (H) 0.50 - 1.40 mg/dL    Calcium 7.5 (L) 8.5 - 10.5 mg/dL    AST(SGOT) 40 12 - 45 U/L    ALT(SGPT) 62 (H) 2 - 50 U/L    Alkaline Phosphatase 100 (H) 30 - 99 U/L    Total Bilirubin 0.5 0.1 - 1.5 mg/dL    Albumin 3.5 3.2 - 4.9 g/dL    Total Protein 7.5 6.0 - 8.2 g/dL    Globulin 4.0 (H) 1.9 - 3.5 g/dL    A-G Ratio 0.9 g/dL   proBrain Natriuretic Peptide, NT    Collection Time: 01/16/21  1:10 PM   Result Value Ref Range    NT-proBNP 243 (H) 0 - 125 pg/mL   ESTIMATED GFR    Collection Time: 01/16/21  1:10 PM   Result Value Ref Range    GFR If  19 (A) >60 mL/min/1.73 m 2    GFR If Non  16 (A) >60 mL/min/1.73 m 2   URINALYSIS    Collection Time: 01/16/21  1:36 PM    Specimen: Urine, [place] Cath   Result Value Ref Range    Micro Urine Req Microscopic        Imaging  DX-CHEST-PORTABLE (1 VIEW)   Final Result      Hypoinflation with LEFT mid and lower lung atelectasis, with possible superimposed LEFT basilar pneumonia.      [id number]/O   Final Result      1.  Pancreatic edema with significant peripancreatic stranding and ill-defined fluid, consistent with acute interstitial edematous pancreatitis.      2.  Small amount of ascites.      3.  No choledocholithiasis is identified.      4.  Status post cholecystectomy.       CT-ABDOMEN-PELVIS WITH   Final Result      1.  Inflammatory stranding involving the pancreas with fluid within the anterior pararenal space consistent with pancreatitis.      2.  Fatty liver.      3.  Prior cholecystectomy.      4.  Bibasilar atelectasis/consolidation.      DX-CHEST-PORTABLE (1 VIEW)   Final Result      No evidence of acute cardiopulmonary process.      [id number] VIEW    (Results Pending)   DX-CHEST-FOR LINE PLACEMENT Perform procedure in: Patient's Room    (Results Pending)       Assessment/Plan  * Acute pancreatitis  Assessment & Plan  S/p Monico 7/2020 Dr Skelton  Severe in nature  Etiology benign alcohol hx, s/p monico no gallstone, triglyceride not consistent with it, medication reviewed only + is Valsartan/HTCZ will stop since this is highly likely the etiology  MRCP negative 1/15/2021  Recommend GI consulting He has been seen by Dr Isacc Colon in past  Fluid resus  Serial monitor abdomen  KUB  Abdominal pressure Q shift  May need delta CT scan to look for SBO if suspicion on KUB due to US findings      Obesity (BMI 30-39.9)- (present on admission)  Assessment & Plan  Weight loss as outpatient    Elevated blood sugar- (present on admission)  Assessment & Plan  Serial monitor with sliding scale    HTN (hypertension)- (present on admission)  Assessment & Plan  Hx of on Coreg 25 will reduce due to his acute illness and prn labetalol  Monitor need to adjust    Hypoxia  Assessment & Plan  Related to pancreatis and distention   Decompress abdomen no pocket of ascites seen, may need NG tube  Follow up on CXR and KUB  Supplemental oxygen goal sat > 90%  Shunt physiology from obesity and distended abdomen  Monitor for retention and need for surgery consultation or ventilation    Hypocalcemia  Assessment & Plan  Related to pancreatitis    Hyperkalemia  Assessment & Plan  Serial monitor closely for need for dialysis    Nonischemic cardiomyopathy (HCC)- (present on admission)  Assessment & Plan  Hx of in  2014 EF 25-30% that recovered    Restless leg syndrome- (present on admission)  Assessment & Plan  Hx of    Crohn disease (HCC)- (present on admission)  Assessment & Plan  Hx of     ARF  Assessment & Plan  Maintain euvolemia and monitor fluid responsiveness (avoid NaCL and renal congestion)  MAP > 65 uses pressors or inotropic trial  Monitor urine output and I&O's  Avoid and review nephrotoxin medication  Rule out post obstruction  Consider renal U/S if no renal images  U/a and CPK    Likely septic ATN from sever pancreatitis check abdominal pressure   High likely will need dialysis catheter if no response to lasix, IV bicarb and volume challenge        Discussed patient condition and risk of morbidity and/or mortality with RN, RT, Pharmacy, Patient and nephrology.    The patient remains critically ill from severe pancreatitis and reduction of hyperkalemia with bicarb and lasix IV.  Critical care time = 50 minutes in directly providing and coordinating critical care and extensive data review.  No time overlap and excludes procedures.    Patient was seen during the Covid pandemic with the hospital surging in patient volumes and crisis documentation is implemented.     Jorge Luis Michael MD  Critical Care Medicine

## 2021-01-16 NOTE — PROGRESS NOTES
Received report of patient at start of shift. Patient is AOx4, PRN medications administered for complaints of severe pain. Assessment complete, on 5L O2 via NC. Patient encouraged to use call light for assistance. Safety precautions in place.     Spoke with Dr. Mota during morning rounds at 0800. Informed this MD of elevated potassium, BUN/creatinine, and WBCs. Also informed MD of increased O2 needs. Also informed MD of increasing abdominal distention and that patient has not voided overnight. Bladder scan <100. New orders received. Remote tele monitor placed on patient at 0825, monitor room made aware.

## 2021-01-16 NOTE — ASSESSMENT & PLAN NOTE
- moderate elevation would not explain pancreatitis  - start gemfibrozil  - continue to monitor outpatient basis

## 2021-01-16 NOTE — PROGRESS NOTES
Endocrinology/ Hospitalist Daily Progress Note    Date of Service  1/16/2021    Chief Complaint  55 y.o. male admitted 1/14/2021 with h/o cholecystectomy and Crohn's disease with h/o severe systolic CHF with severe TR and severe MR (?viral in etiology, most recent in ECHO 2018 normal) comes in with pancreatitis.     Hospital Course  54 y/o male with moderate hypertriglyceridemia and previous cholcystectomy admitted for acute pancreatitis on Jan 14 2021.  CT on admission showed inflammatory stranding in the pancreas.      Interval Problem Update  - Afebrile with concerns for tense abdomen, shortness of breath  - WBC higher, patient hasnt urinated in past 8 hours  - bladder scan shows only 73 cc  - patient admits to drinking heavily       Consultants/Specialty  None    Code Status  Full Code    Disposition  TBD    Review of Systems  Review of Systems   Constitutional: Negative for chills and fever.   Respiratory: Positive for shortness of breath. Negative for cough and wheezing.    Cardiovascular: Negative for chest pain and palpitations.   Gastrointestinal: Positive for abdominal pain. Negative for nausea and vomiting.   Genitourinary: Negative for dysuria and urgency.        Physical Exam  Temp:  [36.1 °C (97 °F)-36.4 °C (97.6 °F)] 36.1 °C (97 °F)  Pulse:  [] 102  Resp:  [15-18] 18  BP: ()/(75-91) 115/78  SpO2:  [90 %-94 %] 93 %    Physical Exam  Constitutional:       Appearance: Normal appearance. He is obese.   HENT:      Head: Normocephalic and atraumatic.      Mouth/Throat:      Mouth: Mucous membranes are moist.      Pharynx: Oropharynx is clear.   Eyes:      Extraocular Movements: Extraocular movements intact.      Conjunctiva/sclera: Conjunctivae normal.      Pupils: Pupils are equal, round, and reactive to light.   Cardiovascular:      Rate and Rhythm: Normal rate and regular rhythm.      Pulses: Normal pulses.      Heart sounds: Normal heart sounds.   Pulmonary:      Effort: Pulmonary effort is  normal.      Breath sounds: Normal breath sounds.   Abdominal:      General: Bowel sounds are normal. There is distension.      Palpations: Abdomen is soft. There is no mass.      Tenderness: There is abdominal tenderness. There is no guarding.   Skin:     General: Skin is warm and dry.      Capillary Refill: Capillary refill takes less than 2 seconds.   Neurological:      General: No focal deficit present.      Mental Status: He is alert and oriented to person, place, and time. Mental status is at baseline.         Fluids    Intake/Output Summary (Last 24 hours) at 1/16/2021 0922  Last data filed at 1/16/2021 0828  Gross per 24 hour   Intake 1287.5 ml   Output 400 ml   Net 887.5 ml       Laboratory  Recent Labs     01/14/21  1850 01/16/21  0345   WBC 10.4 19.2*   RBC 4.97 5.36   HEMOGLOBIN 14.8 16.5   HEMATOCRIT 44.9 51.8   MCV 90.3 96.6   MCH 29.8 30.8   MCHC 33.0* 31.9*   RDW 39.4 44.9   PLATELETCT 273 241   MPV 10.8 11.0     Recent Labs     01/15/21  1259 01/16/21  0345 01/16/21  0824   SODIUM 135 131* 129*   POTASSIUM 5.0 6.0* 6.1*   CHLORIDE 98 99 97   CO2 24 21 21   GLUCOSE 173* 155* 147*   BUN 28* 42* 48*   CREATININE 1.64* 3.36* 3.54*   CALCIUM 8.2* 7.3* 7.0*             Recent Labs     01/14/21  1850   TRIGLYCERIDE 571*   HDL 46   LDL see below       Imaging      Assessment/Plan  * Acute pancreatitis  Assessment & Plan  -due to ETOH, patient admits to drinking 5-8 beers daily  -possible third spacing fluids now on 48 hrs post admission due to tense abdomen  - reduce IV fluid from 150 to 75  - IV lasix now  -continue IV dilaudid 0.5 mg Q3 hours PRN  - he has severe pancreatitis based on [place] criteria will consult ICU team and Nephrology    Elevated blood sugar- (present on admission)  Assessment & Plan  - stress hyperglycemia  - patient doesn't have diabetes based on normal a1c  - continue SSI    Hypocalcemia  Assessment & Plan  - calcium gluconate IV push now  - repeat labs today  - telemetry      Hyperkalemia  Assessment & Plan  -kayelate now  -tele now  -repeat labs today     Hypertriglyceridemia- (present on admission)  Assessment & Plan  - moderate elevation would not explain pancreatitis  - start gemfibrozil  - continue to monitor outpatient basis    ARF  Assessment & Plan  - Unstable, creatinine rising with severe hyperkalemia  - will consult Nephrology       VTE prophylaxis:heparin

## 2021-01-16 NOTE — CONSULTS
[facility] -  CONSULTATION NOTE               Author: Zay Perry M.D. Date & Time: 1/16/2021  12:26 PM       REASON FOR CONSULTATION:   Acute Renal Failure    CHIEF COMPLAINT:   abd pain     HISTORY OF PRESENT ILLNESS:    55 y.o. male who presented 1/14/2021 with abdominal pain and emesis, found to have acute pancreatitis with ONOFRE prompting renal consult.    Upon admission, CT abdomen showing inflammatory stranding involving pancreas with fluid within the anterior pararenal space consistent with pancreatitis.  Cr 1.1 on admission, worsened to 3.5 this AM. Oliguric overnight and worsening hyperkalemia despite NS at 75cc/hr. ~200cc UOP after lasix 20mg. Ongoing severe abd pain and worsening abd distention. Worsening shortness or breath and subjective fevers. Being transferred to ICU.     REVIEW OF SYSTEMS:    General: +malaise  CV: No chest pain  RESP: +shortness of breath  GI: +abdominal pain   : No dysuria or gross hematuria  MSK: No trauma  Skin: No rashes  Neuro: No tremors  Psych: No depression    PAST MEDICAL HISTORY:   Past Medical History:   Diagnosis Date   • Clotting disorder (HCC)     PE   • Congestive heart failure (HCC) 7/2015    EF 30-35%; Dilated R atrium; LVH; Mild MR; Mild AI; Mild TR   • Crohn disease (HCC)    • Hypertension    • PAF (paroxysmal atrial fibrillation) (HCC)    • Sleep apnea     un-diagnosed and pending sleep study       PAST SURGICAL HISTORY:      Past Surgical History:   Procedure Laterality Date   • CRYSTAL BY LAPAROSCOPY  7/29/2020    Procedure: CHOLECYSTECTOMY, LAPAROSCOPIC;  Surgeon: Caroline Skelton M.D.;  Location: SURGERY [facility];  Service: General       FAMILY HISTORY:   No family history of renal disease    SOCIAL HISTORY:   No tobacco, No EtOH, No illicits    HOME MEDICATIONS:   No current facility-administered medications on file prior to encounter.      Current Outpatient Medications on File Prior to Encounter   Medication Sig Dispense  "Refill   • ibuprofen (MOTRIN) 600 MG Tab Take 600 mg by mouth every 6 hours as needed.     • ROPINIRole (REQUIP) 0.5 MG Tab Take 4 tablets po qhs 120 Tab 3   • hydrOXYzine HCl (ATARAX) 25 MG Tab Take 1 Tab by mouth every bedtime. 90 Tab 0   • ROPINIRole (REQUIP) 0.5 MG Tab TAKE 2 TABLETS BY MOUTH THREE TIMES DAILY (Patient not taking: Reported on 1/14/2021) 90 Tab 0   • spironolactone (ALDACTONE) 25 MG Tab Take 1 Tab by mouth every bedtime. 30 Tab 11   • carvedilol (COREG) 25 MG Tab Take 25 mg by mouth every evening.     • furosemide (LASIX) 40 MG Tab Take 40 mg by mouth every morning.     • sacubitril-valsartan (ENTRESTO) 49-51 MG Tab tablet Take 1 Tab by mouth 2 Times a Day. 60 Tab 11       ALLERGIES:  Patient has no known allergies.    PHYSICAL EXAM:  VS:  /95   Pulse 98   Temp 36.3 °C (97.4 °F) (Temporal)   Resp 16   Ht 1.753 m (5' 9\")   Wt 104.3 kg (230 lb)   SpO2 93%   BMI 33.97 kg/m²   GENERAL: mild distress  CV: tachycardic  RESP:labored, decreased bases  GI: distended, firm  MSK: No joint deformities   SKIN: No concerning rashes  NEURO: AOx3  PSYCH: Cooperative    LABS:  Recent Results (from the past 24 hour(s))   Comp Metabolic Panel    Collection Time: 01/15/21 12:59 PM   Result Value Ref Range    Sodium 135 135 - 145 mmol/L    Potassium 5.0 3.6 - 5.5 mmol/L    Chloride 98 96 - 112 mmol/L    Co2 24 20 - 33 mmol/L    Anion Gap 13.0 7.0 - 16.0    Glucose 173 (H) 65 - 99 mg/dL    Bun 28 (H) 8 - 22 mg/dL    Creatinine 1.64 (H) 0.50 - 1.40 mg/dL    Calcium 8.2 (L) 8.5 - 10.5 mg/dL    AST(SGOT) 82 (H) 12 - 45 U/L    ALT(SGPT) 122 (H) 2 - 50 U/L    Alkaline Phosphatase 119 (H) 30 - 99 U/L    Total Bilirubin 0.7 0.1 - 1.5 mg/dL    Albumin 4.1 3.2 - 4.9 g/dL    Total Protein 7.6 6.0 - 8.2 g/dL    Globulin 3.5 1.9 - 3.5 g/dL    A-G Ratio 1.2 g/dL   HEMOGLOBIN A1C    Collection Time: 01/15/21 12:59 PM   Result Value Ref Range    Glycohemoglobin 5.5 0.0 - 5.6 %    Est Avg Glucose 111 mg/dL   ESTIMATED " GFR    Collection Time: 01/15/21 12:59 PM   Result Value Ref Range    GFR If  53 (A) >60 mL/min/1.73 m 2    GFR If Non  44 (A) >60 mL/min/1.73 m 2   ACCU-CHEK GLUCOSE    Collection Time: 01/15/21  1:17 PM   Result Value Ref Range    Glucose - Accu-Ck 181 (H) 65 - 99 mg/dL   ACCU-CHEK GLUCOSE    Collection Time: 01/15/21  7:14 PM   Result Value Ref Range    Glucose - Accu-Ck 184 (H) 65 - 99 mg/dL   ACCU-CHEK GLUCOSE    Collection Time: 01/15/21 11:44 PM   Result Value Ref Range    Glucose - Accu-Ck 166 (H) 65 - 99 mg/dL   CBC WITH DIFFERENTIAL    Collection Time: 01/16/21  3:45 AM   Result Value Ref Range    WBC 19.2 (H) 4.8 - 10.8 K/uL    RBC 5.36 4.70 - 6.10 M/uL    Hemoglobin 16.5 14.0 - 18.0 g/dL    Hematocrit 51.8 42.0 - 52.0 %    MCV 96.6 81.4 - 97.8 fL    MCH 30.8 27.0 - 33.0 pg    MCHC 31.9 (L) 33.7 - 35.3 g/dL    RDW 44.9 35.9 - 50.0 fL    Platelet Count 241 164 - 446 K/uL    MPV 11.0 9.0 - 12.9 fL    Neutrophils-Polys 85.20 (H) 44.00 - 72.00 %    Lymphocytes 3.50 (L) 22.00 - 41.00 %    Monocytes 4.30 0.00 - 13.40 %    Eosinophils 0.00 0.00 - 6.90 %    Basophils 0.00 0.00 - 1.80 %    Nucleated RBC 0.00 /100 WBC    Neutrophils (Absolute) 17.36 (H) 1.82 - 7.42 K/uL    Lymphs (Absolute) 0.67 (L) 1.00 - 4.80 K/uL    Monos (Absolute) 0.83 0.00 - 0.85 K/uL    Eos (Absolute) 0.00 0.00 - 0.51 K/uL    Baso (Absolute) 0.00 0.00 - 0.12 K/uL    NRBC (Absolute) 0.00 K/uL   Comp Metabolic Panel    Collection Time: 01/16/21  3:45 AM   Result Value Ref Range    Sodium 131 (L) 135 - 145 mmol/L    Potassium 6.0 (H) 3.6 - 5.5 mmol/L    Chloride 99 96 - 112 mmol/L    Co2 21 20 - 33 mmol/L    Anion Gap 11.0 7.0 - 16.0    Glucose 155 (H) 65 - 99 mg/dL    Bun 42 (H) 8 - 22 mg/dL    Creatinine 3.36 (H) 0.50 - 1.40 mg/dL    Calcium 7.3 (L) 8.5 - 10.5 mg/dL    AST(SGOT) 44 12 - 45 U/L    ALT(SGPT) 76 (H) 2 - 50 U/L    Alkaline Phosphatase 94 30 - 99 U/L    Total Bilirubin 0.6 0.1 - 1.5 mg/dL    Albumin  3.5 3.2 - 4.9 g/dL    Total Protein 6.9 6.0 - 8.2 g/dL    Globulin 3.4 1.9 - 3.5 g/dL    A-G Ratio 1.0 g/dL   MAGNESIUM    Collection Time: 01/16/21  3:45 AM   Result Value Ref Range    Magnesium 1.9 1.5 - 2.5 mg/dL   DIFFERENTIAL MANUAL    Collection Time: 01/16/21  3:45 AM   Result Value Ref Range    Bands-Stabs 5.20 0.00 - 10.00 %    Metamyelocytes 1.70 %    Manual Diff Status PERFORMED    PERIPHERAL SMEAR REVIEW    Collection Time: 01/16/21  3:45 AM   Result Value Ref Range    Peripheral Smear Review see below    PLATELET ESTIMATE    Collection Time: 01/16/21  3:45 AM   Result Value Ref Range    Plt Estimation Normal    MORPHOLOGY    Collection Time: 01/16/21  3:45 AM   Result Value Ref Range    RBC Morphology Normal    ESTIMATED GFR    Collection Time: 01/16/21  3:45 AM   Result Value Ref Range    GFR If  23 (A) >60 mL/min/1.73 m 2    GFR If Non  19 (A) >60 mL/min/1.73 m 2   ACCU-CHEK GLUCOSE    Collection Time: 01/16/21  5:04 AM   Result Value Ref Range    Glucose - Accu-Ck 160 (H) 65 - 99 mg/dL   AMYLASE    Collection Time: 01/16/21  8:24 AM   Result Value Ref Range    Amylase 827 (H) 20 - 103 U/L   LIPASE    Collection Time: 01/16/21  8:24 AM   Result Value Ref Range    Lipase 860 (H) 11 - 82 U/L   Comp Metabolic Panel    Collection Time: 01/16/21  8:24 AM   Result Value Ref Range    Sodium 129 (L) 135 - 145 mmol/L    Potassium 6.1 (H) 3.6 - 5.5 mmol/L    Chloride 97 96 - 112 mmol/L    Co2 21 20 - 33 mmol/L    Anion Gap 11.0 7.0 - 16.0    Glucose 147 (H) 65 - 99 mg/dL    Bun 48 (H) 8 - 22 mg/dL    Creatinine 3.54 (H) 0.50 - 1.40 mg/dL    Calcium 7.0 (L) 8.5 - 10.5 mg/dL    AST(SGOT) 38 12 - 45 U/L    ALT(SGPT) 62 (H) 2 - 50 U/L    Alkaline Phosphatase 98 30 - 99 U/L    Total Bilirubin 0.5 0.1 - 1.5 mg/dL    Albumin 3.5 3.2 - 4.9 g/dL    Total Protein 7.2 6.0 - 8.2 g/dL    Globulin 3.7 (H) 1.9 - 3.5 g/dL    A-G Ratio 0.9 g/dL   ESTIMATED GFR    Collection Time: 01/16/21  8:24  AM   Result Value Ref Range    GFR If  22 (A) >60 mL/min/1.73 m 2    GFR If Non  18 (A) >60 mL/min/1.73 m 2       (click the triangle to expand results)    IMAGING:  [id number]   Final Result      1.  Pancreatic edema with significant peripancreatic stranding and ill-defined fluid, consistent with acute interstitial edematous pancreatitis.      2.  Small amount of ascites.      3.  No choledocholithiasis is identified.      4.  Status post cholecystectomy.      CT-ABDOMEN-PELVIS WITH   Final Result      1.  Inflammatory stranding involving the pancreas with fluid within the anterior pararenal space consistent with pancreatitis.      2.  Fatty liver.      3.  Prior cholecystectomy.      4.  Bibasilar atelectasis/consolidation.      DX-CHEST-PORTABLE (1 VIEW)   Final Result      No evidence of acute cardiopulmonary process.          ASSESSMENT:  # Oliguric ONOFRE: In setting of pancreatitis and contrast. Some concern for compartment syndrome   # Hyperkalemia  # Pancreatitis  # Hypoxic rasp failure  # HX  Non-ischemic cardiomyopathy  # Hyponatremia  # Crohns     PLAN:  -plan for albumin challenge and dose of lasix 80mg IV unless abd us/IV eval dictates otherwise  -jorgensen and bladder pressure  -UA  -repeat labs now  -holding on veltassa  -If worsening hyperkalemia and oliguric, may need to place vasc cath and proceed with HD later, pt agrees.   -Strict I/Os    Thank you for this consult, we will continue to follow.    Zay Perry MD

## 2021-01-16 NOTE — PROGRESS NOTES
2 RN Skin Check    2 RN skin check complete.   Devices in place: oxymask.  Skin assessed under devices: YES  Confirmed pressure ulcers found on:N/A  New potential pressure ulcers noted on: N/A  Wound consult placed NO, N/A  The following interventions in place : patient turned, waffle overlay mattress on bed, pillows to support heels and elevated extremities, jorgensen cath inserted.

## 2021-01-16 NOTE — PROGRESS NOTES
RN asked rapid response team to round on patient as he is currently requiring 4L of O2, HR >100, abdomen is distended, firm, and tender, and he has had little urine output all day despite receiving 150ml/hr LR. He has a history of CHF, and reports taking lasix PRN when he feels fluid overloaded. His lungs do not have crackles. Pt skin is cool but dry, and temporal temp is WNL. RRT felt it appropriate to pause IV fluids until hospitalist was contacted, and pt was added to RRT rounds list. MD Frye was notified of pt updates and IV fluids were restarted at 150ml/hr.

## 2021-01-16 NOTE — PROCEDURES
Central Line Insertion    Date/Time: 1/16/2021 3:04 PM  Performed by: Brandon Campa M.D.  Authorized by: Brandon Campa M.D.     Consent:     Consent obtained:  Written    Consent given by:  Patient    Alternatives discussed:  No treatment  Pre-procedure details:     Hand hygiene: Hand hygiene performed prior to insertion      Sterile barrier technique: All elements of maximal sterile technique followed      Skin preparation:  2% chlorhexidine    Skin preparation agent: Skin preparation agent completely dried prior to procedure    Sedation:     Sedation type:  None  Anesthesia:     Anesthesia method:  Local infiltration    Local anesthetic:  Lidocaine 1% w/o epi  Procedure details:     Location:  R internal jugular    Patient position:  Trendelenburg    Procedural supplies:  Triple lumen    Catheter size: 13 Fr.    Landmarks identified: yes      Ultrasound guidance: yes      Sterile ultrasound techniques: Sterile gel and sterile probe covers were used      Number of attempts:  1    Successful placement: yes    Post-procedure details:     Post-procedure:  Dressing applied and line sutured    Assessment:  Blood return through all ports    Patient tolerance of procedure:  Tolerated well, no immediate complications

## 2021-01-16 NOTE — ASSESSMENT & PLAN NOTE
-due to ETOH, patient admits to drinking 5-8 beers daily  -possible third spacing fluids now on 48 hrs post admission due to tense abdomen  - reduce IV fluid from 150 to 75  - IV lasix now  -continue IV dilaudid 0.5 mg Q3 hours PRN  - CXR now  - he has severe pancreatitis based on Ferry criteria will consult ICU team and Nephrology

## 2021-01-16 NOTE — PROGRESS NOTES
Patient has not voided since beginning of shift, GSU bladder scanner not reading, traction called to get accurate bladder scan.

## 2021-01-16 NOTE — PROGRESS NOTES
Bedside report received.  Assessment completed.  A&O x 4.   On 4L O2. O2 sats in low 90%s. Reports mild to no SOB.  SBP trending down since 0000 on 1/15, IV lasix given at 2200.  Denies N/V and new numbness or tingling.  Patient has not gotten out of bed due to pain. He did sit at the edge of the bed once. Bed alarm n/a.   Pain managed with prescribed medications. Dilaudid 0.5mg q3  Tolerating NPO diet.  + void, although minimal, - flatus, last BM 1/14.  Surgical incisions: n/a  Abdomen is distended and firm, pain reports constant diffuse pain. Heart rate remained in low 100s overnight.    Reviewed plan with of care with patient. Call light and personal belongings with in reach. Hourly rounding in place. All needs met at this time.

## 2021-01-17 PROBLEM — E87.1 HYPONATREMIA: Status: ACTIVE | Noted: 2021-01-01

## 2021-01-17 PROBLEM — J96.01 ACUTE HYPOXEMIC RESPIRATORY FAILURE (HCC): Status: ACTIVE | Noted: 2021-01-01

## 2021-01-17 PROBLEM — E83.42 HYPOMAGNESEMIA: Status: ACTIVE | Noted: 2021-01-01

## 2021-01-17 NOTE — ASSESSMENT & PLAN NOTE
The patient's Cr has been worsening and improving since admission (with baseline Cr <1) which initially may have been from hypotension, vasoconstiction, and direct nephrotoxic damage from his acute pancreatitis. However, on 1/30 he patient was febrile, hypotensive, and septic which likely precipitated his current kidney failure. The patient had a HD catheter placed on 1/31 and initiated dialysis.     Plan:  - Continue HD per Nephrology  - Trial of Lasix 80 BID to improve UOP. Will continue.  - Continue Hudson catheter for urinary retention.  - Continue to maintain blood pressure/adequate perfusion  - Avoid any nephrotoxic medications and renally dose all medications  - Continue to monitor CMP to see if this improves

## 2021-01-17 NOTE — PROGRESS NOTES
[]Hide copied text    []Hover for details   Abdominal pressure reading was 32 at 0000. Multiple reading was taken about the same as before.

## 2021-01-17 NOTE — PROGRESS NOTES
"[facility] -  PROGRESS NOTE               Author: Zay Perry M.D. Date & Time: 1/17/2021  8:44 AM     HPI:  55 y.o. male who presented 1/14/2021 with abdominal pain and emesis, found to have acute pancreatitis with ONOFRE prompting renal consult.     Upon admission, CT abdomen showing inflammatory stranding involving pancreas with fluid within the anterior pararenal space consistent with pancreatitis.  Cr 1.1 on admission, worsened to 3.5 this AM. Oliguric overnight and worsening hyperkalemia despite NS at 75cc/hr. ~200cc UOP after lasix 20mg. Ongoing severe abd pain and worsening abd distention. Worsening shortness or breath and subjective fevers. Being transferred to ICU.    DAILY NEPHROLOGY SUMMARY:  1/16: consult done  1/17: 390cc UOP documented, found to have SBO-NG tube placed, cr stable, abd pain improving, no fluids running     PAST FAMILY HISTORY: Reviewed and Unchanged  SOCIAL HISTORY: Reviewed and Unchanged  CURRENT MEDICATIONS: Reviewed  IMAGING STUDIES: Reviewed    ROS  General: +malaise  CV: No chest pain  RESP: +shortness of breath  GI: +abdominal pain   : No dysuria or gross hematuria  MSK: No trauma  Skin: No rashes  Neuro: No tremors  Psych: No depression    PHYSICAL EXAM  VS:  /91   Pulse (!) 114   Temp 36.4 °C (97.6 °F) (Temporal)   Resp 19   Ht 1.727 m (5' 8\")   Wt 107.2 kg (236 lb 5.3 oz)   SpO2 97%   BMI 35.93 kg/m²   GENERAL: mild distress  CV: tachycardic  RESP: non-labored  GI: Tender  MSK: No joint deformities   SKIN: No concerning rashes  NEURO: AOx3  PSYCH: Cooperative    Fluids:  In: 1000   Out: 390     LABS:  Recent Results (from the past 24 hour(s))   Comp Metabolic Panel    Collection Time: 01/16/21  1:10 PM   Result Value Ref Range    Sodium 129 (L) 135 - 145 mmol/L    Potassium 6.0 (H) 3.6 - 5.5 mmol/L    Chloride 98 96 - 112 mmol/L    Co2 18 (L) 20 - 33 mmol/L    Anion Gap 13.0 7.0 - 16.0    Glucose 173 (H) 65 - 99 mg/dL    Bun 50 (H) 8 - " 22 mg/dL    Creatinine 3.92 (H) 0.50 - 1.40 mg/dL    Calcium 7.5 (L) 8.5 - 10.5 mg/dL    AST(SGOT) 40 12 - 45 U/L    ALT(SGPT) 62 (H) 2 - 50 U/L    Alkaline Phosphatase 100 (H) 30 - 99 U/L    Total Bilirubin 0.5 0.1 - 1.5 mg/dL    Albumin 3.5 3.2 - 4.9 g/dL    Total Protein 7.5 6.0 - 8.2 g/dL    Globulin 4.0 (H) 1.9 - 3.5 g/dL    A-G Ratio 0.9 g/dL   proBrain Natriuretic Peptide, NT    Collection Time: 01/16/21  1:10 PM   Result Value Ref Range    NT-proBNP 243 (H) 0 - 125 pg/mL   ESTIMATED GFR    Collection Time: 01/16/21  1:10 PM   Result Value Ref Range    GFR If  19 (A) >60 mL/min/1.73 m 2    GFR If Non  16 (A) >60 mL/min/1.73 m 2   URINALYSIS    Collection Time: 01/16/21  1:36 PM    Specimen: Urine, Hudson Cath   Result Value Ref Range    Color DK Yellow     Character Turbid (A)     Specific Gravity 1.021 <1.035    Ph 5.0 5.0 - 8.0    Glucose Negative Negative mg/dL    Ketones Negative Negative mg/dL    Protein 100 (A) Negative mg/dL    Bilirubin Negative Negative    Urobilinogen, Urine 0.2 Negative    Nitrite Negative Negative    Leukocyte Esterase Negative Negative    Occult Blood Trace (A) Negative    Micro Urine Req Microscopic    URINE SODIUM RANDOM    Collection Time: 01/16/21  1:36 PM   Result Value Ref Range    Sodium, Urine -per volume 23 mmol/L   URINE CREATININE RANDOM    Collection Time: 01/16/21  1:36 PM   Result Value Ref Range    Creatinine, Random Urine 274.12 mg/dL   URINE MICROSCOPIC (W/UA)    Collection Time: 01/16/21  1:36 PM   Result Value Ref Range    WBC 5-10 (A) /hpf    RBC 0-2 (A) /hpf    Bacteria Negative None /hpf    Epithelial Cells Few /hpf    Epithelial Cells Renal Few /hpf    Amorphous Crystal Present /hpf    Hyaline Cast 3-5 (A) /lpf    Granular Casts 6-10 (A) /lpf   BLOOD CULTURE    Collection Time: 01/16/21  3:36 PM    Specimen: Line; Blood   Result Value Ref Range    Significant Indicator NEG     Source BLD     Site Central Line     Culture  Result       No Growth  Note: Blood cultures are incubated for 5 days and  are monitored continuously.Positive blood cultures  are called to the RN and reported as soon as  they are identified.     ACCU-CHEK GLUCOSE    Collection Time: 01/16/21  5:44 PM   Result Value Ref Range    Glucose - Accu-Ck 150 (H) 65 - 99 mg/dL   BLOOD CULTURE    Collection Time: 01/16/21  5:59 PM    Specimen: Peripheral; Blood   Result Value Ref Range    Significant Indicator NEG     Source BLD     Site PERIPHERAL     Culture Result       No Growth  Note: Blood cultures are incubated for 5 days and  are monitored continuously.Positive blood cultures  are called to the RN and reported as soon as  they are identified.     Basic Metabolic Panel    Collection Time: 01/16/21  7:30 PM   Result Value Ref Range    Sodium 132 (L) 135 - 145 mmol/L    Potassium 5.1 3.6 - 5.5 mmol/L    Chloride 97 96 - 112 mmol/L    Co2 23 20 - 33 mmol/L    Glucose 145 (H) 65 - 99 mg/dL    Bun 56 (H) 8 - 22 mg/dL    Creatinine 3.61 (H) 0.50 - 1.40 mg/dL    Calcium 7.0 (L) 8.5 - 10.5 mg/dL    Anion Gap 12.0 7.0 - 16.0   ESTIMATED GFR    Collection Time: 01/16/21  7:30 PM   Result Value Ref Range    GFR If  21 (A) >60 mL/min/1.73 m 2    GFR If Non  18 (A) >60 mL/min/1.73 m 2   ACCU-CHEK GLUCOSE    Collection Time: 01/16/21 10:53 PM   Result Value Ref Range    Glucose - Accu-Ck 119 (H) 65 - 99 mg/dL   Basic Metabolic Panel    Collection Time: 01/16/21 11:25 PM   Result Value Ref Range    Sodium 131 (L) 135 - 145 mmol/L    Potassium 5.3 3.6 - 5.5 mmol/L    Chloride 97 96 - 112 mmol/L    Co2 23 20 - 33 mmol/L    Glucose 150 (H) 65 - 99 mg/dL    Bun 58 (H) 8 - 22 mg/dL    Creatinine 3.90 (H) 0.50 - 1.40 mg/dL    Calcium 6.8 (LL) 8.5 - 10.5 mg/dL    Anion Gap 11.0 7.0 - 16.0   LACTIC ACID    Collection Time: 01/16/21 11:25 PM   Result Value Ref Range    Lactic Acid 1.2 0.5 - 2.0 mmol/L   ESTIMATED GFR    Collection Time: 01/16/21 11:25 PM    Result Value Ref Range    GFR If  19 (A) >60 mL/min/1.73 m 2    GFR If Non  16 (A) >60 mL/min/1.73 m 2   CBC WITH DIFFERENTIAL    Collection Time: 01/17/21  6:00 AM   Result Value Ref Range    WBC 6.9 4.8 - 10.8 K/uL    RBC 4.90 4.70 - 6.10 M/uL    Hemoglobin 15.1 14.0 - 18.0 g/dL    Hematocrit 47.0 42.0 - 52.0 %    MCV 95.9 81.4 - 97.8 fL    MCH 30.8 27.0 - 33.0 pg    MCHC 32.1 (L) 33.7 - 35.3 g/dL    RDW 44.8 35.9 - 50.0 fL    Platelet Count 211 164 - 446 K/uL    MPV 11.2 9.0 - 12.9 fL    Neutrophils-Polys 70.40 44.00 - 72.00 %    Lymphocytes 6.10 (L) 22.00 - 41.00 %    Monocytes 7.80 0.00 - 13.40 %    Eosinophils 0.00 0.00 - 6.90 %    Basophils 0.00 0.00 - 1.80 %    Nucleated RBC 0.00 /100 WBC    Neutrophils (Absolute) 5.70 1.82 - 7.42 K/uL    Lymphs (Absolute) 0.42 (L) 1.00 - 4.80 K/uL    Monos (Absolute) 0.54 0.00 - 0.85 K/uL    Eos (Absolute) 0.00 0.00 - 0.51 K/uL    Baso (Absolute) 0.00 0.00 - 0.12 K/uL    NRBC (Absolute) 0.00 K/uL   Comp Metabolic Panel    Collection Time: 01/17/21  6:00 AM   Result Value Ref Range    Sodium 132 (L) 135 - 145 mmol/L    Potassium 4.8 3.6 - 5.5 mmol/L    Chloride 97 96 - 112 mmol/L    Co2 23 20 - 33 mmol/L    Anion Gap 12.0 7.0 - 16.0    Glucose 148 (H) 65 - 99 mg/dL    Bun 62 (H) 8 - 22 mg/dL    Creatinine 3.84 (H) 0.50 - 1.40 mg/dL    Calcium 7.6 (L) 8.5 - 10.5 mg/dL    AST(SGOT) 28 12 - 45 U/L    ALT(SGPT) 39 2 - 50 U/L    Alkaline Phosphatase 83 30 - 99 U/L    Total Bilirubin 0.5 0.1 - 1.5 mg/dL    Albumin 3.1 (L) 3.2 - 4.9 g/dL    Total Protein 6.9 6.0 - 8.2 g/dL    Globulin 3.8 (H) 1.9 - 3.5 g/dL    A-G Ratio 0.8 g/dL   MAGNESIUM    Collection Time: 01/17/21  6:00 AM   Result Value Ref Range    Magnesium 1.8 1.5 - 2.5 mg/dL   PHOSPHORUS    Collection Time: 01/17/21  6:00 AM   Result Value Ref Range    Phosphorus 5.4 (H) 2.5 - 4.5 mg/dL   LIPASE    Collection Time: 01/17/21  6:00 AM   Result Value Ref Range    Lipase 375 (H) 11 - 82 U/L    LACTIC ACID    Collection Time: 01/17/21  6:00 AM   Result Value Ref Range    Lactic Acid 1.8 0.5 - 2.0 mmol/L   IONIZED CALCIUM    Collection Time: 01/17/21  6:00 AM   Result Value Ref Range    Ionized Calcium 0.9 (L) 1.1 - 1.3 mmol/L   DIFFERENTIAL MANUAL    Collection Time: 01/17/21  6:00 AM   Result Value Ref Range    Bands-Stabs 12.20 (H) 0.00 - 10.00 %    Metamyelocytes 3.50 %    Manual Diff Status PERFORMED    PERIPHERAL SMEAR REVIEW    Collection Time: 01/17/21  6:00 AM   Result Value Ref Range    Peripheral Smear Review see below    ESTIMATED GFR    Collection Time: 01/17/21  6:00 AM   Result Value Ref Range    GFR If  20 (A) >60 mL/min/1.73 m 2    GFR If Non  16 (A) >60 mL/min/1.73 m 2   ACCU-CHEK GLUCOSE    Collection Time: 01/17/21  6:01 AM   Result Value Ref Range    Glucose - Accu-Ck 149 (H) 65 - 99 mg/dL       (click the triangle to expand results)      ASSESSMENT:  # Oliguric ONOFRE: In setting of pancreatitis and contrast. Some concern for compartment syndrome   # Hyperkalemia: resolved   # Pancreatitis  # Hypoxic rasp failure  # HX  Non-ischemic cardiomyopathy  # Hyponatremia: mild   # Hypocalcemia   # Crohns        PLAN:  -no role for RRT  -start IVFs at 75cc/hr   -Strict I/Os  -replace calcium   -daily renal function panel     Thank you,     Zay Perry MD  [facility]  [phone]

## 2021-01-17 NOTE — PROGRESS NOTES
Informed Dr. Michael that Abdominal pressure reading was 31 at 1622. Multiple reading was taken and the pressure was in the 30s - about the same

## 2021-01-17 NOTE — ASSESSMENT & PLAN NOTE
CTM, resolved  -now developing mild hypernatremia, need to watch closely given IVF's with NS initiated, could worsen mental status

## 2021-01-17 NOTE — PROGRESS NOTES
Xray concerning for SBO like I saw on bedside abdominal US will order non contrast CT Abdomen pelvis likely will need NG tube no need for oral contrast as likely etiology is pancreatitis and can do small bowel follow through later if patient doesn't improve with decompression also will order serial lactate.     Jorge Luis Michael MD  Critical Care Medicine

## 2021-01-17 NOTE — PROGRESS NOTES
Informed Dr. Michael that Abdominal pressure reading was 29+ at 2000. Multiple reading was taken about the same as before.

## 2021-01-17 NOTE — CONSULTS
Gastroenterology Consult Note:    Ko Turner M.D.  Date & Time note created:    1/17/2021   6:32 AM     Referring MD:  Dr. Levar Tapia    Patient ID:  Name:             Shaka Riley   YOB: 1965  Age:                 55 y.o.  male   MRN:               [id number]                                                             Reason for Consult:      Acute pancreatitis    History of Present Illness:      Shaka Riley, age 55, was hospitalized 1/14 with severe pancreatitis.  He had laparoscopic cholecystectomy in 7/2020 for acute cholecystitis due to gallstones.       At noon on 1/14/2021 he developed severe epigastric and RUQ pain, came to the ER and was hospitalized with acute pancreatitis.  Because of the severity of the pancreatitis, he was transferred to the ICU on 1/16.     Initial lipase was greater than 3000 on 1/14, and declined to 860 on 1/16.  Initial LT: [phone], and quickly declined.    Labs on 1/16  CBC: WBC 19.2, Hgb 16.5  CMP: BUN 50, Cr 3.9, Na 129, K 6.0, glu 173, LT: 100-40-62-0.5, lactate 1.2  Lipase: Over 3000 on 1/14, and 860 on 1/16.  INR: 1.0, PTT 26    IMAGING  APCT 1/16: Small bilateral pleural effusions.  Changes of acute pancreatitis.  Mildly thick gastric wall adjacent to the pancreatic tail, likely reactive.  Diffusely dilated small bowel and proximal colon, suspect ileus.  Absent gallbladder.    MRCP 1/15: Normal biliary tree.  Absent gallbladder.  Changes of pancreatitis.  No pancreatic ductal dilatation.  Small ascites.  Small LUQ fluid collections adjacent to the spleen.    Medications  Heparin 5000 units subcutaneously every 8 hours  Coreg, insulin  Dilaudid as needed      Assessment:  Acute pancreatitis     The quick development of pain, initial lipase over 3000 and initial elevated liver test suggest gallstone pancreatitis, but alcoholic pancreatitis is possible.  If gallstone pancreatitis is the cause, then the negative MRCP and quick  improvement in liver tests suggests the CBD stone has passed.     Continue supportive treatment.    Stress ulcer prophylaxis  PPI treatment is recommended.    Acute renal failure due to pancreatitis    Covid test: Negative on 1/14    History of Crohn's disease  Nonischemic cardiomyopathy with LVEF 30% in 2015, with recovery.  Mild AI.  Mild MR.  Paroxysmal atrial fibrillation.  History of pulmonary embolism  Hypertension  Sleep apnea      Plan:   PPI treatment for stress ulcer prophylaxis  Continue conservative treatment for the acute pancreatitis.  Abstain from alcohol: We discussed this issue, and he is committed to abstain.    GI will sign off/standby for now.  Please call GI consultants if additional issues arise.  Phone [phone].  Thank you again for this consult.    Labs:  Recent Labs     01/14/21 1850 01/16/21  0345   WBC 10.4 19.2*   RBC 4.97 5.36   HEMOGLOBIN 14.8 16.5   HEMATOCRIT 44.9 51.8   MCV 90.3 96.6   MCH 29.8 30.8   MCHC 33.0* 31.9*   RDW 39.4 44.9   PLATELETCT 273 241   MPV 10.8 11.0       review  Recent Labs     01/16/21  1310 01/16/21  1930 01/16/21  2325   SODIUM 129* 132* 131*   POTASSIUM 6.0* 5.1 5.3   CHLORIDE 98 97 97   CO2 18* 23 23   GLUCOSE 173* 145* 150*   BUN 50* 56* 58*   CREATININE 3.92* 3.61* 3.90*   CALCIUM 7.5* 7.0* 6.8*     Recent Labs     01/14/21  1850 01/14/21  1850 01/16/21  0345 01/16/21  0824 01/16/21  1310 01/16/21  1930 01/16/21  2325   ALTSGPT 170*   < > 76* 62* 62*  --   --    ASTSGOT 328*   < > 44 38 40  --   --    ALKPHOSPHAT 122*   < > 94 98 100*  --   --    TBILIRUBIN 0.8   < > 0.6 0.5 0.5  --   --    AMYLASE  --   --   --  827*  --   --   --    LIPASE >3000*  --   --  860*  --   --   --    GLUCOSE 213*   < > 155* 147* 173* 145* 150*    < > = values in this interval not displayed.       Blood Culture   Date Value Ref Range Status   07/19/2015 No growth after 5 days of incubation.  Final     Blood Culture Hold   Date Value Ref Range Status   05/05/2014 Collected   Final        GI/Nutrition:  Orders Placed This Encounter   Procedures   • Diet NPO     Standing Status:   Standing     Number of Occurrences:   1     Order Specific Question:   Restrict to:     Answer:   Ice Chips [2]       Past Medical History:   Past Medical History:   Diagnosis Date   • Clotting disorder (HCC)     PE   • Congestive heart failure (HCC) 7/2015    EF 30-35%; Dilated R atrium; LVH; Mild MR; Mild AI; Mild TR   • Crohn disease (HCC)    • Hypertension    • PAF (paroxysmal atrial fibrillation) (HCC)    • Sleep apnea     un-diagnosed and pending sleep study       Past Surgical History:  Past Surgical History:   Procedure Laterality Date   • CRYSTAL BY LAPAROSCOPY  7/29/2020    Procedure: CHOLECYSTECTOMY, LAPAROSCOPIC;  Surgeon: Caroline Skelton M.D.;  Location: SURGERY [facility];  Service: General       Medication Allergy:  No Known Allergies    Hospital Medications:  IV infusions: None    •  carvedilol, 12.5 mg, Oral, Q EVENING    •  insulin regular, 1-6 Units, Subcutaneous, Q6HRS    •  insulin glargine, 5 Units, Subcutaneous, QAM INSULIN    •  heparin, 5,000 Units, Subcutaneous, Q8HRS      PRN medications: HYDROmorphone, labetalol, insulin regular **AND** POC Blood Glucose **AND** NOTIFY MD and PharmD **AND** glucose **AND** dextrose 50%    Current Outpatient Medications:  No current facility-administered medications on file prior to encounter.      Current Outpatient Medications on File Prior to Encounter   Medication Sig Dispense Refill   • ibuprofen (MOTRIN) 600 MG Tab Take 600 mg by mouth every 6 hours as needed.     • ROPINIRole (REQUIP) 0.5 MG Tab Take 4 tablets po qhs 120 Tab 3   • hydrOXYzine HCl (ATARAX) 25 MG Tab Take 1 Tab by mouth every bedtime. 90 Tab 0   • ROPINIRole (REQUIP) 0.5 MG Tab TAKE 2 TABLETS BY MOUTH THREE TIMES DAILY (Patient not taking: Reported on 1/14/2021) 90 Tab 0   • spironolactone (ALDACTONE) 25 MG Tab Take 1 Tab by mouth every bedtime. 30 Tab 11   • carvedilol  "(COREG) 25 MG Tab Take 25 mg by mouth every evening.     • furosemide (LASIX) 40 MG Tab Take 40 mg by mouth every morning.     • sacubitril-valsartan (ENTRESTO) 49-51 MG Tab tablet Take 1 Tab by mouth 2 Times a Day. 60 Tab 11       Physical Exam:  Vitals/ General Appearance:   Weight/BMI: Body mass index is 35.93 kg/m².  /109   Pulse 99   Temp 37 °C (98.6 °F)   Resp (!) 10   Ht 1.727 m (5' 8\")   Wt 107.2 kg (236 lb 5.3 oz)   SpO2 99%   Vitals:    01/16/21 1900 01/16/21 2100 01/17/21 0000 01/17/21 0300   BP: 123/88 107/67  157/109   Pulse: (!) 107 (!) 114 (!) 120 99   Resp:  15 19 (!) 10   Temp:   37 °C (98.6 °F)    TempSrc:       SpO2: 96% 97% 95% 99%   Weight:       Height:         Oxygen Therapy:  Pulse Oximetry: 99 %, O2 (LPM): 6, O2 Delivery Device: Oxymask    Physical Exam   Constitutional: He is well-developed, well-nourished, and in no distress.   HENT:   Head: Normocephalic and atraumatic.   Mouth/Throat: Oropharynx is clear and moist.   Eyes: Pupils are equal, round, and reactive to light. EOM are normal.   Neck: Neck supple. No thyromegaly present.   Cardiovascular: Normal heart sounds. Exam reveals no friction rub.   No murmur heard.  Pulmonary/Chest: He has no wheezes. He has no rales.   Normal breath sounds anteriorly.   Abdominal: He exhibits no mass. There is no hepatosplenomegaly. There is abdominal tenderness. There is guarding.   Moderate distention.  Moderate diffuse tenderness with voluntary guarding.   Musculoskeletal:         General: No edema.   Lymphadenopathy:     He has no cervical adenopathy.   Neurological: He is alert. No cranial nerve deficit.   Skin: No rash noted. No erythema.   Psychiatric: Memory and affect normal.   Vitals reviewed.      Review of Systems:      Review of Systems   Respiratory: Negative for cough and shortness of breath.    Cardiovascular: Negative for chest pain and palpitations.   Gastrointestinal: Positive for abdominal pain. Negative for blood in " stool, melena and vomiting.             Family History:  Family History   Problem Relation Age of Onset   • Cancer Mother    • Heart Disease Paternal Grandmother    • Heart Disease Paternal Grandfather    • Cancer Father         paralysis from accident       Social History:  Social History     Socioeconomic History   • Marital status:      Spouse name: Not on file   • Number of children: Not on file   • Years of education: Not on file   • Highest education level: Not on file   Occupational History   • Not on file   Social Needs   • Financial resource strain: Not on file   • Food insecurity     Worry: Not on file     Inability: Not on file   • Transportation needs     Medical: Not on file     Non-medical: Not on file   Tobacco Use   • Smoking status: Never Smoker   • Smokeless tobacco: Never Used   Substance and Sexual Activity   • Alcohol use: Yes     Alcohol/week: 1.2 oz     Types: 2 Glasses of wine per week     Comment: Quit in 1994 - previous six beer after work, 2-3 beers per day currently   • Drug use: No     Types: Methamphetamines     Comment: Quit in 2012, used for 5 years   • Sexual activity: Yes     Partners: Female   Lifestyle   • Physical activity     Days per week: Not on file     Minutes per session: Not on file   • Stress: Not on file   Relationships   • Social connections     Talks on phone: Not on file     Gets together: Not on file     Attends Orthodox service: Not on file     Active member of club or organization: Not on file     Attends meetings of clubs or organizations: Not on file     Relationship status: Not on file   • Intimate partner violence     Fear of current or ex partner: Not on file     Emotionally abused: Not on file     Physically abused: Not on file     Forced sexual activity: Not on file   Other Topics Concern   • Not on file   Social History Narrative   • Not on file       MDM (Data Review):     Records reviewed and summarized in current documentation    Problem List:      Patient Active Problem List    Diagnosis Date Noted   • Acute hypoxemic respiratory failure (HCC) 01/16/2021     Priority: High   • Acute pancreatitis 01/14/2021     Priority: High   • Acute cholecystitis 07/29/2020     Priority: High   • Hyperglycemia 07/20/2016     Priority: Medium   • Paroxysmal atrial fibrillation (HCC) 08/03/2015     Priority: Medium   • Acute kidney injury (HCC) 07/23/2015     Priority: Medium   • Snoring 07/07/2015     Priority: Medium   • Essential hypertension 05/06/2014     Priority: Medium   • Hyperkalemia 01/16/2021     Priority: Low   • Hypocalcemia 01/16/2021     Priority: Low   • Hypertriglyceridemia 01/15/2021     Priority: Low   • Nonischemic cardiomyopathy (HCC) 08/01/2017     Priority: Low   • Restless leg syndrome 02/01/2017     Priority: Low   • Obesity (BMI 30-39.9) 12/06/2016     Priority: Low   • Crohn disease (HCC) 07/23/2015     Priority: Low   • Anxiety 02/21/2020   • Chronic back pain 12/13/2017   • Screening for colon cancer 12/13/2017   • AK (actinic keratosis) 12/13/2017   • Stage C chronic systolic congestive heart failure (HCC) 03/09/2017         Thank your for the opportunity to assist in the care of your patient.  Please call for any questions or concerns.    Ko Turner M.D.

## 2021-01-17 NOTE — ASSESSMENT & PLAN NOTE
Likely from severe pancreatitis.     Plan:  - Continue to monitor with CMP.  - Replace carefully given renal dysfunction

## 2021-01-17 NOTE — PROGRESS NOTES
Critical Care Progress Note    Date of admission  1/14/2021    Chief Complaint  55 y.o. male admitted 1/14/2021 with acute pancreatitis.  He has a history of AF, PE, non-ischemic cardiomyopathy which has recovered, NSTEMI, HTN, obesity, dyslipidemia, restless leg syndrome and laparoscopic cholecystectomy for acute cholecystitis.    Hospital Course    1/17 -    continue bowel rest.  HD/UF per nephrology.  Liver enzymes and lipase are improving.  Continue pain control.    Interval Problem Update  Reviewed last 24 hour events:      SR-ST  NG to suction  98.6  +610 mL in the last 24  +1498 mL since admit  Inputs and outputs do not appear to be accurate      He feels better today.  His abdomen is less distended.  He continues to have abdominal pain which is worse with movement and better with pain medications.  He has no nausea or vomiting.  He has no cough, sputum production or shortness of breath.  He has no angina, palpitations or syncope.      Review of Systems  Review of Systems   Constitutional: Negative for chills and fever.   HENT: Negative for ear discharge, ear pain and nosebleeds.    Eyes: Negative for blurred vision, double vision and photophobia.   Respiratory: Negative for cough, hemoptysis, shortness of breath and stridor.    Cardiovascular: Negative for chest pain, palpitations and leg swelling.   Gastrointestinal: Positive for abdominal pain. Negative for nausea and vomiting.   Genitourinary: Negative for dysuria, hematuria and urgency.   Musculoskeletal: Negative for myalgias and neck pain.   Skin: Negative for rash.   Neurological: Negative for speech change, focal weakness, seizures and headaches.   Endo/Heme/Allergies: Does not bruise/bleed easily.   Psychiatric/Behavioral: Negative for hallucinations, substance abuse and suicidal ideas. The patient is not nervous/anxious.         Vital Signs for last 24 hours   Temp:  [35.2 °C (95.3 °F)-37 °C (98.6 °F)] 36.4 °C (97.6 °F)  Pulse:  [] 115  Resp:   [9-43] 12  BP: ()/() 145/62  SpO2:  [93 %-99 %] 97 %    Hemodynamic parameters for last 24 hours       Respiratory Information for the last 24 hours       Physical Exam   Physical Exam  Constitutional:       Appearance: He is obese. He is not diaphoretic.   HENT:      Head: Normocephalic and atraumatic.      Right Ear: External ear normal.      Left Ear: External ear normal.      Nose: Nose normal.      Mouth/Throat:      Mouth: Mucous membranes are moist.      Pharynx: Oropharynx is clear.   Eyes:      Conjunctiva/sclera: Conjunctivae normal.      Pupils: Pupils are equal, round, and reactive to light.   Neck:      Musculoskeletal: Normal range of motion and neck supple.   Cardiovascular:      Pulses: Normal pulses.      Heart sounds: No gallop.       Comments: Sinus rhythm-sinus tachycardia  Pulmonary:      Breath sounds: No wheezing or rales.   Abdominal:      General: There is distension.      Tenderness: There is abdominal tenderness.   Musculoskeletal: Normal range of motion.      Right lower leg: No edema.      Left lower leg: No edema.      Comments: No clubbing or cyanosis   Skin:     General: Skin is warm and dry.      Capillary Refill: Capillary refill takes less than 2 seconds.   Neurological:      General: No focal deficit present.      Mental Status: He is oriented to person, place, and time.      Cranial Nerves: No cranial nerve deficit.      Comments: No focal weakness         Medications  Current Facility-Administered Medications   Medication Dose Route Frequency Provider Last Rate Last Admin   • pantoprazole (PROTONIX) injection 40 mg  40 mg Intravenous DAILY Levar Tapia M.D.       • HYDROmorphone pf (DILAUDID) injection 0.5-1 mg  0.5-1 mg Intravenous Q2HRS PRN Levar Tapia M.D.   1 mg at 01/17/21 0755   • carvedilol (COREG) tablet 12.5 mg  12.5 mg Oral Q EVENING Jorge Luis Michael M.D.   Stopped at 01/16/21 1800   • labetalol (NORMODYNE/TRANDATE) injection 10-20 mg   10-20 mg Intravenous Q4HRS KARINA Michael M.D.   10 mg at 01/17/21 0239   • insulin regular (HumuLIN R,NovoLIN R) injection  1-6 Units Subcutaneous Q6HRS Zaid Scott M.D.   Stopped at 01/16/21 1800    And   • glucose 4 g chewable tablet 16 g  16 g Oral Q15 MIN PRLAINEY Scott M.D.        And   • dextrose 50% (D50W) injection 50 mL  50 mL Intravenous Q15 MIN PRLAINEY Scott M.D.       • insulin glargine (Lantus) injection  5 Units Subcutaneous QAM INSULIN Zaid Scott M.D.   5 Units at 01/17/21 0600   • heparin injection 5,000 Units  5,000 Units Subcutaneous Q8HRS Johnson Hodge M.D.   5,000 Units at 01/17/21 0536       Fluids    Intake/Output Summary (Last 24 hours) at 1/17/2021 0819  Last data filed at 1/16/2021 1800  Gross per 24 hour   Intake 1000 ml   Output 390 ml   Net 610 ml       Laboratory          Recent Labs     01/16/21  0345 01/16/21  0345 01/16/21  1930 01/16/21  2325 01/17/21  0600   SODIUM 131*   < > 132* 131* 132*   POTASSIUM 6.0*   < > 5.1 5.3 4.8   CHLORIDE 99   < > 97 97 97   CO2 21   < > 23 23 23   BUN 42*   < > 56* 58* 62*   CREATININE 3.36*   < > 3.61* 3.90* 3.84*   MAGNESIUM 1.9  --   --   --  1.8   PHOSPHORUS  --   --   --   --  5.4*   CALCIUM 7.3*   < > 7.0* 6.8* 7.6*    < > = values in this interval not displayed.     Recent Labs     01/14/21  1850 01/14/21  1850 01/16/21  0824 01/16/21  1310 01/16/21  1930 01/16/21  2325 01/17/21  0600   ALTSGPT 170*   < > 62* 62*  --   --  39   ASTSGOT 328*   < > 38 40  --   --  28   ALKPHOSPHAT 122*   < > 98 100*  --   --  83   TBILIRUBIN 0.8   < > 0.5 0.5  --   --  0.5   AMYLASE  --   --  827*  --   --   --   --    LIPASE >3000*  --  860*  --   --   --  375*   GLUCOSE 213*   < > 147* 173* 145* 150* 148*    < > = values in this interval not displayed.     Recent Labs     01/14/21  1850 01/14/21  1850 01/16/21  0345 01/16/21  0824 01/16/21  1310 01/17/21  0600   WBC 10.4  --  19.2*  --   --  6.9   NEUTSPOLYS 85.30*  --   85.20*  --   --  70.40   LYMPHOCYTES 9.60*  --  3.50*  --   --  6.10*   MONOCYTES 4.10  --  4.30  --   --  7.80   EOSINOPHILS 0.20  --  0.00  --   --  0.00   BASOPHILS 0.30  --  0.00  --   --  0.00   ASTSGOT 328*   < > 44 38 40 28   ALTSGPT 170*   < > 76* 62* 62* 39   ALKPHOSPHAT 122*   < > 94 98 100* 83   TBILIRUBIN 0.8   < > 0.6 0.5 0.5 0.5    < > = values in this interval not displayed.     Recent Labs     01/14/21  1850 01/16/21  0345 01/17/21  0600   RBC 4.97 5.36 4.90   HEMOGLOBIN 14.8 16.5 15.1   HEMATOCRIT 44.9 51.8 47.0   PLATELETCT 273 241 211       Imaging  CT:    CT abdomen images personally reviewed.  Distention of the small bowel.  Pancreatic inflammation.    Assessment/Plan  * Acute pancreatitis  Assessment & Plan  Laparoscopic cholecystectomy on or about 7/25/2020 by Dr. Skelton  Severe acute pancreatitis with associated ileus  Bowel rest  Pain control  Lipase and liver enzymes are improved  GI recommends IV PPI for stress ulcer prophylaxis    Acute hypoxemic respiratory failure (HCC)  Assessment & Plan  Continue oxygen    Hyperglycemia- (present on admission)  Assessment & Plan  Sliding scale insulin  Glycohemoglobin normal at 5.5    Acute kidney injury (HCC)  Assessment & Plan  HD per nephrology  Renal dose medications and avoid nephrotoxins  Monitor urine output and renal function    Essential hypertension- (present on admission)  Assessment & Plan  Goal SBP less than 160  IV labetalol to achieve blood pressure goals    Hypocalcemia  Assessment & Plan  Due to pancreatitis  Replete calcium    Hyperkalemia  Assessment & Plan  Resolved after HD    Nonischemic cardiomyopathy (HCC)- (present on admission)  Assessment & Plan  History of in 2014 with EF 25-30% - recovered    Restless leg syndrome- (present on admission)  Assessment & Plan  History of    Obesity (BMI 30-39.9)- (present on admission)  Assessment & Plan  Nutrition counseling and behavioral modification    Crohn disease (HCC)- (present on  admission)  Assessment & Plan  History of       VTE:  Heparin  Ulcer: PPI  Lines: Central Line  Ongoing indication addressed and Hudson Catheter  Ongoing indication addressed    I have performed a physical exam and reviewed and updated ROS and Plan today (1/17/2021). In review of yesterday's note (1/16/2021), there are no changes except as documented above.     Discussed patient condition and risk of morbidity and/or mortality with RN, RT, Pharmacy, Charge nurse / hot rounds, QA team and GI     Levar Tapia MD  Pulmonary and Critical Care Medicine

## 2021-01-17 NOTE — PROGRESS NOTES
"Hospital Medicine Daily Progress Note    Date of Service  1/17/2021    Chief Complaint  Abdominal pain    Hospital Course  55 y.o. male with a history of EtOH use, prior PE, CAD, HTN, DLD admitted 1/14/2021 with severe pancreatitis, and developed acute kidney failure and hyperkalemia.  THe patient had a cholecystectomy 7/2020.      Interval Problem Update  Alert and conversant with clear full sentences.  Ongoing abdominal pain.  States he is hungry \"I could eat soup if offered\"  Remains with NG tube to suction and abdominal distention  Denies flatus.  UOP:290cc    Consultants/Specialty  Intensivist  Gastroenterology  Nephrology    Code Status  Full Code    Disposition  Monitor in ICU today per Discussion with Dr Tapia    Review of Systems  Review of Systems   Constitutional: Negative for chills and fever.   HENT: Negative for sore throat.    Respiratory: Negative for shortness of breath and stridor.    Cardiovascular: Negative for chest pain and leg swelling.   Gastrointestinal: Positive for abdominal pain. Negative for diarrhea and nausea.   Genitourinary: Negative for dysuria and hematuria.   Neurological: Negative for dizziness and headaches.   Psychiatric/Behavioral: The patient is not nervous/anxious.         Physical Exam  Temp:  [35.2 °C (95.3 °F)-37 °C (98.6 °F)] 36.3 °C (97.4 °F)  Pulse:  [] 109  Resp:  [6-43] 12  BP: ()/() 108/84  SpO2:  [90 %-99 %] 95 %    Physical Exam  Vitals signs reviewed.   Constitutional:       Appearance: Normal appearance. He is obese. He is not diaphoretic.   HENT:      Head: Normocephalic and atraumatic.      Nose: Nose normal.      Mouth/Throat:      Mouth: Mucous membranes are moist.      Pharynx: No oropharyngeal exudate.   Eyes:      General: No scleral icterus.        Right eye: No discharge.         Left eye: No discharge.      Extraocular Movements: Extraocular movements intact.      Conjunctiva/sclera: Conjunctivae normal.   Neck:      " Musculoskeletal: No muscular tenderness.   Cardiovascular:      Rate and Rhythm: Normal rate and regular rhythm.      Pulses:           Radial pulses are 2+ on the right side and 2+ on the left side.        Dorsalis pedis pulses are 2+ on the right side and 2+ on the left side.      Heart sounds: No murmur.   Pulmonary:      Effort: Pulmonary effort is normal. No respiratory distress.      Breath sounds: Normal breath sounds. No wheezing or rales.   Abdominal:      General: Bowel sounds are decreased. There is distension.   Musculoskeletal:         General: No swelling or tenderness.      Right lower leg: No edema.      Left lower leg: No edema.   Lymphadenopathy:      Cervical: No cervical adenopathy.   Skin:     Coloration: Skin is not jaundiced or pale.   Neurological:      General: No focal deficit present.      Mental Status: He is alert and oriented to person, place, and time. Mental status is at baseline.      Cranial Nerves: No cranial nerve deficit.   Psychiatric:         Mood and Affect: Mood normal.         Behavior: Behavior normal.         Fluids    Intake/Output Summary (Last 24 hours) at 1/17/2021 1249  Last data filed at 1/17/2021 1200  Gross per 24 hour   Intake 1420 ml   Output 1490 ml   Net -70 ml       Laboratory  Recent Labs     01/14/21  1850 01/16/21  0345 01/17/21  0600   WBC 10.4 19.2* 6.9   RBC 4.97 5.36 4.90   HEMOGLOBIN 14.8 16.5 15.1   HEMATOCRIT 44.9 51.8 47.0   MCV 90.3 96.6 95.9   MCH 29.8 30.8 30.8   MCHC 33.0* 31.9* 32.1*   RDW 39.4 44.9 44.8   PLATELETCT 273 241 211   MPV 10.8 11.0 11.2     Recent Labs     01/16/21  1930 01/16/21  2325 01/17/21  0600   SODIUM 132* 131* 132*   POTASSIUM 5.1 5.3 4.8   CHLORIDE 97 97 97   CO2 23 23 23   GLUCOSE 145* 150* 148*   BUN 56* 58* 62*   CREATININE 3.61* 3.90* 3.84*   CALCIUM 7.0* 6.8* 7.6*             Recent Labs     01/14/21  1850   TRIGLYCERIDE 571*   HDL 46   LDL see below       Imaging  CT-ABDOMEN-PELVIS W/O   Final Result         1.  Worsening inflammatory change and fluid surrounding the pancreas, in keeping with acute pancreatitis.      2. Diffuse dilatation of the small bowel and proximal colon with decompressed distal colon adjacent to the inflammation in the tail the pancreas. This could relate to colonic obstruction due to adjacent inflammatory change or ileus.      3. Mild wall thickening of the stomach fundus adjacent to the pancreatic tail, likely reactive.      DX-ABDOMEN FOR TUBE PLACEMENT   Final Result         Gastric drainage tube with tip projecting over the expected area of the stomach fundus.      DX-CHEST-FOR LINE PLACEMENT Perform procedure in: Patient's Room   Final Result      Left midlung and bibasilar linear atelectasis.      Dialysis catheter projects over the SVC.      No pneumothorax.         [id number] VIEW   Final Result      Increased bowel gas concerning for early or partial small bowel obstruction.      DX-CHEST-PORTABLE (1 VIEW)   Final Result      Hypoinflation with LEFT mid and lower lung atelectasis, with possible superimposed LEFT basilar pneumonia.      [id number]   Final Result      1.  Pancreatic edema with significant peripancreatic stranding and ill-defined fluid, consistent with acute interstitial edematous pancreatitis.      2.  Small amount of ascites.      3.  No choledocholithiasis is identified.      4.  Status post cholecystectomy.      CT-ABDOMEN-PELVIS WITH   Final Result      1.  Inflammatory stranding involving the pancreas with fluid within the anterior pararenal space consistent with pancreatitis.      2.  Fatty liver.      3.  Prior cholecystectomy.      4.  Bibasilar atelectasis/consolidation.      DX-CHEST-PORTABLE (1 VIEW)   Final Result      No evidence of acute cardiopulmonary process.           Assessment/Plan  * Acute pancreatitis  Assessment & Plan  1/15 MRCP without acute etiology  GI believes due to passed gallstone, although could be related to EtOH or question if due to  Entresto  Virus could be other etiology  Pain management  NPO for now.    Hyperglycemia- (present on admission)  Assessment & Plan  1/17 Glucose:148  1/15 HgbA1c:5.5    Acute kidney injury (HCC)  Assessment & Plan  1/17 BUN: 62, Cr:3.84  Nephrology consulting  HD catheter in R IJ site.  Monitor I/O's and labs.    Essential hypertension- (present on admission)  Assessment & Plan  Coreg 12.5mg q HS with parameters  Monitor vitals.    Hyponatremia  Assessment & Plan  1/17 Na: 132  NPO, monitor  Appears slightly positive on I/O's.    Hypocalcemia  Assessment & Plan  Supplement and monitor   1/17 Alb:3.1  Ca:7.6.  Corrected Ca:8.3  Given CaCl last night.    Hyperkalemia  Assessment & Plan  Resolved  Monitor daily BMP    Hypertriglyceridemia- (present on admission)  Assessment & Plan  Levels are 571, considered moderate, >500 risks increase somewhat for inducing pancreatitis    Restless leg syndrome- (present on admission)  Assessment & Plan  Requip    Obesity (BMI 30-39.9)- (present on admission)  Assessment & Plan  1/17 Encouraged dietary change and stopping his 4 beers daily  Body mass index is 36.37 kg/m².    Crohn disease (HCC)- (present on admission)  Assessment & Plan  No acute issue at this time.       VTE prophylaxis: Heparin 5000 TID

## 2021-01-17 NOTE — PROGRESS NOTES
Critical care -     Case reviewed with Dr. Michael.  Will place NG tube to low wall suction.  CT abdomen pelvis ordered and will review.

## 2021-01-17 NOTE — ASSESSMENT & PLAN NOTE
The patient has a history of Crohn's disease and stated that he has been in remission for years. He follows with Dr. Colon as an outpatient.     Plan:  - May need outpatient follow-up

## 2021-01-18 PROBLEM — K56.7 ILEUS (HCC): Status: ACTIVE | Noted: 2021-01-01

## 2021-01-18 NOTE — PROGRESS NOTES
Pulmonary/Critical Care Medicine   Progress Note    Date of service: 1/18/2021  Time: 7:42 AM    Transfer orders placed yesterday afternoon, no significant change in patient condition.  Recommend continued treatment of severe pancreatitis, ONOFRE and ileus. [facility] will sign off at this time however will remain available for any questions or concerns.    SUMAN Patrick Jr..O.  [facility]

## 2021-01-18 NOTE — PROGRESS NOTES
Bladder pressure monitor reading of 15 at 2200. Will need to address discontinuing if patient transfers to floor.

## 2021-01-18 NOTE — PROGRESS NOTES
"[facility] -  PROGRESS NOTE               Author: Zay Perry M.D. Date & Time: 1/18/2021  8:13 AM     HPI:  55 y.o. male who presented 1/14/2021 with abdominal pain and emesis, found to have acute pancreatitis with ONOFRE prompting renal consult.     Upon admission, CT abdomen showing inflammatory stranding involving pancreas with fluid within the anterior pararenal space consistent with pancreatitis.  Cr 1.1 on admission, worsened to 3.5 this AM. Oliguric overnight and worsening hyperkalemia despite NS at 75cc/hr. ~200cc UOP after lasix 20mg. Ongoing severe abd pain and worsening abd distention. Worsening shortness or breath and subjective fevers. Being transferred to ICU.    DAILY NEPHROLOGY SUMMARY:  1/16: consult done  1/17: 390cc UOP documented, found to have SBO-NG tube placed, cr stable, abd pain improving, no fluids running   1/18: Hypertensive this AM, improving UOP, cr downtrending     PAST FAMILY HISTORY: Reviewed and Unchanged  SOCIAL HISTORY: Reviewed and Unchanged  CURRENT MEDICATIONS: Reviewed  IMAGING STUDIES: Reviewed    ROS  General: +malaise  CV: No chest pain  RESP: +shortness of breath  GI: +abdominal pain   : No dysuria or gross hematuria  MSK: No trauma  Skin: No rashes  Neuro: No tremors  Psych: No depression    PHYSICAL EXAM  VS:  BP (!) 172/87   Pulse (!) 108   Temp 37 °C (98.6 °F) (Temporal)   Resp (!) 7   Ht 1.727 m (5' 8\")   Wt 108.5 kg (239 lb 3.2 oz)   SpO2 94%   BMI 36.37 kg/m²   GENERAL: NAD, sitting in chair  CV: no edema  RESP: non-labored  GI: distended  MSK: No joint deformities   SKIN: No concerning rashes  NEURO: AOx3  PSYCH: Cooperative    Fluids:  In: 1612.1 [P.O.:740; I.V.:782.1; Other:90]  Out: 2975     LABS:  Recent Results (from the past 24 hour(s))   ACCU-CHEK GLUCOSE    Collection Time: 01/17/21 11:30 AM   Result Value Ref Range    Glucose - Accu-Ck 141 (H) 65 - 99 mg/dL   Basic Metabolic Panel    Collection Time: 01/17/21 12:00 PM "   Result Value Ref Range    Sodium 132 (L) 135 - 145 mmol/L    Potassium 5.5 3.6 - 5.5 mmol/L    Chloride 97 96 - 112 mmol/L    Co2 22 20 - 33 mmol/L    Glucose 155 (H) 65 - 99 mg/dL    Bun 70 (H) 8 - 22 mg/dL    Creatinine 4.04 (H) 0.50 - 1.40 mg/dL    Calcium 7.2 (L) 8.5 - 10.5 mg/dL    Anion Gap 13.0 7.0 - 16.0   LACTIC ACID    Collection Time: 01/17/21 12:00 PM   Result Value Ref Range    Lactic Acid 1.4 0.5 - 2.0 mmol/L   ESTIMATED GFR    Collection Time: 01/17/21 12:00 PM   Result Value Ref Range    GFR If  19 (A) >60 mL/min/1.73 m 2    GFR If Non African American 15 (A) >60 mL/min/1.73 m 2   ACCU-CHEK GLUCOSE    Collection Time: 01/17/21  5:56 PM   Result Value Ref Range    Glucose - Accu-Ck 147 (H) 65 - 99 mg/dL   CBC WITH DIFFERENTIAL    Collection Time: 01/18/21  3:36 AM   Result Value Ref Range    WBC 5.0 4.8 - 10.8 K/uL    RBC 4.47 (L) 4.70 - 6.10 M/uL    Hemoglobin 13.6 (L) 14.0 - 18.0 g/dL    Hematocrit 42.3 42.0 - 52.0 %    MCV 94.6 81.4 - 97.8 fL    MCH 30.4 27.0 - 33.0 pg    MCHC 32.2 (L) 33.7 - 35.3 g/dL    RDW 43.8 35.9 - 50.0 fL    Platelet Count 208 164 - 446 K/uL    MPV 11.3 9.0 - 12.9 fL    Neutrophils-Polys 57.00 44.00 - 72.00 %    Lymphocytes 4.40 (L) 22.00 - 41.00 %    Monocytes 14.90 (H) 0.00 - 13.40 %    Eosinophils 0.00 0.00 - 6.90 %    Basophils 0.00 0.00 - 1.80 %    Nucleated RBC 0.00 /100 WBC    Neutrophils (Absolute) 3.91 1.82 - 7.42 K/uL    Lymphs (Absolute) 0.22 (L) 1.00 - 4.80 K/uL    Monos (Absolute) 0.75 0.00 - 0.85 K/uL    Eos (Absolute) 0.00 0.00 - 0.51 K/uL    Baso (Absolute) 0.00 0.00 - 0.12 K/uL    NRBC (Absolute) 0.00 K/uL   Comp Metabolic Panel    Collection Time: 01/18/21  3:36 AM   Result Value Ref Range    Sodium 129 (L) 135 - 145 mmol/L    Potassium 4.8 3.6 - 5.5 mmol/L    Chloride 94 (L) 96 - 112 mmol/L    Co2 22 20 - 33 mmol/L    Anion Gap 13.0 7.0 - 16.0    Glucose 155 (H) 65 - 99 mg/dL    Bun 72 (HH) 8 - 22 mg/dL    Creatinine 3.05 (H) 0.50 - 1.40  mg/dL    Calcium 6.9 (LL) 8.5 - 10.5 mg/dL    AST(SGOT) 19 12 - 45 U/L    ALT(SGPT) 27 2 - 50 U/L    Alkaline Phosphatase 70 30 - 99 U/L    Total Bilirubin 0.5 0.1 - 1.5 mg/dL    Albumin 3.1 (L) 3.2 - 4.9 g/dL    Total Protein 6.7 6.0 - 8.2 g/dL    Globulin 3.6 (H) 1.9 - 3.5 g/dL    A-G Ratio 0.9 g/dL   MAGNESIUM    Collection Time: 01/18/21  3:36 AM   Result Value Ref Range    Magnesium 2.3 1.5 - 2.5 mg/dL   PHOSPHORUS    Collection Time: 01/18/21  3:36 AM   Result Value Ref Range    Phosphorus 4.9 (H) 2.5 - 4.5 mg/dL   LIPASE    Collection Time: 01/18/21  3:36 AM   Result Value Ref Range    Lipase 124 (H) 11 - 82 U/L   IONIZED CALCIUM    Collection Time: 01/18/21  3:36 AM   Result Value Ref Range    Ionized Calcium 0.9 (L) 1.1 - 1.3 mmol/L   ESTIMATED GFR    Collection Time: 01/18/21  3:36 AM   Result Value Ref Range    GFR If  26 (A) >60 mL/min/1.73 m 2    GFR If Non African American 21 (A) >60 mL/min/1.73 m 2   DIFFERENTIAL MANUAL    Collection Time: 01/18/21  3:36 AM   Result Value Ref Range    Bands-Stabs 21.10 (H) 0.00 - 10.00 %    Metamyelocytes 2.60 %    Manual Diff Status PERFORMED    PERIPHERAL SMEAR REVIEW    Collection Time: 01/18/21  3:36 AM   Result Value Ref Range    Peripheral Smear Review see below    PLATELET ESTIMATE    Collection Time: 01/18/21  3:36 AM   Result Value Ref Range    Plt Estimation Normal    MORPHOLOGY    Collection Time: 01/18/21  3:36 AM   Result Value Ref Range    RBC Morphology Normal        (click the triangle to expand results)      ASSESSMENT:  # ONOFRE: Initially oliguric. Resolving. In setting of pancreatitis and contrast. Some concern for compartment syndrome.   # Hyperkalemia: resolved   # Pancreatitis  # Hypoxic rasp failure  # SBO: NG tube  # HX  Non-ischemic cardiomyopathy  # Hyponatremia: mild   # Hypocalcemia   # Crohns     PLAN:  -no role for RRT  -continue IVFs at 75cc/hr while NPO  -PRN antihypertensive   -Strict I/Os  -replace calcium   -daily renal  function panel     Thank you,     Zay Perry MD  [facility]  [phone]

## 2021-01-18 NOTE — PROGRESS NOTES
"Hospital Medicine Daily Progress Note    Date of Service  1/18/2021    Chief Complaint  Abdominal pain    Hospital Course  55 y.o. male with a history of EtOH use, prior PE, CAD, HTN, DLD admitted 1/14/2021 with severe pancreatitis, and developed acute kidney failure and hyperkalemia.  THe patient had a cholecystectomy 7/2020.      Interval Problem Update  1/17: Alert and conversant with clear full sentences. Ongoing abdominal pain. States he is hungry \"I could eat soup if offered\"  Remains with NG tube to suction and abdominal distention Denies flatus. UOP:290cc    1/18: Alert and oriented with clear speech.  Receiving dilaudid IV for ongoing abdominal pain.  No Flatus.  NG tube pulled out accidentally last night and RN replaced with 400cc removed after reinsertion.  Low Ca and IV CaCl ordered.    Consultants/Specialty  Intensivist  Gastroenterology  Nephrology    Code Status  Full Code    Disposition  Monitor in ICU today per Discussion with Dr Tapia    Review of Systems  Review of Systems   Constitutional: Positive for malaise/fatigue. Negative for chills and fever.   HENT: Negative for sore throat.    Respiratory: Negative for shortness of breath.    Cardiovascular: Negative for chest pain and leg swelling.   Gastrointestinal: Positive for abdominal pain. Negative for diarrhea and nausea.   Genitourinary: Negative for hematuria.   Musculoskeletal: Positive for myalgias.   Neurological: Negative for dizziness and headaches.   Psychiatric/Behavioral: The patient is not nervous/anxious.         Physical Exam  Temp:  [36.1 °C (96.9 °F)-37 °C (98.6 °F)] 37 °C (98.6 °F)  Pulse:  [] 108  Resp:  [6-27] 7  BP: (108-172)/() 172/87  SpO2:  [89 %-97 %] 94 %    Physical Exam  Vitals signs reviewed.   Constitutional:       Appearance: Normal appearance. He is obese. He is not diaphoretic.   HENT:      Head: Normocephalic and atraumatic.      Nose: Nose normal.   Eyes:      General: No scleral icterus.      "   Right eye: No discharge.         Left eye: No discharge.      Extraocular Movements: Extraocular movements intact.      Conjunctiva/sclera: Conjunctivae normal.   Neck:      Musculoskeletal: Normal range of motion. No muscular tenderness.   Cardiovascular:      Rate and Rhythm: Normal rate and regular rhythm.      Pulses:           Radial pulses are 2+ on the right side and 2+ on the left side.        Dorsalis pedis pulses are 2+ on the right side and 2+ on the left side.      Heart sounds: No murmur.   Pulmonary:      Effort: Pulmonary effort is normal. No respiratory distress.      Breath sounds: Normal breath sounds. No wheezing or rales.   Abdominal:      General: Bowel sounds are absent. There is distension.   Musculoskeletal:         General: No swelling or tenderness.      Right lower leg: No edema.      Left lower leg: No edema.   Skin:     Coloration: Skin is not jaundiced or pale.   Neurological:      General: No focal deficit present.      Mental Status: He is alert and oriented to person, place, and time. Mental status is at baseline.      Cranial Nerves: No cranial nerve deficit.   Psychiatric:         Mood and Affect: Mood normal.         Behavior: Behavior normal.         Fluids    Intake/Output Summary (Last 24 hours) at 1/18/2021 0758  Last data filed at 1/18/2021 0700  Gross per 24 hour   Intake 1675.41 ml   Output 2975 ml   Net -1299.59 ml       Laboratory  Recent Labs     01/16/21  0345 01/17/21  0600 01/18/21  0336   WBC 19.2* 6.9 5.0   RBC 5.36 4.90 4.47*   HEMOGLOBIN 16.5 15.1 13.6*   HEMATOCRIT 51.8 47.0 42.3   MCV 96.6 95.9 94.6   MCH 30.8 30.8 30.4   MCHC 31.9* 32.1* 32.2*   RDW 44.9 44.8 43.8   PLATELETCT 241 211 208   MPV 11.0 11.2 11.3     Recent Labs     01/17/21  0600 01/17/21  1200 01/18/21  0336   SODIUM 132* 132* 129*   POTASSIUM 4.8 5.5 4.8   CHLORIDE 97 97 94*   CO2 23 22 22   GLUCOSE 148* 155* 155*   BUN 62* 70* 72*   CREATININE 3.84* 4.04* 3.05*   CALCIUM 7.6* 7.2* 6.9*                    Imaging  CT-ABDOMEN-PELVIS W/O   Final Result         1. Worsening inflammatory change and fluid surrounding the pancreas, in keeping with acute pancreatitis.      2. Diffuse dilatation of the small bowel and proximal colon with decompressed distal colon adjacent to the inflammation in the tail the pancreas. This could relate to colonic obstruction due to adjacent inflammatory change or ileus.      3. Mild wall thickening of the stomach fundus adjacent to the pancreatic tail, likely reactive.      DX-ABDOMEN FOR TUBE PLACEMENT   Final Result         Gastric drainage tube with tip projecting over the expected area of the stomach fundus.      DX-CHEST-FOR LINE PLACEMENT Perform procedure in: Patient's Room   Final Result      Left midlung and bibasilar linear atelectasis.      Dialysis catheter projects over the SVC.      No pneumothorax.         [id number] VIEW   Final Result      Increased bowel gas concerning for early or partial small bowel obstruction.      DX-CHEST-PORTABLE (1 VIEW)   Final Result      Hypoinflation with LEFT mid and lower lung atelectasis, with possible superimposed LEFT basilar pneumonia.      [id number]   Final Result      1.  Pancreatic edema with significant peripancreatic stranding and ill-defined fluid, consistent with acute interstitial edematous pancreatitis.      2.  Small amount of ascites.      3.  No choledocholithiasis is identified.      4.  Status post cholecystectomy.      CT-ABDOMEN-PELVIS WITH   Final Result      1.  Inflammatory stranding involving the pancreas with fluid within the anterior pararenal space consistent with pancreatitis.      2.  Fatty liver.      3.  Prior cholecystectomy.      4.  Bibasilar atelectasis/consolidation.      DX-CHEST-PORTABLE (1 VIEW)   Final Result      No evidence of acute cardiopulmonary process.           Assessment/Plan  * Acute pancreatitis  Assessment & Plan  1/15 MRCP without acute etiology  GI believes due to passed  gallstone, although could be related to EtOH or question if due to medication  Virus could be other etiology  Pain management  NPO for now.  mobilize    Ileus (HCC)  Assessment & Plan  Ileus vs SBO  Nasal gastric tube to wall suction  Hold oral meds that may not be absorbed  Correct electrolytes  Check TSH  Mobilize  Wean narcotics as able.  Discussed with patient     Hyperglycemia- (present on admission)  Assessment & Plan   Glucose:148  1/15 HgbA1c:5.5    Acute kidney injury (HCC)  Assessment & Plan   BUN: 72, Cr:3.05   BUN: 62, Cr:3.84  Nephrology consulting  HD catheter in R IJ site.  Monitor I/O's and labs.    Essential hypertension- (present on admission)  Assessment & Plan  Coreg 12.5mg q HS with parameters  Monitor vitals.    Hypomagnesemia  Assessment & Plan   M.8  Monitor and replete as need    Hyponatremia  Assessment & Plan   Na: 129   Na: 132  NPO, monitor  Appears slightly positive on I/O's.    Hypocalcemia  Assessment & Plan  Supplement and monitor    Ca:6.9. Correct Ca for albumin 7.6  CaCl ordered   Alb:3.1  Ca:7.6.  Corrected Ca:8.3.  CaCl given    Hyperkalemia  Assessment & Plan  Resolved  Monitor daily BMP    Hypertriglyceridemia- (present on admission)  Assessment & Plan  Levels are 571, considered moderate, >500 risks increase somewhat for inducing pancreatitis  Repeat level ordered     Restless leg syndrome- (present on admission)  Assessment & Plan  Requip    Obesity (BMI 30-39.9)- (present on admission)  Assessment & Plan   Encouraged dietary change and stopping his 4 beers daily  Body mass index is 36.37 kg/m².    Crohn disease (HCC)- (present on admission)  Assessment & Plan  No acute issue at this time.       VTE prophylaxis: Heparin 5000 TID

## 2021-01-18 NOTE — DIETARY
Nutrition Services: Update   Day 4 of admit.  Shaka Riley is a 55 y.o. male with admitting DX of acute pancreatitis with uninfected necrosis.  Pt NPO x 3 - inadequate nutrition in the ICU setting.   PMHx reviewed.  GI note from 1/17 reviewed, including assessment and plan.  Discussed case w/ RN - NGT remains in place to suction (noted 400 out this AM) and pt with c/o ongoing abdominal pain; no plan for clamping trial @ this time.  Appreciate update.  Current clinical picture, MD notes, labs and meds reviewed in addition.     Recommendations/Plan:  1. Advance diet per GI/MD as medically appropriate.  2. Continue to monitor wt.    RD follows

## 2021-01-18 NOTE — ASSESSMENT & PLAN NOTE
The patient had an Ileus that is most likely a complication of his severe pancreatitis. NG tube has been in place since 1/19 and TPN was started. Tthe patient has since been having bowel sounds and has been having output from his rectal tube. He has not had any nausea and vomiting or any abdominal pain. He has also been hungry and wanting to eat. TPN has been off since 1/30 when PICC was infiltrated. Abdominal plain film continues to show that he has dilated loops of bowel. However, he has started tolerating a diet with no nausea and vomiting.     Plan:  - Continue minced and moist diet.  - One time dose of Famotidine 10mg for indigestion. (If needed chronically it would be every other day given his renal function).

## 2021-01-18 NOTE — CARE PLAN
Problem: Nutritional:  Goal: Achieve adequate nutritional intake  Description: Diet to advance >clears per MD and patient will consume >50% of meals  Outcome: NOT MET

## 2021-01-18 NOTE — CARE PLAN
Patient voicing needs, understands pain management goals, treatment plan, and has appropriate expectations of timeline.  Participates in repositioning, and skin protection.

## 2021-01-19 NOTE — PROGRESS NOTES
"[facility] -  PROGRESS NOTE               Author: Zay Perry M.D. Date & Time: 1/19/2021  8:48 AM     HPI:  55 y.o. male who presented 1/14/2021 with abdominal pain and emesis, found to have acute pancreatitis with ONOFRE prompting renal consult.     Upon admission, CT abdomen showing inflammatory stranding involving pancreas with fluid within the anterior pararenal space consistent with pancreatitis.  Cr 1.1 on admission, worsened to 3.5 this AM. Oliguric overnight and worsening hyperkalemia despite NS at 75cc/hr. ~200cc UOP after lasix 20mg. Ongoing severe abd pain and worsening abd distention. Worsening shortness or breath and subjective fevers. Being transferred to ICU.    DAILY NEPHROLOGY SUMMARY:  1/16: consult done  1/17: 390cc UOP documented, found to have SBO-NG tube placed, cr stable, abd pain improving, no fluids running   1/18: Hypertensive this AM, improving UOP, cr downtrending   1/19: Transferred out of the ICU, Cr downtrending, main complaint is back pain, NG tube    PAST FAMILY HISTORY: Reviewed and Unchanged  SOCIAL HISTORY: Reviewed and Unchanged  CURRENT MEDICATIONS: Reviewed  IMAGING STUDIES: Reviewed    ROS  General: +malaise  CV: No chest pain  RESP: +shortness of breath  GI: +abdominal pain   : No dysuria or gross hematuria  MSK: No trauma  Skin: No rashes  Neuro: No tremors  Psych: No depression    PHYSICAL EXAM  VS:  /74   Pulse (!) 123 Comment: Informed RN  Temp 37 °C (98.6 °F) (Temporal)   Resp (!) 24 Comment: Informed RN  Ht 1.727 m (5' 8\")   Wt 100.2 kg (220 lb 14.4 oz)   SpO2 94%   BMI 33.59 kg/m²   GENERAL: NAD, standing   CV: no edema  RESP: non-labored  GI: distended, NG tube  MSK: No joint deformities   SKIN: No concerning rashes  NEURO: AOx3  PSYCH: Cooperative    Fluids:  In: 1413.3 [I.V.:1350]  Out: 1400     LABS:  Recent Results (from the past 24 hour(s))   ACCU-CHEK GLUCOSE    Collection Time: 01/18/21 12:44 PM   Result Value Ref " Range    Glucose - Accu-Ck 112 (H) 65 - 99 mg/dL   ACCU-CHEK GLUCOSE    Collection Time: 01/18/21  5:42 PM   Result Value Ref Range    Glucose - Accu-Ck 103 (H) 65 - 99 mg/dL   TSH WITH REFLEX TO FT4    Collection Time: 01/18/21  7:23 PM   Result Value Ref Range    TSH 1.240 0.380 - 5.330 uIU/mL   Triglyceride    Collection Time: 01/18/21  7:23 PM   Result Value Ref Range    Triglycerides 211 (H) 0 - 149 mg/dL   ACCU-CHEK GLUCOSE    Collection Time: 01/18/21 11:34 PM   Result Value Ref Range    Glucose - Accu-Ck 115 (H) 65 - 99 mg/dL   CBC WITH DIFFERENTIAL    Collection Time: 01/19/21  3:46 AM   Result Value Ref Range    WBC 7.8 4.8 - 10.8 K/uL    RBC 4.07 (L) 4.70 - 6.10 M/uL    Hemoglobin 12.5 (L) 14.0 - 18.0 g/dL    Hematocrit 38.7 (L) 42.0 - 52.0 %    MCV 95.1 81.4 - 97.8 fL    MCH 30.7 27.0 - 33.0 pg    MCHC 32.3 (L) 33.7 - 35.3 g/dL    RDW 44.3 35.9 - 50.0 fL    Platelet Count 183 164 - 446 K/uL    MPV 11.2 9.0 - 12.9 fL    Neutrophils-Polys 73.90 (H) 44.00 - 72.00 %    Lymphocytes 2.60 (L) 22.00 - 41.00 %    Monocytes 5.20 0.00 - 13.40 %    Eosinophils 0.90 0.00 - 6.90 %    Basophils 0.00 0.00 - 1.80 %    Nucleated RBC 0.00 /100 WBC    Neutrophils (Absolute) 6.92 1.82 - 7.42 K/uL    Lymphs (Absolute) 0.20 (L) 1.00 - 4.80 K/uL    Monos (Absolute) 0.41 0.00 - 0.85 K/uL    Eos (Absolute) 0.07 0.00 - 0.51 K/uL    Baso (Absolute) 0.00 0.00 - 0.12 K/uL    NRBC (Absolute) 0.00 K/uL   Comp Metabolic Panel    Collection Time: 01/19/21  3:46 AM   Result Value Ref Range    Sodium 136 135 - 145 mmol/L    Potassium 4.4 3.6 - 5.5 mmol/L    Chloride 99 96 - 112 mmol/L    Co2 25 20 - 33 mmol/L    Anion Gap 12.0 7.0 - 16.0    Glucose 121 (H) 65 - 99 mg/dL    Bun 64 (H) 8 - 22 mg/dL    Creatinine 2.24 (H) 0.50 - 1.40 mg/dL    Calcium 8.8 8.5 - 10.5 mg/dL    AST(SGOT) 12 12 - 45 U/L    ALT(SGPT) 20 2 - 50 U/L    Alkaline Phosphatase 66 30 - 99 U/L    Total Bilirubin 0.5 0.1 - 1.5 mg/dL    Albumin 3.1 (L) 3.2 - 4.9 g/dL     Total Protein 6.8 6.0 - 8.2 g/dL    Globulin 3.7 (H) 1.9 - 3.5 g/dL    A-G Ratio 0.8 g/dL   MAGNESIUM    Collection Time: 01/19/21  3:46 AM   Result Value Ref Range    Magnesium 2.4 1.5 - 2.5 mg/dL   PHOSPHORUS    Collection Time: 01/19/21  3:46 AM   Result Value Ref Range    Phosphorus 3.5 2.5 - 4.5 mg/dL   LIPASE    Collection Time: 01/19/21  3:46 AM   Result Value Ref Range    Lipase 67 11 - 82 U/L   IONIZED CALCIUM    Collection Time: 01/19/21  3:46 AM   Result Value Ref Range    Ionized Calcium 1.1 1.1 - 1.3 mmol/L   ESTIMATED GFR    Collection Time: 01/19/21  3:46 AM   Result Value Ref Range    GFR If  37 (A) >60 mL/min/1.73 m 2    GFR If Non African American 30 (A) >60 mL/min/1.73 m 2   DIFFERENTIAL MANUAL    Collection Time: 01/19/21  3:46 AM   Result Value Ref Range    Bands-Stabs 14.80 (H) 0.00 - 10.00 %    Metamyelocytes 1.70 %    Myelocytes 0.90 %    Manual Diff Status PERFORMED    PERIPHERAL SMEAR REVIEW    Collection Time: 01/19/21  3:46 AM   Result Value Ref Range    Peripheral Smear Review see below    PLATELET ESTIMATE    Collection Time: 01/19/21  3:46 AM   Result Value Ref Range    Plt Estimation Normal    MORPHOLOGY    Collection Time: 01/19/21  3:46 AM   Result Value Ref Range    RBC Morphology Present     Polychromia 1+        (click the triangle to expand results)      ASSESSMENT:  # ONOFRE: Initially oliguric. Resolving. In setting of pancreatitis, contrast and possible compartment syndrome.   # Hyperkalemia: resolved   # Pancreatitis  # Hypoxic rasp failure  # SBO: NG tube  # HX  Non-ischemic cardiomyopathy  # Hypocalcemia   # Crohns     PLAN:  -continue IVFs at 75cc/hr while NPO/NG tube  -PRN antihypertensive in adding to coreg for now  -Holding RAAS blockade   -Strict I/Os  -replace calcium   -daily renal function panel     Thank you,     Zay Perry MD  [facility]  [phone]

## 2021-01-19 NOTE — ASSESSMENT & PLAN NOTE
The patient has a history of chronic back pain. This is important to note since he has MSSA bacteremia and we should be careful to distinguish his pain.     Plan:  - Continue Lidocaine patches, Dilaudid PRN 4 hrs for pancreatitis.

## 2021-01-19 NOTE — PROGRESS NOTES
Pt arrived to unit with transport. Pt educated on unit and all questions answered. Family at bedside

## 2021-01-19 NOTE — PROGRESS NOTES
1200 Bp 193/109-126p- 26rr- 90% on 2 liters.  Pt AOx4 able to make needs known. Pain 10/10. PRN pain medication given per written orders.   Repositioned pt to assist with comfort.   PRN Labetalol 10 mg IV given.   SHARMAINE Christiansen notified.    1546- 151/106/114 PRN pain medication and Labetalol given. Pt wife at bedside.     3583 169/98- 118

## 2021-01-19 NOTE — PROGRESS NOTES
Hospital Medicine Daily Progress Note    Date of Service  1/19/2021    Chief Complaint  Abdominal pain    Hospital Course  55 y.o. male with a history of EtOH use, prior PE, CAD, HTN, DLD admitted 1/14/2021 with severe pancreatitis, and developed acute kidney failure and hyperkalemia.  The patient had a cholecystectomy 7/2020.  He now has associated ileus.     Interval Problem Update  1/19:  NGT in place.  No audible bowel sounds.  Discussed imaging with surgery.  Suspect ileus in the setting of pancreatitis.  No evidence of bowel obstruction.  He reports overall improvement of abdominal pain, but reports worsening of chronic low back pain secondary to lying in bed.  Will decrease frequency of IV narcotics as this is likely contributing to ileus.  Encourage to be out of bed daily.      Consultants/Specialty  Intensivist  Gastroenterology  Nephrology    Code Status  Full Code    Disposition  Monitor in ICU today per Discussion with Dr Tapia    Review of Systems  Review of Systems   Constitutional: Positive for malaise/fatigue. Negative for chills and fever.   HENT: Negative for congestion and sore throat.    Eyes: Negative for blurred vision and double vision.   Respiratory: Negative for shortness of breath.    Cardiovascular: Negative for chest pain and leg swelling.   Gastrointestinal: Positive for abdominal pain. Negative for diarrhea, nausea and vomiting.   Genitourinary: Negative for hematuria.   Musculoskeletal: Positive for back pain and myalgias.   Neurological: Negative for dizziness, focal weakness and headaches.   Psychiatric/Behavioral: The patient is not nervous/anxious.         Physical Exam  Temp:  [36.3 °C (97.3 °F)-37 °C (98.6 °F)] 36.9 °C (98.4 °F)  Pulse:  [] 98  Resp:  [18-26] 20  BP: (123-193)/() 177/100  SpO2:  [86 %-96 %] 94 %    Physical Exam  Vitals signs reviewed.   Constitutional:       General: He is not in acute distress.     Appearance: Normal appearance. He is obese.  He is not diaphoretic.   HENT:      Head: Normocephalic and atraumatic.      Nose: Nose normal.   Eyes:      General:         Right eye: No discharge.         Left eye: No discharge.      Conjunctiva/sclera: Conjunctivae normal.   Neck:      Musculoskeletal: Normal range of motion. No muscular tenderness.   Cardiovascular:      Rate and Rhythm: Normal rate and regular rhythm.      Pulses:           Radial pulses are 2+ on the right side and 2+ on the left side.        Dorsalis pedis pulses are 2+ on the right side and 2+ on the left side.      Heart sounds: No murmur.   Pulmonary:      Effort: Pulmonary effort is normal. No respiratory distress.      Breath sounds: Normal breath sounds. No wheezing or rales.   Abdominal:      General: Bowel sounds are absent. There is distension.      Tenderness: There is abdominal tenderness. There is no guarding.   Musculoskeletal:         General: No swelling or tenderness.      Right lower leg: No edema.      Left lower leg: No edema.   Skin:     Coloration: Skin is not jaundiced or pale.   Neurological:      General: No focal deficit present.      Mental Status: He is alert and oriented to person, place, and time. Mental status is at baseline.      Cranial Nerves: No cranial nerve deficit.   Psychiatric:         Mood and Affect: Mood normal.         Behavior: Behavior normal.         Fluids    Intake/Output Summary (Last 24 hours) at 1/19/2021 1444  Last data filed at 1/19/2021 1400  Gross per 24 hour   Intake 300 ml   Output 2225 ml   Net -1925 ml       Laboratory  Recent Labs     01/17/21  0600 01/18/21  0336 01/19/21  0346   WBC 6.9 5.0 7.8   RBC 4.90 4.47* 4.07*   HEMOGLOBIN 15.1 13.6* 12.5*   HEMATOCRIT 47.0 42.3 38.7*   MCV 95.9 94.6 95.1   MCH 30.8 30.4 30.7   MCHC 32.1* 32.2* 32.3*   RDW 44.8 43.8 44.3   PLATELETCT 211 208 183   MPV 11.2 11.3 11.2     Recent Labs     01/17/21  1200 01/18/21  0336 01/19/21  0346   SODIUM 132* 129* 136   POTASSIUM 5.5 4.8 4.4   CHLORIDE  97 94* 99   CO2 22 22 25   GLUCOSE 155* 155* 121*   BUN 70* 72* 64*   CREATININE 4.04* 3.05* 2.24*   CALCIUM 7.2* 6.9* 8.8             Recent Labs     01/18/21  1923   TRIGLYCERIDE 211*       Imaging  CT-ABDOMEN-PELVIS W/O   Final Result         1. Worsening inflammatory change and fluid surrounding the pancreas, in keeping with acute pancreatitis.      2. Diffuse dilatation of the small bowel and proximal colon with decompressed distal colon adjacent to the inflammation in the tail the pancreas. This could relate to colonic obstruction due to adjacent inflammatory change or ileus.      3. Mild wall thickening of the stomach fundus adjacent to the pancreatic tail, likely reactive.      DX-ABDOMEN FOR TUBE PLACEMENT   Final Result         Gastric drainage tube with tip projecting over the expected area of the stomach fundus.      DX-CHEST-FOR LINE PLACEMENT Perform procedure in: Patient's Room   Final Result      Left midlung and bibasilar linear atelectasis.      Dialysis catheter projects over the SVC.      No pneumothorax.         [id number] VIEW   Final Result      Increased bowel gas concerning for early or partial small bowel obstruction.      DX-CHEST-PORTABLE (1 VIEW)   Final Result      Hypoinflation with LEFT mid and lower lung atelectasis, with possible superimposed LEFT basilar pneumonia.      [id number]   Final Result      1.  Pancreatic edema with significant peripancreatic stranding and ill-defined fluid, consistent with acute interstitial edematous pancreatitis.      2.  Small amount of ascites.      3.  No choledocholithiasis is identified.      4.  Status post cholecystectomy.      CT-ABDOMEN-PELVIS WITH   Final Result      1.  Inflammatory stranding involving the pancreas with fluid within the anterior pararenal space consistent with pancreatitis.      2.  Fatty liver.      3.  Prior cholecystectomy.      4.  Bibasilar atelectasis/consolidation.      DX-CHEST-PORTABLE (1 VIEW)   Final Result       No evidence of acute cardiopulmonary process.      [id number] VIEW    (Results Pending)        Assessment/Plan  * Acute pancreatitis  Assessment & Plan  1/15 MRCP without acute etiology  GI believes due to passed gallstone, although could be related to EtOH or question if due to medication  Virus could be other etiology  Pain management  NPO for now.  mobilize    Ileus (HCC)  Assessment & Plan  Ileus vs SBO  Nasal gastric tube to wall suction  Hold oral meds that may not be absorbed  Correct electrolytes  Check TSH  Mobilize  Wean narcotics as able.  Discussed with patient :  Discussed imaging with surgery.  Suspect ileus in the setting of pancreatitis.  No evidence of obstruction.  Continue NGT and bowel rest.  Encourage to mobilize.  Decreased frequency on IV dilaudid.      Acute hypoxemic respiratory failure (HCC)  Assessment & Plan  Resolved.     Hyperglycemia- (present on admission)  Assessment & Plan   Glucose:148  1/15 HgbA1c:5.5    Acute kidney injury (HCC)  Assessment & Plan  Improving.   Nephrology consulting  Continue IV hydration.   HD catheter in R IJ site.  Monitor I/O's and labs.    Essential hypertension- (present on admission)  Assessment & Plan  Coreg 12.5mg q HS with parameters  Monitor vitals.    Hypomagnesemia  Assessment & Plan   M.8  Monitor and replete as need    Hyponatremia  Assessment & Plan  Resolved.     Chronic back pain- (present on admission)  Assessment & Plan  Reports worsening of pain secondary to lying in bed  Ordered lidocaine patch.   Start PRN toradol    Hypocalcemia  Assessment & Plan  Supplement and monitor    Ca:6.9. Correct Ca for albumin 7.6  CaCl ordered   Alb:3.1  Ca:7.6.  Corrected Ca:8.3.  CaCl given    Hyperkalemia  Assessment & Plan  Resolved  Monitor daily BMP    Hypertriglyceridemia- (present on admission)  Assessment & Plan  Levels are 571, considered moderate, >500 risks increase somewhat for inducing pancreatitis  Repeat level  ordered 1/18    Restless leg syndrome- (present on admission)  Assessment & Plan  Requip    Obesity (BMI 30-39.9)- (present on admission)  Assessment & Plan  1/17 Encouraged dietary change and stopping his 4 beers daily  Body mass index is 36.37 kg/m².    Crohn disease (HCC)- (present on admission)  Assessment & Plan  No acute issue at this time.       VTE prophylaxis: Heparin 5000 TID

## 2021-01-19 NOTE — PROGRESS NOTES
1250 ml of clear yellow urine in jorgensen bag. Verbal orders by Dr hearn to remove jorgensen.  Jorgensen removed at 1400, catheter tip intact. Urinal and call light with in reach of pt.

## 2021-01-19 NOTE — PROGRESS NOTES
Pt. is in severe back pain from his slipped disks. He said the dilaudid is not really helping and would like to avoid taking more opiates if possible. Tylenol helps minimally. Hospitalist paged, one time dose of Toradol ordered.

## 2021-01-19 NOTE — ASSESSMENT & PLAN NOTE
On 1/25 the patient had a PCO2 of 114. He does not have a history of COPD. The patient was intubated from 1/25 to 1/27. He is now requiring 1L of oxygen (not on oxygen at home). This is likely not ARDS, but possibly atelectasis vs decreased respiratory drive from opiate use during that time and the lasting effects from being subsequently intubated. Slowly, his O2 requirements have been going down.     Plan:  - Continue IS for atelectasis. The patient understands he needs to be using this 10 times an hour for every hour he is awake.  - Continue RT protocol with ladan   - Continue oxygen weaning protocol and keep sats around 90%.   - Out of bed to chair orders are in to also help with lung volumes. Aggressive PT/OT to help keep the patient mobilized.

## 2021-01-20 PROBLEM — Z91.89 AT RISK FOR MALNUTRITION: Status: ACTIVE | Noted: 2021-01-01

## 2021-01-20 NOTE — PROGRESS NOTES
Assumed pt care at 0715.  Received report from alejandro RN.  Assessment completed.  Pt AAOx4.  Pt complains of pain.  Medicated per MAR.  NG suction in place.  MD at bedside.  Pt needs met at this time.

## 2021-01-20 NOTE — ASSESSMENT & PLAN NOTE
NPO for consecutive days secondary to above.   Continue IV hydration and monitoring electrolytes  Consulted dietician.   1/21:  Start TPN until bowel function returns.  1/23:  Trial clear liquids.  Will d/c TPN if tolerates oral diet.    1/25:  Tolerating full liquids.

## 2021-01-20 NOTE — CARE PLAN
Problem: Nutritional:  Goal: Achieve adequate nutritional intake  Description: Diet to advance >clears per MD and patient will consume >50% of meals  Outcome: NOT MET   See dietary note. RD following.

## 2021-01-20 NOTE — DISCHARGE PLANNING
Anticipated Discharge Disposition: Home    Action: Chart review completed. Pt is not medically cleared for discharge; awaiting resolution of SBO    Barriers to Discharge: Medical clearance    Plan: Care coordination will continue to follow and assist with discharge plan.

## 2021-01-20 NOTE — PROGRESS NOTES
"[facility] -  PROGRESS NOTE               Author: Zay Perry M.D. Date & Time: 1/20/2021  9:59 AM     HPI:  55 y.o. male who presented 1/14/2021 with abdominal pain and emesis, found to have acute pancreatitis with ONOFRE prompting renal consult.     Upon admission, CT abdomen showing inflammatory stranding involving pancreas with fluid within the anterior pararenal space consistent with pancreatitis.  Cr 1.1 on admission, worsened to 3.5 this AM. Oliguric overnight and worsening hyperkalemia despite NS at 75cc/hr. ~200cc UOP after lasix 20mg. Ongoing severe abd pain and worsening abd distention. Worsening shortness or breath and subjective fevers. Being transferred to ICU.    DAILY NEPHROLOGY SUMMARY:  1/16: consult done  1/17: 390cc UOP documented, found to have SBO-NG tube placed, cr stable, abd pain improving, no fluids running   1/18: Hypertensive this AM, improving UOP, cr downtrending   1/19: Transferred out of the ICU, Cr downtrending, main complaint is back pain, NG tube  1/20: off oxygen, NG tube remains, has not passed gas yet, cr down trending     PAST FAMILY HISTORY: Reviewed and Unchanged  SOCIAL HISTORY: Reviewed and Unchanged  CURRENT MEDICATIONS: Reviewed  IMAGING STUDIES: Reviewed    ROS  General: +malaise  CV: No chest pain  RESP: no shortness of breath  GI: +abdominal pain   : No dysuria or gross hematuria  MSK: No trauma  Skin: No rashes  Neuro: No tremors  Psych: No depression    PHYSICAL EXAM  VS:  /88   Pulse (!) 111   Temp 37 °C (98.6 °F) (Temporal)   Resp 20   Ht 1.727 m (5' 8\")   Wt 100.2 kg (220 lb 14.4 oz)   SpO2 91%   BMI 33.59 kg/m²   GENERAL: NAD   CV: no edema  RESP: non-labored  GI: distended, NG tube  MSK: No joint deformities   SKIN: No concerning rashes  NEURO: AOx3  PSYCH: Cooperative    Fluids:  In: -   Out: 2980     LABS:  Recent Results (from the past 24 hour(s))   ACCU-CHEK GLUCOSE    Collection Time: 01/19/21 11:43 AM   Result " Value Ref Range    Glucose - Accu-Ck 114 (H) 65 - 99 mg/dL   ACCU-CHEK GLUCOSE    Collection Time: 01/19/21  5:06 PM   Result Value Ref Range    Glucose - Accu-Ck 106 (H) 65 - 99 mg/dL   ACCU-CHEK GLUCOSE    Collection Time: 01/19/21 11:57 PM   Result Value Ref Range    Glucose - Accu-Ck 108 (H) 65 - 99 mg/dL   CBC WITH DIFFERENTIAL    Collection Time: 01/20/21  2:59 AM   Result Value Ref Range    WBC 9.5 4.8 - 10.8 K/uL    RBC 3.78 (L) 4.70 - 6.10 M/uL    Hemoglobin 11.4 (L) 14.0 - 18.0 g/dL    Hematocrit 35.4 (L) 42.0 - 52.0 %    MCV 93.7 81.4 - 97.8 fL    MCH 30.2 27.0 - 33.0 pg    MCHC 32.2 (L) 33.7 - 35.3 g/dL    RDW 44.6 35.9 - 50.0 fL    Platelet Count 176 164 - 446 K/uL    MPV 11.6 9.0 - 12.9 fL    Neutrophils-Polys 57.40 44.00 - 72.00 %    Lymphocytes 7.80 (L) 22.00 - 41.00 %    Monocytes 7.80 0.00 - 13.40 %    Eosinophils 0.00 0.00 - 6.90 %    Basophils 0.00 0.00 - 1.80 %    Nucleated RBC 0.00 /100 WBC    Neutrophils (Absolute) 7.76 (H) 1.82 - 7.42 K/uL    Lymphs (Absolute) 0.74 (L) 1.00 - 4.80 K/uL    Monos (Absolute) 0.74 0.00 - 0.85 K/uL    Eos (Absolute) 0.00 0.00 - 0.51 K/uL    Baso (Absolute) 0.00 0.00 - 0.12 K/uL    NRBC (Absolute) 0.00 K/uL   Comp Metabolic Panel    Collection Time: 01/20/21  2:59 AM   Result Value Ref Range    Sodium 138 135 - 145 mmol/L    Potassium 4.5 3.6 - 5.5 mmol/L    Chloride 100 96 - 112 mmol/L    Co2 24 20 - 33 mmol/L    Anion Gap 14.0 7.0 - 16.0    Glucose 113 (H) 65 - 99 mg/dL    Bun 67 (H) 8 - 22 mg/dL    Creatinine 2.05 (H) 0.50 - 1.40 mg/dL    Calcium 8.8 8.5 - 10.5 mg/dL    AST(SGOT) 15 12 - 45 U/L    ALT(SGPT) 17 2 - 50 U/L    Alkaline Phosphatase 67 30 - 99 U/L    Total Bilirubin 0.4 0.1 - 1.5 mg/dL    Albumin 3.1 (L) 3.2 - 4.9 g/dL    Total Protein 6.4 6.0 - 8.2 g/dL    Globulin 3.3 1.9 - 3.5 g/dL    A-G Ratio 0.9 g/dL   MAGNESIUM    Collection Time: 01/20/21  2:59 AM   Result Value Ref Range    Magnesium 2.5 1.5 - 2.5 mg/dL   PHOSPHORUS    Collection Time:  01/20/21  2:59 AM   Result Value Ref Range    Phosphorus 3.3 2.5 - 4.5 mg/dL   LIPASE    Collection Time: 01/20/21  2:59 AM   Result Value Ref Range    Lipase 62 11 - 82 U/L   IONIZED CALCIUM    Collection Time: 01/20/21  2:59 AM   Result Value Ref Range    Ionized Calcium 1.1 1.1 - 1.3 mmol/L   ESTIMATED GFR    Collection Time: 01/20/21  2:59 AM   Result Value Ref Range    GFR If  41 (A) >60 mL/min/1.73 m 2    GFR If Non  34 (A) >60 mL/min/1.73 m 2   DIFFERENTIAL MANUAL    Collection Time: 01/20/21  2:59 AM   Result Value Ref Range    Bands-Stabs 24.30 (H) 0.00 - 10.00 %    Metamyelocytes 2.60 %    Manual Diff Status PERFORMED    PERIPHERAL SMEAR REVIEW    Collection Time: 01/20/21  2:59 AM   Result Value Ref Range    Peripheral Smear Review see below    PLATELET ESTIMATE    Collection Time: 01/20/21  2:59 AM   Result Value Ref Range    Plt Estimation Normal    MORPHOLOGY    Collection Time: 01/20/21  2:59 AM   Result Value Ref Range    RBC Morphology Normal    ACCU-CHEK GLUCOSE    Collection Time: 01/20/21  6:49 AM   Result Value Ref Range    Glucose - Accu-Ck 104 (H) 65 - 99 mg/dL       (click the triangle to expand results)    ASSESSMENT:  # ONOFRE: Initially oliguric. Resolving. In setting of pancreatitis, contrast and possible compartment syndrome.   # Hyperkalemia: resolved   # Pancreatitis  # Hypoxic rasp failure  # SBO: NG tube  # HX  Non-ischemic cardiomyopathy  # Hypocalcemia   # Crohns   # Anemia: acute    PLAN:  -continue IVFs at 75cc/hr while NPO/NG tube  -PRN antihypertensive in adding to coreg for now  -Holding RAAS blockade   -Strict I/Os  -daily renal function panel     Thank you,     Zay Perry MD  [facility]  [phone]

## 2021-01-20 NOTE — DIETARY
Nutrition Services Update: following for diet advancement - consulted today for poor oral intake    Day 6 of admit. NPO since admit (x 6 days).     RD started following 1/18 d/t prolonged NPO status. KUB 1/19 showed SBO. NG in place, set to suction. Per today's progress note, no surgical interventions planned at this time with plans to continue bowel rest. Is noted to have desire to eat, however diet advancement not indicated at this time.     Plan/Recommend:   1. Advance diet beyond NPO / clear liquids as medically feasible   2. If no diet advancement next 24-48 hrs would advise consideration for nutrition support via parenteral nutrition   3. RD will continue to monitor for diet advancement vs change to nutrition plan of care    RD following

## 2021-01-20 NOTE — PROGRESS NOTES
Hospital Medicine Daily Progress Note    Date of Service  1/20/2021    Chief Complaint  Abdominal pain    Hospital Course  55 y.o. male with a history of EtOH use, prior PE, CAD, HTN, DLD admitted 1/14/2021 with severe pancreatitis, and developed acute kidney failure and hyperkalemia.  The patient had a cholecystectomy 7/2020.  He now has associated ileus.     Interval Problem Update  1/19:  NGT in place.  No audible bowel sounds.  Discussed imaging with surgery.  Suspect ileus in the setting of pancreatitis.  No evidence of bowel obstruction.  He reports overall improvement of abdominal pain, but reports worsening of chronic low back pain secondary to lying in bed.  Will decrease frequency of IV narcotics as this is likely contributing to ileus.  Encourage to be out of bed daily.    1/20:  Repeat KUB reviewed.  SBO noted.  Discussed with surgery.  No surgical interventions at this time.  Continue bowel rest.  Repeat CT pending.  Patient complaining of significant discomfort.  He is very anxious to eat.  Discussed further weaning narcotics to promote return of bowel function.  He is also recommended to ambulate.      Consultants/Specialty  Intensivist  Gastroenterology  Nephrology    Code Status  Full Code    Disposition  TBD.    Review of Systems  Review of Systems   Constitutional: Positive for malaise/fatigue. Negative for chills and fever.   HENT: Negative for congestion and sore throat.    Eyes: Negative for blurred vision and double vision.   Respiratory: Negative for cough and shortness of breath.    Cardiovascular: Negative for chest pain and leg swelling.   Gastrointestinal: Positive for abdominal pain. Negative for diarrhea, nausea and vomiting.   Genitourinary: Negative for hematuria.   Musculoskeletal: Positive for back pain and myalgias.   Neurological: Negative for dizziness, focal weakness and headaches.   Psychiatric/Behavioral: The patient is not nervous/anxious.         Physical Exam  Temp:  [36.5  °C (97.7 °F)-37 °C (98.6 °F)] 37 °C (98.6 °F)  Pulse:  [] 111  Resp:  [16-22] 20  BP: (143-169)/() 154/88  SpO2:  [91 %-98 %] 91 %    Physical Exam  Vitals signs reviewed.   Constitutional:       General: He is not in acute distress.     Appearance: Normal appearance. He is obese. He is ill-appearing.   HENT:      Head: Normocephalic and atraumatic.      Nose: Nose normal.      Comments: NGT in place.   Eyes:      General: No scleral icterus.     Conjunctiva/sclera: Conjunctivae normal.   Neck:      Musculoskeletal: Normal range of motion. No muscular tenderness.   Cardiovascular:      Rate and Rhythm: Normal rate and regular rhythm.      Pulses:           Radial pulses are 2+ on the right side and 2+ on the left side.        Dorsalis pedis pulses are 2+ on the right side and 2+ on the left side.      Heart sounds: No murmur.   Pulmonary:      Effort: Pulmonary effort is normal. No respiratory distress.      Breath sounds: Normal breath sounds. No wheezing or rales.   Abdominal:      General: Bowel sounds are absent. There is distension.      Palpations: There is no mass.      Tenderness: There is abdominal tenderness.      Comments: No BS   Musculoskeletal:         General: No swelling or tenderness.      Right lower leg: No edema.      Left lower leg: No edema.   Skin:     Coloration: Skin is not jaundiced or pale.   Neurological:      General: No focal deficit present.      Mental Status: He is alert and oriented to person, place, and time. Mental status is at baseline.      Cranial Nerves: No cranial nerve deficit.   Psychiatric:         Mood and Affect: Mood is anxious.         Fluids    Intake/Output Summary (Last 24 hours) at 1/20/2021 1256  Last data filed at 1/20/2021 0830  Gross per 24 hour   Intake --   Output 3980 ml   Net -3980 ml       Laboratory  Recent Labs     01/18/21  0336 01/19/21  0346 01/20/21  0259   WBC 5.0 7.8 9.5   RBC 4.47* 4.07* 3.78*   HEMOGLOBIN 13.6* 12.5* 11.4*    HEMATOCRIT 42.3 38.7* 35.4*   MCV 94.6 95.1 93.7   MCH 30.4 30.7 30.2   MCHC 32.2* 32.3* 32.2*   RDW 43.8 44.3 44.6   PLATELETCT 208 183 176   MPV 11.3 11.2 11.6     Recent Labs     01/18/21  0336 01/19/21  0346 01/20/21  0259   SODIUM 129* 136 138   POTASSIUM 4.8 4.4 4.5   CHLORIDE 94* 99 100   CO2 22 25 24   GLUCOSE 155* 121* 113*   BUN 72* 64* 67*   CREATININE 3.05* 2.24* 2.05*   CALCIUM 6.9* 8.8 8.8             Recent Labs     01/18/21  1923   TRIGLYCERIDE 211*       Imaging  CT-ABDOMEN-PELVIS W/O   Final Result      1.  Again seen pancreatitis with peripancreatic effusions. This is mildly worsened from comparison.      2.  Again seen dilated loops of small intestine with decompression of the colon and distal small bowel consistent with ileus versus partial small bowel obstruction.      3.  Bibasilar atelectasis and small bilateral pleural effusions.      OK-[id number]-1 VIEW   Final Result      1.  Moderate small bowel dilation with multiple air-fluid levels consistent with small bowel obstruction      2.  Enteric catheter appears appropriately located      CT-ABDOMEN-PELVIS W/O   Final Result         1. Worsening inflammatory change and fluid surrounding the pancreas, in keeping with acute pancreatitis.      2. Diffuse dilatation of the small bowel and proximal colon with decompressed distal colon adjacent to the inflammation in the tail the pancreas. This could relate to colonic obstruction due to adjacent inflammatory change or ileus.      3. Mild wall thickening of the stomach fundus adjacent to the pancreatic tail, likely reactive.      DX-ABDOMEN FOR TUBE PLACEMENT   Final Result         Gastric drainage tube with tip projecting over the expected area of the stomach fundus.      DX-CHEST-FOR LINE PLACEMENT Perform procedure in: Patient's Room   Final Result      Left midlung and bibasilar linear atelectasis.      Dialysis catheter projects over the SVC.      No pneumothorax.         [id number] VIEW    Final Result      Increased bowel gas concerning for early or partial small bowel obstruction.      DX-CHEST-PORTABLE (1 VIEW)   Final Result      Hypoinflation with LEFT mid and lower lung atelectasis, with possible superimposed LEFT basilar pneumonia.      DP-IINEVRE-V/O   Final Result      1.  Pancreatic edema with significant peripancreatic stranding and ill-defined fluid, consistent with acute interstitial edematous pancreatitis.      2.  Small amount of ascites.      3.  No choledocholithiasis is identified.      4.  Status post cholecystectomy.      CT-ABDOMEN-PELVIS WITH   Final Result      1.  Inflammatory stranding involving the pancreas with fluid within the anterior pararenal space consistent with pancreatitis.      2.  Fatty liver.      3.  Prior cholecystectomy.      4.  Bibasilar atelectasis/consolidation.      DX-CHEST-PORTABLE (1 VIEW)   Final Result      No evidence of acute cardiopulmonary process.           Assessment/Plan  * Acute pancreatitis  Assessment & Plan  1/15 MRCP without acute etiology  GI believes due to passed gallstone, although could be related to EtOH or question if due to medication  Virus could be other etiology  Pain management  NPO for now.  Mobilize  1/20:  Abdominal pain improving.  Continues to have ileus.      Ileus (HCC)  Assessment & Plan  Ileus vs SBO  Nasal gastric tube to wall suction  Hold oral meds that may not be absorbed  Correct electrolytes  Check TSH  Mobilize  Wean narcotics as able.  Discussed with patient 1/17 1/19:  Discussed imaging with surgery.  Suspect ileus in the setting of pancreatitis.  No evidence of obstruction.  Continue NGT and bowel rest.  Encourage to mobilize.  Decreased frequency on IV dilaudid.    1/20:  Repeat KUB showing SBO.  Repeat CT ordered.  Discussed with surgery again.  Imaging reviewed and do not recommend surgical intervention as this is likely secondary to pancreatitis.  Continue plan as above.  Further decreased dilaudid  frequency.     Acute hypoxemic respiratory failure (HCC)  Assessment & Plan  Resolved.     Hyperglycemia- (present on admission)  Assessment & Plan   Glucose:148  1/15 HgbA1c:5.5    Acute kidney injury (HCC)  Assessment & Plan  Improving.   Nephrology consulting  Continue IV hydration.   HD catheter in R IJ site.  Monitor I/O's and labs.    Essential hypertension- (present on admission)  Assessment & Plan  Uncontrolled.  Increase coreg 25 mg daily  Start amlodipine.   Holding spironolactone, ACE, and ARB in the setting ONOFRE  Monitor vitals.    At risk for malnutrition  Assessment & Plan  NPO x 6 days secondary to above.   Continue IV hydration and monitoring electrolytes  Consulted dietician.     Hypomagnesemia  Assessment & Plan   M.8  Monitor and replete as need    Hyponatremia  Assessment & Plan  Resolved.     Chronic back pain- (present on admission)  Assessment & Plan  Reports worsening of pain secondary to lying in bed  Ordered lidocaine patch.   Start PRN toradol    Hypocalcemia  Assessment & Plan  Supplement and monitor    Ca:6.9. Correct Ca for albumin 7.6  CaCl ordered   Alb:3.1  Ca:7.6.  Corrected Ca:8.3.  CaCl given    Hyperkalemia  Assessment & Plan  Resolved  Monitor daily BMP    Hypertriglyceridemia- (present on admission)  Assessment & Plan  Levels are 571, considered moderate, >500 risks increase somewhat for inducing pancreatitis  Repeat level ordered     Restless leg syndrome- (present on admission)  Assessment & Plan  Requip    Obesity (BMI 30-39.9)- (present on admission)  Assessment & Plan   Encouraged dietary change and stopping his 4 beers daily  Body mass index is 36.37 kg/m².    Crohn disease (HCC)- (present on admission)  Assessment & Plan  No acute issue at this time.       VTE prophylaxis: Heparin 5000 TID

## 2021-01-21 NOTE — DIETARY
"Nutrition Support Assessment:  Day 7 of admit.  Shaka Riley is a 55 y.o. male with admitting DX of Acute pancreatitis with uninfected necrosis.      Current problem list:  - Ileus, acute hypoxemic respiratory failure, acute pancreatitis, hyperglycemia, ONOFRE, HTN, at risk for malnutrition, hyponatremia, hypomagnesemia, chronic back pain, hyperkalemia, hypocalcemia, hypertriglyceridemia, obesity, crohns disease.      Assessment:  Estimated Nutritional Needs based on:   Height: 172.7 cm (5' 7.99\")  Weight: 100.2 kg (220 lb 14.4 oz) - standing scale weight on .  Weight to Use in Calculations: 100.2 kg (220 lb 14.4 oz)  Body mass index is 33.6 kg/m²., BMI classification: Class I obesity.   Ideal Body Weight: 70 kg.     Calculation/Equation: MSJ x 1 = 1808 kcal.   Total Calories / day: 1808 - 2104 (Calories / k - 21)  Total Grams Protein / day: 100 - 120 (Grams Protein / k - 1.2 actual wt; 1.4 - 1.7 IBW)     Evaluation:   1. NPO day 7 - inadequate nutrition. PICC to be placed for nutrition support.  2. Admitted with severe pancreatitis, ONOFRE, found to have SBO.   3. Per surgery note  \"CT with findings mildly dilated small intestine, air and fluid appears to transit past the ligament of Treitz, evolving inflammatory changes of the pancreas\".   4. Nephrology following for ONOFRE, no RRT at this time..   5. GI: NG to suction with 3.8L output today. No BM since admit.   6. Labs: Na 133, Glucose 141, Creatinine 2.62, BUN 74, Phos 4.8, Mg 2.8, .   7. Meds: SSI, lantus, lasix (40 mg x1 dose today).   8. Weight was 237# (108 kg) on 20 - pt down 17# / 7% x 6 months. Notable but not severe.      Malnutrition Risk: At risk given NPO x 7 days but does not meet criteria at this time.      Recommendations/Plan:  1. TPN per pharmD.     2. Diet advancement as medically able.   3. Monitor weight.     RD Following.               "

## 2021-01-21 NOTE — CONSULTS
Surgical History and Physical    Date of Service: 1/20/2021    Requesting Physician: Greg Rankin MD - Hospitalist    PCP: Quinten Howe M.D.    Reason for Consultation: Ileus    HPI: This is a 55 y.o. male who is presenting with pancreatitis from a presumed biliary and/or EtOH source.  He has been admitted for 6 days and has had no significant distal bowel function.  He has undergone multiple CT scans and and MRCP since admission.  Some of the radiology reads allude to intestinal findings consistent with an ileus or perhaps a bowel obstruction.    The patient was evaluated at bedside with his wife present.  He reports his abdomen is painful and uncomfortable.  No nausea, fevers, chills.  He reports taking in 3-4 large cups of ice each day, but the Day RN refutes this.  He has had 3L of NG output today so far.  He is not on antibiotics and his WBC has been normal for the past several days.  His acute kidney injury is slowly resolving.      PAST MEDICAL HISTORY:   Past Medical History:   Diagnosis Date   • Clotting disorder ([facility])     PE   • Congestive heart failure (HCC) 7/2015    EF 30-35%; Dilated R atrium; LVH; Mild MR; Mild AI; Mild TR   • Crohn disease ([facility])    • Hypertension    • PAF (paroxysmal atrial fibrillation) ([facility])    • Sleep apnea     un-diagnosed and pending sleep study         PAST SURGICAL HISTORY:   Past Surgical History:   Procedure Laterality Date   • CRYSTAL BY LAPAROSCOPY  7/29/2020    Procedure: CHOLECYSTECTOMY, LAPAROSCOPIC;  Surgeon: Caroline Skelton M.D.;  Location: SURGERY [facility];  Service: General          ALLERGIES: Patient has no known allergies.       CURRENT MEDICATIONS:   Outpatient Medications Marked as Taking for the 1/14/21 encounter (Hospital Encounter)   Medication Sig   • ibuprofen (MOTRIN) 600 MG Tab Take 600 mg by mouth every 6 hours as needed.         FAMILY HISTORY: family history includes Cancer in his father and mother; Heart Disease in his paternal grandfather  "and paternal grandmother.      SOCIAL HISTORY:  reports that he has never smoked. He has never used smokeless tobacco. He reports current alcohol use of about 1.2 oz of alcohol per week. He reports that he does not use drugs.  His wife reports her  drinks 4-5 beers every day.    Review of Systems:  Constitutional: Negative for fever, chills, weight loss, malaise/fatigue and diaphoresis.   HENT: Negative for hearing loss, ear pain, nosebleeds, congestion, sore throat, neck pain, tinnitus and ear discharge.    Eyes: Negative for blurred vision, double vision, photophobia, pain, discharge and redness.   Respiratory: Negative for cough, hemoptysis, sputum production, shortness of breath, wheezing and stridor.    Cardiovascular: Negative for chest pain, palpitations, orthopnea, claudication, leg swelling and PND.   Gastrointestinal: Negative for heartburn, nausea, vomiting, abdominal pain, diarrhea, constipation, blood in stool and melena.   Genitourinary: Negative for dysuria, urgency, frequency, hematuria and flank pain.   Musculoskeletal: Negative for myalgias, back pain, joint pain and falls.   Skin: Negative for itching and rash.  Neurological: Negative for dizziness, tingling, tremors, sensory change, speech change, focal weakness, seizures, loss of consciousness, weakness and headaches.   Endo/Heme/Allergies: Negative for environmental allergies and polydipsia. Does not bruise/bleed easily.   Psychiatric/Behavioral: Negative for depression, suicidal ideas, hallucinations, memory loss and substance abuse. The patient is not nervous/anxious and does not have insomnia.    Physical Exam:  /89   Pulse (!) 107   Temp 37.5 °C (99.5 °F) (Temporal)   Resp (!) 21   Ht 1.727 m (5' 8\")   Wt 100.2 kg (220 lb 14.4 oz)   SpO2 92%   Vitals:    01/20/21 1556   BP: 147/89   Pulse: (!) 107   Resp: (!) 21   Temp: 37.5 °C (99.5 °F)   SpO2: 92%     GENERAL:  Otherwise healthy-appearing and in no acute " distress  HEENT:  Atraumatic, normocephalic.  Normal pinna bilaterally.  External auditory canals are without discharge.  Conjunctivae and sclerae are clear. Extraocular movements are full. Pupils are equal, round, and reactive to light.  Oral mucosa is moist.  NECK:  Soft and supple without lymphadenopathy. No masses are noted.  Thyroid is of normal size and texture.  Trachea is midline.  CHEST:  Lungs are clear to auscultation bilaterally.  No masses, lesions, or signs of trauma were noted.     CARDIOVASCULAR:  Regular rate and rhythm.  No murmurs appreciated.  No JVD.  Palpable pulses present in all four extremities.    ABDOMEN:  Soft, non-tender with moderate palpation of all 4 quadrants, the epigastrum is non-tender, markedly distended.  diffusely tympanitic.  NG with dark brown output.  MUSCULOSKELETAL: Normal range of motion x4 extremities.    SKIN:  Warm and well perfused. No rashes.  NEUROLOGIC:  Alert and oriented. Cranial nerves II through XII are grossly intact. Motor and sensory exams are normal in all four extremities. Motor and sensory reflexes are 2+ and symmetric with bilateral plantar responses.  PSYCHIATRIC: Affect and mood is appropriate for age and condition.    Labs:  Recent Labs     01/18/21 0336 01/19/21  0346 01/20/21  0259   WBC 5.0 7.8 9.5   RBC 4.47* 4.07* 3.78*   HEMOGLOBIN 13.6* 12.5* 11.4*   HEMATOCRIT 42.3 38.7* 35.4*   MCV 94.6 95.1 93.7   MCH 30.4 30.7 30.2   MCHC 32.2* 32.3* 32.2*   RDW 43.8 44.3 44.6   PLATELETCT 208 183 176   MPV 11.3 11.2 11.6     Recent Labs     01/18/21 0336 01/19/21  0346 01/20/21  0259   SODIUM 129* 136 138   POTASSIUM 4.8 4.4 4.5   CHLORIDE 94* 99 100   CO2 22 25 24   GLUCOSE 155* 121* 113*   BUN 72* 64* 67*   CREATININE 3.05* 2.24* 2.05*   CALCIUM 6.9* 8.8 8.8         Recent Labs     01/18/21  0336 01/19/21  0346 01/20/21  0259   ASTSGOT 19 12 15   ALTSGPT 27 20 17   TBILIRUBIN 0.5 0.5 0.4   ALKPHOSPHAT 70 66 67   GLOBULIN 3.6* 3.7* 3.3        Radiology:  CT-ABDOMEN-PELVIS W/O   Final Result      1.  Again seen pancreatitis with peripancreatic effusions. This is mildly worsened from comparison.      2.  Again seen dilated loops of small intestine with decompression of the colon and distal small bowel consistent with ileus versus partial small bowel obstruction.      3.  Bibasilar atelectasis and small bilateral pleural effusions.      [id number] VIEW   Final Result      1.  Moderate small bowel dilation with multiple air-fluid levels consistent with small bowel obstruction      2.  Enteric catheter appears appropriately located      CT-ABDOMEN-PELVIS W/O   Final Result         1. Worsening inflammatory change and fluid surrounding the pancreas, in keeping with acute pancreatitis.      2. Diffuse dilatation of the small bowel and proximal colon with decompressed distal colon adjacent to the inflammation in the tail the pancreas. This could relate to colonic obstruction due to adjacent inflammatory change or ileus.      3. Mild wall thickening of the stomach fundus adjacent to the pancreatic tail, likely reactive.      DX-ABDOMEN FOR TUBE PLACEMENT   Final Result         Gastric drainage tube with tip projecting over the expected area of the stomach fundus.      DX-CHEST-FOR LINE PLACEMENT Perform procedure in: Patient's Room   Final Result      Left midlung and bibasilar linear atelectasis.      Dialysis catheter projects over the SVC.      No pneumothorax.         [id number] VIEW   Final Result      Increased bowel gas concerning for early or partial small bowel obstruction.      DX-CHEST-PORTABLE (1 VIEW)   Final Result      Hypoinflation with LEFT mid and lower lung atelectasis, with possible superimposed LEFT basilar pneumonia.      WY-PTBASIA-T/O   Final Result      1.  Pancreatic edema with significant peripancreatic stranding and ill-defined fluid, consistent with acute interstitial edematous pancreatitis.      2.  Small amount of ascites.       3.  No choledocholithiasis is identified.      4.  Status post cholecystectomy.      CT-ABDOMEN-PELVIS WITH   Final Result      1.  Inflammatory stranding involving the pancreas with fluid within the anterior pararenal space consistent with pancreatitis.      2.  Fatty liver.      3.  Prior cholecystectomy.      4.  Bibasilar atelectasis/consolidation.      DX-CHEST-PORTABLE (1 VIEW)   Final Result      No evidence of acute cardiopulmonary process.          Assessment:   Ileus (HCC)- (present on admission)  Assessment & Plan  History of Lap [place] last year, no other abdominal surgeries.  1/14, 1/16, 1/20 CT with findings mildly dilated small intestine, air and fluid appears to transit past the ligament of Treitz, evolving inflammatory changes of the pancreas  Ileus secondary to limited mobility, intra-abdominal inflammation, narcotic use  No indication for surgery for this ileus  Pancreatic inflammation may take weeks to resolve and so may the ileus  NG/NPO/PICC/TPN  Keep electrolytes optimized  Limit narcotics  Limit PO intake of fluids/ice  Ambulate TID  Cease further alcohol use  [facility]      ____________________________________   Edward Marx MD, FACS     JRU / NTS     DD: 1/20/2021   DT: 8:22 PM

## 2021-01-21 NOTE — PROGRESS NOTES
Shift report received from night RN, assumed care at 0715. Patient up in bed, routinely medicated prn per MAR. AOx4, up with standby assist, calls appropriately, bed alarm not in use. PIV currently locked, fluid orders on hold. O2 RA, SPO2 90%. VTE heparin. Plan is bowel rest, few ice chips per hour, pain management, NG to suction. Needs met at this time.    Discussed POC for multimodal pain management, monitoring VS/labs/I&O, mobility, day time routine, comfort, and safety. Patient has call light and personal belongings within reach. Safety and fall precautions in place. Reviewed current labs, notes, medications, and orders. Hourly rounding in place with RN and CNA.    1025: No call made to this RN but critical lab seen and NP notified via [employer].

## 2021-01-21 NOTE — PROCEDURES
Vascular Access Team     Date of Insertion: 1/21/21  Arm Circumference: 26  Internal length: 43  External Length: 0  Vein Occupancy %: 45   Reason for PICC: TPN  Labs: on 1/20/21 WBC 9.5, , 1/21/21 BUN 74, Cr 2.62, GFR 25, INR na     Consents confirmed, vessel patency confirmed with ultrasound. Risks and benefits of procedure explained to patient and education regarding central line associated bloodstream infections provided. Questions answered.      PICC placed in RUE per licensed provider order with ultrasound guidance.  5 Fr, double lumen PICC placed in basilic vein after 1 attempt(s). 2 mL of 1% lidocaine injected intradermally, 21 gauge microintroducer needle and modified Seldinger technique used. 43 cm catheter inserted with good blood return. Secured at 0 cm marker. Each lumen flushed without resistance with 10 mL 0.9% normal saline. PICC line secured with Biopatch and Tegaderm.     PICC tip placement location is confirmed by nurse to be in the Superior Vena Cava (SVC) utilizing 3CG technology. PICC line is appropriate for use at this time. Patient tolerated procedure well, without complications.  Patient condition relayed to unit RN or ordering physician via this post procedure note in the EMR.      Ultrasound images uploaded to PACS and viewable in the EMR - yes  Ultrasound imaged printed and placed in paper chart - no     BARD Power PICC ref # [id number], Lot # [id number], Expiration Date 07/31/2021

## 2021-01-21 NOTE — PROGRESS NOTES
Pharmacy TPN Day # 1       2021    Dosing Weight   81 kg  (AdjBW)       TPN new start      TPN goal: ~ 2000 kcal/day including ~ 1 gm/kg/day Protein      TPN indication: Pancreatitis         Pertinent PMH: Patient admitted on  for abdominal pain. Patient with a history of cholecystomy 2020. Labs obtained in the ED concerning for pancreatitis. Patient made NPO and surgery consulted, recommending bowel rest with NG tube to suction due to abdominal distention. Patient with a current ONOFRE during hospitalization. TPN initiated due to NPO status with plans of prolonged time without enteral access.    Temp (24hrs), Av.9 °C (98.5 °F), Min:36.7 °C (98 °F), Max:37.5 °C (99.5 °F)  .  Recent Labs     21  0346 21  0259 21  0906   SODIUM 136 138 133*   POTASSIUM 4.4 4.5 4.3   CHLORIDE 99 100 91*   CO2 25 24 27   BUN 64* 67* 74*   CREATININE 2.24* 2.05* 2.62*   GLUCOSE 121* 113* 141*   CALCIUM 8.8 8.8 9.3   ASTSGOT 12 15 22   ALTSGPT 20 17 18   ALBUMIN 3.1* 3.1* 3.4   TBILIRUBIN 0.5 0.4 1.1   PHOSPHORUS 3.5 3.3 4.8*   MAGNESIUM 2.4 2.5 2.8*     Accu-Checks  Recent Labs     21  2348 21  0538 21  1204   POCGLUCOSE 111* 131* 135*       Vitals:    21 1556 21 2010 21 0415 21 0728   BP: 147/89 131/88 130/91 136/89   Weight:       Height:           Intake/Output Summary (Last 24 hours) at 2021 1337  Last data filed at 2021 1128  Gross per 24 hour   Intake 150 ml   Output 6750 ml   Net -6600 ml       Orders Placed This Encounter   Procedures   • Diet NPO     Standing Status:   Standing     Number of Occurrences:   1     Order Specific Question:   Restrict to:     Answer:   Ice Chips [2]         TPN for past 72 hours (Show up to 3 orders; newest on the left.)     Start date and time   2021      TPN Central Line Formulation [[id number]]    Order Status  Active       Base    Clinisol 15%  40 g    dextrose 70%  175 g    fat emulsions 20%  25 g        Additives    sodium acetate  50 mEq    sodium chloride  170 mEq    calcium GLUConate  2.3 mEq    M.T.E.-4 Adult  1 mL    M.V.I. Adult  10 mL       Electrolyte Ion Calculated Amount    Sodium  220 mEq    Potassium  --    Calcium  2.3 mEq    Magnesium  --    Aluminum  --    Phosphate  --    Chloride  170 mEq    Acetate  83.87 mEq       Other    Total Protein  40 g    Total Protein/kg  0.4 g/kg    Total Amino Acid  --    Total Amino Acid/kg  --    Glucose Infusion Rate  1.21 mg/kg/min    Osmolarity (Estimated)  --    Volume  1,440 mL    Rate  60 mL/hr    Dosing Weight  100.2 kg    Infusion Site  Central        This formula provides:  % kcal as lipids = 25  Grams protein/kg = 0.5  Non-protein calories = 845  Kcals/kg = 12.4  Total daily calories = 1005    Comments:  1. TPN initiated today for plans of prolonged NPO status in the setting of pancreatitis. NG tube remains to suction.   2. Macronutrients initiated at 50% of patient's estimated kcal requirement. Due to ONOFRE, protein initiated at 0.5 g/kg/day. Will closely monitor renal function and adjust accordingly. Fat also limited to 25% due to slightly high triglycerides.  3. Electrolytes reviewed:  1. Sodium at NS equivalence in TPN.  2. Only electrolyte added today was 0.5 g of calcium gluconate due to ONOFRE. CMP, mag, and phos ordered for tomorrow AM and will supplement outside of the TPN if necessary.  4. Rate to initiate at 60 mL/hr after discussion with NP. Nephrology also following patient. Will monitor recommendations and discuss fluid rates daily.  5. FSBS well controlled on Lantus 5 units subQ daily. Will monitor SSI usage. No insulin in TPN.  6. Pharmacy will continue to monitor.      Thank you!    Nellie Riojas, PharmD, BCPS

## 2021-01-21 NOTE — PROGRESS NOTES
Order received for PICC placement. Cr 2.62, GFR 25, Nephrology following. VT miky Don for PICC placement at this time.

## 2021-01-21 NOTE — ASSESSMENT & PLAN NOTE
History of Lap Yaneth last year, no other abdominal surgeries.  1/14, 1/16, 1/20 CT with findings mildly dilated small intestine, air and fluid appears to transit past the ligament of Treitz, evolving inflammatory changes of the pancreas  Ileus secondary to limited mobility, intra-abdominal inflammation, narcotic use  No indication for surgery for this ileus  Pancreatic inflammation may take weeks to resolve and so may the ileus  NG/NPO/PICC/TPN  Keep electrolytes optimized  Limit narcotics  Limit PO intake of fluids/ice  Ambulate TID  Cease further alcohol use  [facility]

## 2021-01-21 NOTE — CARE PLAN
Problem: Nutritional:  Goal: Nutrition support tolerated and meeting greater than 85% of estimated needs  Outcome: NOT MET     TPN to start. See RD note.

## 2021-01-21 NOTE — PROGRESS NOTES
"[facility] -  PROGRESS NOTE               Author: Zay Perry M.D. Date & Time: 1/21/2021  10:24 AM     HPI:  55 y.o. male who presented 1/14/2021 with abdominal pain and emesis, found to have acute pancreatitis with ONOFRE prompting renal consult.     Upon admission, CT abdomen showing inflammatory stranding involving pancreas with fluid within the anterior pararenal space consistent with pancreatitis.  Cr 1.1 on admission, worsened to 3.5 this AM. Oliguric overnight and worsening hyperkalemia despite NS at 75cc/hr. ~200cc UOP after lasix 20mg. Ongoing severe abd pain and worsening abd distention. Worsening shortness or breath and subjective fevers. Being transferred to ICU.    DAILY NEPHROLOGY SUMMARY:  1/16: consult done  1/17: 390cc UOP documented, found to have SBO-NG tube placed, cr stable, abd pain improving, no fluids running   1/18: Hypertensive this AM, improving UOP, cr downtrending   1/19: Transferred out of the ICU, Cr downtrending, main complaint is back pain, NG tube  1/20: off oxygen, NG tube remains, has not passed gas yet, cr down trending   1/21: worsening abd distention and resp status. NG tube repositioned with 6L output, given dose of lasix, cxr with BL infiltrates, slight bump in cr    PAST FAMILY HISTORY: Reviewed and Unchanged  SOCIAL HISTORY: Reviewed and Unchanged  CURRENT MEDICATIONS: Reviewed  IMAGING STUDIES: Reviewed    ROS  General: +malaise  CV: No chest pain  RESP: no shortness of breath  GI: +abdominal pain   : No dysuria or gross hematuria  MSK: No trauma  Skin: No rashes  Neuro: No tremors  Psych: No depression    PHYSICAL EXAM  VS:  /89   Pulse (!) 112   Temp 36.8 °C (98.3 °F) (Oral)   Resp 20   Ht 1.727 m (5' 8\")   Wt 100.2 kg (220 lb 14.4 oz)   SpO2 94%   BMI 33.59 kg/m²   GENERAL: NAD   CV: +edema  RESP: non-labored  GI: distended, NG tube  MSK: No joint deformities   SKIN: No concerning rashes  NEURO: AOx3  PSYCH: " Cooperative    Fluids:  In: 150   Out: 6600     LABS:  Recent Results (from the past 24 hour(s))   ACCU-CHEK GLUCOSE    Collection Time: 01/20/21  5:16 PM   Result Value Ref Range    Glucose - Accu-Ck 112 (H) 65 - 99 mg/dL   ACCU-CHEK GLUCOSE    Collection Time: 01/20/21 11:48 PM   Result Value Ref Range    Glucose - Accu-Ck 111 (H) 65 - 99 mg/dL   ACCU-CHEK GLUCOSE    Collection Time: 01/21/21  5:38 AM   Result Value Ref Range    Glucose - Accu-Ck 131 (H) 65 - 99 mg/dL   Comp Metabolic Panel    Collection Time: 01/21/21  9:06 AM   Result Value Ref Range    Sodium 133 (L) 135 - 145 mmol/L    Potassium 4.3 3.6 - 5.5 mmol/L    Chloride 91 (L) 96 - 112 mmol/L    Co2 27 20 - 33 mmol/L    Anion Gap 15.0 7.0 - 16.0    Glucose 141 (H) 65 - 99 mg/dL    Bun 74 (HH) 8 - 22 mg/dL    Creatinine 2.62 (H) 0.50 - 1.40 mg/dL    Calcium 9.3 8.5 - 10.5 mg/dL    AST(SGOT) 22 12 - 45 U/L    ALT(SGPT) 18 2 - 50 U/L    Alkaline Phosphatase 102 (H) 30 - 99 U/L    Total Bilirubin 1.1 0.1 - 1.5 mg/dL    Albumin 3.4 3.2 - 4.9 g/dL    Total Protein 7.2 6.0 - 8.2 g/dL    Globulin 3.8 (H) 1.9 - 3.5 g/dL    A-G Ratio 0.9 g/dL   MAGNESIUM    Collection Time: 01/21/21  9:06 AM   Result Value Ref Range    Magnesium 2.8 (H) 1.5 - 2.5 mg/dL   PHOSPHORUS    Collection Time: 01/21/21  9:06 AM   Result Value Ref Range    Phosphorus 4.8 (H) 2.5 - 4.5 mg/dL   IONIZED CALCIUM    Collection Time: 01/21/21  9:06 AM   Result Value Ref Range    Ionized Calcium 1.1 1.1 - 1.3 mmol/L   ESTIMATED GFR    Collection Time: 01/21/21  9:06 AM   Result Value Ref Range    GFR If  31 (A) >60 mL/min/1.73 m 2    GFR If Non African American 25 (A) >60 mL/min/1.73 m 2       (click the triangle to expand results)    ASSESSMENT:  # ONOFRE: Initially oliguric. Resolving. In setting of pancreatitis, contrast and possible compartment syndrome.   # Hyperkalemia: resolved   # Pancreatitis  # Hypoxic rasp failure  # SBO: NG tube, no role for surgical intervention per  surgery  # HX  Non-ischemic cardiomyopathy  # Hypocalcemia   # Crohns   # Anemia: acute    PLAN:  -OK to hold IV fluids for now, If ongoing high NG output may need to restart  -PRN antihypertensive in adding to coreg for now  -Holding RAAS blockade   -Strict I/Os  -daily renal function panel     Thank you,     Zay Perry MD  [facility]  [phone]

## 2021-01-21 NOTE — PROGRESS NOTES
Hospital Medicine Daily Progress Note    Date of Service  1/21/2021    Chief Complaint  Abdominal pain    Hospital Course  55 y.o. male with a history of EtOH use, prior PE, CAD, HTN, DLD admitted 1/14/2021 with severe pancreatitis, and developed acute kidney failure and hyperkalemia.  The patient had a cholecystectomy 7/2020.  He now has associated ileus.     Interval Problem Update  1/19:  NGT in place.  No audible bowel sounds.  Discussed imaging with surgery.  Suspect ileus in the setting of pancreatitis.  No evidence of bowel obstruction.  He reports overall improvement of abdominal pain, but reports worsening of chronic low back pain secondary to lying in bed.  Will decrease frequency of IV narcotics as this is likely contributing to ileus.  Encourage to be out of bed daily.    1/20:  Repeat KUB reviewed.  SBO noted.  Discussed with surgery.  No surgical interventions at this time.  Continue bowel rest.  Repeat CT pending.  Patient complaining of significant discomfort.  He is very anxious to eat.  Discussed further weaning narcotics to promote return of bowel function.  He is also recommended to ambulate.    1/21:  Patient had shortness breath overnight suspect secondary to volume overload in the setting of poorly functioning NGT.  He received dose of lasix with improvement of shortness of breath, although creatinine slightly worse today.  After troubleshooting NGT it is functioning optimally.  Output has drastically increased.  Patient reports feeling much more comfortable.  Start TPN.     Consultants/Specialty  Intensivist  Gastroenterology  Nephrology  General Surgery     Code Status  Full Code    Disposition  TBD.    Review of Systems  Review of Systems   Constitutional: Positive for malaise/fatigue. Negative for chills and fever.   HENT: Negative for congestion and sore throat.    Eyes: Negative for blurred vision and double vision.   Respiratory: Positive for shortness of breath.    Cardiovascular:  Negative for chest pain and leg swelling.   Gastrointestinal: Positive for abdominal pain. Negative for diarrhea, nausea and vomiting.   Genitourinary: Negative for hematuria.   Musculoskeletal: Positive for back pain and myalgias.   Neurological: Negative for dizziness, focal weakness, weakness and headaches.   Psychiatric/Behavioral: The patient is not nervous/anxious.         Physical Exam  Temp:  [36.7 °C (98 °F)-37.5 °C (99.5 °F)] 36.8 °C (98.3 °F)  Pulse:  [107-121] 112  Resp:  [20-21] 20  BP: (130-147)/(88-91) 136/89  SpO2:  [90 %-94 %] 94 %    Physical Exam  Vitals signs reviewed.   Constitutional:       General: He is not in acute distress.     Appearance: Normal appearance. He is obese. He is ill-appearing. He is not toxic-appearing.   HENT:      Head: Normocephalic and atraumatic.      Nose: Nose normal.      Comments: NGT in place.   Eyes:      General: No scleral icterus.        Right eye: No discharge.         Left eye: No discharge.      Conjunctiva/sclera: Conjunctivae normal.   Neck:      Musculoskeletal: Normal range of motion. No muscular tenderness.   Cardiovascular:      Rate and Rhythm: Normal rate and regular rhythm.      Pulses:           Radial pulses are 2+ on the right side and 2+ on the left side.        Dorsalis pedis pulses are 2+ on the right side and 2+ on the left side.      Heart sounds: No murmur.   Pulmonary:      Effort: Pulmonary effort is normal. No respiratory distress.      Breath sounds: Normal breath sounds. No wheezing.   Abdominal:      General: Bowel sounds are absent. There is distension.      Palpations: There is no mass.      Tenderness: There is abdominal tenderness. There is no guarding.      Comments: No BS   Musculoskeletal:         General: No swelling or tenderness.      Right lower leg: No edema.      Left lower leg: No edema.   Skin:     Coloration: Skin is not jaundiced or pale.   Neurological:      General: No focal deficit present.      Mental Status: He is  alert and oriented to person, place, and time. Mental status is at baseline.      Cranial Nerves: No cranial nerve deficit.   Psychiatric:         Mood and Affect: Mood is anxious.         Fluids    Intake/Output Summary (Last 24 hours) at 1/21/2021 1409  Last data filed at 1/21/2021 1128  Gross per 24 hour   Intake 150 ml   Output 6750 ml   Net -6600 ml       Laboratory  Recent Labs     01/19/21  0346 01/20/21  0259   WBC 7.8 9.5   RBC 4.07* 3.78*   HEMOGLOBIN 12.5* 11.4*   HEMATOCRIT 38.7* 35.4*   MCV 95.1 93.7   MCH 30.7 30.2   MCHC 32.3* 32.2*   RDW 44.3 44.6   PLATELETCT 183 176   MPV 11.2 11.6     Recent Labs     01/19/21  0346 01/20/21  0259 01/21/21  0906   SODIUM 136 138 133*   POTASSIUM 4.4 4.5 4.3   CHLORIDE 99 100 91*   CO2 25 24 27   GLUCOSE 121* 113* 141*   BUN 64* 67* 74*   CREATININE 2.24* 2.05* 2.62*   CALCIUM 8.8 8.8 9.3             Recent Labs     01/18/21  1923 01/21/21  0906   TRIGLYCERIDE 211* 167*       Imaging  IR-PICC LINE PLACEMENT W/ GUIDANCE > AGE 5   Final Result                  Ultrasound-guided PICC placement performed by qualified nursing staff as    above.          DX-CHEST-PORTABLE (1 VIEW)   Final Result         1.  Interstitial pulmonary opacities suggests atelectasis or possible subtle infiltrates.      CT-ABDOMEN-PELVIS W/O   Final Result      1.  Again seen pancreatitis with peripancreatic effusions. This is mildly worsened from comparison.      2.  Again seen dilated loops of small intestine with decompression of the colon and distal small bowel consistent with ileus versus partial small bowel obstruction.      3.  Bibasilar atelectasis and small bilateral pleural effusions.      [id number] VIEW   Final Result      1.  Moderate small bowel dilation with multiple air-fluid levels consistent with small bowel obstruction      2.  Enteric catheter appears appropriately located      CT-ABDOMEN-PELVIS W/O   Final Result         1. Worsening inflammatory change and fluid  surrounding the pancreas, in keeping with acute pancreatitis.      2. Diffuse dilatation of the small bowel and proximal colon with decompressed distal colon adjacent to the inflammation in the tail the pancreas. This could relate to colonic obstruction due to adjacent inflammatory change or ileus.      3. Mild wall thickening of the stomach fundus adjacent to the pancreatic tail, likely reactive.      DX-ABDOMEN FOR TUBE PLACEMENT   Final Result         Gastric drainage tube with tip projecting over the expected area of the stomach fundus.      DX-CHEST-FOR LINE PLACEMENT Perform procedure in: Patient's Room   Final Result      Left midlung and bibasilar linear atelectasis.      Dialysis catheter projects over the SVC.      No pneumothorax.         [id number] VIEW   Final Result      Increased bowel gas concerning for early or partial small bowel obstruction.      DX-CHEST-PORTABLE (1 VIEW)   Final Result      Hypoinflation with LEFT mid and lower lung atelectasis, with possible superimposed LEFT basilar pneumonia.      [id number]   Final Result      1.  Pancreatic edema with significant peripancreatic stranding and ill-defined fluid, consistent with acute interstitial edematous pancreatitis.      2.  Small amount of ascites.      3.  No choledocholithiasis is identified.      4.  Status post cholecystectomy.      CT-ABDOMEN-PELVIS WITH   Final Result      1.  Inflammatory stranding involving the pancreas with fluid within the anterior pararenal space consistent with pancreatitis.      2.  Fatty liver.      3.  Prior cholecystectomy.      4.  Bibasilar atelectasis/consolidation.      DX-CHEST-PORTABLE (1 VIEW)   Final Result      No evidence of acute cardiopulmonary process.           Assessment/Plan  * Acute pancreatitis  Assessment & Plan  1/15 MRCP without acute etiology  GI believes due to passed gallstone, although could be related to EtOH or question if due to medication  Virus could be other  etiology  Pain management  NPO for now.  Mobilize  :  Abdominal pain improving.  Continues to have ileus.      Ileus (HCC)- (present on admission)  Assessment & Plan  Ileus vs SBO  Nasal gastric tube to wall suction  Hold oral meds that may not be absorbed  Correct electrolytes  Check TSH  Mobilize  Wean narcotics as able.  Discussed with patient :  Discussed imaging with surgery.  Suspect ileus in the setting of pancreatitis.  No evidence of obstruction.  Continue NGT and bowel rest.  Encourage to mobilize.  Decreased frequency on IV dilaudid.    :  Repeat KUB showing SBO.  Repeat CT ordered.  Discussed with surgery again.  Imaging reviewed and do not recommend surgical intervention as this is likely secondary to pancreatitis.  Continue plan as above.  Further decreased dilaudid frequency.   :  Repeat CT consistent with ileus.  Continue plan as above.     Acute hypoxemic respiratory failure (HCC)  Assessment & Plan  Resolved.     Hyperglycemia- (present on admission)  Assessment & Plan   Glucose:148  1/15 HgbA1c:5.5    Acute kidney injury (HCC)  Assessment & Plan  Improving.   Nephrology consulting  Continue IV hydration.   HD catheter in R IJ site.  Monitor I/O's and labs.  :  Increase in creatinine overnight secondary to lasix dose.  Will start back gentle IV hydration and follow bmp closely.     Essential hypertension- (present on admission)  Assessment & Plan  Uncontrolled.  Increase coreg 25 mg daily  Start amlodipine.   Holding spironolactone, ACE, and ARB in the setting ONOFRE  Monitor vitals.    At risk for malnutrition  Assessment & Plan  NPO for consecutive days secondary to above.   Continue IV hydration and monitoring electrolytes  Consulted dietician.   :  Start TPN until bowel function returns.     Hypomagnesemia  Assessment & Plan   M.8  Monitor and replete as need    Hyponatremia  Assessment & Plan  Resolved.     Chronic back pain- (present on  admission)  Assessment & Plan  Reports worsening of pain secondary to lying in bed  Ordered lidocaine patch.   Start PRN toradol    Hypocalcemia  Assessment & Plan  Supplement and monitor   1/18 Ca:6.9. Correct Ca for albumin 7.6  CaCl ordered  1/17 Alb:3.1  Ca:7.6.  Corrected Ca:8.3.  CaCl given    Hyperkalemia  Assessment & Plan  Resolved  Monitor daily BMP    Hypertriglyceridemia- (present on admission)  Assessment & Plan  Levels are 571, considered moderate, >500 risks increase somewhat for inducing pancreatitis  Repeat level ordered 1/18    Restless leg syndrome- (present on admission)  Assessment & Plan  Requip    Obesity (BMI 30-39.9)- (present on admission)  Assessment & Plan  1/17 Encouraged dietary change and stopping his 4 beers daily  Body mass index is 36.37 kg/m².    Crohn disease (HCC)- (present on admission)  Assessment & Plan  No acute issue at this time.       VTE prophylaxis: Heparin 5000 TID

## 2021-01-21 NOTE — PROGRESS NOTES
"    DATE: 1/21/2021    HD: 6 Acute pancreatitis with ileus    Interval Events:  Reports feeling 100% better.  No BM or flatus yet  Remains distended  NG output down since limiting his PO intake further  Getting PICC    -No surgical issues at this time  -Will sign off    PHYSICAL EXAMINATION:  Constitutional:     Vital Signs: /89   Pulse (!) 112   Temp 36.8 °C (98.3 °F) (Oral)   Resp 20   Ht 1.727 m (5' 7.99\")   Wt 100.2 kg (220 lb 14.4 oz)   SpO2 94%   GENERAL:  No acute distress  HEENT: Pupils equal, round and reactive to light bilaterally.  Sclera are clear bilaterally.  No otorrhea.  No rhinorrhea.  Mucus membranes are moist.  CHEST:  Lungs are clear to auscultation bilaterally  CARDIOVASCULAR:  Regular rate and rhythm  ABDOMEN: Soft, non-tender, markedly distended and tympanitic, NG with thin brown output  GENITOURINARY:  No jorgensen in place, voiding without issues  EXTREMITIES:  Good range of motion through out  NEUROLOGIC:  Alert and oriented.  Cranial Nerves II through XII are grossly intact, no focal deficits appreciated  PSYCHIATRIC:  Normal affect and mood appropriate for age and condition  SKIN:  Warm and well perfused, no rashes      Laboratory Values:   Recent Labs     01/19/21  0346 01/20/21  0259   WBC 7.8 9.5   RBC 4.07* 3.78*   HEMOGLOBIN 12.5* 11.4*   HEMATOCRIT 38.7* 35.4*   MCV 95.1 93.7   MCH 30.7 30.2   MCHC 32.3* 32.2*   RDW 44.3 44.6   PLATELETCT 183 176   MPV 11.2 11.6     Recent Labs     01/19/21  0346 01/20/21  0259 01/21/21  0906   SODIUM 136 138 133*   POTASSIUM 4.4 4.5 4.3   CHLORIDE 99 100 91*   CO2 25 24 27   GLUCOSE 121* 113* 141*   BUN 64* 67* 74*   CREATININE 2.24* 2.05* 2.62*   CALCIUM 8.8 8.8 9.3     Recent Labs     01/19/21  0346 01/20/21  0259 01/21/21  0906   ASTSGOT 12 15 22   ALTSGPT 20 17 18   TBILIRUBIN 0.5 0.4 1.1   ALKPHOSPHAT 66 67 102*   GLOBULIN 3.7* 3.3 3.8*            Imaging:   IR-PICC LINE PLACEMENT W/ GUIDANCE > AGE 5   Final Result                  "   Ultrasound-guided PICC placement performed by qualified nursing staff as    above.          DX-CHEST-PORTABLE (1 VIEW)   Final Result         1.  Interstitial pulmonary opacities suggests atelectasis or possible subtle infiltrates.      CT-ABDOMEN-PELVIS W/O   Final Result      1.  Again seen pancreatitis with peripancreatic effusions. This is mildly worsened from comparison.      2.  Again seen dilated loops of small intestine with decompression of the colon and distal small bowel consistent with ileus versus partial small bowel obstruction.      3.  Bibasilar atelectasis and small bilateral pleural effusions.      [id number]-[id number]-5 VIEW   Final Result      1.  Moderate small bowel dilation with multiple air-fluid levels consistent with small bowel obstruction      2.  Enteric catheter appears appropriately located      CT-ABDOMEN-PELVIS W/O   Final Result         1. Worsening inflammatory change and fluid surrounding the pancreas, in keeping with acute pancreatitis.      2. Diffuse dilatation of the small bowel and proximal colon with decompressed distal colon adjacent to the inflammation in the tail the pancreas. This could relate to colonic obstruction due to adjacent inflammatory change or ileus.      3. Mild wall thickening of the stomach fundus adjacent to the pancreatic tail, likely reactive.      DX-ABDOMEN FOR TUBE PLACEMENT   Final Result         Gastric drainage tube with tip projecting over the expected area of the stomach fundus.      DX-CHEST-FOR LINE PLACEMENT Perform procedure in: Patient's Room   Final Result      Left midlung and bibasilar linear atelectasis.      Dialysis catheter projects over the SVC.      No pneumothorax.         UN-WBRMAKE-5 VIEW   Final Result      Increased bowel gas concerning for early or partial small bowel obstruction.      DX-CHEST-PORTABLE (1 VIEW)   Final Result      Hypoinflation with LEFT mid and lower lung atelectasis, with possible superimposed LEFT basilar  pneumonia.      GH-VPYHQMF-F/O   Final Result      1.  Pancreatic edema with significant peripancreatic stranding and ill-defined fluid, consistent with acute interstitial edematous pancreatitis.      2.  Small amount of ascites.      3.  No choledocholithiasis is identified.      4.  Status post cholecystectomy.      CT-ABDOMEN-PELVIS WITH   Final Result      1.  Inflammatory stranding involving the pancreas with fluid within the anterior pararenal space consistent with pancreatitis.      2.  Fatty liver.      3.  Prior cholecystectomy.      4.  Bibasilar atelectasis/consolidation.      DX-CHEST-PORTABLE (1 VIEW)   Final Result      No evidence of acute cardiopulmonary process.          ASSESSMENT AND PLAN:     Ileus (HCC)- (present on admission)  Assessment & Plan  History of [place] last year, no other abdominal surgeries.  1/14, 1/16, 1/20 CT with findings mildly dilated small intestine, air and fluid appears to transit past the ligament of Treitz, evolving inflammatory changes of the pancreas  Ileus secondary to limited mobility, intra-abdominal inflammation, narcotic use  No indication for surgery for this ileus  Pancreatic inflammation may take weeks to resolve and so may the ileus  NG/NPO/PICC/TPN  Keep electrolytes optimized  Limit narcotics  Limit PO intake of fluids/ice  Ambulate TID  Cease further alcohol use  [facility]             ____________________________________     Wing Marx M.D.

## 2021-01-22 NOTE — PROGRESS NOTES
Pharmacy TPN Day # 2       2021    Dosing Weight   81 kg  (AdjBW)             TPN @ 50% goal   TPN goal: 6710-1213 kcal/day including ~ 1 gm/kg/day Protein  TPN indication: Pancreatitis                                                                Pertinent PMH: Patient admitted on  for abdominal pain. Patient with a history of cholecystomy 2020. Labs obtained in the ED concerning for pancreatitis. Patient made NPO and surgery consulted, recommending bowel rest with NG tube to suction due to abdominal distention. Patient with a current ONOFRE during hospitalization. TPN initiated due to NPO status with plans of prolonged time without enteral access.      Temp (24hrs), Av.4 °C (97.6 °F), Min:36.1 °C (96.9 °F), Max:37 °C (98.6 °F)  .  Recent Labs     21  0259 21  0906 21  0041   SODIUM 138 133* 136   POTASSIUM 4.5 4.3 3.9   CHLORIDE 100 91* 93*   CO2 24 27 30   BUN 67* 74* 84*   CREATININE 2.05* 2.62* 2.54*   GLUCOSE 113* 141* 178*   CALCIUM 8.8 9.3 9.2   ASTSGOT 15 22 23   ALTSGPT 17 18 16   ALBUMIN 3.1* 3.4 3.0*   TBILIRUBIN 0.4 1.1 0.8   PHOSPHORUS 3.3 4.8* 5.1*   MAGNESIUM 2.5 2.8* 2.9*     Accu-Checks  Recent Labs     21  1204 21  1713 21  0835   POCGLUCOSE 135* 119* 168*       Vitals:    21 1605 21 2000 21 0400 21 0754   BP: 145/89 129/91 122/79 135/86   Weight:       Height:           Intake/Output Summary (Last 24 hours) at 2021 1340  Last data filed at 2021 1200  Gross per 24 hour   Intake 210 ml   Output 6025 ml   Net -5815 ml       Orders Placed This Encounter   Procedures   • Diet NPO     Standing Status:   Standing     Number of Occurrences:   1     Order Specific Question:   Restrict to:     Answer:   Ice Chips [2]         TPN for past 72 hours (Show up to 3 orders; newest on the left. Changes between the two most recent orders are indicated.)     Start date and time   2021      TPN Central  Line Formulation [[id number]] TPN Central Line Formulation [[id number]]    Order Status  Active Last Dose in Progress    Last Admin   New Bag at 01/21/2021 2000 by Cj Ramos R.N.       Base    Clinisol 15%  40 g 40 g    dextrose 70%  175 g 175 g    fat emulsions 20%  25 g 25 g       Additives    potassium chloride  40 mEq --    potassium acetate  -- --    sodium acetate  -- 50 mEq    sodium chloride  250 mEq 170 mEq    calcium GLUConate  2.3 mEq 2.3 mEq    M.T.E.-4 Adult  1 mL 1 mL    M.V.I. Adult  10 mL 10 mL    famotidine  20 mg --       QS Base    sterile water  937.88 mL 714.88 mL       Energy Contribution    Proteins  -- --    Dextrose  -- --    Lipids  -- --    Total  -- --       Electrolyte Ion Calculated Amount    Sodium  250 mEq 220 mEq    Potassium  40 mEq --    Calcium  2.3 mEq 2.3 mEq    Magnesium  -- --    Aluminum  -- --    Phosphate  -- --    Chloride  290 mEq 170 mEq    Acetate  33.87 mEq 83.87 mEq       Other    Total Protein  40 g 40 g    Total Protein/kg  0.4 g/kg 0.4 g/kg    Total Amino Acid  -- --    Total Amino Acid/kg  -- --    Glucose Infusion Rate  1.21 mg/kg/min 1.21 mg/kg/min    Osmolarity (Estimated)  -- --    Volume  1,680 mL 1,440 mL    Rate  70 mL/hr 60 mL/hr    Dosing Weight  100.2 kg 100.2 kg    Infusion Site  [facility]            This formula provides:  % kcal as lipids = 25  Grams protein/kg = 0.5  Non-protein calories = 845  Kcals/kg = 12.4  Total daily calories = 1005    Comments:  · Day 2: TPN @ 50% goal , continue current formulation.  · D/w: APRN:  · Ok to increase TPN rate to 70mL/hr. Will f/u daily to discuss fluid requirements.   · Ok to Discontinue Lantus while on TPN, continue ISS @ current range.   · Nephro added LR @75, following duration.  · Continue TPN, pepcid added.     Fluids:  · MIVF:LR@75 (+) TPN@70mL/hr = Total 145 mL/hr     Macronutrient's:  · CHO 59%AA 16%Lipids 25%  · GIR: 1.56 mg/kg/min  · Accuchecks:119-168; ISS 24 hr requirement 1 unit regular  insulin      Micronutrients:  · Na 250 meq  · K 40 meq  · Ca 2.3 meq  · Mg 0 meq  · Phos 0 mmol    Zay Cowan PharmD, BCPS

## 2021-01-22 NOTE — PROGRESS NOTES
Order received to hang IV LR for maintenance fluids. Call made to pharmacy to make sure it was ok to run in double lumen PICC. Confirmation needed within pharmacy that extra fluids are needed while TPN is running. Pharmacist will call back, this RN educated to hold fluids until callback.

## 2021-01-22 NOTE — PROGRESS NOTES
"[facility] -  PROGRESS NOTE               Author: Zay Perry M.D. Date & Time: 1/22/2021  8:43 AM     HPI:  55 y.o. male who presented 1/14/2021 with abdominal pain and emesis, found to have acute pancreatitis with ONOFRE prompting renal consult.     Upon admission, CT abdomen showing inflammatory stranding involving pancreas with fluid within the anterior pararenal space consistent with pancreatitis.  Cr 1.1 on admission, worsened to 3.5 this AM. Oliguric overnight and worsening hyperkalemia despite NS at 75cc/hr. ~200cc UOP after lasix 20mg. Ongoing severe abd pain and worsening abd distention. Worsening shortness or breath and subjective fevers. Being transferred to ICU.    DAILY NEPHROLOGY SUMMARY:  1/16: consult done  1/17: 390cc UOP documented, found to have SBO-NG tube placed, cr stable, abd pain improving, no fluids running   1/18: Hypertensive this AM, improving UOP, cr downtrending   1/19: Transferred out of the ICU, Cr downtrending, main complaint is back pain, NG tube  1/20: off oxygen, NG tube remains, has not passed gas yet, cr down trending   1/21: worsening abd distention and resp status. NG tube repositioned with 6L output, given dose of lasix, cxr with BL infiltrates, slight bump in cr  1/22: started TPN, had an episode of flatus, feel stronger, tachycardic, 6l NG output     PAST FAMILY HISTORY: Reviewed and Unchanged  SOCIAL HISTORY: Reviewed and Unchanged  CURRENT MEDICATIONS: Reviewed  IMAGING STUDIES: Reviewed    ROS  General: No malaise  CV: No chest pain  RESP: no shortness of breath  GI: +abdominal pain   : No dysuria or gross hematuria  MSK: No trauma  Skin: No rashes  Neuro: No tremors  Psych: No depression    PHYSICAL EXAM  VS:  /86   Pulse (!) 129 Comment: rn notified  Temp 37 °C (98.6 °F) (Temporal)   Resp 20   Ht 1.727 m (5' 7.99\")   Wt 100.2 kg (220 lb 14.4 oz)   SpO2 91%   BMI 33.60 kg/m²   GENERAL: NAD   CV: no edema  RESP: non-labored  GI: " distended, NG tube  MSK: No joint deformities   SKIN: No concerning rashes  NEURO: AOx3  PSYCH: Cooperative    Fluids:  In: 630 [P.O.:630]  Out: 7175     LABS:  Recent Results (from the past 24 hour(s))   Comp Metabolic Panel    Collection Time: 01/21/21  9:06 AM   Result Value Ref Range    Sodium 133 (L) 135 - 145 mmol/L    Potassium 4.3 3.6 - 5.5 mmol/L    Chloride 91 (L) 96 - 112 mmol/L    Co2 27 20 - 33 mmol/L    Anion Gap 15.0 7.0 - 16.0    Glucose 141 (H) 65 - 99 mg/dL    Bun 74 (HH) 8 - 22 mg/dL    Creatinine 2.62 (H) 0.50 - 1.40 mg/dL    Calcium 9.3 8.5 - 10.5 mg/dL    AST(SGOT) 22 12 - 45 U/L    ALT(SGPT) 18 2 - 50 U/L    Alkaline Phosphatase 102 (H) 30 - 99 U/L    Total Bilirubin 1.1 0.1 - 1.5 mg/dL    Albumin 3.4 3.2 - 4.9 g/dL    Total Protein 7.2 6.0 - 8.2 g/dL    Globulin 3.8 (H) 1.9 - 3.5 g/dL    A-G Ratio 0.9 g/dL   MAGNESIUM    Collection Time: 01/21/21  9:06 AM   Result Value Ref Range    Magnesium 2.8 (H) 1.5 - 2.5 mg/dL   PHOSPHORUS    Collection Time: 01/21/21  9:06 AM   Result Value Ref Range    Phosphorus 4.8 (H) 2.5 - 4.5 mg/dL   IONIZED CALCIUM    Collection Time: 01/21/21  9:06 AM   Result Value Ref Range    Ionized Calcium 1.1 1.1 - 1.3 mmol/L   ESTIMATED GFR    Collection Time: 01/21/21  9:06 AM   Result Value Ref Range    GFR If  31 (A) >60 mL/min/1.73 m 2    GFR If Non African American 25 (A) >60 mL/min/1.73 m 2   Cholesterol    Collection Time: 01/21/21  9:06 AM   Result Value Ref Range    Cholesterol,Tot 119 100 - 199 mg/dL   Triglyceride    Collection Time: 01/21/21  9:06 AM   Result Value Ref Range    Triglycerides 167 (H) 0 - 149 mg/dL   ACCU-CHEK GLUCOSE    Collection Time: 01/21/21 12:04 PM   Result Value Ref Range    Glucose - Accu-Ck 135 (H) 65 - 99 mg/dL   ACCU-CHEK GLUCOSE    Collection Time: 01/21/21  5:13 PM   Result Value Ref Range    Glucose - Accu-Ck 119 (H) 65 - 99 mg/dL   Magnesium    Collection Time: 01/22/21 12:41 AM   Result Value Ref Range     Magnesium 2.9 (H) 1.5 - 2.5 mg/dL   Phosphorus    Collection Time: 01/22/21 12:41 AM   Result Value Ref Range    Phosphorus 5.1 (H) 2.5 - 4.5 mg/dL       (click the triangle to expand results)    ASSESSMENT:  # ONOFRE: Initially oliguric. Resolving. In setting of pancreatitis, contrast and possible compartment syndrome.   # Hyperkalemia: resolved   # Pancreatitis  # Hypoxic rasp failure  # Ileus: NG tube, no role for surgical intervention per surgery  # HX  Non-ischemic cardiomyopathy  # Hypocalcemia   # Crohns   # Anemia: acute    PLAN:  -Restart fluids given ongoing high NG outpt and e/o contraction alkalosis   -PRN antihypertensive in adding to coreg for now  -Holding RAAS blockade   -Strict I/Os  -daily renal function panel     Thank you,     Zay Perry MD  [facility]  [phone]

## 2021-01-22 NOTE — PROGRESS NOTES
Hospital Medicine Daily Progress Note    Date of Service  1/22/2021    Chief Complaint  Abdominal pain    Hospital Course  55 y.o. male with a history of EtOH use, prior PE, CAD, HTN, DLD admitted 1/14/2021 with severe pancreatitis, and developed acute kidney failure and hyperkalemia.  The patient had a cholecystectomy 7/2020.  He now has associated ileus.     Interval Problem Update  1/19:  NGT in place.  No audible bowel sounds.  Discussed imaging with surgery.  Suspect ileus in the setting of pancreatitis.  No evidence of bowel obstruction.  He reports overall improvement of abdominal pain, but reports worsening of chronic low back pain secondary to lying in bed.  Will decrease frequency of IV narcotics as this is likely contributing to ileus.  Encourage to be out of bed daily.    1/20:  Repeat KUB reviewed.  SBO noted.  Discussed with surgery.  No surgical interventions at this time.  Continue bowel rest.  Repeat CT pending.  Patient complaining of significant discomfort.  He is very anxious to eat.  Discussed further weaning narcotics to promote return of bowel function.  He is also recommended to ambulate.    1/21:  Patient had shortness breath overnight suspect secondary to volume overload in the setting of poorly functioning NGT.  He received dose of lasix with improvement of shortness of breath, although creatinine slightly worse today.  After troubleshooting NGT it is functioning optimally.  Output has drastically increased.  Patient reports feeling much more comfortable.  Start TPN.    1/22: Patient passing flatus.  Slow bowel sounds.  He does continue to have distention.  NG tube was dislodged and he is refusing replacement.  Will leave tube out for now and monitor symptoms.  Continue TPN for now.  Pain is much improved.    Consultants/Specialty  Intensivist  Gastroenterology  Nephrology  General Surgery     Code Status  Full Code    Disposition  TBD.    Review of Systems  Review of Systems    Constitutional: Negative for chills, fever and malaise/fatigue.   HENT: Negative for congestion and sore throat.    Eyes: Negative for blurred vision and double vision.   Respiratory: Negative for cough and shortness of breath.    Cardiovascular: Negative for chest pain, palpitations and leg swelling.   Gastrointestinal: Negative for abdominal pain, diarrhea, nausea and vomiting.   Genitourinary: Negative for hematuria.   Musculoskeletal: Positive for back pain and myalgias.   Neurological: Negative for dizziness, focal weakness, weakness and headaches.   Psychiatric/Behavioral: The patient is not nervous/anxious.         Physical Exam  Temp:  [36.1 °C (96.9 °F)-37 °C (98.6 °F)] 37 °C (98.6 °F)  Pulse:  [112-129] 129  Resp:  [20] 20  BP: (122-145)/(79-91) 135/86  SpO2:  [91 %-96 %] 91 %    Physical Exam  Vitals signs reviewed.   Constitutional:       General: He is not in acute distress.     Appearance: Normal appearance. He is obese. He is ill-appearing.   HENT:      Head: Normocephalic and atraumatic.      Nose: Nose normal.   Eyes:      General:         Right eye: No discharge.         Left eye: No discharge.      Conjunctiva/sclera: Conjunctivae normal.   Neck:      Musculoskeletal: Normal range of motion. No neck rigidity or muscular tenderness.   Cardiovascular:      Rate and Rhythm: Normal rate and regular rhythm.      Pulses:           Radial pulses are 2+ on the right side and 2+ on the left side.        Dorsalis pedis pulses are 2+ on the right side and 2+ on the left side.      Heart sounds: No murmur.   Pulmonary:      Effort: Pulmonary effort is normal. No respiratory distress.      Breath sounds: Normal breath sounds. No wheezing.   Abdominal:      General: Bowel sounds are absent. There is distension.      Tenderness: There is abdominal tenderness. There is no guarding.      Comments: Hypoactive BS.    Musculoskeletal:         General: No swelling.      Right lower leg: No edema.      Left lower  leg: No edema.   Skin:     Coloration: Skin is not jaundiced or pale.   Neurological:      General: No focal deficit present.      Mental Status: He is alert and oriented to person, place, and time. Mental status is at baseline.      Cranial Nerves: No cranial nerve deficit.   Psychiatric:         Mood and Affect: Mood is anxious.         Fluids    Intake/Output Summary (Last 24 hours) at 1/22/2021 1354  Last data filed at 1/22/2021 1200  Gross per 24 hour   Intake 210 ml   Output 6025 ml   Net -5815 ml       Laboratory  Recent Labs     01/20/21  0259   WBC 9.5   RBC 3.78*   HEMOGLOBIN 11.4*   HEMATOCRIT 35.4*   MCV 93.7   MCH 30.2   MCHC 32.2*   RDW 44.6   PLATELETCT 176   MPV 11.6     Recent Labs     01/20/21  0259 01/21/21  0906 01/22/21  0041   SODIUM 138 133* 136   POTASSIUM 4.5 4.3 3.9   CHLORIDE 100 91* 93*   CO2 24 27 30   GLUCOSE 113* 141* 178*   BUN 67* 74* 84*   CREATININE 2.05* 2.62* 2.54*   CALCIUM 8.8 9.3 9.2             Recent Labs     01/21/21  0906   TRIGLYCERIDE 167*       Imaging  IR-PICC LINE PLACEMENT W/ GUIDANCE > AGE 5   Final Result                  Ultrasound-guided PICC placement performed by qualified nursing staff as    above.          DX-CHEST-PORTABLE (1 VIEW)   Final Result         1.  Interstitial pulmonary opacities suggests atelectasis or possible subtle infiltrates.      CT-ABDOMEN-PELVIS W/O   Final Result      1.  Again seen pancreatitis with peripancreatic effusions. This is mildly worsened from comparison.      2.  Again seen dilated loops of small intestine with decompression of the colon and distal small bowel consistent with ileus versus partial small bowel obstruction.      3.  Bibasilar atelectasis and small bilateral pleural effusions.      [id number] VIEW   Final Result      1.  Moderate small bowel dilation with multiple air-fluid levels consistent with small bowel obstruction      2.  Enteric catheter appears appropriately located      CT-ABDOMEN-PELVIS W/O   Final  Result         1. Worsening inflammatory change and fluid surrounding the pancreas, in keeping with acute pancreatitis.      2. Diffuse dilatation of the small bowel and proximal colon with decompressed distal colon adjacent to the inflammation in the tail the pancreas. This could relate to colonic obstruction due to adjacent inflammatory change or ileus.      3. Mild wall thickening of the stomach fundus adjacent to the pancreatic tail, likely reactive.      DX-ABDOMEN FOR TUBE PLACEMENT   Final Result         Gastric drainage tube with tip projecting over the expected area of the stomach fundus.      DX-CHEST-FOR LINE PLACEMENT Perform procedure in: Patient's Room   Final Result      Left midlung and bibasilar linear atelectasis.      Dialysis catheter projects over the SVC.      No pneumothorax.         [id number] VIEW   Final Result      Increased bowel gas concerning for early or partial small bowel obstruction.      DX-CHEST-PORTABLE (1 VIEW)   Final Result      Hypoinflation with LEFT mid and lower lung atelectasis, with possible superimposed LEFT basilar pneumonia.      [id number]-R/O   Final Result      1.  Pancreatic edema with significant peripancreatic stranding and ill-defined fluid, consistent with acute interstitial edematous pancreatitis.      2.  Small amount of ascites.      3.  No choledocholithiasis is identified.      4.  Status post cholecystectomy.      CT-ABDOMEN-PELVIS WITH   Final Result      1.  Inflammatory stranding involving the pancreas with fluid within the anterior pararenal space consistent with pancreatitis.      2.  Fatty liver.      3.  Prior cholecystectomy.      4.  Bibasilar atelectasis/consolidation.      DX-CHEST-PORTABLE (1 VIEW)   Final Result      No evidence of acute cardiopulmonary process.      [id number] VIEW    (Results Pending)        Assessment/Plan  * Acute pancreatitis  Assessment & Plan  1/15 MRCP without acute etiology  GI believes due to passed gallstone,  although could be related to EtOH or question if due to medication  Virus could be other etiology  Pain management  NPO for now.  Mobilize  1/20:  Abdominal pain improving.  Continues to have ileus.      Ileus (HCC)- (present on admission)  Assessment & Plan  Ileus vs SBO  Nasal gastric tube to wall suction  Hold oral meds that may not be absorbed  Correct electrolytes  Check TSH  Mobilize  Wean narcotics as able.  Discussed with patient 1/17 1/19:  Discussed imaging with surgery.  Suspect ileus in the setting of pancreatitis.  No evidence of obstruction.  Continue NGT and bowel rest.  Encourage to mobilize.  Decreased frequency on IV dilaudid.    1/20:  Repeat KUB showing SBO.  Repeat CT ordered.  Discussed with surgery again.  Imaging reviewed and do not recommend surgical intervention as this is likely secondary to pancreatitis.  Continue plan as above.  Further decreased dilaudid frequency.   1/21:  Repeat CT consistent with ileus.  Continue plan as above.   1/22: Starting to pass flatus.  Slow bowel sounds.  NG tube was dislodged and patient refusing to replace.  Will leave tube out for now and monitor symptoms.  Continue TPN.    Acute hypoxemic respiratory failure (HCC)  Assessment & Plan  Resolved.     Hyperglycemia- (present on admission)  Assessment & Plan  1/17 Glucose:148  1/15 HgbA1c:5.5    Acute kidney injury (HCC)  Assessment & Plan  Improving.   Nephrology consulting  Continue IV hydration.   HD catheter in R IJ site.  Monitor I/O's and labs.  1/21:  Increase in creatinine overnight secondary to lasix dose.  Will start back IV hydration and follow bmp closely.     Essential hypertension- (present on admission)  Assessment & Plan  Uncontrolled.  Increase coreg 25 mg daily  Start amlodipine.   Holding spironolactone, ACE, and ARB in the setting ONOFRE  Monitor vitals.    At risk for malnutrition  Assessment & Plan  NPO for consecutive days secondary to above.   Continue IV hydration and monitoring  electrolytes  Consulted dietician.   :  Start TPN until bowel function returns.     Hypomagnesemia  Assessment & Plan   M.8  Monitor and replete as need    Hyponatremia  Assessment & Plan  Resolved.     Chronic back pain- (present on admission)  Assessment & Plan  Reports worsening of pain secondary to lying in bed  Ordered lidocaine patch.   Start PRN toradol    Hypocalcemia  Assessment & Plan  Supplement and monitor    Ca:6.9. Correct Ca for albumin 7.6  CaCl ordered   Alb:3.1  Ca:7.6.  Corrected Ca:8.3.  CaCl given    Hyperkalemia  Assessment & Plan  Resolved  Monitor daily BMP    Hypertriglyceridemia- (present on admission)  Assessment & Plan  Levels are 571, considered moderate, >500 risks increase somewhat for inducing pancreatitis  Repeat level ordered     Restless leg syndrome- (present on admission)  Assessment & Plan  Requip    Obesity (BMI 30-39.9)- (present on admission)  Assessment & Plan   Encouraged dietary change and stopping his 4 beers daily  Body mass index is 36.37 kg/m².    Crohn disease (HCC)- (present on admission)  Assessment & Plan  No acute issue at this time.       VTE prophylaxis: Heparin 5000 TID

## 2021-01-22 NOTE — PROGRESS NOTES
Patient stated NG did not feel like it was in the right place and was extremely painful. NP was called and he stated best plan for now was to pull old tube, replace with new tube, and get a KUB. Patient does not want NG tube replaced and would like it pulled. He states two doctors stated this morning if patient is passing gas he could possibly have tube clamped tonight with possible dc no earlier than Monday.   Plan to pull NG tube per pt request and will update NP.

## 2021-01-22 NOTE — PROGRESS NOTES
Shift report received from night RN, assumed care at 0715. Patient up in bed, routinely medicated prn per MAR. AOx4, up with standby assist, calls appropriately, bed alarm not in use. PIV currently locked, PICC with TPN, fluid orders on hold. O2 1L, SPO2 91%. VTE heparin. Plan is bowel rest, few ice chips per hour, pain management, NG to suction. Needs met at this time.   Crisis charting completed.  Discussed POC for multimodal pain management, monitoring VS/labs/I&O, mobility, day time routine, comfort, and safety. Patient has call light and personal belongings within reach. Safety and fall precautions in place. Reviewed current labs, notes, medications, and orders. Hourly rounding in place with RN and CNA.    1300 Patient wants to stay away from Dilauded so he doesn't have to have NG tube replaced.

## 2021-01-23 NOTE — PROGRESS NOTES
Pharmacy TPN Day # 3       2021    Dosing Weight   81 kg  (AdjBW)             TPN @ 50% goal   TPN goal: 5979-1793 kcal/day including ~ 1 gm/kg/day Protein  TPN indication: Pancreatitis                                                                Pertinent PMH: Patient admitted on  for abdominal pain. Patient with a history of cholecystomy 2020. Labs obtained in the ED concerning for pancreatitis. Patient made NPO and surgery consulted, recommending bowel rest with NG tube to suction due to abdominal distention. Patient with a current ONOFRE during hospitalization. TPN initiated due to NPO status with plans of prolonged time without enteral access.        Temp (24hrs), Av.6 °C (97.9 °F), Min:36.2 °C (97.2 °F), Max:37.1 °C (98.8 °F)  .  Recent Labs     21  0906 21  0041 21  0634 21  1054   SODIUM 133* 136 136  --    POTASSIUM 4.3 3.9 3.8  --    CHLORIDE 91* 93* 94*  --    CO2 27 30 31  --    BUN 74* 84* 80*  --    CREATININE 2.62* 2.54* 2.48*  --    GLUCOSE 141* 178* 134*  --    CALCIUM 9.3 9.2 8.6  --    ASTSGOT 22 23 39  --    ALTSGPT 18 16 27  --    ALBUMIN 3.4 3.0* 2.6*  --    TBILIRUBIN 1.1 0.8 0.8  --    PHOSPHORUS 4.8* 5.1* 3.9 4.0   MAGNESIUM 2.8* 2.9* 2.6* 2.6*     Accu-Checks  Recent Labs     21  2345 21  0604 21  1104   POCGLUCOSE 152* 141* 136*       Vitals:    21 1600 21 2000 21 0400 21 0725   BP: 150/89 133/76 133/67 111/67   Weight:       Height:           Intake/Output Summary (Last 24 hours) at 2021 1331  Last data filed at 2021 1200  Gross per 24 hour   Intake 60 ml   Output 550 ml   Net -490 ml       Orders Placed This Encounter   Procedures   • Diet Order Diet: Clear Liquid     Standing Status:   Standing     Number of Occurrences:   1     Order Specific Question:   Diet:     Answer:   Clear Liquid [10]         TPN for past 72 hours (Show up to 3 orders; newest on the left. Changes between the two most  recent orders are indicated.)     Start date and time   01/22/2021 2000 01/21/2021 2000      TPN Central Line Formulation [[id number]] TPN Central Line Formulation [[id number]]    Order Status  Active Completed    Last Admin  New Bag at 01/22/2021 1941 by Jose Ramirez R.N. New Bag at 01/21/2021 2000 by Cj Ramos RMAGALI       Base    Clinisol 15%  40 g 40 g    dextrose 70%  175 g 175 g    fat emulsions 20%  25 g 25 g       Additives    potassium chloride  40 mEq --    sodium acetate  -- 50 mEq    sodium chloride  250 mEq 170 mEq    calcium GLUConate  2.3 mEq 2.3 mEq    M.T.E.-4 Adult  1 mL 1 mL    M.V.I. Adult  10 mL 10 mL    famotidine  20 mg --       QS Base    sterile water  937.88 mL 714.88 mL       Energy Contribution    Proteins  -- --    Dextrose  -- --    Lipids  -- --    Total  -- --       Electrolyte Ion Calculated Amount    Sodium  250 mEq 220 mEq    Potassium  40 mEq --    Calcium  2.3 mEq 2.3 mEq    Magnesium  -- --    Aluminum  -- --    Phosphate  -- --    Chloride  290 mEq 170 mEq    Acetate  33.87 mEq 83.87 mEq       Other    Total Protein  40 g 40 g    Total Protein/kg  0.4 g/kg 0.4 g/kg    Total Amino Acid  -- --    Total Amino Acid/kg  -- --    Glucose Infusion Rate  1.21 mg/kg/min 1.21 mg/kg/min    Osmolarity (Estimated)  -- --    Volume  1,680 mL 1,440 mL    Rate  70 mL/hr 60 mL/hr    Dosing Weight  100.2 kg 100.2 kg    [facility]            This formula provides:  % kcal as lipids = 25  Grams protein/kg = 0.5  Non-protein calories = 845  Kcals/kg = 12.4  Total daily calories = 1005     Comments:  · Day 3: TPN @ 50% goal , continue current formulation. If electrolytes stable, will advance to 100% goal tomorrow.   · APRN progress note:  ? Increased TPN rate to 70mL/hr yesterday.   ? Lantus discontinued yesterday while on TPN, continue ISS @ current range.   ? No nausea, vomiting, or increased abdominal pain overnight.  Having loose BMs.  Trial clear liquid diet.   Creatinine improving.  Continue IV hydration for now.   · Nephro added LR @75, following duration.  · Continue TPN, pepcid added.      Fluids:  · MIVF:LR@75 (+) TPN@70mL/hr = Total 145 mL/hr      Macronutrient's:  · CHO 59%AA 16%Lipids 25%  · GIR: 1.56 mg/kg/min  · Accuchecks:141-136; ISS 24 hr requirement: 2 units regular insulin     Micronutrients:  · Na 250 meq  · K 50 meq (Increased)  · Ca 2.3 meq  · Mg 0 meq  · Phos 0 mmol     Zay Cowan, TristinD, BCPS

## 2021-01-23 NOTE — PROGRESS NOTES
Report received and assumed care of patient. Patient sitting up in bed with no distress noted. Call bell in reach and safety measures in place.

## 2021-01-23 NOTE — PROGRESS NOTES
[facility] Medicine Daily Progress Note    Date of Service  1/23/2021    Chief Complaint  Abdominal pain    Hospital Course  55 y.o. male with a history of EtOH use, prior PE, CAD, HTN, DLD admitted 1/14/2021 with severe pancreatitis, and developed acute kidney failure and hyperkalemia.  The patient had a cholecystectomy 7/2020.  He now has associated ileus.     Interval Problem Update  1/19:  NGT in place.  No audible bowel sounds.  Discussed imaging with surgery.  Suspect ileus in the setting of pancreatitis.  No evidence of bowel obstruction.  He reports overall improvement of abdominal pain, but reports worsening of chronic low back pain secondary to lying in bed.  Will decrease frequency of IV narcotics as this is likely contributing to ileus.  Encourage to be out of bed daily.    1/20:  Repeat KUB reviewed.  SBO noted.  Discussed with surgery.  No surgical interventions at this time.  Continue bowel rest.  Repeat CT pending.  Patient complaining of significant discomfort.  He is very anxious to eat.  Discussed further weaning narcotics to promote return of bowel function.  He is also recommended to ambulate.    1/21:  Patient had shortness breath overnight suspect secondary to volume overload in the setting of poorly functioning NGT.  He received dose of lasix with improvement of shortness of breath, although creatinine slightly worse today.  After troubleshooting NGT it is functioning optimally.  Output has drastically increased.  Patient reports feeling much more comfortable.  Start TPN.    1/22: Patient passing flatus.  Slow bowel sounds.  He does continue to have distention.  NG tube was dislodged and he is refusing replacement.  Will leave tube out for now and monitor symptoms.  Continue TPN for now.  Pain is much improved.  1/23:  No nausea, vomiting, or increased abdominal pain overnight.  He is having loose BMs.  Trial clear liquid diet.  Creatinine improving.  Continue IV hydration for now.      Consultants/Specialty  Intensivist  Gastroenterology  Nephrology  General Surgery     Code Status  Full Code    Disposition  TBD.    Review of Systems  Review of Systems   Constitutional: Negative for chills, fever and malaise/fatigue.   HENT: Negative for congestion and sore throat.    Eyes: Negative for blurred vision and double vision.   Respiratory: Negative for cough and shortness of breath.    Cardiovascular: Negative for chest pain, palpitations and leg swelling.   Gastrointestinal: Positive for diarrhea. Negative for abdominal pain, nausea and vomiting.   Genitourinary: Negative for hematuria.   Musculoskeletal: Positive for back pain. Negative for myalgias.   Neurological: Negative for dizziness, focal weakness, weakness and headaches.   Psychiatric/Behavioral: The patient is not nervous/anxious.         Physical Exam  Temp:  [36.2 °C (97.2 °F)-37.1 °C (98.8 °F)] 36.2 °C (97.2 °F)  Pulse:  [] 83  Resp:  [17-18] 17  BP: (111-150)/(67-89) 111/67  SpO2:  [91 %-95 %] 91 %    Physical Exam  Vitals signs reviewed.   Constitutional:       General: He is not in acute distress.     Appearance: Normal appearance. He is obese. He is not ill-appearing.   HENT:      Head: Normocephalic and atraumatic.      Nose: Nose normal.   Eyes:      General: No scleral icterus.        Right eye: No discharge.         Left eye: No discharge.      Conjunctiva/sclera: Conjunctivae normal.   Neck:      Musculoskeletal: Normal range of motion. No neck rigidity.   Cardiovascular:      Rate and Rhythm: Normal rate and regular rhythm.      Pulses: Normal pulses.           Radial pulses are 2+ on the right side and 2+ on the left side.        Dorsalis pedis pulses are 2+ on the right side and 2+ on the left side.      Heart sounds: Normal heart sounds. No murmur.   Pulmonary:      Effort: Pulmonary effort is normal. No respiratory distress.      Breath sounds: Normal breath sounds. No wheezing.   Abdominal:      General: Bowel  sounds are absent. There is distension.      Tenderness: There is abdominal tenderness.      Comments: Hypoactive BS.    Musculoskeletal:         General: No swelling or tenderness.      Right lower leg: No edema.      Left lower leg: No edema.   Skin:     Coloration: Skin is not jaundiced or pale.   Neurological:      General: No focal deficit present.      Mental Status: He is alert and oriented to person, place, and time. Mental status is at baseline.      Cranial Nerves: No cranial nerve deficit.   Psychiatric:         Mood and Affect: Mood is anxious.         Fluids    Intake/Output Summary (Last 24 hours) at 1/23/2021 1033  Last data filed at 1/23/2021 0725  Gross per 24 hour   Intake 90 ml   Output 1400 ml   Net -1310 ml       Laboratory  Recent Labs     01/23/21  0634   WBC 11.1*   RBC 3.21*   HEMOGLOBIN 9.8*   HEMATOCRIT 30.5*   MCV 95.0   MCH 30.5   MCHC 32.1*   RDW 48.3   PLATELETCT 174   MPV 12.0     Recent Labs     01/21/21  0906 01/22/21  0041 01/23/21  0634   SODIUM 133* 136 136   POTASSIUM 4.3 3.9 3.8   CHLORIDE 91* 93* 94*   CO2 27 30 31   GLUCOSE 141* 178* 134*   BUN 74* 84* 80*   CREATININE 2.62* 2.54* 2.48*   CALCIUM 9.3 9.2 8.6             Recent Labs     01/21/21  0906   TRIGLYCERIDE 167*       Imaging  [id number] VIEW   Final Result         Diffuse gaseous distention of the small bowel, worse than prior, concerning for small bowel obstruction.      IR-PICC LINE PLACEMENT W/ GUIDANCE > AGE 5   Final Result                  Ultrasound-guided PICC placement performed by qualified nursing staff as    above.          DX-CHEST-PORTABLE (1 VIEW)   Final Result         1.  Interstitial pulmonary opacities suggests atelectasis or possible subtle infiltrates.      CT-ABDOMEN-PELVIS W/O   Final Result      1.  Again seen pancreatitis with peripancreatic effusions. This is mildly worsened from comparison.      2.  Again seen dilated loops of small intestine with decompression of the colon and distal  small bowel consistent with ileus versus partial small bowel obstruction.      3.  Bibasilar atelectasis and small bilateral pleural effusions.      [id number] VIEW   Final Result      1.  Moderate small bowel dilation with multiple air-fluid levels consistent with small bowel obstruction      2.  Enteric catheter appears appropriately located      CT-ABDOMEN-PELVIS W/O   Final Result         1. Worsening inflammatory change and fluid surrounding the pancreas, in keeping with acute pancreatitis.      2. Diffuse dilatation of the small bowel and proximal colon with decompressed distal colon adjacent to the inflammation in the tail the pancreas. This could relate to colonic obstruction due to adjacent inflammatory change or ileus.      3. Mild wall thickening of the stomach fundus adjacent to the pancreatic tail, likely reactive.      DX-ABDOMEN FOR TUBE PLACEMENT   Final Result         Gastric drainage tube with tip projecting over the expected area of the stomach fundus.      DX-CHEST-FOR LINE PLACEMENT Perform procedure in: Patient's Room   Final Result      Left midlung and bibasilar linear atelectasis.      Dialysis catheter projects over the SVC.      No pneumothorax.         [id number] VIEW   Final Result      Increased bowel gas concerning for early or partial small bowel obstruction.      DX-CHEST-PORTABLE (1 VIEW)   Final Result      Hypoinflation with LEFT mid and lower lung atelectasis, with possible superimposed LEFT basilar pneumonia.      [id number]-R/O   Final Result      1.  Pancreatic edema with significant peripancreatic stranding and ill-defined fluid, consistent with acute interstitial edematous pancreatitis.      2.  Small amount of ascites.      3.  No choledocholithiasis is identified.      4.  Status post cholecystectomy.      CT-ABDOMEN-PELVIS WITH   Final Result      1.  Inflammatory stranding involving the pancreas with fluid within the anterior pararenal space consistent with  pancreatitis.      2.  Fatty liver.      3.  Prior cholecystectomy.      4.  Bibasilar atelectasis/consolidation.      DX-CHEST-PORTABLE (1 VIEW)   Final Result      No evidence of acute cardiopulmonary process.           Assessment/Plan  * Acute pancreatitis  Assessment & Plan  1/15 MRCP without acute etiology  GI believes due to passed gallstone, although could be related to EtOH or question if due to medication  Virus could be other etiology  Pain management  NPO for now.  Mobilize  1/20:  Abdominal pain improving.    1/23:  Ileus improving.     Ileus (HCC)- (present on admission)  Assessment & Plan  Ileus vs SBO  Nasal gastric tube to wall suction  Hold oral meds that may not be absorbed  Correct electrolytes  Check TSH  Mobilize  Wean narcotics as able.  Discussed with patient 1/17 1/19:  Discussed imaging with surgery.  Suspect ileus in the setting of pancreatitis.  No evidence of obstruction.  Continue NGT and bowel rest.  Encourage to mobilize.  Decreased frequency on IV dilaudid.    1/20:  Repeat KUB showing SBO.  Repeat CT ordered.  Discussed with surgery again.  Imaging reviewed and do not recommend surgical intervention as this is likely secondary to pancreatitis.  Continue plan as above.  Further decreased dilaudid frequency.   1/21:  Repeat CT consistent with ileus.  Continue plan as above.   1/22: Starting to pass flatus.  Slow bowel sounds.  NG tube was dislodged and patient refusing to replace.  Will leave tube out for now and monitor symptoms.  Continue TPN.  1/23:  Ileus improving.  Many loose BMs.  NGT removed.  Trial clear liquid diet.     Acute hypoxemic respiratory failure (HCC)  Assessment & Plan  Resolved.     Hyperglycemia- (present on admission)  Assessment & Plan  1/17 Glucose:148  1/15 HgbA1c:5.5  1/23:  Hold lantus.  Continue to monitor for hyperglycemia.     Acute kidney injury (HCC)  Assessment & Plan  Improving.   Nephrology consulting  Continue IV hydration.   HD catheter in R IJ  site.  Monitor I/O's and labs.  :  Increase in creatinine overnight secondary to lasix dose.  Will start back IV hydration and follow bmp closely.   :  Improving.  Continue IV hydration.     Essential hypertension- (present on admission)  Assessment & Plan  Controlled on norvasc and coreg.   Holding spironolactone, ACE, and ARB in the setting ONOFRE  Monitor vitals.    At risk for malnutrition  Assessment & Plan  NPO for consecutive days secondary to above.   Continue IV hydration and monitoring electrolytes  Consulted dietician.   :  Start TPN until bowel function returns.  :  Trial clear liquids.  Will d/c TPN if tolerates oral diet.      Hypomagnesemia  Assessment & Plan   M.8  Monitor and replete as need    Hyponatremia  Assessment & Plan  Resolved.     Chronic back pain- (present on admission)  Assessment & Plan  Reports worsening of pain secondary to lying in bed  Ordered lidocaine patch.   Start PRN toradol    Hypocalcemia  Assessment & Plan  Supplement and monitor    Ca:6.9. Correct Ca for albumin 7.6  CaCl ordered   Alb:3.1  Ca:7.6.  Corrected Ca:8.3.  CaCl given    Hyperkalemia  Assessment & Plan  Resolved  Monitor daily BMP    Hypertriglyceridemia- (present on admission)  Assessment & Plan  Levels are 571, considered moderate, >500 risks increase somewhat for inducing pancreatitis  Repeat level ordered     Restless leg syndrome- (present on admission)  Assessment & Plan  Requip    Obesity (BMI 30-39.9)- (present on admission)  Assessment & Plan   Encouraged dietary change and stopping his 4 beers daily  Body mass index is 36.37 kg/m².    Crohn disease (HCC)- (present on admission)  Assessment & Plan  No acute issue at this time.       VTE prophylaxis: Heparin 5000 TID

## 2021-01-23 NOTE — PROGRESS NOTES
"[facility] -  PROGRESS NOTE               Author: Wing Gtz M.D. Date & Time: 1/23/2021  1:53 PM     HPI:  55 y.o. male who presented 1/14/2021 with abdominal pain and emesis, found to have acute pancreatitis with OONFRE prompting renal consult.     Upon admission, CT abdomen showing inflammatory stranding involving pancreas with fluid within the anterior pararenal space consistent with pancreatitis.  Cr 1.1 on admission, worsened to 3.5 this AM. Oliguric overnight and worsening hyperkalemia despite NS at 75cc/hr. ~200cc UOP after lasix 20mg. Ongoing severe abd pain and worsening abd distention. Worsening shortness or breath and subjective fevers. Being transferred to ICU.    DAILY NEPHROLOGY SUMMARY:  1/16: consult done  1/17: 390cc UOP documented, found to have SBO-NG tube placed, cr stable, abd pain improving, no fluids running   1/18: Hypertensive this AM, improving UOP, cr downtrending   1/19: Transferred out of the ICU, Cr downtrending, main complaint is back pain, NG tube  1/20: off oxygen, NG tube remains, has not passed gas yet, cr down trending   1/21: worsening abd distention and resp status. NG tube repositioned with 6L output, given dose of lasix, cxr with BL infiltrates, slight bump in cr  1/22: started TPN, had an episode of flatus, feel stronger, tachycardic, 6l NG output   1/23: Tolerated liquids this morning.  Complaining of gas pain.    PAST FAMILY HISTORY: Reviewed and Unchanged  SOCIAL HISTORY: Reviewed and Unchanged  CURRENT MEDICATIONS: Reviewed  IMAGING STUDIES: Reviewed    ROS  General: No malaise  CV: No chest pain  RESP: no shortness of breath  GI: +abdominal pain   : No dysuria or gross hematuria  MSK: No trauma  Skin: No rashes  Neuro: No tremors  Psych: No depression    PHYSICAL EXAM  VS:  /67   Pulse 83   Temp 36.2 °C (97.2 °F) (Temporal)   Resp 17   Ht 1.727 m (5' 7.99\")   Wt 100.2 kg (220 lb 14.4 oz)   SpO2 91%   BMI 33.60 kg/m²   GENERAL: NAD "   CV: no edema  RESP: non-labored  GI: distended, NG tube  MSK: No joint deformities   SKIN: No concerning rashes  NEURO: AOx3  PSYCH: Cooperative    Fluids:  In: 60 [P.O.:60]  Out: 1400     LABS:  Recent Results (from the past 24 hour(s))   ACCU-CHEK GLUCOSE    Collection Time: 01/22/21  6:00 PM   Result Value Ref Range    Glucose - Accu-Ck 158 (H) 65 - 99 mg/dL   ACCU-CHEK GLUCOSE    Collection Time: 01/22/21 11:45 PM   Result Value Ref Range    Glucose - Accu-Ck 152 (H) 65 - 99 mg/dL   ACCU-CHEK GLUCOSE    Collection Time: 01/23/21  6:04 AM   Result Value Ref Range    Glucose - Accu-Ck 141 (H) 65 - 99 mg/dL   Magnesium    Collection Time: 01/23/21  6:34 AM   Result Value Ref Range    Magnesium 2.6 (H) 1.5 - 2.5 mg/dL   Phosphorus    Collection Time: 01/23/21  6:34 AM   Result Value Ref Range    Phosphorus 3.9 2.5 - 4.5 mg/dL   Comp Metabolic Panel    Collection Time: 01/23/21  6:34 AM   Result Value Ref Range    Sodium 136 135 - 145 mmol/L    Potassium 3.8 3.6 - 5.5 mmol/L    Chloride 94 (L) 96 - 112 mmol/L    Co2 31 20 - 33 mmol/L    Anion Gap 11.0 7.0 - 16.0    Glucose 134 (H) 65 - 99 mg/dL    Bun 80 (HH) 8 - 22 mg/dL    Creatinine 2.48 (H) 0.50 - 1.40 mg/dL    Calcium 8.6 8.5 - 10.5 mg/dL    AST(SGOT) 39 12 - 45 U/L    ALT(SGPT) 27 2 - 50 U/L    Alkaline Phosphatase 113 (H) 30 - 99 U/L    Total Bilirubin 0.8 0.1 - 1.5 mg/dL    Albumin 2.6 (L) 3.2 - 4.9 g/dL    Total Protein 6.2 6.0 - 8.2 g/dL    Globulin 3.6 (H) 1.9 - 3.5 g/dL    A-G Ratio 0.7 g/dL   CBC WITH DIFFERENTIAL    Collection Time: 01/23/21  6:34 AM   Result Value Ref Range    WBC 11.1 (H) 4.8 - 10.8 K/uL    RBC 3.21 (L) 4.70 - 6.10 M/uL    Hemoglobin 9.8 (L) 14.0 - 18.0 g/dL    Hematocrit 30.5 (L) 42.0 - 52.0 %    MCV 95.0 81.4 - 97.8 fL    MCH 30.5 27.0 - 33.0 pg    MCHC 32.1 (L) 33.7 - 35.3 g/dL    RDW 48.3 35.9 - 50.0 fL    Platelet Count 174 164 - 446 K/uL    MPV 12.0 9.0 - 12.9 fL    Neutrophils-Polys 81.70 (H) 44.00 - 72.00 %    Lymphocytes  4.30 (L) 22.00 - 41.00 %    Monocytes 3.50 0.00 - 13.40 %    Eosinophils 2.60 0.00 - 6.90 %    Basophils 0.00 0.00 - 1.80 %    Nucleated RBC 0.00 /100 WBC    Neutrophils (Absolute) 9.93 (H) 1.82 - 7.42 K/uL    Lymphs (Absolute) 0.48 (L) 1.00 - 4.80 K/uL    Monos (Absolute) 0.39 0.00 - 0.85 K/uL    Eos (Absolute) 0.29 0.00 - 0.51 K/uL    Baso (Absolute) 0.00 0.00 - 0.12 K/uL    NRBC (Absolute) 0.00 K/uL   IONIZED CALCIUM    Collection Time: 01/23/21  6:34 AM   Result Value Ref Range    Ionized Calcium 1.1 1.1 - 1.3 mmol/L   DIFFERENTIAL MANUAL    Collection Time: 01/23/21  6:34 AM   Result Value Ref Range    Bands-Stabs 7.80 0.00 - 10.00 %    Manual Diff Status PERFORMED    PERIPHERAL SMEAR REVIEW    Collection Time: 01/23/21  6:34 AM   Result Value Ref Range    Peripheral Smear Review see below    PLATELET ESTIMATE    Collection Time: 01/23/21  6:34 AM   Result Value Ref Range    Plt Estimation Normal    MORPHOLOGY    Collection Time: 01/23/21  6:34 AM   Result Value Ref Range    RBC Morphology Normal    ESTIMATED GFR    Collection Time: 01/23/21  6:34 AM   Result Value Ref Range    GFR If  33 (A) >60 mL/min/1.73 m 2    GFR If Non  27 (A) >60 mL/min/1.73 m 2   MAGNESIUM    Collection Time: 01/23/21 10:54 AM   Result Value Ref Range    Magnesium 2.6 (H) 1.5 - 2.5 mg/dL   PHOSPHORUS    Collection Time: 01/23/21 10:54 AM   Result Value Ref Range    Phosphorus 4.0 2.5 - 4.5 mg/dL   ACCU-CHEK GLUCOSE    Collection Time: 01/23/21 11:04 AM   Result Value Ref Range    Glucose - Accu-Ck 136 (H) 65 - 99 mg/dL       (click the triangle to expand results)    ASSESSMENT:  # ONOFRE: Initially oliguric. Resolving. In setting of pancreatitis, contrast and possible compartment syndrome.   # Hyperkalemia: resolved   # Pancreatitis  # Hypoxic rasp failure  # Ileus: NG tube, no role for surgical intervention per surgery  # HX  Non-ischemic cardiomyopathy  # Hypocalcemia   # Crohns   # Anemia: acute  #  HTN    PLAN:  -Continue IV fluids given ongoing high NG outpt and e/o contraction alkalosis   -Holding RAAS blockade   -Strict I/Os  -daily renal function panel     Thank you,

## 2021-01-24 NOTE — PROGRESS NOTES
[facility] Medicine Daily Progress Note    Date of Service  1/24/2021    Chief Complaint  Abdominal pain    Hospital Course  55 y.o. male with a history of EtOH use, prior PE, CAD, HTN, DLD admitted 1/14/2021 with severe pancreatitis, and developed acute kidney failure and hyperkalemia.  The patient had a cholecystectomy 7/2020.  He now has associated ileus.     Interval Problem Update  1/19:  NGT in place.  No audible bowel sounds.  Discussed imaging with surgery.  Suspect ileus in the setting of pancreatitis.  No evidence of bowel obstruction.  He reports overall improvement of abdominal pain, but reports worsening of chronic low back pain secondary to lying in bed.  Will decrease frequency of IV narcotics as this is likely contributing to ileus.  Encourage to be out of bed daily.    1/20:  Repeat KUB reviewed.  SBO noted.  Discussed with surgery.  No surgical interventions at this time.  Continue bowel rest.  Repeat CT pending.  Patient complaining of significant discomfort.  He is very anxious to eat.  Discussed further weaning narcotics to promote return of bowel function.  He is also recommended to ambulate.    1/21:  Patient had shortness breath overnight suspect secondary to volume overload in the setting of poorly functioning NGT.  He received dose of lasix with improvement of shortness of breath, although creatinine slightly worse today.  After troubleshooting NGT it is functioning optimally.  Output has drastically increased.  Patient reports feeling much more comfortable.  Start TPN.    1/22: Patient passing flatus.  Slow bowel sounds.  He does continue to have distention.  NG tube was dislodged and he is refusing replacement.  Will leave tube out for now and monitor symptoms.  Continue TPN for now.  Pain is much improved.  1/23:  No nausea, vomiting, or increased abdominal pain overnight.  He is having loose BMs.  Trial clear liquid diet.  Creatinine improving.  Continue IV hydration for now.   1/24:  Stable overnight.  Tolerating clear liquid diet.  Continues to have loose bowel movements.  Denies acute abdominal pain.  Denies nausea or vomiting.  Advance diet.  Wean TPN.    Consultants/Specialty  Intensivist  Gastroenterology  Nephrology  General Surgery     Code Status  Full Code    Disposition  TBD.    Review of Systems  Review of Systems   Constitutional: Negative for chills, fever and malaise/fatigue.   HENT: Negative for congestion and sore throat.    Eyes: Negative for blurred vision, double vision and discharge.   Respiratory: Negative for cough and shortness of breath.    Cardiovascular: Negative for chest pain, palpitations and leg swelling.   Gastrointestinal: Positive for diarrhea. Negative for abdominal pain, constipation, nausea and vomiting.   Genitourinary: Negative for dysuria and hematuria.   Musculoskeletal: Positive for back pain. Negative for myalgias.   Neurological: Negative for dizziness, focal weakness, weakness and headaches.   Psychiatric/Behavioral: The patient is not nervous/anxious.         Physical Exam  Temp:  [36.2 °C (97.2 °F)-36.8 °C (98.2 °F)] 36.2 °C (97.2 °F)  Pulse:  [] 93  Resp:  [17-18] 18  BP: (107-149)/(55-87) 123/64  SpO2:  [93 %-96 %] 93 %    Physical Exam  Vitals signs reviewed.   Constitutional:       General: He is not in acute distress.     Appearance: Normal appearance. He is obese. He is not ill-appearing or toxic-appearing.   HENT:      Head: Normocephalic and atraumatic.      Nose: Nose normal.   Eyes:      General:         Right eye: No discharge.         Left eye: No discharge.      Conjunctiva/sclera: Conjunctivae normal.   Neck:      Musculoskeletal: Normal range of motion. No neck rigidity.   Cardiovascular:      Rate and Rhythm: Normal rate and regular rhythm.      Pulses: Normal pulses.           Radial pulses are 2+ on the right side and 2+ on the left side.        Dorsalis pedis pulses are 2+ on the right side and 2+ on the left side.      Heart  sounds: Normal heart sounds. No murmur.   Pulmonary:      Effort: Pulmonary effort is normal. No respiratory distress.      Breath sounds: Normal breath sounds. No wheezing.   Abdominal:      General: Bowel sounds are absent. There is distension.      Tenderness: There is no abdominal tenderness.      Comments: Bowel sounds in all 4 quadrants.   Musculoskeletal:         General: No swelling or tenderness.      Right lower leg: No edema.      Left lower leg: No edema.   Skin:     Coloration: Skin is not jaundiced or pale.   Neurological:      General: No focal deficit present.      Mental Status: He is alert and oriented to person, place, and time. Mental status is at baseline.      Cranial Nerves: No cranial nerve deficit.   Psychiatric:         Mood and Affect: Mood is anxious.         Fluids    Intake/Output Summary (Last 24 hours) at 1/24/2021 1158  Last data filed at 1/24/2021 1100  Gross per 24 hour   Intake 360 ml   Output 200 ml   Net 160 ml       Laboratory  Recent Labs     01/23/21  0634   WBC 11.1*   RBC 3.21*   HEMOGLOBIN 9.8*   HEMATOCRIT 30.5*   MCV 95.0   MCH 30.5   MCHC 32.1*   RDW 48.3   PLATELETCT 174   MPV 12.0     Recent Labs     01/22/21  0041 01/23/21  0634 01/24/21  0141   SODIUM 136 136 132*   POTASSIUM 3.9 3.8 4.2   CHLORIDE 93* 94* 92*   CO2 30 31 29   GLUCOSE 178* 134* 142*   BUN 84* 80* 70*   CREATININE 2.54* 2.48* 2.09*   CALCIUM 9.2 8.6 8.9                   Imaging  [id number] VIEW   Final Result         Diffuse gaseous distention of the small bowel, worse than prior, concerning for small bowel obstruction.      IR-PICC LINE PLACEMENT W/ GUIDANCE > AGE 5   Final Result                  Ultrasound-guided PICC placement performed by qualified nursing staff as    above.          DX-CHEST-PORTABLE (1 VIEW)   Final Result         1.  Interstitial pulmonary opacities suggests atelectasis or possible subtle infiltrates.      CT-ABDOMEN-PELVIS W/O   Final Result      1.  Again seen  pancreatitis with peripancreatic effusions. This is mildly worsened from comparison.      2.  Again seen dilated loops of small intestine with decompression of the colon and distal small bowel consistent with ileus versus partial small bowel obstruction.      3.  Bibasilar atelectasis and small bilateral pleural effusions.      [id number] VIEW   Final Result      1.  Moderate small bowel dilation with multiple air-fluid levels consistent with small bowel obstruction      2.  Enteric catheter appears appropriately located      CT-ABDOMEN-PELVIS W/O   Final Result         1. Worsening inflammatory change and fluid surrounding the pancreas, in keeping with acute pancreatitis.      2. Diffuse dilatation of the small bowel and proximal colon with decompressed distal colon adjacent to the inflammation in the tail the pancreas. This could relate to colonic obstruction due to adjacent inflammatory change or ileus.      3. Mild wall thickening of the stomach fundus adjacent to the pancreatic tail, likely reactive.      DX-ABDOMEN FOR TUBE PLACEMENT   Final Result         Gastric drainage tube with tip projecting over the expected area of the stomach fundus.      DX-CHEST-FOR LINE PLACEMENT Perform procedure in: Patient's Room   Final Result      Left midlung and bibasilar linear atelectasis.      Dialysis catheter projects over the SVC.      No pneumothorax.         [id number] VIEW   Final Result      Increased bowel gas concerning for early or partial small bowel obstruction.      DX-CHEST-PORTABLE (1 VIEW)   Final Result      Hypoinflation with LEFT mid and lower lung atelectasis, with possible superimposed LEFT basilar pneumonia.      [id number]-H/O   Final Result      1.  Pancreatic edema with significant peripancreatic stranding and ill-defined fluid, consistent with acute interstitial edematous pancreatitis.      2.  Small amount of ascites.      3.  No choledocholithiasis is identified.      4.  Status post  cholecystectomy.      CT-ABDOMEN-PELVIS WITH   Final Result      1.  Inflammatory stranding involving the pancreas with fluid within the anterior pararenal space consistent with pancreatitis.      2.  Fatty liver.      3.  Prior cholecystectomy.      4.  Bibasilar atelectasis/consolidation.      DX-CHEST-PORTABLE (1 VIEW)   Final Result      No evidence of acute cardiopulmonary process.           Assessment/Plan  * Acute pancreatitis  Assessment & Plan  1/15 MRCP without acute etiology  GI believes due to passed gallstone, although could be related to EtOH or question if due to medication  Virus could be other etiology  Pain management  NPO for now.  Mobilize  1/20:  Abdominal pain improving.    1/23:  Ileus improving.   1/24:  Pancreatitis appears resolved.     Ileus (HCC)- (present on admission)  Assessment & Plan  Ileus vs SBO  Nasal gastric tube to wall suction  Hold oral meds that may not be absorbed  Correct electrolytes  Check TSH  Mobilize  Wean narcotics as able.  Discussed with patient 1/17 1/19:  Discussed imaging with surgery.  Suspect ileus in the setting of pancreatitis.  No evidence of obstruction.  Continue NGT and bowel rest.  Encourage to mobilize.  Decreased frequency on IV dilaudid.    1/20:  Repeat KUB showing SBO.  Repeat CT ordered.  Discussed with surgery again.  Imaging reviewed and do not recommend surgical intervention as this is likely secondary to pancreatitis.  Continue plan as above.  Further decreased dilaudid frequency.   1/21:  Repeat CT consistent with ileus.  Continue plan as above.   1/22: Starting to pass flatus.  Slow bowel sounds.  NG tube was dislodged and patient refusing to replace.  Will leave tube out for now and monitor symptoms.  Continue TPN.  1/23:  Ileus improving.  Many loose BMs.  NGT removed.  Trial clear liquid diet.   1/24:  Tolerating CLD.  Advance to full liquids.  Wean TPN.    Acute hypoxemic respiratory failure (HCC)  Assessment & Plan  Resolved.      Hyperglycemia- (present on admission)  Assessment & Plan   Glucose:148  1/15 HgbA1c:5.5  :  Hold lantus.  Continue to monitor for hyperglycemia.   :  No need to insulin at this time.    Acute kidney injury (HCC)  Assessment & Plan  Improving.   Nephrology consulting  Continue IV hydration.   HD catheter in R IJ site.  Monitor I/O's and labs.  :  Increase in creatinine overnight secondary to lasix dose.  Will start back IV hydration and follow bmp closely.   :  Improving.  Continue IV hydration.     Essential hypertension- (present on admission)  Assessment & Plan  Controlled on norvasc and coreg.   Holding spironolactone, ACE, and ARB in the setting ONOFRE  Monitor vitals.    At risk for malnutrition  Assessment & Plan  NPO for consecutive days secondary to above.   Continue IV hydration and monitoring electrolytes  Consulted dietician.   :  Start TPN until bowel function returns.  :  Trial clear liquids.  Will d/c TPN if tolerates oral diet.      Hypomagnesemia  Assessment & Plan   M.8  Monitor and replete as need    Hyponatremia  Assessment & Plan  Resolved.     Chronic back pain- (present on admission)  Assessment & Plan  Reports worsening of pain secondary to lying in bed  Ordered lidocaine patch.   Start PRN toradol    Hypocalcemia  Assessment & Plan  Supplement and monitor    Ca:6.9. Correct Ca for albumin 7.6  CaCl ordered   Alb:3.1  Ca:7.6.  Corrected Ca:8.3.  CaCl given    Hyperkalemia  Assessment & Plan  Resolved  Monitor daily BMP    Hypertriglyceridemia- (present on admission)  Assessment & Plan  Levels are 571, considered moderate, >500 risks increase somewhat for inducing pancreatitis  Repeat level ordered     Restless leg syndrome- (present on admission)  Assessment & Plan  Requip    Obesity (BMI 30-39.9)- (present on admission)  Assessment & Plan   Encouraged dietary change and stopping his 4 beers daily  Body mass index is 36.37 kg/m².    Crohn  disease (HCC)- (present on admission)  Assessment & Plan  No acute issue at this time.       VTE prophylaxis: Heparin 5000 TID

## 2021-01-24 NOTE — PROGRESS NOTES
Pharmacy called to let me know Pt will now be weaned off TPN. Was asked to run TPN at 30cc/hr till 3pm and then D/C gtt with a CBG taken at that time. TPN changed to 30ml at this time.

## 2021-01-24 NOTE — PROGRESS NOTES
[facility] -  PROGRESS NOTE               Author: MELANY Flynn Date & Time: 1/24/2021  11:31 AM     HPI:  55 y.o. male who presented 1/14/2021 with abdominal pain and emesis, found to have acute pancreatitis with ONOFRE prompting renal consult.     Upon admission, CT abdomen showing inflammatory stranding involving pancreas with fluid within the anterior pararenal space consistent with pancreatitis.  Cr 1.1 on admission, worsened to 3.5 this AM. Oliguric overnight and worsening hyperkalemia despite NS at 75cc/hr. ~200cc UOP after lasix 20mg. Ongoing severe abd pain and worsening abd distention. Worsening shortness or breath and subjective fevers. Being transferred to ICU.    DAILY NEPHROLOGY SUMMARY:  1/16: consult done  1/17: 390cc UOP documented, found to have SBO-NG tube placed, cr stable, abd pain improving, no fluids running   1/18: Hypertensive this AM, improving UOP, cr downtrending   1/19: Transferred out of the ICU, Cr downtrending, main complaint is back pain, NG tube  1/20: off oxygen, NG tube remains, has not passed gas yet, cr down trending   1/21: worsening abd distention and resp status. NG tube repositioned with 6L output, given dose of lasix, cxr with BL infiltrates, slight bump in cr  1/22: started TPN, had an episode of flatus, feel stronger, tachycardic, 6l NG output   1/23: Tolerated liquids this morning.  Complaining of gas pain.  1/24: Weaning off TPN. No longer w/ NGT. C/O diarrhea.     PAST FAMILY HISTORY: Reviewed and Unchanged  SOCIAL HISTORY: Reviewed and Unchanged  CURRENT MEDICATIONS: Reviewed  IMAGING STUDIES: Reviewed    ROS  General: No malaise  CV: No chest pain  RESP: C/O some shortness of breath  GI: No nausea, vomiting, abd pain, +diarrhea   : No dysuria or gross hematuria  MSK: No trauma  Skin: No rashes  Neuro: No tremors  Psych: No depression    PHYSICAL EXAM  VS:  /64   Pulse 93   Temp 36.2 °C (97.2 °F) (Temporal)   Resp 18    "Ht 1.727 m (5' 7.99\")   Wt 100.2 kg (220 lb 14.4 oz)   SpO2 93%   BMI 33.60 kg/m²   GENERAL: NAD   CV: no edema  RESP: non-labored  GI: lg,distended,hypoactive BS  MSK: No joint deformities   SKIN: No concerning rashes  NEURO: AOx3  PSYCH: Cooperative    Fluids:  In: 30 [P.O.:30]  Out: 200     LABS:  Recent Results (from the past 24 hour(s))   MAGNESIUM    Collection Time: 01/24/21  1:41 AM   Result Value Ref Range    Magnesium 2.2 1.5 - 2.5 mg/dL   PHOSPHORUS    Collection Time: 01/24/21  1:41 AM   Result Value Ref Range    Phosphorus 3.5 2.5 - 4.5 mg/dL   IONIZED CALCIUM    Collection Time: 01/24/21  1:41 AM   Result Value Ref Range    Ionized Calcium 1.2 1.1 - 1.3 mmol/L   Comp Metabolic Panel    Collection Time: 01/24/21  1:41 AM   Result Value Ref Range    Sodium 132 (L) 135 - 145 mmol/L    Potassium 4.2 3.6 - 5.5 mmol/L    Chloride 92 (L) 96 - 112 mmol/L    Co2 29 20 - 33 mmol/L    Anion Gap 11.0 7.0 - 16.0    Glucose 142 (H) 65 - 99 mg/dL    Bun 70 (H) 8 - 22 mg/dL    Creatinine 2.09 (H) 0.50 - 1.40 mg/dL    Calcium 8.9 8.5 - 10.5 mg/dL    AST(SGOT) 47 (H) 12 - 45 U/L    ALT(SGPT) 37 2 - 50 U/L    Alkaline Phosphatase 128 (H) 30 - 99 U/L    Total Bilirubin 0.9 0.1 - 1.5 mg/dL    Albumin 2.8 (L) 3.2 - 4.9 g/dL    Total Protein 6.5 6.0 - 8.2 g/dL    Globulin 3.7 (H) 1.9 - 3.5 g/dL    A-G Ratio 0.8 g/dL   ESTIMATED GFR    Collection Time: 01/24/21  1:41 AM   Result Value Ref Range    GFR If  40 (A) >60 mL/min/1.73 m 2    GFR If Non  33 (A) >60 mL/min/1.73 m 2   ACCU-CHEK GLUCOSE    Collection Time: 01/24/21  2:10 AM   Result Value Ref Range    Glucose - Accu-Ck 144 (H) 65 - 99 mg/dL   ACCU-CHEK GLUCOSE    Collection Time: 01/24/21  5:42 AM   Result Value Ref Range    Glucose - Accu-Ck 142 (H) 65 - 99 mg/dL       (click the triangle to expand results)    ASSESSMENT:  # ONOFRE: Initially oliguric. Resolving. In setting of pancreatitis, contrast and possible compartment syndrome.   # " Hyperkalemia: resolved   # Pancreatitis  # Hypoxic resp failure  # Ileus: NG tube, no role for surgical intervention per surgery  # HX  Non-ischemic cardiomyopathy  # Hypocalcemia   # Crohns   # Anemia: acute  # HTN    PLAN:  -Continue IVFs for now      -Holding RAAS blockade for now  -Please document strict I/Os  -Daily labs with further recommendations to follow      Thank you,

## 2021-01-25 PROBLEM — K56.609 SBO (SMALL BOWEL OBSTRUCTION) (HCC): Status: ACTIVE | Noted: 2021-01-01

## 2021-01-25 PROBLEM — J96.02 ACUTE HYPERCAPNIC RESPIRATORY FAILURE (HCC): Status: ACTIVE | Noted: 2021-01-01

## 2021-01-25 NOTE — CARE PLAN
Problem: Discharge Barriers/Planning  Goal: Patient's continuum of care needs will be met  Outcome: NOT MET  Note: Stomach still distended. Pt very SOB when up walking, and spO2 drops quickly without oxygen.

## 2021-01-25 NOTE — PROGRESS NOTES
Nephrology Daily Progress Note    Date of Service  1/25/2021    Chief Complaint  55 y.o. male who presented 1/14/2021 with abdominal pain and emesis, found to have acute pancreatitis with ONOFRE prompting renal consult.     Upon admission, CT abdomen showing inflammatory stranding involving pancreas with fluid within the anterior pararenal space consistent with pancreatitis.  Cr 1.1 on admission, worsened to 3.5 this AM. Oliguric overnight and worsening hyperkalemia despite NS at 75cc/hr. ~200cc UOP after lasix 20mg. Ongoing severe abd pain and worsening abd distention. Worsening shortness or breath and subjective fevers. Being transferred to ICU.     DAILY NEPHROLOGY SUMMARY:  1/16: consult done  1/17: 390cc UOP documented, found to have SBO-NG tube placed, cr stable, abd pain improving, no fluids running   1/18: Hypertensive this AM, improving UOP, cr downtrending   1/19: Transferred out of the ICU, Cr downtrending, main complaint is back pain, NG tube  1/20: off oxygen, NG tube remains, has not passed gas yet, cr down trending   1/21: worsening abd distention and resp status. NG tube repositioned with 6L output, given dose of lasix, cxr with BL infiltrates, slight bump in cr  1/22: started TPN, had an episode of flatus, feel stronger, tachycardic, 6l NG output   1/23: Tolerated liquids this morning.  Complaining of gas pain.  1/24: Weaning off TPN. No longer w/ NGT. C/O diarrhea.   1/25: ongoing diarhea and abdominal swelling, CT scan today    Review of Systems  Review of Systems   Constitutional: Positive for malaise/fatigue.   Eyes: Negative.    Respiratory: Positive for shortness of breath.    Cardiovascular: Positive for leg swelling.   Gastrointestinal: Positive for abdominal pain and diarrhea.   Genitourinary: Negative.    Musculoskeletal: Positive for myalgias.   Skin: Negative.    Endo/Heme/Allergies: Bruises/bleeds easily.        Physical Exam  Temp:  [36.4 °C (97.5 °F)-37.4 °C (99.4 °F)] 36.9 °C (98.5  °F)  Pulse:  [] 85  Resp:  [18-20] 20  BP: (104-143)/(63-85) 106/63  SpO2:  [95 %-99 %] 95 %    Physical Exam  Vitals signs and nursing note reviewed.   Constitutional:       Comments: Obese male   HENT:      Head: Normocephalic.   Eyes:      Extraocular Movements: Extraocular movements intact.   Neck:      Musculoskeletal: Normal range of motion and neck supple.   Cardiovascular:      Rate and Rhythm: Normal rate.   Pulmonary:      Breath sounds: Rales present.   Abdominal:      General: There is distension.   Musculoskeletal: Normal range of motion.         General: Swelling present.   Skin:     Coloration: Skin is not jaundiced.         Fluids    Intake/Output Summary (Last 24 hours) at 1/25/2021 1204  Last data filed at 1/25/2021 0900  Gross per 24 hour   Intake 840 ml   Output 600 ml   Net 240 ml       Laboratory  Recent Labs     01/23/21  0634 01/25/21  1008   WBC 11.1* 7.9   RBC 3.21* 2.94*   HEMOGLOBIN 9.8* 8.9*   HEMATOCRIT 30.5* 29.6*   MCV 95.0 100.7*   MCH 30.5 30.3   MCHC 32.1* 30.1*   RDW 48.3 50.1*   PLATELETCT 174 202   MPV 12.0 12.1     Recent Labs     01/23/21  0634 01/24/21  0141 01/25/21  0225   SODIUM 136 132* 133*   POTASSIUM 3.8 4.2 4.2   CHLORIDE 94* 92* 95*   CO2 31 29 28   GLUCOSE 134* 142* 123*   BUN 80* 70* 52*   CREATININE 2.48* 2.09* 1.58*   CALCIUM 8.6 8.9 8.8         Recent Labs     01/24/21  0141   NTPROBNP 338*           Imaging      Assessment/Plan  No new Assessment & Plan notes have been filed under this hospital service since the last note was generated.  Service: Nephrology     # ONOFRE: Initially oliguric. Resolving. In setting of pancreatitis, contrast and possible compartment syndrome.   # Pancreatitis  # Hypoxic resp failure  # Ileus: NG tube, no role for surgical intervention per surgery  # HX  Non-ischemic cardiomyopathy  # Hypocalcemia   # Crohns   # Anemia: acute  # HTN     PLAN:  -Holding RAAS blockade for now  - anti HTN meds  -DC IVF second to edema  -will sign  off

## 2021-01-25 NOTE — PROGRESS NOTES
Report received and assumed care of patient. Patient resting with no distress noted at this time. Call bell in reach and safety measures in place.

## 2021-01-25 NOTE — PROGRESS NOTES
[facility] Medicine Daily Progress Note    Date of Service  1/25/2021    Chief Complaint  Abdominal pain    Hospital Course  55 y.o. male with a history of EtOH use, prior PE, CAD, HTN, DLD admitted 1/14/2021 with severe pancreatitis, and developed acute kidney failure and hyperkalemia.  The patient had a cholecystectomy 7/2020.  He now has associated ileus.     Interval Problem Update  1/19:  NGT in place.  No audible bowel sounds.  Discussed imaging with surgery.  Suspect ileus in the setting of pancreatitis.  No evidence of bowel obstruction.  He reports overall improvement of abdominal pain, but reports worsening of chronic low back pain secondary to lying in bed.  Will decrease frequency of IV narcotics as this is likely contributing to ileus.  Encourage to be out of bed daily.    1/20:  Repeat KUB reviewed.  SBO noted.  Discussed with surgery.  No surgical interventions at this time.  Continue bowel rest.  Repeat CT pending.  Patient complaining of significant discomfort.  He is very anxious to eat.  Discussed further weaning narcotics to promote return of bowel function.  He is also recommended to ambulate.    1/21:  Patient had shortness breath overnight suspect secondary to volume overload in the setting of poorly functioning NGT.  He received dose of lasix with improvement of shortness of breath, although creatinine slightly worse today.  After troubleshooting NGT it is functioning optimally.  Output has drastically increased.  Patient reports feeling much more comfortable.  Start TPN.    1/22: Patient passing flatus.  Slow bowel sounds.  He does continue to have distention.  NG tube was dislodged and he is refusing replacement.  Will leave tube out for now and monitor symptoms.  Continue TPN for now.  Pain is much improved.  1/23:  No nausea, vomiting, or increased abdominal pain overnight.  He is having loose BMs.  Trial clear liquid diet.  Creatinine improving.  Continue IV hydration for now.   1/24:  Stable overnight.  Tolerating clear liquid diet.  Continues to have loose bowel movements.  Denies acute abdominal pain.  Denies nausea or vomiting.  Advance diet.  Wean TPN.  1/25:  Tolerating oral diet.  Continues to have significant abdominal distention.  No abdominal pain.  Repeat CT essentially unchanged.  He is requiring supplemental oxygen.  No acute process on x-ray.  Suspect atelectasis.  Encourage mobilization and IS.  Creatinine continues to improve.  D/C IVF.      Consultants/Specialty  Intensivist  Gastroenterology  Nephrology  General Surgery     Code Status  Full Code    Disposition  TBD.    Review of Systems  Review of Systems   Constitutional: Positive for malaise/fatigue. Negative for chills and fever.   HENT: Negative for congestion and sore throat.    Eyes: Negative for blurred vision, double vision and discharge.   Respiratory: Negative for cough and shortness of breath.    Cardiovascular: Negative for chest pain, palpitations and leg swelling.   Gastrointestinal: Positive for diarrhea. Negative for abdominal pain, constipation, nausea and vomiting.   Genitourinary: Negative for dysuria and hematuria.   Musculoskeletal: Positive for back pain.   Neurological: Positive for weakness. Negative for dizziness, focal weakness and headaches.   Psychiatric/Behavioral: The patient is not nervous/anxious.         Physical Exam  Temp:  [36.4 °C (97.5 °F)-37.4 °C (99.4 °F)] 36.9 °C (98.5 °F)  Pulse:  [] 85  Resp:  [18-20] 20  BP: (104-143)/(63-85) 106/63  SpO2:  [95 %-99 %] 95 %    Physical Exam  Vitals signs reviewed.   Constitutional:       General: He is not in acute distress.     Appearance: Normal appearance. He is obese. He is not ill-appearing or toxic-appearing.   HENT:      Head: Normocephalic and atraumatic.      Nose: Nose normal.   Eyes:      General: No scleral icterus.        Right eye: No discharge.         Left eye: No discharge.      Conjunctiva/sclera: Conjunctivae normal.   Neck:       Musculoskeletal: Normal range of motion. No neck rigidity.   Cardiovascular:      Rate and Rhythm: Normal rate and regular rhythm.      Pulses: Normal pulses.           Radial pulses are 2+ on the right side and 2+ on the left side.        Dorsalis pedis pulses are 2+ on the right side and 2+ on the left side.      Heart sounds: Normal heart sounds. No murmur.   Pulmonary:      Effort: Pulmonary effort is normal. No respiratory distress.      Breath sounds: Examination of the right-lower field reveals decreased breath sounds. Examination of the left-lower field reveals decreased breath sounds. Decreased breath sounds and wheezing present.   Abdominal:      General: Bowel sounds are absent. There is distension.      Tenderness: There is no abdominal tenderness. There is no guarding.      Comments: Bowel sounds in all 4 quadrants.   Musculoskeletal:      Comments: Mild bilateral lower extremity edema.    Skin:     Coloration: Skin is not jaundiced or pale.   Neurological:      General: No focal deficit present.      Mental Status: He is alert and oriented to person, place, and time. Mental status is at baseline.      Cranial Nerves: No cranial nerve deficit.   Psychiatric:         Mood and Affect: Mood is anxious.         Fluids    Intake/Output Summary (Last 24 hours) at 1/25/2021 1310  Last data filed at 1/25/2021 1200  Gross per 24 hour   Intake 1080 ml   Output 600 ml   Net 480 ml       Laboratory  Recent Labs     01/23/21  0634 01/25/21  1008   WBC 11.1* 7.9   RBC 3.21* 2.94*   HEMOGLOBIN 9.8* 8.9*   HEMATOCRIT 30.5* 29.6*   MCV 95.0 100.7*   MCH 30.5 30.3   MCHC 32.1* 30.1*   RDW 48.3 50.1*   PLATELETCT 174 202   MPV 12.0 12.1     Recent Labs     01/23/21  0634 01/24/21  0141 01/25/21  0225   SODIUM 136 132* 133*   POTASSIUM 3.8 4.2 4.2   CHLORIDE 94* 92* 95*   CO2 31 29 28   GLUCOSE 134* 142* 123*   BUN 80* 70* 52*   CREATININE 2.48* 2.09* 1.58*   CALCIUM 8.6 8.9 8.8                    Imaging  CT-ABDOMEN-PELVIS W/O   Final Result      1.  Severe acute pancreatitis, worse than on prior exam.   2.  Multiple dilated small bowel loops suggesting distal small bowel obstruction, similar to prior exam.   3.  No evidence of bowel perforation.      DX-CHEST-LIMITED (1 VIEW)   Final Result      Bibasilar atelectasis, with no significant interval change.               INTERPRETING LOCATION: [address], [place], [address]      [id number] VIEW   Final Result         Diffuse gaseous distention of the small bowel, worse than prior, concerning for small bowel obstruction.      IR-PICC LINE PLACEMENT W/ GUIDANCE > AGE 5   Final Result                  Ultrasound-guided PICC placement performed by qualified nursing staff as    above.          DX-CHEST-PORTABLE (1 VIEW)   Final Result         1.  Interstitial pulmonary opacities suggests atelectasis or possible subtle infiltrates.      CT-ABDOMEN-PELVIS W/O   Final Result      1.  Again seen pancreatitis with peripancreatic effusions. This is mildly worsened from comparison.      2.  Again seen dilated loops of small intestine with decompression of the colon and distal small bowel consistent with ileus versus partial small bowel obstruction.      3.  Bibasilar atelectasis and small bilateral pleural effusions.      [id number] VIEW   Final Result      1.  Moderate small bowel dilation with multiple air-fluid levels consistent with small bowel obstruction      2.  Enteric catheter appears appropriately located      CT-ABDOMEN-PELVIS W/O   Final Result         1. Worsening inflammatory change and fluid surrounding the pancreas, in keeping with acute pancreatitis.      2. Diffuse dilatation of the small bowel and proximal colon with decompressed distal colon adjacent to the inflammation in the tail the pancreas. This could relate to colonic obstruction due to adjacent inflammatory change or ileus.      3. Mild wall thickening of the stomach fundus adjacent to the  pancreatic tail, likely reactive.      DX-ABDOMEN FOR TUBE PLACEMENT   Final Result         Gastric drainage tube with tip projecting over the expected area of the stomach fundus.      DX-CHEST-FOR LINE PLACEMENT Perform procedure in: Patient's Room   Final Result      Left midlung and bibasilar linear atelectasis.      Dialysis catheter projects over the SVC.      No pneumothorax.         [id number] VIEW   Final Result      Increased bowel gas concerning for early or partial small bowel obstruction.      DX-CHEST-PORTABLE (1 VIEW)   Final Result      Hypoinflation with LEFT mid and lower lung atelectasis, with possible superimposed LEFT basilar pneumonia.      [id number]/O   Final Result      1.  Pancreatic edema with significant peripancreatic stranding and ill-defined fluid, consistent with acute interstitial edematous pancreatitis.      2.  Small amount of ascites.      3.  No choledocholithiasis is identified.      4.  Status post cholecystectomy.      CT-ABDOMEN-PELVIS WITH   Final Result      1.  Inflammatory stranding involving the pancreas with fluid within the anterior pararenal space consistent with pancreatitis.      2.  Fatty liver.      3.  Prior cholecystectomy.      4.  Bibasilar atelectasis/consolidation.      DX-CHEST-PORTABLE (1 VIEW)   Final Result      No evidence of acute cardiopulmonary process.           Assessment/Plan  * Acute pancreatitis  Assessment & Plan  1/15 MRCP without acute etiology  GI believes due to passed gallstone, although could be related to EtOH or question if due to medication  Virus could be other etiology  Pain management  NPO for now.  Mobilize  1/20:  Abdominal pain improving.    1/23:  Ileus improving.   1/24:  Pancreatitis appears to be improving.     Ileus (HCC)- (present on admission)  Assessment & Plan  Ileus vs SBO  Nasal gastric tube to wall suction  Hold oral meds that may not be absorbed  Correct electrolytes  Check TSH  Mobilize  Wean narcotics as able.   Discussed with patient 1/17 1/19:  Discussed imaging with surgery.  Suspect ileus in the setting of pancreatitis.  No evidence of obstruction.  Continue NGT and bowel rest.  Encourage to mobilize.  Decreased frequency on IV dilaudid.    1/20:  Repeat KUB showing SBO.  Repeat CT ordered.  Discussed with surgery again.  Imaging reviewed and do not recommend surgical intervention as this is likely secondary to pancreatitis.  Continue plan as above.  Further decreased dilaudid frequency.   1/21:  Repeat CT consistent with ileus.  Continue plan as above.   1/22: Starting to pass flatus.  Slow bowel sounds.  NG tube was dislodged and patient refusing to replace.  Will leave tube out for now and monitor symptoms.  Continue TPN.  1/23:  Ileus improving.  Many loose BMs.  NGT removed.  Trial clear liquid diet.   1/24:  Tolerating CLD.  Advance to full liquids.  Wean TPN.  1/25:  Tolerating diet.  Continues to have significant distention.  Start scheduled simethicone.  Encourage mobilization.     Acute hypoxemic respiratory failure (HCC)  Assessment & Plan  Currently requiring supplemental oxygen  No history of COPD or smoking.   No significant abnormality of x-ray  BNP only mildly elevated  Suspect secondary to atelectasis.    Encourage mobilization and IS.     Hyperglycemia- (present on admission)  Assessment & Plan  1/17 Glucose:148  1/15 HgbA1c:5.5  1/23:  Hold lantus.  Continue to monitor for hyperglycemia.   1/24:  No need to insulin at this time.    Acute kidney injury (HCC)  Assessment & Plan  Improving.   Nephrology consulting  Continue IV hydration.   HD catheter in R IJ site.  Monitor I/O's and labs.  1/21:  Increase in creatinine overnight secondary to lasix dose.  Will start back IV hydration and follow bmp closely.   1/23:  Improving.  Continue IV hydration.   1/25:  Continues to improve.  Discontinue IVF.  Nephrology s/o.    Essential hypertension- (present on admission)  Assessment & Plan  Controlled on  norvasc and coreg.   Holding spironolactone, ACE, and ARB in the setting ONOFRE  Monitor vitals.    At risk for malnutrition  Assessment & Plan  NPO for consecutive days secondary to above.   Continue IV hydration and monitoring electrolytes  Consulted dietician.   :  Start TPN until bowel function returns.  :  Trial clear liquids.  Will d/c TPN if tolerates oral diet.    :  Tolerating full liquids.     Hypomagnesemia  Assessment & Plan   M.8  Monitor and replete as need    Hyponatremia  Assessment & Plan  Resolved.     Chronic back pain- (present on admission)  Assessment & Plan  Reports worsening of pain secondary to lying in bed  Ordered lidocaine patch.   Start PRN toradol    Hypocalcemia  Assessment & Plan  Supplement and monitor    Ca:6.9. Correct Ca for albumin 7.6  CaCl ordered   Alb:3.1  Ca:7.6.  Corrected Ca:8.3.  CaCl given    Hyperkalemia  Assessment & Plan  Resolved  Monitor daily BMP    Hypertriglyceridemia- (present on admission)  Assessment & Plan  Levels are 571, considered moderate, >500 risks increase somewhat for inducing pancreatitis  Repeat level ordered     Restless leg syndrome- (present on admission)  Assessment & Plan  Requip    Obesity (BMI 30-39.9)- (present on admission)  Assessment & Plan   Encouraged dietary change and stopping his 4 beers daily  Body mass index is 36.37 kg/m².    Crohn disease (HCC)- (present on admission)  Assessment & Plan  No acute issue at this time.       VTE prophylaxis: Heparin 5000 TID

## 2021-01-25 NOTE — PROGRESS NOTES
"Patient ambulated to bathroom with standby assist. Patient had medium brown watery but \"starting to form\" per patient. Patient tolerating full liquid diet.  "

## 2021-01-25 NOTE — PROGRESS NOTES
PT has had multiple loose stools today. ABD is tight, tympanic, and distended. He is also becoming more urgent with his BMs and has had some incontinent episodes. He has small, shallow breaths that seem to be from his stomach keeping him from taking large breaths. His breath sounds after last ambulation to BM are slighty wheezy.     texted this to HL at this time. Asked for KUB or ABD xray.

## 2021-01-26 PROBLEM — J96.02 ACUTE RESPIRATORY FAILURE WITH HYPOXIA AND HYPERCAPNIA (HCC): Status: ACTIVE | Noted: 2021-01-01

## 2021-01-26 NOTE — PROGRESS NOTES
Assumed care of pt at 1900. Pt A&Ox4 sating 92% on 3L NC. Pt c/o nausea and dry heaving, placed call to on call for PRN zofran. Abdomen taut and distended as reported by dayshift RN. VSS. Encouraged IS use and pt responded he would like to sleep. Returned to pt at 2100 for PM medications. Pt desatting at 85-87% also noticeably diaphoretic which was not present previously. Pt non responsive to sternal rub. RRT called. Pt transferred to [place] [id number]. Report called to Yaa RAINEY.

## 2021-01-26 NOTE — PROGRESS NOTES
Pt arrived to unit at 2158hrs on bed, attached to monitor and with RT and Rapid Response team. VS stable during transfer. 2 bags of belongings placed in closet in room. No belongings placed in safe keeping. Family notified of transfer by Dr. Seymour. 2 RN skin check complete with BRIGID Contreras. Sacrum reddened but blanching, otherwise skin check unremarkable.

## 2021-01-26 NOTE — PROGRESS NOTES
Critical Care Progress Note    Date of admission  1/14/2021    Chief Complaint  55 y.o. male admitted 1/14/2021 with acute pancreatitis.  He has a history of AF, PE, non-ischemic cardiomyopathy which has recovered, NSTEMI, HTN, obesity, dyslipidemia, restless leg syndrome and laparoscopic cholecystectomy for acute cholecystitis.    Hospital Course    1/17 -    continue bowel rest.  HD/UF per nephrology.  Liver enzymes and lipase are improving.  Continue pain control.  1/26 -    Resume TPN.  Continue full vent support.  Keep OG to suction.    Interval Problem Update  Reviewed last 24 hour events:    99.5  -475 mL in the last 24  -17,572 mL since admit  Replete magnesium  Resume TPN  Bowel rest  OG to suction      Review of Systems  Review of Systems   Unable to perform ROS: Acuity of condition        Vital Signs for last 24 hours   Temp:  [36 °C (96.8 °F)-37.5 °C (99.5 °F)] 36.9 °C (98.5 °F)  Pulse:  [] 90  Resp:  [10-30] 26  BP: ()/(50-85) 108/63  SpO2:  [90 %-100 %] 96 %    Hemodynamic parameters for last 24 hours       Respiratory Information for the last 24 hours  Vent Mode: APVCMV  Rate (breaths/min): 26  Vt Target (mL): 430  PEEP/CPAP: 10  MAP: 15  Control VTE (exp VT): 434    Physical Exam   Physical Exam  Constitutional:       Appearance: He is not diaphoretic.      Comments: On ventilator   HENT:      Head: Normocephalic and atraumatic.      Right Ear: External ear normal.      Left Ear: External ear normal.      Nose: Nose normal.      Mouth/Throat:      Mouth: Mucous membranes are moist.      Pharynx: Oropharynx is clear.   Eyes:      Conjunctiva/sclera: Conjunctivae normal.      Pupils: Pupils are equal, round, and reactive to light.   Neck:      Musculoskeletal: Normal range of motion and neck supple.   Cardiovascular:      Pulses: Normal pulses.      Heart sounds: No murmur. No gallop.       Comments: Sinus rhythm  Pulmonary:      Breath sounds: Rales (Few scattered crackles) present. No  wheezing.   Abdominal:      General: There is distension.      Tenderness: There is no abdominal tenderness. There is no guarding.      Comments: No bowel sounds   Musculoskeletal:      Comments: No clubbing or cyanosis   Skin:     General: Skin is warm and dry.      Capillary Refill: Capillary refill takes less than 2 seconds.   Neurological:      Comments: Sedated         Medications  Current Facility-Administered Medications   Medication Dose Route Frequency Provider Last Rate Last Admin   • magnesium sulfate IVPB premix 2 g  2 g Intravenous Once Levar Tapia M.D.       • sodium phosphate 30 mmol in 1/2  mL ivpb  30 mmol Intravenous Once Levar Tapia M.D.       • dexmedetomidine (PRECEDEX) 400 mcg/100mL NS premix infusion  0.1-1.5 mcg/kg/hr Intravenous Continuous Levar Tapia M.D.       • HYDROmorphone (DILAUDID) 0.2 mg/mL in 50mL NS (Continuous Infusion)   Intravenous Continuous Levar Tapia M.D.       • HYDROmorphone pf (DILAUDID) injection 0.5-2 mg  0.5-2 mg Intravenous Q HOUR PRN Levar Tapia M.D.       • MD Alert...TPN per Pharmacy   Other PHARMACY TO DOSE Levar Tapia M.D.       • ibuprofen (MOTRIN) tablet 400 mg  400 mg Oral Q6HRS PRN KASH TorresPLuchoRLuchoNLucho   400 mg at 01/25/21 1718   • ondansetron (ZOFRAN) syringe/vial injection 4 mg  4 mg Intravenous Q4HRS PRN Wing Abarca M.D.   4 mg at 01/25/21 1921   • Respiratory Therapy Consult   Nebulization Continuous RT Anil Seymour M.D.       • ipratropium-albuterol (DUONEB) nebulizer solution  3 mL Nebulization Q2HRS PRN (RT) Anil Seymour M.D.   3 mL at 01/25/21 2225   • famotidine (PEPCID) tablet 20 mg  20 mg Enteral Tube Q12HRS Anil Seymour M.D.   20 mg at 01/26/21 0556    Or   • famotidine (PEPCID) injection 20 mg  20 mg Intravenous Q12HRS Anil B Peel, M.D.       • senna-docusate (PERICOLACE or SENOKOT S) 8.6-50 MG per tablet 2 Tab  2 Tab Enteral Tube BID Anil Seymour M.D.    2 Tab at 01/26/21 0557    And   • polyethylene glycol/lytes (MIRALAX) PACKET 1 Packet  1 Packet Enteral Tube QDAY PRN Anil Seymour M.D.        And   • magnesium hydroxide (MILK OF MAGNESIA) suspension 30 mL  30 mL Enteral Tube QDAY PRN Anil Seymour M.D.        And   • bisacodyl (DULCOLAX) suppository 10 mg  10 mg Rectal QDAY PRN Anil Seymour M.D.       • MD Alert...ICU Electrolyte Replacement per Pharmacy   Other PHARMACY TO DOSE Anil Seymour M.D.       • lidocaine (XYLOCAINE) 1 % injection 1-2 mL  1-2 mL Tracheal Tube Q30 MIN PRN Anil Seymour M.D.   2 mL at 01/26/21 0000   • HYDROcodone-acetaminophen (NORCO) 5-325 MG per tablet 1 Tab  1 Tab Oral Q4HRS PRN Edinson Wilson, A.P.R.N.   1 Tab at 01/25/21 1050   • simethicone (MYLICON) chewable tab 80 mg  80 mg Oral TID Edinson Faithk, A.P.R.N.   80 mg at 01/25/21 1718   • amLODIPine (NORVASC) tablet 5 mg  5 mg Oral Q DAY Edinson Faithk, A.P.R.N.   Stopped at 01/26/21 0551   • lidocaine (LIDODERM) 5 % 1 Patch  1 Patch Transdermal Q24HR Edinson Faithk, A.P.R.N.   1 Patch at 01/25/21 1302   • carvedilol (COREG) tablet 25 mg  25 mg Oral Q EVENING Edinson Katie, A.P.R.N.   Stopped at 01/25/21 1800   • acetaminophen (Tylenol) tablet 650 mg  650 mg Oral Q4HRS PRN Levar Ugarte D.O.   650 mg at 01/20/21 0311   • ROPINIRole (REQUIP) tablet 2 mg  2 mg Oral QHS Levar Tapia M.D.   Stopped at 01/25/21 2102   • labetalol (NORMODYNE/TRANDATE) injection 10-20 mg  10-20 mg Intravenous Q4HRS PRN Jorge Luis Michael M.D.   20 mg at 01/19/21 1538   • heparin injection 5,000 Units  5,000 Units Subcutaneous Q8HRS Johnson Hodge M.D.   5,000 Units at 01/26/21 0556       Fluids    Intake/Output Summary (Last 24 hours) at 1/26/2021 0842  Last data filed at 1/26/2021 0800  Gross per 24 hour   Intake 905.56 ml   Output 1300 ml   Net -394.44 ml       Laboratory  Recent Labs     01/25/21  2135 01/25/21  2252 01/26/21  0353   HPAHT28L 7.05*  --   --    [id number] 114.2*  --   --     [id number] 94.3*  --   --    UFSX8UAV 94.2  --   --    ARTHCO3 31*  --   --    ARTBE -2  --   --    ISTATAPH  --  7.329* 7.513*   ISTATAPCO2  --  54.9* 35.5   ISTATAPO2  --  75 102*   ISTATATCO2  --  31 30   [id number]  --  93 98   ISTATARTHCO3  --  28.9* 28.5*   ISTATARTBE  --  2 5*   ISTATTEMP  --  97.0 F 99.1 F   ISTATFIO2  --  100 85   ISTATSPEC  --  Arterial Arterial   ISTATAPHTC  --  7.342* 7.509*   CPHBACCE3WE  --  71 104*         Recent Labs     01/25/21 0225 01/25/21 1008 01/25/21 2123 01/26/21 0432   SODIUM 133*  --  134* 132*   POTASSIUM 4.2  --  5.1 4.4   CHLORIDE 95*  --  95* 94*   CO2 28  --  32 27   BUN 52*  --  50* 50*   CREATININE 1.58*  --  1.58* 1.81*   MAGNESIUM 2.0 2.0  --  1.8   PHOSPHORUS 4.1 3.7  --  1.7*   CALCIUM 8.8  --  9.6 8.8     Recent Labs     01/25/21 0225 01/25/21 1008 01/25/21 2123 01/26/21 0432   ALTSGPT 24  --  26 20   ASTSGOT 23  --  20 19   ALKPHOSPHAT 103*  --  112* 92   TBILIRUBIN 0.6  --  0.5 0.6   PREALBUMIN  --  5.6*  --   --    GLUCOSE 123*  --  157* 104*     Recent Labs     01/25/21 0225 01/25/21 1008 01/25/21 2123 01/26/21 0432   WBC  --  7.9 11.8* 5.4   NEUTSPOLYS  --  81.60* 75.90* 80.90*   LYMPHOCYTES  --  4.40* 7.80* 3.50*   MONOCYTES  --  4.40 7.80 5.20   EOSINOPHILS  --  4.40 0.90 1.70   BASOPHILS  --  0.00 0.00 0.00   ASTSGOT 23  --  20 19   ALTSGPT 24  --  26 20   ALKPHOSPHAT 103*  --  112* 92   TBILIRUBIN 0.6  --  0.5 0.6     Recent Labs     01/25/21  1008 01/25/21 2123 01/26/21  0432   RBC 2.94* 3.41* 2.97*   HEMOGLOBIN 8.9* 10.3* 8.9*   HEMATOCRIT 29.6* 34.8* 28.7*   PLATELETCT 202 303 250       Imaging  X-Ray:  I have personally reviewed the images and compared with prior images. and My impression is: Minimal bibasilar atelectasis    Assessment/Plan  * Acute respiratory failure with hypoxia and hypercapnia (HCC)  Assessment & Plan  Intubated 1/25  All the appropriate ventilator bundles are in place  Full vent support    Acute  pancreatitis  Assessment & Plan  Laparoscopic cholecystectomy on or about 7/25/2020 by Dr. Skelotn  CT with worsening acute pancreatitis and possible distal small bowel obstruction  Complete bowel rest  OG to suction  TPN for nutritional support    SBO (small bowel obstruction) ([facility])  Assessment & Plan  Small bowel obstruction versus ileus  Complete bowel rest  OG to suction    Ileus (HCC)- (present on admission)  Assessment & Plan  Complete bowel rest  OG to suction  TPN for nutritional support    Hyperglycemia- (present on admission)  Assessment & Plan  Sliding scale insulin  Glycohemoglobin normal at 5.5    Acute kidney injury (HCC)  Assessment & Plan  Monitor renal function and urine output  Avoid nephrotoxins and renal dose medications    Essential hypertension- (present on admission)  Assessment & Plan  Goal SBP less than 160  IV labetalol to achieve blood pressure goals    Hypomagnesemia  Assessment & Plan  Replete magnesium    Chronic back pain- (present on admission)  Assessment & Plan       Nonischemic cardiomyopathy (HCC)- (present on admission)  Assessment & Plan  History of in 2014 with EF 25-30% - recovered    Restless leg syndrome- (present on admission)  Assessment & Plan  History of    Obesity (BMI 30-39.9)- (present on admission)  Assessment & Plan  Nutrition counseling and behavioral modification    Crohn disease (HCC)- (present on admission)  Assessment & Plan  History of       VTE:  Heparin  Ulcer: PPI  Lines: Central Line  Ongoing indication addressed and Hudson Catheter  Ongoing indication addressed    I have performed a physical exam and reviewed and updated ROS and Plan today (1/26/2021). In review of yesterday's note (1/25/2021), there are no changes except as documented above.     I have assessed and reassessed his respiratory status with ventilator adjustments, airway mechanics, ventilator waveforms, blood pressure, hemodynamics, cardiovascular status and his neurologic status.  He is at  increased risk for worsening respiratory, cardiovascular and gastrointestinal system dysfunction.    Discussed patient condition and risk of morbidity and/or mortality with RN, RT, Pharmacy, [facility] resident, Charge nurse / hot rounds and QA team     The patient remains critically ill.  Critical care time = 90 minutes in directly providing and coordinating critical care and extensive data review.  No time overlap and excludes procedures.      Levar Tapia MD  Pulmonary and Critical Care Medicine

## 2021-01-26 NOTE — PROGRESS NOTES
"[facility] ICU Progress Note      Admit Date: 1/14/2021    Resident(s): Susan Augustine D.O.   Attending:  INDIO MOREIRA/ Dr. Tapia    Patient ID:    Name:  Shaka Riley   YOB: 1965  Age:  55 y.o.  male   MRN:  [id number]    Hospital Course (carried forward and updated):  Shaka Riley is a 55 y.o. male admitted on 1/14/21 with one day of abdominal pain with nausea and vomiting, found to have severe acute pancreatitis with lipase >3000, SBO vs. Ileus and ONOFRE. ICU consulted on 1/25/21 due to acute hypercapnic respiratory failure with need for intubation.     Consultants:  Critical Care  General Surgery   Nephrology   Gastroenterology     Interval Events:  Transferred to ICU - intubated   NPO status, continue TPN     Review of Systems   Unable to perform ROS: Intubated     PHYSICAL EXAM:  Vitals:    01/26/21 0600 01/26/21 0604 01/26/21 0700 01/26/21 0800   BP: 103/63  106/63 108/63   Pulse: 93 93 92 90   Resp: (!) 30 (!) 26 (!) 26 (!) 26   Temp: 36.7 °C (98 °F)   36.9 °C (98.5 °F)   TempSrc: Temporal   Temporal   SpO2: 95% 93% 100% 96%   Weight:       Height:        Body mass index is 38.02 kg/m².  Latest Vitals:  /63   Pulse 90   Temp 36.9 °C (98.5 °F) (Temporal)   Resp (!) 26   Ht 1.727 m (5' 7.99\")   Wt 113.4 kg (250 lb)   SpO2 96%   BMI 38.02 kg/m²   O2 therapy: Pulse Oximetry: 96 %, O2 (LPM): 3, O2 Delivery Device: Ventilator  Vitals Range last 24h:  Temp:  [36 °C (96.8 °F)-37.5 °C (99.5 °F)] 36.9 °C (98.5 °F)  Pulse:  [] 90  Resp:  [10-30] 26  BP: ()/(50-85) 108/63  SpO2:  [90 %-100 %] 96 %  Date 01/26/21 0700 - 01/27/21 0659   Shift [id number] 7051-1481 4660-0659 24 Hour Total   INTAKE   I.V. 50.2   50.2     Precedex Volume 50.2   50.2   Other 30   30     Medications (PO/Enteral Liquids) 30   30   Shift Total 80.2   80.2   OUTPUT   Stool         Number of Times Stooled 0 x   0 x   Shift Total       NET 80.2   80.2        Intake/Output Summary (Last 24 " hours) at 1/26/2021 1024  Last data filed at 1/26/2021 0800  Gross per 24 hour   Intake 545.56 ml   Output 1300 ml   Net -754.44 ml      Physical Exam   Constitutional:   Obese, Diaphoretic, Intubated   HENT:   Head: Normocephalic and atraumatic.   Eyes: Pupils are equal, round, and reactive to light. Conjunctivae are normal.   Neck: Neck supple. No thyromegaly present.   Cardiovascular: Normal rate, regular rhythm and intact distal pulses.   Pulmonary/Chest: He has no wheezes. He has no rales.   Intubated   Abdominal:   Distended, hypoactive BS, ecchymosis to bilateral lower abdomen   Musculoskeletal:         General: Edema present.   Neurological: He exhibits normal muscle tone.   Moves all extremities spontaneously. Pupils equal and reactive.    Skin: Skin is warm. No rash noted.   Psychiatric:   Unable to assess   Nursing note and vitals reviewed.    Recent Labs     01/25/21 2135 01/25/21  2252 01/26/21  0353   BLSDJ88A 7.05*  --   --    [id number] 114.2*  --   --    [id number] 94.3*  --   --    KSWU3VRN 94.2  --   --    ARTHCO3 31*  --   --    ARTBE -2  --   --    ISTATAPH  --  7.329* 7.513*   ISTATAPCO2  --  54.9* 35.5   ISTATAPO2  --  75 102*   ISTATATCO2  --  31 30   [id number]  --  93 98   ISTATARTHCO3  --  28.9* 28.5*   ISTATARTBE  --  2 5*   ISTATTEMP  --  97.0 F 99.1 F   ISTATFIO2  --  100 85   ISTATSPEC  --  Arterial Arterial   ISTATAPHTC  --  7.342* 7.509*   [id number]  --  71 104*     Recent Labs     01/25/21  0225 01/25/21  1008 01/25/21 2123 01/26/21  0432   SODIUM 133*  --  134* 132*   POTASSIUM 4.2  --  5.1 4.4   CHLORIDE 95*  --  95* 94*   CO2 28  --  32 27   BUN 52*  --  50* 50*   CREATININE 1.58*  --  1.58* 1.81*   MAGNESIUM 2.0 2.0  --  1.8   PHOSPHORUS 4.1 3.7  --  1.7*   CALCIUM 8.8  --  9.6 8.8     Recent Labs     01/25/21  0225 01/25/21  1008 01/25/21 2123 01/26/21  0432   ALTSGPT 24  --  26 20   ASTSGOT 23  --  20 19   ALKPHOSPHAT 103*  --  112* 92   TBILIRUBIN 0.6  --  0.5 0.6    PREALBUMIN  --  5.6*  --   --    GLUCOSE 123*  --  157* 104*     Recent Labs     01/25/21  1008 01/25/21 2123 01/26/21  0432   RBC 2.94* 3.41* 2.97*   HEMOGLOBIN 8.9* 10.3* 8.9*   HEMATOCRIT 29.6* 34.8* 28.7*   PLATELETCT 202 303 250     Recent Labs     01/25/21  0225 01/25/21  1008 01/25/21 2123 01/26/21  0432   WBC  --  7.9 11.8* 5.4   NEUTSPOLYS  --  81.60* 75.90* 80.90*   LYMPHOCYTES  --  4.40* 7.80* 3.50*   MONOCYTES  --  4.40 7.80 5.20   EOSINOPHILS  --  4.40 0.90 1.70   BASOPHILS  --  0.00 0.00 0.00   ASTSGOT 23  --  20 19   ALTSGPT 24  --  26 20   ALKPHOSPHAT 103*  --  112* 92   TBILIRUBIN 0.6  --  0.5 0.6       Meds:  • magnesium sulfate  2 g 2 g (01/26/21 0851)   • sodium phosphate ivpb  30 mmol 30 mmol (01/26/21 0853)   • dexmedetomidine (PRECEDEX) infusion  0.1-1.5 mcg/kg/hr 1 mcg/kg/hr (01/26/21 0830)   • HYDROmorphone   Stopped (01/26/21 0830)   • HYDROmorphone  0.5-2 mg     • MD Alert...TPN per Pharmacy       • ondansetron  4 mg     • Respiratory Therapy Consult       • ipratropium-albuterol  3 mL     • famotidine  20 mg      Or   • famotidine  20 mg     • senna-docusate  2 Tab      And   • polyethylene glycol/lytes  1 Packet      And   • magnesium hydroxide  30 mL      And   • bisacodyl  10 mg     • MD Alert...Adult ICU Electrolyte Replacement per Pharmacy       • lidocaine  1-2 mL     • simethicone  80 mg     • amLODIPine  5 mg     • lidocaine  1 Patch     • carvedilol  25 mg     • acetaminophen  650 mg     • ROPINIRole  2 mg     • labetalol  10-20 mg     • heparin  5,000 Units        Procedures:  Intubation - Dr. Seymour    Imaging:  DX-ABDOMEN FOR TUBE PLACEMENT   Final Result         1.  Air-filled distended loops of bowel are seen with reactive mucosal pattern, appearance suggests ileus or enteritis. Recommend radiographic followup to resolution to exclude progression to obstruction.   2.  Nasogastric tube tip terminates overlying the expected location of the gastric antrum, could be slightly  advanced.      DX-CHEST-PORTABLE (1 VIEW)   Final Result         1.  Bilateral basilar atelectasis, no focal infiltrate   2.  Cardiomegaly      DX-CHEST-PORTABLE (1 VIEW)   Final Result      Perihilar opacification could be due to atelectasis or infiltrate versus mild edema.      Possible trace effusions.      Hypoventilatory change.      CT-ABDOMEN-PELVIS W/O   Final Result      1.  Severe acute pancreatitis, worse than on prior exam.   2.  Multiple dilated small bowel loops suggesting distal small bowel obstruction, similar to prior exam.   3.  No evidence of bowel perforation.      DX-CHEST-LIMITED (1 VIEW)   Final Result      Bibasilar atelectasis, with no significant interval change.               INTERPRETING LOCATION: [facility], [facility], [address]      [id number] VIEW   Final Result         Diffuse gaseous distention of the small bowel, worse than prior, concerning for small bowel obstruction.      IR-PICC LINE PLACEMENT W/ GUIDANCE > AGE 5   Final Result                  Ultrasound-guided PICC placement performed by qualified nursing staff as    above.          DX-CHEST-PORTABLE (1 VIEW)   Final Result         1.  Interstitial pulmonary opacities suggests atelectasis or possible subtle infiltrates.      CT-ABDOMEN-PELVIS W/O   Final Result      1.  Again seen pancreatitis with peripancreatic effusions. This is mildly worsened from comparison.      2.  Again seen dilated loops of small intestine with decompression of the colon and distal small bowel consistent with ileus versus partial small bowel obstruction.      3.  Bibasilar atelectasis and small bilateral pleural effusions.      [id number] VIEW   Final Result      1.  Moderate small bowel dilation with multiple air-fluid levels consistent with small bowel obstruction      2.  Enteric catheter appears appropriately located      CT-ABDOMEN-PELVIS W/O   Final Result         1. Worsening inflammatory change and fluid surrounding the pancreas, in keeping  with acute pancreatitis.      2. Diffuse dilatation of the small bowel and proximal colon with decompressed distal colon adjacent to the inflammation in the tail the pancreas. This could relate to colonic obstruction due to adjacent inflammatory change or ileus.      3. Mild wall thickening of the stomach fundus adjacent to the pancreatic tail, likely reactive.      DX-ABDOMEN FOR TUBE PLACEMENT   Final Result         Gastric drainage tube with tip projecting over the expected area of the stomach fundus.      DX-CHEST-FOR LINE PLACEMENT Perform procedure in: Patient's Room   Final Result      Left midlung and bibasilar linear atelectasis.      Dialysis catheter projects over the SVC.      No pneumothorax.         [id number] VIEW   Final Result      Increased bowel gas concerning for early or partial small bowel obstruction.      DX-CHEST-PORTABLE (1 VIEW)   Final Result      Hypoinflation with LEFT mid and lower lung atelectasis, with possible superimposed LEFT basilar pneumonia.      [id number]-H/O   Final Result      1.  Pancreatic edema with significant peripancreatic stranding and ill-defined fluid, consistent with acute interstitial edematous pancreatitis.      2.  Small amount of ascites.      3.  No choledocholithiasis is identified.      4.  Status post cholecystectomy.      CT-ABDOMEN-PELVIS WITH   Final Result      1.  Inflammatory stranding involving the pancreas with fluid within the anterior pararenal space consistent with pancreatitis.      2.  Fatty liver.      3.  Prior cholecystectomy.      4.  Bibasilar atelectasis/consolidation.      DX-CHEST-PORTABLE (1 VIEW)   Final Result      No evidence of acute cardiopulmonary process.        Problem and Plan:  * Acute respiratory failure with hypoxia and hypercapnia (HCC)  Assessment & Plan  Likely secondary to atelectasis and OHS   No history of COPD or smoking.   No significant abnormality of x-ray  S/P Intubation 1/25   RT/O2 protocol  Continue lung  protective mechanical ventilation strategies  SAT/SBT    Acute pancreatitis  Assessment & Plan  Etiology likely ETOH use vs. Medications   S/P MRCP on 1/15 without acute etiology  Pain management  NPO   Mobilize when able    Ileus (HCC)- (present on admission)  Assessment & Plan  Ileus vs SBO - etiology likely due to acute pancreatitis vs. ETOH use vs. Medication induced   Nasal gastric tube to wall suction  Hold oral meds that may not be absorbed  Correct electrolytes  Wean narcotics as able.    Continue to monitor as this may take weeks to resolve    Hyperglycemia- (present on admission)  Assessment & Plan  HgbA1c:5.5  Continue to monitor for hyperglycemia.   No need to insulin at this time.    Acute kidney injury (HCC)  Assessment & Plan  Improving.   Nephrology consulting  Monitor I/O's and BMP.  Avoid nephrotoxins/Renally dose all medications.    Essential hypertension- (present on admission)  Assessment & Plan  Controlled on norvasc and coreg  IV PRN per parameters  Holding spironolactone, ACE, and ARB in the setting ONOFRE  Monitor vitals.    Hypomagnesemia  Assessment & Plan  Monitor and replete as need    Hyponatremia  Assessment & Plan  CTM       Hypocalcemia  Assessment & Plan  Supplement and monitor     Hypertriglyceridemia- (present on admission)  Assessment & Plan  Triglycerides 571, considered moderate, >500 risks increase somewhat for inducing pancreatitis      Chronic back pain- (present on admission)  Assessment & Plan  Continue lidocaine patch.   Analgesic: Dilaudid    Restless leg syndrome- (present on admission)  Assessment & Plan  Requip    Obesity (BMI 30-39.9)- (present on admission)  Assessment & Plan  Alcohol cessation and weight loss counseling at discharge  Body mass index is 36.37 kg/m².    Crohn disease (HCC)- (present on admission)  Assessment & Plan  No acute issue at this time.      DISPO: To remain in ICU for mechanical ventilation    CODE STATUS: Full     Quality Measures:  Feeding:  NPO  Analgesia: Precedex  Sedation: Dilaudid  Thromboprophylaxis: Heparin  Head of bed: >30 degrees  Ulcer prophylaxis: Famotidine  Glycemic control: N/A  Bowel care: bowel regimen  Indwelling lines: PICC, PIV, Hudson, NG tube  Deescalation of antibiotics: N/A      Susan Augustine D.O.

## 2021-01-26 NOTE — CARE PLAN
Problem: Pain Management  Goal: Pain level will decrease to patient's comfort goal  Outcome: PROGRESSING AS EXPECTED     Problem: Respiratory:  Goal: Respiratory status will improve  Outcome: PROGRESSING SLOWER THAN EXPECTED

## 2021-01-26 NOTE — CODE DOCUMENTATION
Dr. Seymour at bedside with RRT team and RT to perform intubation.  All necessary life support equipment in the room.     Time Out 2138. All in agreement.     Pt identified with 2 patient identifiers. Emergent procedure, consent signed by physician and placed in pt's chart.    Procedure start time: 2139  Procedure end time: 2147     Medications given per MD order, please see MAR.

## 2021-01-26 NOTE — PROCEDURES
Intubation    Date/Time: 1/25/2021 11:12 PM  Performed by: Anil Seymour M.D.  Authorized by: Anil Seymour M.D.     Consent:     Consent obtained:  Emergent situation  Pre-procedure details:     Patient status:  Unresponsive    Paralytics:  Rocuronium  Procedure details:     Preoxygenation:  BiPAP    Laryngoscope type:  GlideScope    Laryngoscope blade:  Mac 3    Tube size (mm):  8.0    Tube type:  Cuffed    Number of attempts:  1    Cricoid pressure: yes      Tube visualized through cords: yes    Placement assessment:     ETT to teeth:  24    Tube secured with:  ETT mcbride    Breath sounds:  Equal    Placement verification: chest rise, CXR verification, equal breath sounds and ETCO2 detector      CXR findings:  ETT in proper place  Post-procedure details:     Patient tolerance of procedure:  Tolerated well, no immediate complications

## 2021-01-26 NOTE — PROGRESS NOTES
RRT called due to acute change in mental status, desaturation, and respiratory distress.    Patient admitted on 1/14/2021 for acute pancreatitis. It appears that his abdomen was more distended today and repeat CT abdomen was done which showed worsening pancreatitis and SBO vs ileus. Patient according to nursing staff is AAOx3. On my arrival, patient is unresponsive to sternal rub, diaphoretic, and using abdominal muscles to breathe and has shallow tachypnea. Patient's abdomen is distended and firm. Currently on NRBM, vitals ok. Spoke to Dr. Seymour, intensivist, and agrees to intubate and transfer to ICU due to impending respiratory failure.    I spoke to patient's wife Melina Riley at [phone] who stated that she felt like her  was not himself today. She was updated on the current status and she agrees for central line and arterial line insertion if needed. Counseling and emotional support given and patient's wife requested for me to speak to her adult daughter Lilibeth [phone]. Called number however there was no answer. Will try again.

## 2021-01-26 NOTE — DIETARY
Nutrition Support Update: Day 12 of admit.  Shaka Riley is a 55 y.o. male with admitting DX of Acute pancreatitis with uninfected necrosis.     Assessment:  Wt : 113.4 kg via bed scale - wt significant increased from wt on   Estimated Nutritional Needs: based on: Ht: 172.7 cm, Wt : 100.2 kg via stand up scale (suspected dry wt), IBW: 69.9 kg, BMI: 33.59 - Class I Obesity     Calculation/Equation: REE per MSJ x 1 = 1808 kcal/day (65-70%=4116-6807 kcal/day); RMR per PSU (vent L/min 11.3, T max 24 hours 37.5) = 2135 kcal/day (65-70%=7589-0818 kcal/day)  Total Calories / day: 1102 - 1403  (Calories / k - 14)  Total Grams Protein / day: 105 - 140  (Grams Protein / kg IBW: 1.5 - 2) (Grams Protein / k - 1.4)    Evaluation:   1. Pt was intubated yesterday.  2. Pt was previously on TPN. TPN was started on NPO day 7 on  and TPN reached 50% and d/c on . Pt was on a full liquid diet for 2 days and per chart pt PO %. Now NPO since last night  3. Per rounds plan to resume TPN.   4. Pt has OG to suction.   5. Pt with 2+;pitting edema BLE - moderate fluid accumulation.   6. Labs: Na 132, Glucose 104, BUN 50, Creatinine 1.81, Phosphorus 1.7, POC glucose 133, 98, 118  7. Meds: norvasc, pepcid, magnesium sulfate, electrolyte replacement, requip, pericolace, mylicon, sodium phosphate, precedex @ 1 mcg/kg/hr  8. Last BM:   9. Pt with BMI >30 recommend hypo-caloric feeds per SCCM/ASPEN guidelines while intubated.      Malnutrition Risk: Pt with severe malnutrition in the context of acute illness related to acute pancreatitis as evidenced by inadequate nutrition since admit (PO <50% for 12 days) and moderate fluid accumulation.      Recommendations/Plan:  1. TPN per pharmacy see recommendations above.   2. Fluids per MD  3. Monitor weight.      RD following

## 2021-01-26 NOTE — ASSESSMENT & PLAN NOTE
Intubated 1/25  All the appropriate ventilator bundles are in place  Continue vent support  SBT as tolerated

## 2021-01-26 NOTE — CONSULTS
Critical Care Consultation    Date of consult: 1/25/2021    Referring Physician  BEL Torres    Reason for Consultation  AMS, hypercapnic resp failure    History of Presenting Illness  55 y.o. male etoh, prior PE, CAD, HTN, who presented 1/14/2021 with severe acute pancreatitis, SBO, ONOFRE who was transferred to the ICU for acute hypercapnic resp failure. Per report and notes the patient had been imporving until tonight when he was found unresponsive, diaphoretic with increased WOB. His sbdomen was firm and distended. CT of the abdomen from earlier in the am revelead pancreatitis and evidence of a SBO vs ileus. On arrival ABG revealed hypercapnic resp failure with a PCO2 of 114. The patient was intubated an transferred to the ICU     Code Status  Full Code    Review of Systems  Review of Systems   Unable to perform ROS: Acuity of condition       Past Medical History   has a past medical history of Clotting disorder (HCC), Congestive heart failure (HCC) (7/2015), Crohn disease (HCC), Hypertension, PAF (paroxysmal atrial fibrillation) ([facility]), and Sleep apnea.    Surgical History   has a past surgical history that includes monico by laparoscopy (7/29/2020).    Family History  family history includes Cancer in his father and mother; Heart Disease in his paternal grandfather and paternal grandmother.    Social History   reports that he has never smoked. He has never used smokeless tobacco. He reports current alcohol use of about 1.2 oz of alcohol per week. He reports that he does not use drugs.    Medications  Home Medications     Reviewed by Barbara Low (Pharmacy Tech) on 01/14/21 at 1916  Med List Status: Complete   Medication Last Dose Status   carvedilol (COREG) 25 MG Tab 1/13/2021 Active   furosemide (LASIX) 40 MG Tab >3 days ago Active   hydrOXYzine HCl (ATARAX) 25 MG Tab 1/13/2021 Active   ibuprofen (MOTRIN) 600 MG Tab 1/14/2021 Active   ROPINIRole (REQUIP) 0.5 MG Tab Not Taking Active   ROPINIRole  (REQUIP) 0.5 MG Tab 1/13/2021 Active   sacubitril-valsartan (ENTRESTO) 49-51 MG Tab tablet 1/13/2021 Active   spironolactone (ALDACTONE) 25 MG Tab 1/13/2021 Active              Current Facility-Administered Medications   Medication Dose Route Frequency Provider Last Rate Last Admin   • ibuprofen (MOTRIN) tablet 400 mg  400 mg Oral Q6HRS PRN Edinson Wilson A.P.R.N.   400 mg at 01/25/21 1718   • ondansetron (ZOFRAN) syringe/vial injection 4 mg  4 mg Intravenous Q4HRS PRN Wing Abarca M.D.   4 mg at 01/25/21 1921   • Respiratory Therapy Consult   Nebulization Continuous RT Edinson Wilson A.P.R.N.       • etomidate (AMIDATE) injection    ED ONCE PRN Anil Seymour M.D.   20 mg at 01/25/21 2139   • rocuronium (ZEMURON) injection    ED ONCE PRN Anil Seymour M.D.   80 mg at 01/25/21 2139   • phenylephrine (LUZ-SYNEPHRINE) 100 mcg/mL inj (IV Push Syringe)    ED ONCE PRN Anil Seymour M.D.   200 mcg at 01/25/21 2146   • HYDROcodone-acetaminophen (NORCO) 5-325 MG per tablet 1 Tab  1 Tab Oral Q4HRS PRN Edinson Wilson A.P.R.N.   1 Tab at 01/25/21 1050   • simethicone (MYLICON) chewable tab 80 mg  80 mg Oral TID Edinson Wilson, A.P.R.N.   80 mg at 01/25/21 1718   • hydrOXYzine HCl (ATARAX) tablet 25 mg  25 mg Oral QHS Edinson Wilson, A.P.R.N.   25 mg at 01/25/21 2102   • amLODIPine (NORVASC) tablet 5 mg  5 mg Oral Q DAY Edinson Wilson, A.P.R.N.   5 mg at 01/25/21 0622   • lidocaine (LIDODERM) 5 % 1 Patch  1 Patch Transdermal Q24HR Edinson Wilson A.P.R.N.   1 Patch at 01/25/21 1302   • carvedilol (COREG) tablet 25 mg  25 mg Oral Q EVENING BEL Torres   Stopped at 01/25/21 1800   • acetaminophen (Tylenol) tablet 650 mg  650 mg Oral Q4HRS PRN Levar Ugarte D.O.   650 mg at 01/20/21 0311   • ROPINIRole (REQUIP) tablet 2 mg  2 mg Oral QHS Levar Tapia M.D.   2 mg at 01/25/21 2102   • labetalol (NORMODYNE/TRANDATE) injection 10-20 mg  10-20 mg Intravenous Q4HRS PRN Jorge Luis Michael M.D.   20 mg at 01/19/21 1538   •  heparin injection 5,000 Units  5,000 Units Subcutaneous Q8HRS Johnson Hodge M.D.   5,000 Units at 01/25/21 2102       Allergies  No Known Allergies    Vital Signs last 24 hours  Temp:  [36.4 °C (97.5 °F)-37.1 °C (98.8 °F)] 36.7 °C (98.1 °F)  Pulse:  [] 129  Resp:  [10-20] 12  BP: ()/(50-85) 86/50  SpO2:  [90 %-96 %] 92 %    Physical Exam  Physical Exam  Constitutional:       General: He is in acute distress.      Appearance: He is ill-appearing and diaphoretic.   HENT:      Head: Normocephalic and atraumatic.      Mouth/Throat:      Pharynx: Oropharynx is clear.   Eyes:      Extraocular Movements: Extraocular movements intact.      Pupils: Pupils are equal, round, and reactive to light.   Cardiovascular:      Rate and Rhythm: Normal rate and regular rhythm.      Pulses: Normal pulses.   Pulmonary:      Comments: Full MV support  Abdominal:      General: There is distension.   Musculoskeletal:         General: Swelling present.   Skin:     Capillary Refill: Capillary refill takes 2 to 3 seconds.   Neurological:      Comments: GCS 7t. [id number]. Does not follow. PERRL. Moves all 4 ext spont.         Fluids    Intake/Output Summary (Last 24 hours) at 1/25/2021 2148  Last data filed at 1/25/2021 1200  Gross per 24 hour   Intake 600 ml   Output --   Net 600 ml       Laboratory  Recent Results (from the past 48 hour(s))   MAGNESIUM    Collection Time: 01/24/21  1:41 AM   Result Value Ref Range    Magnesium 2.2 1.5 - 2.5 mg/dL   PHOSPHORUS    Collection Time: 01/24/21  1:41 AM   Result Value Ref Range    Phosphorus 3.5 2.5 - 4.5 mg/dL   IONIZED CALCIUM    Collection Time: 01/24/21  1:41 AM   Result Value Ref Range    Ionized Calcium 1.2 1.1 - 1.3 mmol/L   Comp Metabolic Panel    Collection Time: 01/24/21  1:41 AM   Result Value Ref Range    Sodium 132 (L) 135 - 145 mmol/L    Potassium 4.2 3.6 - 5.5 mmol/L    Chloride 92 (L) 96 - 112 mmol/L    Co2 29 20 - 33 mmol/L    Anion Gap 11.0 7.0 - 16.0    Glucose 142  (H) 65 - 99 mg/dL    Bun 70 (H) 8 - 22 mg/dL    Creatinine 2.09 (H) 0.50 - 1.40 mg/dL    Calcium 8.9 8.5 - 10.5 mg/dL    AST(SGOT) 47 (H) 12 - 45 U/L    ALT(SGPT) 37 2 - 50 U/L    Alkaline Phosphatase 128 (H) 30 - 99 U/L    Total Bilirubin 0.9 0.1 - 1.5 mg/dL    Albumin 2.8 (L) 3.2 - 4.9 g/dL    Total Protein 6.5 6.0 - 8.2 g/dL    Globulin 3.7 (H) 1.9 - 3.5 g/dL    A-G Ratio 0.8 g/dL   ESTIMATED GFR    Collection Time: 01/24/21  1:41 AM   Result Value Ref Range    GFR If  40 (A) >60 mL/min/1.73 m 2    GFR If Non  33 (A) >60 mL/min/1.73 m 2   proBrain Natriuretic Peptide, NT    Collection Time: 01/24/21  1:41 AM   Result Value Ref Range    NT-proBNP 338 (H) 0 - 125 pg/mL   ACCU-CHEK GLUCOSE    Collection Time: 01/24/21  2:10 AM   Result Value Ref Range    Glucose - Accu-Ck 144 (H) 65 - 99 mg/dL   ACCU-CHEK GLUCOSE    Collection Time: 01/24/21  5:42 AM   Result Value Ref Range    Glucose - Accu-Ck 142 (H) 65 - 99 mg/dL   ACCU-CHEK GLUCOSE    Collection Time: 01/24/21  5:28 PM   Result Value Ref Range    Glucose - Accu-Ck 126 (H) 65 - 99 mg/dL   ACCU-CHEK GLUCOSE    Collection Time: 01/24/21  8:07 PM   Result Value Ref Range    Glucose - Accu-Ck 118 (H) 65 - 99 mg/dL   Magnesium    Collection Time: 01/25/21  2:25 AM   Result Value Ref Range    Magnesium 2.0 1.5 - 2.5 mg/dL   Phosphorus    Collection Time: 01/25/21  2:25 AM   Result Value Ref Range    Phosphorus 4.1 2.5 - 4.5 mg/dL   Comp Metabolic Panel    Collection Time: 01/25/21  2:25 AM   Result Value Ref Range    Sodium 133 (L) 135 - 145 mmol/L    Potassium 4.2 3.6 - 5.5 mmol/L    Chloride 95 (L) 96 - 112 mmol/L    Co2 28 20 - 33 mmol/L    Anion Gap 10.0 7.0 - 16.0    Glucose 123 (H) 65 - 99 mg/dL    Bun 52 (H) 8 - 22 mg/dL    Creatinine 1.58 (H) 0.50 - 1.40 mg/dL    Calcium 8.8 8.5 - 10.5 mg/dL    AST(SGOT) 23 12 - 45 U/L    ALT(SGPT) 24 2 - 50 U/L    Alkaline Phosphatase 103 (H) 30 - 99 U/L    Total Bilirubin 0.6 0.1 - 1.5 mg/dL     Albumin 2.5 (L) 3.2 - 4.9 g/dL    Total Protein 6.1 6.0 - 8.2 g/dL    Globulin 3.6 (H) 1.9 - 3.5 g/dL    A-G Ratio 0.7 g/dL   IONIZED CALCIUM    Collection Time: 01/25/21  2:25 AM   Result Value Ref Range    Ionized Calcium 1.2 1.1 - 1.3 mmol/L   ESTIMATED GFR    Collection Time: 01/25/21  2:25 AM   Result Value Ref Range    GFR If  55 (A) >60 mL/min/1.73 m 2    GFR If Non African American 45 (A) >60 mL/min/1.73 m 2   ACCU-CHEK GLUCOSE    Collection Time: 01/25/21  6:21 AM   Result Value Ref Range    Glucose - Accu-Ck 98 65 - 99 mg/dL   Prealbumin    Collection Time: 01/25/21 10:08 AM   Result Value Ref Range    Pre-Albumin 5.6 (L) 18.0 - 38.0 mg/dL   CBC WITH DIFFERENTIAL    Collection Time: 01/25/21 10:08 AM   Result Value Ref Range    WBC 7.9 4.8 - 10.8 K/uL    RBC 2.94 (L) 4.70 - 6.10 M/uL    Hemoglobin 8.9 (L) 14.0 - 18.0 g/dL    Hematocrit 29.6 (L) 42.0 - 52.0 %    .7 (H) 81.4 - 97.8 fL    MCH 30.3 27.0 - 33.0 pg    MCHC 30.1 (L) 33.7 - 35.3 g/dL    RDW 50.1 (H) 35.9 - 50.0 fL    Platelet Count 202 164 - 446 K/uL    MPV 12.1 9.0 - 12.9 fL    Neutrophils-Polys 81.60 (H) 44.00 - 72.00 %    Lymphocytes 4.40 (L) 22.00 - 41.00 %    Monocytes 4.40 0.00 - 13.40 %    Eosinophils 4.40 0.00 - 6.90 %    Basophils 0.00 0.00 - 1.80 %    Nucleated RBC 0.00 /100 WBC    Neutrophils (Absolute) 6.87 1.82 - 7.42 K/uL    Lymphs (Absolute) 0.35 (L) 1.00 - 4.80 K/uL    Monos (Absolute) 0.35 0.00 - 0.85 K/uL    Eos (Absolute) 0.35 0.00 - 0.51 K/uL    Baso (Absolute) 0.00 0.00 - 0.12 K/uL    NRBC (Absolute) 0.00 K/uL   LACTIC ACID    Collection Time: 01/25/21 10:08 AM   Result Value Ref Range    Lactic Acid 0.7 0.5 - 2.0 mmol/L   D-DIMER    Collection Time: 01/25/21 10:08 AM   Result Value Ref Range    D-Dimer Screen 6.35 (H) 0.00 - 0.50 ug/mL (FEU)   MAGNESIUM    Collection Time: 01/25/21 10:08 AM   Result Value Ref Range    Magnesium 2.0 1.5 - 2.5 mg/dL   PHOSPHORUS    Collection Time: 01/25/21 10:08 AM    Result Value Ref Range    Phosphorus 3.7 2.5 - 4.5 mg/dL   DIFFERENTIAL MANUAL    Collection Time: 21 10:08 AM   Result Value Ref Range    Bands-Stabs 5.30 0.00 - 10.00 %    Manual Diff Status PERFORMED    PERIPHERAL SMEAR REVIEW    Collection Time: 21 10:08 AM   Result Value Ref Range    Peripheral Smear Review see below    PLATELET ESTIMATE    Collection Time: 21 10:08 AM   Result Value Ref Range    Plt Estimation Normal    MORPHOLOGY    Collection Time: 21 10:08 AM   Result Value Ref Range    RBC Morphology Present     Poikilocytosis 1+     Stomatocytes 1+    LACTIC ACID    Collection Time: 21  9:23 PM   Result Value Ref Range    Lactic Acid 0.8 0.5 - 2.0 mmol/L   EKG    Collection Time: 21  9:28 PM   Result Value Ref Range    Report       [facility]    Test Date:  2021  Pt Name:    SHEBA MERAZ               Department: [facility]  MRN:        [id number]                      Room:       [facility]  Gender:     Male                         Technician: OG  :        [id number]                   Requested By:JEN ATKINSON  Order #:    [id number]                    Reading MD:    Measurements  Intervals                                Axis  Rate:       104                          P:          55  KY:         184                          QRS:        -44  QRSD:       100                          T:          32  QT:         344  QTc:        453    Interpretive Statements  SINUS TACHYCARDIA  LEFT ANTERIOR FASCICULAR BLOCK  ANTERIOR INFARCT, OLD  Compared to ECG 2021 18:51:55  Sinus rhythm no longer present  First degree AV block no longer present  T-wave abnormality no longer present  Possible ischemia no longer present  Myocardial infarct finding still present     Arterial Blood Gas    Collection Time: 21  9:35 PM   Result Value Ref Range    Ph 7.05 (LL) 7.40 - 7.50    Pco2 114.2 (HH) 26.0 - 37.0 mmHg    Po2 94.3 (H) 64.0 - 87.0 mmHg    O2 Saturation 94.2 93.0 -  99.0 %    Hco3 31 (H) 17 - 25 mmol/L    Base Excess -2 -4 - 3 mmol/L    Body Temp see below Centigrade       Imaging  DX-CHEST-PORTABLE (1 VIEW)   Final Result      Perihilar opacification could be due to atelectasis or infiltrate versus mild edema.      Possible trace effusions.      Hypoventilatory change.      CT-ABDOMEN-PELVIS W/O   Final Result      1.  Severe acute pancreatitis, worse than on prior exam.   2.  Multiple dilated small bowel loops suggesting distal small bowel obstruction, similar to prior exam.   3.  No evidence of bowel perforation.      DX-CHEST-LIMITED (1 VIEW)   Final Result      Bibasilar atelectasis, with no significant interval change.               INTERPRETING LOCATION: [address] [facility], [facility], [address]      [id number] VIEW   Final Result         Diffuse gaseous distention of the small bowel, worse than prior, concerning for small bowel obstruction.      IR-PICC LINE PLACEMENT W/ GUIDANCE > AGE 5   Final Result                  Ultrasound-guided PICC placement performed by qualified nursing staff as    above.          DX-CHEST-PORTABLE (1 VIEW)   Final Result         1.  Interstitial pulmonary opacities suggests atelectasis or possible subtle infiltrates.      CT-ABDOMEN-PELVIS W/O   Final Result      1.  Again seen pancreatitis with peripancreatic effusions. This is mildly worsened from comparison.      2.  Again seen dilated loops of small intestine with decompression of the colon and distal small bowel consistent with ileus versus partial small bowel obstruction.      3.  Bibasilar atelectasis and small bilateral pleural effusions.      [id number] VIEW   Final Result      1.  Moderate small bowel dilation with multiple air-fluid levels consistent with small bowel obstruction      2.  Enteric catheter appears appropriately located      CT-ABDOMEN-PELVIS W/O   Final Result         1. Worsening inflammatory change and fluid surrounding the pancreas, in keeping with acute pancreatitis.       2. Diffuse dilatation of the small bowel and proximal colon with decompressed distal colon adjacent to the inflammation in the tail the pancreas. This could relate to colonic obstruction due to adjacent inflammatory change or ileus.      3. Mild wall thickening of the stomach fundus adjacent to the pancreatic tail, likely reactive.      DX-ABDOMEN FOR TUBE PLACEMENT   Final Result         Gastric drainage tube with tip projecting over the expected area of the stomach fundus.      DX-CHEST-FOR LINE PLACEMENT Perform procedure in: Patient's Room   Final Result      Left midlung and bibasilar linear atelectasis.      Dialysis catheter projects over the SVC.      No pneumothorax.         [id number] VIEW   Final Result      Increased bowel gas concerning for early or partial small bowel obstruction.      DX-CHEST-PORTABLE (1 VIEW)   Final Result      Hypoinflation with LEFT mid and lower lung atelectasis, with possible superimposed LEFT basilar pneumonia.      [id number]   Final Result      1.  Pancreatic edema with significant peripancreatic stranding and ill-defined fluid, consistent with acute interstitial edematous pancreatitis.      2.  Small amount of ascites.      3.  No choledocholithiasis is identified.      4.  Status post cholecystectomy.      CT-ABDOMEN-PELVIS WITH   Final Result      1.  Inflammatory stranding involving the pancreas with fluid within the anterior pararenal space consistent with pancreatitis.      2.  Fatty liver.      3.  Prior cholecystectomy.      4.  Bibasilar atelectasis/consolidation.      DX-CHEST-PORTABLE (1 VIEW)   Final Result      No evidence of acute cardiopulmonary process.      DX-CHEST-PORTABLE (1 VIEW)    (Results Pending)   DX-CHEST-PORTABLE (1 VIEW)    (Results Pending)       Assessment/Plan  * Acute pancreatitis  Assessment & Plan  Laparoscopic cholecystectomy on or about 7/25/2020 by Dr. Skelton  Severe acute pancreatitis with associated ileus  Bowel rest  Pain  control      Hyperglycemia- (present on admission)  Assessment & Plan  Sliding scale insulin  Glycohemoglobin normal at 5.5    Acute kidney injury (HCC)  Assessment & Plan  Renal dose meds, avoid nephrotoxins  Strict I/Os  Follow renal function      Essential hypertension- (present on admission)  Assessment & Plan  Goal SBP less than 160  IV labetalol to achieve blood pressure goals    SBO (small bowel obstruction) ([facility])  Assessment & Plan  SBO vs Ileus  Per report passing stool and flatus  NG tube to suction  NPO  Monitor need for surgical consultation  Repeat CT ABD from 1/25 appears similar to prior    Acute hypercapnic respiratory failure (HCC)  Assessment & Plan  ?Compressive atelectasis. No opiates or other sedating meds given per review of EMR and discussion with staff  RT/O2 protocols  Titration of ventilator therapy based on ABGs, waveform analysis and patient's status  SAT/SBT when able (ABCDEF Bundle)      Hypocalcemia  Assessment & Plan  Due to pancreatitis  Replete calcium    Hyperkalemia  Assessment & Plan  Resolved after HD    Nonischemic cardiomyopathy (HCC)  Assessment & Plan  History of in 2014 with EF 25-30% - recovered    Restless leg syndrome- (present on admission)  Assessment & Plan  History of    Obesity (BMI 30-39.9)- (present on admission)  Assessment & Plan  Nutrition counseling and behavioral modification    Crohn disease (HCC)- (present on admission)  Assessment & Plan  History of      Discussed patient condition and risk of morbidity and/or mortality with Hospitalist, Family, RN, RT, Pharmacy, Code status disscussed and Charge nurse / hot rounds.    The patient remains critically ill.  Critical care time = 45 minutes in directly providing and coordinating critical care and extensive data review.  No time overlap and excludes procedures.

## 2021-01-26 NOTE — PROGRESS NOTES
Nephrology Daily Progress Note    Date of Service  1/26/2021    Chief Complaint  55 y.o. male who presented 1/14/2021 with abdominal pain and emesis, found to have acute pancreatitis with ONOFRE prompting renal consult.     Upon admission, CT abdomen showing inflammatory stranding involving pancreas with fluid within the anterior pararenal space consistent with pancreatitis.  Cr 1.1 on admission, worsened to 3.5 this AM. Oliguric overnight and worsening hyperkalemia despite NS at 75cc/hr. ~200cc UOP after lasix 20mg. Ongoing severe abd pain and worsening abd distention. Worsening shortness or breath and subjective fevers. Being transferred to ICU.     DAILY NEPHROLOGY SUMMARY:  1/16: consult done  1/17: 390cc UOP documented, found to have SBO-NG tube placed, cr stable, abd pain improving, no fluids running   1/18: Hypertensive this AM, improving UOP, cr downtrending   1/19: Transferred out of the ICU, Cr downtrending, main complaint is back pain, NG tube  1/20: off oxygen, NG tube remains, has not passed gas yet, cr down trending   1/21: worsening abd distention and resp status. NG tube repositioned with 6L output, given dose of lasix, cxr with BL infiltrates, slight bump in cr  1/22: started TPN, had an episode of flatus, feel stronger, tachycardic, 6l NG output   1/23: Tolerated liquids this morning.  Complaining of gas pain.  1/24: Weaning off TPN. No longer w/ NGT. C/O diarrhea.   1/25: ongoing diarhea and abdominal swelling, CT scan today    Review of Systems  Review of Systems   Unable to perform ROS: Intubated        Physical Exam  Temp:  [36 °C (96.8 °F)-37.5 °C (99.5 °F)] 37.1 °C (98.8 °F)  Pulse:  [] 89  Resp:  [10-30] 26  BP: ()/(50-85) 109/66  SpO2:  [90 %-100 %] 94 %    Physical Exam  Vitals signs and nursing note reviewed.   Constitutional:       Comments: Obese male   HENT:      Head: Normocephalic.      Mouth/Throat:      Comments: ETT in place  Neck:      Musculoskeletal: Normal range of  motion and neck supple.   Cardiovascular:      Rate and Rhythm: Normal rate.   Pulmonary:      Breath sounds: Rales present.   Abdominal:      General: There is distension.   Musculoskeletal: Normal range of motion.         General: Swelling present.   Skin:     Coloration: Skin is not jaundiced.   Neurological:      Comments: sedated         Fluids    Intake/Output Summary (Last 24 hours) at 1/26/2021 1037  Last data filed at 1/26/2021 1000  Gross per 24 hour   Intake 1150.71 ml   Output 1300 ml   Net -149.29 ml       Laboratory  Recent Labs     01/25/21  1008 01/25/21 2123 01/26/21  0432   WBC 7.9 11.8* 5.4   RBC 2.94* 3.41* 2.97*   HEMOGLOBIN 8.9* 10.3* 8.9*   HEMATOCRIT 29.6* 34.8* 28.7*   .7* 102.1* 96.6   MCH 30.3 30.2 30.0   MCHC 30.1* 29.6* 31.0*   RDW 50.1* 50.8* 47.7   PLATELETCT 202 303 250   MPV 12.1 11.5 12.2     Recent Labs     01/25/21  0225 01/25/21 2123 01/26/21  0432   SODIUM 133* 134* 132*   POTASSIUM 4.2 5.1 4.4   CHLORIDE 95* 95* 94*   CO2 28 32 27   GLUCOSE 123* 157* 104*   BUN 52* 50* 50*   CREATININE 1.58* 1.58* 1.81*   CALCIUM 8.8 9.6 8.8         Recent Labs     01/24/21  0141   NTPROBNP 338*           Imaging      Assessment/Plan  No new Assessment & Plan notes have been filed under this hospital service since the last note was generated.  Service: Nephrology     # ONOFRE: Initially oliguric, now non oliguric  # Pancreatitis  # Hypercapnic resp failure  # Ileus: NG tube, no role for surgical intervention per surgery  # HX  Non-ischemic cardiomyopathy  # Hypocalcemia   # Crohns   # Anemia: acute  # Hypotension     PLAN:  -Holding RAAS blockade for now  - DC amldopine  -concentrate TPN  -NG tube-vent per ICU team

## 2021-01-26 NOTE — PROGRESS NOTES
Pharmacy TPN Day # 1       2021    Dosing Weight: 80 kg (adjusted)     TPN currently providing 50% of goal      TPN goal: 2000 - 2400 kcal/day including 0.3-0.6 gm/kg/day Protein due to ONOFRE      TPN indication: Pancreatitis         Pertinent PMH: Patient admitted on  for abdominal pain. PMH positive for cholecystectomy 2020. Labs obtained in the ED concerning for pancreatitis. Patient made NPO and surgery consulted, recommending bowel rest with NG tube to suction due to abdominal distention. Patient's labs positive for ONOFRE during this hospitalization, nephrology is consulting. TPN initiated on  due to NPO status with plans of prolonged time without enteral access. Initial TPN remained at 50% goal each day until it was discontinued on  when patient was placed on a full liquid diet x 2 days. Patient transferred to RICU on 21 due to acute respiratory failure requiring intubation. TPN restarted due to worsening pancreatitis and continued small bowel obstruction found on  Abdomen-pelvis CT scan.    Temp (24hrs), Av.7 °C (98 °F), Min:36 °C (96.8 °F), Max:37.5 °C (99.5 °F)  .  Recent Labs     21  0225 21  1008 21  2123 21  0432   SODIUM 133*  --  134* 132*   POTASSIUM 4.2  --  5.1 4.4   CHLORIDE 95*  --  95* 94*   CO2 28  --  32 27   BUN 52*  --  50* 50*   CREATININE 1.58*  --  1.58* 1.81*   GLUCOSE 123*  --  157* 104*   CALCIUM 8.8  --  9.6 8.8   ASTSGOT 23  --  20 19   ALTSGPT 24  --  26 20   ALBUMIN 2.5*  --  3.0* 2.5*   TBILIRUBIN 0.6  --  0.5 0.6   PHOSPHORUS 4.1 3.7  --  1.7*   MAGNESIUM 2.0 2.0  --  1.8   PREALBUMIN  --  5.6*  --   --      Accu-Checks  Recent Labs     21  0621 21  2114   POCGLUCOSE 118* 98 133*       Vitals:    21 0700 21 0800 21 0900 21 1000   BP: 106/63 108/63 111/68 109/66   Weight:       Height:           Intake/Output Summary (Last 24 hours) at 2021 1335  Last data filed at  1/26/2021 1000  Gross per 24 hour   Intake 910.71 ml   Output 1300 ml   Net -389.29 ml       Orders Placed This Encounter   Procedures   • Diet NPO     Standing Status:   Standing     Number of Occurrences:   1     Order Specific Question:   Type:     Answer:   Now [1]     Order Specific Question:   Restrict to:     Answer:   Strict [1]         TPN for past 72 hours (Show up to 3 orders; newest on the left. Changes between the two most recent orders are indicated.)     Start date and time   01/26/2021 2000 01/23/2021 2000 01/22/2021 2000      TPN Central Line Formulation [[id number]] TPN Central Line Formulation [[id number]] TPN Central Line Formulation [[id number]]    Order Status  Active Discontinued Completed    Last Admin   Rate Change at 01/24/2021 1000 by Cj Pereira RMAGALI New Bag at 01/22/2021 1941 by Jose Ramirez R.N.       Base    Clinisol 15%  20 g 40 g 40 g    dextrose 70%  190 g 175 g 175 g    fat emulsions 20%  28 g 25 g 25 g       Additives    potassium chloride  -- 50 mEq 40 mEq    sodium chloride  130 mEq 250 mEq 250 mEq    calcium GLUConate  -- 2.3 mEq 2.3 mEq    M.T.E.-4 Adult  1 mL 1 mL 1 mL    M.V.I. Adult  -- 10 mL 10 mL    famotidine  40 mg 20 mg 20 mg       QS Base    sterile water  257.77 mL 932.88 mL 937.88 mL       Energy Contribution    Proteins  80 kcal -- --    Dextrose  646 kcal -- --    Lipids  280 kcal -- --    Total  1006 kcal -- --       Electrolyte Ion Calculated Amount    Sodium  130 mEq 250 mEq 250 mEq    Potassium  -- 50 mEq 40 mEq    Calcium  -- 2.3 mEq 2.3 mEq    Magnesium  -- -- --    Aluminum  -- -- --    Phosphate  -- -- --    Chloride  130 mEq 300 mEq 290 mEq    Acetate  16.93 mEq 33.87 mEq 33.87 mEq       Other    Total Protein  20 g 40 g 40 g    Total Protein/kg  0.18 g/kg 0.4 g/kg 0.4 g/kg    Total Amino Acid  -- -- --    Total Amino Acid/kg  -- -- --    Glucose Infusion Rate  1.16 mg/kg/min 1.21 mg/kg/min 1.21 mg/kg/min    Osmolarity (Estimated)  -- -- --     Volume  840 mL 1,680 mL 1,680 mL    Rate  35 mL/hr 70 mL/hr 70 mL/hr    Dosing Weight  113.4 kg 100.2 kg 100.2 kg    In[facility]            This formula provides:  % kcal as lipids = 28%  Grams protein/kg = 0.25 g/kg  Non-protein calories = 926 kcal  Kcals/kg = 12.5 kcal/kg  Total daily calories = 1006 kcal    Comments:  - Diet Progression: Patient is NPO  - Fluids: TPN @ 35 ml/hr (840mL/24hrs). Nephrology requested TPN be concentrated d/t ONOFRE and 2+ pitting edema BLE.  - Other: IV famotidine included in TPN, CTM and dose adjust famotidine of CrCl declines to <50 mL/min.  - Appreciate Clinical Dietitian's recommendations     Macronutrients:  - TPN at 50% of goal today. Kcal goal calculated according to critically ill/anabolic caloric goals despite patient BMI of 38. Patient is being protein restricted due to presence of ONOFRE. At current renal function, would not recommend high-protein hypo-caloric feeding, CTM for improvement in renal function  - Dextrose: 646 kcal  - Blood glucose levels all <180 mg/dL in previous 24 hrs, CTM              - GIR 1.16 mg/kg/min  - Lipids: 280 kcal  - Protein: 80 kcal. Protein g/kg goal restricted due to ONOFRE in this acutely ill patient     Electrolytes:              - Sodium: TPN at NS equivalence              - Potassium: not included in this formulation due to renal function               - Calcium: WNL, not included in this formulation due to renal function              - Phosphorus: 30 mmol NaPhos given outside of TPN today. Phos not included in this formulation due to renal function              - Magnesium: 2 g Mg given outside of TPN today, Mg not included in this formulation due to renal function      Micronutrients and Vitamins: Trace elements included in TPN. Standard multivitamin not included due to national shortages of IV formulation. May consider addition of oral multivitamin when patient is able to resume oral medications    Arben Morrison,  PharmD

## 2021-01-26 NOTE — CODE DOCUMENTATION
RRT called for pt becoming obtunded and nonresponsive. On arrival pt found on 15 L NRB with RT at bedside, nonresponsive to sternal rub, pupils equal 2mm, reactive. Paged Dr. Frye to come to bedside. On arrival, Dr. Frye consulted Intensivist, Dr. Seymour. Pt to transfer to ICU. Dr. Seymour to bedside for intubation.

## 2021-01-27 NOTE — PROGRESS NOTES
Pharmacy TPN Day # 2       2021    Dosing Weight: 80 kg (adjusted)             TPN currently providing 100% of goal                                        TPN goal: 2000 - 2400 kcal/day including 0.3-0.6 gm/kg/day Protein due to ONOFRE                                          TPN indication: Pancreatitis                                                                Pertinent PMH: Patient admitted on  for abdominal pain. PMH positive for cholecystectomy 2020. Labs obtained in the ED concerning for pancreatitis. Patient made NPO and surgery consulted, recommending bowel rest with NG tube to suction due to abdominal distention. Patient's labs positive for ONOFRE during this hospitalization, nephrology is consulting. TPN initiated on  due to NPO status with plans of prolonged time without enteral access. Initial TPN remained at 50% goal each day until it was discontinued on  when patient was placed on a full liquid diet x 2 days. Patient transferred to RICU on 21 due to acute respiratory failure requiring intubation. TPN restarted due to worsening pancreatitis and continued small bowel obstruction found on  Abdomen-pelvis CT scan. Patient was extubated .     Temp (24hrs), Av.2 °C (99 °F), Min:36.8 °C (98.3 °F), Max:37.7 °C (99.8 °F)  .  Recent Labs     21  1008 213 21  0432 21  0422   SODIUM  --  134* 132* 135   POTASSIUM  --  5.1 4.4 3.9   CHLORIDE  --  95* 94* 99   CO2  --  32 27 26   BUN  --  50* 50* 41*   CREATININE  --  1.58* 1.81* 1.73*   GLUCOSE  --  157* 104* 158*   CALCIUM  --  9.6 8.8 8.1*   ASTSGOT  --  20 19 28   ALTSGPT  --  26 20 18   ALBUMIN  --  3.0* 2.5* 2.2*   TBILIRUBIN  --  0.5 0.6 0.7   PHOSPHORUS 3.7  --  1.7* 1.9*   MAGNESIUM 2.0  --  1.8 2.0   PREALBUMIN 5.6*  --   --   --      Accu-Checks  Recent Labs     21  21121  0534   POCGLUCOSE 133* 99 150*       Vitals:    21 0800 21 0900 21 1000  01/27/21 1100   BP: 103/58 107/61 107/61 131/74   Weight:       Height:           Intake/Output Summary (Last 24 hours) at 1/27/2021 1200  Last data filed at 1/27/2021 1000  Gross per 24 hour   Intake 613.03 ml   Output 1005 ml   Net -391.97 ml       Orders Placed This Encounter   Procedures   • Diet NPO     Standing Status:   Standing     Number of Occurrences:   1     Order Specific Question:   Type:     Answer:   Now [1]     Order Specific Question:   Restrict to:     Answer:   Strict [1]         TPN for past 72 hours (Show up to 3 orders; newest on the left. Changes between the two most recent orders are indicated.)     Start date and time   01/27/2021 2000 01/26/2021 2000 01/23/2021 2000      TPN Central Line Formulation [[id number]] TPN Central Line Formulation [[id number]] TPN Central Line Formulation [[id number]]    Order Status  Active Last Dose in Progress Discontinued    Last Admin   New Bag at 01/26/2021 2020 by Yaa Albert R.N. Rate Change at 01/24/2021 1000 by Cj Pereira R.N.       Base    Clinisol 15%  40 g 20 g 40 g    dextrose 70%  380 g 190 g 175 g    fat emulsions 20%  55 g 28 g 25 g       Additives    potassium phosphate  15 mmol -- --    potassium chloride  20 mEq -- 50 mEq    sodium chloride  225 mEq 130 mEq 250 mEq    calcium GLUConate  -- -- 2.3 mEq    M.T.E.-4 Adult  1 mL 1 mL 1 mL    M.V.I. Adult  -- -- 10 mL    famotidine  40 mg 40 mg 20 mg       QS Base    sterile water  279.18 mL 257.77 mL 932.88 mL       Energy Contribution    Proteins  160 kcal -- --    Dextrose  1292 kcal -- --    Lipids  550 kcal -- --    Total  2002 kcal -- --       Electrolyte Ion Calculated Amount    Sodium  225 mEq 130 mEq 250 mEq    Potassium  42 mEq -- 50 mEq    Calcium  -- -- 2.3 mEq    Magnesium  -- -- --    Aluminum  -- -- --    Phosphate  15 mmol -- --    Chloride  245 mEq 130 mEq 300 mEq    Acetate  33.87 mEq 16.93 mEq 33.87 mEq       Other    Total Protein  40 g 20 g 40 g    Total Protein/kg  0.35  g/kg 0.18 g/kg 0.4 g/kg    Total Amino Acid  -- -- --    Total Amino Acid/kg  -- -- --    Glucose Infusion Rate  2.33 mg/kg/min 1.16 mg/kg/min 1.21 mg/kg/min    Osmolarity (Estimated)  -- -- --    Volume  1,440 mL 840 mL 1,680 mL    Rate  60 mL/hr 35 mL/hr 70 mL/hr    Dosing Weight  113.4 kg 113.4 kg 100.2 kg    In[facility]            This formula provides:  % kcal as lipids = 27%  Grams protein/kg = 0.5 g/kg  Non-protein calories = 1842 kcal  Kcals/kg = 25 kcal/kg  Total daily calories = 2002 kcal    Comments:  - Diet Progression: Patient continues NPO  - Fluids: TPN @ 60 ml/hr (1440mL/24hrs). Nephrology requested TPN be concentrated d/t ONOFRE and 2+ pitting edema BLE.  - Other: IV famotidine included in TPN, CTM and dose adjust famotidine of CrCl declines to <50 mL/min.  - Appreciate Clinical Dietitian's recommendations     Macronutrients:  - TPN at 100% of goal today. Kcal goal calculated according to critically ill/anabolic caloric goals despite patient BMI of 38. Patient is being protein restricted due to presence of ONOFRE. At current renal function, would not recommend high-protein hypo-caloric feeding, CTM for improvement in renal function  - Dextrose: 1292 kcal  - Blood glucose levels all <180 mg/dL in previous 24 hrs, CTM              - GIR 2.33 mg/kg/min   - Low SSI ordered with Q6h POC BG tests due to high carbohydrate content of TPN  - Lipids: 550 kcal  - Protein: 160 kcal. Protein g/kg goal restricted due to ONOFRE in this acutely ill patient     Electrolytes:              - Sodium: TPN at NS equivalence              - Potassium: 42 mEq K included (30 mmol Kphos given outside of TPN today)              - Calcium: Ionized WNL, Ca not included in this formulation due to renal function              - Phosphorus: 15 mmol KPhos included (30 mmol KPhos given outside of TPN today)              - Magnesium: WNL, Mg not included in this formulation due to renal function      Micronutrients  and Vitamins: Trace elements included in TPN. Standard multivitamin not included due to national shortages of IV formulation. May consider addition of oral multivitamin when patient is able to resume oral medications.     Arben Morrison, PharmD

## 2021-01-27 NOTE — PROGRESS NOTES
Critical Care Progress Note    Date of admission  1/14/2021    Chief Complaint  55 y.o. male admitted 1/14/2021 with acute pancreatitis.  He has a history of AF, PE, non-ischemic cardiomyopathy which has recovered, NSTEMI, HTN, obesity, dyslipidemia, restless leg syndrome and laparoscopic cholecystectomy for acute cholecystitis.    Hospital Course    1/17 -    continue bowel rest.  HD/UF per nephrology.  Liver enzymes and lipase are improving.  Continue pain control.  1/26 -    Resume TPN.  Continue full vent support.  Keep OG to suction.  1/27 -    continue vent support and SBT as tolerated.  NG to suction.  Bowel rest.  Continue TPN.    Interval Problem Update  Reviewed last 24 hour events:      SR  Awake and alert  OG with 400 out last night  Dex off  Vent 3  PEEP 10 50%  FVC 1.4, NIF -30, RSBI 67  TPN  99.8  +88 mL in the last 24  -17,483 mL since admit      Review of Systems  Review of Systems   Unable to perform ROS: Acuity of condition        Vital Signs for last 24 hours   Temp:  [36.8 °C (98.3 °F)-37.7 °C (99.8 °F)] 37.2 °C (99 °F)  Pulse:  [78-91] 90  Resp:  [15-35] 26  BP: (101-124)/(55-70) 107/61  SpO2:  [92 %-100 %] 93 %    Hemodynamic parameters for last 24 hours       Respiratory Information for the last 24 hours  Vent Mode: APVCMV  Rate (breaths/min): 26  Vt Target (mL): 430  PEEP/CPAP: 8  P Support: 10  MAP: 14  Length of Weaning Trial (Hours): 1  Control VTE (exp VT): 432    Physical Exam   Physical Exam  Constitutional:       Appearance: He is not diaphoretic.      Comments: On ventilator   HENT:      Head: Normocephalic and atraumatic.      Right Ear: External ear normal.      Left Ear: External ear normal.      Nose: Nose normal.      Mouth/Throat:      Mouth: Mucous membranes are moist.      Pharynx: No oropharyngeal exudate.   Eyes:      General:         Right eye: No discharge.         Left eye: No discharge.      Pupils: Pupils are equal, round, and reactive to light.   Neck:       Musculoskeletal: Normal range of motion. No neck rigidity.   Cardiovascular:      Pulses: Normal pulses.      Heart sounds: No murmur. No friction rub.      Comments: Sinus rhythm  Pulmonary:      Breath sounds: Rales (Very few scattered crackles) present. No wheezing.   Abdominal:      General: There is distension.      Tenderness: There is no abdominal tenderness. There is no guarding.      Comments: No bowel sounds   Musculoskeletal:      Comments: No clubbing or cyanosis   Skin:     General: Skin is warm and dry.      Capillary Refill: Capillary refill takes less than 2 seconds.   Neurological:      Comments: Arouses easily.  Nods.  Follows.  Moves all 4.         Medications  Current Facility-Administered Medications   Medication Dose Route Frequency Provider Last Rate Last Admin   • potassium phosphate 30 mmol in  mL ivpb  30 mmol Intravenous Once Levar Tapia M.D. 83.3 mL/hr at 01/27/21 1049 30 mmol at 01/27/21 1049   • HYDROmorphone pf (DILAUDID) injection 0.5-1 mg  0.5-1 mg Intravenous Q3HRS PRN Levar Tapia M.D.       • dexmedetomidine (PRECEDEX) 400 mcg/100mL NS premix infusion  0.1-1.5 mcg/kg/hr Intravenous Continuous Levar Tapia M.D.   Stopped at 01/27/21 0625   • MD Alert...TPN per Pharmacy   Other PHARMACY TO DOSE Levar Tapia M.D.       • TPN Central Line Formulation   Intravenous TPN DAILY Levar Tapia M.D. 35 mL/hr at 01/26/21 2020 New Bag at 01/26/21 2020   • carvedilol (COREG) tablet 25 mg  25 mg Enteral Tube Q EVENING Levar Tapia M.D.   25 mg at 01/26/21 1743   • ROPINIRole (REQUIP) tablet 2 mg  2 mg Enteral Tube QHS Levar Tapia M.D.   2 mg at 01/26/21 2129   • simethicone (MYLICON) chewable tab 80 mg  80 mg Enteral Tube TID Levar Tapia M.D.   80 mg at 01/27/21 0532   • acetaminophen (Tylenol) tablet 650 mg  650 mg Enteral Tube Q4HRS PRN Levar Tapia M.D.       • ondansetron (ZOFRAN) syringe/vial  injection 4 mg  4 mg Intravenous Q4HRS PRN Wing Abarca M.D.   4 mg at 01/25/21 1921   • Respiratory Therapy Consult   Nebulization Continuous RT Anil Seymour M.D.       • ipratropium-albuterol (DUONEB) nebulizer solution  3 mL Nebulization Q2HRS PRN (RT) Anil Seymour M.D.   3 mL at 01/25/21 2225   • senna-docusate (PERICOLACE or SENOKOT S) 8.6-50 MG per tablet 2 Tab  2 Tab Enteral Tube BID Anil Seymour M.D.   2 Tab at 01/27/21 0532    And   • polyethylene glycol/lytes (MIRALAX) PACKET 1 Packet  1 Packet Enteral Tube QDAY PRN Anil Seymour M.D.        And   • magnesium hydroxide (MILK OF MAGNESIA) suspension 30 mL  30 mL Enteral Tube QDAY PRN Anil Seymour M.D.        And   • bisacodyl (DULCOLAX) suppository 10 mg  10 mg Rectal QDAY PRN Anil Seymour M.D.       • MD Alert...ICU Electrolyte Replacement per Pharmacy   Other PHARMACY TO DOSE Anil Seymour M.D.       • lidocaine (XYLOCAINE) 1 % injection 1-2 mL  1-2 mL Tracheal Tube Q30 MIN PRN Anil Seymour M.D.   2 mL at 01/26/21 0000   • lidocaine (LIDODERM) 5 % 1 Patch  1 Patch Transdermal Q24HR Edinson Wilson A.P.R.N.   1 Patch at 01/26/21 1630   • labetalol (NORMODYNE/TRANDATE) injection 10-20 mg  10-20 mg Intravenous Q4HRS PRN Jorge Luis Michael M.D.   20 mg at 01/19/21 1538   • heparin injection 5,000 Units  5,000 Units Subcutaneous Q8HRS Johnson Hodge M.D.   5,000 Units at 01/27/21 0532       Fluids    Intake/Output Summary (Last 24 hours) at 1/27/2021 1057  Last data filed at 1/27/2021 1000  Gross per 24 hour   Intake 669.83 ml   Output 1080 ml   Net -410.17 ml       Laboratory  Recent Labs     01/25/21 2135 01/25/21  2252 01/26/21  0353   QGHTB09X 7.05*  --   --    ANCQYA113V 114.2*  --   --    JEYHG014H 94.3*  --   --    TBLE8DPO 94.2  --   --    ARTHCO3 31*  --   --    ARTBE -2  --   --    ISTATAPH  --  7.329* 7.513*   ISTATAPCO2  --  54.9* 35.5   ISTATAPO2  --  75 102*   ISTATATCO2  --  31 30   UIMLFPM3LOD  --  93 98    ISTATARTHCO3  --  28.9* 28.5*   ISTATARTBE  --  2 5*   ISTATTEMP  --  97.0 F 99.1 F   ISTATFIO2  --  100 85   ISTATSPEC  --  Arterial Arterial   ISTATAPHTC  --  7.342* 7.509*   LZKOSAVA0CA  --  71 104*         Recent Labs     01/25/21 1008 01/25/21 2123 01/26/21 0432 01/27/21 0422   SODIUM  --  134* 132* 135   POTASSIUM  --  5.1 4.4 3.9   CHLORIDE  --  95* 94* 99   CO2  --  32 27 26   BUN  --  50* 50* 41*   CREATININE  --  1.58* 1.81* 1.73*   MAGNESIUM 2.0  --  1.8 2.0   PHOSPHORUS 3.7  --  1.7* 1.9*   CALCIUM  --  9.6 8.8 8.1*     Recent Labs     01/25/21 1008 01/25/21 2123 01/26/21 0432 01/27/21 0422   ALTSGPT  --  26 20 18   ASTSGOT  --  20 19 28   ALKPHOSPHAT  --  112* 92 81   TBILIRUBIN  --  0.5 0.6 0.7   PREALBUMIN 5.6*  --   --   --    GLUCOSE  --  157* 104* 158*     Recent Labs     01/25/21 2123 01/26/21 0432 01/27/21 0422   WBC 11.8* 5.4 6.7   NEUTSPOLYS 75.90* 80.90* 77.20*   LYMPHOCYTES 7.80* 3.50* 1.80*   MONOCYTES 7.80 5.20 4.40   EOSINOPHILS 0.90 1.70 0.90   BASOPHILS 0.00 0.00 0.90   ASTSGOT 20 19 28   ALTSGPT 26 20 18   ALKPHOSPHAT 112* 92 81   TBILIRUBIN 0.5 0.6 0.7     Recent Labs     01/25/21 2123 01/26/21 0432 01/27/21 0422   RBC 3.41* 2.97* 2.96*   HEMOGLOBIN 10.3* 8.9* 8.8*   HEMATOCRIT 34.8* 28.7* 27.3*   PLATELETCT 303 250 293       Imaging  X-Ray:  I have personally reviewed the images and compared with prior images. and My impression is: Slightly increased left lower lobe subsegmental atelectasis    Assessment/Plan  * Acute respiratory failure with hypoxia and hypercapnia (HCC)  Assessment & Plan  Intubated 1/25  All the appropriate ventilator bundles are in place  Continue vent support  SBT as tolerated    Acute pancreatitis  Assessment & Plan  Laparoscopic cholecystectomy on or about 7/25/2020 by Dr. Skelton  CT with worsening acute pancreatitis and possible distal small bowel obstruction on 1/25  Complete bowel rest  NG to suction  TPN for nutritional support    SBO (small  bowel obstruction) ([facility])  Assessment & Plan  Small bowel obstruction versus ileus  Complete bowel rest  NG to suction    Ileus ([facility])- (present on admission)  Assessment & Plan  Complete bowel rest  NG to suction  TPN for nutritional support    Hyperglycemia- (present on admission)  Assessment & Plan  Sliding scale insulin  Glycohemoglobin normal at 5.5    Acute kidney injury ([facility])  Assessment & Plan  Monitor renal function and urine output  Avoid nephrotoxins and renal dose medications    Essential hypertension- (present on admission)  Assessment & Plan  Goal SBP less than 160  IV labetalol to achieve blood pressure goals    Chronic back pain- (present on admission)  Assessment & Plan       Nonischemic cardiomyopathy (HCC)- (present on admission)  Assessment & Plan  History of in 2014 with EF 25-30% - recovered    Restless leg syndrome- (present on admission)  Assessment & Plan  History of    Obesity (BMI 30-39.9)- (present on admission)  Assessment & Plan  Nutrition counseling and behavioral modification    Crohn disease ([facility])- (present on admission)  Assessment & Plan  History of       VTE:  Heparin  Ulcer: PPI  Lines: Central Line  Ongoing indication addressed and Hudson Catheter  Ongoing indication addressed    I have performed a physical exam and reviewed and updated ROS and Plan today (1/27/2021). In review of yesterday's note (1/26/2021), there are no changes except as documented above.     I have assessed and reassessed his respiratory status with spontaneous breathing trials and ventilator adjustments, ventilator waveforms, airway mechanics, hemodynamics, blood pressure, cardiovascular status and his neurologic status.  He is at increased risk for worsening respiratory, cardiovascular and gastrointestinal system dysfunction.    Discussed patient condition and risk of morbidity and/or mortality with RN, RT, Pharmacy, [employer] [facility] resident, Charge nurse / hot rounds and QA team     The patient remains critically  ill.  Critical care time = 35 minutes in directly providing and coordinating critical care and extensive data review.  No time overlap and excludes procedures.      Levar Tapia MD  Pulmonary and Critical Care Medicine

## 2021-01-27 NOTE — CARE PLAN
Pt very A/A, wants his ETT out, no changes made at this time but will def be a good candidate to spont. Am ABG good, slightly Alkalotic, AVS

## 2021-01-27 NOTE — CARE PLAN
Problem: Knowledge Deficit  Goal: Knowledge of disease process/condition, treatment plan, diagnostic tests, and medications will improve  Outcome: PROGRESSING AS EXPECTED     Problem: Urinary Elimination:  Goal: Ability to reestablish a normal urinary elimination pattern will improve  Outcome: PROGRESSING SLOWER THAN EXPECTED

## 2021-01-27 NOTE — PROGRESS NOTES
2 RN skin check complete.       Devices in place: EKG leads and electrodes, O2 probe, BP cuff, SCDs, Cortrack, Jorgensen and stat lock, ET Tube and jake    Skin assessed under devices.    No new or potential skin break down. Bilateral sacral/buttocks is pinkish-red but blanching. Bilateral elbows, heels, cheeks, ears intact without signs of breakdown.       The following interventions in place: Qshift 2 RN skin check, Q2 hr turns and device repositioning, low airloss mattress, moisturizer, nutrition, pillows to reposition extremities, mobility, padding applied to jorgensen tubing and cortrack, mepilex

## 2021-01-27 NOTE — PROGRESS NOTES
Nephrology Daily Progress Note    Date of Service  1/27/2021    Chief Complaint  55 y.o. male who presented 1/14/2021 with abdominal pain and emesis, found to have acute pancreatitis with ONOFRE prompting renal consult.     Upon admission, CT abdomen showing inflammatory stranding involving pancreas with fluid within the anterior pararenal space consistent with pancreatitis.  Cr 1.1 on admission, worsened to 3.5 this AM. Oliguric overnight and worsening hyperkalemia despite NS at 75cc/hr. ~200cc UOP after lasix 20mg. Ongoing severe abd pain and worsening abd distention. Worsening shortness or breath and subjective fevers. Being transferred to ICU.     DAILY NEPHROLOGY SUMMARY:  1/16: consult done  1/17: 390cc UOP documented, found to have SBO-NG tube placed, cr stable, abd pain improving, no fluids running   1/18: Hypertensive this AM, improving UOP, cr downtrending   1/19: Transferred out of the ICU, Cr downtrending, main complaint is back pain, NG tube  1/20: off oxygen, NG tube remains, has not passed gas yet, cr down trending   1/21: worsening abd distention and resp status. NG tube repositioned with 6L output, given dose of lasix, cxr with BL infiltrates, slight bump in cr  1/22: started TPN, had an episode of flatus, feel stronger, tachycardic, 6l NG output   1/23: Tolerated liquids this morning.  Complaining of gas pain.  1/24: Weaning off TPN. No longer w/ NGT. C/O diarrhea.   1/25: ongoing diarhea and abdominal swelling, CT scan today  1/26: intubated 1/25 trasnferred to unit  1/27: improved mentation, NG output decreased, BP soft    Review of Systems  Review of Systems   Unable to perform ROS: Intubated        Physical Exam  Temp:  [36.8 °C (98.3 °F)-37.7 °C (99.8 °F)] 37.5 °C (99.5 °F)  Pulse:  [78-90] 78  Resp:  [15-35] 28  BP: (101-124)/(55-70) 103/58  SpO2:  [92 %-100 %] 97 %    Physical Exam  Vitals signs and nursing note reviewed.   Constitutional:       Comments: Obese male   HENT:      Head:  Normocephalic.      Mouth/Throat:      Comments: ETT in place  Neck:      Musculoskeletal: Normal range of motion and neck supple.   Cardiovascular:      Rate and Rhythm: Normal rate.   Pulmonary:      Breath sounds: Rales present.   Abdominal:      General: There is distension.   Musculoskeletal: Normal range of motion.         General: Swelling present.   Skin:     Coloration: Skin is not jaundiced.   Neurological:      Comments: Alert nods appropriatly         Fluids    Intake/Output Summary (Last 24 hours) at 1/27/2021 0902  Last data filed at 1/27/2021 0800  Gross per 24 hour   Intake 724.98 ml   Output 1175 ml   Net -450.02 ml       Laboratory  Recent Labs     01/25/21 2123 01/26/21 0432 01/27/21 0422   WBC 11.8* 5.4 6.7   RBC 3.41* 2.97* 2.96*   HEMOGLOBIN 10.3* 8.9* 8.8*   HEMATOCRIT 34.8* 28.7* 27.3*   .1* 96.6 92.2   MCH 30.2 30.0 29.7   MCHC 29.6* 31.0* 32.2*   RDW 50.8* 47.7 46.5   PLATELETCT 303 250 293   MPV 11.5 12.2 11.7     Recent Labs     01/25/21 2123 01/26/21 0432 01/27/21  0422   SODIUM 134* 132* 135   POTASSIUM 5.1 4.4 3.9   CHLORIDE 95* 94* 99   CO2 32 27 26   GLUCOSE 157* 104* 158*   BUN 50* 50* 41*   CREATININE 1.58* 1.81* 1.73*   CALCIUM 9.6 8.8 8.1*         No results for input(s): NTPROBNP in the last 72 hours.  Recent Labs     01/27/21  0422   TRIGLYCERIDE 94       Imaging      Assessment/Plan  No new Assessment & Plan notes have been filed under this hospital service since the last note was generated.  Service: Nephrology     # ONOFRE: Initially oliguric, now non oliguric  # Pancreatitis  # Hypercapnic resp failure intubated 1/25  # Ileus: NG tube, no role for surgical intervention per surgery  # HX  Non-ischemic cardiomyopathy  # Hypocalcemia   # Crohns   # Anemia       PLAN:  -furosemide x 1  - wean vent probable extubation  -concentrate TPN  -NG tube

## 2021-01-27 NOTE — RESPIRATORY CARE
Extubation    Cuff leak noted yes  Stridor present no     Patient tolerating 5lpm oxymask    Events/Summary/Plan: Pt extubated per MD to 5lpm oxymask, no distress noted (01/27/21 0268)

## 2021-01-27 NOTE — PROGRESS NOTES
"[facility] ICU Progress Note      Admit Date: 1/14/2021    Resident(s): Susan Augustine D.O.   Attending:  INDIO MOREIRA/ Dr. Tapia    Patient ID:    Name:  Shaka Riley   YOB: 1965  Age:  55 y.o.  male   MRN:  [id number]    Hospital Course (carried forward and updated):  Shaka Riley is a 55 y.o. male admitted on 1/14/21 with one day of abdominal pain with nausea and vomiting, found to have severe acute pancreatitis with lipase >3000, SBO vs. Ileus and ONOFRE. ICU consulted on 1/25/21 due to acute hypercapnic respiratory failure with need for intubation.     Consultants:  Critical Care  Nephrology  General Surgery  Gastronterology    Interval Events:  Tmax 99.5 I/O +100.8, CXR showing pulmonary edema   Wall to suction 400 output   1 dose of lasix 40 IV given by nephrology  Patient more awake and alert, able to nod that he is not in pain     Review of Systems   Unable to perform ROS: Acuity of condition     PHYSICAL EXAM:  Vitals:    01/27/21 0735 01/27/21 0800 01/27/21 0900 01/27/21 1000   BP:  103/58 107/61 107/61   Pulse:  91 90 90   Resp: (!) 28 (!) 26 (!) 30 (!) 26   Temp:  37.5 °C (99.5 °F)  37.2 °C (99 °F)   TempSrc:  Temporal  Temporal   SpO2:  97% 94% 93%   Weight:       Height:        Body mass index is 38.02 kg/m².  Latest Vitals:  /61   Pulse 90   Temp 37.2 °C (99 °F) (Temporal)   Resp (!) 26   Ht 1.727 m (5' 7.99\")   Wt 113.4 kg (250 lb)   SpO2 93%   BMI 38.02 kg/m²   O2 therapy: Pulse Oximetry: 93 %, O2 Delivery Device: Ventilator  Vitals Range last 24h:  Temp:  [36.8 °C (98.3 °F)-37.7 °C (99.8 °F)] 37.2 °C (99 °F)  Pulse:  [78-91] 90  Resp:  [15-35] 26  BP: (101-124)/(55-70) 107/61  SpO2:  [92 %-100 %] 93 %  Date 01/27/21 0700 - 01/28/21 0659   Shift 0418-5113 5279-5904 9776-2823 24 Hour Total   INTAKE   I.V. 11.8   11.8     Precedex Volume 11.8   11.8   Shift Total 11.8   11.8   OUTPUT   Stool         Number of Times Stooled 0 x   0 x   Shift Total       NET " 11.8   11.8        Intake/Output Summary (Last 24 hours) at 1/27/2021 1025  Last data filed at 1/27/2021 1000  Gross per 24 hour   Intake 669.83 ml   Output 1080 ml   Net -410.17 ml        Physical Exam   Constitutional: He is well-developed, well-nourished, and in no distress.   HENT:   Head: Normocephalic and atraumatic.   Eyes: Pupils are equal, round, and reactive to light. Conjunctivae are normal.   Neck: Neck supple. No JVD present.   Cardiovascular: Normal rate and regular rhythm. Exam reveals no gallop and no friction rub.   No murmur heard.  Pulmonary/Chest: No respiratory distress. He has no wheezes. He exhibits no tenderness.   Bibasilar rales   Abdominal:   Hypoactive BS, Firm abdomen, distended, nontender   Musculoskeletal: Normal range of motion.      Comments: +1 Edema to BLE   Neurological: He is alert.   Follows commands. Moves all extremities spontaneously   Skin: Skin is warm and dry.   Nursing note and vitals reviewed.    Recent Labs     01/25/21 2135 01/25/21  2252 01/26/21  0353   [id number] 7.05*  --   --    [id number] 114.2*  --   --    [id number] 94.3*  --   --    PZZB3LDR 94.2  --   --    ARTHCO3 31*  --   --    ARTBE -2  --   --    ISTATAPH  --  7.329* 7.513*   ISTATAPCO2  --  54.9* 35.5   ISTATAPO2  --  75 102*   ISTATATCO2  --  31 30   [id number]  --  93 98   ISTATARTHCO3  --  28.9* 28.5*   ISTATARTBE  --  2 5*   ISTATTEMP  --  97.0 F 99.1 F   ISTATFIO2  --  100 85   ISTATSPEC  --  Arterial Arterial   ISTATAPHTC  --  7.342* 7.509*   [id number]  --  71 104*     Recent Labs     01/25/21  1008 01/25/21  2123 01/26/21  0432 01/27/21  0422   SODIUM  --  134* 132* 135   POTASSIUM  --  5.1 4.4 3.9   CHLORIDE  --  95* 94* 99   CO2  --  32 27 26   BUN  --  50* 50* 41*   CREATININE  --  1.58* 1.81* 1.73*   MAGNESIUM 2.0  --  1.8 2.0   PHOSPHORUS 3.7  --  1.7* 1.9*   CALCIUM  --  9.6 8.8 8.1*     Recent Labs     01/25/21  1008 01/25/21 2123 01/26/21  0432 01/27/21 0422   ALTSGPT  --  26 20 18    ASTSGOT  --  20 19 28   ALKPHOSPHAT  --  112* 92 81   TBILIRUBIN  --  0.5 0.6 0.7   PREALBUMIN 5.6*  --   --   --    GLUCOSE  --  157* 104* 158*     Recent Labs     01/25/21 2123 01/26/21 0432 01/27/21 0422   RBC 3.41* 2.97* 2.96*   HEMOGLOBIN 10.3* 8.9* 8.8*   HEMATOCRIT 34.8* 28.7* 27.3*   PLATELETCT 303 250 293     Recent Labs     01/25/21 2123 01/26/21 0432 01/27/21 0422   WBC 11.8* 5.4 6.7   NEUTSPOLYS 75.90* 80.90* 77.20*   LYMPHOCYTES 7.80* 3.50* 1.80*   MONOCYTES 7.80 5.20 4.40   EOSINOPHILS 0.90 1.70 0.90   BASOPHILS 0.00 0.00 0.90   ASTSGOT 20 19 28   ALTSGPT 26 20 18   ALKPHOSPHAT 112* 92 81   TBILIRUBIN 0.5 0.6 0.7       Meds:  • furosemide  40 mg     • potassium phosphate IVPB (CUSTOM)  30 mmol     • dexmedetomidine (PRECEDEX) infusion  0.1-1.5 mcg/kg/hr Stopped (01/27/21 0625)   • HYDROmorphone   Stopped (01/26/21 0830)   • HYDROmorphone  0.5-2 mg     • MD Alert...TPN per Pharmacy       • TPN Central Line Formulation   35 mL/hr at 01/26/21 2020   • carvedilol  25 mg     • ROPINIRole  2 mg     • simethicone  80 mg     • acetaminophen  650 mg     • ondansetron  4 mg     • Respiratory Therapy Consult       • ipratropium-albuterol  3 mL     • senna-docusate  2 Tab      And   • polyethylene glycol/lytes  1 Packet      And   • magnesium hydroxide  30 mL      And   • bisacodyl  10 mg     • MD Alert...Adult ICU Electrolyte Replacement per Pharmacy       • lidocaine  1-2 mL     • lidocaine  1 Patch     • labetalol  10-20 mg     • heparin  5,000 Units          Imaging:  DX-CHEST-PORTABLE (1 VIEW)   Final Result         1.  Pulmonary edema and/or infiltrates are identified, which appear somewhat increased since the prior exam.   2.  Cardiomegaly      DX-ABDOMEN FOR TUBE PLACEMENT   Final Result         1.  Air-filled distended loops of bowel are seen with reactive mucosal pattern, appearance suggests ileus or enteritis. Recommend radiographic followup to resolution to exclude progression to obstruction.    2.  Nasogastric tube tip terminates overlying the expected location of the gastric antrum, could be slightly advanced.      DX-CHEST-PORTABLE (1 VIEW)   Final Result         1.  Bilateral basilar atelectasis, no focal infiltrate   2.  Cardiomegaly      DX-CHEST-PORTABLE (1 VIEW)   Final Result      Perihilar opacification could be due to atelectasis or infiltrate versus mild edema.      Possible trace effusions.      Hypoventilatory change.      CT-ABDOMEN-PELVIS W/O   Final Result      1.  Severe acute pancreatitis, worse than on prior exam.   2.  Multiple dilated small bowel loops suggesting distal small bowel obstruction, similar to prior exam.   3.  No evidence of bowel perforation.      DX-CHEST-LIMITED (1 VIEW)   Final Result      Bibasilar atelectasis, with no significant interval change.               INTERPRETING LOCATION: [address] [facility], [facility], [address]      [id number] VIEW   Final Result         Diffuse gaseous distention of the small bowel, worse than prior, concerning for small bowel obstruction.      IR-PICC LINE PLACEMENT W/ GUIDANCE > AGE 5   Final Result                  Ultrasound-guided PICC placement performed by qualified nursing staff as    above.          DX-CHEST-PORTABLE (1 VIEW)   Final Result         1.  Interstitial pulmonary opacities suggests atelectasis or possible subtle infiltrates.      CT-ABDOMEN-PELVIS W/O   Final Result      1.  Again seen pancreatitis with peripancreatic effusions. This is mildly worsened from comparison.      2.  Again seen dilated loops of small intestine with decompression of the colon and distal small bowel consistent with ileus versus partial small bowel obstruction.      3.  Bibasilar atelectasis and small bilateral pleural effusions.      [id number] VIEW   Final Result      1.  Moderate small bowel dilation with multiple air-fluid levels consistent with small bowel obstruction      2.  Enteric catheter appears appropriately located       CT-ABDOMEN-PELVIS W/O   Final Result         1. Worsening inflammatory change and fluid surrounding the pancreas, in keeping with acute pancreatitis.      2. Diffuse dilatation of the small bowel and proximal colon with decompressed distal colon adjacent to the inflammation in the tail the pancreas. This could relate to colonic obstruction due to adjacent inflammatory change or ileus.      3. Mild wall thickening of the stomach fundus adjacent to the pancreatic tail, likely reactive.      DX-ABDOMEN FOR TUBE PLACEMENT   Final Result         Gastric drainage tube with tip projecting over the expected area of the stomach fundus.      DX-CHEST-FOR LINE PLACEMENT Perform procedure in: Patient's Room   Final Result      Left midlung and bibasilar linear atelectasis.      Dialysis catheter projects over the SVC.      No pneumothorax.         [id number] VIEW   Final Result      Increased bowel gas concerning for early or partial small bowel obstruction.      DX-CHEST-PORTABLE (1 VIEW)   Final Result      Hypoinflation with LEFT mid and lower lung atelectasis, with possible superimposed LEFT basilar pneumonia.      [id number]-V/O   Final Result      1.  Pancreatic edema with significant peripancreatic stranding and ill-defined fluid, consistent with acute interstitial edematous pancreatitis.      2.  Small amount of ascites.      3.  No choledocholithiasis is identified.      4.  Status post cholecystectomy.      CT-ABDOMEN-PELVIS WITH   Final Result      1.  Inflammatory stranding involving the pancreas with fluid within the anterior pararenal space consistent with pancreatitis.      2.  Fatty liver.      3.  Prior cholecystectomy.      4.  Bibasilar atelectasis/consolidation.      DX-CHEST-PORTABLE (1 VIEW)   Final Result      No evidence of acute cardiopulmonary process.          Problem and Plan:  * Acute respiratory failure with hypoxia and hypercapnia (HCC)  Assessment & Plan  Likely secondary to atelectasis and  OHS   No history of COPD or smoking.   No significant abnormality of x-ray  S/P Intubation 1/25   RT/O2 protocol  Continue lung protective mechanical ventilation strategies  SAT/SBT    Acute pancreatitis  Assessment & Plan  Etiology likely ETOH use vs. Medications   S/P MRCP on 1/15 without acute etiology  Pain management  NPO   Mobilize when able    Ileus (HCC)- (present on admission)  Assessment & Plan  Ileus vs SBO - etiology likely due to acute pancreatitis vs. ETOH use vs. Medication induced   Nasal gastric tube to wall suction  Hold oral meds that may not be absorbed  Correct electrolytes  Wean narcotics as able.    Continue to monitor as this may take weeks to resolve    Hyperglycemia- (present on admission)  Assessment & Plan  HgbA1c:5.5  Continue to monitor for hyperglycemia.   No need to insulin at this time.    Acute kidney injury (HCC)  Assessment & Plan  Improving.   Nephrology consulting  Monitor I/O's and BMP.  Avoid nephrotoxins/Renally dose all medications.    Essential hypertension- (present on admission)  Assessment & Plan  On coreg  IV PRN per parameters  Holding Amlodipine, spironolactone, ACE, and ARB   CTM    Hypomagnesemia  Assessment & Plan  Monitor and replete as need    Hyponatremia  Assessment & Plan  CTM       Hypocalcemia  Assessment & Plan  Supplement and monitor     Hypertriglyceridemia- (present on admission)  Assessment & Plan  Triglycerides 571, considered moderate, >500 risks increase somewhat for inducing pancreatitis      Chronic back pain- (present on admission)  Assessment & Plan  Continue lidocaine patch.   Analgesic: Dilaudid    Restless leg syndrome- (present on admission)  Assessment & Plan  Requip    Obesity (BMI 30-39.9)- (present on admission)  Assessment & Plan  Alcohol cessation and weight loss counseling at discharge  Body mass index is 36.37 kg/m².    Crohn disease (HCC)- (present on admission)  Assessment & Plan  No acute issue at this time.      DISPO: Continue  critical care management     CODE STATUS: Full    Quality Measures:  Feeding: TPN  Analgesia: Dilaudid  Sedation: Precedex   Thromboprophylaxis: Heparin  Head of bed: >30 degrees  Ulcer prophylaxis: N/A  Glycemic control: N/A  Bowel care: bowel regimen  Indwelling lines: Hudson, PICC, PIV, NG tube  Deescalation of antibiotics: N/A      Susan Augustine D.O.

## 2021-01-28 NOTE — DISCHARGE PLANNING
Hospital Care Management Discharge Planning       Anticipated Discharge Disposition:   · To be determined      Action:   · Patient discussed during interdisciplinary ICU rounds   · Pt requiring TPN infusions and NG tube to suction  · Pt requiring 5L of oxygen via oxymask    · No d/c needs identified at this time      Barriers to Discharge:   · Medical clearance      Plan:   · Hospital Care Management Team to continue to provide support services and assistance with discharge planning as needed.

## 2021-01-28 NOTE — PROGRESS NOTES
2 RN skin check complete.       Devices in place: EKG leads and electrodes, O2 probe, BP cuff, SCDs, NG, Jorgensen and stat lock, Rectal trumpet, condom cath,     Skin assessed under devices.    No new or potential skin break down. Bilateral sacrum, heels, elbows, cheeks, ears, and nose are intact without any signs of breakdown.       The following interventions in place: Qshift 2 RN skin check, Q2 hr turns and device repositioning, low airloss mattress, moisturizer, nutrition, pillows to reposition extremities, mobility, padding applied to jorgensen tubing and NG, mepilex in place, rectal tube padded

## 2021-01-28 NOTE — PROGRESS NOTES
"[facility] Progress Note      Admit Date: 1/14/2021    Resident(s): Susan Augustine D.O.   Attending:  INDIO MOREIRA/ Dr. Cotto    Patient ID:    Name:  Shaka Riley   YOB: 1965  Age:  55 y.o.  male   MRN:  [id number]    Hospital Course (carried forward and updated):  Shaka Riley is a 55 y.o. male admitted on 1/14/21 with one day of abdominal pain with nausea and vomiting, found to have severe acute pancreatitis with lipase >3000, SBO vs. Ileus and ONOFRE. ICU consulted on 1/25/21 due to acute hypercapnic respiratory failure with need for intubation.     Consultants:  Critical Care  Nephrology  General Surgery  Gastronterology    Interval Events:  Extubated 1/27   Tmax 98.3 I/O Net (- 2.7L)   Reports passing flatus, burning pain to abdomen - states improved from prior   Continue Lasix     Review of Systems   Constitutional: Negative for chills and fever.   HENT: Negative for congestion and sore throat.    Eyes: Negative for blurred vision and double vision.   Respiratory: Negative for cough and wheezing.    Cardiovascular: Negative for chest pain and palpitations.   Gastrointestinal: Negative for nausea and vomiting.   Genitourinary: Negative for dysuria and urgency.   Musculoskeletal: Negative for myalgias.   Skin: Negative for rash.   Neurological: Negative for dizziness and headaches.   Psychiatric/Behavioral: Negative for depression and suicidal ideas.     PHYSICAL EXAM:  Vitals:    01/28/21 0700 01/28/21 0800 01/28/21 0900 01/28/21 1000   BP: 107/64 121/63 118/62 123/69   Pulse: 92 98 96 95   Resp: 12 16 20 18   Temp:  36.8 °C (98.2 °F)  36.9 °C (98.4 °F)   TempSrc:  Temporal  Temporal   SpO2: 95% 95% 95% 96%   Weight:       Height:        Body mass index is 38.02 kg/m².  Latest Vitals:  /69   Pulse 95   Temp 36.9 °C (98.4 °F) (Temporal)   Resp 18   Ht 1.727 m (5' 7.99\")   Wt 113 kg (250 lb)   SpO2 96%   BMI 38.02 kg/m²   O2 therapy: Pulse Oximetry: 96 %, O2 " (LPM): 5, O2 Delivery Device: Oxymask  Vitals Range last 24h:  Temp:  [36.4 °C (97.5 °F)-37.5 °C (99.5 °F)] 36.9 °C (98.4 °F)  Pulse:  [] 95  Resp:  [12-47] 18  BP: ()/(58-74) 123/69  SpO2:  [90 %-99 %] 96 %  Date 01/28/21 0700 - 01/29/21 0659   Shift 5796-7263 7312-8973 3001-4229 24 Hour Total   INTAKE   Other 30   30     Medications (PO/Enteral Liquids) 30   30   Shift Total 30   30   OUTPUT   Urine 245   245     Output (mL) (Urethral Catheter Latex) 245   245   Shift Total 245   245   NET -215   -215        Intake/Output Summary (Last 24 hours) at 1/28/2021 1030  Last data filed at 1/28/2021 1000  Gross per 24 hour   Intake 710 ml   Output 3580 ml   Net -2870 ml      Physical Exam   Constitutional: He is well-developed, well-nourished, and in no distress.   HENT:   Head: Normocephalic and atraumatic.   Eyes: Pupils are equal, round, and reactive to light. Conjunctivae are normal.   Neck: Neck supple. No JVD present.   Cardiovascular: Normal rate and regular rhythm. Exam reveals no gallop and no friction rub.   No murmur heard.  Pulmonary/Chest: No respiratory distress. He has no wheezes. He exhibits no tenderness.   Bibasilar rales   Abdominal:   Hypoactive BS, Firm abdomen, distended, diffuse tenderness   Musculoskeletal: Normal range of motion.      Comments: +1 Edema to BLE   Neurological: He is alert. GCS score is 15.   Skin: Skin is warm and dry.   Nursing note and vitals reviewed.    Recent Labs     01/25/21 2135 01/25/21  2252 01/26/21  0353   LTEOF23M 7.05*  --   --    [id number] 114.2*  --   --    [id number] 94.3*  --   --    KSOZ2AOP 94.2  --   --    ARTHCO3 31*  --   --    ARTBE -2  --   --    ISTATAPH  --  7.329* 7.513*   ISTATAPCO2  --  54.9* 35.5   ISTATAPO2  --  75 102*   ISTATATCO2  --  31 30   CLIHPCJ4VPO  --  93 98   ISTATARTHCO3  --  28.9* 28.5*   ISTATARTBE  --  2 5*   ISTATTEMP  --  97.0 F 99.1 F   ISTATFIO2  --  100 85   ISTATSPEC  --  Arterial Arterial   ISTATAPHTC  --  7.342*  7.509*   YAYRETJZ2EZ  --  71 104*     Recent Labs     01/26/21 0432 01/27/21 0422 01/28/21  0400   SODIUM 132* 135 142   POTASSIUM 4.4 3.9 4.0   CHLORIDE 94* 99 104   CO2 27 26 30   BUN 50* 41* 38*   CREATININE 1.81* 1.73* 1.61*   MAGNESIUM 1.8 2.0 2.0   PHOSPHORUS 1.7* 1.9* 4.3   CALCIUM 8.8 8.1* 8.2*     Recent Labs     01/26/21 0432 01/27/21 0422 01/28/21  0400   ALTSGPT 20 18 31   ASTSGOT 19 28 48*   ALKPHOSPHAT 92 81 74   TBILIRUBIN 0.6 0.7 0.6   GLUCOSE 104* 158* 198*     Recent Labs     01/26/21 0432 01/27/21 0422 01/28/21  0400   RBC 2.97* 2.96* 2.85*   HEMOGLOBIN 8.9* 8.8* 8.8*   HEMATOCRIT 28.7* 27.3* 27.8*   PLATELETCT 250 293 231     Recent Labs     01/26/21 0432 01/27/21 0422 01/28/21  0400   WBC 5.4 6.7 6.3   NEUTSPOLYS 80.90* 77.20* 80.00*   LYMPHOCYTES 3.50* 1.80* 2.60*   MONOCYTES 5.20 4.40 6.10   EOSINOPHILS 1.70 0.90 0.90   BASOPHILS 0.00 0.90 0.00   ASTSGOT 19 28 48*   ALTSGPT 20 18 31   ALKPHOSPHAT 92 81 74   TBILIRUBIN 0.6 0.7 0.6       Meds:  • furosemide  40 mg     • enoxaparin (LOVENOX) injection  40 mg     • HYDROmorphone  0.5-1 mg     • TPN Central Line Formulation   60 mL/hr at 01/27/21 1924   • dextrose 50%  50 mL     • insulin regular  2-10 Units     • MD Alert...TPN per Pharmacy       • carvedilol  25 mg     • ROPINIRole  2 mg     • simethicone  80 mg     • acetaminophen  650 mg     • ondansetron  4 mg     • Respiratory Therapy Consult       • ipratropium-albuterol  3 mL     • senna-docusate  2 Tab      And   • polyethylene glycol/lytes  1 Packet      And   • magnesium hydroxide  30 mL      And   • bisacodyl  10 mg     • MD Alert...Adult ICU Electrolyte Replacement per Pharmacy       • lidocaine  1 Patch     • labetalol  10-20 mg          Imaging:  DX-CHEST-PORTABLE (1 VIEW)   Final Result         1.  Pulmonary edema and/or infiltrates are identified, which appear somewhat increased since the prior exam.   2.  Cardiomegaly      DX-ABDOMEN FOR TUBE PLACEMENT   Final Result          1.  Air-filled distended loops of bowel are seen with reactive mucosal pattern, appearance suggests ileus or enteritis. Recommend radiographic followup to resolution to exclude progression to obstruction.   2.  Nasogastric tube tip terminates overlying the expected location of the gastric antrum, could be slightly advanced.      DX-CHEST-PORTABLE (1 VIEW)   Final Result         1.  Bilateral basilar atelectasis, no focal infiltrate   2.  Cardiomegaly      DX-CHEST-PORTABLE (1 VIEW)   Final Result      Perihilar opacification could be due to atelectasis or infiltrate versus mild edema.      Possible trace effusions.      Hypoventilatory change.      CT-ABDOMEN-PELVIS W/O   Final Result      1.  Severe acute pancreatitis, worse than on prior exam.   2.  Multiple dilated small bowel loops suggesting distal small bowel obstruction, similar to prior exam.   3.  No evidence of bowel perforation.      DX-CHEST-LIMITED (1 VIEW)   Final Result      Bibasilar atelectasis, with no significant interval change.               INTERPRETING LOCATION: [facility], [facility], [facility]      [id number] VIEW   Final Result         Diffuse gaseous distention of the small bowel, worse than prior, concerning for small bowel obstruction.      IR-PICC LINE PLACEMENT W/ GUIDANCE > AGE 5   Final Result                  Ultrasound-guided PICC placement performed by qualified nursing staff as    above.          DX-CHEST-PORTABLE (1 VIEW)   Final Result         1.  Interstitial pulmonary opacities suggests atelectasis or possible subtle infiltrates.      CT-ABDOMEN-PELVIS W/O   Final Result      1.  Again seen pancreatitis with peripancreatic effusions. This is mildly worsened from comparison.      2.  Again seen dilated loops of small intestine with decompression of the colon and distal small bowel consistent with ileus versus partial small bowel obstruction.      3.  Bibasilar atelectasis and small bilateral pleural effusions.      [id number]  VIEW   Final Result      1.  Moderate small bowel dilation with multiple air-fluid levels consistent with small bowel obstruction      2.  Enteric catheter appears appropriately located      CT-ABDOMEN-PELVIS W/O   Final Result         1. Worsening inflammatory change and fluid surrounding the pancreas, in keeping with acute pancreatitis.      2. Diffuse dilatation of the small bowel and proximal colon with decompressed distal colon adjacent to the inflammation in the tail the pancreas. This could relate to colonic obstruction due to adjacent inflammatory change or ileus.      3. Mild wall thickening of the stomach fundus adjacent to the pancreatic tail, likely reactive.      DX-ABDOMEN FOR TUBE PLACEMENT   Final Result         Gastric drainage tube with tip projecting over the expected area of the stomach fundus.      DX-CHEST-FOR LINE PLACEMENT Perform procedure in: Patient's Room   Final Result      Left midlung and bibasilar linear atelectasis.      Dialysis catheter projects over the SVC.      No pneumothorax.         [id number] VIEW   Final Result      Increased bowel gas concerning for early or partial small bowel obstruction.      DX-CHEST-PORTABLE (1 VIEW)   Final Result      Hypoinflation with LEFT mid and lower lung atelectasis, with possible superimposed LEFT basilar pneumonia.      [id number]-O/O   Final Result      1.  Pancreatic edema with significant peripancreatic stranding and ill-defined fluid, consistent with acute interstitial edematous pancreatitis.      2.  Small amount of ascites.      3.  No choledocholithiasis is identified.      4.  Status post cholecystectomy.      CT-ABDOMEN-PELVIS WITH   Final Result      1.  Inflammatory stranding involving the pancreas with fluid within the anterior pararenal space consistent with pancreatitis.      2.  Fatty liver.      3.  Prior cholecystectomy.      4.  Bibasilar atelectasis/consolidation.      DX-CHEST-PORTABLE (1 VIEW)   Final Result      No  evidence of acute cardiopulmonary process.        Problem and Plan:  * Acute respiratory failure with hypoxia and hypercapnia (HCC)  Assessment & Plan  Likely secondary to atelectasis and OHS   No history of COPD or smoking.   No significant abnormality of x-ray  S/P Intubation 1/25 - 1/27  RT / IS     Acute pancreatitis  Assessment & Plan  Etiology likely ETOH use vs. Medications   S/P MRCP on 1/15 without acute etiology  Pain management  NPO   Mobilize when able    Ileus (HCC)- (present on admission)  Assessment & Plan  Ileus vs SBO - etiology likely due to acute pancreatitis vs. ETOH use vs. Medication induced   Nasal gastric tube to wall suction  Hold oral meds that may not be absorbed  Correct electrolytes  Wean narcotics as able.    Continue to monitor as this may take weeks to resolve    Hyperglycemia- (present on admission)  Assessment & Plan  HgbA1c:5.5  Insulin sliding scale started  CTM    Acute kidney injury (HCC)  Assessment & Plan  Improving.   Nephrology consulting  Monitor I/O's and BMP.  Avoid nephrotoxins/Renally dose all medications.    Essential hypertension- (present on admission)  Assessment & Plan  On coreg  IV PRN per parameters  Holding Amlodipine, spironolactone, ACE, and ARB   CTM    Hypomagnesemia  Assessment & Plan  Monitor and replete as need    Hyponatremia  Assessment & Plan  CTM       Hypocalcemia  Assessment & Plan  Supplement and monitor     Hypertriglyceridemia- (present on admission)  Assessment & Plan  Triglycerides 571, considered moderate, >500 risks increase somewhat for inducing pancreatitis      Chronic back pain- (present on admission)  Assessment & Plan  Continue lidocaine patch.   Analgesic: Dilaudid    Restless leg syndrome- (present on admission)  Assessment & Plan  Continue home Requip    Obesity (BMI 30-39.9)- (present on admission)  Assessment & Plan  Alcohol cessation and weight loss counseling at discharge  Body mass index is 36.37 kg/m².    Crohn disease (HCC)-  (present on admission)  Assessment & Plan  Reports it has been in remission for years.   GI: Dr. Colon  No acute issue at this time.      DISPO: Continue critical care management     CODE STATUS: Full    Quality Measures:  Feeding: TPN  Analgesia: Dilaudid  Sedation: None  Thromboprophylaxis: Lovenox  Head of bed: >30 degrees  Ulcer prophylaxis: Pepcid in TPN formulation  Glycemic control: Insulin Sliding Scale  Bowel care: bowel regimen  Indwelling lines: Hudson, PICC, PIV, NG tube  Deescalation of antibiotics: N/A      Susan Augustine D.O.

## 2021-01-28 NOTE — PROGRESS NOTES
Nephrology Daily Progress Note    Date of Service  1/28/2021    Chief Complaint  55 y.o. male who presented 1/14/2021 with abdominal pain and emesis, found to have acute pancreatitis with ONOFRE prompting renal consult.     Upon admission, CT abdomen showing inflammatory stranding involving pancreas with fluid within the anterior pararenal space consistent with pancreatitis.  Cr 1.1 on admission, worsened to 3.5 this AM. Oliguric overnight and worsening hyperkalemia despite NS at 75cc/hr. ~200cc UOP after lasix 20mg. Ongoing severe abd pain and worsening abd distention. Worsening shortness or breath and subjective fevers. Being transferred to ICU.     DAILY NEPHROLOGY SUMMARY:  1/16: consult done  1/17: 390cc UOP documented, found to have SBO-NG tube placed, cr stable, abd pain improving, no fluids running   1/18: Hypertensive this AM, improving UOP, cr downtrending   1/19: Transferred out of the ICU, Cr downtrending, main complaint is back pain, NG tube  1/20: off oxygen, NG tube remains, has not passed gas yet, cr down trending   1/21: worsening abd distention and resp status. NG tube repositioned with 6L output, given dose of lasix, cxr with BL infiltrates, slight bump in cr  1/22: started TPN, had an episode of flatus, feel stronger, tachycardic, 6l NG output   1/23: Tolerated liquids this morning.  Complaining of gas pain.  1/24: Weaning off TPN. No longer w/ NGT. C/O diarrhea.   1/25: ongoing diarhea and abdominal swelling, CT scan today  1/26: intubated 1/25 trasnferred to unit  1/27: improved mentation, NG output decreased, BP soft  1/28: extubated 1/27, abdoman still distended, increased U/O    Review of Systems  Review of Systems   Constitutional: Positive for malaise/fatigue.   Eyes: Negative.    Respiratory: Negative.    Cardiovascular: Positive for leg swelling.   Gastrointestinal: Positive for abdominal pain.   Genitourinary:        Hudson in place   Musculoskeletal: Negative.    Skin: Negative.     Endo/Heme/Allergies: Bruises/bleeds easily.        Physical Exam  Temp:  [36.4 °C (97.5 °F)-37.5 °C (99.5 °F)] 36.9 °C (98.4 °F)  Pulse:  [] 95  Resp:  [12-47] 18  BP: ()/(58-74) 123/69  SpO2:  [90 %-99 %] 96 %    Physical Exam  Vitals signs and nursing note reviewed.   Constitutional:       Comments: Obese male   HENT:      Head: Normocephalic.      Mouth/Throat:      Comments: ETT in place  Neck:      Musculoskeletal: Normal range of motion and neck supple.   Cardiovascular:      Rate and Rhythm: Normal rate.   Pulmonary:      Breath sounds: Rales present.   Abdominal:      General: There is distension.   Musculoskeletal: Normal range of motion.         General: Swelling present.   Skin:     Coloration: Skin is not jaundiced.   Neurological:      Comments: Alert nods appropriatly         Fluids    Intake/Output Summary (Last 24 hours) at 1/28/2021 1019  Last data filed at 1/28/2021 1000  Gross per 24 hour   Intake 710 ml   Output 3580 ml   Net -2870 ml       Laboratory  Recent Labs     01/26/21  0432 01/27/21  0422 01/28/21  0400   WBC 5.4 6.7 6.3   RBC 2.97* 2.96* 2.85*   HEMOGLOBIN 8.9* 8.8* 8.8*   HEMATOCRIT 28.7* 27.3* 27.8*   MCV 96.6 92.2 97.5   MCH 30.0 29.7 30.9   MCHC 31.0* 32.2* 31.7*   RDW 47.7 46.5 49.0   PLATELETCT 250 293 231   MPV 12.2 11.7 11.5     Recent Labs     01/26/21  0432 01/27/21  0422 01/28/21  0400   SODIUM 132* 135 142   POTASSIUM 4.4 3.9 4.0   CHLORIDE 94* 99 104   CO2 27 26 30   GLUCOSE 104* 158* 198*   BUN 50* 41* 38*   CREATININE 1.81* 1.73* 1.61*   CALCIUM 8.8 8.1* 8.2*         No results for input(s): NTPROBNP in the last 72 hours.  Recent Labs     01/27/21  0422   TRIGLYCERIDE 94       Imaging      Assessment/Plan  No new Assessment & Plan notes have been filed under this hospital service since the last note was generated.  Service: Nephrology     # ONOFRE: Initially oliguric, now non oliguric  # Pancreatitis  # Hypercapnic resp failure extubated 1/27  # Ileus: NG tube,  no role for surgical intervention per surgery  # HX  Non-ischemic cardiomyopathy  # Hypocalcemia   # Crohns   # Anemia       PLAN:  - repeat furosemide x 1  -concentrate TPN  -NG tube  - will sign off

## 2021-01-28 NOTE — PROGRESS NOTES
Pulmonary and Critical Care Medicine Progress Note    This gentleman is significantly improved.  I reviewed his chart and discussed the case with his nurse.  He has no critical care needs.    OK to transfer out of ICU.  [facility] will sign off.  Please call if you have any questions.    Levar Tapia MD  Pulmonary and Critical Care Medicine

## 2021-01-28 NOTE — PROGRESS NOTES
Pharmacy TPN Day # 3       2021    Dosing Weight: 80 kg (adjusted)             TPN currently providing 100% of goal                                        TPN goal: 2000 - 2400 kcal/day including ~1 gm/kg/day Protein (increased from 0.5 g/kg/day as ONOFRE appears to be resolving)                                          TPN indication: Pancreatitis                                                                Pertinent PMH: Patient admitted on  for abdominal pain. PMH positive for cholecystectomy 2020. Labs obtained in the ED concerning for pancreatitis. Patient made NPO and surgery consulted, recommending bowel rest with NG tube to suction due to abdominal distention. Patient's labs positive for ONOFRE during this hospitalization, nephrology is consulting. TPN initiated on  due to NPO status with plans of prolonged time without enteral access. Initial TPN remained at 50% goal each day until it was discontinued on  when patient was placed on a full liquid diet x 2 days. Patient transferred to RICU on 21 due to acute respiratory failure requiring intubation. TPN restarted due to worsening pancreatitis and continued small bowel obstruction found on  Abdomen-pelvis CT scan. Patient was extubated . Urine output is improving as of , nephrology has signed off, and patient likely to be discharged from ICU and transferred to floor.    Temp (24hrs), Av.9 °C (98.4 °F), Min:36.4 °C (97.5 °F), Max:37.5 °C (99.5 °F)  .  Recent Labs     21  0432 21  0422 21  0400   SODIUM 132* 135 142   POTASSIUM 4.4 3.9 4.0   CHLORIDE 94* 99 104   CO2 27 26 30   BUN 50* 41* 38*   CREATININE 1.81* 1.73* 1.61*   GLUCOSE 104* 158* 198*   CALCIUM 8.8 8.1* 8.2*   ASTSGOT 19 28 48*   ALTSGPT 20 18 31   ALBUMIN 2.5* 2.2* 2.2*   TBILIRUBIN 0.6 0.7 0.6   PHOSPHORUS 1.7* 1.9* 4.3   MAGNESIUM 1.8 2.0 2.0     Accu-Checks  Recent Labs     21  0020 21  0600 21  1145   POCGLUCOSE 176*  189* 192*       Vitals:    01/28/21 0800 01/28/21 0900 01/28/21 1000 01/28/21 1100   BP: 121/63 118/62 123/69 127/67   Weight:       Height:           Intake/Output Summary (Last 24 hours) at 1/28/2021 1228  Last data filed at 1/28/2021 1100  Gross per 24 hour   Intake 210 ml   Output 2880 ml   Net -2670 ml       Orders Placed This Encounter   Procedures   • Diet NPO     Standing Status:   Standing     Number of Occurrences:   1     Order Specific Question:   Type:     Answer:   Now [1]     Order Specific Question:   Restrict to:     Answer:   Strict [1]         TPN for past 72 hours (Show up to 3 orders; newest on the left. Changes between the two most recent orders are indicated.)     Start date and time   01/28/2021 2000 01/27/2021 2000 01/26/2021 2000      TPN Central Line Formulation [[id number]] TPN Central Line Formulation [[id number]] TPN Central Line Formulation [[id number]]    Order Status  Active Active Completed    Last Admin   New Bag at 01/27/2021 1924 by Yaa Albert R.N. New Bag at 01/26/2021 2020 by Yaa Albert R.N.       Base    Clinisol 15%  80 g 40 g 20 g    dextrose 70%  340 g 380 g 190 g    fat emulsions 20%  55 g 55 g 28 g       Additives    potassium phosphate  15 mmol 15 mmol --    potassium chloride  20 mEq 20 mEq --    sodium chloride  120 mEq 225 mEq 130 mEq    magnesium sulfate  8 mEq -- --    calcium GLUConate  4.65 mEq -- --    M.T.E.-4 Adult  1 mL 1 mL 1 mL    famotidine  40 mg 40 mg 40 mg       QS Base    sterile water  204 mL 279.18 mL 257.77 mL       Energy Contribution    Proteins  320 kcal -- --    Dextrose  1156 kcal -- --    Lipids  550 kcal -- --    Total  2026 kcal -- --       Electrolyte Ion Calculated Amount    Sodium  120 mEq 225 mEq 130 mEq    Potassium  42 mEq 42 mEq --    Calcium  4.65 mEq -- --    Magnesium  8 mEq -- --    Aluminum  -- -- --    Phosphate  15 mmol 15 mmol --    Chloride  140 mEq 245 mEq 130 mEq    Acetate  67.73 mEq 33.87 mEq 16.93 mEq       Other     Total Protein  80 g 40 g 20 g    Total Protein/kg  0.71 g/kg 0.35 g/kg 0.18 g/kg    Total Amino Acid  -- -- --    Total Amino Acid/kg  -- -- --    Glucose Infusion Rate  2.09 mg/kg/min 2.33 mg/kg/min 1.16 mg/kg/min    Osmolarity (Estimated)  -- -- --    Volume  1,560 mL 1,440 mL 840 mL    Rate  65 mL/hr 60 mL/hr 35 mL/hr    Dosing Weight  113 kg 113 kg 113 kg    Infusion [facility]  [facility]            This formula provides:  % kcal as lipids = 27%  Grams protein/kg = 1 g/kg  Non-protein calories = 1706  Kcals/kg = 25 kcal/kg  Total daily calories = 2026 kcal    Comments:  - Diet Progression: Patient continues NPO  - Fluids: TPN @ 65 ml/hr (1560mL/24hrs). Nephrology has requested TPN be concentrated d/t ONOFRE and 2+ pitting edema BLE.  - Other: IV famotidine included in TPN, CTM and dose adjust famotidine if CrCl declines to <50 mL/min.  - Appreciate Clinical Dietitian's recommendations  - AST elevation noted, will CTM     Macronutrients:  - TPN at 100% of goal today. Kcal goal calculated according to critically ill/anabolic caloric goals despite patient BMI of 38. Patient is being protein restricted due to presence of ONOFRE which appears to be resolving, therefore protein content was increased from 0.5 g/kg to 1 g/kg adjusted body weight today. CTM for further improvement in renal function  - Dextrose: 1156 kcal  - Blood glucose levels range from 158-198 mg/dL in previous 24 hrs, CTM              - GIR 2.09 mg/kg/min              - Low SSI with Q6h POC BG tests ordered (10u SSI administered in previous 24 hrs)   - Reduced carbohydrate content of TPN which may help reduce SSI needs  - Lipids: 550 kcal  - Protein: 320 kcal. Protein g/kg goal restricted due to ONOFRE in this acutely ill patient     Electrolytes:              - Sodium: TPN adjusted to 1/2 NS equivalence today due to rise in serum Na and Cl              - Potassium: Continued with 42 mEq K              - Calcium: 4.65 mEq Ca included  today              - Phosphorus: Continued with 15 mmol KPhos              - Magnesium: 2g (8 mEq) Mg included today       Micronutrients and Vitamins: Trace elements included in TPN. Standard multivitamin not included due to national shortages of IV formulation. May consider addition of oral multivitamin when patient is able to resume oral medications.     Arben Morrison, PharmD

## 2021-01-28 NOTE — CARE PLAN
Problem: Nutritional:  Goal: Nutrition support tolerated and meeting greater than 85% of estimated needs  Outcome: MET  TPN @ goal

## 2021-01-28 NOTE — CARE PLAN
Problem: Safety  Goal: Will remain free from falls  Intervention: Assess risk factors for falls  Note: Call light within reach, bed alarm in use, yellow socks available.      Problem: Knowledge Deficit  Goal: Knowledge of the prescribed therapeutic regimen will improve  Intervention: Discuss information regarding therpeutic regimen and document in education  Note: Encourage activity to increase bowel motility.

## 2021-01-29 NOTE — ASSESSMENT & PLAN NOTE
-ileus at this time, but high risk for progression, no change in hypoactive bowel sounds  -discontinue TPN for now  -monitor closely

## 2021-01-29 NOTE — PROGRESS NOTES
Pharmacy TPN Day # 4       2021    Dosing Weight: 80 kg (adjusted)             TPN currently providing 100% of goal                                        TPN goal: 2000 - 2400 kcal/day including 0.5 g/kg/day (reduced from 1 g/kg/day due to acute worsening of renal function)                                         TPN indication: Pancreatitis                                                                Pertinent PMH: Patient admitted on  for abdominal pain. PMH positive for cholecystectomy 2020. Labs obtained in the ED concerning for pancreatitis. Patient made NPO and surgery consulted, recommending bowel rest with NG tube to suction due to abdominal distention. Patient's labs positive for ONOFRE during this hospitalization, nephrology is consulting. TPN initiated on  due to NPO status with plans of prolonged time without enteral access. Initial TPN remained at 50% goal each day until it was discontinued on  when patient was placed on a full liquid diet x 2 days. Patient transferred to RICU on 21 due to acute respiratory failure requiring intubation. TPN restarted due to worsening pancreatitis and continued small bowel obstruction found on  Abdomen-pelvis CT scan. Patient was extubated . Urine output is improving as of , nephrology signed off, and patient was transferred to floor. Acute worsening of renal function on  prompted return to protein restricted TPN formulation with Nephrology consulting again.    Temp (24hrs), Av.8 °C (98.2 °F), Min:36.7 °C (98 °F), Max:37.1 °C (98.8 °F)  .  Recent Labs     21  0422 21  0400 21  0430   SODIUM 135 142 143   POTASSIUM 3.9 4.0 4.2   CHLORIDE 99 104 104   CO2 26 30 31   BUN 41* 38* 45*   CREATININE 1.73* 1.61* 2.46*   GLUCOSE 158* 198* 177*   CALCIUM 8.1* 8.2* 8.8   ASTSGOT 28 48* 41   ALTSGPT 18 31 35   ALBUMIN 2.2* 2.2* 2.3*   TBILIRUBIN 0.7 0.6 0.5   PHOSPHORUS 1.9* 4.3 3.6   MAGNESIUM 2.0 2.0 2.0      Accu-Checks  Recent Labs     01/28/21  1731 01/29/21  0002 01/29/21  0638   POCGLUCOSE 178* 190* 170*       Vitals:    01/29/21 0000 01/29/21 0345 01/29/21 0744 01/29/21 1217   BP:  141/72 113/68 106/62   Weight: 112 kg (246 lb 7.6 oz)      Height:           Intake/Output Summary (Last 24 hours) at 1/29/2021 1442  Last data filed at 1/29/2021 1325  Gross per 24 hour   Intake 60 ml   Output 1325 ml   Net -1265 ml       Orders Placed This Encounter   Procedures   • Diet NPO     Standing Status:   Standing     Number of Occurrences:   1     Order Specific Question:   Type:     Answer:   Now [1]     Order Specific Question:   Restrict to:     Answer:   Strict [1]         TPN for past 72 hours (Show up to 3 orders; newest on the left. Changes between the two most recent orders are indicated.)     Start date and time   01/29/2021 2000 01/28/2021 2000 01/27/2021 2000      TPN Central Line Formulation [[id number]] TPN Central Line Formulation [[id number]] TPN Central Line Formulation [[id number]]    Order Status  Active Active Completed    Last Admin   New Bag at 01/28/2021 2123 by Doran S Cushing, R.N. New Bag at 01/27/2021 1924 by Yaa Albert R.N.       Base    Clinisol 15%  40 g 80 g 40 g    dextrose 70%  380 g 340 g 380 g    fat emulsions 20%  55 g 55 g 55 g       Additives    potassium phosphate  15 mmol 15 mmol 15 mmol    potassium chloride  -- 20 mEq 20 mEq    sodium chloride  110 mEq 120 mEq 225 mEq    magnesium sulfate  4 mEq 8 mEq --    calcium GLUConate  2.3 mEq 4.65 mEq --    M.T.E.-4 Adult  1 mL 1 mL 1 mL    famotidine  20 mg 40 mg 40 mg       QS Base    sterile water  313.99 mL 204 mL 279.18 mL       Energy Contribution    Proteins  160 kcal -- --    Dextrose  1292 kcal -- --    Lipids  550 kcal -- --    Total  2002 kcal -- --       Electrolyte Ion Calculated Amount    Sodium  110 mEq 120 mEq 225 mEq    Potassium  22 mEq 42 mEq 42 mEq    Calcium  2.3 mEq 4.65 mEq --    Magnesium  4.02 mEq 8 mEq --     Aluminum  -- -- --    Phosphate  15 mmol 15 mmol 15 mmol    Chloride  110 mEq 140 mEq 245 mEq    Acetate  33.87 mEq 67.73 mEq 33.87 mEq       Other    Total Protein  40 g 80 g 40 g    Total Protein/kg  0.36 g/kg 0.71 g/kg 0.35 g/kg    Total Amino Acid  -- -- --    Total Amino Acid/kg  -- -- --    Glucose Infusion Rate  2.36 mg/kg/min 2.09 mg/kg/min 2.34 mg/kg/min    Osmolarity (Estimated)  -- -- --    Volume  1,440 mL 1,560 mL 1,440 mL    Rate  60 mL/hr 65 mL/hr 60 mL/hr    Dosing Weight  112 kg 113 kg 113 kg    Infusion [facility]  [facility]            This formula provides:  % kcal as lipids = 27%  Grams protein/kg = 0.5 g/kg  Non-protein calories = 1842 kcal  Kcals/kg = 25 kcal/kg  Total daily calories = 2002 kcal    Comments:  - Diet Progression: Patient continues NPO  - Fluids: TPN @ 60 ml/hr (1440mL/24hrs). Nephrology requested TPN be concentrated d/t ONOFRE and 2+ pitting edema BLE.  - Other: Famotidine included in TPN, dose reduced from 40 mg to 20 mg today d/t CrCl <50 mL/min. CTM   - Appreciate Clinical Dietitian's recommendations     Macronutrients:  - TPN at 100% of goal. Patient is being protein restricted due to worsening of ONOFRE. Protein content reduced from 1 g/kg to 0.5 g/kg today. At current renal function, would not recommend high-protein hypo-caloric feeding, CTM for improvement in renal function  - Dextrose: 1292 kcal  - Blood glucose levels 170-198 mg/dL in previous 24 hrs, CTM              - GIR 2.36 mg/kg/min              - Low SSI with Q6h POC BG tests ordered due to high carbohydrate content of TPN   - Lipids: 550 kcal  - Protein: 160 kcal. Protein g/kg goal restricted due to ONOFRE in this acutely ill patient     Electrolytes:              - Sodium: TPN at 1/2 NS equivalence              - Potassium: Reduced to 22 mEq K today included as KPhos              - Calcium: Reduced to 2.3 mEq Ca today              - Phosphorus: continued with 15 mmol KPhos              - Magnesium: Reduced to 1g  (4 mEq) Mg today     Micronutrients and Vitamins: Trace elements included in TPN. Standard multivitamin not included due to national shortages of IV formulation. May consider addition of oral multivitamin when patient is able to resume oral medications.     Arben Morrison, PharmD

## 2021-01-29 NOTE — PROGRESS NOTES
Nephrology Daily Progress Note    Date of Service  1/29/2021    Chief Complaint  55 y.o. male who presented 1/14/2021 with abdominal pain and emesis, found to have acute pancreatitis with ONOFRE prompting renal consult.     Upon admission, CT abdomen showing inflammatory stranding involving pancreas with fluid within the anterior pararenal space consistent with pancreatitis.  Cr 1.1 on admission, worsened to 3.5 this AM. Oliguric overnight and worsening hyperkalemia despite NS at 75cc/hr. ~200cc UOP after lasix 20mg. Ongoing severe abd pain and worsening abd distention. Worsening shortness or breath and subjective fevers. Being transferred to ICU.     DAILY NEPHROLOGY SUMMARY:  1/16: consult done  1/17: 390cc UOP documented, found to have SBO-NG tube placed, cr stable, abd pain improving, no fluids running   1/18: Hypertensive this AM, improving UOP, cr downtrending   1/19: Transferred out of the ICU, Cr downtrending, main complaint is back pain, NG tube  1/20: off oxygen, NG tube remains, has not passed gas yet, cr down trending   1/21: worsening abd distention and resp status. NG tube repositioned with 6L output, given dose of lasix, cxr with BL infiltrates, slight bump in cr  1/22: started TPN, had an episode of flatus, feel stronger, tachycardic, 6l NG output   1/23: Tolerated liquids this morning.  Complaining of gas pain.  1/24: Weaning off TPN. No longer w/ NGT. C/O diarrhea.   1/25: ongoing diarhea and abdominal swelling, CT scan today  1/26: intubated 1/25 trasnferred to unit  1/27: improved mentation, NG output decreased, BP soft  1/28: extubated 1/27, abdoman still distended, increased U/O  1/29:  Transferred out of ICU, more confused today    Review of Systems  Review of Systems   Unable to perform ROS: Mental acuity   Genitourinary:        Hudson in place        Physical Exam  Temp:  [36.3 °C (97.3 °F)-37.1 °C (98.8 °F)] 36.7 °C (98 °F)  Pulse:  [] 100  Resp:  [16-24] 16  BP: (112-141)/()  113/68  SpO2:  [85 %-98 %] 93 %    Physical Exam  Vitals signs and nursing note reviewed.   Constitutional:       Comments: Obese male   HENT:      Head: Normocephalic.      Mouth/Throat:      Comments: ETT in place  Neck:      Musculoskeletal: Normal range of motion and neck supple.   Cardiovascular:      Rate and Rhythm: Normal rate.   Pulmonary:      Breath sounds: Rales present.   Abdominal:      General: There is distension.   Musculoskeletal: Normal range of motion.         General: Swelling present.   Skin:     Coloration: Skin is not jaundiced.   Neurological:      Comments: confused         Fluids    Intake/Output Summary (Last 24 hours) at 1/29/2021 1117  Last data filed at 1/29/2021 0800  Gross per 24 hour   Intake 60 ml   Output 1905 ml   Net -1845 ml       Laboratory  Recent Labs     01/27/21 0422 01/28/21 0400 01/29/21  0430   WBC 6.7 6.3 5.2   RBC 2.96* 2.85* 3.02*   HEMOGLOBIN 8.8* 8.8* 9.0*   HEMATOCRIT 27.3* 27.8* 29.5*   MCV 92.2 97.5 97.7   MCH 29.7 30.9 29.8   MCHC 32.2* 31.7* 30.5*   RDW 46.5 49.0 49.6   PLATELETCT 293 231 209   MPV 11.7 11.5 12.4     Recent Labs     01/27/21 0422 01/28/21 0400 01/29/21  0430   SODIUM 135 142 143   POTASSIUM 3.9 4.0 4.2   CHLORIDE 99 104 104   CO2 26 30 31   GLUCOSE 158* 198* 177*   BUN 41* 38* 45*   CREATININE 1.73* 1.61* 2.46*   CALCIUM 8.1* 8.2* 8.8         No results for input(s): NTPROBNP in the last 72 hours.  Recent Labs     01/27/21  0422   TRIGLYCERIDE 94       Imaging      Assessment/Plan  No new Assessment & Plan notes have been filed under this hospital service since the last note was generated.  Service: Nephrology     # ONOFRE: Initially oliguric, anuric now ? obstruction  # Pancreatitis  #encephalpathy  # Ileus: NG tube, no role for surgical intervention per surgery  # HX  Non-ischemic cardiomyopathy  # Hypocalcemia   # Crohns   # Anemia       PLAN:  - replace jorgensen  -concentrate TPN  -NG tube

## 2021-01-29 NOTE — PROGRESS NOTES
Bedside report received from Allison RAINEY; assumed care of pt. Assessment completed.  Pt is A&O x4. Pt on room 4 L NC.   Mid abd and lower back pain Pain 9/10 Medicating PRN per MAR  Denies nausea.   - numbness, - tingling.     LDA: Double Lumen PICC infusing 24-hr TPN at 65 mL/hr  20g PIV LFA SL  R nare NG tube to low continuous suction  Rectal tube in place     +void urinal  Strict NPO  Pt max assist to chair, fatigues easily. Tolerates well.   Call light within reach. All needs met at this time. Fall Precautions and hourly rounding in place.

## 2021-01-29 NOTE — PROGRESS NOTES
[facility] Medicine Daily Progress Note    Date of Service  1/29/2021    Chief Complaint  Abdominal pain    Hospital Course  55 y.o. male with a history of EtOH use, prior PE, CAD, HTN, DLD admitted 1/14/2021 with severe pancreatitis, and developed acute kidney failure and hyperkalemia.  The patient had a cholecystectomy 7/2020.  He now has associated ileus. He was transferred back to the ICU for worsening respiratory failure hypercapnic, intubated and then extubated on 1/27. Has developed a severe pancreatitis associated ileus on TPN. He has worsening renal failure and worsening mental status    Interval Problem Update  Renal failure-jorgensen removed. Unclear if obstruction present. Cr nael to 2.5 and patient remains oliguric and slightly more confused today. Discussed with Dr Gimenez of renal.    Pancreatitis-pain is quite severe again, diffuse, Very distended abdomen.    Ileus-pronounced, rectal tube in place. No nausea or vomiting. NG tube with 890 mL of output again.     Consultants/Specialty  Intensivist  Gastroenterology  Nephrology  General Surgery     Code Status  Full Code    Disposition  TBD, likely in the hospital for many more weeks    Review of Systems  Review of Systems   Constitutional: Positive for malaise/fatigue. Negative for fever.        More fatigued today   HENT: Negative for hearing loss.    Eyes: Negative for blurred vision.   Respiratory: Negative for shortness of breath.    Cardiovascular: Negative for chest pain.   Gastrointestinal: Positive for abdominal pain and diarrhea. Negative for constipation, nausea and vomiting.   Genitourinary: Negative for dysuria and hematuria.   Musculoskeletal: Positive for back pain.   Neurological: Positive for weakness. Negative for dizziness, focal weakness and headaches.   Psychiatric/Behavioral: The patient is not nervous/anxious.    All other systems reviewed and are negative.       Physical Exam  Temp:  [36.7 °C (98 °F)-37.1 °C (98.8 °F)] 36.8 °C (98.2  °F)  Pulse:  [] 100  Resp:  [16-20] 16  BP: (106-141)/() 106/62  SpO2:  [92 %-96 %] 92 %    Physical Exam  Vitals signs reviewed.   Constitutional:       General: He is not in acute distress.     Appearance: Normal appearance. He is obese. He is not ill-appearing.      Comments: Somewhat lethargic   HENT:      Head: Normocephalic and atraumatic.      Nose: Nose normal.      Comments: NC in place  NG tube in place  Eyes:      General:         Right eye: No discharge.         Left eye: No discharge.      Conjunctiva/sclera: Conjunctivae normal.   Neck:      Musculoskeletal: Normal range of motion. No neck rigidity.   Cardiovascular:      Rate and Rhythm: Normal rate and regular rhythm.      Pulses: Normal pulses.           Radial pulses are 2+ on the right side and 2+ on the left side.        Dorsalis pedis pulses are 2+ on the right side and 2+ on the left side.      Heart sounds: Normal heart sounds. No murmur.   Pulmonary:      Effort: Pulmonary effort is normal. No respiratory distress.      Breath sounds: Examination of the right-lower field reveals decreased breath sounds. Examination of the left-lower field reveals decreased breath sounds. Decreased breath sounds and wheezing present.      Comments: Mild expiratory  Abdominal:      General: Bowel sounds are absent. There is distension (moderate to severe).      Tenderness: There is abdominal tenderness (diffuse). There is no guarding or rebound.      Comments: Engorged mariano-umbilical veins  No clear bowel sounds  Rectal tube in place, liquid stool, no blood appreciated   Musculoskeletal:      Right lower leg: Edema present.      Left lower leg: Edema present.      Comments: 3+ pitting edema B/L LE's, warm peripherally   Skin:     Coloration: Skin is not jaundiced or pale.   Neurological:      General: No focal deficit present.      Mental Status: He is alert. Mental status is at baseline.      Cranial Nerves: No cranial nerve deficit.      Comments:  A&OX2, much more lethargic   Psychiatric:         Mood and Affect: Mood is anxious.         Fluids    Intake/Output Summary (Last 24 hours) at 1/29/2021 1504  Last data filed at 1/29/2021 1325  Gross per 24 hour   Intake 60 ml   Output 1325 ml   Net -1265 ml       Laboratory  Recent Labs     01/27/21  0422 01/28/21  0400 01/29/21  0430   WBC 6.7 6.3 5.2   RBC 2.96* 2.85* 3.02*   HEMOGLOBIN 8.8* 8.8* 9.0*   HEMATOCRIT 27.3* 27.8* 29.5*   MCV 92.2 97.5 97.7   MCH 29.7 30.9 29.8   MCHC 32.2* 31.7* 30.5*   RDW 46.5 49.0 49.6   PLATELETCT 293 231 209   MPV 11.7 11.5 12.4     Recent Labs     01/27/21  0422 01/28/21  0400 01/29/21  0430   SODIUM 135 142 143   POTASSIUM 3.9 4.0 4.2   CHLORIDE 99 104 104   CO2 26 30 31   GLUCOSE 158* 198* 177*   BUN 41* 38* 45*   CREATININE 1.73* 1.61* 2.46*   CALCIUM 8.1* 8.2* 8.8             Recent Labs     01/27/21  0422   TRIGLYCERIDE 94       Imaging  No new imaging in the last 24 hours     Assessment/Plan  * Acute respiratory failure with hypoxia and hypercapnia (HCC)- (present on admission)  Assessment & Plan  Likely secondary to atelectasis and OHS   No history of COPD or smoking.   No significant abnormality of x-ray  S/P Intubation 1/25 - 1/27  RT / IS   -need to start mobilizing, cannot do diuretics at this time    Acute pancreatitis- (present on admission)  Assessment & Plan  Etiology likely ETOH use vs. Medications   S/P MRCP on 1/15 without acute etiology  Pain management  NPO   Mobilize when able  -very severe, Erica criteria rising with worsening cr  -high risk for further decompensation  -monitor closely  -TPN  -judicious use of narcotics    SBO (small bowel obstruction) (HCC)- (present on admission)  Assessment & Plan  -ileus at this time, but high risk for progression  -monitor closely    Ileus (HCC)- (present on admission)  Assessment & Plan  Ileus vs SBO - etiology likely due to acute pancreatitis vs. ETOH use vs. Medication induced, all combined  Nasal gastric tube to  wall suction, still copius amounts  Hold oral meds that may not be absorbed  Correct electrolytes  Wean narcotics as able, cant at this time, may need to switch to IV fentanyl if renal function worsens  TPN  -no clear improvement, consider repeat abdominal imaging in 2-3 days, may need to reconsult surgery down the road  -PT/OT ordered    Hyperglycemia- (present on admission)  Assessment & Plan  HgbA1c:5.5  Insulin sliding scale started  CTM  -sugars are doing ok  -insulin added to TPN    Acute kidney injury (HCC)- (present on admission)  Assessment & Plan  -worsening now, altered mental status, rising Cr, jorgensen removed on 1/28  -replace jorgensen today  -hold all diuretics and nephrotoxic medications  -monitor UOP closely   Nephrology consulting  Monitor I/O's and BMP.  -try to avoid iHD, no acute need for RRT at this time    Essential hypertension- (present on admission)  Assessment & Plan  On coreg  IV PRN per parameters  Holding Amlodipine, spironolactone, ACE, and ARB   CTM    Hypomagnesemia- (present on admission)  Assessment & Plan  Monitor and replete as need    Hyponatremia- (present on admission)  Assessment & Plan  CTM, resolved      Hypocalcemia- (present on admission)  Assessment & Plan  Supplement and monitor, adjust for low albumin    Hypertriglyceridemia- (present on admission)  Assessment & Plan  Triglycerides 571, considered moderate, >500 risks increase somewhat for inducing pancreatitis      Chronic back pain- (present on admission)  Assessment & Plan  Continue lidocaine patch.   Analgesic: Dilaudid    Nonischemic cardiomyopathy (HCC)- (present on admission)  Assessment & Plan  -hypervolemic, diuretics precluded due to renal failure, had recovery of his EF    Restless leg syndrome- (present on admission)  Assessment & Plan  -hold requip at this time    Obesity (BMI 30-39.9)- (present on admission)  Assessment & Plan  Alcohol cessation and weight loss counseling at discharge  Body mass index is 36.37  kg/m².    Crohn disease (HCC)- (present on admission)  Assessment & Plan  Reports it has been in remission for years.   GI: Dr. Colon  No acute issue at this time.       VTE prophylaxis: Heparin 5000 TID

## 2021-01-29 NOTE — ASSESSMENT & PLAN NOTE
The patient has a history of non-ischemic cardiomyopathy (worst EF was 30%). This may have been from alcohol use. However, the patient has since been on Entresto, Spironolactone, Lasix, and Coreg. Since thn, his EF has actually improved back to normal (last echo 2018 EF 65%).     Plan:  - Stopped home medications at this time due to hypotension.   - Will continue when clinically appropriate.

## 2021-01-29 NOTE — PROGRESS NOTES
Received report from ICU-RN  Pt arrived to T423 bed 1  No immediate distress.  A&Ox4  C/O pain, medicated per MAR  Denies n/v  5LO2 via oxymask  Oriented to room, white board, TV, call light, call before fall, plan of care, oral care expectations, and ambulation goals  Call light and personal belongings within reach.  Fall precautions and hourly rounding in place  Wife at bedside    2 RN skin check completed    Left PIV  Right Double picc with TPN dressing dated 1/21/21  RLE 2+pitting edema  LLE generalized edema  Sacrum intact- mepilex in place over a lidocaine patch  Left shin scab  NG in place - arrived with juwan valve connected white port into blue port.  Blue port folded in half and taped.  Now on LCS

## 2021-01-30 PROBLEM — T82.898A: Status: ACTIVE | Noted: 2021-01-01

## 2021-01-30 PROBLEM — I95.9 HYPOTENSION: Status: ACTIVE | Noted: 2021-01-01

## 2021-01-30 PROBLEM — A41.9 SEPSIS (HCC): Status: ACTIVE | Noted: 2021-01-01

## 2021-01-30 PROBLEM — M79.A3 NON-TRAUMATIC COMPARTMENT SYNDROME OF ABDOMEN: Status: ACTIVE | Noted: 2021-01-01

## 2021-01-30 NOTE — PROGRESS NOTES
Interim TPN Note    PICC infiltrated 1/30 AM. Unable to replace due to ONOFRE and potential vascular cath placement soon. Treatment team determined replacement IVF to be NS ; BG ~2hours post TPN stop resulted at 131.  Follow for hypoglycemic s/sx over the next few hours.    Will follow for safe TPN resumption.    Cal Otero, TristinD, BCPS

## 2021-01-30 NOTE — PROGRESS NOTES
Hospital Medicine Daily Progress Note    Date of Service  1/30/2021    Chief Complaint  Abdominal pain    Hospital Course  55 y.o. male with a history of EtOH use, prior PE, CAD, HTN, DLD admitted 1/14/2021 with severe pancreatitis, and developed acute kidney failure and hyperkalemia.  The patient had a cholecystectomy 7/2020.  He now has associated ileus. He was transferred back to the ICU for worsening respiratory failure hypercapnic, intubated and then extubated on 1/27. Has developed a severe pancreatitis associated ileus on TPN. He has worsening renal failure and worsening mental status    Interval Problem Update  Renal failure-jorgensen replaced, only 275 mL of urine in the last 24 hours. Cr significantly worse today, electrolytes are ok. Possible uremic now, worsening mental status.    ?sepsis-dropping BP, fever of 101.5 and tachycardia. Moved to tele. Sepsis bundle ordered. Pan cultured down. Denies any new symptoms.      Pancreatitis-pain Is diffuse, changed pain medications as outlined below.     Ileus-pronounced, rectal tube in place. No clear nausea, NG tube output is 800 in the last day. No rectal tube output noted.     Infiltrated PICC line-RUE, PICC line has been removed. TPN has been stopped. PICC team will not place PICC line at this time given impaired kidney function and possible need for vascular catheter.     Consultants/Specialty  Intensivist  Gastroenterology  Nephrology  General Surgery     Code Status  Full Code    Disposition  Transfer to tele, low threshold to move to the ICU    Review of Systems  Review of Systems   Constitutional: Positive for malaise/fatigue. Negative for fever.        Much more fatigued and confused today   HENT: Negative for hearing loss.    Eyes: Negative for blurred vision.   Respiratory: Negative for shortness of breath.    Cardiovascular: Negative for palpitations.   Gastrointestinal: Positive for abdominal pain. Negative for constipation, diarrhea, nausea and  vomiting.   Genitourinary: Negative for dysuria and hematuria.   Musculoskeletal: Positive for back pain.   Neurological: Positive for weakness. Negative for dizziness, focal weakness and headaches.   Psychiatric/Behavioral: The patient is not nervous/anxious.    All other systems reviewed and are negative.       Physical Exam  Temp:  [36.6 °C (97.8 °F)-38.4 °C (101.1 °F)] 37.8 °C (100 °F)  Pulse:  [] 112  Resp:  [18-22] 18  BP: ()/(52-70) 92/58  SpO2:  [90 %-96 %] 94 %    Physical Exam  Vitals signs reviewed.   Constitutional:       General: He is not in acute distress.     Appearance: Normal appearance. He is obese. He is not ill-appearing.      Comments: Very lethargic and confused   HENT:      Head: Normocephalic and atraumatic.      Nose: Nose normal.      Comments: NC in place  NG tube in place  Eyes:      General:         Right eye: No discharge.         Left eye: No discharge.      Conjunctiva/sclera: Conjunctivae normal.   Neck:      Musculoskeletal: Normal range of motion. No neck rigidity.   Cardiovascular:      Rate and Rhythm: Normal rate and regular rhythm.      Pulses: Normal pulses.           Radial pulses are 2+ on the right side and 2+ on the left side.        Dorsalis pedis pulses are 2+ on the right side and 2+ on the left side.      Heart sounds: Normal heart sounds. No murmur.   Pulmonary:      Effort: Pulmonary effort is normal. No respiratory distress.      Breath sounds: Examination of the right-lower field reveals decreased breath sounds. Examination of the left-lower field reveals decreased breath sounds. Decreased breath sounds and wheezing present.      Comments: Slightly less audible today  Abdominal:      General: Bowel sounds are absent. There is distension (moderate to severe).      Tenderness: There is abdominal tenderness (diffuse). There is no guarding or rebound.      Comments: Engorged mariano-umbilical veins  No clear bowel sounds  No clear change on abdominal exam  today   Musculoskeletal:      Right lower leg: Edema present.      Left lower leg: Edema present.      Comments: 3+ pitting edema B/L LE's, warm peripherally  RUE PICC removed, area of infiltration appreciated   Skin:     Coloration: Skin is not jaundiced or pale.   Neurological:      General: No focal deficit present.      Mental Status: He is alert. Mental status is at baseline.      Cranial Nerves: No cranial nerve deficit.      Comments: A&OX2, very lethargic, slow to answer questions   Psychiatric:         Mood and Affect: Mood is anxious.         Fluids    Intake/Output Summary (Last 24 hours) at 1/30/2021 1244  Last data filed at 1/30/2021 0818  Gross per 24 hour   Intake 0 ml   Output 1250 ml   Net -1250 ml       Laboratory  Recent Labs     01/28/21 0400 01/29/21  0430 01/30/21  0130   WBC 6.3 5.2 5.6   RBC 2.85* 3.02* 3.00*   HEMOGLOBIN 8.8* 9.0* 9.0*   HEMATOCRIT 27.8* 29.5* 29.2*   MCV 97.5 97.7 97.3   MCH 30.9 29.8 30.0   MCHC 31.7* 30.5* 30.8*   RDW 49.0 49.6 50.4*   PLATELETCT 231 209 172   MPV 11.5 12.4 12.8     Recent Labs     01/28/21 0400 01/29/21  0430 01/30/21  0130   SODIUM 142 143 146*   POTASSIUM 4.0 4.2 4.4   CHLORIDE 104 104 106   CO2 30 31 31   GLUCOSE 198* 177* 185*   BUN 38* 45* 62*   CREATININE 1.61* 2.46* 3.85*   CALCIUM 8.2* 8.8 8.7                   Imaging  CXR as noted above    Assessment/Plan  * Acute respiratory failure with hypoxia and hypercapnia (HCC)- (present on admission)  Assessment & Plan  Likely secondary to atelectasis and OHS   No history of COPD or smoking.   No significant abnormality of x-ray  S/P Intubation 1/25 - 1/27  RT / IS   -O2 saturations a bit worse this AM, ABG reviewed by me, pH is ok, PCO2 around 57, need to monitor closely  -if mental status worsens, STAT ABG, has a h/o hypercapnic RF and low threshold to move to the ICU  -CXR personally reviewed by me shows mild blunting of the B/L CP angles and some atelectasis, but otherwise clear    Acute  "pancreatitis- (present on admission)  Assessment & Plan  Etiology likely ETOH use vs. Medications   S/P MRCP on 1/15 without acute etiology  Pain management  NPO   Mobilize when able  -very severe, [place] criteria rising with worsening cr, worsening  -high risk for further decompensation  -monitor closely  -holding TPN given infiltrated PICC line  -judicious use of narcotics    SBO (small bowel obstruction) (HCC)- (present on admission)  Assessment & Plan  -ileus at this time, but high risk for progression, no change in hypoactive bowel sounds  -discontinue TPN for now  -monitor closely    Ileus (HCC)- (present on admission)  Assessment & Plan  Ileus vs SBO - etiology likely due to acute pancreatitis vs. ETOH use vs. Medication induced, all combined  Nasal gastric tube to wall suction, still copius amounts  Hold oral meds that may not be absorbed  Correct electrolytes  -change narcotics to IV fentanyl PRN given worsening kidney function and worsening mental status  -cant mobilize well currently given how sick he is  -Hold TPN for now  -NG tube still with 800 mL of output in the last 24 hours    Hyperglycemia- (present on admission)  Assessment & Plan  HgbA1c:5.5  Insulin sliding scale started  CTM  -sugars are doing ok  -no TPN for now    Acute kidney injury (HCC)- (present on admission)  Assessment & Plan  -continues to worsen now, jorgensen catheter back in place, oliguric, worsening mental status, lytes are ok  -need to watch very closely  -Urine lytes pending, Complement levels pending  -IVF\"s with 100 mL HOUR with NS  -hold all diuretics and nephrotoxic medications  -monitor UOP closely   Nephrology consulting  -likely will need IHD in 24-48 hours    Essential hypertension- (present on admission)  Assessment & Plan  -hypotensive now, hold coreg  Holding Amlodipine, spironolactone, ACE, and ARB   CTM  -will add midodrine 10 mg TID, while NG tube is clamped    Hypotension  Assessment & Plan  -hold all outpatient BP " meds  -start midodrine 10 mg TID  -albumin infusions with 12.5 grams BID    PICC line infiltration (HCC)  Assessment & Plan  -RUE, removed, need to hold TPN  -hold TPN for now  -defer central line since he likely will need vascular catheter for iHD  -once central line may need to place CVC or reconsult PICC team for line if kidneys are better    Non-traumatic compartment syndrome of abdomen- (present on admission)  Assessment & Plan  -waxes and wanes during his prolonged hospital course  -2/2 profound pancreatitis and gross intra-abdominal inflammation  -Intra-bladder pressure today was 16 mm Hg, monitor periodically with jorgensen  -likely needs iHD soon to help get some fluid off  -general surgery has been consulted in the past, if worsens, might need open abdomen, though would likely do poorly with that    Sepsis (HCC)  Assessment & Plan  -This is Sepsis Not present on admission  SIRS criteria identified on my evaluation include: Fever, with temperature greater than 101 deg F and Tachycardia, with heart rate greater than 90 BPM  Source is ?intra-abdominal versus skin versus lungs  Sepsis protocol initiated, but limited fluids given volume issues  Fluid resuscitation ordered per protocol  IV antibiotics as appropriate for source of sepsis  While organ dysfunction may be noted elsewhere in this problem list or in the chart, degree of organ dysfunction does not meet CMS criteria for severe sepsis  -hard to determine source, pancreatitis itself can cause SIRS like picture, but this is the first time the patient has had a fever in his 16 day hospital stay, which puts him at high risk for true infection  -empiric IV zosyn, IV doxycycline (avoid vanco given impaired kidneys), consider micafungin (given TPN, was on for 4 days)  -RUE PICC line infiltrated, has been removed  -NS fluids at discretion of renal  -Pan culture, F/U all cultures and trend          Hypomagnesemia- (present on admission)  Assessment & Plan  Monitor  and replete as need    Hyponatremia- (present on admission)  Assessment & Plan  CTM, resolved  -now developing mild hypernatremia, need to watch closely given IVF's with NS initiated, could worsen mental status      Hypocalcemia- (present on admission)  Assessment & Plan  Supplement and monitor, adjust for low albumin    Hypertriglyceridemia- (present on admission)  Assessment & Plan  Triglycerides 571, considered moderate, >500 risks increase somewhat for inducing pancreatitis      Chronic back pain- (present on admission)  Assessment & Plan  Continue lidocaine patch.   Switch to IV fentanyl PRN    Nonischemic cardiomyopathy (HCC)- (present on admission)  Assessment & Plan  -hypervolemic, diuretics precluded due to renal failure, had recovery of his EF  -now given hypotension, reinitiated fluids  -hold outpatient coreg, entresto, and lasix for worsening kidney function and progressive hypotension    Restless leg syndrome- (present on admission)  Assessment & Plan  -continue requip    Obesity (BMI 30-39.9)- (present on admission)  Assessment & Plan  Alcohol cessation and weight loss counseling at discharge  Body mass index is 36.37 kg/m².    Crohn disease (HCC)- (present on admission)  Assessment & Plan  Reports it has been in remission for years.   GI: Dr. Colon  No acute issue at this time.       VTE prophylaxis: Heparin 5000 TID

## 2021-01-30 NOTE — PROGRESS NOTES
Patient transferred to  825. Report given to BRIGID Rodriguez. All questions and concerns answered at this time.

## 2021-01-30 NOTE — ASSESSMENT & PLAN NOTE
-waxes and wanes during his prolonged hospital course  -2/2 profound pancreatitis and gross intra-abdominal inflammation  -Intra-bladder pressure today was 16 mm Hg, monitor periodically with jorgensen  -likely needs iHD soon to help get some fluid off  -general surgery has been consulted in the past, if worsens, might need open abdomen, though would likely do poorly with that

## 2021-01-30 NOTE — ASSESSMENT & PLAN NOTE
-RUE, removed, need to hold TPN  -hold TPN for now  -defer central line since he likely will need vascular catheter for iHD  -once central line may need to place CVC or reconsult PICC team for line if kidneys are better

## 2021-01-30 NOTE — PROGRESS NOTES
Pharmacist Cal Otero updated that pt's TPN needed to be stopped due to PICC line infiltration, Per pharmacist, start pt on dextrose 5 % and 0.45 % NaCl with KCl 20 mEq @ 60 ml/hr, until PICC line is reestablished and then restart TPN @ 60 ml/hr.

## 2021-01-30 NOTE — ASSESSMENT & PLAN NOTE
On 1/30 the patient spiked a fever of 101.1 and became hypotensive and tachycardic (2/4 SIRS criteria). Cultures from 1/30 grew MSSA (unclear source, possibly line related?). The patient's PICC was removed on 1/30 due to infiltration. The patient is no longer septic. TTE did not show any vegetations.    Plan:  - ID consulted and recommended switching to Dapto with end date 2/16.   - Blood cultures ordered and should be repeated every 48 hours until negative. Currently Bcx from 2/1 are negative.  - Continue to monitor daily for back pain, joint pain, nuchal rigidity, and mental status changes.

## 2021-01-30 NOTE — PROGRESS NOTES
Nephrology Daily Progress Note    Date of Service  1/30/2021    Chief Complaint  55 y.o. male who presented 1/14/2021 with abdominal pain and emesis, found to have acute pancreatitis with ONOFRE prompting renal consult.     Upon admission, CT abdomen showing inflammatory stranding involving pancreas with fluid within the anterior pararenal space consistent with pancreatitis.  Cr 1.1 on admission, worsened to 3.5 this AM. Oliguric overnight and worsening hyperkalemia despite NS at 75cc/hr. ~200cc UOP after lasix 20mg. Ongoing severe abd pain and worsening abd distention. Worsening shortness or breath and subjective fevers. Being transferred to ICU.     DAILY NEPHROLOGY SUMMARY:  1/16: consult done  1/17: 390cc UOP documented, found to have SBO-NG tube placed, cr stable, abd pain improving, no fluids running   1/18: Hypertensive this AM, improving UOP, cr downtrending   1/19: Transferred out of the ICU, Cr downtrending, main complaint is back pain, NG tube  1/20: off oxygen, NG tube remains, has not passed gas yet, cr down trending   1/21: worsening abd distention and resp status. NG tube repositioned with 6L output, given dose of lasix, cxr with BL infiltrates, slight bump in cr  1/22: started TPN, had an episode of flatus, feel stronger, tachycardic, 6l NG output   1/23: Tolerated liquids this morning.  Complaining of gas pain.  1/24: Weaning off TPN. No longer w/ NGT. C/O diarrhea.   1/25: ongoing diarhea and abdominal swelling, CT scan today  1/26: intubated 1/25 trasnferred to unit  1/27: improved mentation, NG output decreased, BP soft  1/28: extubated 1/27, abdoman still distended, increased U/O  1/29:  Transferred out of ICU, more confused today  1/30: trasnferred up to T8, worsening renal function    Review of Systems  Review of Systems   Unable to perform ROS: Mental acuity   Genitourinary:        Hudson in place        Physical Exam  Temp:  [36.6 °C (97.8 °F)-38.4 °C (101.1 °F)] 37.2 °C (99 °F)  Pulse:   [] 110  Resp:  [16-22] 22  BP: ()/(52-70) 92/52  SpO2:  [90 %-96 %] 92 %    Physical Exam  Vitals signs and nursing note reviewed.   Constitutional:       Comments: Obese male   HENT:      Head: Normocephalic.      Mouth/Throat:      Comments: ETT in place  Neck:      Musculoskeletal: Normal range of motion and neck supple.   Cardiovascular:      Rate and Rhythm: Normal rate.   Pulmonary:      Breath sounds: Rales present.   Abdominal:      General: There is distension.   Musculoskeletal: Normal range of motion.         General: Swelling present.   Skin:     Coloration: Skin is not jaundiced.   Neurological:      Comments: confused         Fluids    Intake/Output Summary (Last 24 hours) at 1/30/2021 1202  Last data filed at 1/30/2021 0818  Gross per 24 hour   Intake 0 ml   Output 1400 ml   Net -1400 ml       Laboratory  Recent Labs     01/28/21  0400 01/29/21  0430 01/30/21  0130   WBC 6.3 5.2 5.6   RBC 2.85* 3.02* 3.00*   HEMOGLOBIN 8.8* 9.0* 9.0*   HEMATOCRIT 27.8* 29.5* 29.2*   MCV 97.5 97.7 97.3   MCH 30.9 29.8 30.0   MCHC 31.7* 30.5* 30.8*   RDW 49.0 49.6 50.4*   PLATELETCT 231 209 172   MPV 11.5 12.4 12.8     Recent Labs     01/28/21  0400 01/29/21  0430 01/30/21  0130   SODIUM 142 143 146*   POTASSIUM 4.0 4.2 4.4   CHLORIDE 104 104 106   CO2 30 31 31   GLUCOSE 198* 177* 185*   BUN 38* 45* 62*   CREATININE 1.61* 2.46* 3.85*   CALCIUM 8.2* 8.8 8.7         No results for input(s): NTPROBNP in the last 72 hours.        Imaging      Assessment/Plan  No new Assessment & Plan notes have been filed under this hospital service since the last note was generated.  Service: Nephrology     # ONOFRE: multifactorial, it had been improved, but 2 days ago event changed trajectory (hypotensive episode sustolic less than 100) and now with signficant dysfunction  # Pancreatitis  #encephalpathy intermittant  # Ileus: NG tube, no role for surgical intervention per surgery  # HX  Non-ischemic cardiomyopathy  # Hypocalcemia    # Crohns   # Anemia       PLAN:  - restart NACl  -probable HD in next 24-48 hrs  - TPN  -NG tube

## 2021-01-30 NOTE — PROGRESS NOTES
Report received. Assessment completed.  Pt is slightly confused/lethargic this am asking odd questions but able to reorient, now A&O x4.  Pt on 4LO2 NC.   Medicating for pain PRN per MAR  Denies nausea.  NG tube to suction with high output, changed canister  Rectal tube flushed with stool return  Low urine output-updated MD.  NPO.   Pt refused assistance to get up to chair, PT OT is ordered.  Call light and belongings within reach. All needs met at this time. Fall Precautions and hourly rounding in place.

## 2021-01-30 NOTE — CARE PLAN
Problem: Communication  Goal: The ability to communicate needs accurately and effectively will improve  Outcome: PROGRESSING AS EXPECTED  Note: Patient updated on plan of care      Problem: Safety  Goal: Will remain free from falls  Outcome: PROGRESSING AS EXPECTED  Note: Bed locked and in lowest position, side rails up x2, call light within reach

## 2021-01-30 NOTE — CARE PLAN
Problem: Knowledge Deficit  Goal: Knowledge of disease process/condition, treatment plan, diagnostic tests, and medications will improve  Outcome: PROGRESSING AS EXPECTED     Problem: Bowel/Gastric:  Goal: Normal bowel function is maintained or improved  Outcome: PROGRESSING SLOWER THAN EXPECTED

## 2021-01-30 NOTE — PROGRESS NOTES
Bedside report received.  Assessment complete.  Patient lethargic, opens eyes to voice, but unable to answer orientation questions at this time. Patient mumbling and unable to form coherent answers. Unable to follow commands  Patient up with max assist.    Patient unable to rate pain. Patient does not appear to be in pain at this time  Patient does not appear to be nauseous. Right nare NGT to LCS. Strict NPO  + void via jorgensen, AAMIR flatus, + BM via rectal tube  Patient denies SOB.  SCD's off.    Findings escalated to MD.    Review plan with of care with patient. Call light and personal belongings with in reach. Hourly rounding in place. All needs met at this time.

## 2021-01-30 NOTE — PROGRESS NOTES
Pt arrived to unit via bed as a higher level from [facility]. Tele-monitor placed and monitor room notified. Patient ST on monitor. Notified MD Scott of elevated temperature of 101.1 and increased abdominal pressure of 16 mmhg. Patient currently A & O x 3 (disoriented to event); on 6 L O2 nasal canula; up max assist; without complaints of acute pain. Call light within reach. Whiteboard updated. Fall precautions in place. Bed locked and in lowest position. All questions answered. No other needs indicated at this time.

## 2021-01-31 NOTE — PROGRESS NOTES
Transport here to take Pt to IR via bed with portable Oxygen tank.  NGT clamped for transport.  Pt awake and alert in no apparent distress.  Family member at bedside, plans to remain in room while Pt is off unit.

## 2021-01-31 NOTE — OR SURGEON
Immediate Post- Operative Note        PostOp Diagnosis: renal insufficiency      Procedure(s): NTDC      Estimated Blood Loss: Less than 5 ml        Complications: None            1/31/2021     3:50 PM     Jarod Nath M.D.

## 2021-01-31 NOTE — THERAPY
Missed Therapy     Patient Name: Shaka Riley  Age:  55 y.o., Sex:  male  Medical Record #: [id number]  Today's Date: 1/31/2021    Attempted PT evaluation. Pt currently off the unit for dialysis. Will re-attempt as able/appropriate.

## 2021-01-31 NOTE — WOUND TEAM
Wound consult placed on 1/31 regarding sacrococcygeal area. Chart and images reviewed. This RN in to assess patient in T825/00. Pt currently off unit in IR getting Dialysis catheter placed. Per family pt to then undergo dialysis. Wound team will attempt to follow up with patient tomorrow.

## 2021-01-31 NOTE — PROGRESS NOTES
Assumed care of Pt after bedside report with BRIGID Payne.  Pt lying in bed in no apparent distress, denies discomfort.  Plan for Dialysis Catheter placement today.  Will continue to monitor.

## 2021-01-31 NOTE — PROGRESS NOTES
2 RN skin check complete.   Devices in place NG tube, Oxy mask, Jorgensen cathter, rectal tube.  Skin assessed under devices yes.  New potential pressure ulcers noted on sacrum/coccyx. Wound consult placed yes.  The following interventions in place Barrier paste, q2h turns, pillows used to float heels.     Generalized edema with pitting creases from bed sheets.  Redness on right upper arm from infected PICC.  Sacrum/coccyx purple, red, non blanching with moisture fissure.   Buttocks red and slow to omero BL  IAD in groin  Redness on legs from jorgensen and rectal tubing.

## 2021-01-31 NOTE — PROGRESS NOTES
"Patient underwent a temporary dialysis catheter placement by Dr. Nath, assisted by Jeanmarie RT and Liane RT. Patient, this RN and MD signed consent prior to sedation medication. Patient states he has \"horrible back pain\" and requesting to be pre-medicated before moved onto IR table. MD Nath OK with this, patient given fentanyl, VSS.     Patient was placed in a supine position. MD Nath located correct site with imaging guidance. Vitals were taken every 5 minutes and remained stable during procedure (see doc flow sheet for results). CO2 waveform capnography was monitored and remained 13-29 throughout procedure. Patient appeared to tolerate procedure well, all sedation medication given at discretion of MD Nath. MD Nath sutured surgical incision, then a biopatch/gauze/tegaderm   dressing was placed over right neck surgical site. Report called to Zack RAINEY in dialysis, Emerald RN on floor also given report. Pt transported by bed with this ACLS RN to dialysis. During bedside handoff with Zack RAINEY, patient is awake and talking, surgical site C/D/I    [employer] Slim Cath Short Term Dialysis Catheter 12 Fr x 15cm   Placed in right IJ  Ref# [id number] Lot# [id number] Exp Date 10/31/2021   "

## 2021-01-31 NOTE — PROGRESS NOTES
"Patient reports seeing \"suleman bears coming for me\" and hearing \"scary stuff\". Patient believes hallucinations causes by pain medication fentyl. Patient oriented x4 but agitated at time of complaint. MD notified of patient complaint. No new orders at this time.   "

## 2021-01-31 NOTE — PROGRESS NOTES
Nephrology Daily Progress Note    Date of Service  1/31/2021    Chief Complaint  55 y.o. male who presented 1/14/2021 with abdominal pain and emesis, found to have acute pancreatitis with ONOFRE prompting renal consult.     Upon admission, CT abdomen showing inflammatory stranding involving pancreas with fluid within the anterior pararenal space consistent with pancreatitis.  Cr 1.1 on admission, worsened to 3.5 this AM. Oliguric overnight and worsening hyperkalemia despite NS at 75cc/hr. ~200cc UOP after lasix 20mg. Ongoing severe abd pain and worsening abd distention. Worsening shortness or breath and subjective fevers. Being transferred to ICU.     DAILY NEPHROLOGY SUMMARY:  1/16: consult done  1/17: 390cc UOP documented, found to have SBO-NG tube placed, cr stable, abd pain improving, no fluids running   1/18: Hypertensive this AM, improving UOP, cr downtrending   1/19: Transferred out of the ICU, Cr downtrending, main complaint is back pain, NG tube  1/20: off oxygen, NG tube remains, has not passed gas yet, cr down trending   1/21: worsening abd distention and resp status. NG tube repositioned with 6L output, given dose of lasix, cxr with BL infiltrates, slight bump in cr  1/22: started TPN, had an episode of flatus, feel stronger, tachycardic, 6l NG output   1/23: Tolerated liquids this morning.  Complaining of gas pain.  1/24: Weaning off TPN. No longer w/ NGT. C/O diarrhea.   1/25: ongoing diarhea and abdominal swelling, CT scan today  1/26: intubated 1/25 trasnferred to unit  1/27: improved mentation, NG output decreased, BP soft  1/28: extubated 1/27, abdoman still distended, increased U/O  1/29:  Transferred out of ICU, more confused today  1/30: trasnferred up to T8, worsening renal function  1/31:  Decreased U/O, creat owrse    Review of Systems  Review of Systems   Unable to perform ROS: Mental acuity   Genitourinary:        Hudson in place        Physical Exam  Temp:  [36.1 °C (97 °F)-38.2 °C (100.8 °F)]  36.1 °C (97 °F)  Pulse:  [] 77  Resp:  [16-22] 16  BP: ()/(50-68) 104/58  SpO2:  [92 %-97 %] 95 %    Physical Exam  Vitals signs and nursing note reviewed.   Constitutional:       Comments: Obese male   HENT:      Head: Normocephalic.      Mouth/Throat:      Comments: ETT in place  Neck:      Musculoskeletal: Normal range of motion and neck supple.   Cardiovascular:      Rate and Rhythm: Normal rate.   Pulmonary:      Breath sounds: Rales present.   Abdominal:      General: There is distension.   Musculoskeletal: Normal range of motion.         General: Swelling present.   Skin:     Coloration: Skin is not jaundiced.   Neurological:      Comments: confused         Fluids    Intake/Output Summary (Last 24 hours) at 1/31/2021 0946  Last data filed at 1/31/2021 0919  Gross per 24 hour   Intake --   Output 250 ml   Net -250 ml       Laboratory  Recent Labs     01/29/21  0430 01/30/21  0130 01/31/21  0231   WBC 5.2 5.6 4.8   RBC 3.02* 3.00* 2.84*   HEMOGLOBIN 9.0* 9.0* 8.5*   HEMATOCRIT 29.5* 29.2* 28.1*   MCV 97.7 97.3 98.9*   MCH 29.8 30.0 29.9   MCHC 30.5* 30.8* 30.2*   RDW 49.6 50.4* 52.4*   PLATELETCT 209 172 80*   MPV 12.4 12.8 13.6*     Recent Labs     01/29/21  0430 01/30/21  0130 01/31/21  0231   SODIUM 143 146* 147*   POTASSIUM 4.2 4.4 5.0   CHLORIDE 104 106 109   CO2 31 31 27   GLUCOSE 177* 185* 155*   BUN 45* 62* 88*   CREATININE 2.46* 3.85* 6.30*   CALCIUM 8.8 8.7 8.2*     Recent Labs     01/30/21  1218   INR 1.03     No results for input(s): NTPROBNP in the last 72 hours.        Imaging      Assessment/Plan  No new Assessment & Plan notes have been filed under this hospital service since the last note was generated.  Service: Nephrology     # ONOFRE: multifactorial, it had been improved, but 2 days ago event changed trajectory (hypotensive episode sustolic less than 100) and now with signficant worsening  # Pancreatitis  #encephalpathy intermittant  # Ileus: NG tube, no role for surgical  intervention per surgery  # HX  Non-ischemic cardiomyopathy  # Hypocalcemia   # Crohns   # Anemia  # hyperantremia second to GI losses/ not able to tolerate PO     PLAN:  -NG tube  -HD cath today by IR  -HD today  - IVF

## 2021-02-01 PROBLEM — A41.01 SEPSIS DUE TO METHICILLIN SUSCEPTIBLE STAPHYLOCOCCUS AUREUS (MSSA) WITH ACUTE RENAL FAILURE (HCC): Status: ACTIVE | Noted: 2021-01-01

## 2021-02-01 PROBLEM — I95.9 HYPOTENSION: Status: RESOLVED | Noted: 2021-01-01 | Resolved: 2021-01-01

## 2021-02-01 PROBLEM — N17.9 ACUTE RENAL FAILURE (HCC): Status: ACTIVE | Noted: 2021-01-01

## 2021-02-01 PROBLEM — N17.9 SEPSIS DUE TO METHICILLIN SUSCEPTIBLE STAPHYLOCOCCUS AUREUS (MSSA) WITH ACUTE RENAL FAILURE (HCC): Status: ACTIVE | Noted: 2021-01-01

## 2021-02-01 PROBLEM — E87.1 HYPONATREMIA: Status: RESOLVED | Noted: 2021-01-01 | Resolved: 2021-01-01

## 2021-02-01 PROBLEM — R65.20 SEPSIS DUE TO METHICILLIN SUSCEPTIBLE STAPHYLOCOCCUS AUREUS (MSSA) WITH ACUTE RENAL FAILURE (HCC): Status: ACTIVE | Noted: 2021-01-01

## 2021-02-01 PROBLEM — D69.6 THROMBOCYTOPENIA (HCC): Status: ACTIVE | Noted: 2021-01-01

## 2021-02-01 PROBLEM — T82.898A: Status: RESOLVED | Noted: 2021-01-01 | Resolved: 2021-01-01

## 2021-02-01 PROBLEM — K56.609 SBO (SMALL BOWEL OBSTRUCTION) (HCC): Status: RESOLVED | Noted: 2021-01-01 | Resolved: 2021-01-01

## 2021-02-01 PROBLEM — M79.A3 NON-TRAUMATIC COMPARTMENT SYNDROME OF ABDOMEN: Status: RESOLVED | Noted: 2021-01-01 | Resolved: 2021-01-01

## 2021-02-01 PROBLEM — E83.42 HYPOMAGNESEMIA: Status: RESOLVED | Noted: 2021-01-01 | Resolved: 2021-01-01

## 2021-02-01 PROBLEM — N17.9 ACUTE RENAL FAILURE (HCC): Status: RESOLVED | Noted: 2021-01-01 | Resolved: 2021-01-01

## 2021-02-01 NOTE — PROGRESS NOTES
Hospital Medicine Daily Progress Note  [facility] Red Team  Attending: Dr. Cohen  Senior: Dr. Gao     Date of Service  1/31/2021    Chief Complaint  Abdominal pain    Hospital Course  55 y.o. male with a history of EtOH use, prior PE, CAD, HTN, DLD admitted 1/14/2021 with severe pancreatitis, and developed acute kidney failure and hyperkalemia.  The patient had a cholecystectomy 7/2020.  He now has associated ileus. He was transferred back to the ICU for worsening respiratory failure hypercapnic, intubated and then extubated on 1/27. Has developed a severe pancreatitis associated ileus on TPN. He has worsening renal failure and worsening mental status    Interval Problem Update    Respiratory failure- continue to monitor, low threshold for repeat ABG and ICU transfer if becomes altered with worsening respiratory status     Renal failure-continued to worsen, Cr 6.30/BUN 88, nephrology notified, dialysis catheter placed and first dialysis 1/31/21     sepsis- blood cultures positive Staphylococcus aureus (methicillin sensitive)   detected by PCR- continue on cefazolin     Pancreatitis-pain improved, monitor compartment pressures if continue to increase reconsult general surgery     Ileus-pronounced, rectal tube in place. No clear nausea, NG tube output 650 is in the last day. NPO on iv fluids for now, will need to decide on source of nutrition      Consultants/Specialty  Intensivist  Gastroenterology  Nephrology  General Surgery     Code Status  Full Code    Disposition  Transfer to [facility], low threshold to move to the ICU    Review of Systems  Review of Systems   Constitutional: Positive for malaise/fatigue. Negative for fever.        Much more fatigued and confused today   HENT: Negative for hearing loss.    Eyes: Negative for blurred vision.   Respiratory: Negative for shortness of breath.    Cardiovascular: Negative for palpitations.   Gastrointestinal: Negative for abdominal pain, constipation, diarrhea, nausea  and vomiting.   Genitourinary: Negative for dysuria and hematuria.   Musculoskeletal: Positive for back pain.   Neurological: Positive for weakness. Negative for dizziness, focal weakness and headaches.   Psychiatric/Behavioral: The patient is not nervous/anxious.    All other systems reviewed and are negative.       Physical Exam  Temp:  [35.9 °C (96.6 °F)-37.2 °C (99 °F)] 36.5 °C (97.7 °F)  Pulse:  [] 89  Resp:  [16-22] 18  BP: ()/(47-66) 104/59  SpO2:  [92 %-99 %] 98 %    Physical Exam  Vitals signs reviewed.   Constitutional:       General: He is not in acute distress.     Appearance: Normal appearance. He is obese. He is not ill-appearing.      Comments: Mentation improved, alert    HENT:      Head: Normocephalic and atraumatic.      Nose: Nose normal.      Comments: NC in place  NG tube in place  Eyes:      General:         Right eye: No discharge.         Left eye: No discharge.      Conjunctiva/sclera: Conjunctivae normal.   Neck:      Musculoskeletal: Normal range of motion. No neck rigidity.   Cardiovascular:      Rate and Rhythm: Normal rate and regular rhythm.      Pulses: Normal pulses.           Radial pulses are 2+ on the right side and 2+ on the left side.        Dorsalis pedis pulses are 2+ on the right side and 2+ on the left side.      Heart sounds: Normal heart sounds. No murmur.   Pulmonary:      Effort: Pulmonary effort is normal. No respiratory distress.      Breath sounds: Examination of the right-lower field reveals decreased breath sounds. Examination of the left-lower field reveals decreased breath sounds. Decreased breath sounds present. No wheezing.      Comments: Slightly less audible today  Abdominal:      General: Bowel sounds are absent. There is distension (moderate ).      Tenderness: There is abdominal tenderness (epigastric, mild). There is no guarding or rebound.      Comments: No clear bowel sounds     Musculoskeletal:      Right lower leg: Edema present.      Left  lower leg: Edema present.      Comments: 2+ pitting edema B/L LE's, warm peripherally  RUE PICC removed, area of infiltration appreciated   Skin:     Coloration: Skin is not jaundiced or pale.   Neurological:      General: No focal deficit present.      Mental Status: He is alert. Mental status is at baseline.      Cranial Nerves: No cranial nerve deficit.   Psychiatric:         Mood and Affect: Mood is anxious.         Fluids    Intake/Output Summary (Last 24 hours) at 1/31/2021 2031  Last data filed at 1/31/2021 1910  Gross per 24 hour   Intake 600 ml   Output 1100 ml   Net -500 ml       Laboratory  Recent Labs     01/29/21  0430 01/30/21  0130 01/31/21  0231   WBC 5.2 5.6 4.8   RBC 3.02* 3.00* 2.84*   HEMOGLOBIN 9.0* 9.0* 8.5*   HEMATOCRIT 29.5* 29.2* 28.1*   MCV 97.7 97.3 98.9*   MCH 29.8 30.0 29.9   MCHC 30.5* 30.8* 30.2*   RDW 49.6 50.4* 52.4*   PLATELETCT 209 172 80*   MPV 12.4 12.8 13.6*     Recent Labs     01/29/21  0430 01/30/21  0130 01/31/21  0231   SODIUM 143 146* 147*   POTASSIUM 4.2 4.4 5.0   CHLORIDE 104 106 109   CO2 31 31 27   GLUCOSE 177* 185* 155*   BUN 45* 62* 88*   CREATININE 2.46* 3.85* 6.30*   CALCIUM 8.8 8.7 8.2*     Recent Labs     01/30/21  1218   INR 1.03               Imaging  CXR as noted above    Assessment/Plan  Assessment & plan notes cannot be loaded without a specified hospital service.     Acute respiratory failure with hypoxia and hypercapnia (HCC)- (present on admission)  Assessment & Plan  Likely secondary to atelectasis and OHS   No history of COPD or smoking.   No significant abnormality of x-ray  S/P Intubation 1/25 - 1/27  RT / IS   -CXR shows mild blunting of the B/L CP angles and some atelectasis    Plan  - need to monitor closely   -if mental status worsens, STAT ABG, has a h/o hypercapnic RF and low threshold to move to the ICU    Acute pancreatitis- (present on admission)  Assessment & Plan  Etiology likely ETOH use vs. Medications   S/P MRCP on 1/15 without acute  etiology  Pain management  NPO   Mobilize when able  -very severe, Dyke criteria rising with worsening cr, worsening  -high risk for further decompensation  -monitor closely  -judicious use of narcotics     SBO (small bowel obstruction) (HCC)- (present on admission)  Assessment & Plan  -ileus at this time, but high risk for progression, no change in hypoactive bowel sounds  -discontinue TPN for now  -monitor closely     Ileus (HCC)- (present on admission)  Assessment & Plan  Ileus vs SBO - etiology likely due to acute pancreatitis vs. ETOH use vs. Medication induced, all combined  Nasal gastric tube to wall suction, still copius amounts  Hold oral meds that may not be absorbed  Correct electrolytes  -change narcotics to IV fentanyl PRN given worsening kidney function and worsening mental status  -cant mobilize well currently given how sick he is  -Hold TPN for now  -NG tube still with 800 mL of output in the last 24 hours  -consider feeding once improved      Hyperglycemia- (present on admission)  Assessment & Plan  HgbA1c:5.5  Insulin sliding scale started  CTM  -sugars are doing ok  -no TPN for now     Acute kidney injury (HCC)- (present on admission)  Assessment & Plan  -continues to worsen , jorgensen catheter, oliguric  -Urine lytes pending, Complement levels pending  -IVF 83 ml/hr 1/2 NS/D5  -hold all diuretics and nephrotoxic medications  -monitor UOP closely   Nephrology consulted  Dialysis catheter placed and dialysis started 1/31/21 as patient appears to be in severe renal failure      Essential hypertension- (present on admission)  Assessment & Plan  -hypotensive now, hold coreg  Holding Amlodipine, spironolactone, ACE, and ARB   CTM  - midodrine 10 mg TID, while NG tube is clamped     Hypotension  Assessment & Plan  -hold all outpatient BP meds  - midodrine 10 mg TID  -albumin infusions with 12.5 grams BID     PICC line infiltration (HCC)  Assessment & Plan  -RUE, removed  Stopped TPN  - vascular catheter  for iHD placed 1/31/21    Non-traumatic compartment syndrome of abdomen- (present on admission)  Assessment & Plan  -waxes and wanes during his prolonged hospital course  -2/2 profound pancreatitis and gross intra-abdominal inflammation  -Intra-bladder pressure today was 18 mm Hg, monitor periodically with jorgensen  -started on HD per nephrology 1/31/21  -general surgery has been consulted in the past, if worsens, might need open abdomen, though would likely do poorly with that     Sepsis (HCC)  Assessment & Plan  -This is Sepsis Not present on admission  SIRS criteria identified on my evaluation include: Fever, with temperature greater than 101 deg F and Tachycardia, with heart rate greater than 90 BPM  Source is ?intra-abdominal versus skin versus lungs  Sepsis protocol initiated, but limited fluids given volume issues  Fluid resuscitation ordered per protocol  IV antibiotics as appropriate for source of sepsis  While organ dysfunction may be noted elsewhere in this problem list or in the chart, degree of organ dysfunction does not meet CMS criteria for severe sepsis  2 positive blood cultures for  Staphylococcus aureus (methicillin sensitive)   detected by PCR     Plan:  Switched to Cefazolin on 1/31/21 after initially being on Zosyn and doxycycline      Hypomagnesemia- (present on admission)  Assessment & Plan  Monitor and replete as need     Hyponatremia- (present on admission)  Assessment & Plan  CTM, resolved  -now developing mild hypernatremia, on D5 1/2 NS  -monitor         Hypocalcemia- (present on admission)  Assessment & Plan  Supplement and monitor, adjust for low albumin     Hypertriglyceridemia- (present on admission)  Assessment & Plan  Triglycerides 571, considered moderate, >500 risks increase somewhat for inducing pancreatitis        Chronic back pain- (present on admission)  Assessment & Plan  Continue lidocaine patch.    IV fentanyl PRN     Nonischemic cardiomyopathy (HCC)- (present on  admission)  Assessment & Plan  -hypervolemic, diuretics precluded due to renal failure, had recovery of his EF  -now given hypotension, reinitiated fluids 83 ml/hr D5 1/2 NS  -hold outpatient coreg, entresto, and lasix for worsening kidney function and progressive hypotension     Restless leg syndrome- (present on admission)  Assessment & Plan  -continue requip     Obesity (BMI 30-39.9)- (present on admission)  Assessment & Plan  Alcohol cessation and weight loss counseling at discharge  Body mass index is 36.37 kg/m².     Crohn disease (HCC)- (present on admission)  Assessment & Plan  Reports it has been in remission for years.   GI: Dr. Colon  No acute issue at this time.    VTE prophylaxis: Heparin 5000 BID

## 2021-02-01 NOTE — DIETARY
NUTRITION SERVICES: UPDATE    TPN stopped yesterday due to PICC line infiltration. Transitioned to PO diet today.    Current diet: Clear Liquids, Mildly Thickened- clarified with Food and Nutrition  who confirmed this is equivalent to a Level 3 Liquidized, Level 2 Mildly Thickened diet with clear liquid modifier. Spoke with RN to make necessary adjustment to diet order so it reads correctly through our food system, [employer].     RD following for diet advancement and overall adequacy of nutrition.

## 2021-02-01 NOTE — CARE PLAN
Problem: Communication  Goal: The ability to communicate needs accurately and effectively will improve  Outcome: MET     Problem: Safety  Goal: Will remain free from injury  Outcome: MET  Goal: Will remain free from falls  Outcome: MET     Problem: Infection  Goal: Will remain free from infection  Outcome: MET     Problem: Bowel/Gastric:  Goal: Normal bowel function is maintained or improved  Outcome: MET  Goal: Will not experience complications related to bowel motility  Outcome: MET     Problem: Knowledge Deficit  Goal: Knowledge of disease process/condition, treatment plan, diagnostic tests, and medications will improve  Outcome: MET  Goal: Knowledge of the prescribed therapeutic regimen will improve  Outcome: MET     Problem: Discharge Barriers/Planning  Goal: Patient's continuum of care needs will be met  Outcome: MET     Problem: Pain Management  Goal: Pain level will decrease to patient's comfort goal  Outcome: MET     Problem: Respiratory:  Goal: Respiratory status will improve  Outcome: MET     Problem: Skin Integrity  Goal: Risk for impaired skin integrity will decrease  Outcome: MET     Problem: Fluid Volume:  Goal: Will maintain balanced intake and output  Outcome: MET     Problem: Urinary Elimination:  Goal: Ability to reestablish a normal urinary elimination pattern will improve  Outcome: MET     Problem: Safety - Medical Restraint  Goal: Remains free of injury from restraints (Restraint for Interference with Medical Device)  Description: INTERVENTIONS:  1. Determine that other, less restrictive measures have been tried or would not be effective before applying the restraint  2. Evaluate the patient's condition at the time of restraint application  3. Inform patient/family regarding the reason for restraint  4. Q2H: Monitor safety, psychosocial status, comfort, nutrition and hydration  Outcome: MET  Goal: Free from restraint(s) (Restraint for Interference with Medical Device)  Description:  INTERVENTIONS:  1. ONCE/SHIFT or MINIMUM Q12H: Assess and document the continuing need for restraints  2. Q24H: Continued use of restraint requires LIP to perform face to face examination and written order  3. Identify and implement measures to help patient regain control  Outcome: MET     Problem: Psychosocial Needs:  Goal: Level of anxiety will decrease  Outcome: MET     Problem: Mobility  Goal: Risk for activity intolerance will decrease  Outcome: MET

## 2021-02-01 NOTE — WOUND TEAM
[facility]  Inpatient Services  Initial Wound and Skin Care Evaluation    Admission Date: 1/14/2021     Last order of IP CONSULT TO WOUND CARE was found on 1/31/2021 from Hospital Encounter on 1/14/2021     HPI, PMH, SH: Reviewed    Past Surgical History:   Procedure Laterality Date   • CRYSTAL BY LAPAROSCOPY  7/29/2020    Procedure: CHOLECYSTECTOMY, LAPAROSCOPIC;  Surgeon: Caroline Skelton M.D.;  Location: SURGERY [facility];  Service: General     Social History     Tobacco Use   • Smoking status: Never Smoker   • Smokeless tobacco: Never Used   Substance Use Topics   • Alcohol use: Yes     Alcohol/week: 1.2 oz     Types: 2 Glasses of wine per week     Comment: Quit in 1994 - previous six beer after work, 2-3 beers per day currently     Chief Complaint   Patient presents with   • Abdominal Pain     Starting at noon today. Pt states it feels very similar to when he had gallbladder issues and gallbladder was removed 3 months ago here at [facility].      Diagnosis: Acute pancreatitis with uninfected necrosis [K85.91]    Unit where seen by Wound Team: T825/00     WOUND CONSULT/FOLLOW UP RELATED TO:  Coccyx/sacrum      WOUND HISTORY:  Complicated admit with multiple transfers between floors.  Admitted with ABD pain.  On ICU bed.  Has been refusing a waffle overlay.  [id number] in place.        GSU 01/15 - 01/16  CIC 01/17 - 01/18  PPU 01/18 - 01/25  RICU 01/25 - 01/28  GSU 01/28 - 01/30  T8 01/30 - current   Consult 01/31    WOUND ASSESSMENT/LDA     Wound 01/31/21 Pressure Injury Coccyx sDTI 01/31  (Active)   Wound Image    02/01/21 1300   Site Assessment Red;Purple    Periwound Assessment Purple    Margins Unattached edges    Closure None    Drainage Amount Scant    Drainage Description Sanguineous    Treatments Cleansed;Offloading;Site care    Wound Cleansing Foam Cleanser/Washcloth    Periwound Protectant Barrier Paste    Dressing Options Mepilex    Dressing Changed New    Dressing Status Clean;Dry;Intact     Dressing Change/Treatment Frequency Every 72 hrs, and As Needed    NEXT Dressing Change/Treatment Date 02/04/21    NEXT Weekly Photo (Inpatient Only) 02/08/21    Pressure Injury Stage DTPI    Wound Length (cm) 5.5 cm    Wound Width (cm) 2 cm    Wound Surface Area (cm^2) 11 cm^2    WOUND NURSE ONLY - Time Spent with Patient (mins) 60      Vascular:    LISA:   No results found.    Lab Values:    Lab Results   Component Value Date/Time    WBC 4.8 01/31/2021 02:31 AM    RBC 2.84 (L) 01/31/2021 02:31 AM    HEMOGLOBIN 8.5 (L) 01/31/2021 02:31 AM    HEMATOCRIT 28.1 (L) 01/31/2021 02:31 AM    HBA1C 5.5 01/15/2021 12:59 PM        Culture Results show:  No results found for this or any previous visit (from the past 720 hour(s)).    Pain Level/Medicated:  Complaints of pain to back due to herniated disc.  Tolerated.        INTERVENTIONS BY WOUND TEAM:  Chart and images reviewed. Discussed with bedside RN. This RN in to assess patient. Performed standard wound care which includes appropriate positioning, dressing removal and non-selective debridement.   Preparation for Dressing removal: NA  Cleansed with:  foam cleanser and wash cloth.    Sharp debridement: MAGNOLIA  Mary wound: Cleansed with foam cleanser.   Primary Dressing: sacral mepilex   Secondary (Outer) Dressing: same.  Pillows placed to right side.  Heels floated with pillows.      Interdisciplinary consultation: Patient, Bedside RN (Meghan)     EVALUATION / RATIONALE FOR TREATMENT:  Most Recent Date:   02/01/21: patient with dark sDTI to coccyx area.  Likely related to moisture friction an pressure related as well as multi system organ involvement.      Goals: Steady decrease in wound area and depth weekly.    WOUND TEAM PLAN OF CARE ([X] for frequency of wound follow up,):   Nursing to follow orders written for wound care. Contact wound team if area fails to progress, deteriorates or with any questions/concerns  Dressing changes by wound team:                   Follow up 3  times weekly:                NPWT change 3 times weekly:     Follow up 1-2 times weekly:    X weekly   Follow up Bi-Monthly:                   Follow up as needed:     Other (explain):     NURSING PLAN OF CARE ORDERS (X):  Dressing changes: See Dressing Care orders: X  Skin care: See Skin Care orders:   RN Prevention Protocol: X  Rectal tube care: See Rectal Tube Care orders:   Other orders:    RSKIN:   CURRENTLY IN PLACE (X), APPLIED THIS VISIT (A), ORDERED (O):   Q shift Khadar:  X  Q shift pressure point assessments:  X    Surface/Positioning   Pressure redistribution mattress            Low Airloss        ICU bed   Bariatric foam      Bariatric LACI     Waffle cushion        Waffle Overlay          Reposition q 2 hours    X  TAPs Turning system   O  Z Ghulam Pillow     Offloading/Redistribution   Sacral Mepilex (Silicone dressing)   O  Heel Mepilex (Silicone dressing)       X  Heel float boots (Prevalon boot)             Float Heels off Bed with Pillows       X    Respiratory   Silicone O2 tubing       X  Gray Foam Ear protectors     Cannula fixation Device (Tender )          High flow offloading Clip    Elastic head band offloading device      Anchorfast                                                         Trach with Optifoam split foam             Containment/Moisture Prevention     Rectal tube or BMS  X  Purwick/Condom Cath        Hudson Catheter  X  Barrier wipes           Barrier paste     If unable to keep patient dry.   Antifungal tx      Interdry        Mobilization per PT/OT      Up to chair        Ambulate      PT/OT      Nutrition       Dietician      X per rec  Diabetes Education      PO     TF   X  TPN     NPO   # days     Other        Anticipated discharge plans: will likely need ongoing wound care post DC.    LTACH:        SNF/Rehab:                X  Home Health Care:           Outpatient Wound Center:            Self/Family Care:        Other:

## 2021-02-01 NOTE — PROGRESS NOTES
Nephrology Daily Progress Note    Date of Service  2/1/2021    Chief Complaint  55 y.o. male who presented 1/14/2021 with abdominal pain and emesis, found to have acute pancreatitis with ONOFRE prompting renal consult.     Upon admission, CT abdomen showing inflammatory stranding involving pancreas with fluid within the anterior pararenal space consistent with pancreatitis.  Cr 1.1 on admission, worsened to 3.5 this AM. Oliguric overnight and worsening hyperkalemia despite NS at 75cc/hr. ~200cc UOP after lasix 20mg. Ongoing severe abd pain and worsening abd distention. Worsening shortness or breath and subjective fevers. Being transferred to ICU.     DAILY NEPHROLOGY SUMMARY:  1/16: consult done  1/17: 390cc UOP documented, found to have SBO-NG tube placed, cr stable, abd pain improving, no fluids running   1/18: Hypertensive this AM, improving UOP, cr downtrending   1/19: Transferred out of the ICU, Cr downtrending, main complaint is back pain, NG tube  1/20: off oxygen, NG tube remains, has not passed gas yet, cr down trending   1/21: worsening abd distention and resp status. NG tube repositioned with 6L output, given dose of lasix, cxr with BL infiltrates, slight bump in cr  1/22: started TPN, had an episode of flatus, feel stronger, tachycardic, 6l NG output   1/23: Tolerated liquids this morning.  Complaining of gas pain.  1/24: Weaning off TPN. No longer w/ NGT. C/O diarrhea.   1/25: ongoing diarhea and abdominal swelling, CT scan today  1/26: intubated 1/25 trasnferred to unit  1/27: improved mentation, NG output decreased, BP soft  1/28: extubated 1/27, abdoman still distended, increased U/O  1/29:  Transferred out of ICU, more confused today  1/30: trasnferred up to T8, worsening renal function  1/31:  Decreased U/O, creat worse  2/1: Tolerated HD yesterday.  Denies SOB.  + back pain    Review of Systems  Review of Systems   Genitourinary:        Hudson in place   All other systems reviewed and are negative.        Physical Exam  Temp:  [35.9 °C (96.6 °F)-36.5 °C (97.7 °F)] 36.3 °C (97.3 °F)  Pulse:  [76-94] 78  Resp:  [17-22] 19  BP: ()/(47-66) 93/54  SpO2:  [92 %-99 %] 98 %    Physical Exam  Vitals signs and nursing note reviewed.   Constitutional:       Comments: Obese male   HENT:      Head: Normocephalic.      Mouth/Throat:      Comments: ETT in place  Neck:      Musculoskeletal: Normal range of motion and neck supple.   Cardiovascular:      Rate and Rhythm: Normal rate.   Pulmonary:      Breath sounds: Rales present.   Abdominal:      General: There is distension.   Musculoskeletal: Normal range of motion.         General: Swelling present.   Skin:     Coloration: Skin is not jaundiced.   Neurological:      Comments: confused         Fluids    Intake/Output Summary (Last 24 hours) at 2/1/2021 0850  Last data filed at 2/1/2021 0600  Gross per 24 hour   Intake 1733.67 ml   Output 1900 ml   Net -166.33 ml       Laboratory  Recent Labs     01/30/21  0130 01/31/21  0231   WBC 5.6 4.8   RBC 3.00* 2.84*   HEMOGLOBIN 9.0* 8.5*   HEMATOCRIT 29.2* 28.1*   MCV 97.3 98.9*   MCH 30.0 29.9   MCHC 30.8* 30.2*   RDW 50.4* 52.4*   PLATELETCT 172 80*   MPV 12.8 13.6*     Recent Labs     01/30/21  0130 01/31/21  0231   SODIUM 146* 147*   POTASSIUM 4.4 5.0   CHLORIDE 106 109   CO2 31 27   GLUCOSE 185* 155*   BUN 62* 88*   CREATININE 3.85* 6.30*   CALCIUM 8.7 8.2*     Recent Labs     01/30/21  1218   INR 1.03     No results for input(s): NTPROBNP in the last 72 hours.        Imaging      Assessment/Plan     # ONOFRE: multifactorial, it had been improved, but 2 days ago event changed trajectory (hypotensive episode sustolic less than 100) and now with signficant worsening.  Possibly ATN from hemodynamic changes.   - HD started 1/31  # Pancreatitis  #encephalpathy intermittant  # Ileus: NG tube, no role for surgical intervention per surgery  # HX  Non-ischemic cardiomyopathy  # Hypocalcemia   # Crohns   # Anemia  # hyperantremia second  to GI losses/ not able to tolerate PO     PLAN:  -Repeat HD today  -No heparin with HD  -Accurate I/Os  -Daily labs  -Dose all medications for intermittent HD    Thank you

## 2021-02-01 NOTE — PROGRESS NOTES
Pt back to room after dialysis.  Awake, alert, oriented X 2, in no apparent distress, NGT connected to LIS per order.  Daughter remains at bedside.

## 2021-02-01 NOTE — THERAPY
Physical Therapy   Initial Evaluation     Patient Name: Shaka Riley  Age:  55 y.o., Sex:  male  Medical Record #: [id number]  Today's Date: 2/1/2021     Precautions: Fall Risk, Nasogastric Tube, Swallow Precautions ( See Comments)(rectal tube)    Assessment  Patient is 55 y.o. male admitted on 1/14/2021 with abdominal pain and emesis, diagnosed acute pancreatitis with ONOFRE.  Additional factors influencing patient status / progress: B LE edema, back pain, B LE pain/weakness and impaired ROM, decreased activity tolerance, impaired bed mobility, transfers and gait requiring assist for all mobility currently. Pt lives in a 2nd story apt with his spouse who he reports has RA. PT will follow during acute stay.     Plan    Recommend Physical Therapy 3 times per week until therapy goals are met for the following treatments:  Bed Mobility, Equipment, Gait Training, Neuro Re-Education / Balance, Stair Training, Therapeutic Activities and Therapeutic Exercises    DC Equipment Recommendations: Unable to determine at this time  Discharge Recommendations: Recommend post-acute placement for additional physical therapy services prior to discharge home       Subjective    Pt agrees to PT.      Objective       02/01/21 0842   Pain 0 - 10 Group   Location Back;Leg   Location Orientation Lower;Right;Left   Pain Rating Scale (NPRS) 8   Description Aching;Constant   Comfort Goal Perform Activity;Sleep Comfortably   Therapist Pain Assessment During Activity;8  (RN present)   Non Verbal Descriptors   Non Verbal Scale  Calm;Unlabored Breathing;Sleeping   Prior Living Situation   Prior Services None   Housing / Facility 2 Story Apartment / Condo   Steps Into Home 16   Steps In Home 0   Rail Right Rail (Steps into Home)   Equipment Owned None   Lives with - Patient's Self Care Capacity Spouse   Comments Pt reports spouse has RA and cannot physically assist him. Spouse does drive.   Prior Level of Functional Mobility   Bed Mobility  Independent   Transfer Status Independent   Ambulation Independent   Distance Ambulation (Feet) 800   Assistive Devices Used None   Stairs Independent   History of Falls   History of Falls No   Cognition    Cognition / Consciousness WDL   Level of Consciousness Alert   Comments Pt cooperative and appropriate t/o tx.   Passive ROM Lower Body   Passive ROM Lower Body X   Gross Passive ROM Gross Passive Range of Motion Impaired,  but Appears Adequate for Functional Mobility.   Comments Limited by LE edema   Active ROM Lower Body    Active ROM Lower Body  X   Gross Active ROM Gross Active Range of Motion Impaired,  but Appears Adequate for Functional Mobility.   Comments Limited by LE edema   Strength Lower Body   Lower Body Strength  X   Gross Strength Generalized Weakness, Equal Bilaterally   Comments limited by LE edema   Sensation Lower Body   Lower Extremity Sensation   WDL   Lower Body Muscle Tone   Lower Body Muscle Tone  WDL   Coordination Lower Body    Coordination Lower Body  WDL   Balance Assessment   Sitting Balance (Static) Fair   Sitting Balance (Dynamic) Fair -   Standing Balance (Static) Fair   Standing Balance (Dynamic) Fair -   Weight Shift Sitting Fair   Weight Shift Standing Fair   Comments with B HHA at EOB   Gait Analysis   Gait Level Of Assist Unable to Participate   Bed Mobility    Supine to Sit Maximal Assist   Sit to Supine Moderate Assist   Scooting Maximal Assist   Rolling Moderate Assist to Lt.;Minimal Assist to Rt.   Functional Mobility   Sit to Stand Minimal Assist   Bed, Chair, Wheelchair Transfer Unable to Participate   Toilet Transfers Unable to Participate   Transfer Method Stand Step   Mobility supine>EOB>stand and 4 lateral steps at bedside   Comments limited by fatigue and LE pain   How much help from another person does the patient currently need...   6 clicks Mobility Score 14   Activity Tolerance   Sitting Edge of Bed 10 min   Standing 2 min total   Comments Limited by fatigue  and LE edema   Edema / Skin Assessment   Right Lower Extremity Edema   (present)   Left Lower Extremity Edema   (present)   Patient / Family Goals    Patient / Family Goal #1 Walk again   Short Term Goals    Short Term Goal # 1 Pt will be SPV for supine<>sit in 6 txs to improve functional mobility.   Short Term Goal # 2 Pt will be SPV for all transfers with LRAD in 6 txs to improve OOB activity.   Short Term Goal # 3 Pt will be SPV for gait >250' with LRAD in 6 txs to improve functional mobility.   Short Term Goal # 4 Pt will be SPV for up/down FOS to be able to safely access his home.   Education Group   Role of Physical Therapist Patient Response Patient;Acceptance;Explanation;Demonstration;Verbal Demonstration;Action Demonstration   Exercises - Supine Patient Response Patient;Acceptance;Explanation;Demonstration;Verbal Demonstration;Action Demonstration;Reinforcement Needed   Problem List    Problems Pain;Impaired Bed Mobility;Impaired Transfers;Impaired Ambulation;Functional ROM Deficit;Functional Strength Deficit;Impaired Balance;Decreased Activity Tolerance   Anticipated Discharge Equipment and Recommendations   DC Equipment Recommendations Unable to determine at this time   Discharge Recommendations Recommend post-acute placement for additional physical therapy services prior to discharge home

## 2021-02-01 NOTE — PROGRESS NOTES
Daily Progress Note:     Date of Service: 2/1/2021  Primary Team: [facility] Red Team    Attending: Girish Ching M.D.   Senior Resident: Luis Rodriguez M.D.  Contact:  [phone]    ID:   The patient is a 55-year-old male with a past medical history of alcohol use disorder, obesity, nonischemic cardiomyopathy (EF 25-30%), Crohn's disease, hypertension, chronic back pain, cholecystectomy 7/2020, and obesity.  The patient presented on 1/14/2021 for pancreatitis and his hospital course was complicated by ileus vs SBO on 1/19 when NG tube was placed. The patient subsequently was started on TPN on 1/21 to 1/30 (when PICC was infiltrated). The patient also had severe hypoxic and hypercapnic respiratory failure and was intubated from 1/25 to 1/27. Since then the patient has been transferred back to the floor where it was noted that the patient was spiking fevers. Blood cultures were drawn on 1/30 and both sets were positive for MSSA. Unfortunately his Cr has trended upwards as well and HD catheter was inserted on 1/31 and the patient was started on HD.     Interval Update:  The patient is doing well. He states that he is hungry and he would like food or water. He denies any abdominal pain. He states that he is not having any joint pain and that his back pain is from laying in bed all day.     Consultants/Specialty:  GI- Dr. Turner (Signed off 1/17)  Nephrology- Actively following patient  General Surgery- Dr. Marx (signed off 1/21)  Critical care (signed off 1/28)    Review of Systems:    Review of Systems   Constitutional: Negative for chills and fever.   HENT: Negative for congestion and sore throat.    Eyes: Negative for blurred vision and double vision.   Respiratory: Negative for cough and shortness of breath.    Cardiovascular: Negative for chest pain, palpitations and leg swelling.   Gastrointestinal: Negative for abdominal pain, constipation, diarrhea, nausea and vomiting.   Genitourinary: Negative for  dysuria and urgency.   Musculoskeletal: Negative for falls and joint pain.   Skin: Positive for rash (Ecchymosis over abdomen). Negative for itching.   Neurological: Negative for dizziness and headaches.       Objective Data:   Physical Exam:   Vitals:   Temp:  [36.1 °C (96.9 °F)-36.7 °C (98.1 °F)] 36.1 °C (97 °F)  Pulse:  [68-89] 75  Resp:  [18-22] 18  BP: ()/(47-66) 130/61  SpO2:  [94 %-98 %] 95 %     Physical Exam  Vitals signs and nursing note reviewed.   Constitutional:       General: He is not in acute distress.     Appearance: He is obese. He is ill-appearing (Chronically ill appearing).   HENT:      Head: Normocephalic and atraumatic.      Mouth/Throat:      Pharynx: Oropharynx is clear. No oropharyngeal exudate or posterior oropharyngeal erythema.      Comments: NG tube in place  Eyes:      General: No scleral icterus.     Extraocular Movements: Extraocular movements intact.      Pupils: Pupils are equal, round, and reactive to light.   Cardiovascular:      Rate and Rhythm: Normal rate and regular rhythm.      Heart sounds: No murmur. No gallop.    Pulmonary:      Effort: Pulmonary effort is normal. No respiratory distress.      Breath sounds: Normal breath sounds. No wheezing.   Abdominal:      General: There is distension.      Palpations: Abdomen is soft.      Tenderness: There is no abdominal tenderness. There is no guarding.      Hernia: No hernia is present.      Comments: Decreased bowel sounds, but bowel sounds are present throughout   Genitourinary:     Comments: Rectal tube and Hudson Catheter in place  Musculoskeletal:         General: No swelling.      Right lower leg: No edema.      Left lower leg: No edema.   Skin:     General: Skin is warm and dry.      Coloration: Skin is not jaundiced.   Neurological:      Mental Status: He is alert and oriented to person, place, and time.      Cranial Nerves: No cranial nerve deficit.   Psychiatric:         Mood and Affect: Mood normal.          Behavior: Behavior normal.           Labs:   Results for orders placed or performed during the hospital encounter of 01/14/21   CBC with Differential   Result Value Ref Range    WBC 10.4 4.8 - 10.8 K/uL    RBC 4.97 4.70 - 6.10 M/uL    Hemoglobin 14.8 14.0 - 18.0 g/dL    Hematocrit 44.9 42.0 - 52.0 %    MCV 90.3 81.4 - 97.8 fL    MCH 29.8 27.0 - 33.0 pg    MCHC 33.0 (L) 33.7 - 35.3 g/dL    RDW 39.4 35.9 - 50.0 fL    Platelet Count 273 164 - 446 K/uL    MPV 10.8 9.0 - 12.9 fL    Neutrophils-Polys 85.30 (H) 44.00 - 72.00 %    Lymphocytes 9.60 (L) 22.00 - 41.00 %    Monocytes 4.10 0.00 - 13.40 %    Eosinophils 0.20 0.00 - 6.90 %    Basophils 0.30 0.00 - 1.80 %    Immature Granulocytes 0.50 0.00 - 0.90 %    Nucleated RBC 0.00 /100 WBC    Neutrophils (Absolute) 8.85 (H) 1.82 - 7.42 K/uL    Lymphs (Absolute) 1.00 1.00 - 4.80 K/uL    Monos (Absolute) 0.42 0.00 - 0.85 K/uL    Eos (Absolute) 0.02 0.00 - 0.51 K/uL    Baso (Absolute) 0.03 0.00 - 0.12 K/uL    Immature Granulocytes (abs) 0.05 0.00 - 0.11 K/uL    NRBC (Absolute) 0.00 K/uL   Complete Metabolic Panel (CMP)   Result Value Ref Range    Sodium 131 (L) 135 - 145 mmol/L    Potassium 4.0 3.6 - 5.5 mmol/L    Chloride 98 96 - 112 mmol/L    Co2 22 20 - 33 mmol/L    Anion Gap 11.0 7.0 - 16.0    Glucose 213 (H) 65 - 99 mg/dL    Bun 23 (H) 8 - 22 mg/dL    Creatinine 1.13 0.50 - 1.40 mg/dL    Calcium 9.5 8.5 - 10.5 mg/dL    AST(SGOT) 328 (H) 12 - 45 U/L    ALT(SGPT) 170 (H) 2 - 50 U/L    Alkaline Phosphatase 122 (H) 30 - 99 U/L    Total Bilirubin 0.8 0.1 - 1.5 mg/dL    Albumin 4.2 3.2 - 4.9 g/dL    Total Protein 7.4 6.0 - 8.2 g/dL    Globulin 3.2 1.9 - 3.5 g/dL    A-G Ratio 1.3 g/dL   Troponin   Result Value Ref Range    Troponin T <6 6 - 19 ng/L   LIPASE   Result Value Ref Range    Lipase >3000 (H) 11 - 82 U/L   ESTIMATED GFR   Result Value Ref Range    GFR If African American >60 >60 mL/min/1.73 m 2    GFR If Non African American >60 >60 mL/min/1.73 m 2   Lipid Profile    Result Value Ref Range    Cholesterol,Tot 224 (H) 100 - 199 mg/dL    Triglycerides 571 (H) 0 - 149 mg/dL    HDL 46 >=40 mg/dL    LDL see below <100 mg/dL   SARS-COV Antigen ABDOULAYE: Collect dry nasal swab AND NP swab in VTM   Result Value Ref Range    SARS-CoV-2 Source Nasal Swab     SARS-COV ANTIGEN ABDOULAYE NotDetected Not-Detected   SARS-CoV-2 PCR (24 hour In-House): Collect NP swab in VTM    Specimen: Respirate   Result Value Ref Range    SARS-CoV-2 Source NP Swab     SARS-CoV-2 by PCR NotDetected    Comp Metabolic Panel   Result Value Ref Range    Sodium 135 135 - 145 mmol/L    Potassium 5.0 3.6 - 5.5 mmol/L    Chloride 98 96 - 112 mmol/L    Co2 24 20 - 33 mmol/L    Anion Gap 13.0 7.0 - 16.0    Glucose 173 (H) 65 - 99 mg/dL    Bun 28 (H) 8 - 22 mg/dL    Creatinine 1.64 (H) 0.50 - 1.40 mg/dL    Calcium 8.2 (L) 8.5 - 10.5 mg/dL    AST(SGOT) 82 (H) 12 - 45 U/L    ALT(SGPT) 122 (H) 2 - 50 U/L    Alkaline Phosphatase 119 (H) 30 - 99 U/L    Total Bilirubin 0.7 0.1 - 1.5 mg/dL    Albumin 4.1 3.2 - 4.9 g/dL    Total Protein 7.6 6.0 - 8.2 g/dL    Globulin 3.5 1.9 - 3.5 g/dL    A-G Ratio 1.2 g/dL   HEMOGLOBIN A1C   Result Value Ref Range    Glycohemoglobin 5.5 0.0 - 5.6 %    Est Avg Glucose 111 mg/dL   ESTIMATED GFR   Result Value Ref Range    GFR If  53 (A) >60 mL/min/1.73 m 2    GFR If Non  44 (A) >60 mL/min/1.73 m 2   CBC WITH DIFFERENTIAL   Result Value Ref Range    WBC 19.2 (H) 4.8 - 10.8 K/uL    RBC 5.36 4.70 - 6.10 M/uL    Hemoglobin 16.5 14.0 - 18.0 g/dL    Hematocrit 51.8 42.0 - 52.0 %    MCV 96.6 81.4 - 97.8 fL    MCH 30.8 27.0 - 33.0 pg    MCHC 31.9 (L) 33.7 - 35.3 g/dL    RDW 44.9 35.9 - 50.0 fL    Platelet Count 241 164 - 446 K/uL    MPV 11.0 9.0 - 12.9 fL    Neutrophils-Polys 85.20 (H) 44.00 - 72.00 %    Lymphocytes 3.50 (L) 22.00 - 41.00 %    Monocytes 4.30 0.00 - 13.40 %    Eosinophils 0.00 0.00 - 6.90 %    Basophils 0.00 0.00 - 1.80 %    Nucleated RBC 0.00 /100 WBC    Neutrophils  (Absolute) 17.36 (H) 1.82 - 7.42 K/uL    Lymphs (Absolute) 0.67 (L) 1.00 - 4.80 K/uL    Monos (Absolute) 0.83 0.00 - 0.85 K/uL    Eos (Absolute) 0.00 0.00 - 0.51 K/uL    Baso (Absolute) 0.00 0.00 - 0.12 K/uL    NRBC (Absolute) 0.00 K/uL   Comp Metabolic Panel   Result Value Ref Range    Sodium 131 (L) 135 - 145 mmol/L    Potassium 6.0 (H) 3.6 - 5.5 mmol/L    Chloride 99 96 - 112 mmol/L    Co2 21 20 - 33 mmol/L    Anion Gap 11.0 7.0 - 16.0    Glucose 155 (H) 65 - 99 mg/dL    Bun 42 (H) 8 - 22 mg/dL    Creatinine 3.36 (H) 0.50 - 1.40 mg/dL    Calcium 7.3 (L) 8.5 - 10.5 mg/dL    AST(SGOT) 44 12 - 45 U/L    ALT(SGPT) 76 (H) 2 - 50 U/L    Alkaline Phosphatase 94 30 - 99 U/L    Total Bilirubin 0.6 0.1 - 1.5 mg/dL    Albumin 3.5 3.2 - 4.9 g/dL    Total Protein 6.9 6.0 - 8.2 g/dL    Globulin 3.4 1.9 - 3.5 g/dL    A-G Ratio 1.0 g/dL   MAGNESIUM   Result Value Ref Range    Magnesium 1.9 1.5 - 2.5 mg/dL   DIFFERENTIAL MANUAL   Result Value Ref Range    Bands-Stabs 5.20 0.00 - 10.00 %    Metamyelocytes 1.70 %    Manual Diff Status PERFORMED    PERIPHERAL SMEAR REVIEW   Result Value Ref Range    Peripheral Smear Review see below    PLATELET ESTIMATE   Result Value Ref Range    Plt Estimation Normal    MORPHOLOGY   Result Value Ref Range    RBC Morphology Normal    ESTIMATED GFR   Result Value Ref Range    GFR If  23 (A) >60 mL/min/1.73 m 2    GFR If Non  19 (A) >60 mL/min/1.73 m 2   AMYLASE   Result Value Ref Range    Amylase 827 (H) 20 - 103 U/L   LIPASE   Result Value Ref Range    Lipase 860 (H) 11 - 82 U/L   Comp Metabolic Panel   Result Value Ref Range    Sodium 129 (L) 135 - 145 mmol/L    Potassium 6.1 (H) 3.6 - 5.5 mmol/L    Chloride 97 96 - 112 mmol/L    Co2 21 20 - 33 mmol/L    Anion Gap 11.0 7.0 - 16.0    Glucose 147 (H) 65 - 99 mg/dL    Bun 48 (H) 8 - 22 mg/dL    Creatinine 3.54 (H) 0.50 - 1.40 mg/dL    Calcium 7.0 (L) 8.5 - 10.5 mg/dL    AST(SGOT) 38 12 - 45 U/L    ALT(SGPT) 62 (H) 2 -  50 U/L    Alkaline Phosphatase 98 30 - 99 U/L    Total Bilirubin 0.5 0.1 - 1.5 mg/dL    Albumin 3.5 3.2 - 4.9 g/dL    Total Protein 7.2 6.0 - 8.2 g/dL    Globulin 3.7 (H) 1.9 - 3.5 g/dL    A-G Ratio 0.9 g/dL   ESTIMATED GFR   Result Value Ref Range    GFR If  22 (A) >60 mL/min/1.73 m 2    GFR If Non  18 (A) >60 mL/min/1.73 m 2   Comp Metabolic Panel   Result Value Ref Range    Sodium 129 (L) 135 - 145 mmol/L    Potassium 6.0 (H) 3.6 - 5.5 mmol/L    Chloride 98 96 - 112 mmol/L    Co2 18 (L) 20 - 33 mmol/L    Anion Gap 13.0 7.0 - 16.0    Glucose 173 (H) 65 - 99 mg/dL    Bun 50 (H) 8 - 22 mg/dL    Creatinine 3.92 (H) 0.50 - 1.40 mg/dL    Calcium 7.5 (L) 8.5 - 10.5 mg/dL    AST(SGOT) 40 12 - 45 U/L    ALT(SGPT) 62 (H) 2 - 50 U/L    Alkaline Phosphatase 100 (H) 30 - 99 U/L    Total Bilirubin 0.5 0.1 - 1.5 mg/dL    Albumin 3.5 3.2 - 4.9 g/dL    Total Protein 7.5 6.0 - 8.2 g/dL    Globulin 4.0 (H) 1.9 - 3.5 g/dL    A-G Ratio 0.9 g/dL   proBrain Natriuretic Peptide, NT   Result Value Ref Range    NT-proBNP 243 (H) 0 - 125 pg/mL   URINALYSIS    Specimen: Urine, Hudson Cath   Result Value Ref Range    Color DK Yellow     Character Turbid (A)     Specific Gravity 1.021 <1.035    Ph 5.0 5.0 - 8.0    Glucose Negative Negative mg/dL    Ketones Negative Negative mg/dL    Protein 100 (A) Negative mg/dL    Bilirubin Negative Negative    Urobilinogen, Urine 0.2 Negative    Nitrite Negative Negative    Leukocyte Esterase Negative Negative    Occult Blood Trace (A) Negative    Micro Urine Req Microscopic    URINE SODIUM RANDOM   Result Value Ref Range    Sodium, Urine -per volume 23 mmol/L   URINE CREATININE RANDOM   Result Value Ref Range    Creatinine, Random Urine 274.12 mg/dL   ESTIMATED GFR   Result Value Ref Range    GFR If  19 (A) >60 mL/min/1.73 m 2    GFR If Non  16 (A) >60 mL/min/1.73 m 2   URINE MICROSCOPIC (W/UA)   Result Value Ref Range    WBC 5-10 (A) /hpf     RBC 0-2 (A) /hpf    Bacteria Negative None /hpf    Epithelial Cells Few /hpf    Epithelial Cells Renal Few /hpf    Amorphous Crystal Present /hpf    Hyaline Cast 3-5 (A) /lpf    Granular Casts 6-10 (A) /lpf   BLOOD CULTURE    Specimen: Line; Blood   Result Value Ref Range    Significant Indicator NEG     Source BLD     Site Central Line     Culture Result No growth after 5 days of incubation.    BLOOD CULTURE    Specimen: Peripheral; Blood   Result Value Ref Range    Significant Indicator NEG     Source BLD     Site PERIPHERAL     Culture Result No growth after 5 days of incubation.    Basic Metabolic Panel   Result Value Ref Range    Sodium 132 (L) 135 - 145 mmol/L    Potassium 5.1 3.6 - 5.5 mmol/L    Chloride 97 96 - 112 mmol/L    Co2 23 20 - 33 mmol/L    Glucose 145 (H) 65 - 99 mg/dL    Bun 56 (H) 8 - 22 mg/dL    Creatinine 3.61 (H) 0.50 - 1.40 mg/dL    Calcium 7.0 (L) 8.5 - 10.5 mg/dL    Anion Gap 12.0 7.0 - 16.0   Basic Metabolic Panel   Result Value Ref Range    Sodium 131 (L) 135 - 145 mmol/L    Potassium 5.3 3.6 - 5.5 mmol/L    Chloride 97 96 - 112 mmol/L    Co2 23 20 - 33 mmol/L    Glucose 150 (H) 65 - 99 mg/dL    Bun 58 (H) 8 - 22 mg/dL    Creatinine 3.90 (H) 0.50 - 1.40 mg/dL    Calcium 6.8 (LL) 8.5 - 10.5 mg/dL    Anion Gap 11.0 7.0 - 16.0   LACTIC ACID   Result Value Ref Range    Lactic Acid 1.2 0.5 - 2.0 mmol/L   ESTIMATED GFR   Result Value Ref Range    GFR If  21 (A) >60 mL/min/1.73 m 2    GFR If Non  18 (A) >60 mL/min/1.73 m 2   ESTIMATED GFR   Result Value Ref Range    GFR If  19 (A) >60 mL/min/1.73 m 2    GFR If Non  16 (A) >60 mL/min/1.73 m 2   CBC WITH DIFFERENTIAL   Result Value Ref Range    WBC 6.9 4.8 - 10.8 K/uL    RBC 4.90 4.70 - 6.10 M/uL    Hemoglobin 15.1 14.0 - 18.0 g/dL    Hematocrit 47.0 42.0 - 52.0 %    MCV 95.9 81.4 - 97.8 fL    MCH 30.8 27.0 - 33.0 pg    MCHC 32.1 (L) 33.7 - 35.3 g/dL    RDW 44.8 35.9 - 50.0 fL     Platelet Count 211 164 - 446 K/uL    MPV 11.2 9.0 - 12.9 fL    Neutrophils-Polys 70.40 44.00 - 72.00 %    Lymphocytes 6.10 (L) 22.00 - 41.00 %    Monocytes 7.80 0.00 - 13.40 %    Eosinophils 0.00 0.00 - 6.90 %    Basophils 0.00 0.00 - 1.80 %    Nucleated RBC 0.00 /100 WBC    Neutrophils (Absolute) 5.70 1.82 - 7.42 K/uL    Lymphs (Absolute) 0.42 (L) 1.00 - 4.80 K/uL    Monos (Absolute) 0.54 0.00 - 0.85 K/uL    Eos (Absolute) 0.00 0.00 - 0.51 K/uL    Baso (Absolute) 0.00 0.00 - 0.12 K/uL    NRBC (Absolute) 0.00 K/uL   Comp Metabolic Panel   Result Value Ref Range    Sodium 132 (L) 135 - 145 mmol/L    Potassium 4.8 3.6 - 5.5 mmol/L    Chloride 97 96 - 112 mmol/L    Co2 23 20 - 33 mmol/L    Anion Gap 12.0 7.0 - 16.0    Glucose 148 (H) 65 - 99 mg/dL    Bun 62 (H) 8 - 22 mg/dL    Creatinine 3.84 (H) 0.50 - 1.40 mg/dL    Calcium 7.6 (L) 8.5 - 10.5 mg/dL    AST(SGOT) 28 12 - 45 U/L    ALT(SGPT) 39 2 - 50 U/L    Alkaline Phosphatase 83 30 - 99 U/L    Total Bilirubin 0.5 0.1 - 1.5 mg/dL    Albumin 3.1 (L) 3.2 - 4.9 g/dL    Total Protein 6.9 6.0 - 8.2 g/dL    Globulin 3.8 (H) 1.9 - 3.5 g/dL    A-G Ratio 0.8 g/dL   MAGNESIUM   Result Value Ref Range    Magnesium 1.8 1.5 - 2.5 mg/dL   PHOSPHORUS   Result Value Ref Range    Phosphorus 5.4 (H) 2.5 - 4.5 mg/dL   LIPASE   Result Value Ref Range    Lipase 375 (H) 11 - 82 U/L   Basic Metabolic Panel   Result Value Ref Range    Sodium 132 (L) 135 - 145 mmol/L    Potassium 5.5 3.6 - 5.5 mmol/L    Chloride 97 96 - 112 mmol/L    Co2 22 20 - 33 mmol/L    Glucose 155 (H) 65 - 99 mg/dL    Bun 70 (H) 8 - 22 mg/dL    Creatinine 4.04 (H) 0.50 - 1.40 mg/dL    Calcium 7.2 (L) 8.5 - 10.5 mg/dL    Anion Gap 13.0 7.0 - 16.0   LACTIC ACID   Result Value Ref Range    Lactic Acid 1.8 0.5 - 2.0 mmol/L   IONIZED CALCIUM   Result Value Ref Range    Ionized Calcium 0.9 (L) 1.1 - 1.3 mmol/L   DIFFERENTIAL MANUAL   Result Value Ref Range    Bands-Stabs 12.20 (H) 0.00 - 10.00 %    Metamyelocytes 3.50 %     Manual Diff Status PERFORMED    PERIPHERAL SMEAR REVIEW   Result Value Ref Range    Peripheral Smear Review see below    ESTIMATED GFR   Result Value Ref Range    GFR If  20 (A) >60 mL/min/1.73 m 2    GFR If Non  16 (A) >60 mL/min/1.73 m 2   LACTIC ACID   Result Value Ref Range    Lactic Acid 1.4 0.5 - 2.0 mmol/L   ESTIMATED GFR   Result Value Ref Range    GFR If  19 (A) >60 mL/min/1.73 m 2    GFR If Non African American 15 (A) >60 mL/min/1.73 m 2   CBC WITH DIFFERENTIAL   Result Value Ref Range    WBC 5.0 4.8 - 10.8 K/uL    RBC 4.47 (L) 4.70 - 6.10 M/uL    Hemoglobin 13.6 (L) 14.0 - 18.0 g/dL    Hematocrit 42.3 42.0 - 52.0 %    MCV 94.6 81.4 - 97.8 fL    MCH 30.4 27.0 - 33.0 pg    MCHC 32.2 (L) 33.7 - 35.3 g/dL    RDW 43.8 35.9 - 50.0 fL    Platelet Count 208 164 - 446 K/uL    MPV 11.3 9.0 - 12.9 fL    Neutrophils-Polys 57.00 44.00 - 72.00 %    Lymphocytes 4.40 (L) 22.00 - 41.00 %    Monocytes 14.90 (H) 0.00 - 13.40 %    Eosinophils 0.00 0.00 - 6.90 %    Basophils 0.00 0.00 - 1.80 %    Nucleated RBC 0.00 /100 WBC    Neutrophils (Absolute) 3.91 1.82 - 7.42 K/uL    Lymphs (Absolute) 0.22 (L) 1.00 - 4.80 K/uL    Monos (Absolute) 0.75 0.00 - 0.85 K/uL    Eos (Absolute) 0.00 0.00 - 0.51 K/uL    Baso (Absolute) 0.00 0.00 - 0.12 K/uL    NRBC (Absolute) 0.00 K/uL   Comp Metabolic Panel   Result Value Ref Range    Sodium 129 (L) 135 - 145 mmol/L    Potassium 4.8 3.6 - 5.5 mmol/L    Chloride 94 (L) 96 - 112 mmol/L    Co2 22 20 - 33 mmol/L    Anion Gap 13.0 7.0 - 16.0    Glucose 155 (H) 65 - 99 mg/dL    Bun 72 (HH) 8 - 22 mg/dL    Creatinine 3.05 (H) 0.50 - 1.40 mg/dL    Calcium 6.9 (LL) 8.5 - 10.5 mg/dL    AST(SGOT) 19 12 - 45 U/L    ALT(SGPT) 27 2 - 50 U/L    Alkaline Phosphatase 70 30 - 99 U/L    Total Bilirubin 0.5 0.1 - 1.5 mg/dL    Albumin 3.1 (L) 3.2 - 4.9 g/dL    Total Protein 6.7 6.0 - 8.2 g/dL    Globulin 3.6 (H) 1.9 - 3.5 g/dL    A-G Ratio 0.9 g/dL   MAGNESIUM    Result Value Ref Range    Magnesium 2.3 1.5 - 2.5 mg/dL   PHOSPHORUS   Result Value Ref Range    Phosphorus 4.9 (H) 2.5 - 4.5 mg/dL   LIPASE   Result Value Ref Range    Lipase 124 (H) 11 - 82 U/L   IONIZED CALCIUM   Result Value Ref Range    Ionized Calcium 0.9 (L) 1.1 - 1.3 mmol/L   ESTIMATED GFR   Result Value Ref Range    GFR If  26 (A) >60 mL/min/1.73 m 2    GFR If Non African American 21 (A) >60 mL/min/1.73 m 2   DIFFERENTIAL MANUAL   Result Value Ref Range    Bands-Stabs 21.10 (H) 0.00 - 10.00 %    Metamyelocytes 2.60 %    Manual Diff Status PERFORMED    PERIPHERAL SMEAR REVIEW   Result Value Ref Range    Peripheral Smear Review see below    PLATELET ESTIMATE   Result Value Ref Range    Plt Estimation Normal    MORPHOLOGY   Result Value Ref Range    RBC Morphology Normal    TSH WITH REFLEX TO FT4   Result Value Ref Range    TSH 1.240 0.380 - 5.330 uIU/mL   Triglyceride   Result Value Ref Range    Triglycerides 211 (H) 0 - 149 mg/dL   CBC WITH DIFFERENTIAL   Result Value Ref Range    WBC 7.8 4.8 - 10.8 K/uL    RBC 4.07 (L) 4.70 - 6.10 M/uL    Hemoglobin 12.5 (L) 14.0 - 18.0 g/dL    Hematocrit 38.7 (L) 42.0 - 52.0 %    MCV 95.1 81.4 - 97.8 fL    MCH 30.7 27.0 - 33.0 pg    MCHC 32.3 (L) 33.7 - 35.3 g/dL    RDW 44.3 35.9 - 50.0 fL    Platelet Count 183 164 - 446 K/uL    MPV 11.2 9.0 - 12.9 fL    Neutrophils-Polys 73.90 (H) 44.00 - 72.00 %    Lymphocytes 2.60 (L) 22.00 - 41.00 %    Monocytes 5.20 0.00 - 13.40 %    Eosinophils 0.90 0.00 - 6.90 %    Basophils 0.00 0.00 - 1.80 %    Nucleated RBC 0.00 /100 WBC    Neutrophils (Absolute) 6.92 1.82 - 7.42 K/uL    Lymphs (Absolute) 0.20 (L) 1.00 - 4.80 K/uL    Monos (Absolute) 0.41 0.00 - 0.85 K/uL    Eos (Absolute) 0.07 0.00 - 0.51 K/uL    Baso (Absolute) 0.00 0.00 - 0.12 K/uL    NRBC (Absolute) 0.00 K/uL   Comp Metabolic Panel   Result Value Ref Range    Sodium 136 135 - 145 mmol/L    Potassium 4.4 3.6 - 5.5 mmol/L    Chloride 99 96 - 112 mmol/L    Co2  25 20 - 33 mmol/L    Anion Gap 12.0 7.0 - 16.0    Glucose 121 (H) 65 - 99 mg/dL    Bun 64 (H) 8 - 22 mg/dL    Creatinine 2.24 (H) 0.50 - 1.40 mg/dL    Calcium 8.8 8.5 - 10.5 mg/dL    AST(SGOT) 12 12 - 45 U/L    ALT(SGPT) 20 2 - 50 U/L    Alkaline Phosphatase 66 30 - 99 U/L    Total Bilirubin 0.5 0.1 - 1.5 mg/dL    Albumin 3.1 (L) 3.2 - 4.9 g/dL    Total Protein 6.8 6.0 - 8.2 g/dL    Globulin 3.7 (H) 1.9 - 3.5 g/dL    A-G Ratio 0.8 g/dL   MAGNESIUM   Result Value Ref Range    Magnesium 2.4 1.5 - 2.5 mg/dL   PHOSPHORUS   Result Value Ref Range    Phosphorus 3.5 2.5 - 4.5 mg/dL   LIPASE   Result Value Ref Range    Lipase 67 11 - 82 U/L   IONIZED CALCIUM   Result Value Ref Range    Ionized Calcium 1.1 1.1 - 1.3 mmol/L   ESTIMATED GFR   Result Value Ref Range    GFR If  37 (A) >60 mL/min/1.73 m 2    GFR If Non African American 30 (A) >60 mL/min/1.73 m 2   DIFFERENTIAL MANUAL   Result Value Ref Range    Bands-Stabs 14.80 (H) 0.00 - 10.00 %    Metamyelocytes 1.70 %    Myelocytes 0.90 %    Manual Diff Status PERFORMED    PERIPHERAL SMEAR REVIEW   Result Value Ref Range    Peripheral Smear Review see below    PLATELET ESTIMATE   Result Value Ref Range    Plt Estimation Normal    MORPHOLOGY   Result Value Ref Range    RBC Morphology Present     Polychromia 1+    CBC WITH DIFFERENTIAL   Result Value Ref Range    WBC 9.5 4.8 - 10.8 K/uL    RBC 3.78 (L) 4.70 - 6.10 M/uL    Hemoglobin 11.4 (L) 14.0 - 18.0 g/dL    Hematocrit 35.4 (L) 42.0 - 52.0 %    MCV 93.7 81.4 - 97.8 fL    MCH 30.2 27.0 - 33.0 pg    MCHC 32.2 (L) 33.7 - 35.3 g/dL    RDW 44.6 35.9 - 50.0 fL    Platelet Count 176 164 - 446 K/uL    MPV 11.6 9.0 - 12.9 fL    Neutrophils-Polys 57.40 44.00 - 72.00 %    Lymphocytes 7.80 (L) 22.00 - 41.00 %    Monocytes 7.80 0.00 - 13.40 %    Eosinophils 0.00 0.00 - 6.90 %    Basophils 0.00 0.00 - 1.80 %    Nucleated RBC 0.00 /100 WBC    Neutrophils (Absolute) 7.76 (H) 1.82 - 7.42 K/uL    Lymphs (Absolute) 0.74 (L)  1.00 - 4.80 K/uL    Monos (Absolute) 0.74 0.00 - 0.85 K/uL    Eos (Absolute) 0.00 0.00 - 0.51 K/uL    Baso (Absolute) 0.00 0.00 - 0.12 K/uL    NRBC (Absolute) 0.00 K/uL   Comp Metabolic Panel   Result Value Ref Range    Sodium 138 135 - 145 mmol/L    Potassium 4.5 3.6 - 5.5 mmol/L    Chloride 100 96 - 112 mmol/L    Co2 24 20 - 33 mmol/L    Anion Gap 14.0 7.0 - 16.0    Glucose 113 (H) 65 - 99 mg/dL    Bun 67 (H) 8 - 22 mg/dL    Creatinine 2.05 (H) 0.50 - 1.40 mg/dL    Calcium 8.8 8.5 - 10.5 mg/dL    AST(SGOT) 15 12 - 45 U/L    ALT(SGPT) 17 2 - 50 U/L    Alkaline Phosphatase 67 30 - 99 U/L    Total Bilirubin 0.4 0.1 - 1.5 mg/dL    Albumin 3.1 (L) 3.2 - 4.9 g/dL    Total Protein 6.4 6.0 - 8.2 g/dL    Globulin 3.3 1.9 - 3.5 g/dL    A-G Ratio 0.9 g/dL   MAGNESIUM   Result Value Ref Range    Magnesium 2.5 1.5 - 2.5 mg/dL   PHOSPHORUS   Result Value Ref Range    Phosphorus 3.3 2.5 - 4.5 mg/dL   LIPASE   Result Value Ref Range    Lipase 62 11 - 82 U/L   IONIZED CALCIUM   Result Value Ref Range    Ionized Calcium 1.1 1.1 - 1.3 mmol/L   ESTIMATED GFR   Result Value Ref Range    GFR If  41 (A) >60 mL/min/1.73 m 2    GFR If Non  34 (A) >60 mL/min/1.73 m 2   DIFFERENTIAL MANUAL   Result Value Ref Range    Bands-Stabs 24.30 (H) 0.00 - 10.00 %    Metamyelocytes 2.60 %    Manual Diff Status PERFORMED    PERIPHERAL SMEAR REVIEW   Result Value Ref Range    Peripheral Smear Review see below    PLATELET ESTIMATE   Result Value Ref Range    Plt Estimation Normal    MORPHOLOGY   Result Value Ref Range    RBC Morphology Normal    Comp Metabolic Panel   Result Value Ref Range    Sodium 133 (L) 135 - 145 mmol/L    Potassium 4.3 3.6 - 5.5 mmol/L    Chloride 91 (L) 96 - 112 mmol/L    Co2 27 20 - 33 mmol/L    Anion Gap 15.0 7.0 - 16.0    Glucose 141 (H) 65 - 99 mg/dL    Bun 74 (HH) 8 - 22 mg/dL    Creatinine 2.62 (H) 0.50 - 1.40 mg/dL    Calcium 9.3 8.5 - 10.5 mg/dL    AST(SGOT) 22 12 - 45 U/L    ALT(SGPT) 18 2  - 50 U/L    Alkaline Phosphatase 102 (H) 30 - 99 U/L    Total Bilirubin 1.1 0.1 - 1.5 mg/dL    Albumin 3.4 3.2 - 4.9 g/dL    Total Protein 7.2 6.0 - 8.2 g/dL    Globulin 3.8 (H) 1.9 - 3.5 g/dL    A-G Ratio 0.9 g/dL   MAGNESIUM   Result Value Ref Range    Magnesium 2.8 (H) 1.5 - 2.5 mg/dL   PHOSPHORUS   Result Value Ref Range    Phosphorus 4.8 (H) 2.5 - 4.5 mg/dL   IONIZED CALCIUM   Result Value Ref Range    Ionized Calcium 1.1 1.1 - 1.3 mmol/L   ESTIMATED GFR   Result Value Ref Range    GFR If  31 (A) >60 mL/min/1.73 m 2    GFR If Non African American 25 (A) >60 mL/min/1.73 m 2   Cholesterol   Result Value Ref Range    Cholesterol,Tot 119 100 - 199 mg/dL   Triglyceride   Result Value Ref Range    Triglycerides 167 (H) 0 - 149 mg/dL   Magnesium   Result Value Ref Range    Magnesium 2.9 (H) 1.5 - 2.5 mg/dL   Phosphorus   Result Value Ref Range    Phosphorus 5.1 (H) 2.5 - 4.5 mg/dL   Comp Metabolic Panel   Result Value Ref Range    Sodium 136 135 - 145 mmol/L    Potassium 3.9 3.6 - 5.5 mmol/L    Chloride 93 (L) 96 - 112 mmol/L    Co2 30 20 - 33 mmol/L    Anion Gap 13.0 7.0 - 16.0    Glucose 178 (H) 65 - 99 mg/dL    Bun 84 (HH) 8 - 22 mg/dL    Creatinine 2.54 (H) 0.50 - 1.40 mg/dL    Calcium 9.2 8.5 - 10.5 mg/dL    AST(SGOT) 23 12 - 45 U/L    ALT(SGPT) 16 2 - 50 U/L    Alkaline Phosphatase 106 (H) 30 - 99 U/L    Total Bilirubin 0.8 0.1 - 1.5 mg/dL    Albumin 3.0 (L) 3.2 - 4.9 g/dL    Total Protein 6.9 6.0 - 8.2 g/dL    Globulin 3.9 (H) 1.9 - 3.5 g/dL    A-G Ratio 0.8 g/dL   ESTIMATED GFR   Result Value Ref Range    GFR If  32 (A) >60 mL/min/1.73 m 2    GFR If Non African American 26 (A) >60 mL/min/1.73 m 2   Magnesium   Result Value Ref Range    Magnesium 2.6 (H) 1.5 - 2.5 mg/dL   Phosphorus   Result Value Ref Range    Phosphorus 3.9 2.5 - 4.5 mg/dL   Comp Metabolic Panel   Result Value Ref Range    Sodium 136 135 - 145 mmol/L    Potassium 3.8 3.6 - 5.5 mmol/L    Chloride 94 (L) 96 - 112  mmol/L    Co2 31 20 - 33 mmol/L    Anion Gap 11.0 7.0 - 16.0    Glucose 134 (H) 65 - 99 mg/dL    Bun 80 (HH) 8 - 22 mg/dL    Creatinine 2.48 (H) 0.50 - 1.40 mg/dL    Calcium 8.6 8.5 - 10.5 mg/dL    AST(SGOT) 39 12 - 45 U/L    ALT(SGPT) 27 2 - 50 U/L    Alkaline Phosphatase 113 (H) 30 - 99 U/L    Total Bilirubin 0.8 0.1 - 1.5 mg/dL    Albumin 2.6 (L) 3.2 - 4.9 g/dL    Total Protein 6.2 6.0 - 8.2 g/dL    Globulin 3.6 (H) 1.9 - 3.5 g/dL    A-G Ratio 0.7 g/dL   CBC WITH DIFFERENTIAL   Result Value Ref Range    WBC 11.1 (H) 4.8 - 10.8 K/uL    RBC 3.21 (L) 4.70 - 6.10 M/uL    Hemoglobin 9.8 (L) 14.0 - 18.0 g/dL    Hematocrit 30.5 (L) 42.0 - 52.0 %    MCV 95.0 81.4 - 97.8 fL    MCH 30.5 27.0 - 33.0 pg    MCHC 32.1 (L) 33.7 - 35.3 g/dL    RDW 48.3 35.9 - 50.0 fL    Platelet Count 174 164 - 446 K/uL    MPV 12.0 9.0 - 12.9 fL    Neutrophils-Polys 81.70 (H) 44.00 - 72.00 %    Lymphocytes 4.30 (L) 22.00 - 41.00 %    Monocytes 3.50 0.00 - 13.40 %    Eosinophils 2.60 0.00 - 6.90 %    Basophils 0.00 0.00 - 1.80 %    Nucleated RBC 0.00 /100 WBC    Neutrophils (Absolute) 9.93 (H) 1.82 - 7.42 K/uL    Lymphs (Absolute) 0.48 (L) 1.00 - 4.80 K/uL    Monos (Absolute) 0.39 0.00 - 0.85 K/uL    Eos (Absolute) 0.29 0.00 - 0.51 K/uL    Baso (Absolute) 0.00 0.00 - 0.12 K/uL    NRBC (Absolute) 0.00 K/uL   MAGNESIUM   Result Value Ref Range    Magnesium 2.6 (H) 1.5 - 2.5 mg/dL   PHOSPHORUS   Result Value Ref Range    Phosphorus 4.0 2.5 - 4.5 mg/dL   IONIZED CALCIUM   Result Value Ref Range    Ionized Calcium 1.1 1.1 - 1.3 mmol/L   DIFFERENTIAL MANUAL   Result Value Ref Range    Bands-Stabs 7.80 0.00 - 10.00 %    Manual Diff Status PERFORMED    PERIPHERAL SMEAR REVIEW   Result Value Ref Range    Peripheral Smear Review see below    PLATELET ESTIMATE   Result Value Ref Range    Plt Estimation Normal    MORPHOLOGY   Result Value Ref Range    RBC Morphology Normal    ESTIMATED GFR   Result Value Ref Range    GFR If  33 (A) >60  mL/min/1.73 m 2    GFR If Non  27 (A) >60 mL/min/1.73 m 2   MAGNESIUM   Result Value Ref Range    Magnesium 2.2 1.5 - 2.5 mg/dL   PHOSPHORUS   Result Value Ref Range    Phosphorus 3.5 2.5 - 4.5 mg/dL   IONIZED CALCIUM   Result Value Ref Range    Ionized Calcium 1.2 1.1 - 1.3 mmol/L   Comp Metabolic Panel   Result Value Ref Range    Sodium 132 (L) 135 - 145 mmol/L    Potassium 4.2 3.6 - 5.5 mmol/L    Chloride 92 (L) 96 - 112 mmol/L    Co2 29 20 - 33 mmol/L    Anion Gap 11.0 7.0 - 16.0    Glucose 142 (H) 65 - 99 mg/dL    Bun 70 (H) 8 - 22 mg/dL    Creatinine 2.09 (H) 0.50 - 1.40 mg/dL    Calcium 8.9 8.5 - 10.5 mg/dL    AST(SGOT) 47 (H) 12 - 45 U/L    ALT(SGPT) 37 2 - 50 U/L    Alkaline Phosphatase 128 (H) 30 - 99 U/L    Total Bilirubin 0.9 0.1 - 1.5 mg/dL    Albumin 2.8 (L) 3.2 - 4.9 g/dL    Total Protein 6.5 6.0 - 8.2 g/dL    Globulin 3.7 (H) 1.9 - 3.5 g/dL    A-G Ratio 0.8 g/dL   ESTIMATED GFR   Result Value Ref Range    GFR If  40 (A) >60 mL/min/1.73 m 2    GFR If Non  33 (A) >60 mL/min/1.73 m 2   proBrain Natriuretic Peptide, NT   Result Value Ref Range    NT-proBNP 338 (H) 0 - 125 pg/mL   Magnesium   Result Value Ref Range    Magnesium 2.0 1.5 - 2.5 mg/dL   Phosphorus   Result Value Ref Range    Phosphorus 4.1 2.5 - 4.5 mg/dL   Comp Metabolic Panel   Result Value Ref Range    Sodium 133 (L) 135 - 145 mmol/L    Potassium 4.2 3.6 - 5.5 mmol/L    Chloride 95 (L) 96 - 112 mmol/L    Co2 28 20 - 33 mmol/L    Anion Gap 10.0 7.0 - 16.0    Glucose 123 (H) 65 - 99 mg/dL    Bun 52 (H) 8 - 22 mg/dL    Creatinine 1.58 (H) 0.50 - 1.40 mg/dL    Calcium 8.8 8.5 - 10.5 mg/dL    AST(SGOT) 23 12 - 45 U/L    ALT(SGPT) 24 2 - 50 U/L    Alkaline Phosphatase 103 (H) 30 - 99 U/L    Total Bilirubin 0.6 0.1 - 1.5 mg/dL    Albumin 2.5 (L) 3.2 - 4.9 g/dL    Total Protein 6.1 6.0 - 8.2 g/dL    Globulin 3.6 (H) 1.9 - 3.5 g/dL    A-G Ratio 0.7 g/dL   IONIZED CALCIUM   Result Value Ref Range     Ionized Calcium 1.2 1.1 - 1.3 mmol/L   ESTIMATED GFR   Result Value Ref Range    GFR If  55 (A) >60 mL/min/1.73 m 2    GFR If Non African American 45 (A) >60 mL/min/1.73 m 2   Prealbumin   Result Value Ref Range    Pre-Albumin 5.6 (L) 18.0 - 38.0 mg/dL   CBC WITH DIFFERENTIAL   Result Value Ref Range    WBC 7.9 4.8 - 10.8 K/uL    RBC 2.94 (L) 4.70 - 6.10 M/uL    Hemoglobin 8.9 (L) 14.0 - 18.0 g/dL    Hematocrit 29.6 (L) 42.0 - 52.0 %    .7 (H) 81.4 - 97.8 fL    MCH 30.3 27.0 - 33.0 pg    MCHC 30.1 (L) 33.7 - 35.3 g/dL    RDW 50.1 (H) 35.9 - 50.0 fL    Platelet Count 202 164 - 446 K/uL    MPV 12.1 9.0 - 12.9 fL    Neutrophils-Polys 81.60 (H) 44.00 - 72.00 %    Lymphocytes 4.40 (L) 22.00 - 41.00 %    Monocytes 4.40 0.00 - 13.40 %    Eosinophils 4.40 0.00 - 6.90 %    Basophils 0.00 0.00 - 1.80 %    Nucleated RBC 0.00 /100 WBC    Neutrophils (Absolute) 6.87 1.82 - 7.42 K/uL    Lymphs (Absolute) 0.35 (L) 1.00 - 4.80 K/uL    Monos (Absolute) 0.35 0.00 - 0.85 K/uL    Eos (Absolute) 0.35 0.00 - 0.51 K/uL    Baso (Absolute) 0.00 0.00 - 0.12 K/uL    NRBC (Absolute) 0.00 K/uL   LACTIC ACID   Result Value Ref Range    Lactic Acid 0.7 0.5 - 2.0 mmol/L   D-DIMER   Result Value Ref Range    D-Dimer Screen 6.35 (H) 0.00 - 0.50 ug/mL (FEU)   MAGNESIUM   Result Value Ref Range    Magnesium 2.0 1.5 - 2.5 mg/dL   PHOSPHORUS   Result Value Ref Range    Phosphorus 3.7 2.5 - 4.5 mg/dL   DIFFERENTIAL MANUAL   Result Value Ref Range    Bands-Stabs 5.30 0.00 - 10.00 %    Manual Diff Status PERFORMED    PERIPHERAL SMEAR REVIEW   Result Value Ref Range    Peripheral Smear Review see below    PLATELET ESTIMATE   Result Value Ref Range    Plt Estimation Normal    MORPHOLOGY   Result Value Ref Range    RBC Morphology Present     Poikilocytosis 1+     Stomatocytes 1+    Arterial Blood Gas   Result Value Ref Range    Ph 7.05 (LL) 7.40 - 7.50    Pco2 114.2 (HH) 26.0 - 37.0 mmHg    Po2 94.3 (H) 64.0 - 87.0 mmHg    O2 Saturation  94.2 93.0 - 99.0 %    Hco3 31 (H) 17 - 25 mmol/L    Base Excess -2 -4 - 3 mmol/L    Body Temp see below Centigrade   Troponin   Result Value Ref Range    Troponin T 11 6 - 19 ng/L   CBC WITH DIFFERENTIAL   Result Value Ref Range    WBC 11.8 (H) 4.8 - 10.8 K/uL    RBC 3.41 (L) 4.70 - 6.10 M/uL    Hemoglobin 10.3 (L) 14.0 - 18.0 g/dL    Hematocrit 34.8 (L) 42.0 - 52.0 %    .1 (H) 81.4 - 97.8 fL    MCH 30.2 27.0 - 33.0 pg    MCHC 29.6 (L) 33.7 - 35.3 g/dL    RDW 50.8 (H) 35.9 - 50.0 fL    Platelet Count 303 164 - 446 K/uL    MPV 11.5 9.0 - 12.9 fL    Neutrophils-Polys 75.90 (H) 44.00 - 72.00 %    Lymphocytes 7.80 (L) 22.00 - 41.00 %    Monocytes 7.80 0.00 - 13.40 %    Eosinophils 0.90 0.00 - 6.90 %    Basophils 0.00 0.00 - 1.80 %    Nucleated RBC 0.00 /100 WBC    Neutrophils (Absolute) 9.88 (H) 1.82 - 7.42 K/uL    Lymphs (Absolute) 0.92 (L) 1.00 - 4.80 K/uL    Monos (Absolute) 0.92 (H) 0.00 - 0.85 K/uL    Eos (Absolute) 0.11 0.00 - 0.51 K/uL    Baso (Absolute) 0.00 0.00 - 0.12 K/uL    NRBC (Absolute) 0.00 K/uL    Anisocytosis 1+     Macrocytosis 1+     Microcytosis 1+    LACTIC ACID   Result Value Ref Range    Lactic Acid 0.8 0.5 - 2.0 mmol/L   Comp Metabolic Panel   Result Value Ref Range    Sodium 134 (L) 135 - 145 mmol/L    Potassium 5.1 3.6 - 5.5 mmol/L    Chloride 95 (L) 96 - 112 mmol/L    Co2 32 20 - 33 mmol/L    Anion Gap 7.0 7.0 - 16.0    Glucose 157 (H) 65 - 99 mg/dL    Bun 50 (H) 8 - 22 mg/dL    Creatinine 1.58 (H) 0.50 - 1.40 mg/dL    Calcium 9.6 8.5 - 10.5 mg/dL    AST(SGOT) 20 12 - 45 U/L    ALT(SGPT) 26 2 - 50 U/L    Alkaline Phosphatase 112 (H) 30 - 99 U/L    Total Bilirubin 0.5 0.1 - 1.5 mg/dL    Albumin 3.0 (L) 3.2 - 4.9 g/dL    Total Protein 7.3 6.0 - 8.2 g/dL    Globulin 4.3 (H) 1.9 - 3.5 g/dL    A-G Ratio 0.7 g/dL   PLATELET ESTIMATE   Result Value Ref Range    Plt Estimation Normal    MORPHOLOGY   Result Value Ref Range    RBC Morphology Present     Polychromia 1+     Stomatocytes 2+     PERIPHERAL SMEAR REVIEW   Result Value Ref Range    Peripheral Smear Review see below    DIFFERENTIAL MANUAL   Result Value Ref Range    Bands-Stabs 7.80 0.00 - 10.00 %    Manual Diff Status PERFORMED    ESTIMATED GFR   Result Value Ref Range    GFR If  55 (A) >60 mL/min/1.73 m 2    GFR If Non African American 45 (A) >60 mL/min/1.73 m 2   MAGNESIUM   Result Value Ref Range    Magnesium 1.8 1.5 - 2.5 mg/dL   PHOSPHORUS   Result Value Ref Range    Phosphorus 1.7 (L) 2.5 - 4.5 mg/dL   CBC with Differential   Result Value Ref Range    WBC 5.4 4.8 - 10.8 K/uL    RBC 2.97 (L) 4.70 - 6.10 M/uL    Hemoglobin 8.9 (L) 14.0 - 18.0 g/dL    Hematocrit 28.7 (L) 42.0 - 52.0 %    MCV 96.6 81.4 - 97.8 fL    MCH 30.0 27.0 - 33.0 pg    MCHC 31.0 (L) 33.7 - 35.3 g/dL    RDW 47.7 35.9 - 50.0 fL    Platelet Count 250 164 - 446 K/uL    MPV 12.2 9.0 - 12.9 fL    Neutrophils-Polys 80.90 (H) 44.00 - 72.00 %    Lymphocytes 3.50 (L) 22.00 - 41.00 %    Monocytes 5.20 0.00 - 13.40 %    Eosinophils 1.70 0.00 - 6.90 %    Basophils 0.00 0.00 - 1.80 %    Nucleated RBC 0.00 /100 WBC    Neutrophils (Absolute) 4.79 1.82 - 7.42 K/uL    Lymphs (Absolute) 0.19 (L) 1.00 - 4.80 K/uL    Monos (Absolute) 0.28 0.00 - 0.85 K/uL    Eos (Absolute) 0.09 0.00 - 0.51 K/uL    Baso (Absolute) 0.00 0.00 - 0.12 K/uL    NRBC (Absolute) 0.00 K/uL    Hypochromia 1+    Comp Metabolic Panel   Result Value Ref Range    Sodium 132 (L) 135 - 145 mmol/L    Potassium 4.4 3.6 - 5.5 mmol/L    Chloride 94 (L) 96 - 112 mmol/L    Co2 27 20 - 33 mmol/L    Anion Gap 11.0 7.0 - 16.0    Glucose 104 (H) 65 - 99 mg/dL    Bun 50 (H) 8 - 22 mg/dL    Creatinine 1.81 (H) 0.50 - 1.40 mg/dL    Calcium 8.8 8.5 - 10.5 mg/dL    AST(SGOT) 19 12 - 45 U/L    ALT(SGPT) 20 2 - 50 U/L    Alkaline Phosphatase 92 30 - 99 U/L    Total Bilirubin 0.6 0.1 - 1.5 mg/dL    Albumin 2.5 (L) 3.2 - 4.9 g/dL    Total Protein 6.1 6.0 - 8.2 g/dL    Globulin 3.6 (H) 1.9 - 3.5 g/dL    A-G Ratio 0.7 g/dL    ESTIMATED GFR   Result Value Ref Range    GFR If  47 (A) >60 mL/min/1.73 m 2    GFR If Non  39 (A) >60 mL/min/1.73 m 2   PERIPHERAL SMEAR REVIEW   Result Value Ref Range    Peripheral Smear Review see below    PLATELET ESTIMATE   Result Value Ref Range    Plt Estimation Normal    MORPHOLOGY   Result Value Ref Range    RBC Morphology Present     Polychromia 1+     Stomatocytes 2+    DIFFERENTIAL MANUAL   Result Value Ref Range    Bands-Stabs 7.80 0.00 - 10.00 %    Myelocytes 0.90 %    Manual Diff Status PERFORMED    PHOSPHORUS   Result Value Ref Range    Phosphorus 1.9 (L) 2.5 - 4.5 mg/dL   IONIZED CALCIUM   Result Value Ref Range    Ionized Calcium 1.1 1.1 - 1.3 mmol/L   CBC with Differential   Result Value Ref Range    WBC 6.7 4.8 - 10.8 K/uL    RBC 2.96 (L) 4.70 - 6.10 M/uL    Hemoglobin 8.8 (L) 14.0 - 18.0 g/dL    Hematocrit 27.3 (L) 42.0 - 52.0 %    MCV 92.2 81.4 - 97.8 fL    MCH 29.7 27.0 - 33.0 pg    MCHC 32.2 (L) 33.7 - 35.3 g/dL    RDW 46.5 35.9 - 50.0 fL    Platelet Count 293 164 - 446 K/uL    MPV 11.7 9.0 - 12.9 fL    Neutrophils-Polys 77.20 (H) 44.00 - 72.00 %    Lymphocytes 1.80 (L) 22.00 - 41.00 %    Monocytes 4.40 0.00 - 13.40 %    Eosinophils 0.90 0.00 - 6.90 %    Basophils 0.90 0.00 - 1.80 %    Nucleated RBC 0.00 /100 WBC    Neutrophils (Absolute) 6.11 1.82 - 7.42 K/uL    Lymphs (Absolute) 0.12 (L) 1.00 - 4.80 K/uL    Monos (Absolute) 0.29 0.00 - 0.85 K/uL    Eos (Absolute) 0.06 0.00 - 0.51 K/uL    Baso (Absolute) 0.06 0.00 - 0.12 K/uL    NRBC (Absolute) 0.00 K/uL   Comp Metabolic Panel   Result Value Ref Range    Sodium 135 135 - 145 mmol/L    Potassium 3.9 3.6 - 5.5 mmol/L    Chloride 99 96 - 112 mmol/L    Co2 26 20 - 33 mmol/L    Anion Gap 10.0 7.0 - 16.0    Glucose 158 (H) 65 - 99 mg/dL    Bun 41 (H) 8 - 22 mg/dL    Creatinine 1.73 (H) 0.50 - 1.40 mg/dL    Calcium 8.1 (L) 8.5 - 10.5 mg/dL    AST(SGOT) 28 12 - 45 U/L    ALT(SGPT) 18 2 - 50 U/L    Alkaline  Phosphatase 81 30 - 99 U/L    Total Bilirubin 0.7 0.1 - 1.5 mg/dL    Albumin 2.2 (L) 3.2 - 4.9 g/dL    Total Protein 5.9 (L) 6.0 - 8.2 g/dL    Globulin 3.7 (H) 1.9 - 3.5 g/dL    A-G Ratio 0.6 g/dL   Magnesium   Result Value Ref Range    Magnesium 2.0 1.5 - 2.5 mg/dL   Triglyceride   Result Value Ref Range    Triglycerides 94 0 - 149 mg/dL   ESTIMATED GFR   Result Value Ref Range    GFR If African American 50 (A) >60 mL/min/1.73 m 2    GFR If Non  41 (A) >60 mL/min/1.73 m 2   DIFFERENTIAL MANUAL   Result Value Ref Range    Bands-Stabs 14.00 (H) 0.00 - 10.00 %    Metamyelocytes 0.90 %    Manual Diff Status PERFORMED    PERIPHERAL SMEAR REVIEW   Result Value Ref Range    Peripheral Smear Review see below    PLATELET ESTIMATE   Result Value Ref Range    Plt Estimation Normal    MORPHOLOGY   Result Value Ref Range    RBC Morphology Normal    PHOSPHORUS   Result Value Ref Range    Phosphorus 4.3 2.5 - 4.5 mg/dL   IONIZED CALCIUM   Result Value Ref Range    Ionized Calcium 1.1 1.1 - 1.3 mmol/L   CBC with Differential   Result Value Ref Range    WBC 6.3 4.8 - 10.8 K/uL    RBC 2.85 (L) 4.70 - 6.10 M/uL    Hemoglobin 8.8 (L) 14.0 - 18.0 g/dL    Hematocrit 27.8 (L) 42.0 - 52.0 %    MCV 97.5 81.4 - 97.8 fL    MCH 30.9 27.0 - 33.0 pg    MCHC 31.7 (L) 33.7 - 35.3 g/dL    RDW 49.0 35.9 - 50.0 fL    Platelet Count 231 164 - 446 K/uL    MPV 11.5 9.0 - 12.9 fL    Neutrophils-Polys 80.00 (H) 44.00 - 72.00 %    Lymphocytes 2.60 (L) 22.00 - 41.00 %    Monocytes 6.10 0.00 - 13.40 %    Eosinophils 0.90 0.00 - 6.90 %    Basophils 0.00 0.00 - 1.80 %    Nucleated RBC 0.00 /100 WBC    Neutrophils (Absolute) 5.42 1.82 - 7.42 K/uL    Lymphs (Absolute) 0.16 (L) 1.00 - 4.80 K/uL    Monos (Absolute) 0.38 0.00 - 0.85 K/uL    Eos (Absolute) 0.06 0.00 - 0.51 K/uL    Baso (Absolute) 0.00 0.00 - 0.12 K/uL    NRBC (Absolute) 0.00 K/uL   Comp Metabolic Panel   Result Value Ref Range    Sodium 142 135 - 145 mmol/L    Potassium 4.0 3.6 -  5.5 mmol/L    Chloride 104 96 - 112 mmol/L    Co2 30 20 - 33 mmol/L    Anion Gap 8.0 7.0 - 16.0    Glucose 198 (H) 65 - 99 mg/dL    Bun 38 (H) 8 - 22 mg/dL    Creatinine 1.61 (H) 0.50 - 1.40 mg/dL    Calcium 8.2 (L) 8.5 - 10.5 mg/dL    AST(SGOT) 48 (H) 12 - 45 U/L    ALT(SGPT) 31 2 - 50 U/L    Alkaline Phosphatase 74 30 - 99 U/L    Total Bilirubin 0.6 0.1 - 1.5 mg/dL    Albumin 2.2 (L) 3.2 - 4.9 g/dL    Total Protein 6.0 6.0 - 8.2 g/dL    Globulin 3.8 (H) 1.9 - 3.5 g/dL    A-G Ratio 0.6 g/dL   Magnesium   Result Value Ref Range    Magnesium 2.0 1.5 - 2.5 mg/dL   ESTIMATED GFR   Result Value Ref Range    GFR If  54 (A) >60 mL/min/1.73 m 2    GFR If Non  44 (A) >60 mL/min/1.73 m 2   DIFFERENTIAL MANUAL   Result Value Ref Range    Bands-Stabs 6.10 0.00 - 10.00 %    Metamyelocytes 4.30 %    Manual Diff Status PERFORMED    PERIPHERAL SMEAR REVIEW   Result Value Ref Range    Peripheral Smear Review see below    PLATELET ESTIMATE   Result Value Ref Range    Plt Estimation Normal    MORPHOLOGY   Result Value Ref Range    RBC Morphology Normal    PHOSPHORUS   Result Value Ref Range    Phosphorus 3.6 2.5 - 4.5 mg/dL   IONIZED CALCIUM   Result Value Ref Range    Ionized Calcium 1.2 1.1 - 1.3 mmol/L   CBC with Differential   Result Value Ref Range    WBC 5.2 4.8 - 10.8 K/uL    RBC 3.02 (L) 4.70 - 6.10 M/uL    Hemoglobin 9.0 (L) 14.0 - 18.0 g/dL    Hematocrit 29.5 (L) 42.0 - 52.0 %    MCV 97.7 81.4 - 97.8 fL    MCH 29.8 27.0 - 33.0 pg    MCHC 30.5 (L) 33.7 - 35.3 g/dL    RDW 49.6 35.9 - 50.0 fL    Platelet Count 209 164 - 446 K/uL    MPV 12.4 9.0 - 12.9 fL    Neutrophils-Polys 77.40 (H) 44.00 - 72.00 %    Lymphocytes 1.70 (L) 22.00 - 41.00 %    Monocytes 2.60 0.00 - 13.40 %    Eosinophils 0.90 0.00 - 6.90 %    Basophils 0.00 0.00 - 1.80 %    Nucleated RBC 0.40 /100 WBC    Neutrophils (Absolute) 4.84 1.82 - 7.42 K/uL    Lymphs (Absolute) 0.09 (L) 1.00 - 4.80 K/uL    Monos (Absolute) 0.14 0.00 -  0.85 K/uL    Eos (Absolute) 0.05 0.00 - 0.51 K/uL    Baso (Absolute) 0.00 0.00 - 0.12 K/uL    NRBC (Absolute) 0.02 K/uL   Comp Metabolic Panel   Result Value Ref Range    Sodium 143 135 - 145 mmol/L    Potassium 4.2 3.6 - 5.5 mmol/L    Chloride 104 96 - 112 mmol/L    Co2 31 20 - 33 mmol/L    Anion Gap 8.0 7.0 - 16.0    Glucose 177 (H) 65 - 99 mg/dL    Bun 45 (H) 8 - 22 mg/dL    Creatinine 2.46 (H) 0.50 - 1.40 mg/dL    Calcium 8.8 8.5 - 10.5 mg/dL    AST(SGOT) 41 12 - 45 U/L    ALT(SGPT) 35 2 - 50 U/L    Alkaline Phosphatase 72 30 - 99 U/L    Total Bilirubin 0.5 0.1 - 1.5 mg/dL    Albumin 2.3 (L) 3.2 - 4.9 g/dL    Total Protein 6.3 6.0 - 8.2 g/dL    Globulin 4.0 (H) 1.9 - 3.5 g/dL    A-G Ratio 0.6 g/dL   Magnesium   Result Value Ref Range    Magnesium 2.0 1.5 - 2.5 mg/dL   DIFFERENTIAL MANUAL   Result Value Ref Range    Bands-Stabs 15.70 (H) 0.00 - 10.00 %    Myelocytes 1.70 %    Manual Diff Status PERFORMED    PERIPHERAL SMEAR REVIEW   Result Value Ref Range    Peripheral Smear Review see below    PLATELET ESTIMATE   Result Value Ref Range    Plt Estimation Normal    MORPHOLOGY   Result Value Ref Range    RBC Morphology Normal    ESTIMATED GFR   Result Value Ref Range    GFR If  33 (A) >60 mL/min/1.73 m 2    GFR If Non  27 (A) >60 mL/min/1.73 m 2   PHOSPHORUS   Result Value Ref Range    Phosphorus 3.6 2.5 - 4.5 mg/dL   CBC with Differential   Result Value Ref Range    WBC 5.6 4.8 - 10.8 K/uL    RBC 3.00 (L) 4.70 - 6.10 M/uL    Hemoglobin 9.0 (L) 14.0 - 18.0 g/dL    Hematocrit 29.2 (L) 42.0 - 52.0 %    MCV 97.3 81.4 - 97.8 fL    MCH 30.0 27.0 - 33.0 pg    MCHC 30.8 (L) 33.7 - 35.3 g/dL    RDW 50.4 (H) 35.9 - 50.0 fL    Platelet Count 172 164 - 446 K/uL    MPV 12.8 9.0 - 12.9 fL    Neutrophils-Polys 77.40 (H) 44.00 - 72.00 %    Lymphocytes 7.80 (L) 22.00 - 41.00 %    Monocytes 4.30 0.00 - 13.40 %    Eosinophils 0.00 0.00 - 6.90 %    Basophils 0.00 0.00 - 1.80 %    Nucleated RBC 0.00  /100 WBC    Neutrophils (Absolute) 4.87 1.82 - 7.42 K/uL    Lymphs (Absolute) 0.44 (L) 1.00 - 4.80 K/uL    Monos (Absolute) 0.24 0.00 - 0.85 K/uL    Eos (Absolute) 0.00 0.00 - 0.51 K/uL    Baso (Absolute) 0.00 0.00 - 0.12 K/uL    NRBC (Absolute) 0.00 K/uL    Hypochromia 1+     Anisocytosis 1+     Macrocytosis 1+     Microcytosis 1+    Comp Metabolic Panel   Result Value Ref Range    Sodium 146 (H) 135 - 145 mmol/L    Potassium 4.4 3.6 - 5.5 mmol/L    Chloride 106 96 - 112 mmol/L    Co2 31 20 - 33 mmol/L    Anion Gap 9.0 7.0 - 16.0    Glucose 185 (H) 65 - 99 mg/dL    Bun 62 (H) 8 - 22 mg/dL    Creatinine 3.85 (H) 0.50 - 1.40 mg/dL    Calcium 8.7 8.5 - 10.5 mg/dL    AST(SGOT) 45 12 - 45 U/L    ALT(SGPT) 32 2 - 50 U/L    Alkaline Phosphatase 68 30 - 99 U/L    Total Bilirubin 0.8 0.1 - 1.5 mg/dL    Albumin 2.2 (L) 3.2 - 4.9 g/dL    Total Protein 6.2 6.0 - 8.2 g/dL    Globulin 4.0 (H) 1.9 - 3.5 g/dL    A-G Ratio 0.6 g/dL   Magnesium   Result Value Ref Range    Magnesium 2.1 1.5 - 2.5 mg/dL   ESTIMATED GFR   Result Value Ref Range    GFR If  20 (A) >60 mL/min/1.73 m 2    GFR If Non  16 (A) >60 mL/min/1.73 m 2   DIFFERENTIAL MANUAL   Result Value Ref Range    Bands-Stabs 9.60 0.00 - 10.00 %    Metamyelocytes 0.90 %    Manual Diff Status PERFORMED    PERIPHERAL SMEAR REVIEW   Result Value Ref Range    Peripheral Smear Review see below    PLATELET ESTIMATE   Result Value Ref Range    Plt Estimation Normal    MORPHOLOGY   Result Value Ref Range    RBC Morphology Present     Polychromia 1+     Target Cells 1+    URINE CREATININE RANDOM   Result Value Ref Range    Creatinine, Random Urine 297.96 mg/dL   URINE SODIUM RANDOM   Result Value Ref Range    Sodium, Urine -per volume 27 mmol/L   URINE PROTEIN   Result Value Ref Range    Total Protein, Urine 189.0 (H) 0.0 - 15.0 mg/dL   COMPLEMENT C3   Result Value Ref Range    C3 Complement 95.8 87.0 - 200.0 mg/dL   COMPLEMENT C4   Result Value Ref Range     Complement C4 6.5 (L) 19.0 - 52.0 mg/dL   ABG - LAB   Result Value Ref Range    Ph 7.34 (L) 7.40 - 7.50    Pco2 58.8 (HH) 26.0 - 37.0 mmHg    Po2 136.1 (H) 64.0 - 87.0 mmHg    O2 Saturation 97.7 93.0 - 99.0 %    Hco3 31 (H) 17 - 25 mmol/L    Base Excess 4 (H) -4 - 3 mmol/L    Body Temp see below Centigrade   BLOOD CULTURE    Specimen: Peripheral; Blood   Result Value Ref Range    Significant Indicator POS (POS)     Source BLD     Site PERIPHERAL     Culture Result (A)      Growth detected by [employer] instrument. 01/30/2021  23:04  Staphylococcus aureus (methicillin sensitive)  detected by PCR.      Culture Result Staphylococcus aureus (A)        Susceptibility    Staphylococcus aureus - [facility]     Azithromycin <=2 Sensitive mcg/mL     Clindamycin <=0.25 Sensitive mcg/mL     Cefotaxime <=8 Sensitive mcg/mL     Cefazolin <=8 Sensitive mcg/mL     Cefepime <=4 Sensitive mcg/mL     Ceftaroline <=0.5 Sensitive mcg/mL     Daptomycin <=0.5 Sensitive mcg/mL     Ampicillin/sulbactam <=8/4 Sensitive mcg/mL     Erythromycin <=0.25 Sensitive mcg/mL     Vancomycin 1 Sensitive mcg/mL     Oxacillin <=0.25 Sensitive mcg/mL     Penicillin >2 Resistant mcg/mL     Pip/Tazobactam <=8 Sensitive mcg/mL     Trimeth/Sulfa <=0.5/9.5 Sensitive mcg/mL     Tetracycline <=4 Sensitive mcg/mL   BLOOD CULTURE    Specimen: Peripheral; Blood   Result Value Ref Range    Significant Indicator POS (POS)     Source BLD     Site PERIPHERAL     Culture Result (A)      Growth detected by [employer] instrument. 01/31/2021  02:08    Culture Result (A)      Staphylococcus aureus  See previous culture for sensitivity report.     PROCALCITONIN   Result Value Ref Range    Procalcitonin 4.71 (H) <0.25 ng/mL   Lactic Acid -STAT Once   Result Value Ref Range    Lactic Acid 2.2 (H) 0.5 - 2.0 mmol/L   Prothrombin time (INR)   Result Value Ref Range    PT 13.9 12.0 - 14.6 sec    INR 1.03 0.87 - 1.13   Lactic Acid Every four hours after STAT order   Result Value Ref Range     Lactic Acid 2.2 (H) 0.5 - 2.0 mmol/L   FUNGAL BLOOD CULTURE    Specimen: Blood   Result Value Ref Range    Significant Indicator NEG     Source BLD     Site Peripheral     Culture Result Culture in progress.    Lactic Acid Every four hours after STAT order   Result Value Ref Range    Lactic Acid 2.0 0.5 - 2.0 mmol/L   PHOSPHORUS   Result Value Ref Range    Phosphorus 7.4 (H) 2.5 - 4.5 mg/dL   CBC with Differential   Result Value Ref Range    WBC 4.8 4.8 - 10.8 K/uL    RBC 2.84 (L) 4.70 - 6.10 M/uL    Hemoglobin 8.5 (L) 14.0 - 18.0 g/dL    Hematocrit 28.1 (L) 42.0 - 52.0 %    MCV 98.9 (H) 81.4 - 97.8 fL    MCH 29.9 27.0 - 33.0 pg    MCHC 30.2 (L) 33.7 - 35.3 g/dL    RDW 52.4 (H) 35.9 - 50.0 fL    Platelet Count 80 (L) 164 - 446 K/uL    MPV 13.6 (H) 9.0 - 12.9 fL    Neutrophils-Polys 76.50 (H) 44.00 - 72.00 %    Lymphocytes 7.00 (L) 22.00 - 41.00 %    Monocytes 0.00 0.00 - 13.40 %    Eosinophils 0.90 0.00 - 6.90 %    Basophils 0.00 0.00 - 1.80 %    Nucleated RBC 0.00 /100 WBC    Neutrophils (Absolute) 4.38 1.82 - 7.42 K/uL    Lymphs (Absolute) 0.34 (L) 1.00 - 4.80 K/uL    Monos (Absolute) 0.00 0.00 - 0.85 K/uL    Eos (Absolute) 0.04 0.00 - 0.51 K/uL    Baso (Absolute) 0.00 0.00 - 0.12 K/uL    NRBC (Absolute) 0.00 K/uL    Hypochromia 1+     Anisocytosis 1+     Macrocytosis 1+     Microcytosis 1+    Comp Metabolic Panel   Result Value Ref Range    Sodium 147 (H) 135 - 145 mmol/L    Potassium 5.0 3.6 - 5.5 mmol/L    Chloride 109 96 - 112 mmol/L    Co2 27 20 - 33 mmol/L    Anion Gap 11.0 7.0 - 16.0    Glucose 155 (H) 65 - 99 mg/dL    Bun 88 (HH) 8 - 22 mg/dL    Creatinine 6.30 (HH) 0.50 - 1.40 mg/dL    Calcium 8.2 (L) 8.5 - 10.5 mg/dL    AST(SGOT) 53 (H) 12 - 45 U/L    ALT(SGPT) 26 2 - 50 U/L    Alkaline Phosphatase 61 30 - 99 U/L    Total Bilirubin 0.9 0.1 - 1.5 mg/dL    Albumin 1.9 (L) 3.2 - 4.9 g/dL    Total Protein 5.6 (L) 6.0 - 8.2 g/dL    Globulin 3.7 (H) 1.9 - 3.5 g/dL    A-G Ratio 0.5 g/dL     *Note: Due to a  large number of results and/or encounters for the requested time period, some results have not been displayed. A complete set of results can be found in Results Review.       Imaging:   IR-CVC NON TUNNELED > AGE 5   Final Result      Successful image guided non-tunneled dialysis catheter placement.      Plan: The catheter is available for immediate use.         US-EXTREMITY VENOUS LOWER BILAT   Final Result      DX-CHEST-LIMITED (1 VIEW)   Final Result      1.  Removal of endotracheal tube and right PICC line.   2.  Hypoinflation with mild patchy opacities probably related to atelectasis. Correlate clinically for infection.      DX-CHEST-PORTABLE (1 VIEW)   Final Result         1.  Pulmonary edema and/or infiltrates are identified, which appear somewhat increased since the prior exam.   2.  Cardiomegaly      DX-ABDOMEN FOR TUBE PLACEMENT   Final Result         1.  Air-filled distended loops of bowel are seen with reactive mucosal pattern, appearance suggests ileus or enteritis. Recommend radiographic followup to resolution to exclude progression to obstruction.   2.  Nasogastric tube tip terminates overlying the expected location of the gastric antrum, could be slightly advanced.      DX-CHEST-PORTABLE (1 VIEW)   Final Result         1.  Bilateral basilar atelectasis, no focal infiltrate   2.  Cardiomegaly      DX-CHEST-PORTABLE (1 VIEW)   Final Result      Perihilar opacification could be due to atelectasis or infiltrate versus mild edema.      Possible trace effusions.      Hypoventilatory change.      CT-ABDOMEN-PELVIS W/O   Final Result      1.  Severe acute pancreatitis, worse than on prior exam.   2.  Multiple dilated small bowel loops suggesting distal small bowel obstruction, similar to prior exam.   3.  No evidence of bowel perforation.      DX-CHEST-LIMITED (1 VIEW)   Final Result      Bibasilar atelectasis, with no significant interval change.               INTERPRETING LOCATION: [address] [place] [address],  [id number]      [id number] VIEW   Final Result         Diffuse gaseous distention of the small bowel, worse than prior, concerning for small bowel obstruction.      IR-PICC LINE PLACEMENT W/ GUIDANCE > AGE 5   Final Result                  Ultrasound-guided PICC placement performed by qualified nursing staff as    above.          DX-CHEST-PORTABLE (1 VIEW)   Final Result         1.  Interstitial pulmonary opacities suggests atelectasis or possible subtle infiltrates.      CT-ABDOMEN-PELVIS W/O   Final Result      1.  Again seen pancreatitis with peripancreatic effusions. This is mildly worsened from comparison.      2.  Again seen dilated loops of small intestine with decompression of the colon and distal small bowel consistent with ileus versus partial small bowel obstruction.      3.  Bibasilar atelectasis and small bilateral pleural effusions.      [id number] VIEW   Final Result      1.  Moderate small bowel dilation with multiple air-fluid levels consistent with small bowel obstruction      2.  Enteric catheter appears appropriately located      CT-ABDOMEN-PELVIS W/O   Final Result         1. Worsening inflammatory change and fluid surrounding the pancreas, in keeping with acute pancreatitis.      2. Diffuse dilatation of the small bowel and proximal colon with decompressed distal colon adjacent to the inflammation in the tail the pancreas. This could relate to colonic obstruction due to adjacent inflammatory change or ileus.      3. Mild wall thickening of the stomach fundus adjacent to the pancreatic tail, likely reactive.      DX-ABDOMEN FOR TUBE PLACEMENT   Final Result         Gastric drainage tube with tip projecting over the expected area of the stomach fundus.      DX-CHEST-FOR LINE PLACEMENT Perform procedure in: Patient's Room   Final Result      Left midlung and bibasilar linear atelectasis.      Dialysis catheter projects over the SVC.      No pneumothorax.         [id number] VIEW   Final Result       Increased bowel gas concerning for early or partial small bowel obstruction.      DX-CHEST-PORTABLE (1 VIEW)   Final Result      Hypoinflation with LEFT mid and lower lung atelectasis, with possible superimposed LEFT basilar pneumonia.      [id number]   Final Result      1.  Pancreatic edema with significant peripancreatic stranding and ill-defined fluid, consistent with acute interstitial edematous pancreatitis.      2.  Small amount of ascites.      3.  No choledocholithiasis is identified.      4.  Status post cholecystectomy.      CT-ABDOMEN-PELVIS WITH   Final Result      1.  Inflammatory stranding involving the pancreas with fluid within the anterior pararenal space consistent with pancreatitis.      2.  Fatty liver.      3.  Prior cholecystectomy.      4.  Bibasilar atelectasis/consolidation.      DX-CHEST-PORTABLE (1 VIEW)   Final Result      No evidence of acute cardiopulmonary process.      IR-PICC LINE PLACEMENT W/ GUIDANCE > AGE 5    (Results Pending)   [id number] VIEW    (Results Pending)       Problem Representation:   The patient is a 55-year-old male with a past medical history of alcohol use disorder, crohn's disease, non-ischemic cardiomyopathy (?from alcohol use, last EF 25-30%), and lap monico on 7/2020 who presented on 1/14/2021 with severe pancreatitis and a hospital course complicated by intubation, renal failure, ileus/SBO, MSSA bacteremia.     * Sepsis due to methicillin susceptible Staphylococcus aureus (MSSA) with acute renal failure (HCC)  Assessment & Plan  On 1/30 the patient spiked a fever of 101.1 and became hypotensive and tachycardic (2/4 SIRS criteria). Cultures from 1/30 grew MSSA (unclear source, possibly line related?). The patient's PICC was removed on 1/30 due to infiltration. The patient has since been on Ancef.     Plan:  - Continue Ancef (start date 1/31)  - Blood cultures ordered and should be repeated every 48 hours until negative.   - If the patient's blood  cultures are persistently positive, we will need echo/AUGUST. If the patient has a new bundle branch block on telemetry, we should consider perivalvular abscess.   - Recommend keeping the patient on antibiotics for at least 3 weeks from last negative blood culture (can consult ID at that time).   - Continue to monitor daily for back pain, joint pain, nuchal rigidity, and mental status changes.      Thrombocytopenia (HCC)  Assessment & Plan  The patient's platelet count on 2/1 dropped by more than 50%. This is concerning given that he is acutely ill and could be from multiple causes such as HIT, DIC, medications (ancef), sepsis. The patient has been on Heparin since 1/14, which makes HIT less likely, but 4T score is 5 which is intermediate. Given that the patient has severe pancreatitis, he is at a high risk for DIC. However, his ISTH criteria for DIC is 4 which is not suggestive of DIC (but given his risk we can repeat this if it continues to fall). His fibrinogen is also very high (which may not be reliable in his case since he is also septic. Also, some studies suggest that elevated fibrinogen can happen in DIC as well and is a poor prognostic indicator). Ancef and other cephalosporins are known to cause drops in platelet count and he was switched on 1/31 so it is possible. ITP can also cause this as well.    Plan:  - Ordered TEG to ensure there is nothing else abnormal. Can repeat DIC panel in 1-2 days if platelets continue to fall.   - Ordered HIT panel and we can stop heparin until it returns  - Continue to monitor CMP and look for signs of bleeding.   - If all other workup is negative it is most likely from medications.    Ileus (HCC)- (present on admission)  Assessment & Plan  The patient had an Ileus that is most likely a complication of his severe pancreatitis. NG tube has been in place since 1/19 and TPN was started. Tthe patient has since been having bowel sounds and has been having output from his rectal  tube. He has not had any nausea and vomiting or any abdominal pain. He has also been hungry and wanting to eat. TPN has been off since 1/30 when PICC was infiltrated.     Plan:  - Abdominal plain film to see if ileus has resolved.   - Continue NG tube, but stop suction.   - SLP consulted and recommended a diet.   - Can trial clears right now with NG suction off and if the patient is tolerating (I.e. no nausea and vomiting), we can remove NG tube in the morning. However, if not, we will restart suction and keep NG tube in place.   - The patient has been having nutrition on/off for the 18 days he has been here. I anticipate that he may have some refeeding syndrome so we will continue to aggressively check and replace electrolytes.       Acute respiratory failure with hypoxia and hypercapnia (HCC)- (present on admission)  Assessment & Plan  On 1/25 the patient had a PCO2 of 114. He does not have a history of COPD. The patient was intubated from 1/25 to 1/27. He is now requiring 1L of oxygen (not on oxygen at home). This is likely not ARDS, but possibly atelectasis vs decreased respiratory drive from opiate use during that time and the lasting effects from being subsequently intubated.     Plan:  - Continue IS for atelectasis  - Continue RT protocol with ladan  - Stopped Fentanyl and giving Dilaudid 1mg q4h PRN for pain. We will use this as sparingly as possible so to not cause further respiratory depression.  - Continue oxygen weaning protocol and keep sats around 90%.   - Out of bed to chair orders are in to also help with lung volumes. Aggressive PT/OT to help keep the patient mobilized.     Acute pancreatitis- (present on admission)  Assessment & Plan  This is likely alcohol related pancreatitis (the patient was drinking 7 beers per day prior to admission) that is very severe by Erica criteria with worsening creatinine.  The patient also had other complications of pancreatitis including respiratory failure (not  ARDS however, ?opiod related) and ileus. The patient currently has an NG tube in place.     Plan:  - Continue monitoring CMP and replacing electrolytes as needed.  - Continue IV Fluids (D5 1/2NS at 83 cc/hr)  - Gave a bolus of NS  - Carefully using Dilaudid for pain control given renal failure.   - Continuing NG tube and slowly advancing diet.      Acute kidney injury (HCC)- (present on admission)  Assessment & Plan  The patient's Cr has been worsening and improving since admission (with baseline Cr <1) which initially may have been from hypotension, vasoconstiction, and direct nephrotoxic damage from his acute pancreatitis. . However, on 1/30 he patient was febrile, hypotensive, and septic which likely precipitated his current kidney failure. The patient had a HD catheter placed on 1/31 and initiated dialysis.     Plan:  - Continue HD per Nephrology  - Strict monitoring of I&O with jorgensen catheter  - Will keep Jorgensen catheter in for now  - Continue to maintain blood pressure/adequate perfusion  - Avoid any nephrotoxic medications and renally dose all medications  - Continue to monitor CMP to see if this improves    Hyperglycemia- (present on admission)  Assessment & Plan  The patient is not a diabetic with last A1c 5.5. However, he has been having episodes of hyperglycemia likely from pancreatitis.     Plan:  - Continue to monitor with finger sticks  - Continue SSI for now since diet has been advanced    Essential hypertension- (present on admission)  Assessment & Plan  The patient is on Carvedilol 25mg bid, Lasix 40mg, Entresto (49-51), and Spironolactone at home. He was hypotensive and septic on 1/30. He has remained hypotensive and is now on midodrine. Therefore, we will hold these medications and re-evaluate at discharge.     Plan:  - Continue Midodrine and hold for SBP>110   - Continue holding home medications     Hypocalcemia- (present on admission)  Assessment & Plan  Likely from severe pancreatitis.      Plan:  - Continue to monitor with CMP.  - Replace carefully given renal dysfunction    Hypertriglyceridemia- (present on admission)  Assessment & Plan  RESOLVED.   The patient was noted to have a triglyceride level of 571. While this is moderate, a triglyceride level >500 increases risk for pancreatitis. His most recent triglyceride level was in the 90s.       Chronic back pain- (present on admission)  Assessment & Plan  The patient has a history of chronic back pain. This is important to note since he has MSSA bacteremia and we should be careful to distinguish his pain.     Plan:  - Continue Lidocaine patches, Dilaudid PRN 4 hrs for pancreatitis.     Nonischemic cardiomyopathy (HCC)- (present on admission)  Assessment & Plan  The patient has a history of non-ischemic cardiomyopathy (worst EF was 30%). This may have been from alcohol use. However, the patient has since been on Entresto, Spironolactone, Lasix, and Coreg. Since thn, his EF has actually improved back to normal (last echo 2018 EF 65%).     Plan:  - Stopped home medications at this time due to hypotension.   - Will continue when clinically appropriate.   - Using gentle hydration  - Can consider another echo if blood cultures remain positive.     Restless leg syndrome- (present on admission)  Assessment & Plan  History of RLS.     Plan:  - Continue Ropinerole.    Obesity (BMI 30-39.9)- (present on admission)  Assessment & Plan    Body mass index is 37.52 kg/m².   Plan:  - Counseling when clinically appropriate    Crohn disease (HCC)- (present on admission)  Assessment & Plan  The patient has a history of Crohn's disease and stated that he has been in remission for years. He follows with Dr. Colon as an outpatient.     Plan:  - May need outpatient follow-up      DVT ppx: Holding heparin for now until HIT panel comes back.  Diet: Clear liquids, mildly thickened  Tubes/Lines: Rectal tube, NG tube, Hudson Catheter, HD catheter ([facility])  Code status:  JASE Rodriguez M.D.

## 2021-02-01 NOTE — THERAPY
Speech Language Pathology   Initial Assessment     Patient Name: Shaka Riley  AGE:  55 y.o., SEX:  male  Medical Record #: [id number]  Today's Date: 2/1/2021     Precautions: Fall Risk, Nasogastric Tube, Swallow Precautions ( See Comments), Other (See Comments)(rectal tube)  Comments: has NGT to suction.  aspiration risk    Assessment    Patient is 55 y.o. male admitted on 1/14/2021 with abdominal pain and emesis, diagnosed with acute pancreatitis, SBO and ONOFRE.  Was found unresponsive on 1/25/21 and transferred to ICU - he was subsequently intubated due to hypercapnic respiratory failure and PCO2 of 114. He was extubated 1/27.  Currently he has NGT to suction with minimal output.   PMH:  Prior PE, ETOH use, CAD, HTN.  Swallowing evaluation ordered, and ok from MD to trial clear liquids and applesauce.      Patient awake, alert and oriented in all spheres.  Oral mechanism evaluation noted to be grossly intact with intermittent throat clearing on secretions.  Patient does have large bore NGT in which can make it more painful and difficult to swallow. Presentation of PO consisted of ice chips, thin liquids, mildly thick liquids, Jello  and applesauce (only medically cleared to test these consistencies).  Patient with inconsistent throat clearing on Jell-O with increased throat clearing and subtle vocal quality changes with thin liquids which can be concerning for possible penetration/aspiration.  At this time, would recommend initiation of clear liquid diet with mildly thick liquids (per RD, order to be placed in Epic as liquidised diet with mildly thick liquids with CLEAR LIQUIDS as modifier).  SLP will follow for PO trials and upgrade as appropriate. STOP PO with any difficulty.    Plan    Clear liquid diet with mildly thick liquids (per RD, order to be placed in Epic as liquidised diet with mildly thick liquids with CLEAR LIQUIDS as modifier).     Upright at 90* for all PO intake    NO STRAWS    Recommend  Speech Therapy 3 times per week until therapy goals are met for the following treatments:  Dysphagia Training and Patient / Family / Caregiver Education.    Discharge Recommendations: Recommend post-acute placement for additional speech therapy services prior to discharge home    Objective       02/01/21 1250   Pain 0 - 10 Group   Therapist Pain Assessment Nurse Notified;Post Activity Pain Same as Prior to Activity  (back pain, RN notified)   Prior Living Situation   Prior Services None   Housing / Facility 2 Story Apartment / Condo   Rail Right Rail (Steps into Home)   Elevator No   Bathroom Set up Bathtub / Shower Combination   Equipment Owned None   Lives with - Patient's Self Care Capacity Spouse   Comments per notes: Pt reports spouse has RA and cannot physically assist him. Spouse does drive   Prior Level Of Function   Communication Within Functional Limits   Swallow Within Functional Limits   Dentition Intact   Dentures None   Hearing Within Functional Limits for Evaluation   Hearing Aid None   Vision Within Functional Limits for Evaluation   Patient's Primary Language English   Education Some High School   Education Years Completed 11   Occupation (Pre-Hospital Vocational) Employed Full Time  ()   Labial Function   Labial Structure At Rest Within Functional Limits   Labial Vowel Production / I /, / U / Within Functional Limits   Labial Sequence / I /, / U / Within Functional Limits   Frown, Pucker Within Functional Limits   Lingual Function   Lingual Structure At Rest Within Functional Limits   Lingual Protrude Within Functional Limits   Lingual Retract Within Functional Limits   Elevate In Mouth Within Functional Limits   Elevate Outside Mouth Within Functional Limits   Lateralization No Impairment Left;No Impairment Right   Lick Lips (Circular) Within Functional Limits   Lick Palate No Impairment   / Pa / 5X's Within Functional Limits   / Ta / 5X's Within Functional Limits    / Ka / 5X's Within Functional Limits   / [place]aka / 5X's Within Functional Limits   Jaw   Jaw Structure At Rest Within Functional Limits   Bite (Masseter) Within Functional Limits   Chew (Rotary) Not Tested   Jaw Open / Resist Within Functional Limits   Jaw Close / Resist Within Functional Limits   Velar Function   Velar Structure At Rest Within Functional Limits   / A / Prolonged Within Functional Limits   / A /  5X's Within Functional Limits   Gag / Palatal Reflex Not Tested   Laryngeal Function   Voice Quality   (intermittent throat clearing before PO offered)   Volutional Cough Minimal   Excursion Upon Swallow Complete  (adequate)   Max Phonation Time (Seconds) 6 seconds   Oral Food Presentation   Ice Chips Within Functional Limits   Single Swallow Mildly Thick (2) - (Nectar Thick)  Within Functional Limits   Serial Swallow Mildly Thick (2) - (Nectar Thick)   (1 episode of throat clearing with straw sip)   Single Swallow Thin (0) Minimal   Serial Swallow Thin (0) Minimal  (more consistent throat clearing following swallow)   Liquidised (3) Within Functional Limits   Pureed (4) Not Tested  (Not cleared by MD to test this)   Minced & Moist (5) - (Dysphagia II) Not Tested   Soft & Bite-Sized (6) - (Dysphagia III) Not Tested   Regular (7) Not Tested   Regular-Easy to Chew (7) Not Tested   Self Feeding Needs Assistance   Tracheostomy   Tracheostomy  No   Dysphagia Strategies / Recommendations   Strategies / Interventions Recommended (Yes / No) Yes   Compensatory Strategies Monitor During Meals;No Straws;Single Sips / Bites;Multiple Swallows   Diet / Liquid Recommendation Liquidised (3) - (Nectar Thick Full Liquid);Mildly Thick (2) - (Nectar Thick)  (CLEAR LIQUIDS ONLY PER MD)   Medication Administration  Whole with Liquid Wash   Therapy Interventions Dysphagia Therapy By Speech Language Pathologist;Pharyngeal / Laryngeal Exercises   Follow Up SLP Evaluation PO trials for diet upgrade once medically cleared    Dysphagia Rating   Nutritional Liquid Intake Rating Scale Thickened beverages (mildly thick unless otherwise specified)   Nutritional Food Intake Rating Scale Total oral diet of a single consistency   Short Term Goals   Short Term Goal # 1 Pt will be able to consume PO trials of PU4/MM5/SB6/TN0 once medically cleared with no overt S/Sx of aspiration noted   Problem List   Problem List Dysphagia   Anticipated Discharge Needs   Discharge Recommendations Recommend post-acute placement for additional speech therapy services prior to discharge home   Therapy Recommendations Upon DC Dysphagia Training;Community Re-Integration;Patient / Family / Caregiver Education

## 2021-02-01 NOTE — PROGRESS NOTES
Pt currently NPO due to ileus. Spoke with MD bolaños for speech therapist to evaluate patient and have PO intake. MD clarified that liquids, jello and applesauce are okay. Will try and advance throughout stay.

## 2021-02-01 NOTE — PROGRESS NOTES
[facility] Progress Note    Hemodialysis treatment #1 ordered today per Dr. Gimenez x 2 hours.   Treatment initiated at 1621.     Hypotensive at 1800: 77/47 requiring NS bolus 300 mL; see paper flow sheet for details.     Net UF -300 mL (+ fluid balance).     Post tx, CVC flushed with saline then locked with heparin 1000 units/mL per designated amount in each wing then clamped and capped. Aspirate heparin prior to next CVC use.    Report given to Primary RN.

## 2021-02-01 NOTE — CARE PLAN
Problem: Nutritional:  Goal: Achieve adequate nutritional intake  Description: Diet to advance >clears per MD and patient will consume >50% of meals  Outcome: NOT MET   Pt started on clear liquid liquidized, mildly thickened liquids diet (aka mildly thickened clear liquids diet). See RD note.    RD following.

## 2021-02-01 NOTE — THERAPY
Occupational Therapy Contact Note     Patient Name: Shaka Riley  Age:  55 y.o., Sex:  male  Medical Record #: [id number]  Today's Date: 1/31/2021 01/31/21 [id number]   Interdisciplinary Plan of Care Collaboration   Collaboration Comments OT eval attempted. Pt off unit for dialysis catheter placement and HD treatment. Will re-attempt eval as able/appropriate.

## 2021-02-01 NOTE — NON-PROVIDER
"*Medical Student Practice Note*    ID: Mr. Shaka Riley is a 56 y/o male with a past medical history of Crohn's, non-ischemic cardiomyopathy with EF of 25-30% that resolved, atrial fibrillation due to pulmonary embolism, NSTEMI, HTN, obesity, hyperlipidemia, sleep apnea, chronic alcohol use, and cholecystitis requiring a lap cholecystectomy 07/2020 who was admitted 18 days ago for acute pancreatitis.     Interval Events:  -A non-tunneled dialysis catheter was placed yesterday  -Pt went to first hemodialysis session yesterday   -Pt remains hypotensive over 24-hr interval     Subjective  Mr. Matt states he feels \"okay\" this morning. His main concern is that he is hungry and thirsty and hasn't had anything to eat or drink for days. He denies any nausea, vomiting, or abdominal pain, and he cannot discern whether he is passing flatus due to the rectal tube placement. He describes having generalized pain all over his body, but the areas that hurt the worst include his right shoulder and back. He understands that narcotic use is limited due to his ongoing pancreatitis.    Review of Systems  *See HPI*  General: denies fever, chills, or night sweats  HEENT: denies blurry vision, rhinorrhea  Cardiac: denies angina, palpitations, edema and dyspnea  Respiratory: denies shortness of breath, + for dry cough  GI: denies nausea, vomiting, and abdominal pain  Hematologic: + for easy bruising (abdomen at heparin injection sites)    Past Medical History  -Cholecystitis with lap monico 07/2020  -Crohn's disease  -CHF  -A-fib   -Pulmonary embolism  -NSTEMI  -HTN  -Hyperlipidemia  -Sleep apnea  -Restless leg syndrome     Medications (at home)  Carvedilol, furosemide, hydroxyzine, ropinirole, sacubutril-valsartan, spironolactone     Family History   -Cancer in mom  -Heart disease father's side of family    Social History  , never smoker, 6-7 beers daily until this hospital admission, methamphetamine (duration 5 years, quit in " )    Objective  Vitals  T: 96.6-97.3  BP: /47-66  P: 77-94  RR: 18-22  O2: 95-99% with face mask on 4L    Input: 1,733  -IV: 797  -Dialysis: 600  -IV piggyback: 216.7  -Other: 120    Output: 1900  -Urine: 500  -Dialysis: 300  -Stool: 200  -N    Net: -167    Physical Exam   General: Ill-appearing, obese, white male with pleasant affect  Skin: Multiple bruises on abdomen  HEENT: normocephalic, atraumatic, no conjunctival injection or scleral icterus, extraocular movements intact, right internal jugal vein catheter placed, NG tube in place  Cardiovascular: normal rate, rhythm; no murmurs appreciated   Pulmonary: chest symmetry with respirations, clear to auscultation bilaterally on anterior chest wall, oxygen mask with 4L in place   Abdomen: scars from lap monico, multiple bruises and indurations at heparin injection sites, hypoactive bowel sounds, distension, soft and non-tender to palpation in all 4 quadrants, no guarding, no shifting dullness  Genitourinary: jorgensen catheter with miniscule amount of yellow urine  Rectal: rectal tube in place draining brown diarrhea  Extremities: bilateral lower extremity edema; IV in posterior right forearm  Neurological: Sensation intact in all extremities, no focal cranial nerve deficits appreciated    Labs  CBC  -continues to be anemic with Hgb 8.5  -thrombocytopenia (lost >50% of platelet count from previous reading)    CMP:   -hypernatremic  -hyperglycemia  -Kidney failure (BUN, creatinine, GFR all significantly elevated)  -Hypocalcemia  -AST mildly elevated  -Hyperphosphatemia     Imaging  None reported    Assessment/Plan   Mr. Riley is a 56 y/o male admitted 2021 for acute pancreatitis. His hospital stay has been complicated by an ileus/SBO, acute renal failure, acute hypercapnic respiratory failure requiring intubation for 2 days, and sepsis. His vital signs appear to be stabilizing, however, labs still show electrolyte derangements and new onset  thrombocytopenia.    #Bacteremia likely secondary to contaminated PICC line  -Blood cultures grew staph aureus (methicillin sensitive) which was likely contracted from an infected PICC line that was removed  - He is currently receiving cefazolin 2g q24hrs scheduled through 2/13; consult ID for recommendations of antibiotic duration due to patient's extensive co-morbidities     #Acute Renal Failure secondary to Acute kidney injury secondary to hypotension secondary to acute respiratory failure compounded with sepsis  -Nephrology consulted  -1st hemodialysis session was yesterday, second session scheduled today per nephrology   -Maintain normotensive blood pressure to maximize chance of kidney recovery by holding diuretics and giving LR bolus PRN for hypotensive pressures   -Refrain from using nephrotoxic medications     #Thrombocytopenia   -Possible causes from most to least likely: cefazolin adverse reaction, type 2 HIT, DIC   -Stop heparin, order HIT panel, order fibrinogen, D-dimer, and check for schistocytes on blood smear   -Consider bridging therapy to wafarin   -Platelet transfusion suggested if <10K or if symptoms occur      #Hypernatremia secondary to NG tube  -Pt has been NPO and had NG tube to suction  -Consult Speech/Path for diet recommendation   -LR bolus should help (along with patient's ongoing hypotension)     #Acute Pancreatitis secondary to chronic alcohol abuse  -Pt denies any abdominal pain, but does complain of back pain  -Have patient ambulating as tolerated and working with PT/OT  -Pain being managed with acetaminophen 650mg (use conservatively with ongoing ONOFRE) and fentanyl 25mcg PRN    -monitor for any changes in symptoms    #Ileus vs SBO likely secondary to a combination of medications, acute pancreatitis, ethanol abuse, and PMH of Crohns   -NG tube to suction removed 900ml in last 24hrs  -Surgery was consulted in the past and determined it was a non-surgical case   -Pt denies any pain and  is having diarrhea per rectal tube  -Speech path evaluating for return to liquid diet today  -Encourage pt to ambulate and work with PT/OT  -Monitor for changes in symptoms     #Hyperglycemia secondary to diabetes mellitus   -Pt placed on sliding insulin scale   -Continue to monitor and adjust insulin dosing upon returning to normal diet    #Back pain  -Continue to treat with lidocaine patch  -Have patient sit in chair for meals  -Encourage ambulation as tolerated     #Restless leg syndrome (present on admission)  -Being managed with ropinirole 2mg    #Crohn disease  -Self reports it being in remission for years    #Obesity  -Alcohol cessation and weight loss counseling at discharge    Note written by: Denise Francisco MS3  Attending Physician: Dr. Girish Ching

## 2021-02-02 PROBLEM — D61.818 PANCYTOPENIA (HCC): Status: ACTIVE | Noted: 2021-01-01

## 2021-02-02 NOTE — PROGRESS NOTES
Nephrology Daily Progress Note    Date of Service  2/2/2021    Chief Complaint  55 y.o. male who presented 1/14/2021 with abdominal pain and emesis, found to have acute pancreatitis with ONOFRE prompting renal consult.     Upon admission, CT abdomen showing inflammatory stranding involving pancreas with fluid within the anterior pararenal space consistent with pancreatitis.  Cr 1.1 on admission, worsened to 3.5 this AM. Oliguric overnight and worsening hyperkalemia despite NS at 75cc/hr. ~200cc UOP after lasix 20mg. Ongoing severe abd pain and worsening abd distention. Worsening shortness or breath and subjective fevers. Being transferred to ICU.     DAILY NEPHROLOGY SUMMARY:  1/16: consult done  1/17: 390cc UOP documented, found to have SBO-NG tube placed, cr stable, abd pain improving, no fluids running   1/18: Hypertensive this AM, improving UOP, cr downtrending   1/19: Transferred out of the ICU, Cr downtrending, main complaint is back pain, NG tube  1/20: off oxygen, NG tube remains, has not passed gas yet, cr down trending   1/21: worsening abd distention and resp status. NG tube repositioned with 6L output, given dose of lasix, cxr with BL infiltrates, slight bump in cr  1/22: started TPN, had an episode of flatus, feel stronger, tachycardic, 6l NG output   1/23: Tolerated liquids this morning.  Complaining of gas pain.  1/24: Weaning off TPN. No longer w/ NGT. C/O diarrhea.   1/25: ongoing diarhea and abdominal swelling, CT scan today  1/26: intubated 1/25 trasnferred to unit  1/27: improved mentation, NG output decreased, BP soft  1/28: extubated 1/27, abdoman still distended, increased U/O  1/29:  Transferred out of ICU, more confused today  1/30: trasnferred up to T8, worsening renal function  1/31:  Decreased U/O, creat worse  2/1: Tolerated HD yesterday.  Denies SOB.  + back pain  2/2: Denies pain or SOB, + 4.5L yesterday.  Tolerated HD    Review of Systems  Review of Systems   Genitourinary:        Hudson  in place   All other systems reviewed and are negative.       Physical Exam  Temp:  [36 °C (96.8 °F)-37.5 °C (99.5 °F)] 36.8 °C (98.2 °F)  Pulse:  [70-88] 84  Resp:  [16-20] 16  BP: ()/(53-67) 122/67  SpO2:  [89 %-98 %] 91 %    Physical Exam  Vitals signs and nursing note reviewed.   Constitutional:       Comments: Obese male   HENT:      Head: Normocephalic.      Mouth/Throat:      Comments: ETT in place  Neck:      Musculoskeletal: Normal range of motion and neck supple.   Cardiovascular:      Rate and Rhythm: Normal rate.   Pulmonary:      Breath sounds: Rales present.   Abdominal:      General: There is distension.   Musculoskeletal: Normal range of motion.         General: Swelling present.   Skin:     Coloration: Skin is not jaundiced.   Neurological:      Comments: confused         Fluids    Intake/Output Summary (Last 24 hours) at 2/2/2021 0837  Last data filed at 2/2/2021 0615  Gross per 24 hour   Intake 5604.82 ml   Output 900 ml   Net 4704.82 ml       Laboratory  Recent Labs     01/31/21  0231 02/02/21  0249   WBC 4.8 3.2*   RBC 2.84* 2.86*   HEMOGLOBIN 8.5* 8.5*   HEMATOCRIT 28.1* 26.9*   MCV 98.9* 94.1   MCH 29.9 29.7   MCHC 30.2* 31.6*   RDW 52.4* 49.1   PLATELETCT 80* 71*   MPV 13.6* 14.3*     Recent Labs     01/31/21  0231 02/02/21  0249   SODIUM 147* 137   POTASSIUM 5.0 3.8   CHLORIDE 109 100   CO2 27 29   GLUCOSE 155* 140*   BUN 88* 65*   CREATININE 6.30* 4.94*   CALCIUM 8.2* 8.2*     Recent Labs     01/30/21  1218 02/01/21  1405   APTT  --  29.4   INR 1.03 1.11     No results for input(s): NTPROBNP in the last 72 hours.        Imaging      Assessment/Plan     # ONOFRE: multifactorial, it had been improved, but 2 days ago event changed trajectory (hypotensive episode sustolic less than 100) and now with signficant worsening.  Possibly ATN from hemodynamic changes.   - HD started 1/31   - Minimal UOP  # Sepsis   - MSSA bacteremia   - ABx per primary team  # Pancreatitis  # Encephalpathy  intermittant  # Ileus: NG tube, no role for surgical intervention per surgery  # HX  Non-ischemic cardiomyopathy  # Thrombocytopenia  # Hypocalcemia   # Crohns   # Anemia  # hyperantremia second to GI losses/ not able to tolerate PO     PLAN:  -Repeat HD today  -Can trial lasix 80mg IV BID for UOP  -Decrease IVF if possible given net positive  -No heparin with HD  -Accurate I/Os  -Daily labs  -Dose all medications for intermittent HD    Thank you

## 2021-02-02 NOTE — THERAPY
Missed Therapy     Patient Name: Shaka Riley  Age:  55 y.o., Sex:  male  Medical Record #: [id number]  Today's Date: 2/2/2021 02/02/21 1537   Interdisciplinary Plan of Care Collaboration   IDT Collaboration with  Nursing;Physician   Collaboration Comments Came by to see patient this am for swallow reassessment; however, he was in dialysis.  Spoke with Dr. Stroud, and patient is now cleared for diet advancement as tolerated.  Was unable to get back to see patient this afternoon.  SLP will follow with patient first thing in the morning to see if he can be advanced.

## 2021-02-02 NOTE — THERAPY
"Occupational Therapy  Daily Treatment     Patient Name: Shaka Riley  Age:  55 y.o., Sex:  male  Medical Record #: [id number]  Today's Date: 2/2/2021     Precautions: Fall Risk  Comments: supplemental O2 and jorgensen only, all other tubes DC'd    Assessment    Pt agreeable to OT tx, motivated to use the commode. Pt assisted to complete log roll to limit strain on back d/t chronic back pain. Pt demonstrated functional transfer to [facility] with Caro for safety, required MaxA for pericare. Pt was interested in sitting up in the bedside chair however became diaphoretic and reported feeling weak and nauseous after using the commode. Pt assisted back to supine in bed d/t suspected orthostatic hypotension, BP taken once returned to bed safely: 111/68mmHg after 1-2min in supine. Pt reported feeling better, participated in grooming ADLs sitting up in bed. Anticipate continued improvement in functional activity tolerance and ADL independence with gradually improving medical status. Acute OT to continue to follow.     Plan    Continue current treatment plan.    DC Equipment Recommendations: Unable to determine at this time  Discharge Recommendations: Recommend post-acute placement for additional occupational therapy services prior to discharge home    Subjective    \"This shouldn't be this difficult, I was fine before.\"     Objective     02/02/21 1501   Precautions   Precautions Fall Risk   Comments supplemental O2 and jorgensen only, all other tubes DC'd   Supine Upper Body Exercises   Chest Press Bilateral   Front Arm Raise Bilateral   Comments encouraged bed level AROM with UEs to increase circulation and activity   Balance   Weight Shift Sitting Fair   Weight Shift Standing Fair   Comments standing with FWW   Bed Mobility    Supine to Sit Moderate Assist   Sit to Supine Moderate Assist   Scooting Moderate Assist   Rolling Moderate Assist to Rt.;Moderate Assist to Lt.   Comments log roll to minimize chronic back pain    Activities " of Daily Living   Eating Minimal Assist  (observed spouse feeding pt soup)   Grooming Supervision  (facewash semi-supine in bed)   Lower Body Dressing Maximal Assist   Toileting Moderate Assist  (assist for thorough clean s/p BM on BSC)   Functional Mobility   Sit to Stand Minimal Assist   Bed, Chair, Wheelchair Transfer Minimal Assist   Toilet Transfers Minimal Assist   Transfer Method Stand Step   Mobility bed to BSC transfer   Activity Tolerance   Comments limited by fatigue and probable orthostatic hypotension (unable to check BP until pt returned to supine)   Short Term Goals   Short Term Goal # 1 pt will demo functional transfer with SPV   Goal Outcome # 1 Progressing as expected   Short Term Goal # 2 pt will demo UB dress with SPV   Goal Outcome # 2 Progressing as expected   Short Term Goal # 3 pt will tolerate >5min stand for grooming ADLs   Goal Outcome # 3 Goal not met   Short Term Goal # 4 pt will complete toileting ADL at SPV level   Goal Outcome # 4 Progressing as expected

## 2021-02-02 NOTE — ASSESSMENT & PLAN NOTE
The patient's platelet count on 2/1 dropped by more than 50%. On 2/2 his white count dropped as well. This is concerning given that he is acutely ill and could be from multiple causes such as HIT, DIC, medications (ancef), sepsis. Most of these causes have been ruled out (HIT and ISTH score are both low and lab studies did not offer much insight). The cause of his pancytopenia is likely from Ancef.     Plan:  - Continue to monitor platelets. It will usually take time before it starts to recover.

## 2021-02-02 NOTE — PROGRESS NOTES
Went to give meds to patient and found rectal tube out. Educated patient the importance of rectal tube, patient refused to have it reinserted. Riri RAINEY also educated patient on importance. Pt still refused. MD notified. Will continue to monitor.

## 2021-02-02 NOTE — PROGRESS NOTES
[facility] Services Progress Note     Hemodialysis treatment ordered today per Dr. Gtz x 2 hours.   Treatment initiated at 1537 ended at 1737.      Patient tolerated treatment ; see paper flow sheet for details.      Net UF 0 mL per MD order.      Post tx, CVC flushed with saline then locked with heparin 1000 units/mL per designated amount in each wing then clamped and capped. Aspirate heparin prior to next CVC use.     Report given to Primary RN ALVINO Fox.

## 2021-02-02 NOTE — DISCHARGE PLANNING
Received Choice form at [phone]  Agency/Facility Name: [facility]  Referral sent per Choice form @ [phone]

## 2021-02-02 NOTE — PROGRESS NOTES
[facility] Services Progress Note    Hemodialysis treatment  day 2 performed x 2.5 hours per Dr. Gtz.  Treatment started at 1018 and ended at 1248.      Patient tolerated treatment well without any issues. Patient stable during and post treatment. See Acute HD paper flow sheet for details.      Net UF Removed: 1,000 ml (see Dialysis I & O flow sheet)     Post treatment: Right IJ non tunneled HD catheter flushed with saline then locked with heparin 1000 units/ml per designated amount in each port/limb (see MAR) then clamped, capped aseptically . Heparin lock to be aspirated prior to next Dialysis/CVC use by Dialysis RN only. Please do not flush or draw from ports.    Notify Dialysis and Nephrologist for follow-up.     Report given to primary care nurse GIOVANNI Ham RN.

## 2021-02-02 NOTE — PROGRESS NOTES
Daily Progress Note:     Date of Service: 2/2/2021  Primary Team: [facility] Red Team    Attending: Girish Ching M.D.   Senior Resident: Luis Rodriguez M.D.  Contact:  [phone]    ID:   The patient is a 55-year-old male with a past medical history of alcohol use disorder, obesity, nonischemic cardiomyopathy (EF 25-30%), Crohn's disease, hypertension, chronic back pain, cholecystectomy 7/2020, and obesity.  The patient presented on 1/14/2021 for pancreatitis and his hospital course was complicated by ileus vs SBO on 1/19 when NG tube was placed. The patient subsequently was started on TPN on 1/21 to 1/30 (when PICC was infiltrated). The patient also had severe hypoxic and hypercapnic respiratory failure and was intubated from 1/25 to 1/27. Since then the patient has been transferred back to the floor where it was noted that the patient was spiking fevers. Blood cultures were drawn on 1/30 and both sets were positive for MSSA. Unfortunately his Cr has trended upwards as well and HD catheter was inserted on 1/31 and the patient was started on HD.     Interval Update:  The patient is doing well this morning. He states that he has not had any nausea or vomiting. He says that he has been tolerating his diet well. His rectal tube fell out over night and he does not want it back in. He is agreeable to getting up in the chair with meals.     Consultants/Specialty:  GI- Dr. Turner (Signed off 1/17)  Nephrology- Actively following patient  General Surgery- Dr. Marx (signed off 1/21)  Critical care (signed off 1/28)    Review of Systems:    Review of Systems   Constitutional: Negative for chills and fever.   HENT: Negative for congestion and sore throat.    Eyes: Negative for blurred vision and double vision.   Respiratory: Negative for cough and shortness of breath.    Cardiovascular: Negative for chest pain, palpitations and leg swelling.   Gastrointestinal: Negative for abdominal pain, constipation, diarrhea,  nausea and vomiting.   Genitourinary: Negative for dysuria and urgency.   Musculoskeletal: Negative for falls and joint pain.   Skin: Negative for itching and rash.   Neurological: Negative for dizziness and headaches.     Objective Data:   Physical Exam:   Vitals:   Temp:  [36 °C (96.8 °F)-37.5 °C (99.5 °F)] 36.3 °C (97.4 °F)  Pulse:  [70-94] 94  Resp:  [16-20] 20  BP: ()/(52-67) 125/64  SpO2:  [89 %-98 %] 97 %     Physical Exam  Vitals signs and nursing note reviewed.   Constitutional:       General: He is not in acute distress.     Appearance: He is obese. He is ill-appearing (Chronically ill appearing.).   HENT:      Head: Normocephalic and atraumatic.      Mouth/Throat:      Pharynx: Oropharynx is clear. No oropharyngeal exudate or posterior oropharyngeal erythema.      Comments: NG tube in place. Not hooked to suction.  Eyes:      General: No scleral icterus.     Extraocular Movements: Extraocular movements intact.      Pupils: Pupils are equal, round, and reactive to light.   Cardiovascular:      Rate and Rhythm: Normal rate and regular rhythm.      Heart sounds: No murmur. No gallop.    Pulmonary:      Effort: Pulmonary effort is normal. No respiratory distress.      Breath sounds: Normal breath sounds. No wheezing.   Abdominal:      General: There is distension (+ abdominal distention without any tenderness).      Palpations: Abdomen is soft.      Tenderness: There is no abdominal tenderness. There is no guarding.      Hernia: No hernia is present.      Comments: Decreased bowel sounds, but bowel sounds are present throughout   Genitourinary:     Comments: Hudson catheter in place.   Musculoskeletal:         General: No swelling.      Right lower leg: No edema.      Left lower leg: No edema.   Skin:     General: Skin is warm and dry.      Coloration: Skin is not jaundiced.   Neurological:      General: No focal deficit present.      Mental Status: He is alert and oriented to person, place, and time.       Cranial Nerves: No cranial nerve deficit.   Psychiatric:         Mood and Affect: Mood normal.         Behavior: Behavior normal.       Labs:   Results for orders placed or performed during the hospital encounter of 01/14/21   CBC with Differential   Result Value Ref Range    WBC 10.4 4.8 - 10.8 K/uL    RBC 4.97 4.70 - 6.10 M/uL    Hemoglobin 14.8 14.0 - 18.0 g/dL    Hematocrit 44.9 42.0 - 52.0 %    MCV 90.3 81.4 - 97.8 fL    MCH 29.8 27.0 - 33.0 pg    MCHC 33.0 (L) 33.7 - 35.3 g/dL    RDW 39.4 35.9 - 50.0 fL    Platelet Count 273 164 - 446 K/uL    MPV 10.8 9.0 - 12.9 fL    Neutrophils-Polys 85.30 (H) 44.00 - 72.00 %    Lymphocytes 9.60 (L) 22.00 - 41.00 %    Monocytes 4.10 0.00 - 13.40 %    Eosinophils 0.20 0.00 - 6.90 %    Basophils 0.30 0.00 - 1.80 %    Immature Granulocytes 0.50 0.00 - 0.90 %    Nucleated RBC 0.00 /100 WBC    Neutrophils (Absolute) 8.85 (H) 1.82 - 7.42 K/uL    Lymphs (Absolute) 1.00 1.00 - 4.80 K/uL    Monos (Absolute) 0.42 0.00 - 0.85 K/uL    Eos (Absolute) 0.02 0.00 - 0.51 K/uL    Baso (Absolute) 0.03 0.00 - 0.12 K/uL    Immature Granulocytes (abs) 0.05 0.00 - 0.11 K/uL    NRBC (Absolute) 0.00 K/uL   Complete Metabolic Panel (CMP)   Result Value Ref Range    Sodium 131 (L) 135 - 145 mmol/L    Potassium 4.0 3.6 - 5.5 mmol/L    Chloride 98 96 - 112 mmol/L    Co2 22 20 - 33 mmol/L    Anion Gap 11.0 7.0 - 16.0    Glucose 213 (H) 65 - 99 mg/dL    Bun 23 (H) 8 - 22 mg/dL    Creatinine 1.13 0.50 - 1.40 mg/dL    Calcium 9.5 8.5 - 10.5 mg/dL    AST(SGOT) 328 (H) 12 - 45 U/L    ALT(SGPT) 170 (H) 2 - 50 U/L    Alkaline Phosphatase 122 (H) 30 - 99 U/L    Total Bilirubin 0.8 0.1 - 1.5 mg/dL    Albumin 4.2 3.2 - 4.9 g/dL    Total Protein 7.4 6.0 - 8.2 g/dL    Globulin 3.2 1.9 - 3.5 g/dL    A-G Ratio 1.3 g/dL   Troponin   Result Value Ref Range    Troponin T <6 6 - 19 ng/L   LIPASE   Result Value Ref Range    Lipase >3000 (H) 11 - 82 U/L   ESTIMATED GFR   Result Value Ref Range    GFR If   >60 >60 mL/min/1.73 m 2    GFR If Non African American >60 >60 mL/min/1.73 m 2   Lipid Profile   Result Value Ref Range    Cholesterol,Tot 224 (H) 100 - 199 mg/dL    Triglycerides 571 (H) 0 - 149 mg/dL    HDL 46 >=40 mg/dL    LDL see below <100 mg/dL   SARS-COV Antigen ABDOULAYE: Collect dry nasal swab AND NP swab in VTM   Result Value Ref Range    SARS-CoV-2 Source Nasal Swab     SARS-COV ANTIGEN ABDOULAYE NotDetected Not-Detected   SARS-CoV-2 PCR (24 hour In-House): Collect NP swab in VTM    Specimen: Respirate   Result Value Ref Range    SARS-CoV-2 Source NP Swab     SARS-CoV-2 by PCR NotDetected    Comp Metabolic Panel   Result Value Ref Range    Sodium 135 135 - 145 mmol/L    Potassium 5.0 3.6 - 5.5 mmol/L    Chloride 98 96 - 112 mmol/L    Co2 24 20 - 33 mmol/L    Anion Gap 13.0 7.0 - 16.0    Glucose 173 (H) 65 - 99 mg/dL    Bun 28 (H) 8 - 22 mg/dL    Creatinine 1.64 (H) 0.50 - 1.40 mg/dL    Calcium 8.2 (L) 8.5 - 10.5 mg/dL    AST(SGOT) 82 (H) 12 - 45 U/L    ALT(SGPT) 122 (H) 2 - 50 U/L    Alkaline Phosphatase 119 (H) 30 - 99 U/L    Total Bilirubin 0.7 0.1 - 1.5 mg/dL    Albumin 4.1 3.2 - 4.9 g/dL    Total Protein 7.6 6.0 - 8.2 g/dL    Globulin 3.5 1.9 - 3.5 g/dL    A-G Ratio 1.2 g/dL   HEMOGLOBIN A1C   Result Value Ref Range    Glycohemoglobin 5.5 0.0 - 5.6 %    Est Avg Glucose 111 mg/dL   ESTIMATED GFR   Result Value Ref Range    GFR If  53 (A) >60 mL/min/1.73 m 2    GFR If Non  44 (A) >60 mL/min/1.73 m 2   CBC WITH DIFFERENTIAL   Result Value Ref Range    WBC 19.2 (H) 4.8 - 10.8 K/uL    RBC 5.36 4.70 - 6.10 M/uL    Hemoglobin 16.5 14.0 - 18.0 g/dL    Hematocrit 51.8 42.0 - 52.0 %    MCV 96.6 81.4 - 97.8 fL    MCH 30.8 27.0 - 33.0 pg    MCHC 31.9 (L) 33.7 - 35.3 g/dL    RDW 44.9 35.9 - 50.0 fL    Platelet Count 241 164 - 446 K/uL    MPV 11.0 9.0 - 12.9 fL    Neutrophils-Polys 85.20 (H) 44.00 - 72.00 %    Lymphocytes 3.50 (L) 22.00 - 41.00 %    Monocytes 4.30 0.00 - 13.40 %    Eosinophils  0.00 0.00 - 6.90 %    Basophils 0.00 0.00 - 1.80 %    Nucleated RBC 0.00 /100 WBC    Neutrophils (Absolute) 17.36 (H) 1.82 - 7.42 K/uL    Lymphs (Absolute) 0.67 (L) 1.00 - 4.80 K/uL    Monos (Absolute) 0.83 0.00 - 0.85 K/uL    Eos (Absolute) 0.00 0.00 - 0.51 K/uL    Baso (Absolute) 0.00 0.00 - 0.12 K/uL    NRBC (Absolute) 0.00 K/uL   Comp Metabolic Panel   Result Value Ref Range    Sodium 131 (L) 135 - 145 mmol/L    Potassium 6.0 (H) 3.6 - 5.5 mmol/L    Chloride 99 96 - 112 mmol/L    Co2 21 20 - 33 mmol/L    Anion Gap 11.0 7.0 - 16.0    Glucose 155 (H) 65 - 99 mg/dL    Bun 42 (H) 8 - 22 mg/dL    Creatinine 3.36 (H) 0.50 - 1.40 mg/dL    Calcium 7.3 (L) 8.5 - 10.5 mg/dL    AST(SGOT) 44 12 - 45 U/L    ALT(SGPT) 76 (H) 2 - 50 U/L    Alkaline Phosphatase 94 30 - 99 U/L    Total Bilirubin 0.6 0.1 - 1.5 mg/dL    Albumin 3.5 3.2 - 4.9 g/dL    Total Protein 6.9 6.0 - 8.2 g/dL    Globulin 3.4 1.9 - 3.5 g/dL    A-G Ratio 1.0 g/dL   MAGNESIUM   Result Value Ref Range    Magnesium 1.9 1.5 - 2.5 mg/dL   DIFFERENTIAL MANUAL   Result Value Ref Range    Bands-Stabs 5.20 0.00 - 10.00 %    Metamyelocytes 1.70 %    Manual Diff Status PERFORMED    PERIPHERAL SMEAR REVIEW   Result Value Ref Range    Peripheral Smear Review see below    PLATELET ESTIMATE   Result Value Ref Range    Plt Estimation Normal    MORPHOLOGY   Result Value Ref Range    RBC Morphology Normal    ESTIMATED GFR   Result Value Ref Range    GFR If  23 (A) >60 mL/min/1.73 m 2    GFR If Non  19 (A) >60 mL/min/1.73 m 2   AMYLASE   Result Value Ref Range    Amylase 827 (H) 20 - 103 U/L   LIPASE   Result Value Ref Range    Lipase 860 (H) 11 - 82 U/L   Comp Metabolic Panel   Result Value Ref Range    Sodium 129 (L) 135 - 145 mmol/L    Potassium 6.1 (H) 3.6 - 5.5 mmol/L    Chloride 97 96 - 112 mmol/L    Co2 21 20 - 33 mmol/L    Anion Gap 11.0 7.0 - 16.0    Glucose 147 (H) 65 - 99 mg/dL    Bun 48 (H) 8 - 22 mg/dL    Creatinine 3.54 (H) 0.50 -  1.40 mg/dL    Calcium 7.0 (L) 8.5 - 10.5 mg/dL    AST(SGOT) 38 12 - 45 U/L    ALT(SGPT) 62 (H) 2 - 50 U/L    Alkaline Phosphatase 98 30 - 99 U/L    Total Bilirubin 0.5 0.1 - 1.5 mg/dL    Albumin 3.5 3.2 - 4.9 g/dL    Total Protein 7.2 6.0 - 8.2 g/dL    Globulin 3.7 (H) 1.9 - 3.5 g/dL    A-G Ratio 0.9 g/dL   ESTIMATED GFR   Result Value Ref Range    GFR If  22 (A) >60 mL/min/1.73 m 2    GFR If Non  18 (A) >60 mL/min/1.73 m 2   Comp Metabolic Panel   Result Value Ref Range    Sodium 129 (L) 135 - 145 mmol/L    Potassium 6.0 (H) 3.6 - 5.5 mmol/L    Chloride 98 96 - 112 mmol/L    Co2 18 (L) 20 - 33 mmol/L    Anion Gap 13.0 7.0 - 16.0    Glucose 173 (H) 65 - 99 mg/dL    Bun 50 (H) 8 - 22 mg/dL    Creatinine 3.92 (H) 0.50 - 1.40 mg/dL    Calcium 7.5 (L) 8.5 - 10.5 mg/dL    AST(SGOT) 40 12 - 45 U/L    ALT(SGPT) 62 (H) 2 - 50 U/L    Alkaline Phosphatase 100 (H) 30 - 99 U/L    Total Bilirubin 0.5 0.1 - 1.5 mg/dL    Albumin 3.5 3.2 - 4.9 g/dL    Total Protein 7.5 6.0 - 8.2 g/dL    Globulin 4.0 (H) 1.9 - 3.5 g/dL    A-G Ratio 0.9 g/dL   proBrain Natriuretic Peptide, NT   Result Value Ref Range    NT-proBNP 243 (H) 0 - 125 pg/mL   URINALYSIS    Specimen: Urine, Hudson Cath   Result Value Ref Range    Color DK Yellow     Character Turbid (A)     Specific Gravity 1.021 <1.035    Ph 5.0 5.0 - 8.0    Glucose Negative Negative mg/dL    Ketones Negative Negative mg/dL    Protein 100 (A) Negative mg/dL    Bilirubin Negative Negative    Urobilinogen, Urine 0.2 Negative    Nitrite Negative Negative    Leukocyte Esterase Negative Negative    Occult Blood Trace (A) Negative    Micro Urine Req Microscopic    URINE SODIUM RANDOM   Result Value Ref Range    Sodium, Urine -per volume 23 mmol/L   URINE CREATININE RANDOM   Result Value Ref Range    Creatinine, Random Urine 274.12 mg/dL   ESTIMATED GFR   Result Value Ref Range    GFR If  19 (A) >60 mL/min/1.73 m 2    GFR If Non  16  (A) >60 mL/min/1.73 m 2   URINE MICROSCOPIC (W/UA)   Result Value Ref Range    WBC 5-10 (A) /hpf    RBC 0-2 (A) /hpf    Bacteria Negative None /hpf    Epithelial Cells Few /hpf    Epithelial Cells Renal Few /hpf    Amorphous Crystal Present /hpf    Hyaline Cast 3-5 (A) /lpf    Granular Casts 6-10 (A) /lpf   BLOOD CULTURE    Specimen: Line; Blood   Result Value Ref Range    Significant Indicator NEG     Source BLD     Site Central Line     Culture Result No growth after 5 days of incubation.    BLOOD CULTURE    Specimen: Peripheral; Blood   Result Value Ref Range    Significant Indicator NEG     Source BLD     Site PERIPHERAL     Culture Result No growth after 5 days of incubation.    Basic Metabolic Panel   Result Value Ref Range    Sodium 132 (L) 135 - 145 mmol/L    Potassium 5.1 3.6 - 5.5 mmol/L    Chloride 97 96 - 112 mmol/L    Co2 23 20 - 33 mmol/L    Glucose 145 (H) 65 - 99 mg/dL    Bun 56 (H) 8 - 22 mg/dL    Creatinine 3.61 (H) 0.50 - 1.40 mg/dL    Calcium 7.0 (L) 8.5 - 10.5 mg/dL    Anion Gap 12.0 7.0 - 16.0   Basic Metabolic Panel   Result Value Ref Range    Sodium 131 (L) 135 - 145 mmol/L    Potassium 5.3 3.6 - 5.5 mmol/L    Chloride 97 96 - 112 mmol/L    Co2 23 20 - 33 mmol/L    Glucose 150 (H) 65 - 99 mg/dL    Bun 58 (H) 8 - 22 mg/dL    Creatinine 3.90 (H) 0.50 - 1.40 mg/dL    Calcium 6.8 (LL) 8.5 - 10.5 mg/dL    Anion Gap 11.0 7.0 - 16.0   LACTIC ACID   Result Value Ref Range    Lactic Acid 1.2 0.5 - 2.0 mmol/L   ESTIMATED GFR   Result Value Ref Range    GFR If  21 (A) >60 mL/min/1.73 m 2    GFR If Non  18 (A) >60 mL/min/1.73 m 2   ESTIMATED GFR   Result Value Ref Range    GFR If  19 (A) >60 mL/min/1.73 m 2    GFR If Non  16 (A) >60 mL/min/1.73 m 2   CBC WITH DIFFERENTIAL   Result Value Ref Range    WBC 6.9 4.8 - 10.8 K/uL    RBC 4.90 4.70 - 6.10 M/uL    Hemoglobin 15.1 14.0 - 18.0 g/dL    Hematocrit 47.0 42.0 - 52.0 %    MCV 95.9 81.4 -  97.8 fL    MCH 30.8 27.0 - 33.0 pg    MCHC 32.1 (L) 33.7 - 35.3 g/dL    RDW 44.8 35.9 - 50.0 fL    Platelet Count 211 164 - 446 K/uL    MPV 11.2 9.0 - 12.9 fL    Neutrophils-Polys 70.40 44.00 - 72.00 %    Lymphocytes 6.10 (L) 22.00 - 41.00 %    Monocytes 7.80 0.00 - 13.40 %    Eosinophils 0.00 0.00 - 6.90 %    Basophils 0.00 0.00 - 1.80 %    Nucleated RBC 0.00 /100 WBC    Neutrophils (Absolute) 5.70 1.82 - 7.42 K/uL    Lymphs (Absolute) 0.42 (L) 1.00 - 4.80 K/uL    Monos (Absolute) 0.54 0.00 - 0.85 K/uL    Eos (Absolute) 0.00 0.00 - 0.51 K/uL    Baso (Absolute) 0.00 0.00 - 0.12 K/uL    NRBC (Absolute) 0.00 K/uL   Comp Metabolic Panel   Result Value Ref Range    Sodium 132 (L) 135 - 145 mmol/L    Potassium 4.8 3.6 - 5.5 mmol/L    Chloride 97 96 - 112 mmol/L    Co2 23 20 - 33 mmol/L    Anion Gap 12.0 7.0 - 16.0    Glucose 148 (H) 65 - 99 mg/dL    Bun 62 (H) 8 - 22 mg/dL    Creatinine 3.84 (H) 0.50 - 1.40 mg/dL    Calcium 7.6 (L) 8.5 - 10.5 mg/dL    AST(SGOT) 28 12 - 45 U/L    ALT(SGPT) 39 2 - 50 U/L    Alkaline Phosphatase 83 30 - 99 U/L    Total Bilirubin 0.5 0.1 - 1.5 mg/dL    Albumin 3.1 (L) 3.2 - 4.9 g/dL    Total Protein 6.9 6.0 - 8.2 g/dL    Globulin 3.8 (H) 1.9 - 3.5 g/dL    A-G Ratio 0.8 g/dL   MAGNESIUM   Result Value Ref Range    Magnesium 1.8 1.5 - 2.5 mg/dL   PHOSPHORUS   Result Value Ref Range    Phosphorus 5.4 (H) 2.5 - 4.5 mg/dL   LIPASE   Result Value Ref Range    Lipase 375 (H) 11 - 82 U/L   Basic Metabolic Panel   Result Value Ref Range    Sodium 132 (L) 135 - 145 mmol/L    Potassium 5.5 3.6 - 5.5 mmol/L    Chloride 97 96 - 112 mmol/L    Co2 22 20 - 33 mmol/L    Glucose 155 (H) 65 - 99 mg/dL    Bun 70 (H) 8 - 22 mg/dL    Creatinine 4.04 (H) 0.50 - 1.40 mg/dL    Calcium 7.2 (L) 8.5 - 10.5 mg/dL    Anion Gap 13.0 7.0 - 16.0   LACTIC ACID   Result Value Ref Range    Lactic Acid 1.8 0.5 - 2.0 mmol/L   IONIZED CALCIUM   Result Value Ref Range    Ionized Calcium 0.9 (L) 1.1 - 1.3 mmol/L   DIFFERENTIAL  MANUAL   Result Value Ref Range    Bands-Stabs 12.20 (H) 0.00 - 10.00 %    Metamyelocytes 3.50 %    Manual Diff Status PERFORMED    PERIPHERAL SMEAR REVIEW   Result Value Ref Range    Peripheral Smear Review see below    ESTIMATED GFR   Result Value Ref Range    GFR If  20 (A) >60 mL/min/1.73 m 2    GFR If Non  16 (A) >60 mL/min/1.73 m 2   LACTIC ACID   Result Value Ref Range    Lactic Acid 1.4 0.5 - 2.0 mmol/L   ESTIMATED GFR   Result Value Ref Range    GFR If  19 (A) >60 mL/min/1.73 m 2    GFR If Non African American 15 (A) >60 mL/min/1.73 m 2   CBC WITH DIFFERENTIAL   Result Value Ref Range    WBC 5.0 4.8 - 10.8 K/uL    RBC 4.47 (L) 4.70 - 6.10 M/uL    Hemoglobin 13.6 (L) 14.0 - 18.0 g/dL    Hematocrit 42.3 42.0 - 52.0 %    MCV 94.6 81.4 - 97.8 fL    MCH 30.4 27.0 - 33.0 pg    MCHC 32.2 (L) 33.7 - 35.3 g/dL    RDW 43.8 35.9 - 50.0 fL    Platelet Count 208 164 - 446 K/uL    MPV 11.3 9.0 - 12.9 fL    Neutrophils-Polys 57.00 44.00 - 72.00 %    Lymphocytes 4.40 (L) 22.00 - 41.00 %    Monocytes 14.90 (H) 0.00 - 13.40 %    Eosinophils 0.00 0.00 - 6.90 %    Basophils 0.00 0.00 - 1.80 %    Nucleated RBC 0.00 /100 WBC    Neutrophils (Absolute) 3.91 1.82 - 7.42 K/uL    Lymphs (Absolute) 0.22 (L) 1.00 - 4.80 K/uL    Monos (Absolute) 0.75 0.00 - 0.85 K/uL    Eos (Absolute) 0.00 0.00 - 0.51 K/uL    Baso (Absolute) 0.00 0.00 - 0.12 K/uL    NRBC (Absolute) 0.00 K/uL   Comp Metabolic Panel   Result Value Ref Range    Sodium 129 (L) 135 - 145 mmol/L    Potassium 4.8 3.6 - 5.5 mmol/L    Chloride 94 (L) 96 - 112 mmol/L    Co2 22 20 - 33 mmol/L    Anion Gap 13.0 7.0 - 16.0    Glucose 155 (H) 65 - 99 mg/dL    Bun 72 (HH) 8 - 22 mg/dL    Creatinine 3.05 (H) 0.50 - 1.40 mg/dL    Calcium 6.9 (LL) 8.5 - 10.5 mg/dL    AST(SGOT) 19 12 - 45 U/L    ALT(SGPT) 27 2 - 50 U/L    Alkaline Phosphatase 70 30 - 99 U/L    Total Bilirubin 0.5 0.1 - 1.5 mg/dL    Albumin 3.1 (L) 3.2 - 4.9 g/dL    Total  Protein 6.7 6.0 - 8.2 g/dL    Globulin 3.6 (H) 1.9 - 3.5 g/dL    A-G Ratio 0.9 g/dL   MAGNESIUM   Result Value Ref Range    Magnesium 2.3 1.5 - 2.5 mg/dL   PHOSPHORUS   Result Value Ref Range    Phosphorus 4.9 (H) 2.5 - 4.5 mg/dL   LIPASE   Result Value Ref Range    Lipase 124 (H) 11 - 82 U/L   IONIZED CALCIUM   Result Value Ref Range    Ionized Calcium 0.9 (L) 1.1 - 1.3 mmol/L   ESTIMATED GFR   Result Value Ref Range    GFR If  26 (A) >60 mL/min/1.73 m 2    GFR If Non African American 21 (A) >60 mL/min/1.73 m 2   DIFFERENTIAL MANUAL   Result Value Ref Range    Bands-Stabs 21.10 (H) 0.00 - 10.00 %    Metamyelocytes 2.60 %    Manual Diff Status PERFORMED    PERIPHERAL SMEAR REVIEW   Result Value Ref Range    Peripheral Smear Review see below    PLATELET ESTIMATE   Result Value Ref Range    Plt Estimation Normal    MORPHOLOGY   Result Value Ref Range    RBC Morphology Normal    TSH WITH REFLEX TO FT4   Result Value Ref Range    TSH 1.240 0.380 - 5.330 uIU/mL   Triglyceride   Result Value Ref Range    Triglycerides 211 (H) 0 - 149 mg/dL   CBC WITH DIFFERENTIAL   Result Value Ref Range    WBC 7.8 4.8 - 10.8 K/uL    RBC 4.07 (L) 4.70 - 6.10 M/uL    Hemoglobin 12.5 (L) 14.0 - 18.0 g/dL    Hematocrit 38.7 (L) 42.0 - 52.0 %    MCV 95.1 81.4 - 97.8 fL    MCH 30.7 27.0 - 33.0 pg    MCHC 32.3 (L) 33.7 - 35.3 g/dL    RDW 44.3 35.9 - 50.0 fL    Platelet Count 183 164 - 446 K/uL    MPV 11.2 9.0 - 12.9 fL    Neutrophils-Polys 73.90 (H) 44.00 - 72.00 %    Lymphocytes 2.60 (L) 22.00 - 41.00 %    Monocytes 5.20 0.00 - 13.40 %    Eosinophils 0.90 0.00 - 6.90 %    Basophils 0.00 0.00 - 1.80 %    Nucleated RBC 0.00 /100 WBC    Neutrophils (Absolute) 6.92 1.82 - 7.42 K/uL    Lymphs (Absolute) 0.20 (L) 1.00 - 4.80 K/uL    Monos (Absolute) 0.41 0.00 - 0.85 K/uL    Eos (Absolute) 0.07 0.00 - 0.51 K/uL    Baso (Absolute) 0.00 0.00 - 0.12 K/uL    NRBC (Absolute) 0.00 K/uL   Comp Metabolic Panel   Result Value Ref Range     Sodium 136 135 - 145 mmol/L    Potassium 4.4 3.6 - 5.5 mmol/L    Chloride 99 96 - 112 mmol/L    Co2 25 20 - 33 mmol/L    Anion Gap 12.0 7.0 - 16.0    Glucose 121 (H) 65 - 99 mg/dL    Bun 64 (H) 8 - 22 mg/dL    Creatinine 2.24 (H) 0.50 - 1.40 mg/dL    Calcium 8.8 8.5 - 10.5 mg/dL    AST(SGOT) 12 12 - 45 U/L    ALT(SGPT) 20 2 - 50 U/L    Alkaline Phosphatase 66 30 - 99 U/L    Total Bilirubin 0.5 0.1 - 1.5 mg/dL    Albumin 3.1 (L) 3.2 - 4.9 g/dL    Total Protein 6.8 6.0 - 8.2 g/dL    Globulin 3.7 (H) 1.9 - 3.5 g/dL    A-G Ratio 0.8 g/dL   MAGNESIUM   Result Value Ref Range    Magnesium 2.4 1.5 - 2.5 mg/dL   PHOSPHORUS   Result Value Ref Range    Phosphorus 3.5 2.5 - 4.5 mg/dL   LIPASE   Result Value Ref Range    Lipase 67 11 - 82 U/L   IONIZED CALCIUM   Result Value Ref Range    Ionized Calcium 1.1 1.1 - 1.3 mmol/L   ESTIMATED GFR   Result Value Ref Range    GFR If  37 (A) >60 mL/min/1.73 m 2    GFR If Non African American 30 (A) >60 mL/min/1.73 m 2   DIFFERENTIAL MANUAL   Result Value Ref Range    Bands-Stabs 14.80 (H) 0.00 - 10.00 %    Metamyelocytes 1.70 %    Myelocytes 0.90 %    Manual Diff Status PERFORMED    PERIPHERAL SMEAR REVIEW   Result Value Ref Range    Peripheral Smear Review see below    PLATELET ESTIMATE   Result Value Ref Range    Plt Estimation Normal    MORPHOLOGY   Result Value Ref Range    RBC Morphology Present     Polychromia 1+    CBC WITH DIFFERENTIAL   Result Value Ref Range    WBC 9.5 4.8 - 10.8 K/uL    RBC 3.78 (L) 4.70 - 6.10 M/uL    Hemoglobin 11.4 (L) 14.0 - 18.0 g/dL    Hematocrit 35.4 (L) 42.0 - 52.0 %    MCV 93.7 81.4 - 97.8 fL    MCH 30.2 27.0 - 33.0 pg    MCHC 32.2 (L) 33.7 - 35.3 g/dL    RDW 44.6 35.9 - 50.0 fL    Platelet Count 176 164 - 446 K/uL    MPV 11.6 9.0 - 12.9 fL    Neutrophils-Polys 57.40 44.00 - 72.00 %    Lymphocytes 7.80 (L) 22.00 - 41.00 %    Monocytes 7.80 0.00 - 13.40 %    Eosinophils 0.00 0.00 - 6.90 %    Basophils 0.00 0.00 - 1.80 %    Nucleated RBC  0.00 /100 WBC    Neutrophils (Absolute) 7.76 (H) 1.82 - 7.42 K/uL    Lymphs (Absolute) 0.74 (L) 1.00 - 4.80 K/uL    Monos (Absolute) 0.74 0.00 - 0.85 K/uL    Eos (Absolute) 0.00 0.00 - 0.51 K/uL    Baso (Absolute) 0.00 0.00 - 0.12 K/uL    NRBC (Absolute) 0.00 K/uL   Comp Metabolic Panel   Result Value Ref Range    Sodium 138 135 - 145 mmol/L    Potassium 4.5 3.6 - 5.5 mmol/L    Chloride 100 96 - 112 mmol/L    Co2 24 20 - 33 mmol/L    Anion Gap 14.0 7.0 - 16.0    Glucose 113 (H) 65 - 99 mg/dL    Bun 67 (H) 8 - 22 mg/dL    Creatinine 2.05 (H) 0.50 - 1.40 mg/dL    Calcium 8.8 8.5 - 10.5 mg/dL    AST(SGOT) 15 12 - 45 U/L    ALT(SGPT) 17 2 - 50 U/L    Alkaline Phosphatase 67 30 - 99 U/L    Total Bilirubin 0.4 0.1 - 1.5 mg/dL    Albumin 3.1 (L) 3.2 - 4.9 g/dL    Total Protein 6.4 6.0 - 8.2 g/dL    Globulin 3.3 1.9 - 3.5 g/dL    A-G Ratio 0.9 g/dL   MAGNESIUM   Result Value Ref Range    Magnesium 2.5 1.5 - 2.5 mg/dL   PHOSPHORUS   Result Value Ref Range    Phosphorus 3.3 2.5 - 4.5 mg/dL   LIPASE   Result Value Ref Range    Lipase 62 11 - 82 U/L   IONIZED CALCIUM   Result Value Ref Range    Ionized Calcium 1.1 1.1 - 1.3 mmol/L   ESTIMATED GFR   Result Value Ref Range    GFR If  41 (A) >60 mL/min/1.73 m 2    GFR If Non  34 (A) >60 mL/min/1.73 m 2   DIFFERENTIAL MANUAL   Result Value Ref Range    Bands-Stabs 24.30 (H) 0.00 - 10.00 %    Metamyelocytes 2.60 %    Manual Diff Status PERFORMED    PERIPHERAL SMEAR REVIEW   Result Value Ref Range    Peripheral Smear Review see below    PLATELET ESTIMATE   Result Value Ref Range    Plt Estimation Normal    MORPHOLOGY   Result Value Ref Range    RBC Morphology Normal    Comp Metabolic Panel   Result Value Ref Range    Sodium 133 (L) 135 - 145 mmol/L    Potassium 4.3 3.6 - 5.5 mmol/L    Chloride 91 (L) 96 - 112 mmol/L    Co2 27 20 - 33 mmol/L    Anion Gap 15.0 7.0 - 16.0    Glucose 141 (H) 65 - 99 mg/dL    Bun 74 (HH) 8 - 22 mg/dL    Creatinine 2.62 (H)  0.50 - 1.40 mg/dL    Calcium 9.3 8.5 - 10.5 mg/dL    AST(SGOT) 22 12 - 45 U/L    ALT(SGPT) 18 2 - 50 U/L    Alkaline Phosphatase 102 (H) 30 - 99 U/L    Total Bilirubin 1.1 0.1 - 1.5 mg/dL    Albumin 3.4 3.2 - 4.9 g/dL    Total Protein 7.2 6.0 - 8.2 g/dL    Globulin 3.8 (H) 1.9 - 3.5 g/dL    A-G Ratio 0.9 g/dL   MAGNESIUM   Result Value Ref Range    Magnesium 2.8 (H) 1.5 - 2.5 mg/dL   PHOSPHORUS   Result Value Ref Range    Phosphorus 4.8 (H) 2.5 - 4.5 mg/dL   IONIZED CALCIUM   Result Value Ref Range    Ionized Calcium 1.1 1.1 - 1.3 mmol/L   ESTIMATED GFR   Result Value Ref Range    GFR If  31 (A) >60 mL/min/1.73 m 2    GFR If Non African American 25 (A) >60 mL/min/1.73 m 2   Cholesterol   Result Value Ref Range    Cholesterol,Tot 119 100 - 199 mg/dL   Triglyceride   Result Value Ref Range    Triglycerides 167 (H) 0 - 149 mg/dL   Magnesium   Result Value Ref Range    Magnesium 2.9 (H) 1.5 - 2.5 mg/dL   Phosphorus   Result Value Ref Range    Phosphorus 5.1 (H) 2.5 - 4.5 mg/dL   Comp Metabolic Panel   Result Value Ref Range    Sodium 136 135 - 145 mmol/L    Potassium 3.9 3.6 - 5.5 mmol/L    Chloride 93 (L) 96 - 112 mmol/L    Co2 30 20 - 33 mmol/L    Anion Gap 13.0 7.0 - 16.0    Glucose 178 (H) 65 - 99 mg/dL    Bun 84 (HH) 8 - 22 mg/dL    Creatinine 2.54 (H) 0.50 - 1.40 mg/dL    Calcium 9.2 8.5 - 10.5 mg/dL    AST(SGOT) 23 12 - 45 U/L    ALT(SGPT) 16 2 - 50 U/L    Alkaline Phosphatase 106 (H) 30 - 99 U/L    Total Bilirubin 0.8 0.1 - 1.5 mg/dL    Albumin 3.0 (L) 3.2 - 4.9 g/dL    Total Protein 6.9 6.0 - 8.2 g/dL    Globulin 3.9 (H) 1.9 - 3.5 g/dL    A-G Ratio 0.8 g/dL   ESTIMATED GFR   Result Value Ref Range    GFR If  32 (A) >60 mL/min/1.73 m 2    GFR If Non African American 26 (A) >60 mL/min/1.73 m 2   Magnesium   Result Value Ref Range    Magnesium 2.6 (H) 1.5 - 2.5 mg/dL   Phosphorus   Result Value Ref Range    Phosphorus 3.9 2.5 - 4.5 mg/dL   Comp Metabolic Panel   Result Value Ref  Range    Sodium 136 135 - 145 mmol/L    Potassium 3.8 3.6 - 5.5 mmol/L    Chloride 94 (L) 96 - 112 mmol/L    Co2 31 20 - 33 mmol/L    Anion Gap 11.0 7.0 - 16.0    Glucose 134 (H) 65 - 99 mg/dL    Bun 80 (HH) 8 - 22 mg/dL    Creatinine 2.48 (H) 0.50 - 1.40 mg/dL    Calcium 8.6 8.5 - 10.5 mg/dL    AST(SGOT) 39 12 - 45 U/L    ALT(SGPT) 27 2 - 50 U/L    Alkaline Phosphatase 113 (H) 30 - 99 U/L    Total Bilirubin 0.8 0.1 - 1.5 mg/dL    Albumin 2.6 (L) 3.2 - 4.9 g/dL    Total Protein 6.2 6.0 - 8.2 g/dL    Globulin 3.6 (H) 1.9 - 3.5 g/dL    A-G Ratio 0.7 g/dL   CBC WITH DIFFERENTIAL   Result Value Ref Range    WBC 11.1 (H) 4.8 - 10.8 K/uL    RBC 3.21 (L) 4.70 - 6.10 M/uL    Hemoglobin 9.8 (L) 14.0 - 18.0 g/dL    Hematocrit 30.5 (L) 42.0 - 52.0 %    MCV 95.0 81.4 - 97.8 fL    MCH 30.5 27.0 - 33.0 pg    MCHC 32.1 (L) 33.7 - 35.3 g/dL    RDW 48.3 35.9 - 50.0 fL    Platelet Count 174 164 - 446 K/uL    MPV 12.0 9.0 - 12.9 fL    Neutrophils-Polys 81.70 (H) 44.00 - 72.00 %    Lymphocytes 4.30 (L) 22.00 - 41.00 %    Monocytes 3.50 0.00 - 13.40 %    Eosinophils 2.60 0.00 - 6.90 %    Basophils 0.00 0.00 - 1.80 %    Nucleated RBC 0.00 /100 WBC    Neutrophils (Absolute) 9.93 (H) 1.82 - 7.42 K/uL    Lymphs (Absolute) 0.48 (L) 1.00 - 4.80 K/uL    Monos (Absolute) 0.39 0.00 - 0.85 K/uL    Eos (Absolute) 0.29 0.00 - 0.51 K/uL    Baso (Absolute) 0.00 0.00 - 0.12 K/uL    NRBC (Absolute) 0.00 K/uL   MAGNESIUM   Result Value Ref Range    Magnesium 2.6 (H) 1.5 - 2.5 mg/dL   PHOSPHORUS   Result Value Ref Range    Phosphorus 4.0 2.5 - 4.5 mg/dL   IONIZED CALCIUM   Result Value Ref Range    Ionized Calcium 1.1 1.1 - 1.3 mmol/L   DIFFERENTIAL MANUAL   Result Value Ref Range    Bands-Stabs 7.80 0.00 - 10.00 %    Manual Diff Status PERFORMED    PERIPHERAL SMEAR REVIEW   Result Value Ref Range    Peripheral Smear Review see below    PLATELET ESTIMATE   Result Value Ref Range    Plt Estimation Normal    MORPHOLOGY   Result Value Ref Range    RBC  Morphology Normal    ESTIMATED GFR   Result Value Ref Range    GFR If  33 (A) >60 mL/min/1.73 m 2    GFR If Non  27 (A) >60 mL/min/1.73 m 2   MAGNESIUM   Result Value Ref Range    Magnesium 2.2 1.5 - 2.5 mg/dL   PHOSPHORUS   Result Value Ref Range    Phosphorus 3.5 2.5 - 4.5 mg/dL   IONIZED CALCIUM   Result Value Ref Range    Ionized Calcium 1.2 1.1 - 1.3 mmol/L   Comp Metabolic Panel   Result Value Ref Range    Sodium 132 (L) 135 - 145 mmol/L    Potassium 4.2 3.6 - 5.5 mmol/L    Chloride 92 (L) 96 - 112 mmol/L    Co2 29 20 - 33 mmol/L    Anion Gap 11.0 7.0 - 16.0    Glucose 142 (H) 65 - 99 mg/dL    Bun 70 (H) 8 - 22 mg/dL    Creatinine 2.09 (H) 0.50 - 1.40 mg/dL    Calcium 8.9 8.5 - 10.5 mg/dL    AST(SGOT) 47 (H) 12 - 45 U/L    ALT(SGPT) 37 2 - 50 U/L    Alkaline Phosphatase 128 (H) 30 - 99 U/L    Total Bilirubin 0.9 0.1 - 1.5 mg/dL    Albumin 2.8 (L) 3.2 - 4.9 g/dL    Total Protein 6.5 6.0 - 8.2 g/dL    Globulin 3.7 (H) 1.9 - 3.5 g/dL    A-G Ratio 0.8 g/dL   ESTIMATED GFR   Result Value Ref Range    GFR If  40 (A) >60 mL/min/1.73 m 2    GFR If Non  33 (A) >60 mL/min/1.73 m 2   proBrain Natriuretic Peptide, NT   Result Value Ref Range    NT-proBNP 338 (H) 0 - 125 pg/mL   Magnesium   Result Value Ref Range    Magnesium 2.0 1.5 - 2.5 mg/dL   Phosphorus   Result Value Ref Range    Phosphorus 4.1 2.5 - 4.5 mg/dL   Comp Metabolic Panel   Result Value Ref Range    Sodium 133 (L) 135 - 145 mmol/L    Potassium 4.2 3.6 - 5.5 mmol/L    Chloride 95 (L) 96 - 112 mmol/L    Co2 28 20 - 33 mmol/L    Anion Gap 10.0 7.0 - 16.0    Glucose 123 (H) 65 - 99 mg/dL    Bun 52 (H) 8 - 22 mg/dL    Creatinine 1.58 (H) 0.50 - 1.40 mg/dL    Calcium 8.8 8.5 - 10.5 mg/dL    AST(SGOT) 23 12 - 45 U/L    ALT(SGPT) 24 2 - 50 U/L    Alkaline Phosphatase 103 (H) 30 - 99 U/L    Total Bilirubin 0.6 0.1 - 1.5 mg/dL    Albumin 2.5 (L) 3.2 - 4.9 g/dL    Total Protein 6.1 6.0 - 8.2 g/dL     Globulin 3.6 (H) 1.9 - 3.5 g/dL    A-G Ratio 0.7 g/dL   IONIZED CALCIUM   Result Value Ref Range    Ionized Calcium 1.2 1.1 - 1.3 mmol/L   ESTIMATED GFR   Result Value Ref Range    GFR If  55 (A) >60 mL/min/1.73 m 2    GFR If Non African American 45 (A) >60 mL/min/1.73 m 2   Prealbumin   Result Value Ref Range    Pre-Albumin 5.6 (L) 18.0 - 38.0 mg/dL   CBC WITH DIFFERENTIAL   Result Value Ref Range    WBC 7.9 4.8 - 10.8 K/uL    RBC 2.94 (L) 4.70 - 6.10 M/uL    Hemoglobin 8.9 (L) 14.0 - 18.0 g/dL    Hematocrit 29.6 (L) 42.0 - 52.0 %    .7 (H) 81.4 - 97.8 fL    MCH 30.3 27.0 - 33.0 pg    MCHC 30.1 (L) 33.7 - 35.3 g/dL    RDW 50.1 (H) 35.9 - 50.0 fL    Platelet Count 202 164 - 446 K/uL    MPV 12.1 9.0 - 12.9 fL    Neutrophils-Polys 81.60 (H) 44.00 - 72.00 %    Lymphocytes 4.40 (L) 22.00 - 41.00 %    Monocytes 4.40 0.00 - 13.40 %    Eosinophils 4.40 0.00 - 6.90 %    Basophils 0.00 0.00 - 1.80 %    Nucleated RBC 0.00 /100 WBC    Neutrophils (Absolute) 6.87 1.82 - 7.42 K/uL    Lymphs (Absolute) 0.35 (L) 1.00 - 4.80 K/uL    Monos (Absolute) 0.35 0.00 - 0.85 K/uL    Eos (Absolute) 0.35 0.00 - 0.51 K/uL    Baso (Absolute) 0.00 0.00 - 0.12 K/uL    NRBC (Absolute) 0.00 K/uL   LACTIC ACID   Result Value Ref Range    Lactic Acid 0.7 0.5 - 2.0 mmol/L   D-DIMER   Result Value Ref Range    D-Dimer Screen 6.35 (H) 0.00 - 0.50 ug/mL (FEU)   MAGNESIUM   Result Value Ref Range    Magnesium 2.0 1.5 - 2.5 mg/dL   PHOSPHORUS   Result Value Ref Range    Phosphorus 3.7 2.5 - 4.5 mg/dL   DIFFERENTIAL MANUAL   Result Value Ref Range    Bands-Stabs 5.30 0.00 - 10.00 %    Manual Diff Status PERFORMED    PERIPHERAL SMEAR REVIEW   Result Value Ref Range    Peripheral Smear Review see below    PLATELET ESTIMATE   Result Value Ref Range    Plt Estimation Normal    MORPHOLOGY   Result Value Ref Range    RBC Morphology Present     Poikilocytosis 1+     Stomatocytes 1+    Arterial Blood Gas   Result Value Ref Range    Ph 7.05  (LL) 7.40 - 7.50    Pco2 114.2 (HH) 26.0 - 37.0 mmHg    Po2 94.3 (H) 64.0 - 87.0 mmHg    O2 Saturation 94.2 93.0 - 99.0 %    Hco3 31 (H) 17 - 25 mmol/L    Base Excess -2 -4 - 3 mmol/L    Body Temp see below Centigrade   Troponin   Result Value Ref Range    Troponin T 11 6 - 19 ng/L   CBC WITH DIFFERENTIAL   Result Value Ref Range    WBC 11.8 (H) 4.8 - 10.8 K/uL    RBC 3.41 (L) 4.70 - 6.10 M/uL    Hemoglobin 10.3 (L) 14.0 - 18.0 g/dL    Hematocrit 34.8 (L) 42.0 - 52.0 %    .1 (H) 81.4 - 97.8 fL    MCH 30.2 27.0 - 33.0 pg    MCHC 29.6 (L) 33.7 - 35.3 g/dL    RDW 50.8 (H) 35.9 - 50.0 fL    Platelet Count 303 164 - 446 K/uL    MPV 11.5 9.0 - 12.9 fL    Neutrophils-Polys 75.90 (H) 44.00 - 72.00 %    Lymphocytes 7.80 (L) 22.00 - 41.00 %    Monocytes 7.80 0.00 - 13.40 %    Eosinophils 0.90 0.00 - 6.90 %    Basophils 0.00 0.00 - 1.80 %    Nucleated RBC 0.00 /100 WBC    Neutrophils (Absolute) 9.88 (H) 1.82 - 7.42 K/uL    Lymphs (Absolute) 0.92 (L) 1.00 - 4.80 K/uL    Monos (Absolute) 0.92 (H) 0.00 - 0.85 K/uL    Eos (Absolute) 0.11 0.00 - 0.51 K/uL    Baso (Absolute) 0.00 0.00 - 0.12 K/uL    NRBC (Absolute) 0.00 K/uL    Anisocytosis 1+     Macrocytosis 1+     Microcytosis 1+    LACTIC ACID   Result Value Ref Range    Lactic Acid 0.8 0.5 - 2.0 mmol/L   Comp Metabolic Panel   Result Value Ref Range    Sodium 134 (L) 135 - 145 mmol/L    Potassium 5.1 3.6 - 5.5 mmol/L    Chloride 95 (L) 96 - 112 mmol/L    Co2 32 20 - 33 mmol/L    Anion Gap 7.0 7.0 - 16.0    Glucose 157 (H) 65 - 99 mg/dL    Bun 50 (H) 8 - 22 mg/dL    Creatinine 1.58 (H) 0.50 - 1.40 mg/dL    Calcium 9.6 8.5 - 10.5 mg/dL    AST(SGOT) 20 12 - 45 U/L    ALT(SGPT) 26 2 - 50 U/L    Alkaline Phosphatase 112 (H) 30 - 99 U/L    Total Bilirubin 0.5 0.1 - 1.5 mg/dL    Albumin 3.0 (L) 3.2 - 4.9 g/dL    Total Protein 7.3 6.0 - 8.2 g/dL    Globulin 4.3 (H) 1.9 - 3.5 g/dL    A-G Ratio 0.7 g/dL   PLATELET ESTIMATE   Result Value Ref Range    Plt Estimation Normal     MORPHOLOGY   Result Value Ref Range    RBC Morphology Present     Polychromia 1+     Stomatocytes 2+    PERIPHERAL SMEAR REVIEW   Result Value Ref Range    Peripheral Smear Review see below    DIFFERENTIAL MANUAL   Result Value Ref Range    Bands-Stabs 7.80 0.00 - 10.00 %    Manual Diff Status PERFORMED    ESTIMATED GFR   Result Value Ref Range    GFR If  55 (A) >60 mL/min/1.73 m 2    GFR If Non African American 45 (A) >60 mL/min/1.73 m 2   MAGNESIUM   Result Value Ref Range    Magnesium 1.8 1.5 - 2.5 mg/dL   PHOSPHORUS   Result Value Ref Range    Phosphorus 1.7 (L) 2.5 - 4.5 mg/dL   CBC with Differential   Result Value Ref Range    WBC 5.4 4.8 - 10.8 K/uL    RBC 2.97 (L) 4.70 - 6.10 M/uL    Hemoglobin 8.9 (L) 14.0 - 18.0 g/dL    Hematocrit 28.7 (L) 42.0 - 52.0 %    MCV 96.6 81.4 - 97.8 fL    MCH 30.0 27.0 - 33.0 pg    MCHC 31.0 (L) 33.7 - 35.3 g/dL    RDW 47.7 35.9 - 50.0 fL    Platelet Count 250 164 - 446 K/uL    MPV 12.2 9.0 - 12.9 fL    Neutrophils-Polys 80.90 (H) 44.00 - 72.00 %    Lymphocytes 3.50 (L) 22.00 - 41.00 %    Monocytes 5.20 0.00 - 13.40 %    Eosinophils 1.70 0.00 - 6.90 %    Basophils 0.00 0.00 - 1.80 %    Nucleated RBC 0.00 /100 WBC    Neutrophils (Absolute) 4.79 1.82 - 7.42 K/uL    Lymphs (Absolute) 0.19 (L) 1.00 - 4.80 K/uL    Monos (Absolute) 0.28 0.00 - 0.85 K/uL    Eos (Absolute) 0.09 0.00 - 0.51 K/uL    Baso (Absolute) 0.00 0.00 - 0.12 K/uL    NRBC (Absolute) 0.00 K/uL    Hypochromia 1+    Comp Metabolic Panel   Result Value Ref Range    Sodium 132 (L) 135 - 145 mmol/L    Potassium 4.4 3.6 - 5.5 mmol/L    Chloride 94 (L) 96 - 112 mmol/L    Co2 27 20 - 33 mmol/L    Anion Gap 11.0 7.0 - 16.0    Glucose 104 (H) 65 - 99 mg/dL    Bun 50 (H) 8 - 22 mg/dL    Creatinine 1.81 (H) 0.50 - 1.40 mg/dL    Calcium 8.8 8.5 - 10.5 mg/dL    AST(SGOT) 19 12 - 45 U/L    ALT(SGPT) 20 2 - 50 U/L    Alkaline Phosphatase 92 30 - 99 U/L    Total Bilirubin 0.6 0.1 - 1.5 mg/dL    Albumin 2.5 (L) 3.2 -  4.9 g/dL    Total Protein 6.1 6.0 - 8.2 g/dL    Globulin 3.6 (H) 1.9 - 3.5 g/dL    A-G Ratio 0.7 g/dL   ESTIMATED GFR   Result Value Ref Range    GFR If  47 (A) >60 mL/min/1.73 m 2    GFR If Non  39 (A) >60 mL/min/1.73 m 2   PERIPHERAL SMEAR REVIEW   Result Value Ref Range    Peripheral Smear Review see below    PLATELET ESTIMATE   Result Value Ref Range    Plt Estimation Normal    MORPHOLOGY   Result Value Ref Range    RBC Morphology Present     Polychromia 1+     Stomatocytes 2+    DIFFERENTIAL MANUAL   Result Value Ref Range    Bands-Stabs 7.80 0.00 - 10.00 %    Myelocytes 0.90 %    Manual Diff Status PERFORMED    PHOSPHORUS   Result Value Ref Range    Phosphorus 1.9 (L) 2.5 - 4.5 mg/dL   IONIZED CALCIUM   Result Value Ref Range    Ionized Calcium 1.1 1.1 - 1.3 mmol/L   CBC with Differential   Result Value Ref Range    WBC 6.7 4.8 - 10.8 K/uL    RBC 2.96 (L) 4.70 - 6.10 M/uL    Hemoglobin 8.8 (L) 14.0 - 18.0 g/dL    Hematocrit 27.3 (L) 42.0 - 52.0 %    MCV 92.2 81.4 - 97.8 fL    MCH 29.7 27.0 - 33.0 pg    MCHC 32.2 (L) 33.7 - 35.3 g/dL    RDW 46.5 35.9 - 50.0 fL    Platelet Count 293 164 - 446 K/uL    MPV 11.7 9.0 - 12.9 fL    Neutrophils-Polys 77.20 (H) 44.00 - 72.00 %    Lymphocytes 1.80 (L) 22.00 - 41.00 %    Monocytes 4.40 0.00 - 13.40 %    Eosinophils 0.90 0.00 - 6.90 %    Basophils 0.90 0.00 - 1.80 %    Nucleated RBC 0.00 /100 WBC    Neutrophils (Absolute) 6.11 1.82 - 7.42 K/uL    Lymphs (Absolute) 0.12 (L) 1.00 - 4.80 K/uL    Monos (Absolute) 0.29 0.00 - 0.85 K/uL    Eos (Absolute) 0.06 0.00 - 0.51 K/uL    Baso (Absolute) 0.06 0.00 - 0.12 K/uL    NRBC (Absolute) 0.00 K/uL   Comp Metabolic Panel   Result Value Ref Range    Sodium 135 135 - 145 mmol/L    Potassium 3.9 3.6 - 5.5 mmol/L    Chloride 99 96 - 112 mmol/L    Co2 26 20 - 33 mmol/L    Anion Gap 10.0 7.0 - 16.0    Glucose 158 (H) 65 - 99 mg/dL    Bun 41 (H) 8 - 22 mg/dL    Creatinine 1.73 (H) 0.50 - 1.40 mg/dL     Calcium 8.1 (L) 8.5 - 10.5 mg/dL    AST(SGOT) 28 12 - 45 U/L    ALT(SGPT) 18 2 - 50 U/L    Alkaline Phosphatase 81 30 - 99 U/L    Total Bilirubin 0.7 0.1 - 1.5 mg/dL    Albumin 2.2 (L) 3.2 - 4.9 g/dL    Total Protein 5.9 (L) 6.0 - 8.2 g/dL    Globulin 3.7 (H) 1.9 - 3.5 g/dL    A-G Ratio 0.6 g/dL   Magnesium   Result Value Ref Range    Magnesium 2.0 1.5 - 2.5 mg/dL   Triglyceride   Result Value Ref Range    Triglycerides 94 0 - 149 mg/dL   ESTIMATED GFR   Result Value Ref Range    GFR If African American 50 (A) >60 mL/min/1.73 m 2    GFR If Non  41 (A) >60 mL/min/1.73 m 2   DIFFERENTIAL MANUAL   Result Value Ref Range    Bands-Stabs 14.00 (H) 0.00 - 10.00 %    Metamyelocytes 0.90 %    Manual Diff Status PERFORMED    PERIPHERAL SMEAR REVIEW   Result Value Ref Range    Peripheral Smear Review see below    PLATELET ESTIMATE   Result Value Ref Range    Plt Estimation Normal    MORPHOLOGY   Result Value Ref Range    RBC Morphology Normal    PHOSPHORUS   Result Value Ref Range    Phosphorus 4.3 2.5 - 4.5 mg/dL   IONIZED CALCIUM   Result Value Ref Range    Ionized Calcium 1.1 1.1 - 1.3 mmol/L   CBC with Differential   Result Value Ref Range    WBC 6.3 4.8 - 10.8 K/uL    RBC 2.85 (L) 4.70 - 6.10 M/uL    Hemoglobin 8.8 (L) 14.0 - 18.0 g/dL    Hematocrit 27.8 (L) 42.0 - 52.0 %    MCV 97.5 81.4 - 97.8 fL    MCH 30.9 27.0 - 33.0 pg    MCHC 31.7 (L) 33.7 - 35.3 g/dL    RDW 49.0 35.9 - 50.0 fL    Platelet Count 231 164 - 446 K/uL    MPV 11.5 9.0 - 12.9 fL    Neutrophils-Polys 80.00 (H) 44.00 - 72.00 %    Lymphocytes 2.60 (L) 22.00 - 41.00 %    Monocytes 6.10 0.00 - 13.40 %    Eosinophils 0.90 0.00 - 6.90 %    Basophils 0.00 0.00 - 1.80 %    Nucleated RBC 0.00 /100 WBC    Neutrophils (Absolute) 5.42 1.82 - 7.42 K/uL    Lymphs (Absolute) 0.16 (L) 1.00 - 4.80 K/uL    Monos (Absolute) 0.38 0.00 - 0.85 K/uL    Eos (Absolute) 0.06 0.00 - 0.51 K/uL    Baso (Absolute) 0.00 0.00 - 0.12 K/uL    NRBC (Absolute) 0.00 K/uL    Comp Metabolic Panel   Result Value Ref Range    Sodium 142 135 - 145 mmol/L    Potassium 4.0 3.6 - 5.5 mmol/L    Chloride 104 96 - 112 mmol/L    Co2 30 20 - 33 mmol/L    Anion Gap 8.0 7.0 - 16.0    Glucose 198 (H) 65 - 99 mg/dL    Bun 38 (H) 8 - 22 mg/dL    Creatinine 1.61 (H) 0.50 - 1.40 mg/dL    Calcium 8.2 (L) 8.5 - 10.5 mg/dL    AST(SGOT) 48 (H) 12 - 45 U/L    ALT(SGPT) 31 2 - 50 U/L    Alkaline Phosphatase 74 30 - 99 U/L    Total Bilirubin 0.6 0.1 - 1.5 mg/dL    Albumin 2.2 (L) 3.2 - 4.9 g/dL    Total Protein 6.0 6.0 - 8.2 g/dL    Globulin 3.8 (H) 1.9 - 3.5 g/dL    A-G Ratio 0.6 g/dL   Magnesium   Result Value Ref Range    Magnesium 2.0 1.5 - 2.5 mg/dL   ESTIMATED GFR   Result Value Ref Range    GFR If  54 (A) >60 mL/min/1.73 m 2    GFR If Non  44 (A) >60 mL/min/1.73 m 2   DIFFERENTIAL MANUAL   Result Value Ref Range    Bands-Stabs 6.10 0.00 - 10.00 %    Metamyelocytes 4.30 %    Manual Diff Status PERFORMED    PERIPHERAL SMEAR REVIEW   Result Value Ref Range    Peripheral Smear Review see below    PLATELET ESTIMATE   Result Value Ref Range    Plt Estimation Normal    MORPHOLOGY   Result Value Ref Range    RBC Morphology Normal    PHOSPHORUS   Result Value Ref Range    Phosphorus 3.6 2.5 - 4.5 mg/dL   IONIZED CALCIUM   Result Value Ref Range    Ionized Calcium 1.2 1.1 - 1.3 mmol/L   CBC with Differential   Result Value Ref Range    WBC 5.2 4.8 - 10.8 K/uL    RBC 3.02 (L) 4.70 - 6.10 M/uL    Hemoglobin 9.0 (L) 14.0 - 18.0 g/dL    Hematocrit 29.5 (L) 42.0 - 52.0 %    MCV 97.7 81.4 - 97.8 fL    MCH 29.8 27.0 - 33.0 pg    MCHC 30.5 (L) 33.7 - 35.3 g/dL    RDW 49.6 35.9 - 50.0 fL    Platelet Count 209 164 - 446 K/uL    MPV 12.4 9.0 - 12.9 fL    Neutrophils-Polys 77.40 (H) 44.00 - 72.00 %    Lymphocytes 1.70 (L) 22.00 - 41.00 %    Monocytes 2.60 0.00 - 13.40 %    Eosinophils 0.90 0.00 - 6.90 %    Basophils 0.00 0.00 - 1.80 %    Nucleated RBC 0.40 /100 WBC    Neutrophils (Absolute)  4.84 1.82 - 7.42 K/uL    Lymphs (Absolute) 0.09 (L) 1.00 - 4.80 K/uL    Monos (Absolute) 0.14 0.00 - 0.85 K/uL    Eos (Absolute) 0.05 0.00 - 0.51 K/uL    Baso (Absolute) 0.00 0.00 - 0.12 K/uL    NRBC (Absolute) 0.02 K/uL   Comp Metabolic Panel   Result Value Ref Range    Sodium 143 135 - 145 mmol/L    Potassium 4.2 3.6 - 5.5 mmol/L    Chloride 104 96 - 112 mmol/L    Co2 31 20 - 33 mmol/L    Anion Gap 8.0 7.0 - 16.0    Glucose 177 (H) 65 - 99 mg/dL    Bun 45 (H) 8 - 22 mg/dL    Creatinine 2.46 (H) 0.50 - 1.40 mg/dL    Calcium 8.8 8.5 - 10.5 mg/dL    AST(SGOT) 41 12 - 45 U/L    ALT(SGPT) 35 2 - 50 U/L    Alkaline Phosphatase 72 30 - 99 U/L    Total Bilirubin 0.5 0.1 - 1.5 mg/dL    Albumin 2.3 (L) 3.2 - 4.9 g/dL    Total Protein 6.3 6.0 - 8.2 g/dL    Globulin 4.0 (H) 1.9 - 3.5 g/dL    A-G Ratio 0.6 g/dL   Magnesium   Result Value Ref Range    Magnesium 2.0 1.5 - 2.5 mg/dL   DIFFERENTIAL MANUAL   Result Value Ref Range    Bands-Stabs 15.70 (H) 0.00 - 10.00 %    Myelocytes 1.70 %    Manual Diff Status PERFORMED    PERIPHERAL SMEAR REVIEW   Result Value Ref Range    Peripheral Smear Review see below    PLATELET ESTIMATE   Result Value Ref Range    Plt Estimation Normal    MORPHOLOGY   Result Value Ref Range    RBC Morphology Normal    ESTIMATED GFR   Result Value Ref Range    GFR If  33 (A) >60 mL/min/1.73 m 2    GFR If Non  27 (A) >60 mL/min/1.73 m 2   PHOSPHORUS   Result Value Ref Range    Phosphorus 3.6 2.5 - 4.5 mg/dL   CBC with Differential   Result Value Ref Range    WBC 5.6 4.8 - 10.8 K/uL    RBC 3.00 (L) 4.70 - 6.10 M/uL    Hemoglobin 9.0 (L) 14.0 - 18.0 g/dL    Hematocrit 29.2 (L) 42.0 - 52.0 %    MCV 97.3 81.4 - 97.8 fL    MCH 30.0 27.0 - 33.0 pg    MCHC 30.8 (L) 33.7 - 35.3 g/dL    RDW 50.4 (H) 35.9 - 50.0 fL    Platelet Count 172 164 - 446 K/uL    MPV 12.8 9.0 - 12.9 fL    Neutrophils-Polys 77.40 (H) 44.00 - 72.00 %    Lymphocytes 7.80 (L) 22.00 - 41.00 %    Monocytes 4.30 0.00  - 13.40 %    Eosinophils 0.00 0.00 - 6.90 %    Basophils 0.00 0.00 - 1.80 %    Nucleated RBC 0.00 /100 WBC    Neutrophils (Absolute) 4.87 1.82 - 7.42 K/uL    Lymphs (Absolute) 0.44 (L) 1.00 - 4.80 K/uL    Monos (Absolute) 0.24 0.00 - 0.85 K/uL    Eos (Absolute) 0.00 0.00 - 0.51 K/uL    Baso (Absolute) 0.00 0.00 - 0.12 K/uL    NRBC (Absolute) 0.00 K/uL    Hypochromia 1+     Anisocytosis 1+     Macrocytosis 1+     Microcytosis 1+    Comp Metabolic Panel   Result Value Ref Range    Sodium 146 (H) 135 - 145 mmol/L    Potassium 4.4 3.6 - 5.5 mmol/L    Chloride 106 96 - 112 mmol/L    Co2 31 20 - 33 mmol/L    Anion Gap 9.0 7.0 - 16.0    Glucose 185 (H) 65 - 99 mg/dL    Bun 62 (H) 8 - 22 mg/dL    Creatinine 3.85 (H) 0.50 - 1.40 mg/dL    Calcium 8.7 8.5 - 10.5 mg/dL    AST(SGOT) 45 12 - 45 U/L    ALT(SGPT) 32 2 - 50 U/L    Alkaline Phosphatase 68 30 - 99 U/L    Total Bilirubin 0.8 0.1 - 1.5 mg/dL    Albumin 2.2 (L) 3.2 - 4.9 g/dL    Total Protein 6.2 6.0 - 8.2 g/dL    Globulin 4.0 (H) 1.9 - 3.5 g/dL    A-G Ratio 0.6 g/dL   Magnesium   Result Value Ref Range    Magnesium 2.1 1.5 - 2.5 mg/dL   ESTIMATED GFR   Result Value Ref Range    GFR If  20 (A) >60 mL/min/1.73 m 2    GFR If Non  16 (A) >60 mL/min/1.73 m 2   DIFFERENTIAL MANUAL   Result Value Ref Range    Bands-Stabs 9.60 0.00 - 10.00 %    Metamyelocytes 0.90 %    Manual Diff Status PERFORMED    PERIPHERAL SMEAR REVIEW   Result Value Ref Range    Peripheral Smear Review see below    PLATELET ESTIMATE   Result Value Ref Range    Plt Estimation Normal    MORPHOLOGY   Result Value Ref Range    RBC Morphology Present     Polychromia 1+     Target Cells 1+    URINE CREATININE RANDOM   Result Value Ref Range    Creatinine, Random Urine 297.96 mg/dL   URINE SODIUM RANDOM   Result Value Ref Range    Sodium, Urine -per volume 27 mmol/L   URINE PROTEIN   Result Value Ref Range    Total Protein, Urine 189.0 (H) 0.0 - 15.0 mg/dL   COMPLEMENT C3   Result  Value Ref Range    C3 Complement 95.8 87.0 - 200.0 mg/dL   COMPLEMENT C4   Result Value Ref Range    Complement C4 6.5 (L) 19.0 - 52.0 mg/dL   ABG - LAB   Result Value Ref Range    Ph 7.34 (L) 7.40 - 7.50    Pco2 58.8 (HH) 26.0 - 37.0 mmHg    Po2 136.1 (H) 64.0 - 87.0 mmHg    O2 Saturation 97.7 93.0 - 99.0 %    Hco3 31 (H) 17 - 25 mmol/L    Base Excess 4 (H) -4 - 3 mmol/L    Body Temp see below Centigrade   BLOOD CULTURE    Specimen: Peripheral; Blood   Result Value Ref Range    Significant Indicator POS (POS)     Source BLD     Site PERIPHERAL     Culture Result (A)      Growth detected by [employer] instrument. 01/30/2021  23:04  Staphylococcus aureus (methicillin sensitive)  detected by PCR.      Culture Result Staphylococcus aureus (A)        Susceptibility    Staphylococcus aureus - [facility]     Azithromycin <=2 Sensitive mcg/mL     Clindamycin <=0.25 Sensitive mcg/mL     Cefotaxime <=8 Sensitive mcg/mL     Cefazolin <=8 Sensitive mcg/mL     Cefepime <=4 Sensitive mcg/mL     Ceftaroline <=0.5 Sensitive mcg/mL     Daptomycin <=0.5 Sensitive mcg/mL     Ampicillin/sulbactam <=8/4 Sensitive mcg/mL     Erythromycin <=0.25 Sensitive mcg/mL     Vancomycin 1 Sensitive mcg/mL     Oxacillin <=0.25 Sensitive mcg/mL     Penicillin >2 Resistant mcg/mL     Pip/Tazobactam <=8 Sensitive mcg/mL     Trimeth/Sulfa <=0.5/9.5 Sensitive mcg/mL     Tetracycline <=4 Sensitive mcg/mL   BLOOD CULTURE    Specimen: Peripheral; Blood   Result Value Ref Range    Significant Indicator POS (POS)     Source BLD     Site PERIPHERAL     Culture Result (A)      Growth detected by [employer] instrument. 01/31/2021  02:08    Culture Result (A)      Staphylococcus aureus  See previous culture for sensitivity report.     PROCALCITONIN   Result Value Ref Range    Procalcitonin 4.71 (H) <0.25 ng/mL   Lactic Acid -STAT Once   Result Value Ref Range    Lactic Acid 2.2 (H) 0.5 - 2.0 mmol/L   Prothrombin time (INR)   Result Value Ref Range    PT 13.9 12.0 - 14.6 sec     INR 1.03 0.87 - 1.13   Lactic Acid Every four hours after STAT order   Result Value Ref Range    Lactic Acid 2.2 (H) 0.5 - 2.0 mmol/L   FUNGAL BLOOD CULTURE    Specimen: Blood   Result Value Ref Range    Significant Indicator NEG     Source BLD     Site Peripheral     Culture Result Culture in progress.    Lactic Acid Every four hours after STAT order   Result Value Ref Range    Lactic Acid 2.0 0.5 - 2.0 mmol/L   PHOSPHORUS   Result Value Ref Range    Phosphorus 7.4 (H) 2.5 - 4.5 mg/dL   CBC with Differential   Result Value Ref Range    WBC 4.8 4.8 - 10.8 K/uL    RBC 2.84 (L) 4.70 - 6.10 M/uL    Hemoglobin 8.5 (L) 14.0 - 18.0 g/dL    Hematocrit 28.1 (L) 42.0 - 52.0 %    MCV 98.9 (H) 81.4 - 97.8 fL    MCH 29.9 27.0 - 33.0 pg    MCHC 30.2 (L) 33.7 - 35.3 g/dL    RDW 52.4 (H) 35.9 - 50.0 fL    Platelet Count 80 (L) 164 - 446 K/uL    MPV 13.6 (H) 9.0 - 12.9 fL    Neutrophils-Polys 76.50 (H) 44.00 - 72.00 %    Lymphocytes 7.00 (L) 22.00 - 41.00 %    Monocytes 0.00 0.00 - 13.40 %    Eosinophils 0.90 0.00 - 6.90 %    Basophils 0.00 0.00 - 1.80 %    Nucleated RBC 0.00 /100 WBC    Neutrophils (Absolute) 4.38 1.82 - 7.42 K/uL    Lymphs (Absolute) 0.34 (L) 1.00 - 4.80 K/uL    Monos (Absolute) 0.00 0.00 - 0.85 K/uL    Eos (Absolute) 0.04 0.00 - 0.51 K/uL    Baso (Absolute) 0.00 0.00 - 0.12 K/uL    NRBC (Absolute) 0.00 K/uL    Hypochromia 1+     Anisocytosis 1+     Macrocytosis 1+     Microcytosis 1+    Comp Metabolic Panel   Result Value Ref Range    Sodium 147 (H) 135 - 145 mmol/L    Potassium 5.0 3.6 - 5.5 mmol/L    Chloride 109 96 - 112 mmol/L    Co2 27 20 - 33 mmol/L    Anion Gap 11.0 7.0 - 16.0    Glucose 155 (H) 65 - 99 mg/dL    Bun 88 (HH) 8 - 22 mg/dL    Creatinine 6.30 (HH) 0.50 - 1.40 mg/dL    Calcium 8.2 (L) 8.5 - 10.5 mg/dL    AST(SGOT) 53 (H) 12 - 45 U/L    ALT(SGPT) 26 2 - 50 U/L    Alkaline Phosphatase 61 30 - 99 U/L    Total Bilirubin 0.9 0.1 - 1.5 mg/dL    Albumin 1.9 (L) 3.2 - 4.9 g/dL    Total Protein 5.6 (L)  6.0 - 8.2 g/dL    Globulin 3.7 (H) 1.9 - 3.5 g/dL    A-G Ratio 0.5 g/dL     *Note: Due to a large number of results and/or encounters for the requested time period, some results have not been displayed. A complete set of results can be found in Results Review.       Imaging:   [id number] VIEW   Final Result      [Ovary persistent distention of small bowel loops in the mid abdomen.      IR-CVC NON TUNNELED > AGE 5   Final Result      Successful image guided non-tunneled dialysis catheter placement.      Plan: The catheter is available for immediate use.         US-EXTREMITY VENOUS LOWER BILAT   Final Result      DX-CHEST-LIMITED (1 VIEW)   Final Result      1.  Removal of endotracheal tube and right PICC line.   2.  Hypoinflation with mild patchy opacities probably related to atelectasis. Correlate clinically for infection.      DX-CHEST-PORTABLE (1 VIEW)   Final Result         1.  Pulmonary edema and/or infiltrates are identified, which appear somewhat increased since the prior exam.   2.  Cardiomegaly      DX-ABDOMEN FOR TUBE PLACEMENT   Final Result         1.  Air-filled distended loops of bowel are seen with reactive mucosal pattern, appearance suggests ileus or enteritis. Recommend radiographic followup to resolution to exclude progression to obstruction.   2.  Nasogastric tube tip terminates overlying the expected location of the gastric antrum, could be slightly advanced.      DX-CHEST-PORTABLE (1 VIEW)   Final Result         1.  Bilateral basilar atelectasis, no focal infiltrate   2.  Cardiomegaly      DX-CHEST-PORTABLE (1 VIEW)   Final Result      Perihilar opacification could be due to atelectasis or infiltrate versus mild edema.      Possible trace effusions.      Hypoventilatory change.      CT-ABDOMEN-PELVIS W/O   Final Result      1.  Severe acute pancreatitis, worse than on prior exam.   2.  Multiple dilated small bowel loops suggesting distal small bowel obstruction, similar to prior exam.   3.  No  evidence of bowel perforation.      DX-CHEST-LIMITED (1 VIEW)   Final Result      Bibasilar atelectasis, with no significant interval change.               INTERPRETING LOCATION: [address], [place], [address]      [id number] VIEW   Final Result         Diffuse gaseous distention of the small bowel, worse than prior, concerning for small bowel obstruction.      IR-PICC LINE PLACEMENT W/ GUIDANCE > AGE 5   Final Result                  Ultrasound-guided PICC placement performed by qualified nursing staff as    above.          DX-CHEST-PORTABLE (1 VIEW)   Final Result         1.  Interstitial pulmonary opacities suggests atelectasis or possible subtle infiltrates.      CT-ABDOMEN-PELVIS W/O   Final Result      1.  Again seen pancreatitis with peripancreatic effusions. This is mildly worsened from comparison.      2.  Again seen dilated loops of small intestine with decompression of the colon and distal small bowel consistent with ileus versus partial small bowel obstruction.      3.  Bibasilar atelectasis and small bilateral pleural effusions.      [id number] VIEW   Final Result      1.  Moderate small bowel dilation with multiple air-fluid levels consistent with small bowel obstruction      2.  Enteric catheter appears appropriately located      CT-ABDOMEN-PELVIS W/O   Final Result         1. Worsening inflammatory change and fluid surrounding the pancreas, in keeping with acute pancreatitis.      2. Diffuse dilatation of the small bowel and proximal colon with decompressed distal colon adjacent to the inflammation in the tail the pancreas. This could relate to colonic obstruction due to adjacent inflammatory change or ileus.      3. Mild wall thickening of the stomach fundus adjacent to the pancreatic tail, likely reactive.      DX-ABDOMEN FOR TUBE PLACEMENT   Final Result         Gastric drainage tube with tip projecting over the expected area of the stomach fundus.      DX-CHEST-FOR LINE PLACEMENT Perform  procedure in: Patient's Room   Final Result      Left midlung and bibasilar linear atelectasis.      Dialysis catheter projects over the SVC.      No pneumothorax.         [id number] VIEW   Final Result      Increased bowel gas concerning for early or partial small bowel obstruction.      DX-CHEST-PORTABLE (1 VIEW)   Final Result      Hypoinflation with LEFT mid and lower lung atelectasis, with possible superimposed LEFT basilar pneumonia.      [id number]-R/O   Final Result      1.  Pancreatic edema with significant peripancreatic stranding and ill-defined fluid, consistent with acute interstitial edematous pancreatitis.      2.  Small amount of ascites.      3.  No choledocholithiasis is identified.      4.  Status post cholecystectomy.      CT-ABDOMEN-PELVIS WITH   Final Result      1.  Inflammatory stranding involving the pancreas with fluid within the anterior pararenal space consistent with pancreatitis.      2.  Fatty liver.      3.  Prior cholecystectomy.      4.  Bibasilar atelectasis/consolidation.      DX-CHEST-PORTABLE (1 VIEW)   Final Result      No evidence of acute cardiopulmonary process.      EC-ECHOCARDIOGRAM COMPLETE W/O CONT    (Results Pending)       Problem Representation:   The patient is a 55-year-old male with a past medical history of alcohol use disorder, crohn's disease, non-ischemic cardiomyopathy (?from alcohol use, last EF 25-30%), and lap monico on 7/2020 who presented on 1/14/2021 with severe pancreatitis and a hospital course complicated by intubation, renal failure, ileus/SBO, MSSA bacteremia.     * Sepsis due to methicillin susceptible Staphylococcus aureus (MSSA) with acute renal failure (HCC)  Assessment & Plan  On 1/30 the patient spiked a fever of 101.1 and became hypotensive and tachycardic (2/4 SIRS criteria). Cultures from 1/30 grew MSSA (unclear source, possibly line related?). The patient's PICC was removed on 1/30 due to infiltration. The patient has since been on Ancef.      Plan:  - ID consulted and recommended switching to Dapto.   - Blood cultures ordered and should be repeated every 48 hours until negative. Currently Bcx from 2/1 are negative.  - Per ID, recommend AUGUST  - Recommend keeping the patient on antibiotics for at least 3 weeks from last negative blood culture (can consult ID at that time).   - Continue to monitor daily for back pain, joint pain, nuchal rigidity, and mental status changes.      Pancytopenia (HCC)  Assessment & Plan  The patient's platelet count on 2/1 dropped by more than 50%. On 2/2 his white count dropped as well. This is concerning given that he is acutely ill and could be from multiple causes such as HIT, DIC, medications (ancef), sepsis. Most of these causes have been ruled out (HIT and ISTH score are both low and lab studies did not offer much insight). The cause of his pancytopenia is likely from Ancef.     Plan:  - Switch to Dapto and continue to monitor platelets  - Continue to monitor CBC and look for signs of bleeding.     Ileus (HCC)- (present on admission)  Assessment & Plan  The patient had an Ileus that is most likely a complication of his severe pancreatitis. NG tube has been in place since 1/19 and TPN was started. Tthe patient has since been having bowel sounds and has been having output from his rectal tube. He has not had any nausea and vomiting or any abdominal pain. He has also been hungry and wanting to eat. TPN has been off since 1/30 when PICC was infiltrated. Abdominal plain film continues to show that he has dilated loops of bowel. However, he has started tolerating a diet with no nausea and vomiting.     Plan:  - Discontinue NG tube  - SLP consulted and recommended a diet. Can advance as tolerated.  - The patient has been having nutrition on/off for the 18 days he has been here. I anticipate that he may have some refeeding syndrome so we will continue to aggressively check and replace electrolytes.       Acute respiratory  failure with hypoxia and hypercapnia (HCC)- (present on admission)  Assessment & Plan  On 1/25 the patient had a PCO2 of 114. He does not have a history of COPD. The patient was intubated from 1/25 to 1/27. He is now requiring 1L of oxygen (not on oxygen at home). This is likely not ARDS, but possibly atelectasis vs decreased respiratory drive from opiate use during that time and the lasting effects from being subsequently intubated. Slowly, his O2 requirements have been going down.     Plan:  - Continue IS for atelectasis. The patient understands he needs to be using this 10 times an hour for every hour he is awake.  - Continue RT protocol with duonebs   - Stopped Fentanyl and giving Dilaudid 1mg q4h PRN for pain. We will use this as sparingly as possible so to not cause further respiratory depression.  - Continue oxygen weaning protocol and keep sats around 90%.   - Out of bed to chair orders are in to also help with lung volumes. Aggressive PT/OT to help keep the patient mobilized.     Acute pancreatitis- (present on admission)  Assessment & Plan  This is likely alcohol related pancreatitis (the patient was drinking 7 beers per day prior to admission) that is very severe by [place] criteria with worsening creatinine.  The patient also had other complications of pancreatitis including respiratory failure (not ARDS however, ?opiod related) and ileus. The patient currently has an NG tube in place.     Plan:  - Continue monitoring CMP and replacing electrolytes as needed.  - Continue IV Fluids (D5 1/2NS at 83 cc/hr)  - Gave a bolus of NS  - Carefully using Dilaudid for pain control given renal failure.   - Continuing NG tube and slowly advancing diet.      Acute kidney injury (HCC)- (present on admission)  Assessment & Plan  The patient's Cr has been worsening and improving since admission (with baseline Cr <1) which initially may have been from hypotension, vasoconstiction, and direct nephrotoxic damage from his  acute pancreatitis. However, on 1/30 he patient was febrile, hypotensive, and septic which likely precipitated his current kidney failure. The patient had a HD catheter placed on 1/31 and initiated dialysis. Cr has slowly been going down with dialysis but UOP remains 200cc/24hours.    Plan:  - Continue HD per Nephrology  - Trial of Lasix 80 BID to improve UOP  - Strict monitoring of I&O with jorgensen catheter, and continue jorgensen catheter for now.  - Continue to maintain blood pressure/adequate perfusion  - Avoid any nephrotoxic medications and renally dose all medications  - Continue to monitor CMP to see if this improves    Hyperglycemia- (present on admission)  Assessment & Plan  The patient is not a diabetic with last A1c 5.5. However, he has been having episodes of hyperglycemia likely from pancreatitis.     Plan:  - Continue to monitor with finger sticks  - Continue SSI for now since diet has been advanced    Essential hypertension- (present on admission)  Assessment & Plan  The patient is on Carvedilol 25mg bid, Lasix 40mg, Entresto (49-51), and Spironolactone at home. He was hypotensive and septic on 1/30. He has remained hypotensive and is now on midodrine. Therefore, we will hold these medications and re-evaluate at discharge.     Plan:  - Continue Midodrine and hold for SBP>110   - Continue holding home medications     Hypocalcemia- (present on admission)  Assessment & Plan  Likely from severe pancreatitis.     Plan:  - Continue to monitor with CMP.  - Replace carefully given renal dysfunction    Hypertriglyceridemia- (present on admission)  Assessment & Plan  RESOLVED.   The patient was noted to have a triglyceride level of 571. While this is moderate, a triglyceride level >500 increases risk for pancreatitis. His most recent triglyceride level was in the 90s.       Chronic back pain- (present on admission)  Assessment & Plan  The patient has a history of chronic back pain. This is important to note since he  has MSSA bacteremia and we should be careful to distinguish his pain.     Plan:  - Continue Lidocaine patches, Dilaudid PRN 4 hrs for pancreatitis.     Nonischemic cardiomyopathy (HCC)- (present on admission)  Assessment & Plan  The patient has a history of non-ischemic cardiomyopathy (worst EF was 30%). This may have been from alcohol use. However, the patient has since been on Entresto, Spironolactone, Lasix, and Coreg. Since thn, his EF has actually improved back to normal (last echo 2018 EF 65%).     Plan:  - Stopped home medications at this time due to hypotension.   - Will continue when clinically appropriate.   - Using gentle hydration  - Can consider another echo if blood cultures remain positive.     Restless leg syndrome- (present on admission)  Assessment & Plan  History of RLS.     Plan:  - Continue Ropinerole.    Obesity (BMI 30-39.9)- (present on admission)  Assessment & Plan    Body mass index is 37.52 kg/m².   Plan:  - Counseling when clinically appropriate    Crohn disease (HCC)- (present on admission)  Assessment & Plan  The patient has a history of Crohn's disease and stated that he has been in remission for years. He follows with Dr. Colon as an outpatient.     Plan:  - May need outpatient follow-up      DVT ppx: Heparin Q8h  Diet: Clear liquids, mildly thickened  Tubes/Lines: [place] Catheter, HD catheter ([facility])  Code status: FULL    Luis Rodriguez M.D.

## 2021-02-02 NOTE — CONSULTS
INFECTIOUS DISEASES INPATIENT CONSULT NOTE     Date of Service: 2/2/2021    Consult Requested By: [facility] resident, Luis Rodriguez M.D.    Reason for Consultation: MSSA bacteremia    History of Present Illness:   Shaka Riley is a 55 y.o. man with a history of PE, congestive heart failure, alcohol abuse, Crohn's disease, hypertension and paroxysmal atrial fibrillation admitted 1/14/2021 for abdominal pain.  Patient is alert and oriented to person and place however he is a poor historian so history is difficult to obtain so history is obtained from review of the medical records.  Patient was initially admitted for abdominal pain secondary to acute pancreatitis.  His hospital course was complicated by small bowel obstruction versus ileus on 1/19/2021 for which an NG tube was placed.  Patient was also started on TPN on 1/21/2021 and thus a PICC line was placed.  His hospital course was further complicated by acute hypoxic respiratory failure for which he was transferred to the ICU and intubated on 1/25/2021.  Patient extubated on 1/27.  Thereafter he was transferred to the floor.  Overall, his renal function has continued to decline and the patient underwent HD catheter placement on 1/31 and has now been started on hemodialysis.  Patient had been doing well without any fevers until 1/30 when he spiked a temperature with a T-max of 100.8.  Blood cultures on 1/30 are now positive for MSSA.  His PICC line has since been removed.  Patient was started on cefazolin on 1/31 and since then he has had acute pancytopenia.  Repeat blood cultures from 2/1 are negative to date.  At this time, patient does complain of back pain however he does have chronic back pain.  He denies any joint pain.  He denies any pacemaker or any hardware in his body.  Infectious disease service consulted for antibiotic recommendations.      Unable to obtain a review of systems as patient is a poor historian    PMH:   Past Medical History:   Diagnosis  Date   • Clotting disorder (HCC)     PE   • Congestive heart failure (HCC) 7/2015    EF 30-35%; Dilated R atrium; LVH; Mild MR; Mild AI; Mild TR   • Crohn disease (HCC)    • Hypertension    • PAF (paroxysmal atrial fibrillation) (HCC)    • Sleep apnea     un-diagnosed and pending sleep study       PSH:  Past Surgical History:   Procedure Laterality Date   • CRYSTAL BY LAPAROSCOPY  7/29/2020    Procedure: CHOLECYSTECTOMY, LAPAROSCOPIC;  Surgeon: Caroline Skelton M.D.;  Location[facility] SURGERY [facility];  Service: General       FAMILY HX:  Family History   Problem Relation Age of Onset   • Cancer Mother    • Heart Disease Paternal Grandmother    • Heart Disease Paternal Grandfather    • Cancer Father         paralysis from accident       SOCIAL HX:  Social History     Socioeconomic History   • Marital status:      Spouse name: Not on file   • Number of children: Not on file   • Years of education: Not on file   • Highest education level: Not on file   Occupational History   • Not on file   Social Needs   • Financial resource strain: Not on file   • Food insecurity     Worry: Not on file     Inability: Not on file   • Transportation needs     Medical: Not on file     Non-medical: Not on file   Tobacco Use   • Smoking status: Never Smoker   • Smokeless tobacco: Never Used   Substance and Sexual Activity   • Alcohol use: Yes     Alcohol/week: 1.2 oz     Types: 2 Glasses of wine per week     Comment: Quit in 1994 - previous six beer after work, 2-3 beers per day currently   • Drug use: No     Types: Methamphetamines     Comment: Quit in 2012, used for 5 years   • Sexual activity: Yes     Partners: Female   Lifestyle   • Physical activity     Days per week: Not on file     Minutes per session: Not on file   • Stress: Not on file   Relationships   • Social connections     Talks on phone: Not on file     Gets together: Not on file     Attends Alevism service: Not on file     Active member of club or organization: Not  on file     Attends meetings of clubs or organizations: Not on file     Relationship status: Not on file   • Intimate partner violence     Fear of current or ex partner: Not on file     Emotionally abused: Not on file     Physically abused: Not on file     Forced sexual activity: Not on file   Other Topics Concern   • Not on file   Social History Narrative   • Not on file     Social History     Tobacco Use   Smoking Status Never Smoker   Smokeless Tobacco Never Used     Social History     Substance and Sexual Activity   Alcohol Use Yes   • Alcohol/week: 1.2 oz   • Types: 2 Glasses of wine per week    Comment: Quit in 1994 - previous six beer after work, 2-3 beers per day currently       Allergies/Intolerances:  No Known Allergies      Other Current Medications:    Current Facility-Administered Medications:   •  heparin injection 5,000 Units, 5,000 Units, Subcutaneous, Q8HRS, Luis Rodriguez M.D., 5,000 Units at 02/01/21 1303  •  HYDROmorphone pf (DILAUDID) injection 1 mg, 1 mg, Intravenous, Q4HRS PRN, Luis Rodriguez M.D., 1 mg at 02/01/21 2322  •  insulin lispro (HumaLOG) injection, 1-6 Units, Subcutaneous, Q6HRS, 1 Units at 02/02/21 0639 **AND** POC Blood Glucose, , , Q6H **AND** NOTIFY MD and PharmD, , , Once **AND** glucose 4 g chewable tablet 16 g, 16 g, Oral, Q15 MIN PRN **AND** dextrose 50% (D50W) injection 50 mL, 50 mL, Intravenous, Q15 MIN PRN, Luis Rodriguez M.D.  •  midodrine (PROAMATINE) tablet 10 mg, 10 mg, Enteral Tube, TID WITH MEALS, Zaid Scott M.D., 10 mg at 02/02/21 0620  •  Pharmacy Consult: Enteral tube insertion - review meds/change route/product selection, , Other, PHARMACY TO DOSE, Zaid Scott M.D.  •  ceFAZolin in dextrose (ANCEF) IVPB premix 2 g, 2 g, Intravenous, Q24HRS, GIOVANNI Cohen M.D., Stopped at 02/02/21 0615  •  ROPINIRole (REQUIP) tablet 2 mg, 2 mg, Enteral Tube, QHS, Levar Tapia M.D., 2 mg at 02/01/21 2044  •  simethicone (MYLICON) chewable tab 80 mg, 80  "mg, Enteral Tube, TID, Levar Tapia M.D., 80 mg at 21 0600  •  acetaminophen (Tylenol) tablet 650 mg, 650 mg, Enteral Tube, Q4HRS PRN, Levar Tapia M.D., 650 mg at 21 1650  •  ondansetron (ZOFRAN) syringe/vial injection 4 mg, 4 mg, Intravenous, Q4HRS PRN, Wing Abarca M.D., 4 mg at 21 1921  •  Respiratory Therapy Consult, , Nebulization, Continuous RT, Anil Seymour M.D.  •  ipratropium-albuterol (DUONEB) nebulizer solution, 3 mL, Nebulization, Q2HRS PRN (RT), Anil Seymour M.D., 3 mL at 21  •  senna-docusate (PERICOLACE or SENOKOT S) 8.6-50 MG per tablet 2 Tab, 2 Tab, Enteral Tube, BID, 2 Tab at 21 0521 **AND** polyethylene glycol/lytes (MIRALAX) PACKET 1 Packet, 1 Packet, Enteral Tube, QDAY PRN **AND** magnesium hydroxide (MILK OF MAGNESIA) suspension 30 mL, 30 mL, Enteral Tube, QDAY PRN **AND** bisacodyl (DULCOLAX) suppository 10 mg, 10 mg, Rectal, QDAY PRN, Anil Seymour M.D.  •  lidocaine (LIDODERM) 5 % 1 Patch, 1 Patch, Transdermal, Q24HR, BEL Torres, Stopped at 21 8495  [unfilled]    Most Recent Vital Signs:  /67   Pulse 84   Temp 36.8 °C (98.2 °F) (Temporal)   Resp 16   Ht 1.727 m (5' 7.99\")   Wt 114 kg (251 lb 1.7 oz)   SpO2 91%   BMI 38.19 kg/m²   Temp  Av.7 °C (98.1 °F)  Min: 35.2 °C (95.3 °F)  Max: 38.4 °C (101.1 °F)    Physical Exam:  General: Obese, well nourished, no diaphoresis, well-appearing, no acute distress  HEENT: sclera anicteric, PERRL, extraocular muscles intact, mucous membranes moist, oropharynx clear and moist, no oral lesions or exudate  Neck: supple, no lymphadenopathy, right HD catheter in place  Chest: CTAB, no rales, rhonchi or wheezes, normal work of breathing.  Cardiac: regular rate and rhythm, normal S1 S2, no murmurs, rubs or gallops  Abdomen: + bowel sounds, soft, non-tender, non-distended, no hepatosplenomegaly  : Hudson catheter in place  Extremities: WWP, no edema, " "2+ pedal pulses  Skin: warm and dry, no rashes or worrisome lesions, pallor  Neuro: Alert and oriented to self and place however is somewhat confused and does not answer questions appropriately, non-focal exam, speech fluent, full range of motion to bilateral upper and lower extremities  Psych: normal mood and behavior, pleasant; memory poor    Pertinent Lab Results:  Recent Labs     01/31/21 0231 02/02/21  0249   WBC 4.8 3.2*      Recent Labs     01/31/21 0231 02/02/21  0249   HEMOGLOBIN 8.5* 8.5*   HEMATOCRIT 28.1* 26.9*   MCV 98.9* 94.1   MCH 29.9 29.7   MACROCYTOSIS 1+ 1+   ANISOCYTOSIS 1+ 1+   PLATELETCT 80* 71*         Recent Labs     01/31/21 0231 02/02/21  0249   SODIUM 147* 137   POTASSIUM 5.0 3.8   CHLORIDE 109 100   CO2 27 29   CREATININE 6.30* 4.94*        Recent Labs     01/31/21 0231 02/02/21  0249   ALBUMIN 1.9* 2.1*        Pertinent Micro:  Results     Procedure Component Value Units Date/Time    BLOOD CULTURE [[id number]] Collected: 02/01/21 1045    Order Status: Completed Specimen: Blood from Peripheral Updated: 02/02/21 0618     Significant Indicator NEG     Source BLD     Site PERIPHERAL     Culture Result No Growth  Note: Blood cultures are incubated for 5 days and  are monitored continuously.Positive blood cultures  are called to the RN and reported as soon as  they are identified.      Narrative:      Per Hospital Policy: Only change Specimen Src: to \"Line\" if  specified by physician order.  Left Hand    BLOOD CULTURE [[id number]] Collected: 02/01/21 1045    Order Status: Completed Specimen: Blood from Peripheral Updated: 02/02/21 0618     Significant Indicator NEG     Source BLD     Site PERIPHERAL     Culture Result No Growth  Note: Blood cultures are incubated for 5 days and  are monitored continuously.Positive blood cultures  are called to the RN and reported as soon as  they are identified.      Narrative:      Per Hospital Policy: Only change Specimen Src: to \"Line\" if  specified by " "physician order.  Right Hand    BLOOD CULTURE [[id number]]  (Abnormal) Collected: 01/30/21 1207    Order Status: Completed Specimen: Blood from Peripheral Updated: 02/01/21 1022     Significant Indicator POS     Source BLD     Site PERIPHERAL     Culture Result Growth detected by Bactec instrument. 01/31/2021  02:08      Staphylococcus aureus  See previous culture for sensitivity report.      Narrative:      CALL  [facility]  183 tel. [phone],  CALLED  183 tel. [phone] 01/31/2021, 02:10, RB PERF. RESULTS CALLED TO:  [id number], RN  Per Hospital Policy: Only change Specimen Src: to \"Line\" if  specified by physician order.  Right Hand    BLOOD CULTURE [[id number]]  (Abnormal)  (Susceptibility) Collected: 01/30/21 0954    Order Status: Completed Specimen: Blood from Peripheral Updated: 02/01/21 1021     Significant Indicator POS     Source BLD     Site PERIPHERAL     Culture Result Growth detected by Bactec instrument. 01/30/2021  23:04  Staphylococcus aureus (methicillin sensitive)  detected by PCR.        Staphylococcus aureus    Narrative:      CALL  [facility]  183 tel. [phone],  CALLED  183 tel. [phone] 01/30/2021, 23:07, RB PERF. RESULTS CALLED TO:  [id number], RN and pharmacy voicemail  Per Hospital Policy: Only change Specimen Src: to \"Line\" if  specified by physician order.  Right Hand    Susceptibility     Staphylococcus aureus (1)     Antibiotic Interpretation Microscan Method Status    Azithromycin Sensitive <=2 mcg/mL GILBERT Final    Clindamycin Sensitive <=0.25 mcg/mL GILBERT Final    Cefotaxime Sensitive <=8 mcg/mL GILBERT Final    Cefazolin Sensitive <=8 mcg/mL GILBERT Final    Cefepime Sensitive <=4 mcg/mL GILBERT Final    Ceftaroline Sensitive <=0.5 mcg/mL GILBERT Final    Daptomycin Sensitive <=0.5 mcg/mL GILBERT Final    Ampicillin/sulbactam Sensitive <=8/4 mcg/mL GILBERT Final    Erythromycin Sensitive <=0.25 mcg/mL GILBERT Final    Vancomycin Sensitive 1 mcg/mL GILBERT Final    Oxacillin Sensitive <=0.25 mcg/mL GILBERT Final    Penicillin Resistant >2 " mcg/mL GILBERT Final    Pip/Tazobactam Sensitive <=8 mcg/mL GILBERT Final    Trimeth/Sulfa Sensitive <=0.5/9.5 mcg/mL GILBERT Final    Tetracycline Sensitive <=4 mcg/mL GILBERT Final                   FUNGAL BLOOD CULTURE [[id number]] Collected: 01/30/21 1459    Order Status: Completed Specimen: Blood Updated: 01/31/21 0649     Significant Indicator NEG     Source BLD     Site Peripheral     Culture Result Culture in progress.    Narrative:      Right Hand    URINALYSIS [[id number]] Collected: 01/30/21 0800    Order Status: Completed Specimen: Urine, Clean Catch Updated: 01/30/21 1217    Narrative:      Collected By:[id number] DIANE SAMS    FUNGAL BLOOD CULTURE [[id number]]     Order Status: Canceled Specimen: Blood         Blood Culture   Date Value Ref Range Status   07/19/2015 No growth after 5 days of incubation.  Final     Blood Culture Hold   Date Value Ref Range Status   05/05/2014 Collected  Final        Studies:  Ct-abdomen-pelvis W/o    Result Date: 1/25/2021 1/25/2021 11:41 AM HISTORY/REASON FOR EXAM:  Abdominal distension. TECHNIQUE/EXAM DESCRIPTION: CT scan of the abdomen and pelvis without contrast. Noncontrast helical scanning was obtained from the diaphragmatic domes through the pubic symphysis. Low dose optimization technique was utilized for this CT exam including automated exposure control and adjustment of the mA and/or kV according to patient size. COMPARISON: 1/20/2021 FINDINGS: CT Abdomen: Visualized lung bases show mild dependent atelectasis. The liver is unremarkable. The spleen is unremarkable. Adrenal glands are unremarkable. The kidneys are unremarkable. Extensive edema and fluid about the pancreas extending into the pararenal spaces bilaterally and tracking inferiorly within the retroperitoneum, more extensive than on prior exam. Gallbladder is absent. CT Pelvis: Bladder is unremarkable. Trace pelvic fluid. Appendix has a normal appearance. Multiple dilated small bowel loops throughout the abdomen.   Distal small bowel is decompressed. Mildly increased colonic fluid. No pneumoperitoneum. Abdominal aorta is unremarkable. Lumbar spine degenerative changes with mild compression of T11 and L1, likely chronic, with multilevel Schmorl's nodes. Small fat-containing LEFT inguinal hernia. Body wall edema, similar to prior.     1.  Severe acute pancreatitis, worse than on prior exam. 2.  Multiple dilated small bowel loops suggesting distal small bowel obstruction, similar to prior exam. 3.  No evidence of bowel perforation.    Ct-abdomen-pelvis W/o    Result Date: 1/20/2021 1/20/2021 12:18 PM HISTORY/REASON FOR EXAM: Pancreatitis follow-up. Abdominal pain. TECHNIQUE/EXAM DESCRIPTION: CT scan of the abdomen and pelvis without contrast. Contrast-enhanced helical scanning was obtained from the diaphragmatic domes through the pubic symphysis without intravenous contrast. Low dose optimization technique was utilized for this CT exam including automated exposure control and adjustment of the mA and/or kV according to patient size. COMPARISON: 1/16/2021 FINDINGS: The study is limited due to nonuse of intravenous contrast. CT Abdomen: There is bibasilar atelectasis. There is a small left pleural effusion. NG tube extends into the stomach. There has been prior cholecystectomy. The spleen is normal. The kidneys are normal. The adrenal glands are normal. There is again seen inflammatory change involving the pancreas and surrounding peripancreatic tissues. This is worsened from comparison. There is also fluid within the anterior pararenal space consistent with peripancreatic effusions. This extends inferiorly into the pelvis bilaterally along the lateral conal fascia. The aorta is normal in caliber. No evidence of retroperitoneal adenopathy. CT Pelvis: There again seen mildly dilated loops of small intestine with decompression distally. The appendix is visualized and appears normal. There is no evidence of free fluid. There is a  left inguinal hernia which contains fat.     1.  Again seen pancreatitis with peripancreatic effusions. This is mildly worsened from comparison. 2.  Again seen dilated loops of small intestine with decompression of the colon and distal small bowel consistent with ileus versus partial small bowel obstruction. 3.  Bibasilar atelectasis and small bilateral pleural effusions.    Ct-abdomen-pelvis W/o    Result Date: 1/16/2021 1/16/2021 8:57 PM HISTORY/REASON FOR EXAM:  Bowel obstruction high-grade suspected diffuse abdomina pain, chills and feels feverish, mild labored breathing TECHNIQUE/EXAM DESCRIPTION: CT scan of the abdomen and pelvis without contrast. Noncontrast helical scanning was obtained from the diaphragmatic domes through the pubic symphysis. Low dose optimization technique was utilized for this CT exam including automated exposure control and adjustment of the mA and/or kV according to patient size. COMPARISON: 1/14/2021. FINDINGS: Lung Bases: Airspace opacities in the bilateral lung bases. Small bilateral pleural effusions Abdomen: Within the limits of noncontrast technique, the liver, spleen, kidneys and adrenal glands are unremarkable in appearance. Diffuse stranding surrounding the pancreas with fluid, slightly worse than prior. Worsening wall thickening of the stomach fundus adjacent to the pancreatic tail. The gallbladder is surgically absent. Diffuse dilatation of the small bowel and proximal colon. Decompressed descending colon. The abdominal aorta is normal in caliber. Pelvis: Small amount of fluid in the pelvis. Decompressed urinary bladder with Hudson catheter. No lymph node enlargement. Small fat-containing bilateral inguinal hernias No aggressive bone lesions are seen. ________________________________    1. Worsening inflammatory change and fluid surrounding the pancreas, in keeping with acute pancreatitis. 2. Diffuse dilatation of the small bowel and proximal colon with decompressed distal  colon adjacent to the inflammation in the tail the pancreas. This could relate to colonic obstruction due to adjacent inflammatory change or ileus. 3. Mild wall thickening of the stomach fundus adjacent to the pancreatic tail, likely reactive.    Ct-abdomen-pelvis With    Result Date: 1/14/2021 1/14/2021 7:28 PM HISTORY/REASON FOR EXAM: Diffuse abdominal pain. TECHNIQUE/EXAM DESCRIPTION: CT scan of the abdomen and pelvis with contrast. Contrast-enhanced helical scanning was obtained from the diaphragmatic domes through the pubic symphysis following the bolus administration of 100 mL of Omnipaque 350 nonionic contrast without complication. Low dose optimization technique was utilized for this CT exam including automated exposure control and adjustment of the mA and/or kV according to patient size. COMPARISON: No prior studies available. FINDINGS: CT Abdomen: There is bibasilar atelectasis/consolidation. There is fatty change of the liver. The spleen is normal. There is cortical scarring involving the upper pole of the left kidney. The adrenal glands are normal. There is inflammatory stranding seen surrounding the pancreas. There is peripancreatic fluid. Fluid extends along the lateral conal fascia. There has been prior cholecystectomy. The aorta is normal in caliber. No evidence of retroperitoneal adenopathy. CT Pelvis: There is no evidence of bowel obstruction or inflammatory change. The appendix is not identified. There is no evidence of free fluid.     1.  Inflammatory stranding involving the pancreas with fluid within the anterior pararenal space consistent with pancreatitis. 2.  Fatty liver. 3.  Prior cholecystectomy. 4.  Bibasilar atelectasis/consolidation.    [id number] View    Result Date: 2/1/2021 2/1/2021 6:20 PM HISTORY/REASON FOR EXAM:  Abdominal Pain. TECHNIQUE/EXAM DESCRIPTION AND NUMBER OF VIEWS:  1 view(s) of the abdomen. COMPARISON: 1/25/2021 FINDINGS: AP view the abdomen demonstrates persistent  distention of small bowel loops in the mid abdomen. Small bowel loops measure approximately 5 cm in diameter, similar to the prior exam. There is air within the colon and rectum. No pneumatosis or portal venous  air is appreciated. Surgical clips project over the right upper quadrant.     [Ovary persistent distention of small bowel loops in the mid abdomen.    [id number] View    Result Date: 1/22/2021 1/22/2021 2:40 PM HISTORY/REASON FOR EXAM:  Abdominal Pain Abdominal Pain TECHNIQUE/EXAM DESCRIPTION AND NUMBER OF VIEWS:  1 view(s) of the abdomen. COMPARISON: 1/19/21 FINDINGS: Interval removal of gastric drainage tube. Diffuse gaseous distention of the small bowel. There is gas and stool in the colon. No portal venous gas or pneumatosis. No definite free intraperitoneal air but evaluation is limited on supine radiograph.     Diffuse gaseous distention of the small bowel, worse than prior, concerning for small bowel obstruction.    My-[id number]-7 View    Result Date: 1/19/2021 1/19/2021 4:34 PM HISTORY/REASON FOR EXAM:  Abdominal Pain. TECHNIQUE/EXAM DESCRIPTION AND NUMBER OF VIEWS:  1 view(s) of the abdomen. COMPARISON: 1 view abdomen 1/16/2021 FINDINGS: Enteric catheter tip projects over the stomach. There is atelectasis at both lung bases. There is moderate small bowel dilation with multiple air-fluid levels.     1.  Moderate small bowel dilation with multiple air-fluid levels consistent with small bowel obstruction 2.  Enteric catheter appears appropriately located    Yf-[id number] View    Result Date: 1/16/2021 1/16/2021 4:47 PM HISTORY/REASON FOR EXAM:  Abdominal Pain. TECHNIQUE/EXAM DESCRIPTION AND NUMBER OF VIEWS:  1 view(s) of the abdomen. COMPARISON: None FINDINGS: Increased colonic and small bowel gas with dilated mid abdominal small bowel loops present. Surgical clips are seen in the RIGHT midabdomen. No major bony abnormality is seen.     Increased bowel gas concerning for early or partial small bowel  obstruction.    Dx-chest-for Line Placement Perform Procedure In: Patient's Room    Result Date: 1/16/2021 1/16/2021 4:48 PM HISTORY/REASON FOR EXAM:  Right HD cath TECHNIQUE/EXAM DESCRIPTION AND NUMBER OF VIEWS: Single AP view of the chest. COMPARISON: None FINDINGS: Dialysis catheter projects over the SVC. The cardiomediastinal silhouette is enlarged.. There is a left midlung and bibasilar atelectasis. No pleural effusion or pneumothorax is identified.     Left midlung and bibasilar linear atelectasis. Dialysis catheter projects over the SVC. No pneumothorax.     Dx-chest-limited (1 View)    Result Date: 1/30/2021 1/30/2021 9:42 AM HISTORY/REASON FOR EXAM:  Shortness of Breath TECHNIQUE/EXAM DESCRIPTION AND NUMBER OF VIEWS: Single portable view of the chest. COMPARISON: 1/27/2021 FINDINGS: Enteric tube courses to the abdomen with the distal tip not seen. Endotracheal tube has been removed. Right PICC line has been removed. Cardiomediastinal silhouette is stable. Low lung volumes with bibasilar atelectasis. Some increased right perihilar opacity likely represents atelectasis. Correlate clinically for infection. No significant pleural effusion or pneumothorax. No acute osseous abnormality.     1.  Removal of endotracheal tube and right PICC line. 2.  Hypoinflation with mild patchy opacities probably related to atelectasis. Correlate clinically for infection.    Dx-chest-limited (1 View)    Result Date: 1/24/2021  HISTORY/REASON FOR EXAM:  Shortness of Breath. TECHNIQUE/EXAM DESCRIPTION:  Single view of the chest,  1/24/2021 6:17 PM COMPARISON:  1/21/2021 FINDINGS:   The cardiomediastinal silhouettes, jamal, and pulmonary vessels are normal. There are minimal coarse curvilinear opacities again seen in both lower lung fields, consistent with discoid atelectasis. The mid and upper lung fields are clear. A right-sided PICC is present, the tip of which projects in the area of the proximal SVC.     Bibasilar atelectasis,  with no significant interval change. INTERPRETING LOCATION: [facility], [place], [address]    Dx-chest-portable (1 View)    Result Date: 1/27/2021 1/27/2021 12:54 AM HISTORY/REASON FOR EXAM: For indication of respiratory failure; For indication of respiratory failure TECHNIQUE/EXAM DESCRIPTION:  Single AP view of the chest. COMPARISON: January 25, 2021 FINDINGS: Nasogastric tube terminates below the lower margin of the film within the abdomen.  Otherwise medical device position is stable. Cardiomegaly is observed. The mediastinal contour appears within normal limits.  The central  pulmonary vasculature appears prominent and indistinct. Bilateral lung volumes are diminished.  Diffuse scattered hazy pulmonary parenchymal opacities are seen. No significant pleural effusions are identified. The bony structures appear age-appropriate.     1.  Pulmonary edema and/or infiltrates are identified, which appear somewhat increased since the prior exam. 2.  Cardiomegaly    Dx-chest-portable (1 View)    Result Date: 1/25/2021 1/25/2021 10:05 PM HISTORY/REASON FOR EXAM: POST INTUBATION TECHNIQUE/EXAM DESCRIPTION:  Single AP view of the chest. COMPARISON: January 25, 2021 FINDINGS: Endotracheal tube terminates between the clavicles and patricia.     Otherwise medical device position is stable. Cardiomegaly is observed. The mediastinal contour appears within normal limits.  The central pulmonary vasculature appears normal. The lungs appear well expanded bilaterally.  Hazy linear density in the bilateral lung bases is observed. No significant pleural effusions are identified. The bony structures appear age-appropriate.     1.  Bilateral basilar atelectasis, no focal infiltrate 2.  Cardiomegaly    Dx-chest-portable (1 View)    Result Date: 1/25/2021 1/25/2021 9:19 PM HISTORY/REASON FOR EXAM:  Shortness of Breath. TECHNIQUE/EXAM DESCRIPTION AND NUMBER OF VIEWS: Single portable view of the chest. COMPARISON: January 24, 2021  FINDINGS: Right-sided PICC line remains in place. There is perihilar opacification. No pneumothorax. Possible trace effusions. External pacer pads are present.     Perihilar opacification could be due to atelectasis or infiltrate versus mild edema. Possible trace effusions. Hypoventilatory change.    Dx-chest-portable (1 View)    Result Date: 1/21/2021 1/21/2021 2:09 AM HISTORY/REASON FOR EXAM: Shortness of Breath TECHNIQUE/EXAM DESCRIPTION:  Single AP view of the chest. COMPARISON: January 16, 2021 FINDINGS: Nasogastric tube terminates below the lower margin of the film within the abdomen. The cardiac silhouette appears within normal limits. The mediastinal contour appears within normal limits.  The central pulmonary vasculature appears normal. The lungs appear well expanded bilaterally.  Hazy interstitial bilateral pulmonary opacities are seen. No significant pleural effusions are identified. The bony structures appear age-appropriate.     1.  Interstitial pulmonary opacities suggests atelectasis or possible subtle infiltrates.    Dx-chest-portable (1 View)    Result Date: 1/16/2021 1/16/2021 1:44 PM HISTORY/REASON FOR EXAM:  Shortness of Breath. TECHNIQUE/EXAM DESCRIPTION AND NUMBER OF VIEWS: Single portable view of the chest. COMPARISON: 1/14/2021 FINDINGS: Cardiac mediastinal contour is unchanged. Lungs show hypoinflation. Linear opacities in the LEFT mid and lower lung region. Ill-defined opacity LEFT lung base. No pleural fluid collection or pneumothorax. No major bony abnormality is seen.     Hypoinflation with LEFT mid and lower lung atelectasis, with possible superimposed LEFT basilar pneumonia.    Dx-chest-portable (1 View)    Result Date: 1/14/2021 1/14/2021 6:53 PM HISTORY/REASON FOR EXAM: Abdominal pain. TECHNIQUE/EXAM DESCRIPTION AND NUMBER OF VIEWS: Single portable view of the chest. COMPARISON: 7/29/2020 FINDINGS: There is no evidence of focal consolidation or evidence of pulmonary edema. There  is no pleural effusion. The heart is normal in size.     No evidence of acute cardiopulmonary process.    Ir-cvc Non Tunneled > Age 5    Result Date: 2/1/2021  HISTORY/REASON FOR EXAM: Renal insufficiency. TECHNIQUE/EXAM DESCRIPTION AND NUMBER OF VIEWS: Ultrasound and fluoroscopic guided non-tunneled central venous catheter placement COMPARISON:  None. Contrast: None FLUOROSCOPIC IMAGES: 1 fluoroscopic image(s) obtained. Fluoroscopic guidance was used during the procedure. FLUOROSCOPY TIME: 0.4 minutes MEDICATIONS: Moderate sedation was provided. Pulse oximetry and continuous cardiac monitoring by the nurse was performed throughout the exam. Intraservice time was 15 minutes. PROCEDURE:     The risks, benefits, goals and objectives and alternatives were discussed. Risks were specified as including but not limited to bleeding, infection, damage to vessels or nerves, pain and discomfort. Issues related to catheter care were discussed. The patient's questions were answered. Informed oral and written consent were obtained. The patient was placed supine on the angiography table. The skin of the  RIGHT neck was prepped and draped in the usual sterile manner. Maximum sterile barrier technique was used. A timeout was performed. Patency of the vein was documented with real-time  ultrasound and the recorded image was entered into the patient?s medical record. Local anesthetic result was achieved with administration of 1% lidocaine. A small skin nick was made with an 11 blade scalpel. Using real-time sonographic guidance and sterile technique a micro-access needle was advanced into the  RIGHT internal jugular vein. Fluoroscopic guidance was then utilized. Access was secured with a wire under real-time fluoroscopic visualization. The tract was dilated under real-time fluoroscopic visualization to accommodate a 12 Norwegian 16 cm non-tunneled hemodialysis catheter with third medication port was placed with its tip at the cavoatrial  junction as confirmed with fluoroscopy. All ports aspirated and flushed appropriately and were terminally flushed with heparinized saline. The catheter was secured to the skin in the usual manner and is available for immediate use. The skin was cleaned and a dressing was applied. The patient remained comfortable throughout the procedure and there were no complications. FINDINGS: Internal jugular vein patent by ultrasound.     Successful image guided non-tunneled dialysis catheter placement. Plan: The catheter is available for immediate use.     [id number]    Result Date: 1/15/2021  1/15/2021 10:21 AM HISTORY/REASON FOR EXAM:  Abdominal pain, acute, nonlocalized. Abdominal pain. Patient is status post cholecystectomy TECHNIQUE/EXAM DESCRIPTION: Magnetic resonance cholangiopancreatography. Magnetic resonance cholangiopancreatography was performed on with axial, coronal, and sagittal thin and thick section heavily T2-weighted sequences. 3-D cholangiographic multiplanar maximum intensity projection (MIP) images were created on a workstation.. The study was performed on a [employer] Signa 1.5 Chloe MRI scanner. COMPARISON: Ultrasound 7/29/2020 and CT 10/14/2021 FINDINGS: Biliary system: No intrahepatic ductal dilatation.. The common bile duct measures approximately 4 mm. The gallbladder has been removed. The cystic duct remnant is normal in diameter. No choledocholithiasis. Pancreas: Diffuse pancreatic enlargement with edema. There is ill-defined peripancreatic fluid and stranding, consistent with pancreatitis. Fluid demonstrates high T2 and high T1 signal, suggesting proteinaceous fluid or hemorrhage. No pancreatic ductal dilatation. Pancreatic duct is not well visualized in the head, but appears to normally extend to the ampulla. Liver: Unremarkable. Spleen: Unremarkable. Adrenal glands: Unremarkable. Kidneys: No hydronephrosis. Ascites: Small amount of ascites extends inferiorly along the paracolic gutters. There are  small ill-defined collections in the left upper quadrant adjacent to the spleen. Lymph nodes: No adenopathy is identified. Miscellaneous: Bilateral lower lobe opacities may represent atelectasis.     1.  Pancreatic edema with significant peripancreatic stranding and ill-defined fluid, consistent with acute interstitial edematous pancreatitis. 2.  Small amount of ascites. 3.  No choledocholithiasis is identified. 4.  Status post cholecystectomy.    Us-extremity Venous Lower Bilat    Result Date: 2021   Vascular Laboratory  CONCLUSIONS  No superficial or deep venous thrombosis.  MERAZ SHEBA  Exam Date:     2021 11:37  Room #:     Inpatient  Priority:     Routine  Ht (in):             Wt (lb):  Ordering Physician:        BEAN WEN  Referring Physician:       BEHZAD Porter  Sonographer:               Jamari Hodge RDCS, RVT  Study Type:                Complete Bilateral  Technical Quality:         Adequate  Age:    55    Gender:     M  MRN:    [id number]  :    [id number]      BSA:  Indications:     Swelling of Limb  CPT Codes:       46563  ICD Codes:       729.81  History:  Limitations:  PROCEDURES:  Bilateral lower extremity venous duplex imaging.  The following venous structures were evaluated: common femoral, profunda  femoral, greater saphenous, femoral, popliteal , peroneal and posterior  tibial veins.  Serial compression, augmentation maneuvers,  color and spectral Doppler  flow evaluations were performed.  FINDINGS:  Bilateral lower extremities -.  Complete color filling and compressibility with normal venous flow dynamics  including spontaneous flow, response to augmentation maneuvers, and  respiratory phasicity.  The peroneal and posterior tibial veins are difficult to assess for  compressibility, but flow response to augmentation is demonstrated.  No superficial or deep venous thrombosis.  Prior study 2015.  Francis Hernandez  (Electronically Signed)   Final Date:      30 January 2021                   13:50    Dx-abdomen For Tube Placement    Result Date: 1/25/2021 1/25/2021 10:53 PM HISTORY/REASON FOR EXAM: Nasogastric tube placement TECHNIQUE/EXAM DESCRIPTION:  Single AP view the abdomen. COMPARISON: FINDINGS: Limited views of the lung bases are clear. Nasogastric tube is seen, the tip overlies the lumbar spine.  Scattered air-filled reactive loops of small bowel are seen within the abdomen. The bony structures appear age-appropriate.     1.  Air-filled distended loops of bowel are seen with reactive mucosal pattern, appearance suggests ileus or enteritis. Recommend radiographic followup to resolution to exclude progression to obstruction. 2.  Nasogastric tube tip terminates overlying the expected location of the gastric antrum, could be slightly advanced.    Dx-abdomen For Tube Placement    Result Date: 1/16/2021 1/16/2021 8:15 PM HISTORY/REASON FOR EXAM:  Tube evaluation. TECHNIQUE/EXAM DESCRIPTION AND NUMBER OF VIEWS:  1 view(s) of the abdomen. COMPARISON:  1/16/2021. FINDINGS: Limited single view of the abdomen performed primarily to evaluate enteric tube position. The tip projects over the expected area of the stomach. Gaseous distention of the bowel.     Gastric drainage tube with tip projecting over the expected area of the stomach fundus.    Ir-picc Line Placement W/ Guidance > Age 5    Result Date: 1/21/2021  HISTORY/REASON FOR EXAM:   PICC placement. TECHNIQUE/EXAM DESCRIPTION AND NUMBER OF VIEWS:   PICC line insertion with ultrasound guidance.  The procedure was performed using maximal sterile barrier technique including sterile gown, mask, cap, and donning of sterile gloves following appropriate hand hygiene and/or sterile scrub. Patient skin site was prepped with 2% Chlorhexidine solution. FINDINGS:  PICC line insertion with Ultrasound Guidance was performed by qualified nursing staff without the assistance of a Radiologist. PICC positioning  appropriateness confirmed by 3CG technology; chest xray only needed in the instance 3CG unable to confirm placement.              Ultrasound-guided PICC placement performed by qualified nursing staff as above.       IMPRESSION:   1.  MSSA bacteremia, source likely secondary to PICC line    2.  Acute renal failure on hemodialysis  3.  Acute pancytopenia, possibly secondary to cefazolin  4.  Alcoholic pancreatitis  5.  Chronic back pain  6.  Crohn's disease, in remission    PLAN:   Shaka Riley is a 55 y.o. man with a history of Crohn's disease in remission, congestive heart failure, chronic back pain, alcohol abuse and a history of cholecystectomy in July 2020 admitted for severe abdominal pain.  Patient was initially admitted for acute pancreatitis felt secondary to his alcohol use.  His hospital course has been complicated by acute kidney injury, ileus and acute hypoxemic respiratory failure.  Patient was extubated on 1/27/2021 and transferred out of the ICU on 1/28.  Patient was otherwise relatively stable on the medical floor until 1/30 when he spiked a fever.  Blood cultures obtained on 1/30 (2 out of 2 sets) are now positive for MSSA.  The source of his MSSA bacteremia is likely from his PICC line which appeared to have infiltrated.  Doppler ultrasound is however negative for any DVT in the extremity.  Repeat blood cultures from 2/1 are negative to date.  Interestingly, after cefazolin was started, he developed acute pancytopenia which is been felt to be secondary to the IV antibiotics.    -Discontinue IV cefazolin  -Start IV daptomycin 8 mg/kg every 48 hours as patient is on hemodialysis given acute pancytopenia  -Monitor CPK weekly while on daptomycin  -Follow repeat blood cultures on 2/1-negative to date  -Needs TTE  -Anticipate a 14-day course of antibiotics from date of first negative blood culture for treatment of uncomplicated MSSA bacteremia.  Anticipated stop date 2/15/2021  -Continue to monitor  for any acute worsening of back pain or new joint pain.  Patient denies at this time      Plan of care discussed with IM [facility] resident, Luis Rodriguez M.D. and bedside RN. Will continue to follow    Marianne Hills M.D.      Please note that this dictation was created using voice recognition software. I have worked with technical experts from [facility] to optimize the interface.  I have made every reasonable attempt to correct obvious errors, but there may be errors of grammar and possibly content that I did not discover before finalizing the note.

## 2021-02-02 NOTE — PROGRESS NOTES
Notified MD Rodriguez about pt not having rectal tube, she said it is ok to leave out as long as patient complies with PT&OT for bedside commode. Pt educated, pt  verbalized understanding. Will continue to monitor.

## 2021-02-02 NOTE — PROGRESS NOTES
Assumed care of pt. Bedside report received from Darlene RAINEY. Pt was updated on plan of care. Call light, phone and personal belongings in reach. Bed alarm on and working properly, bed in lowest position, and locked. Pt currently does not have pain.

## 2021-02-02 NOTE — DISCHARGE PLANNING
Anticipated Discharge Disposition: SNF    Action: Spoke to pt at bedside, pt states he is amenable to SNF, pt gave verbal choice, choice form faxed to [facility].    Barriers to Discharge: Medical Clearance, SNF acceptance    Plan: f/u with medical team, pt, [facility]

## 2021-02-03 PROBLEM — R41.0 DELIRIUM: Status: ACTIVE | Noted: 2021-01-01

## 2021-02-03 NOTE — DISCHARGE PLANNING
Anticipated Discharge Disposition: LTAC vs Acute Rehab    Action: Verbal choice obtained for LTACH, faxed to [facility]. [facility] Rep notified by text of referral.    Barriers to Discharge: LTACH vs Acute Rehab acceptance    Plan: f/u with medical team, pt

## 2021-02-03 NOTE — NON-PROVIDER
"*Medical Student Practice Note*    ID: Mr. Shaka Riley is a 54 y/o male with a past medical history of Crohn's, non-ischemic cardiomyopathy with EF of 25-30% that resolved, atrial fibrillation due to pulmonary embolism, NSTEMI, HTN, obesity, hyperlipidemia, sleep apnea, chronic alcohol use, and cholecystitis requiring a lap cholecystectomy 07/2020 who was admitted 19 days ago for acute pancreatitis.      Interval Events:  -Speech pathology evaluated and cleared patient for clear liquid diet with mildly thick liquids  -Patient underwent second dialysis session   -Hypotension has improved   -Rectal tube removed  -Patient down to 3L oxygen via nasal cannula from 4L via mask yesterday     Subjective  Mr. Riley says he feels \"a lot better\" this morning. He is glad to be consuming some food again and has been tolerating it well. He has had no nausea or vomiting and has had one semi-solid bowel movement. His back pain has improved from yesterday, and he denies any abdominal pain. His complaints this morning include: wanting to get his NG tube removed, leg heaviness, and \"fluid on my belly.\"     Review of Systems  *See HPI*  General: denies fevers, rigors, fatigue (after dialysis sessions)  HEENT: denies any change in vision or rhinorrhea   Cardiac: denies angina or palpitations, some dyspnea getting up out of bed (unable to do some PT exercises)  Respiratory: +shortness of breath when trying to move, no cough today  GI: denies nausea or vomiting, denies diarrhea and constipation, +for abdominal fullness  Extremities: +for leg swelling     Objective  Vitals  T: 96.8-99.5 BP: /53-67  P: 68-88  RR: 16-20  O2: 89-98% on 3L via nasal cannula      I/Os not recorded today     Physical Exam   General: Improved overall appearance from yesterday; obese, white male with pleasant affect  Skin: Multiple bruises on abdomen with indurations at injection sites from heparin  HEENT: normocephalic, atraumatic, no conjunctival " injection or scleral icterus, right internal jugal vein catheter, NG tube in place  Cardiovascular: normal rate and rhythm, no murmurs appreciated  Pulmonary: chest symmetry with respirations, clear to auscultation bilaterally on anterior chest wall (refused to sit up for posterior auscultation), on 3L via nasal cannula    Abdomen: scars from lap monico, multiple bruises and indurations at heparin injection sites, normoactive bowel sounds (improvement from yesterday), increased distension, soft and non-tender to palpation in all 4 quadrants, no guarding, no shifting dullness  Genitourinary: jorgensen catheter with miniscule amount of yellow urine  Extremities: bilateral lower extremity edema; IV in posterior right forearm  Neurological: Sensation intact in all extremities, no focal cranial nerve deficits appreciated     Labs  CBC  -lymphopenia WBC 3.2  (previously 4.5, 5.6)  -continues to be anemically stable with Hgb 8.5 (previously 8.5)  -continues to be thrombocytopenic for 2nd day in a row Plt 71 (previously 80, 172)     CMP:   -Na today 137 (hypernatremia resolved)  -continues to be hyperglycemic Glu 140 (previously 155)   -Kidney function labs improved slightly BUN 65 was 88, creatinine 4.94 was 6.30  -Continues to be hypocalcemic Ca 8.2 (previously 8.2)  -AST  27 (normalized from 53)  -P 3.9 (normalized from 7.4)     Coagulation Panel  -PT: 14.7  -INR: 1.11  -APTT: 29.4  -Fibrinogen: 559  -D-dimer: 4.84  HIT PF4 Ab: 0.207  HIT interpretation: Negative    Blood Culture: continues to be negative (3 in a row negative now)    Imaging  Abdominal XR: persistent distension of small bowel     Assessment/Plan   Mr. Riley is a 54 y/o male admitted 01/14/2021 for acute pancreatitis. His hospital stay has been complicated by an ileus/SBO, acute renal failure, acute hypercapnic respiratory failure requiring intubation for 2 days, and sepsis. His hypotension, electrolyte derangements, and kidney function are steadily  improving. Overall the Mr. Riley appears better today.      #Bacteremia likely secondary to contaminated PICC line  -Blood cultures grew staph aureus (methicillin sensitive) which was likely contracted from an infected PICC line that was removed  -There have been 3 negative blood cultures in a row as of 2/2/2021  -He was receiving cefazolin 2g q24hrs scheduled through 2/13, however, labs suggest this is likely causing his thrombocytopenia  -ID has been consulted and switched patient to IV daptomycin 910 mg q 48hrs through 02/15/21      #Acute Renal Failure secondary to Acute kidney injury secondary to hypotension secondary to acute respiratory failure compounded with sepsis  -Nephrology consulted  -Patient has had 2 back-to-back hemodialysis sessions  -Too early to ascertain whether kidney function improvement on labs is from spontaneous recovery or a consequence of the therapeutic benefit of dialysis   -Discontinue midodrine 10mg as patient has been normotensive for 24hrs   -Refrain from using nephrotoxic medications      #Thrombocytopenia   -Blood smear, Coagulation and HIT panels do not suggest a common hemostasis disorder which supports our highest differential of an adverse reaction to cefazolin as the culprit  -Cefazolin switched to daptomycin to treat ongoing bacteremia  -Continue heparin DVT prophylaxis       #Hypernatremia secondary to NG tube  -Resolved most likely due to NG tube switched off suction and patient returning to clear liquid diet     #Acute Pancreatitis secondary to chronic alcohol abuse  -Pt denies any abdominal pain or back pain today  -Have patient ambulating as tolerated and working with PT/OT  -Pain being managed with acetaminophen 650mg (use conservatively with ongoing ONOFRE)   -monitor for any changes in symptoms     #Ileus vs SBO likely secondary to a combination of medications, acute pancreatitis, ethanol abuse, and PMH of Crohns   -Order NG tube to be removed today as patient  tolerated it being off while also on a clear liquid diet  -Abdominal X-ray suggests similar amount of abdominal distension as previous reading, however pt had one soft bowel movement   -Advance diet as tolerated   -Encourage pt to ambulate and work with PT/OT  -Monitor for changes in symptoms     #Fluid Retention secondary to acute renal failure  -Patient feels symptomatic and physical exam suggest worsening abdominal distension and lower extremity edema despite 2 dialysis sessions  -Add diuretic (Furosemide)   -Encourage patient to ambulate as tolerated    #Hyperglycemia secondary to diabetes mellitus   -Pt placed on sliding insulin scale   -Continue to monitor and adjust insulin dosing upon returning to normal diet     #Back pain  -Continue to treat with lidocaine patch  -Have patient sit in chair for meals  -Encourage ambulation as tolerated      #Restless leg syndrome (present on admission)  -Being managed with ropinirole 2mg     #Crohn disease  -Self reports it being in remission for years     #Obesity  -Alcohol cessation and weight loss counseling at discharge     Note written by: Denise Francisco MS3  Attending Physician: Dr. Girish Ching

## 2021-02-03 NOTE — THERAPY
"Physical Therapy   Daily Treatment     Patient Name: Shaka Riley  Age:  55 y.o., Sex:  male  Medical Record #: [id number]  Today's Date: 2/2/2021     Precautions: Fall Risk    Assessment    Pt progressing well w/ therapy. Pt demonstrating improved strength for functional mobility. He had most difficulty w/ bed mobility. He was able to stand and ambulate short distance w/ the FWW. Pt limited this session by increased confusion. Pt became fixated that he was not in the right room and then kept stating he felt off. Pt becoming diaphoretic and pale. Pt having a hard time following cues to lay back down w/ these symptoms and needing therapist assist to initiate. Unable to obtain a BP reading quickly w/in laying down. Pt falling asleep as soon as he was laid back down.    Plan    Continue current treatment plan.    DC Equipment Recommendations: Unable to determine at this time  Discharge Recommendations: Recommend post-acute placement for additional physical therapy services prior to discharge home      Subjective    \"My wife has never seen me this sick.\"     Objective       02/02/21 1523   Precautions   Precautions Fall Risk   Gait Analysis   Gait Level Of Assist Minimal Assist   Assistive Device Front Wheel Walker   Distance (Feet) 5   # of Times Distance was Traveled 1   Deviation Increased Base Of Support;Bradykinetic   Comments Pt limited by stating he felt off, diaphoretic and pale.   Bed Mobility    Supine to Sit Moderate Assist   Sit to Supine Moderate Assist   Scooting Moderate Assist   Rolling Moderate Assist to Lt.   Comments Pt needing re-education on log roll. Heavy reliance on bed rail.   Functional Mobility   Sit to Stand Minimal Assist   Bed, Chair, Wheelchair Transfer Minimal Assist   Transfer Method Stand Pivot   Mobility SPT w/ FWW   Short Term Goals    Short Term Goal # 1 Pt will be SPV for supine<>sit in 6 txs to improve functional mobility.   Goal Outcome # 1 goal not met   Short Term Goal # 2 " Pt will be SPV for all transfers with LRAD in 6 txs to improve OOB activity.   Goal Outcome # 2 Goal not met   Short Term Goal # 3 Pt will be SPV for gait >250' with LRAD in 6 txs to improve functional mobility.   Goal Outcome # 3 Goal not met   Short Term Goal # 4 Pt will be SPV for up/down FOS to be able to safely access his home.   Goal Outcome # 4 Goal not met

## 2021-02-03 NOTE — PROGRESS NOTES
Daily Progress Note:     Date of Service: 2/3/2021  Primary Team: [facility] Red Team    Attending: Girish Ching M.D.   Senior Resident: Luis Rodriguez M.D.  Contact:  [phone]    ID:   The patient is a 55-year-old male with a past medical history of alcohol use disorder, obesity, nonischemic cardiomyopathy (EF 25-30%), Crohn's disease, hypertension, chronic back pain, cholecystectomy 7/2020, and obesity.  The patient presented on 1/14/2021 for pancreatitis and his hospital course was complicated by ileus vs SBO on 1/19 when NG tube was placed. The patient subsequently was started on TPN on 1/21 to 1/30 (when PICC was infiltrated). The patient also had severe hypoxic and hypercapnic respiratory failure and was intubated from 1/25 to 1/27. Since then the patient has been transferred back to the floor where it was noted that the patient was spiking fevers. Blood cultures were drawn on 1/30 and both sets were positive for MSSA. Unfortunately his Cr has trended upwards as well and HD catheter was inserted on 1/31 and the patient was started on HD.     Interval Update:  The patient overnight was confused. He had ripped out his jorgensen catheter and was found in the bathroom chewing/touching his dialysis line. This morning, the patient states that he remembers what happened and knew that he was confused. He was more oriented in the morning and understands why he is in the hospital. The patient states he has had multiple bowel movements and is tolerating his diet well with no problems.    Consultants/Specialty:  GI- Dr. Turner (Signed off 1/17)  Nephrology- Actively following patient  General Surgery- Dr. Marx (signed off 1/21)  Critical care (signed off 1/28)    Review of Systems:    Review of Systems   Constitutional: Negative for chills and fever.   HENT: Negative for congestion and sore throat.    Eyes: Negative for blurred vision and double vision.   Respiratory: Negative for cough and shortness of breath.     Cardiovascular: Negative for chest pain, palpitations and leg swelling.   Gastrointestinal: Negative for abdominal pain, constipation, diarrhea, nausea and vomiting.   Genitourinary: Negative for dysuria and urgency.   Musculoskeletal: Negative for falls and joint pain.   Skin: Negative for itching and rash.   Neurological: Negative for dizziness and headaches.     Objective Data:   Physical Exam:   Vitals:   Temp:  [36.5 °C (97.7 °F)-37.1 °C (98.8 °F)] 36.5 °C (97.7 °F)  Pulse:  [83-98] 90  Resp:  [16-19] 17  BP: (103-138)/(58-79) 138/79  SpO2:  [90 %-95 %] 95 %     Physical Exam  Vitals signs and nursing note reviewed.   Constitutional:       General: He is not in acute distress.     Appearance: He is obese. He is ill-appearing (Chronically ill appearing).   HENT:      Head: Normocephalic and atraumatic.      Mouth/Throat:      Pharynx: Oropharynx is clear. No oropharyngeal exudate or posterior oropharyngeal erythema.      Comments: NG tube in place. Not hooked to suction.  Eyes:      General: No scleral icterus.     Extraocular Movements: Extraocular movements intact.      Pupils: Pupils are equal, round, and reactive to light.   Cardiovascular:      Rate and Rhythm: Normal rate and regular rhythm.      Heart sounds: No murmur. No gallop.    Pulmonary:      Effort: Pulmonary effort is normal. No respiratory distress.      Breath sounds: Normal breath sounds. No wheezing.   Abdominal:      General: There is distension (+ abdominal distention ).      Palpations: Abdomen is soft.      Tenderness: There is no abdominal tenderness. There is no guarding.      Hernia: No hernia is present.      Comments: Decreased bowel sounds, but bowel sounds are present throughout   Musculoskeletal:         General: No swelling.      Right lower leg: No edema.      Left lower leg: No edema.   Skin:     General: Skin is warm and dry.      Coloration: Skin is not jaundiced.   Neurological:      General: No focal deficit present.       Mental Status: He is alert and oriented to person, place, and time. Mental status is at baseline.      Cranial Nerves: No cranial nerve deficit.      Deep Tendon Reflexes: Reflexes normal.   Psychiatric:         Mood and Affect: Mood normal.         Behavior: Behavior normal.       Labs:   Results for orders placed or performed during the hospital encounter of 01/14/21   EC-ECHOCARDIOGRAM COMPLETE W/O CONT   Result Value Ref Range    Eject.Frac. MOD BP 60.44     Eject.Frac. MOD 4C 67.31     Eject.Frac. MOD 2C 53.41     Left Ventrical Ejection Fraction 60    CBC with Differential   Result Value Ref Range    WBC 10.4 4.8 - 10.8 K/uL    RBC 4.97 4.70 - 6.10 M/uL    Hemoglobin 14.8 14.0 - 18.0 g/dL    Hematocrit 44.9 42.0 - 52.0 %    MCV 90.3 81.4 - 97.8 fL    MCH 29.8 27.0 - 33.0 pg    MCHC 33.0 (L) 33.7 - 35.3 g/dL    RDW 39.4 35.9 - 50.0 fL    Platelet Count 273 164 - 446 K/uL    MPV 10.8 9.0 - 12.9 fL    Neutrophils-Polys 85.30 (H) 44.00 - 72.00 %    Lymphocytes 9.60 (L) 22.00 - 41.00 %    Monocytes 4.10 0.00 - 13.40 %    Eosinophils 0.20 0.00 - 6.90 %    Basophils 0.30 0.00 - 1.80 %    Immature Granulocytes 0.50 0.00 - 0.90 %    Nucleated RBC 0.00 /100 WBC    Neutrophils (Absolute) 8.85 (H) 1.82 - 7.42 K/uL    Lymphs (Absolute) 1.00 1.00 - 4.80 K/uL    Monos (Absolute) 0.42 0.00 - 0.85 K/uL    Eos (Absolute) 0.02 0.00 - 0.51 K/uL    Baso (Absolute) 0.03 0.00 - 0.12 K/uL    Immature Granulocytes (abs) 0.05 0.00 - 0.11 K/uL    NRBC (Absolute) 0.00 K/uL   Complete Metabolic Panel (CMP)   Result Value Ref Range    Sodium 131 (L) 135 - 145 mmol/L    Potassium 4.0 3.6 - 5.5 mmol/L    Chloride 98 96 - 112 mmol/L    Co2 22 20 - 33 mmol/L    Anion Gap 11.0 7.0 - 16.0    Glucose 213 (H) 65 - 99 mg/dL    Bun 23 (H) 8 - 22 mg/dL    Creatinine 1.13 0.50 - 1.40 mg/dL    Calcium 9.5 8.5 - 10.5 mg/dL    AST(SGOT) 328 (H) 12 - 45 U/L    ALT(SGPT) 170 (H) 2 - 50 U/L    Alkaline Phosphatase 122 (H) 30 - 99 U/L    Total Bilirubin 0.8  0.1 - 1.5 mg/dL    Albumin 4.2 3.2 - 4.9 g/dL    Total Protein 7.4 6.0 - 8.2 g/dL    Globulin 3.2 1.9 - 3.5 g/dL    A-G Ratio 1.3 g/dL   Troponin   Result Value Ref Range    Troponin T <6 6 - 19 ng/L   LIPASE   Result Value Ref Range    Lipase >3000 (H) 11 - 82 U/L   ESTIMATED GFR   Result Value Ref Range    GFR If African American >60 >60 mL/min/1.73 m 2    GFR If Non African American >60 >60 mL/min/1.73 m 2   Lipid Profile   Result Value Ref Range    Cholesterol,Tot 224 (H) 100 - 199 mg/dL    Triglycerides 571 (H) 0 - 149 mg/dL    HDL 46 >=40 mg/dL    LDL see below <100 mg/dL   SARS-COV Antigen ABDOULAYE: Collect dry nasal swab AND NP swab in VTM   Result Value Ref Range    SARS-CoV-2 Source Nasal Swab     SARS-COV ANTIGEN ABDOULAYE NotDetected Not-Detected   SARS-CoV-2 PCR (24 hour In-House): Collect NP swab in VTM    Specimen: Respirate   Result Value Ref Range    SARS-CoV-2 Source NP Swab     SARS-CoV-2 by PCR NotDetected    Comp Metabolic Panel   Result Value Ref Range    Sodium 135 135 - 145 mmol/L    Potassium 5.0 3.6 - 5.5 mmol/L    Chloride 98 96 - 112 mmol/L    Co2 24 20 - 33 mmol/L    Anion Gap 13.0 7.0 - 16.0    Glucose 173 (H) 65 - 99 mg/dL    Bun 28 (H) 8 - 22 mg/dL    Creatinine 1.64 (H) 0.50 - 1.40 mg/dL    Calcium 8.2 (L) 8.5 - 10.5 mg/dL    AST(SGOT) 82 (H) 12 - 45 U/L    ALT(SGPT) 122 (H) 2 - 50 U/L    Alkaline Phosphatase 119 (H) 30 - 99 U/L    Total Bilirubin 0.7 0.1 - 1.5 mg/dL    Albumin 4.1 3.2 - 4.9 g/dL    Total Protein 7.6 6.0 - 8.2 g/dL    Globulin 3.5 1.9 - 3.5 g/dL    A-G Ratio 1.2 g/dL   HEMOGLOBIN A1C   Result Value Ref Range    Glycohemoglobin 5.5 0.0 - 5.6 %    Est Avg Glucose 111 mg/dL   ESTIMATED GFR   Result Value Ref Range    GFR If  53 (A) >60 mL/min/1.73 m 2    GFR If Non  44 (A) >60 mL/min/1.73 m 2   CBC WITH DIFFERENTIAL   Result Value Ref Range    WBC 19.2 (H) 4.8 - 10.8 K/uL    RBC 5.36 4.70 - 6.10 M/uL    Hemoglobin 16.5 14.0 - 18.0 g/dL    Hematocrit  51.8 42.0 - 52.0 %    MCV 96.6 81.4 - 97.8 fL    MCH 30.8 27.0 - 33.0 pg    MCHC 31.9 (L) 33.7 - 35.3 g/dL    RDW 44.9 35.9 - 50.0 fL    Platelet Count 241 164 - 446 K/uL    MPV 11.0 9.0 - 12.9 fL    Neutrophils-Polys 85.20 (H) 44.00 - 72.00 %    Lymphocytes 3.50 (L) 22.00 - 41.00 %    Monocytes 4.30 0.00 - 13.40 %    Eosinophils 0.00 0.00 - 6.90 %    Basophils 0.00 0.00 - 1.80 %    Nucleated RBC 0.00 /100 WBC    Neutrophils (Absolute) 17.36 (H) 1.82 - 7.42 K/uL    Lymphs (Absolute) 0.67 (L) 1.00 - 4.80 K/uL    Monos (Absolute) 0.83 0.00 - 0.85 K/uL    Eos (Absolute) 0.00 0.00 - 0.51 K/uL    Baso (Absolute) 0.00 0.00 - 0.12 K/uL    NRBC (Absolute) 0.00 K/uL   Comp Metabolic Panel   Result Value Ref Range    Sodium 131 (L) 135 - 145 mmol/L    Potassium 6.0 (H) 3.6 - 5.5 mmol/L    Chloride 99 96 - 112 mmol/L    Co2 21 20 - 33 mmol/L    Anion Gap 11.0 7.0 - 16.0    Glucose 155 (H) 65 - 99 mg/dL    Bun 42 (H) 8 - 22 mg/dL    Creatinine 3.36 (H) 0.50 - 1.40 mg/dL    Calcium 7.3 (L) 8.5 - 10.5 mg/dL    AST(SGOT) 44 12 - 45 U/L    ALT(SGPT) 76 (H) 2 - 50 U/L    Alkaline Phosphatase 94 30 - 99 U/L    Total Bilirubin 0.6 0.1 - 1.5 mg/dL    Albumin 3.5 3.2 - 4.9 g/dL    Total Protein 6.9 6.0 - 8.2 g/dL    Globulin 3.4 1.9 - 3.5 g/dL    A-G Ratio 1.0 g/dL   MAGNESIUM   Result Value Ref Range    Magnesium 1.9 1.5 - 2.5 mg/dL   DIFFERENTIAL MANUAL   Result Value Ref Range    Bands-Stabs 5.20 0.00 - 10.00 %    Metamyelocytes 1.70 %    Manual Diff Status PERFORMED    PERIPHERAL SMEAR REVIEW   Result Value Ref Range    Peripheral Smear Review see below    PLATELET ESTIMATE   Result Value Ref Range    Plt Estimation Normal    MORPHOLOGY   Result Value Ref Range    RBC Morphology Normal    ESTIMATED GFR   Result Value Ref Range    GFR If  23 (A) >60 mL/min/1.73 m 2    GFR If Non  19 (A) >60 mL/min/1.73 m 2   AMYLASE   Result Value Ref Range    Amylase 827 (H) 20 - 103 U/L   LIPASE   Result Value Ref  Range    Lipase 860 (H) 11 - 82 U/L   Comp Metabolic Panel   Result Value Ref Range    Sodium 129 (L) 135 - 145 mmol/L    Potassium 6.1 (H) 3.6 - 5.5 mmol/L    Chloride 97 96 - 112 mmol/L    Co2 21 20 - 33 mmol/L    Anion Gap 11.0 7.0 - 16.0    Glucose 147 (H) 65 - 99 mg/dL    Bun 48 (H) 8 - 22 mg/dL    Creatinine 3.54 (H) 0.50 - 1.40 mg/dL    Calcium 7.0 (L) 8.5 - 10.5 mg/dL    AST(SGOT) 38 12 - 45 U/L    ALT(SGPT) 62 (H) 2 - 50 U/L    Alkaline Phosphatase 98 30 - 99 U/L    Total Bilirubin 0.5 0.1 - 1.5 mg/dL    Albumin 3.5 3.2 - 4.9 g/dL    Total Protein 7.2 6.0 - 8.2 g/dL    Globulin 3.7 (H) 1.9 - 3.5 g/dL    A-G Ratio 0.9 g/dL   ESTIMATED GFR   Result Value Ref Range    GFR If  22 (A) >60 mL/min/1.73 m 2    GFR If Non  18 (A) >60 mL/min/1.73 m 2   Comp Metabolic Panel   Result Value Ref Range    Sodium 129 (L) 135 - 145 mmol/L    Potassium 6.0 (H) 3.6 - 5.5 mmol/L    Chloride 98 96 - 112 mmol/L    Co2 18 (L) 20 - 33 mmol/L    Anion Gap 13.0 7.0 - 16.0    Glucose 173 (H) 65 - 99 mg/dL    Bun 50 (H) 8 - 22 mg/dL    Creatinine 3.92 (H) 0.50 - 1.40 mg/dL    Calcium 7.5 (L) 8.5 - 10.5 mg/dL    AST(SGOT) 40 12 - 45 U/L    ALT(SGPT) 62 (H) 2 - 50 U/L    Alkaline Phosphatase 100 (H) 30 - 99 U/L    Total Bilirubin 0.5 0.1 - 1.5 mg/dL    Albumin 3.5 3.2 - 4.9 g/dL    Total Protein 7.5 6.0 - 8.2 g/dL    Globulin 4.0 (H) 1.9 - 3.5 g/dL    A-G Ratio 0.9 g/dL   proBrain Natriuretic Peptide, NT   Result Value Ref Range    NT-proBNP 243 (H) 0 - 125 pg/mL   URINALYSIS    Specimen: Urine, [place] Cath   Result Value Ref Range    Color DK Yellow     Character Turbid (A)     Specific Gravity 1.021 <1.035    Ph 5.0 5.0 - 8.0    Glucose Negative Negative mg/dL    Ketones Negative Negative mg/dL    Protein 100 (A) Negative mg/dL    Bilirubin Negative Negative    Urobilinogen, Urine 0.2 Negative    Nitrite Negative Negative    Leukocyte Esterase Negative Negative    Occult Blood Trace (A) Negative    Micro  Urine Req Microscopic    URINE SODIUM RANDOM   Result Value Ref Range    Sodium, Urine -per volume 23 mmol/L   URINE CREATININE RANDOM   Result Value Ref Range    Creatinine, Random Urine 274.12 mg/dL   ESTIMATED GFR   Result Value Ref Range    GFR If  19 (A) >60 mL/min/1.73 m 2    GFR If Non  16 (A) >60 mL/min/1.73 m 2   URINE MICROSCOPIC (W/UA)   Result Value Ref Range    WBC 5-10 (A) /hpf    RBC 0-2 (A) /hpf    Bacteria Negative None /hpf    Epithelial Cells Few /hpf    Epithelial Cells Renal Few /hpf    Amorphous Crystal Present /hpf    Hyaline Cast 3-5 (A) /lpf    Granular Casts 6-10 (A) /lpf   BLOOD CULTURE    Specimen: Line; Blood   Result Value Ref Range    Significant Indicator NEG     Source BLD     Site Central Line     Culture Result No growth after 5 days of incubation.    BLOOD CULTURE    Specimen: Peripheral; Blood   Result Value Ref Range    Significant Indicator NEG     Source BLD     Site PERIPHERAL     Culture Result No growth after 5 days of incubation.    Basic Metabolic Panel   Result Value Ref Range    Sodium 132 (L) 135 - 145 mmol/L    Potassium 5.1 3.6 - 5.5 mmol/L    Chloride 97 96 - 112 mmol/L    Co2 23 20 - 33 mmol/L    Glucose 145 (H) 65 - 99 mg/dL    Bun 56 (H) 8 - 22 mg/dL    Creatinine 3.61 (H) 0.50 - 1.40 mg/dL    Calcium 7.0 (L) 8.5 - 10.5 mg/dL    Anion Gap 12.0 7.0 - 16.0   Basic Metabolic Panel   Result Value Ref Range    Sodium 131 (L) 135 - 145 mmol/L    Potassium 5.3 3.6 - 5.5 mmol/L    Chloride 97 96 - 112 mmol/L    Co2 23 20 - 33 mmol/L    Glucose 150 (H) 65 - 99 mg/dL    Bun 58 (H) 8 - 22 mg/dL    Creatinine 3.90 (H) 0.50 - 1.40 mg/dL    Calcium 6.8 (LL) 8.5 - 10.5 mg/dL    Anion Gap 11.0 7.0 - 16.0   LACTIC ACID   Result Value Ref Range    Lactic Acid 1.2 0.5 - 2.0 mmol/L   ESTIMATED GFR   Result Value Ref Range    GFR If  21 (A) >60 mL/min/1.73 m 2    GFR If Non  18 (A) >60 mL/min/1.73 m 2   ESTIMATED GFR    Result Value Ref Range    GFR If  19 (A) >60 mL/min/1.73 m 2    GFR If Non  16 (A) >60 mL/min/1.73 m 2   CBC WITH DIFFERENTIAL   Result Value Ref Range    WBC 6.9 4.8 - 10.8 K/uL    RBC 4.90 4.70 - 6.10 M/uL    Hemoglobin 15.1 14.0 - 18.0 g/dL    Hematocrit 47.0 42.0 - 52.0 %    MCV 95.9 81.4 - 97.8 fL    MCH 30.8 27.0 - 33.0 pg    MCHC 32.1 (L) 33.7 - 35.3 g/dL    RDW 44.8 35.9 - 50.0 fL    Platelet Count 211 164 - 446 K/uL    MPV 11.2 9.0 - 12.9 fL    Neutrophils-Polys 70.40 44.00 - 72.00 %    Lymphocytes 6.10 (L) 22.00 - 41.00 %    Monocytes 7.80 0.00 - 13.40 %    Eosinophils 0.00 0.00 - 6.90 %    Basophils 0.00 0.00 - 1.80 %    Nucleated RBC 0.00 /100 WBC    Neutrophils (Absolute) 5.70 1.82 - 7.42 K/uL    Lymphs (Absolute) 0.42 (L) 1.00 - 4.80 K/uL    Monos (Absolute) 0.54 0.00 - 0.85 K/uL    Eos (Absolute) 0.00 0.00 - 0.51 K/uL    Baso (Absolute) 0.00 0.00 - 0.12 K/uL    NRBC (Absolute) 0.00 K/uL   Comp Metabolic Panel   Result Value Ref Range    Sodium 132 (L) 135 - 145 mmol/L    Potassium 4.8 3.6 - 5.5 mmol/L    Chloride 97 96 - 112 mmol/L    Co2 23 20 - 33 mmol/L    Anion Gap 12.0 7.0 - 16.0    Glucose 148 (H) 65 - 99 mg/dL    Bun 62 (H) 8 - 22 mg/dL    Creatinine 3.84 (H) 0.50 - 1.40 mg/dL    Calcium 7.6 (L) 8.5 - 10.5 mg/dL    AST(SGOT) 28 12 - 45 U/L    ALT(SGPT) 39 2 - 50 U/L    Alkaline Phosphatase 83 30 - 99 U/L    Total Bilirubin 0.5 0.1 - 1.5 mg/dL    Albumin 3.1 (L) 3.2 - 4.9 g/dL    Total Protein 6.9 6.0 - 8.2 g/dL    Globulin 3.8 (H) 1.9 - 3.5 g/dL    A-G Ratio 0.8 g/dL   MAGNESIUM   Result Value Ref Range    Magnesium 1.8 1.5 - 2.5 mg/dL   PHOSPHORUS   Result Value Ref Range    Phosphorus 5.4 (H) 2.5 - 4.5 mg/dL   LIPASE   Result Value Ref Range    Lipase 375 (H) 11 - 82 U/L   Basic Metabolic Panel   Result Value Ref Range    Sodium 132 (L) 135 - 145 mmol/L    Potassium 5.5 3.6 - 5.5 mmol/L    Chloride 97 96 - 112 mmol/L    Co2 22 20 - 33 mmol/L    Glucose 155  (H) 65 - 99 mg/dL    Bun 70 (H) 8 - 22 mg/dL    Creatinine 4.04 (H) 0.50 - 1.40 mg/dL    Calcium 7.2 (L) 8.5 - 10.5 mg/dL    Anion Gap 13.0 7.0 - 16.0   LACTIC ACID   Result Value Ref Range    Lactic Acid 1.8 0.5 - 2.0 mmol/L   IONIZED CALCIUM   Result Value Ref Range    Ionized Calcium 0.9 (L) 1.1 - 1.3 mmol/L   DIFFERENTIAL MANUAL   Result Value Ref Range    Bands-Stabs 12.20 (H) 0.00 - 10.00 %    Metamyelocytes 3.50 %    Manual Diff Status PERFORMED    PERIPHERAL SMEAR REVIEW   Result Value Ref Range    Peripheral Smear Review see below    ESTIMATED GFR   Result Value Ref Range    GFR If  20 (A) >60 mL/min/1.73 m 2    GFR If Non  16 (A) >60 mL/min/1.73 m 2   LACTIC ACID   Result Value Ref Range    Lactic Acid 1.4 0.5 - 2.0 mmol/L   ESTIMATED GFR   Result Value Ref Range    GFR If  19 (A) >60 mL/min/1.73 m 2    GFR If Non African American 15 (A) >60 mL/min/1.73 m 2   CBC WITH DIFFERENTIAL   Result Value Ref Range    WBC 5.0 4.8 - 10.8 K/uL    RBC 4.47 (L) 4.70 - 6.10 M/uL    Hemoglobin 13.6 (L) 14.0 - 18.0 g/dL    Hematocrit 42.3 42.0 - 52.0 %    MCV 94.6 81.4 - 97.8 fL    MCH 30.4 27.0 - 33.0 pg    MCHC 32.2 (L) 33.7 - 35.3 g/dL    RDW 43.8 35.9 - 50.0 fL    Platelet Count 208 164 - 446 K/uL    MPV 11.3 9.0 - 12.9 fL    Neutrophils-Polys 57.00 44.00 - 72.00 %    Lymphocytes 4.40 (L) 22.00 - 41.00 %    Monocytes 14.90 (H) 0.00 - 13.40 %    Eosinophils 0.00 0.00 - 6.90 %    Basophils 0.00 0.00 - 1.80 %    Nucleated RBC 0.00 /100 WBC    Neutrophils (Absolute) 3.91 1.82 - 7.42 K/uL    Lymphs (Absolute) 0.22 (L) 1.00 - 4.80 K/uL    Monos (Absolute) 0.75 0.00 - 0.85 K/uL    Eos (Absolute) 0.00 0.00 - 0.51 K/uL    Baso (Absolute) 0.00 0.00 - 0.12 K/uL    NRBC (Absolute) 0.00 K/uL   Comp Metabolic Panel   Result Value Ref Range    Sodium 129 (L) 135 - 145 mmol/L    Potassium 4.8 3.6 - 5.5 mmol/L    Chloride 94 (L) 96 - 112 mmol/L    Co2 22 20 - 33 mmol/L    Anion Gap 13.0  7.0 - 16.0    Glucose 155 (H) 65 - 99 mg/dL    Bun 72 (HH) 8 - 22 mg/dL    Creatinine 3.05 (H) 0.50 - 1.40 mg/dL    Calcium 6.9 (LL) 8.5 - 10.5 mg/dL    AST(SGOT) 19 12 - 45 U/L    ALT(SGPT) 27 2 - 50 U/L    Alkaline Phosphatase 70 30 - 99 U/L    Total Bilirubin 0.5 0.1 - 1.5 mg/dL    Albumin 3.1 (L) 3.2 - 4.9 g/dL    Total Protein 6.7 6.0 - 8.2 g/dL    Globulin 3.6 (H) 1.9 - 3.5 g/dL    A-G Ratio 0.9 g/dL   MAGNESIUM   Result Value Ref Range    Magnesium 2.3 1.5 - 2.5 mg/dL   PHOSPHORUS   Result Value Ref Range    Phosphorus 4.9 (H) 2.5 - 4.5 mg/dL   LIPASE   Result Value Ref Range    Lipase 124 (H) 11 - 82 U/L   IONIZED CALCIUM   Result Value Ref Range    Ionized Calcium 0.9 (L) 1.1 - 1.3 mmol/L   ESTIMATED GFR   Result Value Ref Range    GFR If  26 (A) >60 mL/min/1.73 m 2    GFR If Non African American 21 (A) >60 mL/min/1.73 m 2   DIFFERENTIAL MANUAL   Result Value Ref Range    Bands-Stabs 21.10 (H) 0.00 - 10.00 %    Metamyelocytes 2.60 %    Manual Diff Status PERFORMED    PERIPHERAL SMEAR REVIEW   Result Value Ref Range    Peripheral Smear Review see below    PLATELET ESTIMATE   Result Value Ref Range    Plt Estimation Normal    MORPHOLOGY   Result Value Ref Range    RBC Morphology Normal    TSH WITH REFLEX TO FT4   Result Value Ref Range    TSH 1.240 0.380 - 5.330 uIU/mL   Triglyceride   Result Value Ref Range    Triglycerides 211 (H) 0 - 149 mg/dL   CBC WITH DIFFERENTIAL   Result Value Ref Range    WBC 7.8 4.8 - 10.8 K/uL    RBC 4.07 (L) 4.70 - 6.10 M/uL    Hemoglobin 12.5 (L) 14.0 - 18.0 g/dL    Hematocrit 38.7 (L) 42.0 - 52.0 %    MCV 95.1 81.4 - 97.8 fL    MCH 30.7 27.0 - 33.0 pg    MCHC 32.3 (L) 33.7 - 35.3 g/dL    RDW 44.3 35.9 - 50.0 fL    Platelet Count 183 164 - 446 K/uL    MPV 11.2 9.0 - 12.9 fL    Neutrophils-Polys 73.90 (H) 44.00 - 72.00 %    Lymphocytes 2.60 (L) 22.00 - 41.00 %    Monocytes 5.20 0.00 - 13.40 %    Eosinophils 0.90 0.00 - 6.90 %    Basophils 0.00 0.00 - 1.80 %     Nucleated RBC 0.00 /100 WBC    Neutrophils (Absolute) 6.92 1.82 - 7.42 K/uL    Lymphs (Absolute) 0.20 (L) 1.00 - 4.80 K/uL    Monos (Absolute) 0.41 0.00 - 0.85 K/uL    Eos (Absolute) 0.07 0.00 - 0.51 K/uL    Baso (Absolute) 0.00 0.00 - 0.12 K/uL    NRBC (Absolute) 0.00 K/uL   Comp Metabolic Panel   Result Value Ref Range    Sodium 136 135 - 145 mmol/L    Potassium 4.4 3.6 - 5.5 mmol/L    Chloride 99 96 - 112 mmol/L    Co2 25 20 - 33 mmol/L    Anion Gap 12.0 7.0 - 16.0    Glucose 121 (H) 65 - 99 mg/dL    Bun 64 (H) 8 - 22 mg/dL    Creatinine 2.24 (H) 0.50 - 1.40 mg/dL    Calcium 8.8 8.5 - 10.5 mg/dL    AST(SGOT) 12 12 - 45 U/L    ALT(SGPT) 20 2 - 50 U/L    Alkaline Phosphatase 66 30 - 99 U/L    Total Bilirubin 0.5 0.1 - 1.5 mg/dL    Albumin 3.1 (L) 3.2 - 4.9 g/dL    Total Protein 6.8 6.0 - 8.2 g/dL    Globulin 3.7 (H) 1.9 - 3.5 g/dL    A-G Ratio 0.8 g/dL   MAGNESIUM   Result Value Ref Range    Magnesium 2.4 1.5 - 2.5 mg/dL   PHOSPHORUS   Result Value Ref Range    Phosphorus 3.5 2.5 - 4.5 mg/dL   LIPASE   Result Value Ref Range    Lipase 67 11 - 82 U/L   IONIZED CALCIUM   Result Value Ref Range    Ionized Calcium 1.1 1.1 - 1.3 mmol/L   ESTIMATED GFR   Result Value Ref Range    GFR If  37 (A) >60 mL/min/1.73 m 2    GFR If Non African American 30 (A) >60 mL/min/1.73 m 2   DIFFERENTIAL MANUAL   Result Value Ref Range    Bands-Stabs 14.80 (H) 0.00 - 10.00 %    Metamyelocytes 1.70 %    Myelocytes 0.90 %    Manual Diff Status PERFORMED    PERIPHERAL SMEAR REVIEW   Result Value Ref Range    Peripheral Smear Review see below    PLATELET ESTIMATE   Result Value Ref Range    Plt Estimation Normal    MORPHOLOGY   Result Value Ref Range    RBC Morphology Present     Polychromia 1+    CBC WITH DIFFERENTIAL   Result Value Ref Range    WBC 9.5 4.8 - 10.8 K/uL    RBC 3.78 (L) 4.70 - 6.10 M/uL    Hemoglobin 11.4 (L) 14.0 - 18.0 g/dL    Hematocrit 35.4 (L) 42.0 - 52.0 %    MCV 93.7 81.4 - 97.8 fL    MCH 30.2 27.0 - 33.0  pg    MCHC 32.2 (L) 33.7 - 35.3 g/dL    RDW 44.6 35.9 - 50.0 fL    Platelet Count 176 164 - 446 K/uL    MPV 11.6 9.0 - 12.9 fL    Neutrophils-Polys 57.40 44.00 - 72.00 %    Lymphocytes 7.80 (L) 22.00 - 41.00 %    Monocytes 7.80 0.00 - 13.40 %    Eosinophils 0.00 0.00 - 6.90 %    Basophils 0.00 0.00 - 1.80 %    Nucleated RBC 0.00 /100 WBC    Neutrophils (Absolute) 7.76 (H) 1.82 - 7.42 K/uL    Lymphs (Absolute) 0.74 (L) 1.00 - 4.80 K/uL    Monos (Absolute) 0.74 0.00 - 0.85 K/uL    Eos (Absolute) 0.00 0.00 - 0.51 K/uL    Baso (Absolute) 0.00 0.00 - 0.12 K/uL    NRBC (Absolute) 0.00 K/uL   Comp Metabolic Panel   Result Value Ref Range    Sodium 138 135 - 145 mmol/L    Potassium 4.5 3.6 - 5.5 mmol/L    Chloride 100 96 - 112 mmol/L    Co2 24 20 - 33 mmol/L    Anion Gap 14.0 7.0 - 16.0    Glucose 113 (H) 65 - 99 mg/dL    Bun 67 (H) 8 - 22 mg/dL    Creatinine 2.05 (H) 0.50 - 1.40 mg/dL    Calcium 8.8 8.5 - 10.5 mg/dL    AST(SGOT) 15 12 - 45 U/L    ALT(SGPT) 17 2 - 50 U/L    Alkaline Phosphatase 67 30 - 99 U/L    Total Bilirubin 0.4 0.1 - 1.5 mg/dL    Albumin 3.1 (L) 3.2 - 4.9 g/dL    Total Protein 6.4 6.0 - 8.2 g/dL    Globulin 3.3 1.9 - 3.5 g/dL    A-G Ratio 0.9 g/dL   MAGNESIUM   Result Value Ref Range    Magnesium 2.5 1.5 - 2.5 mg/dL   PHOSPHORUS   Result Value Ref Range    Phosphorus 3.3 2.5 - 4.5 mg/dL   LIPASE   Result Value Ref Range    Lipase 62 11 - 82 U/L   IONIZED CALCIUM   Result Value Ref Range    Ionized Calcium 1.1 1.1 - 1.3 mmol/L   ESTIMATED GFR   Result Value Ref Range    GFR If  41 (A) >60 mL/min/1.73 m 2    GFR If Non  34 (A) >60 mL/min/1.73 m 2   DIFFERENTIAL MANUAL   Result Value Ref Range    Bands-Stabs 24.30 (H) 0.00 - 10.00 %    Metamyelocytes 2.60 %    Manual Diff Status PERFORMED    PERIPHERAL SMEAR REVIEW   Result Value Ref Range    Peripheral Smear Review see below    PLATELET ESTIMATE   Result Value Ref Range    Plt Estimation Normal    MORPHOLOGY   Result Value  Ref Range    RBC Morphology Normal    Comp Metabolic Panel   Result Value Ref Range    Sodium 133 (L) 135 - 145 mmol/L    Potassium 4.3 3.6 - 5.5 mmol/L    Chloride 91 (L) 96 - 112 mmol/L    Co2 27 20 - 33 mmol/L    Anion Gap 15.0 7.0 - 16.0    Glucose 141 (H) 65 - 99 mg/dL    Bun 74 (HH) 8 - 22 mg/dL    Creatinine 2.62 (H) 0.50 - 1.40 mg/dL    Calcium 9.3 8.5 - 10.5 mg/dL    AST(SGOT) 22 12 - 45 U/L    ALT(SGPT) 18 2 - 50 U/L    Alkaline Phosphatase 102 (H) 30 - 99 U/L    Total Bilirubin 1.1 0.1 - 1.5 mg/dL    Albumin 3.4 3.2 - 4.9 g/dL    Total Protein 7.2 6.0 - 8.2 g/dL    Globulin 3.8 (H) 1.9 - 3.5 g/dL    A-G Ratio 0.9 g/dL   MAGNESIUM   Result Value Ref Range    Magnesium 2.8 (H) 1.5 - 2.5 mg/dL   PHOSPHORUS   Result Value Ref Range    Phosphorus 4.8 (H) 2.5 - 4.5 mg/dL   IONIZED CALCIUM   Result Value Ref Range    Ionized Calcium 1.1 1.1 - 1.3 mmol/L   ESTIMATED GFR   Result Value Ref Range    GFR If  31 (A) >60 mL/min/1.73 m 2    GFR If Non African American 25 (A) >60 mL/min/1.73 m 2   Cholesterol   Result Value Ref Range    Cholesterol,Tot 119 100 - 199 mg/dL   Triglyceride   Result Value Ref Range    Triglycerides 167 (H) 0 - 149 mg/dL   Magnesium   Result Value Ref Range    Magnesium 2.9 (H) 1.5 - 2.5 mg/dL   Phosphorus   Result Value Ref Range    Phosphorus 5.1 (H) 2.5 - 4.5 mg/dL   Comp Metabolic Panel   Result Value Ref Range    Sodium 136 135 - 145 mmol/L    Potassium 3.9 3.6 - 5.5 mmol/L    Chloride 93 (L) 96 - 112 mmol/L    Co2 30 20 - 33 mmol/L    Anion Gap 13.0 7.0 - 16.0    Glucose 178 (H) 65 - 99 mg/dL    Bun 84 (HH) 8 - 22 mg/dL    Creatinine 2.54 (H) 0.50 - 1.40 mg/dL    Calcium 9.2 8.5 - 10.5 mg/dL    AST(SGOT) 23 12 - 45 U/L    ALT(SGPT) 16 2 - 50 U/L    Alkaline Phosphatase 106 (H) 30 - 99 U/L    Total Bilirubin 0.8 0.1 - 1.5 mg/dL    Albumin 3.0 (L) 3.2 - 4.9 g/dL    Total Protein 6.9 6.0 - 8.2 g/dL    Globulin 3.9 (H) 1.9 - 3.5 g/dL    A-G Ratio 0.8 g/dL   ESTIMATED GFR    Result Value Ref Range    GFR If  32 (A) >60 mL/min/1.73 m 2    GFR If Non African American 26 (A) >60 mL/min/1.73 m 2   Magnesium   Result Value Ref Range    Magnesium 2.6 (H) 1.5 - 2.5 mg/dL   Phosphorus   Result Value Ref Range    Phosphorus 3.9 2.5 - 4.5 mg/dL   Comp Metabolic Panel   Result Value Ref Range    Sodium 136 135 - 145 mmol/L    Potassium 3.8 3.6 - 5.5 mmol/L    Chloride 94 (L) 96 - 112 mmol/L    Co2 31 20 - 33 mmol/L    Anion Gap 11.0 7.0 - 16.0    Glucose 134 (H) 65 - 99 mg/dL    Bun 80 (HH) 8 - 22 mg/dL    Creatinine 2.48 (H) 0.50 - 1.40 mg/dL    Calcium 8.6 8.5 - 10.5 mg/dL    AST(SGOT) 39 12 - 45 U/L    ALT(SGPT) 27 2 - 50 U/L    Alkaline Phosphatase 113 (H) 30 - 99 U/L    Total Bilirubin 0.8 0.1 - 1.5 mg/dL    Albumin 2.6 (L) 3.2 - 4.9 g/dL    Total Protein 6.2 6.0 - 8.2 g/dL    Globulin 3.6 (H) 1.9 - 3.5 g/dL    A-G Ratio 0.7 g/dL   CBC WITH DIFFERENTIAL   Result Value Ref Range    WBC 11.1 (H) 4.8 - 10.8 K/uL    RBC 3.21 (L) 4.70 - 6.10 M/uL    Hemoglobin 9.8 (L) 14.0 - 18.0 g/dL    Hematocrit 30.5 (L) 42.0 - 52.0 %    MCV 95.0 81.4 - 97.8 fL    MCH 30.5 27.0 - 33.0 pg    MCHC 32.1 (L) 33.7 - 35.3 g/dL    RDW 48.3 35.9 - 50.0 fL    Platelet Count 174 164 - 446 K/uL    MPV 12.0 9.0 - 12.9 fL    Neutrophils-Polys 81.70 (H) 44.00 - 72.00 %    Lymphocytes 4.30 (L) 22.00 - 41.00 %    Monocytes 3.50 0.00 - 13.40 %    Eosinophils 2.60 0.00 - 6.90 %    Basophils 0.00 0.00 - 1.80 %    Nucleated RBC 0.00 /100 WBC    Neutrophils (Absolute) 9.93 (H) 1.82 - 7.42 K/uL    Lymphs (Absolute) 0.48 (L) 1.00 - 4.80 K/uL    Monos (Absolute) 0.39 0.00 - 0.85 K/uL    Eos (Absolute) 0.29 0.00 - 0.51 K/uL    Baso (Absolute) 0.00 0.00 - 0.12 K/uL    NRBC (Absolute) 0.00 K/uL   MAGNESIUM   Result Value Ref Range    Magnesium 2.6 (H) 1.5 - 2.5 mg/dL   PHOSPHORUS   Result Value Ref Range    Phosphorus 4.0 2.5 - 4.5 mg/dL   IONIZED CALCIUM   Result Value Ref Range    Ionized Calcium 1.1 1.1 - 1.3 mmol/L    DIFFERENTIAL MANUAL   Result Value Ref Range    Bands-Stabs 7.80 0.00 - 10.00 %    Manual Diff Status PERFORMED    PERIPHERAL SMEAR REVIEW   Result Value Ref Range    Peripheral Smear Review see below    PLATELET ESTIMATE   Result Value Ref Range    Plt Estimation Normal    MORPHOLOGY   Result Value Ref Range    RBC Morphology Normal    ESTIMATED GFR   Result Value Ref Range    GFR If  33 (A) >60 mL/min/1.73 m 2    GFR If Non  27 (A) >60 mL/min/1.73 m 2   MAGNESIUM   Result Value Ref Range    Magnesium 2.2 1.5 - 2.5 mg/dL   PHOSPHORUS   Result Value Ref Range    Phosphorus 3.5 2.5 - 4.5 mg/dL   IONIZED CALCIUM   Result Value Ref Range    Ionized Calcium 1.2 1.1 - 1.3 mmol/L   Comp Metabolic Panel   Result Value Ref Range    Sodium 132 (L) 135 - 145 mmol/L    Potassium 4.2 3.6 - 5.5 mmol/L    Chloride 92 (L) 96 - 112 mmol/L    Co2 29 20 - 33 mmol/L    Anion Gap 11.0 7.0 - 16.0    Glucose 142 (H) 65 - 99 mg/dL    Bun 70 (H) 8 - 22 mg/dL    Creatinine 2.09 (H) 0.50 - 1.40 mg/dL    Calcium 8.9 8.5 - 10.5 mg/dL    AST(SGOT) 47 (H) 12 - 45 U/L    ALT(SGPT) 37 2 - 50 U/L    Alkaline Phosphatase 128 (H) 30 - 99 U/L    Total Bilirubin 0.9 0.1 - 1.5 mg/dL    Albumin 2.8 (L) 3.2 - 4.9 g/dL    Total Protein 6.5 6.0 - 8.2 g/dL    Globulin 3.7 (H) 1.9 - 3.5 g/dL    A-G Ratio 0.8 g/dL   ESTIMATED GFR   Result Value Ref Range    GFR If  40 (A) >60 mL/min/1.73 m 2    GFR If Non  33 (A) >60 mL/min/1.73 m 2   proBrain Natriuretic Peptide, NT   Result Value Ref Range    NT-proBNP 338 (H) 0 - 125 pg/mL   Magnesium   Result Value Ref Range    Magnesium 2.0 1.5 - 2.5 mg/dL   Phosphorus   Result Value Ref Range    Phosphorus 4.1 2.5 - 4.5 mg/dL   Comp Metabolic Panel   Result Value Ref Range    Sodium 133 (L) 135 - 145 mmol/L    Potassium 4.2 3.6 - 5.5 mmol/L    Chloride 95 (L) 96 - 112 mmol/L    Co2 28 20 - 33 mmol/L    Anion Gap 10.0 7.0 - 16.0    Glucose 123 (H) 65 - 99  mg/dL    Bun 52 (H) 8 - 22 mg/dL    Creatinine 1.58 (H) 0.50 - 1.40 mg/dL    Calcium 8.8 8.5 - 10.5 mg/dL    AST(SGOT) 23 12 - 45 U/L    ALT(SGPT) 24 2 - 50 U/L    Alkaline Phosphatase 103 (H) 30 - 99 U/L    Total Bilirubin 0.6 0.1 - 1.5 mg/dL    Albumin 2.5 (L) 3.2 - 4.9 g/dL    Total Protein 6.1 6.0 - 8.2 g/dL    Globulin 3.6 (H) 1.9 - 3.5 g/dL    A-G Ratio 0.7 g/dL   IONIZED CALCIUM   Result Value Ref Range    Ionized Calcium 1.2 1.1 - 1.3 mmol/L   ESTIMATED GFR   Result Value Ref Range    GFR If  55 (A) >60 mL/min/1.73 m 2    GFR If Non African American 45 (A) >60 mL/min/1.73 m 2   Prealbumin   Result Value Ref Range    Pre-Albumin 5.6 (L) 18.0 - 38.0 mg/dL   CBC WITH DIFFERENTIAL   Result Value Ref Range    WBC 7.9 4.8 - 10.8 K/uL    RBC 2.94 (L) 4.70 - 6.10 M/uL    Hemoglobin 8.9 (L) 14.0 - 18.0 g/dL    Hematocrit 29.6 (L) 42.0 - 52.0 %    .7 (H) 81.4 - 97.8 fL    MCH 30.3 27.0 - 33.0 pg    MCHC 30.1 (L) 33.7 - 35.3 g/dL    RDW 50.1 (H) 35.9 - 50.0 fL    Platelet Count 202 164 - 446 K/uL    MPV 12.1 9.0 - 12.9 fL    Neutrophils-Polys 81.60 (H) 44.00 - 72.00 %    Lymphocytes 4.40 (L) 22.00 - 41.00 %    Monocytes 4.40 0.00 - 13.40 %    Eosinophils 4.40 0.00 - 6.90 %    Basophils 0.00 0.00 - 1.80 %    Nucleated RBC 0.00 /100 WBC    Neutrophils (Absolute) 6.87 1.82 - 7.42 K/uL    Lymphs (Absolute) 0.35 (L) 1.00 - 4.80 K/uL    Monos (Absolute) 0.35 0.00 - 0.85 K/uL    Eos (Absolute) 0.35 0.00 - 0.51 K/uL    Baso (Absolute) 0.00 0.00 - 0.12 K/uL    NRBC (Absolute) 0.00 K/uL   LACTIC ACID   Result Value Ref Range    Lactic Acid 0.7 0.5 - 2.0 mmol/L   D-DIMER   Result Value Ref Range    D-Dimer Screen 6.35 (H) 0.00 - 0.50 ug/mL (FEU)   MAGNESIUM   Result Value Ref Range    Magnesium 2.0 1.5 - 2.5 mg/dL   PHOSPHORUS   Result Value Ref Range    Phosphorus 3.7 2.5 - 4.5 mg/dL   DIFFERENTIAL MANUAL   Result Value Ref Range    Bands-Stabs 5.30 0.00 - 10.00 %    Manual Diff Status PERFORMED     PERIPHERAL SMEAR REVIEW   Result Value Ref Range    Peripheral Smear Review see below    PLATELET ESTIMATE   Result Value Ref Range    Plt Estimation Normal    MORPHOLOGY   Result Value Ref Range    RBC Morphology Present     Poikilocytosis 1+     Stomatocytes 1+    Arterial Blood Gas   Result Value Ref Range    Ph 7.05 (LL) 7.40 - 7.50    Pco2 114.2 (HH) 26.0 - 37.0 mmHg    Po2 94.3 (H) 64.0 - 87.0 mmHg    O2 Saturation 94.2 93.0 - 99.0 %    Hco3 31 (H) 17 - 25 mmol/L    Base Excess -2 -4 - 3 mmol/L    Body Temp see below Centigrade   Troponin   Result Value Ref Range    Troponin T 11 6 - 19 ng/L   CBC WITH DIFFERENTIAL   Result Value Ref Range    WBC 11.8 (H) 4.8 - 10.8 K/uL    RBC 3.41 (L) 4.70 - 6.10 M/uL    Hemoglobin 10.3 (L) 14.0 - 18.0 g/dL    Hematocrit 34.8 (L) 42.0 - 52.0 %    .1 (H) 81.4 - 97.8 fL    MCH 30.2 27.0 - 33.0 pg    MCHC 29.6 (L) 33.7 - 35.3 g/dL    RDW 50.8 (H) 35.9 - 50.0 fL    Platelet Count 303 164 - 446 K/uL    MPV 11.5 9.0 - 12.9 fL    Neutrophils-Polys 75.90 (H) 44.00 - 72.00 %    Lymphocytes 7.80 (L) 22.00 - 41.00 %    Monocytes 7.80 0.00 - 13.40 %    Eosinophils 0.90 0.00 - 6.90 %    Basophils 0.00 0.00 - 1.80 %    Nucleated RBC 0.00 /100 WBC    Neutrophils (Absolute) 9.88 (H) 1.82 - 7.42 K/uL    Lymphs (Absolute) 0.92 (L) 1.00 - 4.80 K/uL    Monos (Absolute) 0.92 (H) 0.00 - 0.85 K/uL    Eos (Absolute) 0.11 0.00 - 0.51 K/uL    Baso (Absolute) 0.00 0.00 - 0.12 K/uL    NRBC (Absolute) 0.00 K/uL    Anisocytosis 1+     Macrocytosis 1+     Microcytosis 1+    LACTIC ACID   Result Value Ref Range    Lactic Acid 0.8 0.5 - 2.0 mmol/L   Comp Metabolic Panel   Result Value Ref Range    Sodium 134 (L) 135 - 145 mmol/L    Potassium 5.1 3.6 - 5.5 mmol/L    Chloride 95 (L) 96 - 112 mmol/L    Co2 32 20 - 33 mmol/L    Anion Gap 7.0 7.0 - 16.0    Glucose 157 (H) 65 - 99 mg/dL    Bun 50 (H) 8 - 22 mg/dL    Creatinine 1.58 (H) 0.50 - 1.40 mg/dL    Calcium 9.6 8.5 - 10.5 mg/dL    AST(SGOT) 20 12  - 45 U/L    ALT(SGPT) 26 2 - 50 U/L    Alkaline Phosphatase 112 (H) 30 - 99 U/L    Total Bilirubin 0.5 0.1 - 1.5 mg/dL    Albumin 3.0 (L) 3.2 - 4.9 g/dL    Total Protein 7.3 6.0 - 8.2 g/dL    Globulin 4.3 (H) 1.9 - 3.5 g/dL    A-G Ratio 0.7 g/dL   PLATELET ESTIMATE   Result Value Ref Range    Plt Estimation Normal    MORPHOLOGY   Result Value Ref Range    RBC Morphology Present     Polychromia 1+     Stomatocytes 2+    PERIPHERAL SMEAR REVIEW   Result Value Ref Range    Peripheral Smear Review see below    DIFFERENTIAL MANUAL   Result Value Ref Range    Bands-Stabs 7.80 0.00 - 10.00 %    Manual Diff Status PERFORMED    ESTIMATED GFR   Result Value Ref Range    GFR If  55 (A) >60 mL/min/1.73 m 2    GFR If Non African American 45 (A) >60 mL/min/1.73 m 2   MAGNESIUM   Result Value Ref Range    Magnesium 1.8 1.5 - 2.5 mg/dL   PHOSPHORUS   Result Value Ref Range    Phosphorus 1.7 (L) 2.5 - 4.5 mg/dL   CBC with Differential   Result Value Ref Range    WBC 5.4 4.8 - 10.8 K/uL    RBC 2.97 (L) 4.70 - 6.10 M/uL    Hemoglobin 8.9 (L) 14.0 - 18.0 g/dL    Hematocrit 28.7 (L) 42.0 - 52.0 %    MCV 96.6 81.4 - 97.8 fL    MCH 30.0 27.0 - 33.0 pg    MCHC 31.0 (L) 33.7 - 35.3 g/dL    RDW 47.7 35.9 - 50.0 fL    Platelet Count 250 164 - 446 K/uL    MPV 12.2 9.0 - 12.9 fL    Neutrophils-Polys 80.90 (H) 44.00 - 72.00 %    Lymphocytes 3.50 (L) 22.00 - 41.00 %    Monocytes 5.20 0.00 - 13.40 %    Eosinophils 1.70 0.00 - 6.90 %    Basophils 0.00 0.00 - 1.80 %    Nucleated RBC 0.00 /100 WBC    Neutrophils (Absolute) 4.79 1.82 - 7.42 K/uL    Lymphs (Absolute) 0.19 (L) 1.00 - 4.80 K/uL    Monos (Absolute) 0.28 0.00 - 0.85 K/uL    Eos (Absolute) 0.09 0.00 - 0.51 K/uL    Baso (Absolute) 0.00 0.00 - 0.12 K/uL    NRBC (Absolute) 0.00 K/uL    Hypochromia 1+    Comp Metabolic Panel   Result Value Ref Range    Sodium 132 (L) 135 - 145 mmol/L    Potassium 4.4 3.6 - 5.5 mmol/L    Chloride 94 (L) 96 - 112 mmol/L    Co2 27 20 - 33 mmol/L     Anion Gap 11.0 7.0 - 16.0    Glucose 104 (H) 65 - 99 mg/dL    Bun 50 (H) 8 - 22 mg/dL    Creatinine 1.81 (H) 0.50 - 1.40 mg/dL    Calcium 8.8 8.5 - 10.5 mg/dL    AST(SGOT) 19 12 - 45 U/L    ALT(SGPT) 20 2 - 50 U/L    Alkaline Phosphatase 92 30 - 99 U/L    Total Bilirubin 0.6 0.1 - 1.5 mg/dL    Albumin 2.5 (L) 3.2 - 4.9 g/dL    Total Protein 6.1 6.0 - 8.2 g/dL    Globulin 3.6 (H) 1.9 - 3.5 g/dL    A-G Ratio 0.7 g/dL   ESTIMATED GFR   Result Value Ref Range    GFR If  47 (A) >60 mL/min/1.73 m 2    GFR If Non  39 (A) >60 mL/min/1.73 m 2   PERIPHERAL SMEAR REVIEW   Result Value Ref Range    Peripheral Smear Review see below    PLATELET ESTIMATE   Result Value Ref Range    Plt Estimation Normal    MORPHOLOGY   Result Value Ref Range    RBC Morphology Present     Polychromia 1+     Stomatocytes 2+    DIFFERENTIAL MANUAL   Result Value Ref Range    Bands-Stabs 7.80 0.00 - 10.00 %    Myelocytes 0.90 %    Manual Diff Status PERFORMED    PHOSPHORUS   Result Value Ref Range    Phosphorus 1.9 (L) 2.5 - 4.5 mg/dL   IONIZED CALCIUM   Result Value Ref Range    Ionized Calcium 1.1 1.1 - 1.3 mmol/L   CBC with Differential   Result Value Ref Range    WBC 6.7 4.8 - 10.8 K/uL    RBC 2.96 (L) 4.70 - 6.10 M/uL    Hemoglobin 8.8 (L) 14.0 - 18.0 g/dL    Hematocrit 27.3 (L) 42.0 - 52.0 %    MCV 92.2 81.4 - 97.8 fL    MCH 29.7 27.0 - 33.0 pg    MCHC 32.2 (L) 33.7 - 35.3 g/dL    RDW 46.5 35.9 - 50.0 fL    Platelet Count 293 164 - 446 K/uL    MPV 11.7 9.0 - 12.9 fL    Neutrophils-Polys 77.20 (H) 44.00 - 72.00 %    Lymphocytes 1.80 (L) 22.00 - 41.00 %    Monocytes 4.40 0.00 - 13.40 %    Eosinophils 0.90 0.00 - 6.90 %    Basophils 0.90 0.00 - 1.80 %    Nucleated RBC 0.00 /100 WBC    Neutrophils (Absolute) 6.11 1.82 - 7.42 K/uL    Lymphs (Absolute) 0.12 (L) 1.00 - 4.80 K/uL    Monos (Absolute) 0.29 0.00 - 0.85 K/uL    Eos (Absolute) 0.06 0.00 - 0.51 K/uL    Baso (Absolute) 0.06 0.00 - 0.12 K/uL    NRBC (Absolute)  0.00 K/uL   Comp Metabolic Panel   Result Value Ref Range    Sodium 135 135 - 145 mmol/L    Potassium 3.9 3.6 - 5.5 mmol/L    Chloride 99 96 - 112 mmol/L    Co2 26 20 - 33 mmol/L    Anion Gap 10.0 7.0 - 16.0    Glucose 158 (H) 65 - 99 mg/dL    Bun 41 (H) 8 - 22 mg/dL    Creatinine 1.73 (H) 0.50 - 1.40 mg/dL    Calcium 8.1 (L) 8.5 - 10.5 mg/dL    AST(SGOT) 28 12 - 45 U/L    ALT(SGPT) 18 2 - 50 U/L    Alkaline Phosphatase 81 30 - 99 U/L    Total Bilirubin 0.7 0.1 - 1.5 mg/dL    Albumin 2.2 (L) 3.2 - 4.9 g/dL    Total Protein 5.9 (L) 6.0 - 8.2 g/dL    Globulin 3.7 (H) 1.9 - 3.5 g/dL    A-G Ratio 0.6 g/dL   Magnesium   Result Value Ref Range    Magnesium 2.0 1.5 - 2.5 mg/dL   Triglyceride   Result Value Ref Range    Triglycerides 94 0 - 149 mg/dL   ESTIMATED GFR   Result Value Ref Range    GFR If African American 50 (A) >60 mL/min/1.73 m 2    GFR If Non  41 (A) >60 mL/min/1.73 m 2   DIFFERENTIAL MANUAL   Result Value Ref Range    Bands-Stabs 14.00 (H) 0.00 - 10.00 %    Metamyelocytes 0.90 %    Manual Diff Status PERFORMED    PERIPHERAL SMEAR REVIEW   Result Value Ref Range    Peripheral Smear Review see below    PLATELET ESTIMATE   Result Value Ref Range    Plt Estimation Normal    MORPHOLOGY   Result Value Ref Range    RBC Morphology Normal    PHOSPHORUS   Result Value Ref Range    Phosphorus 4.3 2.5 - 4.5 mg/dL   IONIZED CALCIUM   Result Value Ref Range    Ionized Calcium 1.1 1.1 - 1.3 mmol/L   CBC with Differential   Result Value Ref Range    WBC 6.3 4.8 - 10.8 K/uL    RBC 2.85 (L) 4.70 - 6.10 M/uL    Hemoglobin 8.8 (L) 14.0 - 18.0 g/dL    Hematocrit 27.8 (L) 42.0 - 52.0 %    MCV 97.5 81.4 - 97.8 fL    MCH 30.9 27.0 - 33.0 pg    MCHC 31.7 (L) 33.7 - 35.3 g/dL    RDW 49.0 35.9 - 50.0 fL    Platelet Count 231 164 - 446 K/uL    MPV 11.5 9.0 - 12.9 fL    Neutrophils-Polys 80.00 (H) 44.00 - 72.00 %    Lymphocytes 2.60 (L) 22.00 - 41.00 %    Monocytes 6.10 0.00 - 13.40 %    Eosinophils 0.90 0.00 - 6.90 %     Basophils 0.00 0.00 - 1.80 %    Nucleated RBC 0.00 /100 WBC    Neutrophils (Absolute) 5.42 1.82 - 7.42 K/uL    Lymphs (Absolute) 0.16 (L) 1.00 - 4.80 K/uL    Monos (Absolute) 0.38 0.00 - 0.85 K/uL    Eos (Absolute) 0.06 0.00 - 0.51 K/uL    Baso (Absolute) 0.00 0.00 - 0.12 K/uL    NRBC (Absolute) 0.00 K/uL   Comp Metabolic Panel   Result Value Ref Range    Sodium 142 135 - 145 mmol/L    Potassium 4.0 3.6 - 5.5 mmol/L    Chloride 104 96 - 112 mmol/L    Co2 30 20 - 33 mmol/L    Anion Gap 8.0 7.0 - 16.0    Glucose 198 (H) 65 - 99 mg/dL    Bun 38 (H) 8 - 22 mg/dL    Creatinine 1.61 (H) 0.50 - 1.40 mg/dL    Calcium 8.2 (L) 8.5 - 10.5 mg/dL    AST(SGOT) 48 (H) 12 - 45 U/L    ALT(SGPT) 31 2 - 50 U/L    Alkaline Phosphatase 74 30 - 99 U/L    Total Bilirubin 0.6 0.1 - 1.5 mg/dL    Albumin 2.2 (L) 3.2 - 4.9 g/dL    Total Protein 6.0 6.0 - 8.2 g/dL    Globulin 3.8 (H) 1.9 - 3.5 g/dL    A-G Ratio 0.6 g/dL   Magnesium   Result Value Ref Range    Magnesium 2.0 1.5 - 2.5 mg/dL   ESTIMATED GFR   Result Value Ref Range    GFR If  54 (A) >60 mL/min/1.73 m 2    GFR If Non  44 (A) >60 mL/min/1.73 m 2   DIFFERENTIAL MANUAL   Result Value Ref Range    Bands-Stabs 6.10 0.00 - 10.00 %    Metamyelocytes 4.30 %    Manual Diff Status PERFORMED    PERIPHERAL SMEAR REVIEW   Result Value Ref Range    Peripheral Smear Review see below    PLATELET ESTIMATE   Result Value Ref Range    Plt Estimation Normal    MORPHOLOGY   Result Value Ref Range    RBC Morphology Normal    PHOSPHORUS   Result Value Ref Range    Phosphorus 3.6 2.5 - 4.5 mg/dL   IONIZED CALCIUM   Result Value Ref Range    Ionized Calcium 1.2 1.1 - 1.3 mmol/L   CBC with Differential   Result Value Ref Range    WBC 5.2 4.8 - 10.8 K/uL    RBC 3.02 (L) 4.70 - 6.10 M/uL    Hemoglobin 9.0 (L) 14.0 - 18.0 g/dL    Hematocrit 29.5 (L) 42.0 - 52.0 %    MCV 97.7 81.4 - 97.8 fL    MCH 29.8 27.0 - 33.0 pg    MCHC 30.5 (L) 33.7 - 35.3 g/dL    RDW 49.6 35.9 - 50.0 fL     Platelet Count 209 164 - 446 K/uL    MPV 12.4 9.0 - 12.9 fL    Neutrophils-Polys 77.40 (H) 44.00 - 72.00 %    Lymphocytes 1.70 (L) 22.00 - 41.00 %    Monocytes 2.60 0.00 - 13.40 %    Eosinophils 0.90 0.00 - 6.90 %    Basophils 0.00 0.00 - 1.80 %    Nucleated RBC 0.40 /100 WBC    Neutrophils (Absolute) 4.84 1.82 - 7.42 K/uL    Lymphs (Absolute) 0.09 (L) 1.00 - 4.80 K/uL    Monos (Absolute) 0.14 0.00 - 0.85 K/uL    Eos (Absolute) 0.05 0.00 - 0.51 K/uL    Baso (Absolute) 0.00 0.00 - 0.12 K/uL    NRBC (Absolute) 0.02 K/uL   Comp Metabolic Panel   Result Value Ref Range    Sodium 143 135 - 145 mmol/L    Potassium 4.2 3.6 - 5.5 mmol/L    Chloride 104 96 - 112 mmol/L    Co2 31 20 - 33 mmol/L    Anion Gap 8.0 7.0 - 16.0    Glucose 177 (H) 65 - 99 mg/dL    Bun 45 (H) 8 - 22 mg/dL    Creatinine 2.46 (H) 0.50 - 1.40 mg/dL    Calcium 8.8 8.5 - 10.5 mg/dL    AST(SGOT) 41 12 - 45 U/L    ALT(SGPT) 35 2 - 50 U/L    Alkaline Phosphatase 72 30 - 99 U/L    Total Bilirubin 0.5 0.1 - 1.5 mg/dL    Albumin 2.3 (L) 3.2 - 4.9 g/dL    Total Protein 6.3 6.0 - 8.2 g/dL    Globulin 4.0 (H) 1.9 - 3.5 g/dL    A-G Ratio 0.6 g/dL   Magnesium   Result Value Ref Range    Magnesium 2.0 1.5 - 2.5 mg/dL   DIFFERENTIAL MANUAL   Result Value Ref Range    Bands-Stabs 15.70 (H) 0.00 - 10.00 %    Myelocytes 1.70 %    Manual Diff Status PERFORMED    PERIPHERAL SMEAR REVIEW   Result Value Ref Range    Peripheral Smear Review see below    PLATELET ESTIMATE   Result Value Ref Range    Plt Estimation Normal    MORPHOLOGY   Result Value Ref Range    RBC Morphology Normal    ESTIMATED GFR   Result Value Ref Range    GFR If  33 (A) >60 mL/min/1.73 m 2    GFR If Non  27 (A) >60 mL/min/1.73 m 2   PHOSPHORUS   Result Value Ref Range    Phosphorus 3.6 2.5 - 4.5 mg/dL   CBC with Differential   Result Value Ref Range    WBC 5.6 4.8 - 10.8 K/uL    RBC 3.00 (L) 4.70 - 6.10 M/uL    Hemoglobin 9.0 (L) 14.0 - 18.0 g/dL    Hematocrit 29.2 (L)  42.0 - 52.0 %    MCV 97.3 81.4 - 97.8 fL    MCH 30.0 27.0 - 33.0 pg    MCHC 30.8 (L) 33.7 - 35.3 g/dL    RDW 50.4 (H) 35.9 - 50.0 fL    Platelet Count 172 164 - 446 K/uL    MPV 12.8 9.0 - 12.9 fL    Neutrophils-Polys 77.40 (H) 44.00 - 72.00 %    Lymphocytes 7.80 (L) 22.00 - 41.00 %    Monocytes 4.30 0.00 - 13.40 %    Eosinophils 0.00 0.00 - 6.90 %    Basophils 0.00 0.00 - 1.80 %    Nucleated RBC 0.00 /100 WBC    Neutrophils (Absolute) 4.87 1.82 - 7.42 K/uL    Lymphs (Absolute) 0.44 (L) 1.00 - 4.80 K/uL    Monos (Absolute) 0.24 0.00 - 0.85 K/uL    Eos (Absolute) 0.00 0.00 - 0.51 K/uL    Baso (Absolute) 0.00 0.00 - 0.12 K/uL    NRBC (Absolute) 0.00 K/uL    Hypochromia 1+     Anisocytosis 1+     Macrocytosis 1+     Microcytosis 1+    Comp Metabolic Panel   Result Value Ref Range    Sodium 146 (H) 135 - 145 mmol/L    Potassium 4.4 3.6 - 5.5 mmol/L    Chloride 106 96 - 112 mmol/L    Co2 31 20 - 33 mmol/L    Anion Gap 9.0 7.0 - 16.0    Glucose 185 (H) 65 - 99 mg/dL    Bun 62 (H) 8 - 22 mg/dL    Creatinine 3.85 (H) 0.50 - 1.40 mg/dL    Calcium 8.7 8.5 - 10.5 mg/dL    AST(SGOT) 45 12 - 45 U/L    ALT(SGPT) 32 2 - 50 U/L    Alkaline Phosphatase 68 30 - 99 U/L    Total Bilirubin 0.8 0.1 - 1.5 mg/dL    Albumin 2.2 (L) 3.2 - 4.9 g/dL    Total Protein 6.2 6.0 - 8.2 g/dL    Globulin 4.0 (H) 1.9 - 3.5 g/dL    A-G Ratio 0.6 g/dL   Magnesium   Result Value Ref Range    Magnesium 2.1 1.5 - 2.5 mg/dL   ESTIMATED GFR   Result Value Ref Range    GFR If  20 (A) >60 mL/min/1.73 m 2    GFR If Non  16 (A) >60 mL/min/1.73 m 2   DIFFERENTIAL MANUAL   Result Value Ref Range    Bands-Stabs 9.60 0.00 - 10.00 %    Metamyelocytes 0.90 %    Manual Diff Status PERFORMED    PERIPHERAL SMEAR REVIEW   Result Value Ref Range    Peripheral Smear Review see below    PLATELET ESTIMATE   Result Value Ref Range    Plt Estimation Normal    MORPHOLOGY   Result Value Ref Range    RBC Morphology Present     Polychromia 1+      Target Cells 1+    URINE CREATININE RANDOM   Result Value Ref Range    Creatinine, Random Urine 297.96 mg/dL   URINE SODIUM RANDOM   Result Value Ref Range    Sodium, Urine -per volume 27 mmol/L   URINE PROTEIN   Result Value Ref Range    Total Protein, Urine 189.0 (H) 0.0 - 15.0 mg/dL   COMPLEMENT C3   Result Value Ref Range    C3 Complement 95.8 87.0 - 200.0 mg/dL   COMPLEMENT C4   Result Value Ref Range    Complement C4 6.5 (L) 19.0 - 52.0 mg/dL   ABG - LAB   Result Value Ref Range    Ph 7.34 (L) 7.40 - 7.50    Pco2 58.8 (HH) 26.0 - 37.0 mmHg    Po2 136.1 (H) 64.0 - 87.0 mmHg    O2 Saturation 97.7 93.0 - 99.0 %    Hco3 31 (H) 17 - 25 mmol/L    Base Excess 4 (H) -4 - 3 mmol/L    Body Temp see below Centigrade   BLOOD CULTURE    Specimen: Peripheral; Blood   Result Value Ref Range    Significant Indicator POS (POS)     Source BLD     Site PERIPHERAL     Culture Result (A)      Growth detected by [employer] instrument. 01/30/2021  23:04  Staphylococcus aureus (methicillin sensitive)  detected by PCR.      Culture Result Staphylococcus aureus (A)        Susceptibility    Staphylococcus aureus - [facility]     Azithromycin <=2 Sensitive mcg/mL     Clindamycin <=0.25 Sensitive mcg/mL     Cefotaxime <=8 Sensitive mcg/mL     Cefazolin <=8 Sensitive mcg/mL     Cefepime <=4 Sensitive mcg/mL     Ceftaroline <=0.5 Sensitive mcg/mL     Daptomycin <=0.5 Sensitive mcg/mL     Ampicillin/sulbactam <=8/4 Sensitive mcg/mL     Erythromycin <=0.25 Sensitive mcg/mL     Vancomycin 1 Sensitive mcg/mL     Oxacillin <=0.25 Sensitive mcg/mL     Penicillin >2 Resistant mcg/mL     Pip/Tazobactam <=8 Sensitive mcg/mL     Trimeth/Sulfa <=0.5/9.5 Sensitive mcg/mL     Tetracycline <=4 Sensitive mcg/mL   BLOOD CULTURE    Specimen: Peripheral; Blood   Result Value Ref Range    Significant Indicator POS (POS)     Source BLD     Site PERIPHERAL     Culture Result (A)      Growth detected by [employer] instrument. 01/31/2021  02:08    Culture Result (A)       Staphylococcus aureus  See previous culture for sensitivity report.     PROCALCITONIN   Result Value Ref Range    Procalcitonin 4.71 (H) <0.25 ng/mL   Lactic Acid -STAT Once   Result Value Ref Range    Lactic Acid 2.2 (H) 0.5 - 2.0 mmol/L   Prothrombin time (INR)   Result Value Ref Range    PT 13.9 12.0 - 14.6 sec    INR 1.03 0.87 - 1.13   Lactic Acid Every four hours after STAT order   Result Value Ref Range    Lactic Acid 2.2 (H) 0.5 - 2.0 mmol/L   FUNGAL BLOOD CULTURE    Specimen: Blood   Result Value Ref Range    Significant Indicator NEG     Source BLD     Site Peripheral     Culture Result Culture in progress.    Lactic Acid Every four hours after STAT order   Result Value Ref Range    Lactic Acid 2.0 0.5 - 2.0 mmol/L   PHOSPHORUS   Result Value Ref Range    Phosphorus 7.4 (H) 2.5 - 4.5 mg/dL   CBC with Differential   Result Value Ref Range    WBC 4.8 4.8 - 10.8 K/uL    RBC 2.84 (L) 4.70 - 6.10 M/uL    Hemoglobin 8.5 (L) 14.0 - 18.0 g/dL    Hematocrit 28.1 (L) 42.0 - 52.0 %    MCV 98.9 (H) 81.4 - 97.8 fL    MCH 29.9 27.0 - 33.0 pg    MCHC 30.2 (L) 33.7 - 35.3 g/dL    RDW 52.4 (H) 35.9 - 50.0 fL    Platelet Count 80 (L) 164 - 446 K/uL    MPV 13.6 (H) 9.0 - 12.9 fL    Neutrophils-Polys 76.50 (H) 44.00 - 72.00 %    Lymphocytes 7.00 (L) 22.00 - 41.00 %    Monocytes 0.00 0.00 - 13.40 %    Eosinophils 0.90 0.00 - 6.90 %    Basophils 0.00 0.00 - 1.80 %    Nucleated RBC 0.00 /100 WBC    Neutrophils (Absolute) 4.38 1.82 - 7.42 K/uL    Lymphs (Absolute) 0.34 (L) 1.00 - 4.80 K/uL    Monos (Absolute) 0.00 0.00 - 0.85 K/uL    Eos (Absolute) 0.04 0.00 - 0.51 K/uL    Baso (Absolute) 0.00 0.00 - 0.12 K/uL    NRBC (Absolute) 0.00 K/uL    Hypochromia 1+     Anisocytosis 1+     Macrocytosis 1+     Microcytosis 1+    Comp Metabolic Panel   Result Value Ref Range    Sodium 147 (H) 135 - 145 mmol/L    Potassium 5.0 3.6 - 5.5 mmol/L    Chloride 109 96 - 112 mmol/L    Co2 27 20 - 33 mmol/L    Anion Gap 11.0 7.0 - 16.0    Glucose 155  (H) 65 - 99 mg/dL    Bun 88 (HH) 8 - 22 mg/dL    Creatinine 6.30 (HH) 0.50 - 1.40 mg/dL    Calcium 8.2 (L) 8.5 - 10.5 mg/dL    AST(SGOT) 53 (H) 12 - 45 U/L    ALT(SGPT) 26 2 - 50 U/L    Alkaline Phosphatase 61 30 - 99 U/L    Total Bilirubin 0.9 0.1 - 1.5 mg/dL    Albumin 1.9 (L) 3.2 - 4.9 g/dL    Total Protein 5.6 (L) 6.0 - 8.2 g/dL    Globulin 3.7 (H) 1.9 - 3.5 g/dL    A-G Ratio 0.5 g/dL     *Note: Due to a large number of results and/or encounters for the requested time period, some results have not been displayed. A complete set of results can be found in Results Review.       Imaging:   EC-ECHOCARDIOGRAM COMPLETE W/O CONT   Final Result      [id number] VIEW   Final Result      [Ovary persistent distention of small bowel loops in the mid abdomen.      IR-CVC NON TUNNELED > AGE 5   Final Result      Successful image guided non-tunneled dialysis catheter placement.      Plan: The catheter is available for immediate use.         US-EXTREMITY VENOUS LOWER BILAT   Final Result      DX-CHEST-LIMITED (1 VIEW)   Final Result      1.  Removal of endotracheal tube and right PICC line.   2.  Hypoinflation with mild patchy opacities probably related to atelectasis. Correlate clinically for infection.      DX-CHEST-PORTABLE (1 VIEW)   Final Result         1.  Pulmonary edema and/or infiltrates are identified, which appear somewhat increased since the prior exam.   2.  Cardiomegaly      DX-ABDOMEN FOR TUBE PLACEMENT   Final Result         1.  Air-filled distended loops of bowel are seen with reactive mucosal pattern, appearance suggests ileus or enteritis. Recommend radiographic followup to resolution to exclude progression to obstruction.   2.  Nasogastric tube tip terminates overlying the expected location of the gastric antrum, could be slightly advanced.      DX-CHEST-PORTABLE (1 VIEW)   Final Result         1.  Bilateral basilar atelectasis, no focal infiltrate   2.  Cardiomegaly      DX-CHEST-PORTABLE (1 VIEW)   Final  Result      Perihilar opacification could be due to atelectasis or infiltrate versus mild edema.      Possible trace effusions.      Hypoventilatory change.      CT-ABDOMEN-PELVIS W/O   Final Result      1.  Severe acute pancreatitis, worse than on prior exam.   2.  Multiple dilated small bowel loops suggesting distal small bowel obstruction, similar to prior exam.   3.  No evidence of bowel perforation.      DX-CHEST-LIMITED (1 VIEW)   Final Result      Bibasilar atelectasis, with no significant interval change.               INTERPRETING LOCATION: [address] [facility], [place], [address]      [id number] VIEW   Final Result         Diffuse gaseous distention of the small bowel, worse than prior, concerning for small bowel obstruction.      IR-PICC LINE PLACEMENT W/ GUIDANCE > AGE 5   Final Result                  Ultrasound-guided PICC placement performed by qualified nursing staff as    above.          DX-CHEST-PORTABLE (1 VIEW)   Final Result         1.  Interstitial pulmonary opacities suggests atelectasis or possible subtle infiltrates.      CT-ABDOMEN-PELVIS W/O   Final Result      1.  Again seen pancreatitis with peripancreatic effusions. This is mildly worsened from comparison.      2.  Again seen dilated loops of small intestine with decompression of the colon and distal small bowel consistent with ileus versus partial small bowel obstruction.      3.  Bibasilar atelectasis and small bilateral pleural effusions.      [id number] VIEW   Final Result      1.  Moderate small bowel dilation with multiple air-fluid levels consistent with small bowel obstruction      2.  Enteric catheter appears appropriately located      CT-ABDOMEN-PELVIS W/O   Final Result         1. Worsening inflammatory change and fluid surrounding the pancreas, in keeping with acute pancreatitis.      2. Diffuse dilatation of the small bowel and proximal colon with decompressed distal colon adjacent to the inflammation in the tail the pancreas. This  could relate to colonic obstruction due to adjacent inflammatory change or ileus.      3. Mild wall thickening of the stomach fundus adjacent to the pancreatic tail, likely reactive.      DX-ABDOMEN FOR TUBE PLACEMENT   Final Result         Gastric drainage tube with tip projecting over the expected area of the stomach fundus.      DX-CHEST-FOR LINE PLACEMENT Perform procedure in: Patient's Room   Final Result      Left midlung and bibasilar linear atelectasis.      Dialysis catheter projects over the SVC.      No pneumothorax.         [id number] VIEW   Final Result      Increased bowel gas concerning for early or partial small bowel obstruction.      DX-CHEST-PORTABLE (1 VIEW)   Final Result      Hypoinflation with LEFT mid and lower lung atelectasis, with possible superimposed LEFT basilar pneumonia.      [id number]   Final Result      1.  Pancreatic edema with significant peripancreatic stranding and ill-defined fluid, consistent with acute interstitial edematous pancreatitis.      2.  Small amount of ascites.      3.  No choledocholithiasis is identified.      4.  Status post cholecystectomy.      CT-ABDOMEN-PELVIS WITH   Final Result      1.  Inflammatory stranding involving the pancreas with fluid within the anterior pararenal space consistent with pancreatitis.      2.  Fatty liver.      3.  Prior cholecystectomy.      4.  Bibasilar atelectasis/consolidation.      DX-CHEST-PORTABLE (1 VIEW)   Final Result      No evidence of acute cardiopulmonary process.          Problem Representation:   The patient is a 55-year-old male with a past medical history of alcohol use disorder, crohn's disease, non-ischemic cardiomyopathy (?from alcohol use, last EF 25-30%), and lap monico on 7/2020 who presented on 1/14/2021 with severe pancreatitis and a hospital course complicated by intubation, renal failure, ileus/SBO, MSSA bacteremia.     * Sepsis due to methicillin susceptible Staphylococcus aureus (MSSA) with acute  renal failure (HCC)  Assessment & Plan  On 1/30 the patient spiked a fever of 101.1 and became hypotensive and tachycardic (2/4 SIRS criteria). Cultures from 1/30 grew MSSA (unclear source, possibly line related?). The patient's PICC was removed on 1/30 due to infiltration. The patient has since been on Ancef.     Plan:  - ID consulted and recommended switching to Dapto.   - Blood cultures ordered and should be repeated every 48 hours until negative. Currently Bcx from 2/1 are negative.  - Per ID, recommend AUGUST  - Recommend keeping the patient on antibiotics for 2 weeks from last negative blood culture. End date 2/16.  - Continue to monitor daily for back pain, joint pain, nuchal rigidity, and mental status changes.      Pancytopenia (HCC)  Assessment & Plan  The patient's platelet count on 2/1 dropped by more than 50%. On 2/2 his white count dropped as well. This is concerning given that he is acutely ill and could be from multiple causes such as HIT, DIC, medications (ancef), sepsis. Most of these causes have been ruled out (HIT and ISTH score are both low and lab studies did not offer much insight). The cause of his pancytopenia is likely from Ancef.     Plan:  - Continue to monitor platelets    Ileus (HCC)- (present on admission)  Assessment & Plan  The patient had an Ileus that is most likely a complication of his severe pancreatitis. NG tube has been in place since 1/19 and TPN was started. Tthe patient has since been having bowel sounds and has been having output from his rectal tube. He has not had any nausea and vomiting or any abdominal pain. He has also been hungry and wanting to eat. TPN has been off since 1/30 when PICC was infiltrated. Abdominal plain film continues to show that he has dilated loops of bowel. However, he has started tolerating a diet with no nausea and vomiting.     Plan:  - SLP consulted and recommended a diet. Can advance as tolerated.      Acute respiratory failure with hypoxia and  hypercapnia (HCC)- (present on admission)  Assessment & Plan  On 1/25 the patient had a PCO2 of 114. He does not have a history of COPD. The patient was intubated from 1/25 to 1/27. He is now requiring 1L of oxygen (not on oxygen at home). This is likely not ARDS, but possibly atelectasis vs decreased respiratory drive from opiate use during that time and the lasting effects from being subsequently intubated. Slowly, his O2 requirements have been going down.     Plan:  - Continue IS for atelectasis. The patient understands he needs to be using this 10 times an hour for every hour he is awake.  - Continue RT protocol with duonebs   - Stopped Fentanyl and giving Dilaudid 1mg q4h PRN for pain. We will use this as sparingly as possible so to not cause further respiratory depression.  - Continue oxygen weaning protocol and keep sats around 90%.   - Out of bed to chair orders are in to also help with lung volumes. Aggressive PT/OT to help keep the patient mobilized.     Acute pancreatitis- (present on admission)  Assessment & Plan  This is likely alcohol related pancreatitis (the patient was drinking 7 beers per day prior to admission) that is very severe by [place] criteria with worsening creatinine.  The patient also had other complications of pancreatitis including respiratory failure (not ARDS however, ?opiod related) and ileus. The patient currently has an NG tube in place.     Plan:  - Continue monitoring CMP and replacing electrolytes as needed.  - Continue IV Fluids (D5 1/2NS at 83 cc/hr)  - Gave a bolus of NS  - Carefully using Dilaudid for pain control given renal failure.   - Diet has been advanced.       Acute kidney injury (HCC)- (present on admission)  Assessment & Plan  The patient's Cr has been worsening and improving since admission (with baseline Cr <1) which initially may have been from hypotension, vasoconstiction, and direct nephrotoxic damage from his acute pancreatitis. However, on 1/30 he patient  was febrile, hypotensive, and septic which likely precipitated his current kidney failure. The patient had a HD catheter placed on 1/31 and initiated dialysis.     Plan:  - Continue HD per Nephrology  - Trial of Lasix 80 BID to improve UOP. Will continue.  - Ok to keep jorgensen out for now, but will need to continue bladder scanning and will re-insert jorgensen if retaining. Continue strict monitoring of I&O.  - Continue to maintain blood pressure/adequate perfusion  - Avoid any nephrotoxic medications and renally dose all medications  - Continue to monitor CMP to see if this improves    Delirium  Assessment & Plan  The patient had an episode of delirium on the night of 2/2 that is suspected to be from prolonged hospitalization.     Plan:  - Please keep window open during daytime hours  - Out of bed to chair with meals  - Melatonin at night time  - Will stop blood draws at 3 AM and will schedule them for 8 AM  - Stopped all pain medications except tylenol    Hyperglycemia- (present on admission)  Assessment & Plan  The patient is not a diabetic with last A1c 5.5. However, he has been having episodes of hyperglycemia likely from pancreatitis.     Plan:  - Continue to monitor with finger sticks  - Continue SSI for now since diet has been advanced    Essential hypertension- (present on admission)  Assessment & Plan  The patient is on Carvedilol 25mg bid, Lasix 40mg, Entresto (49-51), and Spironolactone at home. He was hypotensive and septic on 1/30. He has remained hypotensive and is now on midodrine. Therefore, we will hold these medications and re-evaluate at discharge.     Plan:  - Continue Midodrine and hold for SBP>110   - Continue holding home medications     Hypocalcemia- (present on admission)  Assessment & Plan  Likely from severe pancreatitis.     Plan:  - Continue to monitor with CMP.  - Replace carefully given renal dysfunction    Hypertriglyceridemia- (present on admission)  Assessment & Plan  RESOLVED.   The  patient was noted to have a triglyceride level of 571. While this is moderate, a triglyceride level >500 increases risk for pancreatitis. His most recent triglyceride level was in the 90s.       Chronic back pain- (present on admission)  Assessment & Plan  The patient has a history of chronic back pain. This is important to note since he has MSSA bacteremia and we should be careful to distinguish his pain.     Plan:  - Continue Lidocaine patches, Dilaudid PRN 4 hrs for pancreatitis.     Nonischemic cardiomyopathy (HCC)- (present on admission)  Assessment & Plan  The patient has a history of non-ischemic cardiomyopathy (worst EF was 30%). This may have been from alcohol use. However, the patient has since been on Entresto, Spironolactone, Lasix, and Coreg. Since thn, his EF has actually improved back to normal (last echo 2018 EF 65%).     Plan:  - Stopped home medications at this time due to hypotension.   - Will continue when clinically appropriate.   - Using gentle hydration  - Can consider another echo if blood cultures remain positive.     Restless leg syndrome- (present on admission)  Assessment & Plan  History of RLS.     Plan:  - Will stop ropinerole in case this is related to delirium.    Obesity (BMI 30-39.9)- (present on admission)  Assessment & Plan    Body mass index is 37.52 kg/m².   Plan:  - Counseling when clinically appropriate    Crohn disease (HCC)- (present on admission)  Assessment & Plan  The patient has a history of Crohn's disease and stated that he has been in remission for years. He follows with Dr. Colon as an outpatient.     Plan:  - May need outpatient follow-up      DVT ppx: Heparin Q8h  Diet: Minced diet  Tubes/Lines: HD catheter ([facility])  Code status: FULL    Luis Rodriugez M.D.

## 2021-02-03 NOTE — CARE PLAN
Problem: Safety  Goal: Will remain free from falls  Outcome: PROGRESSING AS EXPECTED     Problem: Venous Thromboembolism (VTW)/Deep Vein Thrombosis (DVT) Prevention:  Goal: Patient will participate in Venous Thrombosis (VTE)/Deep Vein Thrombosis (DVT)Prevention Measures  Outcome: PROGRESSING AS EXPECTED   Patient received HD today. CHG bath completed, Patient with some intermittent confusion. Wife at bedside.

## 2021-02-03 NOTE — PROGRESS NOTES
Bedside report received from BRIGID Bettencourt. Call light and belongings within reach. Bed locked and in lowest position. Alarm and fall precautions in place.

## 2021-02-03 NOTE — THERAPY
Speech Language Pathology  Daily Treatment     Patient Name: Shaka Riley  Age:  55 y.o., Sex:  male  Medical Record #: [id number]  Today's Date: 2/3/2021     Precautions: Fall Risk  Comments: supplemental O2 and jorgensen only, all other tubes DC'd    Assessment    Patient seen for swallow reassessment now that he is cleared for diet advancement.  Patient awake, alert and oriented in all spheres, but needed increased time for processing information and responding.  Presentation of PO consisted of ice, mildly thick liquids, thin liquids, liquidised, purees, minced and moist and soft and bite sized consistencies.  Patient was noted to have increased WOB with mastication of soft and bite sized consistencies and had delayed throat clearing X2.  He reported that as PO trials progressed, he was more tired and it was an effort to chew.  On thin liquids, he had throat clearing or delayed coughing following all trials, and RR increased from the 20s to the 40s.  This is concerning for some level of penetration/aspiration.  At this time, would recommend Minced and moist diet with mildly thick liquids (renal)--MM5/MT2.  Must be upright at 90* for all PO intake--up in chair as able to tolerate.  NO STRAWS.  SLP will continue to follow.  IF patient continues to exhibit S/Sx of aspiration in the days to come, may consider diagnostic swallow study to r/o aspiration.     Plan    Minced and moist diet with mildly thick liquids (renal)--MM5/MT2    Medications whole with mildly thick liquid wash    Upright at 90* for all PO intake--up in chair as able to tolerate      NO STRAWS    Continue current treatment plan.    Discharge Recommendations: Recommend post-acute placement for additional speech therapy services prior to discharge home    Objective     02/03/21 0829   Pain 0 - 10 Group   Therapist Pain Assessment Nurse Notified;Post Activity Pain Same as Prior to Activity;0  (denies pain)   Cognitive-Linguistic   Level of Consciousness  Alert   Orientation Level Not Oriented to Reason  (min assistance for year)   Voice   Comments clear   Dysphagia    Positioning / Behavior Modification Multiple Swallows;Self Monitoring   Other Treatments PO trials of liquidised/puree, MM5, SB6, MT2 and thins   Diet / Liquid Recommendation Mildly Thick (2) - (Nectar Thick);Minced & Moist (5) - (Dysphagia II)   Nutritional Liquid Intake Rating Scale Thickened beverages (mildly thick unless otherwise specified)   Nutritional Food Intake Rating Scale Total oral diet with multiple consistencies but requiring special preparation or compensations   Nursing Communication Swallow Precaution Sign Posted at Head of Bed   Skilled Intervention Compensatory Strategies;Verbal Cueing   Comments fatigue is a concern   Recommended Route of Medication Administration   Medication Administration  Whole with Liquid Wash  (mildly thick liquids)   Short Term Goals   Short Term Goal # 1 Pt will be able to consume PO trials of PU4/MM5/SB6/TN0 once medically cleared with no overt S/Sx of aspiration noted   Goal Outcome # 1 Progressing as expected   Short Term Goal # 1 B  Pt will be able to consume MM5/MT2 diet with no overt S/Sx of aspiration   Goal Outcome  # 1 B Progressing as expected   Anticipated Discharge Needs   Discharge Recommendations Recommend post-acute placement for additional speech therapy services prior to discharge home   Therapy Recommendations Upon DC Dysphagia Training;Community Re-Integration;Patient / Family / Caregiver Education

## 2021-02-03 NOTE — PROGRESS NOTES
Nephrology Daily Progress Note    Date of Service  2/3/2021    Chief Complaint  55 y.o. male who presented 1/14/2021 with abdominal pain and emesis, found to have acute pancreatitis with ONOFRE prompting renal consult.     Upon admission, CT abdomen showing inflammatory stranding involving pancreas with fluid within the anterior pararenal space consistent with pancreatitis.  Cr 1.1 on admission, worsened to 3.5 this AM. Oliguric overnight and worsening hyperkalemia despite NS at 75cc/hr. ~200cc UOP after lasix 20mg. Ongoing severe abd pain and worsening abd distention. Worsening shortness or breath and subjective fevers. Being transferred to ICU.     DAILY NEPHROLOGY SUMMARY:  1/16: consult done  1/17: 390cc UOP documented, found to have SBO-NG tube placed, cr stable, abd pain improving, no fluids running   1/18: Hypertensive this AM, improving UOP, cr downtrending   1/19: Transferred out of the ICU, Cr downtrending, main complaint is back pain, NG tube  1/20: off oxygen, NG tube remains, has not passed gas yet, cr down trending   1/21: worsening abd distention and resp status. NG tube repositioned with 6L output, given dose of lasix, cxr with BL infiltrates, slight bump in cr  1/22: started TPN, had an episode of flatus, feel stronger, tachycardic, 6l NG output   1/23: Tolerated liquids this morning.  Complaining of gas pain.  1/24: Weaning off TPN. No longer w/ NGT. C/O diarrhea.   1/25: ongoing diarhea and abdominal swelling, CT scan today  1/26: intubated 1/25 trasnferred to unit  1/27: improved mentation, NG output decreased, BP soft  1/28: extubated 1/27, abdoman still distended, increased U/O  1/29:  Transferred out of ICU, more confused today  1/30: trasnferred up to T8, worsening renal function  1/31:  Decreased U/O, creat worse  2/1: Tolerated HD yesterday.  Denies SOB.  + back pain  2/2: Denies pain or SOB, + 4.5L yesterday.  Tolerated HD  2/3: Denies pain or SOB but fatigued.  Minimal UOP.  Pulled out jorgensen  catheter.    Review of Systems  Review of Systems   All other systems reviewed and are negative.       Physical Exam  Temp:  [36.3 °C (97.4 °F)-37.1 °C (98.8 °F)] 37 °C (98.6 °F)  Pulse:  [83-94] 94  Resp:  [16-20] 18  BP: (103-125)/(52-77) 117/63  SpO2:  [90 %-97 %] 95 %    Physical Exam  Vitals signs and nursing note reviewed.   Constitutional:       Comments: Obese male   HENT:      Head: Normocephalic.   Neck:      Musculoskeletal: Normal range of motion and neck supple.   Cardiovascular:      Rate and Rhythm: Normal rate.   Abdominal:      General: There is distension.   Musculoskeletal: Normal range of motion.         General: No swelling.   Skin:     Coloration: Skin is not jaundiced.         Fluids    Intake/Output Summary (Last 24 hours) at 2/3/2021 0855  Last data filed at 2/2/2021 1800  Gross per 24 hour   Intake 1070 ml   Output 1575 ml   Net -505 ml       Laboratory  Recent Labs     02/02/21  0249   WBC 3.2*   RBC 2.86*   HEMOGLOBIN 8.5*   HEMATOCRIT 26.9*   MCV 94.1   MCH 29.7   MCHC 31.6*   RDW 49.1   PLATELETCT 71*   MPV 14.3*     Recent Labs     02/02/21  0249   SODIUM 137   POTASSIUM 3.8   CHLORIDE 100   CO2 29   GLUCOSE 140*   BUN 65*   CREATININE 4.94*   CALCIUM 8.2*     Recent Labs     02/01/21  1405   APTT 29.4   INR 1.11     No results for input(s): NTPROBNP in the last 72 hours.        Imaging      Assessment/Plan     # ONOFRE: multifactorial, it had been improved, but 2 days ago event changed trajectory (hypotensive episode sustolic less than 100) and now with signficant worsening.  Possibly ATN from hemodynamic changes.   - HD started 1/31   - Minimal UOP  # Sepsis   - MSSA bacteremia   - ABx per primary team   - Followed by ID  # Pancreatitis  # Encephalpathy intermittant  # Ileus: NG tube, no role for surgical intervention per surgery  # HX  Non-ischemic cardiomyopathy  # Thrombocytopenia  # Hypocalcemia    - Corrected calcium WNL  # Crohns   # Anemia  # hyperantremia second to GI losses/  not able to tolerate PO     PLAN:  -Hold HD today  -Continue lasix 80mg IV BID for UOP  -No heparin with HD  -Accurate I/Os  -Daily labs  -Dose all medications for intermittent HD    Thank you

## 2021-02-03 NOTE — PROGRESS NOTES
Infectious Disease Progress Note    Author: Marianne Hills M.D. Date & Time of service: 2/3/2021  11:14 AM    Chief Complaint:  Follow-up for MSSA bacteremia    Interval History:  2/3 afebrile WBC 4.2.  Patient feeling better today.  He states his back pain is also better.  Family at bedside with questions.  Repeat blood cultures from  remain negative to date.  Denies any new joint pain.  TTE-negative    Labs Reviewed and Medications Reviewed.    Review of Systems:  Review of Systems   Constitutional: Negative for chills and fever.   Gastrointestinal: Negative for abdominal pain, nausea and vomiting.   Musculoskeletal: Positive for back pain. Negative for joint pain.        Chronic but improved       Hemodynamics:  Temp (24hrs), Av.9 °C (98.4 °F), Min:36.3 °C (97.4 °F), Max:37.1 °C (98.8 °F)  Temperature: 37 °C (98.6 °F)  Pulse  Av.3  Min: 60  Max: 133   Blood Pressure: 117/63       Physical Exam:  Physical Exam  Vitals signs and nursing note reviewed.   Constitutional:       Appearance: He is obese.   Eyes:      Pupils: Pupils are equal, round, and reactive to light.   Neck:      Comments: Right HD catheter  Cardiovascular:      Rate and Rhythm: Normal rate and regular rhythm.   Pulmonary:      Breath sounds: Normal breath sounds.   Abdominal:      Palpations: Abdomen is soft.      Tenderness: There is no abdominal tenderness.   Genitourinary:     Comments: Hudson catheter  Musculoskeletal: Normal range of motion.      Comments: Prior PICC line site in right upper extremity without any edema, induration or erythema   Skin:     General: Skin is warm.      Coloration: Skin is pale.   Neurological:      Mental Status: He is alert.      Comments: Much more alert today and answering questions appropriately         Meds:    Current Facility-Administered Medications:   •  DAPTOmycin  •  heparin  •  furosemide  •  heparin  •  insulin lispro **AND** POC Blood Glucose **AND** NOTIFY MD and PharmD **AND**  glucose **AND** dextrose 50%  •  midodrine  •  Pharmacy  •  ROPINIRole  •  simethicone  •  acetaminophen  •  Respiratory Therapy Consult  •  ipratropium-albuterol  •  senna-docusate **AND** polyethylene glycol/lytes **AND** magnesium hydroxide **AND** bisacodyl  •  lidocaine    Labs:  Recent Labs     02/02/21  0249 02/03/21  0808   WBC 3.2* 4.2*   RBC 2.86* 2.99*   HEMOGLOBIN 8.5* 8.8*   HEMATOCRIT 26.9* 27.4*   MCV 94.1 91.6   MCH 29.7 29.4   RDW 49.1 47.2   PLATELETCT 71* 74*   MPV 14.3* 13.7*   NEUTSPOLYS 65.20 70.80   LYMPHOCYTES 4.30* 2.70*   MONOCYTES 0.90 2.70   EOSINOPHILS 0.90 0.00   BASOPHILS 0.00 0.00   RBCMORPHOLO Present Present     Recent Labs     02/02/21  0249   SODIUM 137   POTASSIUM 3.8   CHLORIDE 100   CO2 29   GLUCOSE 140*   BUN 65*     Recent Labs     02/02/21  0249   ALBUMIN 2.1*   TBILIRUBIN 0.4   ALKPHOSPHAT 59   TOTPROTEIN 5.6*   ALTSGPT 13   ASTSGOT 27   CREATININE 4.94*       Imaging:  Ct-abdomen-pelvis W/o    Result Date: 1/25/2021 1/25/2021 11:41 AM HISTORY/REASON FOR EXAM:  Abdominal distension. TECHNIQUE/EXAM DESCRIPTION: CT scan of the abdomen and pelvis without contrast. Noncontrast helical scanning was obtained from the diaphragmatic domes through the pubic symphysis. Low dose optimization technique was utilized for this CT exam including automated exposure control and adjustment of the mA and/or kV according to patient size. COMPARISON: 1/20/2021 FINDINGS: CT Abdomen: Visualized lung bases show mild dependent atelectasis. The liver is unremarkable. The spleen is unremarkable. Adrenal glands are unremarkable. The kidneys are unremarkable. Extensive edema and fluid about the pancreas extending into the pararenal spaces bilaterally and tracking inferiorly within the retroperitoneum, more extensive than on prior exam. Gallbladder is absent. CT Pelvis: Bladder is unremarkable. Trace pelvic fluid. Appendix has a normal appearance. Multiple dilated small bowel loops throughout the  abdomen.  Distal small bowel is decompressed. Mildly increased colonic fluid. No pneumoperitoneum. Abdominal aorta is unremarkable. Lumbar spine degenerative changes with mild compression of T11 and L1, likely chronic, with multilevel Schmorl's nodes. Small fat-containing LEFT inguinal hernia. Body wall edema, similar to prior.     1.  Severe acute pancreatitis, worse than on prior exam. 2.  Multiple dilated small bowel loops suggesting distal small bowel obstruction, similar to prior exam. 3.  No evidence of bowel perforation.    Ct-abdomen-pelvis W/o    Result Date: 1/20/2021 1/20/2021 12:18 PM HISTORY/REASON FOR EXAM: Pancreatitis follow-up. Abdominal pain. TECHNIQUE/EXAM DESCRIPTION: CT scan of the abdomen and pelvis without contrast. Contrast-enhanced helical scanning was obtained from the diaphragmatic domes through the pubic symphysis without intravenous contrast. Low dose optimization technique was utilized for this CT exam including automated exposure control and adjustment of the mA and/or kV according to patient size. COMPARISON: 1/16/2021 FINDINGS: The study is limited due to nonuse of intravenous contrast. CT Abdomen: There is bibasilar atelectasis. There is a small left pleural effusion. NG tube extends into the stomach. There has been prior cholecystectomy. The spleen is normal. The kidneys are normal. The adrenal glands are normal. There is again seen inflammatory change involving the pancreas and surrounding peripancreatic tissues. This is worsened from comparison. There is also fluid within the anterior pararenal space consistent with peripancreatic effusions. This extends inferiorly into the pelvis bilaterally along the lateral conal fascia. The aorta is normal in caliber. No evidence of retroperitoneal adenopathy. CT Pelvis: There again seen mildly dilated loops of small intestine with decompression distally. The appendix is visualized and appears normal. There is no evidence of free fluid.  There is a left inguinal hernia which contains fat.     1.  Again seen pancreatitis with peripancreatic effusions. This is mildly worsened from comparison. 2.  Again seen dilated loops of small intestine with decompression of the colon and distal small bowel consistent with ileus versus partial small bowel obstruction. 3.  Bibasilar atelectasis and small bilateral pleural effusions.    Ct-abdomen-pelvis W/o    Result Date: 1/16/2021 1/16/2021 8:57 PM HISTORY/REASON FOR EXAM:  Bowel obstruction high-grade suspected diffuse abdomina pain, chills and feels feverish, mild labored breathing TECHNIQUE/EXAM DESCRIPTION: CT scan of the abdomen and pelvis without contrast. Noncontrast helical scanning was obtained from the diaphragmatic domes through the pubic symphysis. Low dose optimization technique was utilized for this CT exam including automated exposure control and adjustment of the mA and/or kV according to patient size. COMPARISON: 1/14/2021. FINDINGS: Lung Bases: Airspace opacities in the bilateral lung bases. Small bilateral pleural effusions Abdomen: Within the limits of noncontrast technique, the liver, spleen, kidneys and adrenal glands are unremarkable in appearance. Diffuse stranding surrounding the pancreas with fluid, slightly worse than prior. Worsening wall thickening of the stomach fundus adjacent to the pancreatic tail. The gallbladder is surgically absent. Diffuse dilatation of the small bowel and proximal colon. Decompressed descending colon. The abdominal aorta is normal in caliber. Pelvis: Small amount of fluid in the pelvis. Decompressed urinary bladder with Hudson catheter. No lymph node enlargement. Small fat-containing bilateral inguinal hernias No aggressive bone lesions are seen. ________________________________    1. Worsening inflammatory change and fluid surrounding the pancreas, in keeping with acute pancreatitis. 2. Diffuse dilatation of the small bowel and proximal colon with  decompressed distal colon adjacent to the inflammation in the tail the pancreas. This could relate to colonic obstruction due to adjacent inflammatory change or ileus. 3. Mild wall thickening of the stomach fundus adjacent to the pancreatic tail, likely reactive.    Ct-abdomen-pelvis With    Result Date: 1/14/2021 1/14/2021 7:28 PM HISTORY/REASON FOR EXAM: Diffuse abdominal pain. TECHNIQUE/EXAM DESCRIPTION: CT scan of the abdomen and pelvis with contrast. Contrast-enhanced helical scanning was obtained from the diaphragmatic domes through the pubic symphysis following the bolus administration of 100 mL of Omnipaque 350 nonionic contrast without complication. Low dose optimization technique was utilized for this CT exam including automated exposure control and adjustment of the mA and/or kV according to patient size. COMPARISON: No prior studies available. FINDINGS: CT Abdomen: There is bibasilar atelectasis/consolidation. There is fatty change of the liver. The spleen is normal. There is cortical scarring involving the upper pole of the left kidney. The adrenal glands are normal. There is inflammatory stranding seen surrounding the pancreas. There is peripancreatic fluid. Fluid extends along the lateral conal fascia. There has been prior cholecystectomy. The aorta is normal in caliber. No evidence of retroperitoneal adenopathy. CT Pelvis: There is no evidence of bowel obstruction or inflammatory change. The appendix is not identified. There is no evidence of free fluid.     1.  Inflammatory stranding involving the pancreas with fluid within the anterior pararenal space consistent with pancreatitis. 2.  Fatty liver. 3.  Prior cholecystectomy. 4.  Bibasilar atelectasis/consolidation.    Jz-eqxkrsl-2 View    Result Date: 2/1/2021 2/1/2021 6:20 PM HISTORY/REASON FOR EXAM:  Abdominal Pain. TECHNIQUE/EXAM DESCRIPTION AND NUMBER OF VIEWS:  1 view(s) of the abdomen. COMPARISON: 1/25/2021 FINDINGS: AP view the abdomen  demonstrates persistent distention of small bowel loops in the mid abdomen. Small bowel loops measure approximately 5 cm in diameter, similar to the prior exam. There is air within the colon and rectum. No pneumatosis or portal venous  air is appreciated. Surgical clips project over the right upper quadrant.     [Ovary persistent distention of small bowel loops in the mid abdomen.    El-[id number]-2 View    Result Date: 1/22/2021 1/22/2021 2:40 PM HISTORY/REASON FOR EXAM:  Abdominal Pain Abdominal Pain TECHNIQUE/EXAM DESCRIPTION AND NUMBER OF VIEWS:  1 view(s) of the abdomen. COMPARISON: 1/19/21 FINDINGS: Interval removal of gastric drainage tube. Diffuse gaseous distention of the small bowel. There is gas and stool in the colon. No portal venous gas or pneumatosis. No definite free intraperitoneal air but evaluation is limited on supine radiograph.     Diffuse gaseous distention of the small bowel, worse than prior, concerning for small bowel obstruction.    Dz-lmapimq-4 View    Result Date: 1/19/2021 1/19/2021 4:34 PM HISTORY/REASON FOR EXAM:  Abdominal Pain. TECHNIQUE/EXAM DESCRIPTION AND NUMBER OF VIEWS:  1 view(s) of the abdomen. COMPARISON: 1 view abdomen 1/16/2021 FINDINGS: Enteric catheter tip projects over the stomach. There is atelectasis at both lung bases. There is moderate small bowel dilation with multiple air-fluid levels.     1.  Moderate small bowel dilation with multiple air-fluid levels consistent with small bowel obstruction 2.  Enteric catheter appears appropriately located    Kc-tothbee-5 View    Result Date: 1/16/2021 1/16/2021 4:47 PM HISTORY/REASON FOR EXAM:  Abdominal Pain. TECHNIQUE/EXAM DESCRIPTION AND NUMBER OF VIEWS:  1 view(s) of the abdomen. COMPARISON: None FINDINGS: Increased colonic and small bowel gas with dilated mid abdominal small bowel loops present. Surgical clips are seen in the RIGHT midabdomen. No major bony abnormality is seen.     Increased bowel gas concerning for early  or partial small bowel obstruction.    Dx-chest-for Line Placement Perform Procedure In: Patient's Room    Result Date: 1/16/2021 1/16/2021 4:48 PM HISTORY/REASON FOR EXAM:  Right HD cath TECHNIQUE/EXAM DESCRIPTION AND NUMBER OF VIEWS: Single AP view of the chest. COMPARISON: None FINDINGS: Dialysis catheter projects over the SVC. The cardiomediastinal silhouette is enlarged.. There is a left midlung and bibasilar atelectasis. No pleural effusion or pneumothorax is identified.     Left midlung and bibasilar linear atelectasis. Dialysis catheter projects over the SVC. No pneumothorax.     Dx-chest-limited (1 View)    Result Date: 1/30/2021 1/30/2021 9:42 AM HISTORY/REASON FOR EXAM:  Shortness of Breath TECHNIQUE/EXAM DESCRIPTION AND NUMBER OF VIEWS: Single portable view of the chest. COMPARISON: 1/27/2021 FINDINGS: Enteric tube courses to the abdomen with the distal tip not seen. Endotracheal tube has been removed. Right PICC line has been removed. Cardiomediastinal silhouette is stable. Low lung volumes with bibasilar atelectasis. Some increased right perihilar opacity likely represents atelectasis. Correlate clinically for infection. No significant pleural effusion or pneumothorax. No acute osseous abnormality.     1.  Removal of endotracheal tube and right PICC line. 2.  Hypoinflation with mild patchy opacities probably related to atelectasis. Correlate clinically for infection.    Dx-chest-limited (1 View)    Result Date: 1/24/2021  HISTORY/REASON FOR EXAM:  Shortness of Breath. TECHNIQUE/EXAM DESCRIPTION:  Single view of the chest,  1/24/2021 6:17 PM COMPARISON:  1/21/2021 FINDINGS:   The cardiomediastinal silhouettes, jamal, and pulmonary vessels are normal. There are minimal coarse curvilinear opacities again seen in both lower lung fields, consistent with discoid atelectasis. The mid and upper lung fields are clear. A right-sided PICC is present, the tip of which projects in the area of the proximal SVC.          Bibasilar atelectasis, with no significant interval change. INTERPRETING LOCATION: [address] [facility], [place], [address]    Dx-chest-portable (1 View)    Result Date: 1/27/2021 1/27/2021 12:54 AM HISTORY/REASON FOR EXAM: For indication of respiratory failure; For indication of respiratory failure TECHNIQUE/EXAM DESCRIPTION:  Single AP view of the chest. COMPARISON: January 25, 2021 FINDINGS: Nasogastric tube terminates below the lower margin of the film within the abdomen.  Otherwise medical device position is stable. Cardiomegaly is observed. The mediastinal contour appears within normal limits.  The central  pulmonary vasculature appears prominent and indistinct. Bilateral lung volumes are diminished.  Diffuse scattered hazy pulmonary parenchymal opacities are seen. No significant pleural effusions are identified. The bony structures appear age-appropriate.     1.  Pulmonary edema and/or infiltrates are identified, which appear somewhat increased since the prior exam. 2.  Cardiomegaly    Dx-chest-portable (1 View)    Result Date: 1/25/2021 1/25/2021 10:05 PM HISTORY/REASON FOR EXAM: POST INTUBATION TECHNIQUE/EXAM DESCRIPTION:  Single AP view of the chest. COMPARISON: January 25, 2021 FINDINGS: Endotracheal tube terminates between the clavicles and patricia.     Otherwise medical device position is stable. Cardiomegaly is observed. The mediastinal contour appears within normal limits.  The central pulmonary vasculature appears normal. The lungs appear well expanded bilaterally.  Hazy linear density in the bilateral lung bases is observed. No significant pleural effusions are identified. The bony structures appear age-appropriate.     1.  Bilateral basilar atelectasis, no focal infiltrate 2.  Cardiomegaly    Dx-chest-portable (1 View)    Result Date: 1/25/2021 1/25/2021 9:19 PM HISTORY/REASON FOR EXAM:  Shortness of Breath. TECHNIQUE/EXAM DESCRIPTION AND NUMBER OF VIEWS: Single portable view of the chest.  COMPARISON: January 24, 2021 FINDINGS: Right-sided PICC line remains in place. There is perihilar opacification. No pneumothorax. Possible trace effusions. External pacer pads are present.     Perihilar opacification could be due to atelectasis or infiltrate versus mild edema. Possible trace effusions. Hypoventilatory change.    Dx-chest-portable (1 View)    Result Date: 1/21/2021 1/21/2021 2:09 AM HISTORY/REASON FOR EXAM: Shortness of Breath TECHNIQUE/EXAM DESCRIPTION:  Single AP view of the chest. COMPARISON: January 16, 2021 FINDINGS: Nasogastric tube terminates below the lower margin of the film within the abdomen. The cardiac silhouette appears within normal limits. The mediastinal contour appears within normal limits.  The central pulmonary vasculature appears normal. The lungs appear well expanded bilaterally.  Hazy interstitial bilateral pulmonary opacities are seen. No significant pleural effusions are identified. The bony structures appear age-appropriate.     1.  Interstitial pulmonary opacities suggests atelectasis or possible subtle infiltrates.    Dx-chest-portable (1 View)    Result Date: 1/16/2021 1/16/2021 1:44 PM HISTORY/REASON FOR EXAM:  Shortness of Breath. TECHNIQUE/EXAM DESCRIPTION AND NUMBER OF VIEWS: Single portable view of the chest. COMPARISON: 1/14/2021 FINDINGS: Cardiac mediastinal contour is unchanged. Lungs show hypoinflation. Linear opacities in the LEFT mid and lower lung region. Ill-defined opacity LEFT lung base. No pleural fluid collection or pneumothorax. No major bony abnormality is seen.     Hypoinflation with LEFT mid and lower lung atelectasis, with possible superimposed LEFT basilar pneumonia.    Dx-chest-portable (1 View)    Result Date: 1/14/2021 1/14/2021 6:53 PM HISTORY/REASON FOR EXAM: Abdominal pain. TECHNIQUE/EXAM DESCRIPTION AND NUMBER OF VIEWS: Single portable view of the chest. COMPARISON: 7/29/2020 FINDINGS: There is no evidence of focal consolidation or  evidence of pulmonary edema. There is no pleural effusion. The heart is normal in size.     No evidence of acute cardiopulmonary process.    Ir-cvc Non Tunneled > Age 5    Result Date: 2/1/2021  HISTORY/REASON FOR EXAM: Renal insufficiency. TECHNIQUE/EXAM DESCRIPTION AND NUMBER OF VIEWS: Ultrasound and fluoroscopic guided non-tunneled central venous catheter placement COMPARISON:  None. Contrast: None FLUOROSCOPIC IMAGES: 1 fluoroscopic image(s) obtained. Fluoroscopic guidance was used during the procedure. FLUOROSCOPY TIME: 0.4 minutes MEDICATIONS: Moderate sedation was provided. Pulse oximetry and continuous cardiac monitoring by the nurse was performed throughout the exam. Intraservice time was 15 minutes. PROCEDURE:     The risks, benefits, goals and objectives and alternatives were discussed. Risks were specified as including but not limited to bleeding, infection, damage to vessels or nerves, pain and discomfort. Issues related to catheter care were discussed. The patient's questions were answered. Informed oral and written consent were obtained. The patient was placed supine on the angiography table. The skin of the  RIGHT neck was prepped and draped in the usual sterile manner. Maximum sterile barrier technique was used. A timeout was performed. Patency of the vein was documented with real-time  ultrasound and the recorded image was entered into the patient?s medical record. Local anesthetic result was achieved with administration of 1% lidocaine. A small skin nick was made with an 11 blade scalpel. Using real-time sonographic guidance and sterile technique a micro-access needle was advanced into the  RIGHT internal jugular vein. Fluoroscopic guidance was then utilized. Access was secured with a wire under real-time fluoroscopic visualization. The tract was dilated under real-time fluoroscopic visualization to accommodate a 12 [place] 16 cm non-tunneled hemodialysis catheter with third medication port was  placed with its tip at the cavoatrial junction as confirmed with fluoroscopy. All ports aspirated and flushed appropriately and were terminally flushed with heparinized saline. The catheter was secured to the skin in the usual manner and is available for immediate use. The skin was cleaned and a dressing was applied. The patient remained comfortable throughout the procedure and there were no complications. FINDINGS: Internal jugular vein patent by ultrasound.     Successful image guided non-tunneled dialysis catheter placement. Plan: The catheter is available for immediate use.     Ab-mwmktwy-i/o    Result Date: 1/15/2021  1/15/2021 10:21 AM HISTORY/REASON FOR EXAM:  Abdominal pain, acute, nonlocalized. Abdominal pain. Patient is status post cholecystectomy TECHNIQUE/EXAM DESCRIPTION: Magnetic resonance cholangiopancreatography. Magnetic resonance cholangiopancreatography was performed on with axial, coronal, and sagittal thin and thick section heavily T2-weighted sequences. 3-D cholangiographic multiplanar maximum intensity projection (MIP) images were created on a workstation.. The study was performed on a [employer] Signa 1.5 Chloe MRI scanner. COMPARISON: Ultrasound 7/29/2020 and CT 10/14/2021 FINDINGS: Biliary system: No intrahepatic ductal dilatation.. The common bile duct measures approximately 4 mm. The gallbladder has been removed. The cystic duct remnant is normal in diameter. No choledocholithiasis. Pancreas: Diffuse pancreatic enlargement with edema. There is ill-defined peripancreatic fluid and stranding, consistent with pancreatitis. Fluid demonstrates high T2 and high T1 signal, suggesting proteinaceous fluid or hemorrhage. No pancreatic ductal dilatation. Pancreatic duct is not well visualized in the head, but appears to normally extend to the ampulla. Liver: Unremarkable. Spleen: Unremarkable. Adrenal glands: Unremarkable. Kidneys: No hydronephrosis. Ascites: Small amount of ascites extends inferiorly  along the paracolic gutters. There are small ill-defined collections in the left upper quadrant adjacent to the spleen. Lymph nodes: No adenopathy is identified. Miscellaneous: Bilateral lower lobe opacities may represent atelectasis.     1.  Pancreatic edema with significant peripancreatic stranding and ill-defined fluid, consistent with acute interstitial edematous pancreatitis. 2.  Small amount of ascites. 3.  No choledocholithiasis is identified. 4.  Status post cholecystectomy.    Us-extremity Venous Lower Bilat    Result Date: 2021   Vascular Laboratory  CONCLUSIONS  No superficial or deep venous thrombosis.  SHEBA MERAZ  Exam Date:     2021 11:37  Room #:     Inpatient  Priority:     Routine  Ht (in):             Wt (lb):  Ordering Physician:        BEAN WEN  Referring Physician:       [id number] Claire  Sonographer:               Jamari Hodge RDCS,                             T  Study Type:                Complete Bilateral  Technical Quality:         Adequate  Age:    55    Gender:     M  MRN:    [id number]  :    [id number]      BSA:  Indications:     Swelling of Limb  CPT Codes:       39439  ICD Codes:       729.81  History:  Limitations:  PROCEDURES:  Bilateral lower extremity venous duplex imaging.  The following venous structures were evaluated: common femoral, profunda  femoral, greater saphenous, femoral, popliteal , peroneal and posterior  tibial veins.  Serial compression, augmentation maneuvers,  color and spectral Doppler  flow evaluations were performed.  FINDINGS:  Bilateral lower extremities -.  Complete color filling and compressibility with normal venous flow dynamics  including spontaneous flow, response to augmentation maneuvers, and  respiratory phasicity.  The peroneal and posterior tibial veins are difficult to assess for  compressibility, but flow response to augmentation is demonstrated.  No superficial or deep venous thrombosis.  Prior study 2015.   Francis Hernandez  (Electronically Signed)  Final Date:      2021                   13:50    Ec-echocardiogram Complete W/o Cont    Result Date: 2021  Transthoracic Echo Report Echocardiography Laboratory CONCLUSIONS Left ventricular ejection fraction is visually estimated to be 60%. Right ventricular systolic pressure is estimated to be 50 mmHg. Mildly dilated right ventricle with normal systolic function, Enlarged right atrium. Normal inferior vena cava size and inspiratory collapse. SHEBA MERAZ Exam Date:         2021                    15:43 Exam Location:     Inpatient Priority:          Routine Ordering Physician:        SANDRA LOPEZ Referring Physician:       JESSICA Leon Sonographer:               Sky Marie RDCS, RVT Age:    55     Gender:    M MRN:    [id number] :    1965 BSA:    2.23   Ht (in):    67     Wt (lb):    251 Exam Type:     Complete Indications:     Endocarditis ICD Codes:       421 CPT Codes:       06838 BP:   125    /   64     HR:   75 Technical Quality:       Technically difficult study -                          adequate information is obtained MEASUREMENTS  (Male / Female) Normal Values 2D ECHO LV Diastolic Diameter PLAX        5.3 cm                4.2 - 5.9 / 3.9 - 5.3 cm LV Systolic Diameter PLAX         3.8 cm                2.1 - 4.0 cm IVS Diastolic Thickness           1.2 cm                LVPW Diastolic Thickness          1.3 cm                LVOT Diameter                     2.1 cm                Estimated LV Ejection Fraction    60 %                  LV Ejection Fraction MOD BP       60.4 %                >= 55  % LV Ejection Fraction MOD 4C       67.3 %                LV Ejection Fraction MOD 2C       53.4 %                IVC Diameter                      1.2 cm                DOPPLER AV Peak Velocity                  1.8 m/s               AV Peak Gradient                  12.4 mmHg             AV Mean  Gradient                  6.5 mmHg              LVOT Peak Velocity                1.2 m/s               AV Area Cont Eq vti               2.8 cm2               MV Velocity Time Integral         26.6 cm               Mitral E Point Velocity           0.56 m/s              Mitral E to A Ratio               0.91                  MV Pressure Half Time             57.8 ms               MV Area PHT                       3.8 cm2               MV Deceleration Time              199 ms                TR Peak Velocity                  320 cm/s              PV Peak Velocity                  1.5 m/s               PV Peak Gradient                  9.5 mmHg              RVOT Peak Velocity                1.2 m/s               * Indicates values subject to auto-interpretation LV EF:  60    % FINDINGS Left Ventricle Normal left ventricular chamber size. Mild concentric left ventricular hypertrophy. Normal left ventricular systolic function. Left ventricular ejection fraction is visually estimated to be 60%. Normal regional wall motion. Indeterminate diastolic function. Right Ventricle Mildly dilated right ventricle. Normal right ventricular systolic function. Right Atrium Enlarged right atrium. Normal inferior vena cava size and inspiratory collapse. Left Atrium Normal left atrial size. Left atrial volume index is 18    mL/sq m. Mitral Valve Mitral annular calcification. No mitral stenosis. Trace mitral regurgitation. Aortic Valve Calcified aortic valve leaflets. No aortic stenosis. No aortic insufficiency. Tricuspid Valve The tricuspid valve is not well visualized. No tricuspid stenosis. Trace tricuspid regurgitation. Right ventricular systolic pressure is estimated to be 50 mmHg. Pulmonic Valve The pulmonic valve is not well visualized. No pulmonic stenosis. No pulmonic insufficiency. Pericardium Normal pericardium without effusion. Aorta Ascending aorta diameter is 2.9  cm. Brent Buckner MD (Electronically Signed) Final Date:      02 February 2021                 17:22    Dx-abdomen For Tube Placement    Result Date: 1/25/2021 1/25/2021 10:53 PM HISTORY/REASON FOR EXAM: Nasogastric tube placement TECHNIQUE/EXAM DESCRIPTION:  Single AP view the abdomen. COMPARISON: FINDINGS: Limited views of the lung bases are clear. Nasogastric tube is seen, the tip overlies the lumbar spine.  Scattered air-filled reactive loops of small bowel are seen within the abdomen. The bony structures appear age-appropriate.     1.  Air-filled distended loops of bowel are seen with reactive mucosal pattern, appearance suggests ileus or enteritis. Recommend radiographic followup to resolution to exclude progression to obstruction. 2.  Nasogastric tube tip terminates overlying the expected location of the gastric antrum, could be slightly advanced.    Dx-abdomen For Tube Placement    Result Date: 1/16/2021 1/16/2021 8:15 PM HISTORY/REASON FOR EXAM:  Tube evaluation. TECHNIQUE/EXAM DESCRIPTION AND NUMBER OF VIEWS:  1 view(s) of the abdomen. COMPARISON:  1/16/2021. FINDINGS: Limited single view of the abdomen performed primarily to evaluate enteric tube position. The tip projects over the expected area of the stomach. Gaseous distention of the bowel.     Gastric drainage tube with tip projecting over the expected area of the stomach fundus.    Ir-picc Line Placement W/ Guidance > Age 5    Result Date: 1/21/2021  HISTORY/REASON FOR EXAM:   PICC placement. TECHNIQUE/EXAM DESCRIPTION AND NUMBER OF VIEWS:   PICC line insertion with ultrasound guidance.  The procedure was performed using maximal sterile barrier technique including sterile gown, mask, cap, and donning of sterile gloves following appropriate hand hygiene and/or sterile scrub. Patient skin site was prepped with 2% Chlorhexidine solution. FINDINGS:  PICC line insertion with Ultrasound Guidance was performed by qualified nursing staff without the assistance of a Radiologist. PICC positioning appropriateness  "confirmed by 3CG technology; chest xray only needed in the instance 3CG unable to confirm placement.              Ultrasound-guided PICC placement performed by qualified nursing staff as above.       Micro:  Results     Procedure Component Value Units Date/Time    BLOOD CULTURE [[id number]] Collected: 02/01/21 1045    Order Status: Completed Specimen: Blood from Peripheral Updated: 02/02/21 0618     Significant Indicator NEG     Source BLD     Site PERIPHERAL     Culture Result No Growth  Note: Blood cultures are incubated for 5 days and  are monitored continuously.Positive blood cultures  are called to the RN and reported as soon as  they are identified.      Narrative:      Per Hospital Policy: Only change Specimen Src: to \"Line\" if  specified by physician order.  Left Hand    BLOOD CULTURE [[id number]] Collected: 02/01/21 1045    Order Status: Completed Specimen: Blood from Peripheral Updated: 02/02/21 0618     Significant Indicator NEG     Source BLD     Site PERIPHERAL     Culture Result No Growth  Note: Blood cultures are incubated for 5 days and  are monitored continuously.Positive blood cultures  are called to the RN and reported as soon as  they are identified.      Narrative:      Per Hospital Policy: Only change Specimen Src: to \"Line\" if  specified by physician order.  Right Hand    BLOOD CULTURE [[id number]]  (Abnormal) Collected: 01/30/21 1207    Order Status: Completed Specimen: Blood from Peripheral Updated: 02/01/21 1022     Significant Indicator POS     Source BLD     Site PERIPHERAL     Culture Result Growth detected by Bactec instrument. 01/31/2021  02:08      Staphylococcus aureus  See previous culture for sensitivity report.      Narrative:      CALL  [facility]  183 tel. [phone],  CALLED  183 tel. [phone] 01/31/2021, 02:10, RB PERF. RESULTS CALLED TO:  [id number], RN  Per Hospital Policy: Only change Specimen Src: to \"Line\" if  specified by physician order.  Right Hand    BLOOD CULTURE [[id number]]  " "(Abnormal)  (Susceptibility) Collected: 01/30/21 0954    Order Status: Completed Specimen: Blood from Peripheral Updated: 02/01/21 1021     Significant Indicator POS     Source BLD     Site PERIPHERAL     Culture Result Growth detected by Bactec instrument. 01/30/2021  23:04  Staphylococcus aureus (methicillin sensitive)  detected by PCR.        Staphylococcus aureus    Narrative:      CALL  [facility]  183 tel. [phone],  CALLED  183 tel. [phone] 01/30/2021, 23:07, RB PERF. RESULTS CALLED TO:  [id number], RN and pharmacy voicemail  Per Hospital Policy: Only change Specimen Src: to \"Line\" if  specified by physician order.  Right Hand    Susceptibility     Staphylococcus aureus (1)     Antibiotic Interpretation Microscan Method Status    Azithromycin Sensitive <=2 mcg/mL GILBERT Final    Clindamycin Sensitive <=0.25 mcg/mL GILBERT Final    Cefotaxime Sensitive <=8 mcg/mL GILBERT Final    Cefazolin Sensitive <=8 mcg/mL GILBERT Final    Cefepime Sensitive <=4 mcg/mL GILBERT Final    Ceftaroline Sensitive <=0.5 mcg/mL GILBERT Final    Daptomycin Sensitive <=0.5 mcg/mL GILBERT Final    Ampicillin/sulbactam Sensitive <=8/4 mcg/mL GILBERT Final    Erythromycin Sensitive <=0.25 mcg/mL GILBERT Final    Vancomycin Sensitive 1 mcg/mL GILBERT Final    Oxacillin Sensitive <=0.25 mcg/mL GILBERT Final    Penicillin Resistant >2 mcg/mL GILBERT Final    Pip/Tazobactam Sensitive <=8 mcg/mL GILBERT Final    Trimeth/Sulfa Sensitive <=0.5/9.5 mcg/mL GILBERT Final    Tetracycline Sensitive <=4 mcg/mL GILBERT Final                   FUNGAL BLOOD CULTURE [[id number]] Collected: 01/30/21 1459    Order Status: Completed Specimen: Blood Updated: 01/31/21 0649     Significant Indicator NEG     Source BLD     Site Peripheral     Culture Result Culture in progress.    Narrative:      Right Hand    FUNGAL BLOOD CULTURE [[id number]]     Order Status: Canceled Specimen: Blood     URINALYSIS [[id number]] Collected: 01/30/21 0800    Order Status: Canceled Specimen: Urine, Clean Catch           Assessment:  Active " Hospital Problems    Diagnosis   • *Sepsis due to methicillin susceptible Staphylococcus aureus (MSSA) with acute renal failure (HCC) [A41.01, R65.20, N17.9]   • Pancytopenia (HCC) [D61.818]   • Ileus (HCC) [K56.7]   • Acute respiratory failure with hypoxia and hypercapnia (HCC) [J96.01, J96.02]   • Acute pancreatitis [K85.90]   • Acute kidney injury (HCC) [N17.9]   • Hyperglycemia [R73.9]   • Essential hypertension [I10]   • Hypocalcemia [E83.51]   • Hypertriglyceridemia [E78.1]   • Chronic back pain [M54.9, G89.29]   • Nonischemic cardiomyopathy (HCC) [I42.8]   • Restless leg syndrome [G25.81]   • Obesity (BMI 30-39.9) [E66.9]   • Crohn disease (HCC) [K50.90]     55 y.o. man with a history of Crohn's disease in remission, congestive heart failure, chronic back pain, alcohol abuse and a history of cholecystectomy in July 2020 admitted for severe abdominal pain.  Patient was initially admitted for acute pancreatitis felt secondary to his alcohol use.  His hospital course has been complicated by acute kidney injury, ileus and acute hypoxemic respiratory failure.  Patient was extubated on 1/27/2021 and transferred out of the ICU on 1/28.  Patient was otherwise relatively stable on the medical floor until 1/30 when he spiked a fever.  Blood cultures obtained on 1/30 (2 out of 2 sets) are now positive for MSSA.  The source of his MSSA bacteremia is likely from his PICC line which appeared to have infiltrated.  Doppler ultrasound is however negative for any DVT in the extremity.  Repeat blood cultures from 2/1 are negative to date.  Interestingly, after cefazolin was started, he developed acute pancytopenia which is been felt to be secondary to the IV antibiotics.    Plan:  MSSA bacteremia, uncomplicated.  On treatment  Source secondary to PICC line  PICC line removed  Doppler ultrasound-negative for DVT  Fever resolved   Leukopenia-improved today  Blood culture on 1/30 (2 out of 2 sets) + MSSA  Repeat blood culture from  2/1-negative to date  TTE-technically difficult study  Will hold off on obtaining a AUGUST as patient quickly cleared his bacteremia and source likely secondary to PICC line  Continue IV daptomycin 8 mg/kg every 48 hours  Monitor CPK weekly  Midline ordered today  Anticipate a 14-day course of antibiotics from 2/1.  Stop date 2/15/2021    Pancytopenia, improving  Randolph to be secondary to cefazolin  Cefazolin changed to daptomycin on 2/2  Monitor    Acute renal failure  On hemodialysis  Avoid nephrotoxins    Alcoholic pancreatitis    Disposition: Recommend SNF placement for completion of his IV antibiotics    Discussed with internal medicine [facility] resident, Dr. Rodriguez.  ID signing off.  Please reconsult if needed

## 2021-02-04 PROBLEM — S31.809D: Status: ACTIVE | Noted: 2021-01-01

## 2021-02-04 PROBLEM — B95.61 BACTEREMIA DUE TO METHICILLIN SUSCEPTIBLE STAPHYLOCOCCUS AUREUS (MSSA): Status: ACTIVE | Noted: 2021-01-01

## 2021-02-04 PROBLEM — R78.81 BACTEREMIA DUE TO METHICILLIN SUSCEPTIBLE STAPHYLOCOCCUS AUREUS (MSSA): Status: ACTIVE | Noted: 2021-01-01

## 2021-02-04 NOTE — PROGRESS NOTES
Nephrology Daily Progress Note    Date of Service  2/4/2021    Chief Complaint  55 y.o. male who presented 1/14/2021 with abdominal pain and emesis, found to have acute pancreatitis with ONOFRE prompting renal consult.     Upon admission, CT abdomen showing inflammatory stranding involving pancreas with fluid within the anterior pararenal space consistent with pancreatitis.  Cr 1.1 on admission, worsened to 3.5 this AM. Oliguric overnight and worsening hyperkalemia despite NS at 75cc/hr. ~200cc UOP after lasix 20mg. Ongoing severe abd pain and worsening abd distention. Worsening shortness or breath and subjective fevers. Being transferred to ICU.     DAILY NEPHROLOGY SUMMARY:  1/16: consult done  1/17: 390cc UOP documented, found to have SBO-NG tube placed, cr stable, abd pain improving, no fluids running   1/18: Hypertensive this AM, improving UOP, cr downtrending   1/19: Transferred out of the ICU, Cr downtrending, main complaint is back pain, NG tube  1/20: off oxygen, NG tube remains, has not passed gas yet, cr down trending   1/21: worsening abd distention and resp status. NG tube repositioned with 6L output, given dose of lasix, cxr with BL infiltrates, slight bump in cr  1/22: started TPN, had an episode of flatus, feel stronger, tachycardic, 6l NG output   1/23: Tolerated liquids this morning.  Complaining of gas pain.  1/24: Weaning off TPN. No longer w/ NGT. C/O diarrhea.   1/25: ongoing diarhea and abdominal swelling, CT scan today  1/26: intubated 1/25 trasnferred to unit  1/27: improved mentation, NG output decreased, BP soft  1/28: extubated 1/27, abdoman still distended, increased U/O  1/29:  Transferred out of ICU, more confused today  1/30: trasnferred up to T8, worsening renal function  1/31:  Decreased U/O, creat worse  2/1: Tolerated HD yesterday.  Denies SOB.  + back pain  2/2: Denies pain or SOB, + 4.5L yesterday.  Tolerated HD  2/3: Denies pain or SOB but fatigued.  Minimal UOP.  Pulled out jorgensen  catheter.  2/4: Notes more UOP, I/Os not accurate.  Denies pain or SOB    Review of Systems  Review of Systems   All other systems reviewed and are negative.       Physical Exam  Temp:  [36.2 °C (97.2 °F)-37 °C (98.6 °F)] 36.6 °C (97.9 °F)  Pulse:  [83-98] 89  Resp:  [17-18] 18  BP: (115-138)/(63-79) 125/73  SpO2:  [90 %-98 %] 98 %    Physical Exam  Vitals signs and nursing note reviewed.   Constitutional:       Comments: Obese male   HENT:      Head: Normocephalic.   Neck:      Musculoskeletal: Normal range of motion and neck supple.   Cardiovascular:      Rate and Rhythm: Normal rate.   Abdominal:      General: There is distension.   Musculoskeletal: Normal range of motion.         General: No swelling.   Skin:     Coloration: Skin is not jaundiced.         Fluids    Intake/Output Summary (Last 24 hours) at 2/4/2021 0745  Last data filed at 2/3/2021 1900  Gross per 24 hour   Intake 740 ml   Output --   Net 740 ml       Laboratory  Recent Labs     02/02/21  0249 02/03/21  0808   WBC 3.2* 4.2*   RBC 2.86* 2.99*   HEMOGLOBIN 8.5* 8.8*   HEMATOCRIT 26.9* 27.4*   MCV 94.1 91.6   MCH 29.7 29.4   MCHC 31.6* 32.1*   RDW 49.1 47.2   PLATELETCT 71* 74*   MPV 14.3* 13.7*     Recent Labs     02/02/21  0249 02/03/21  0808   SODIUM 137 136   POTASSIUM 3.8 4.0   CHLORIDE 100 99   CO2 29 24   GLUCOSE 140* 138*   BUN 65* 64*   CREATININE 4.94* 4.95*   CALCIUM 8.2* 8.1*     Recent Labs     02/01/21  1405   APTT 29.4   INR 1.11     No results for input(s): NTPROBNP in the last 72 hours.        Imaging      Assessment/Plan     # ONOFRE: multifactorial, it had been improved, but 2 days ago event changed trajectory (hypotensive episode sustolic less than 100) and now with signficant worsening.  Possibly ATN from hemodynamic changes.   - HD started 1/31   - Minimal UOP  # Sepsis   - MSSA bacteremia   - ABx per primary team   - Followed by ID  # Pancreatitis  # Encephalpathy intermittant  # Ileus: NG tube, no role for surgical intervention  per surgery  # HX  Non-ischemic cardiomyopathy  # Thrombocytopenia  # Hypocalcemia    - Corrected calcium WNL  # Crohns   # Anemia  # hyperantremia second to GI losses/ not able to tolerate PO     PLAN:  -Hold HD today  -Continue lasix 80mg IV BID for UOP  -Follow up labs  -No heparin with HD  -Accurate I/Os  -Daily labs  -Dose all medications for intermittent HD    Thank you

## 2021-02-04 NOTE — PROGRESS NOTES
Assumed care at 1900, bedside report received from Rut RAINEY. Pt. Is medical. Initial assessment completed, orders reviewed, call light within reach, bed alarm is in use, and hourly rounding in place. POC addressed with patient, no additional questions at this time.

## 2021-02-04 NOTE — CONSULTS
"    Physical Medicine and Rehabilitation Consultation              Date of initial consultation: 2/4/2021  Consulting provider: Luis Rodriguez MD  Reason for consultation: assess for acute inpatient rehab appropriateness  LOS: 21 Day(s)    Chief complaint: Pancreatitis/debility    HPI: The patient is a 55 y.o. right hand dominant male with a past medical history of alcohol use disorder, obesity, nonischemic cardiomyopathy with an ejection fraction of 25 to 30%, Crohn's disease, hypertension, chronic back pain, cholecystectomy and obesity;  who presented on 1/14/2021  6:44 PM with pancreatitis.  Hospital course complicated by ileus, respiratory distress requiring intubation from 1/25-1/27, fever, septicemia, need for acute HD with catheter insertion on 1/31.     The patient currently reports excessive \"water on the legs\", some chest pain, shortness of breath on 3 L nasal cannula O2, and trouble sleeping.  Patient fatigues easily, and states exercise makes him have to have a bowel movement    Social Hx:  1 SH  [address] (second floor apartment)  With: Spouse who has RA and unable to lift him    THERAPY:  PT: Functional mobility   2/2: Min assist for gait with front wheel walker walking 5 feet    OT: ADLs  2/2: Max assist lower body dressing    SLP:   2/3: Modified renal diet MM5/MT2    IMAGING:  CT abdomen 1/25/2021  1.  Severe acute pancreatitis, worse than on prior exam.  2.  Multiple dilated small bowel loops suggesting distal small bowel obstruction, similar to prior exam.  3.  No evidence of bowel perforation.    PROCEDURES:  David Nath MD 1/31  Nontunneled dialysis catheter placement    PMH:  Past Medical History:   Diagnosis Date   • Clotting disorder (HCC)     PE   • Congestive heart failure (HCC) 7/2015    EF 30-35%; Dilated R atrium; LVH; Mild MR; Mild AI; Mild TR   • Crohn disease (HCC)    • Hypertension    • PAF (paroxysmal atrial fibrillation) ([facility])    • Sleep apnea     un-diagnosed and pending sleep study " "      PSH:  Past Surgical History:   Procedure Laterality Date   • CRYSTAL BY LAPAROSCOPY  7/29/2020    Procedure: CHOLECYSTECTOMY, LAPAROSCOPIC;  Surgeon: Caroline Skelton M.D.;  Location: SURGERY [facility];  Service: General       FHX:  Family History   Problem Relation Age of Onset   • Cancer Mother    • Heart Disease Paternal Grandmother    • Heart Disease Paternal Grandfather    • Cancer Father         paralysis from accident       Medications:  Current Facility-Administered Medications   Medication Dose   • dextrose 50% (D50W) injection 50 mL  50 mL    And   • glucose 4 g chewable tablet 16 g  16 g   • senna-docusate (PERICOLACE or SENOKOT S) 8.6-50 MG per tablet 2 Tab  2 Tab    And   • polyethylene glycol/lytes (MIRALAX) PACKET 1 Packet  1 Packet    And   • magnesium hydroxide (MILK OF MAGNESIA) suspension 30 mL  30 mL    And   • bisacodyl (DULCOLAX) suppository 10 mg  10 mg   • midodrine (PROAMATINE) tablet 10 mg  10 mg   • famotidine (PEPCID) tablet 10 mg  10 mg   • melatonin tablet 5 mg  5 mg   • DAPTOmycin 910 mg in NS 50 mL IVPB  8 mg/kg   • heparin injection 2,600 Units  2,600 Units   • furosemide (LASIX) injection 80 mg  80 mg   • heparin injection 5,000 Units  5,000 Units   • acetaminophen (Tylenol) tablet 650 mg  650 mg   • Respiratory Therapy Consult     • ipratropium-albuterol (DUONEB) nebulizer solution  3 mL   • lidocaine (LIDODERM) 5 % 1 Patch  1 Patch       Allergies:  No Known Allergies    Physical Exam:  Vitals: /73   Pulse 85   Temp 36.4 °C (97.6 °F) (Temporal)   Resp 18   Ht 1.727 m (5' 7.99\")   Wt 111 kg (245 lb 6 oz)   SpO2 94%   Gen: NAD  Head: NC/AT  Eyes/ Nose/ Mouth: PERRLA, moist mucous membranes  Cardio: RRR, good distal perfusion, warm extremities  Pulm: normal respiratory effort, no cyanosis   Abd: Soft NTND, negative borborygmi   Ext: 2-3+ pretibial edema. No calf tenderness. No clubbing.    Mental status: answers questions appropriately follows commands  Speech: " fluent, no aphasia or dysarthria    Motor:      Upper Extremity  Myotome R L   Shoulder flexion C5 5 5   Elbow flexion C5 5 5   Wrist extension C6 5 5   Elbow extension C7 5 5   Finger flexion C8 5 5   Finger abduction T1 5 5     Lower Extremity Myotome R L   Hip flexion L2 3/5 3/5   Knee extension L3 3/5 3/5   Ankle dorsiflexion L4 3/5 3/5   Toe extension L5 3/5 3/5   Ankle plantarflexion S1 3/5 3/5       DTRs:  Right  Left    Brachioradialis  2+  2+   Patella tendon  2+ 2+     Labs: Reviewed and significant for   Recent Labs     02/01/21  1405 02/02/21  0249 02/03/21  0808 02/04/21  1107   RBC  --  2.86* 2.99* 2.77*   HEMOGLOBIN  --  8.5* 8.8* 8.1*   HEMATOCRIT  --  26.9* 27.4* 26.0*   PLATELETCT  --  71* 74* 88*   PROTHROMBTM 14.7*  --   --   --    APTT 29.4  --   --   --    INR 1.11  --   --   --      Recent Labs     02/02/21  0249 02/03/21  0808 02/04/21  1107   SODIUM 137 136 133*   POTASSIUM 3.8 4.0 4.0   CHLORIDE 100 99 96   CO2 29 24 24   GLUCOSE 140* 138* 140*   BUN 65* 64*  --    CREATININE 4.94* 4.95* 6.09*   CALCIUM 8.2* 8.1* 8.3*     Recent Results (from the past 24 hour(s))   ACCU-CHEK GLUCOSE    Collection Time: 02/03/21  1:34 PM   Result Value Ref Range    Glucose - Accu-Ck 199 (H) 65 - 99 mg/dL   ACCU-CHEK GLUCOSE    Collection Time: 02/03/21  6:04 PM   Result Value Ref Range    Glucose - Accu-Ck 121 (H) 65 - 99 mg/dL   ACCU-CHEK GLUCOSE    Collection Time: 02/03/21 10:56 PM   Result Value Ref Range    Glucose - Accu-Ck 119 (H) 65 - 99 mg/dL   ACCU-CHEK GLUCOSE    Collection Time: 02/04/21  7:40 AM   Result Value Ref Range    Glucose - Accu-Ck 175 (H) 65 - 99 mg/dL   CBC WITH DIFFERENTIAL    Collection Time: 02/04/21 11:07 AM   Result Value Ref Range    WBC 4.3 (L) 4.8 - 10.8 K/uL    RBC 2.77 (L) 4.70 - 6.10 M/uL    Hemoglobin 8.1 (L) 14.0 - 18.0 g/dL    Hematocrit 26.0 (L) 42.0 - 52.0 %    MCV 93.9 81.4 - 97.8 fL    MCH 29.2 27.0 - 33.0 pg    MCHC 31.2 (L) 33.7 - 35.3 g/dL    RDW 48.3 35.9 - 50.0  fL    Platelet Count 88 (L) 164 - 446 K/uL    MPV 13.5 (H) 9.0 - 12.9 fL    Neutrophils-Polys 76.10 (H) 44.00 - 72.00 %    Lymphocytes 1.80 (L) 22.00 - 41.00 %    Monocytes 6.20 0.00 - 13.40 %    Eosinophils 1.80 0.00 - 6.90 %    Basophils 1.80 0.00 - 1.80 %    Nucleated RBC 0.00 /100 WBC    Neutrophils (Absolute) 3.73 1.82 - 7.42 K/uL    Lymphs (Absolute) 0.08 (L) 1.00 - 4.80 K/uL    Monos (Absolute) 0.27 0.00 - 0.85 K/uL    Eos (Absolute) 0.08 0.00 - 0.51 K/uL    Baso (Absolute) 0.08 0.00 - 0.12 K/uL    NRBC (Absolute) 0.00 K/uL   Comp Metabolic Panel    Collection Time: 02/04/21 11:07 AM   Result Value Ref Range    Sodium 133 (L) 135 - 145 mmol/L    Potassium 4.0 3.6 - 5.5 mmol/L    Chloride 96 96 - 112 mmol/L    Co2 24 20 - 33 mmol/L    Anion Gap 13.0 7.0 - 16.0    Glucose 140 (H) 65 - 99 mg/dL    Creatinine 6.09 (HH) 0.50 - 1.40 mg/dL    Calcium 8.3 (L) 8.5 - 10.5 mg/dL    AST(SGOT) 20 12 - 45 U/L    ALT(SGPT) 6 2 - 50 U/L    Alkaline Phosphatase 86 30 - 99 U/L    Total Bilirubin 0.4 0.1 - 1.5 mg/dL    Albumin 2.1 (L) 3.2 - 4.9 g/dL    Total Protein 6.2 6.0 - 8.2 g/dL    Globulin 4.1 (H) 1.9 - 3.5 g/dL    A-G Ratio 0.5 g/dL   DIFFERENTIAL MANUAL    Collection Time: 02/04/21 11:07 AM   Result Value Ref Range    Bands-Stabs 10.60 (H) 0.00 - 10.00 %    Metamyelocytes 0.90 %    Myelocytes 0.90 %    Manual Diff Status PERFORMED    PERIPHERAL SMEAR REVIEW    Collection Time: 02/04/21 11:07 AM   Result Value Ref Range    Peripheral Smear Review see below    PLATELET ESTIMATE    Collection Time: 02/04/21 11:07 AM   Result Value Ref Range    Plt Estimation Decreased    ESTIMATED GFR    Collection Time: 02/04/21 11:07 AM   Result Value Ref Range    GFR If  12 (A) >60 mL/min/1.73 m 2    GFR If Non African American 10 (A) >60 mL/min/1.73 m 2         ASSESSMENT:  Patient is a 55 y.o. male admitted with pancreatitis now s/p bacteremia treated with antibiotics, acute renal failure secondary to ONOFRE due to  hypotension    [facility] Code / Diagnosis to Support: 0016 - Debility (Non-Cardiac, Non-Pulmonary)    Rehabilitation: Impaired ADLs and mobility  Patient unable to tolerate 3 hours of therapy 5 days a week    Additional Recommendations:  -Recommend SNF placement  - notes reflect [facility] tomorrow  -Reviewed SNF versus IPR with patient and wife at bedside, all are in agreement that a slower prolonged course of therapy will be more beneficial for Joshua then acute inpatient IPR.  -Continue PT OT while in-house  -Starting trazodone for sleep, okay to continue melatonin      Medical Complexity:  Septicemia  Pancreatitis    DVT PPX: Heparin 5K 3 times daily      Thank you for allowing us to participate in the care of this patient.     Patient was seen for 82 minutes on unit/floor of which > 50% of time was spent on counseling and coordination of care regarding the above, including prognosis, risk reduction, benefits of treatment, and options for next stage of care.    James Lagos, DO   Physical Medicine and Rehabilitation

## 2021-02-04 NOTE — DISCHARGE PLANNING
Anticipated Discharge Disposition: [facility]    Action: Spoke to [facility] rep [place] who states that auth could be completed today and if so they can take the pt today, also if they get auth tomorrow pt could transfer tomorrow as they will have availability.    Barriers to Discharge: insurance auth    Plan: Transfer pt to [facility] when auth complete.

## 2021-02-04 NOTE — NON-PROVIDER
ID: Mr. Shaka Riley is a 54 y/o male with a past medical history of Crohn's, non-ischemic cardiomyopathy with EF of 25-30% that resolved, atrial fibrillation due to pulmonary embolism, NSTEMI, HTN, obesity, hyperlipidemia, sleep apnea, chronic alcohol use, and cholecystitis requiring a lap cholecystectomy 07/2020 who was admitted 21 days ago for acute pancreatitis.      Interval Events:  -Altered mental status resolved  -Patient recatheterized   -4 to 5 loose bowel movements overnight  -Gluteal cleft wound worsening     Subjective  Mr. Riley says he is okay this morning. He complains of burning when swallowing while eating, dry mouth and cough, and generalized abdominal pain while sitting up. He says he has been having multiple loose bowel movements and is requesting that he be removed from his bowel regimen medications. He is anxious to work with PT today because he is tired of feeling this way.      Review of Systems  *See HPI*  General: denies fevers, rigors, has some fatigue   HEENT: denies rhinorrhea   Cardiac: denies angina or palpitations, some dyspnea while lying in bed  Respiratory: +shortness of breath when trying to move, + cough   : jorgensen in place draining 600ml of yellow urine    GI: has some nausea and abdominal pain but denies vomiting, + for diarrhea, abdominal fullness has improved from yesterday   Extremities: legs feel better, is able to life them      Objective  Vitals  T: 97.2-98 BP: 115-138/63-79  P: 83-98  RR: 17-18  O2: 90-98% on 3L via nasal cannula      Physical Exam   General: pale, obese, ill-appearing white male  Skin: Multiple bruises on abdomen with indurations at injection sites from heparin  HEENT: normocephalic, atraumatic, no conjunctival injection or scleral icterus, right internal jugal vein catheter in place  Cardiovascular: normal rate and rhythm, no murmurs appreciated  Pulmonary: chest symmetry with respirations, clear to auscultation bilaterally, on 3L via nasal  cannula    Abdomen: scars from lap monico, multiple bruises and indurations at heparin injection sites, distension, soft and non-tender to palpation in all 4 quadrants, no guarding, no shifting dullness  Extremities: bilateral lower extremity edema 1+ appear same as yesterady, IV in posterior right forearm  Neurological: Sensation intact in all extremities, no cranial nerve deficits appreciated     Labs  CBC (pancyotpenic day 2)   -lymphopenia WBC 4.3  (previously 4.2, 3.2, 4.5, 5.6)  -continues to be anemically stable with Hgb 8.1 (previously 8.8, 8.5, 8.5)  -continues to be thrombocytopenic for 4th day in a row Plt 88 (previously 74, 71, 80, 172)     CMP:   -continues to be hyperglycemic Glu 175    -Kidney function labs still show acute failure BUN 67, creatinine 6.09   -Continues to be hypocalcemic Ca 8.3 (previously 8.1, 8.2)      Imaging  No new imaging      Assessment/Plan   Mr. Riley is a 56 y/o male admitted 01/14/2021 for acute pancreatitis. His hospital stay has been complicated by an ileus/SBO, acute renal failure, acute hypercapnic respiratory failure requiring intubation for 2 days, and sepsis. He is no longer hypotensive and his kidney function has taken dip from yesterday's labs. Mr. Riley's altered mental status has resolved.        #Bacteremia likely secondary to contaminated PICC line  -Blood cultures grew staph aureus (methicillin sensitive) which was likely contracted from an infected PICC line that was removed  -There have been 3 negative blood cultures in a row as of 2/2/2021  -He was receiving cefazolin 2g q24hrs scheduled through 2/13, however, labs suggest this is likely causing his thrombocytopenia  -ID has been consulted and switched patient to IV daptomycin 910 mg q 48hrs through 02/15/21      #Acute Renal Failure secondary to Acute kidney injury secondary to hypotension secondary to acute respiratory failure compounded with sepsis  -Nephrology consulted  -Patient has no hemodialysis  yesterday and kidney function has worsened on today's labs  -Too early to ascertain whether kidney function improvement on labs is from spontaneous recovery or a consequence of the therapeutic benefit of dialysis   -Discontinue midodrine 10mg as patient has been normotensive for 24hrs   -Refrain from using nephrotoxic medications      #Delirium  -Likely multifactorial due to extended hospital stay, illness, pain medications, and ropinirol  -Discontinue pain meds and ropinirole   -Open blinds in patient room, encourage sitting in chair for meals and avoiding daytime napping/sleeping, have patient ambulate as tolerated  -Delirium has resolved as of 2/4     #Pancytopenia   -Blood smear, Coagulation and HIT panels do not suggest a common hemostasis disorder which supports our highest differential of an adverse reaction to cefazolin as the culprit  -Cefazolin switched to daptomycin to treat ongoing bacteremia  -Continue heparin DVT prophylaxis  -Continue to monitor labs      #Bloating   -Began after simethicone was discontinued   -Restart simethicone to aid with symptoms of abdominal fullness and postprandial epigastric burning     #Acute Pancreatitis secondary to chronic alcohol abuse  -Pt denies any abdominal pain or back pain today  -Have patient ambulating as tolerated and working with PT/OT  -Pain being managed with acetaminophen 650mg (use conservatively with ongoing ONOFRE)   -Appears to be improving; monitor for any changes in symptoms     #Ileus vs SBO likely secondary to a combination of medications, acute pancreatitis, ethanol abuse, and PMH of Crohns   -Order NG tube to be removed today as patient tolerated it being off while also on a clear liquid diet  -Abdominal X-ray suggests similar amount of abdominal distension as previous reading, however pt had one soft bowel movement   -Advance diet as tolerated   -Encourage pt to ambulate and work with PT/OT  -Monitor for changes in symptoms      #Fluid Retention  secondary to acute renal failure  -Patient feels symptomatic and physical exam suggest worsening abdominal distension and lower extremity edema despite 2 dialysis sessions  -Add diuretic (Furosemide)   -Encourage patient to ambulate as tolerated    #Gluteal Cleft Wound  -Probably from diarrhea and prolonged use of rectal tube  -Consult wound care      #Hyperglycemia secondary to diabetes mellitus   -Pt placed on sliding insulin scale   -Continue to monitor and adjust insulin dosing upon returning to normal diet     #Back pain  -Continue to treat with lidocaine patch  -Have patient sit in chair for meals  -Encourage ambulation as tolerated      #Crohn disease  -Self reports it being in remission for years     #Obesity  -Alcohol cessation and weight loss counseling at discharge     Note written by: Denise Francisco MS3  Attending Physician: Dr. Girish Ching

## 2021-02-04 NOTE — ASSESSMENT & PLAN NOTE
The patient has a wound in this area that may have been from rectal tube/diarrhea.     Plan:  - Wound care consult to help manage.   - Rectal tube was discontinued

## 2021-02-04 NOTE — DISCHARGE PLANNING
[facility] Transitional Care Coordination     Referral from:  Dr. Rodriguez  Facesheet indicates:  [employer]  Potential Rehab Diagnosis:  Debility    Chart review indicates patient has on going medical management and therapy needs to possibly meet inpatient rehab facility criteria with the goal of returning to community.    D/C support: Spouse     Physiatry to consult.     Last Covid test date:  1/*14/2021 - COVID negative      Thank you for the referral.

## 2021-02-04 NOTE — ASSESSMENT & PLAN NOTE
Improving.  The patient had an episode of delirium on the night of 2/2 that is suspected to be from prolonged hospitalization.     Plan:  - Please keep window open during daytime hours  - Out of bed to chair with meals  - Melatonin at night time  - Will stop blood draws at 3 AM and will schedule them for 8 AM  - Stopped all pain medications except tylenol

## 2021-02-04 NOTE — DISCHARGE PLANNING
Anticipated Discharge Disposition: [facility]    Action: received call from [place] with [facility] who states that insurance auth is complete, and they can take pt tomorrow although the time is not yet determined. Dr. Rodrigeuz notifed by [employer]. Pt and wife notified at bedside and gave verbal authorization for transfer tomorrow.    Barriers to Discharge: Time for [facility] to accept transfer tomorrow 2/5.    Plan: Transfer pt to [facility] 2/5/2020 by [facility] when time is determined.

## 2021-02-04 NOTE — PROGRESS NOTES
Daily Progress Note:     Date of Service: 2/4/2021  Primary Team: [facility] IM Red Team    Attending: Girish Ching M.D.   Senior Resident: Luis Rodriguez M.D.  Contact:  [phone]    ID:   The patient is a 55-year-old male with a past medical history of alcohol use disorder, obesity, nonischemic cardiomyopathy (EF 25-30%), Crohn's disease, hypertension, chronic back pain, cholecystectomy 7/2020, and obesity.  The patient presented on 1/14/2021 for pancreatitis and his hospital course was complicated by ileus vs SBO on 1/19 when NG tube was placed. The patient subsequently was started on TPN on 1/21 to 1/30 (when PICC was infiltrated). The patient also had severe hypoxic and hypercapnic respiratory failure and was intubated from 1/25 to 1/27. Since then the patient has been transferred back to the floor where it was noted that the patient was spiking fevers. Blood cultures were drawn on 1/30 and both sets were positive for MSSA. Unfortunately his Cr has trended upwards as well and HD catheter was inserted on 1/31 and the patient was started on HD.     Interval Update:  The patient did not have any acute events overnight.  He slept well with no further episodes of delirium.  The patient states that he had been having some episodes of diarrhea later yesterday.  However, after bowel protocol was held, the patient states that his diarrhea has improved to be more formed this morning. The patient is also complaining of some indigestion this morning. He states that he has a burning sensation after he eats in his epigastrium.     Consultants/Specialty:  GI- Dr. Turner (Signed off 1/17)  Nephrology- Actively following patient  General Surgery- Dr. Marx (signed off 1/21)  Critical care (signed off 1/28)    Review of Systems:    Review of Systems   Constitutional: Negative for chills and fever.   HENT: Negative for congestion and sore throat.    Eyes: Negative for blurred vision and double vision.   Respiratory: Negative  for cough and shortness of breath.    Cardiovascular: Negative for chest pain, palpitations and leg swelling.   Gastrointestinal: Positive for diarrhea and heartburn. Negative for abdominal pain, constipation, nausea and vomiting.   Genitourinary: Negative for dysuria and urgency.   Musculoskeletal: Negative for falls and joint pain.   Skin: Negative for itching and rash.   Neurological: Negative for dizziness and headaches.     Objective Data:   Physical Exam:   Vitals:   Temp:  [36.2 °C (97.2 °F)-36.8 °C (98.3 °F)] 36.4 °C (97.6 °F)  Pulse:  [84-91] 85  Resp:  [17-18] 18  BP: (115-138)/(64-79) 132/73  SpO2:  [93 %-98 %] 94 %     Physical Exam  Vitals signs and nursing note reviewed.   Constitutional:       General: He is not in acute distress.     Appearance: He is obese. He is ill-appearing (Chronically ill appearing).   HENT:      Head: Normocephalic and atraumatic.      Mouth/Throat:      Pharynx: Oropharynx is clear. No oropharyngeal exudate or posterior oropharyngeal erythema.   Eyes:      General: No scleral icterus.     Extraocular Movements: Extraocular movements intact.      Pupils: Pupils are equal, round, and reactive to light.   Cardiovascular:      Rate and Rhythm: Normal rate and regular rhythm.      Heart sounds: No murmur. No gallop.    Pulmonary:      Effort: Pulmonary effort is normal. No respiratory distress.      Breath sounds: Normal breath sounds. No wheezing.   Abdominal:      General: There is distension (+ abdominal distention ).      Palpations: Abdomen is soft.      Tenderness: There is no abdominal tenderness. There is no guarding.      Hernia: No hernia is present.      Comments: Normoactive bowel sounds   Genitourinary:     Comments: Hudson catheter in place  Musculoskeletal:         General: No swelling.      Right lower leg: No edema.      Left lower leg: No edema.   Skin:     General: Skin is warm and dry.      Coloration: Skin is not jaundiced.   Neurological:      General: No  focal deficit present.      Mental Status: He is alert and oriented to person, place, and time. Mental status is at baseline.      Cranial Nerves: No cranial nerve deficit.      Deep Tendon Reflexes: Reflexes normal.   Psychiatric:         Mood and Affect: Mood normal.         Behavior: Behavior normal.       Labs:   Pertinent labs: Hgb 8.1, Platelet 88 (improving from 75)    Imaging:   No new imaging over the last 24-hours.    Problem Representation:   The patient is a 55-year-old male with a past medical history of alcohol use disorder, crohn's disease, non-ischemic cardiomyopathy (?from alcohol use, last EF 25-30%), and lap monico on 7/2020 who presented on 1/14/2021 with severe pancreatitis and a hospital course complicated by intubation, renal failure, ileus/SBO, MSSA bacteremia.     * Bacteremia due to methicillin susceptible Staphylococcus aureus (MSSA)  Assessment & Plan  On 1/30 the patient spiked a fever of 101.1 and became hypotensive and tachycardic (2/4 SIRS criteria). Cultures from 1/30 grew MSSA (unclear source, possibly line related?). The patient's PICC was removed on 1/30 due to infiltration. The patient is no longer septic. TTE did not show any vegetations.    Plan:  - ID consulted and recommended switching to Dapto with end date 2/16.   - Blood cultures ordered and should be repeated every 48 hours until negative. Currently Bcx from 2/1 are negative.  - Continue to monitor daily for back pain, joint pain, nuchal rigidity, and mental status changes.      Pancytopenia (HCC)  Assessment & Plan  The patient's platelet count on 2/1 dropped by more than 50%. On 2/2 his white count dropped as well. This is concerning given that he is acutely ill and could be from multiple causes such as HIT, DIC, medications (ancef), sepsis. Most of these causes have been ruled out (HIT and ISTH score are both low and lab studies did not offer much insight). The cause of his pancytopenia is likely from Ancef.      Plan:  - Continue to monitor platelets. It will usually take time before it starts to recover.    Ileus (HCC)- (present on admission)  Assessment & Plan  The patient had an Ileus that is most likely a complication of his severe pancreatitis. NG tube has been in place since 1/19 and TPN was started. Tthe patient has since been having bowel sounds and has been having output from his rectal tube. He has not had any nausea and vomiting or any abdominal pain. He has also been hungry and wanting to eat. TPN has been off since 1/30 when PICC was infiltrated. Abdominal plain film continues to show that he has dilated loops of bowel. However, he has started tolerating a diet with no nausea and vomiting.     Plan:  - Continue minced and moist diet.  - One time dose of Famotidine 10mg for indigestion. (If needed chronically it would be every other day given his renal function).      Acute respiratory failure with hypoxia and hypercapnia (HCC)- (present on admission)  Assessment & Plan  On 1/25 the patient had a PCO2 of 114. He does not have a history of COPD. The patient was intubated from 1/25 to 1/27. He is now requiring 1L of oxygen (not on oxygen at home). This is likely not ARDS, but possibly atelectasis vs decreased respiratory drive from opiate use during that time and the lasting effects from being subsequently intubated. Slowly, his O2 requirements have been going down.     Plan:  - Continue IS for atelectasis. The patient understands he needs to be using this 10 times an hour for every hour he is awake.  - Continue RT protocol with ladan   - Continue oxygen weaning protocol and keep sats around 90%.   - Out of bed to chair orders are in to also help with lung volumes. Aggressive PT/OT to help keep the patient mobilized.     Acute pancreatitis- (present on admission)  Assessment & Plan  This is likely alcohol related pancreatitis (the patient was drinking 7 beers per day prior to admission) that is very severe  by Patterson criteria with worsening creatinine.  The patient also had other complications of pancreatitis including respiratory failure (not ARDS however, ?opiod related) and ileus. The patient currently has an NG tube in place.     Plan:  - Continue monitoring CMP and replacing electrolytes as needed.  - Diet has been advanced.       Acute kidney injury (HCC)- (present on admission)  Assessment & Plan  The patient's Cr has been worsening and improving since admission (with baseline Cr <1) which initially may have been from hypotension, vasoconstiction, and direct nephrotoxic damage from his acute pancreatitis. However, on 1/30 he patient was febrile, hypotensive, and septic which likely precipitated his current kidney failure. The patient had a HD catheter placed on 1/31 and initiated dialysis.     Plan:  - Continue HD per Nephrology  - Trial of Lasix 80 BID to improve UOP. Will continue.  - Continue Hudson catheter for urinary retention.  - Continue to maintain blood pressure/adequate perfusion  - Avoid any nephrotoxic medications and renally dose all medications  - Continue to monitor CMP to see if this improves    Delirium  Assessment & Plan  Improving.  The patient had an episode of delirium on the night of 2/2 that is suspected to be from prolonged hospitalization.     Plan:  - Please keep window open during daytime hours  - Out of bed to chair with meals  - Melatonin at night time  - Will stop blood draws at 3 AM and will schedule them for 8 AM  - Stopped all pain medications except tylenol    Hyperglycemia- (present on admission)  Assessment & Plan  The patient is not a diabetic with last A1c 5.5. However, he has been having episodes of hyperglycemia likely from pancreatitis.     Plan:  - Continue to monitor with finger sticks  - Continue SSI for now since diet has been advanced    Essential hypertension- (present on admission)  Assessment & Plan  The patient is on Carvedilol 25mg bid, Lasix 40mg, Entresto  (49-51), and Spironolactone at home. He was hypotensive and septic on 1/30. He has remained hypotensive and is now on midodrine. Therefore, we will hold these medications and re-evaluate at discharge.     Plan:  - Continue Midodrine and hold for SBP>110   - Continue holding home medications     Wound of gluteal cleft, subsequent encounter  Assessment & Plan  The patient has a wound in this area that may have been from rectal tube/diarrhea.     Plan:  - Wound care consult to help manage.   - Rectal tube was discontinued    Hypocalcemia- (present on admission)  Assessment & Plan  Likely from severe pancreatitis.     Plan:  - Continue to monitor with CMP.  - Replace carefully given renal dysfunction    Hypertriglyceridemia- (present on admission)  Assessment & Plan  RESOLVED.   The patient was noted to have a triglyceride level of 571. While this is moderate, a triglyceride level >500 increases risk for pancreatitis. His most recent triglyceride level was in the 90s.       Chronic back pain- (present on admission)  Assessment & Plan  The patient has a history of chronic back pain. This is important to note since he has MSSA bacteremia and we should be careful to distinguish his pain.     Plan:  - Continue Lidocaine patches, Dilaudid PRN 4 hrs for pancreatitis.     Nonischemic cardiomyopathy (HCC)- (present on admission)  Assessment & Plan  The patient has a history of non-ischemic cardiomyopathy (worst EF was 30%). This may have been from alcohol use. However, the patient has since been on Entresto, Spironolactone, Lasix, and Coreg. Since thn, his EF has actually improved back to normal (last echo 2018 EF 65%).     Plan:  - Stopped home medications at this time due to hypotension.   - Will continue when clinically appropriate.     Restless leg syndrome- (present on admission)  Assessment & Plan  History of RLS.     Plan:  - Will stop ropinerole in case this is related to delirium.    Obesity (BMI 30-39.9)- (present on  admission)  Assessment & Plan    Body mass index is 37.52 kg/m².   Plan:  - Counseling when clinically appropriate    Crohn disease (HCC)- (present on admission)  Assessment & Plan  The patient has a history of Crohn's disease and stated that he has been in remission for years. He follows with Dr. Colon as an outpatient.     Plan:  - May need outpatient follow-up      DVT ppx: Heparin Q8h  Diet: Minced and moist diet  Tubes/Lines: HD catheter (RIJ), Hudson cath (placed 2/3)  Code status: FULL  Dispo: Looking at LTACH vs Rehab. Can possibly get the patient out 2/5 if accepted.    Luis Rodriguez M.D.

## 2021-02-04 NOTE — CARE PLAN
Problem: Nutritional:  Goal: Achieve adequate nutritional intake  Description: Diet to advance >clears per MD and patient will consume >50% of meals  Outcome: PROGRESSING AS EXPECTED     Diet advancing per SLP, currently Minced/Moist. PO intake intake improving (% x3 meals documented past 48 hours, <25% x1 meal). RD will continue to monitor.

## 2021-02-05 NOTE — THERAPY
Occupational Therapy  Daily Treatment     Patient Name: Shaka Riley  Age:  55 y.o., Sex:  male  Medical Record #: [id number]  Today's Date: 2/4/2021     Precautions  Precautions: Fall Risk  Comments: supplemental O2 and jorgensen only, all other tubes DC'd    Assessment    Pt seen for OT tx today, progressing as expected. Pt is currently limited by decreased safety awareness, impulsivity, cues for sequencing and intermittently cues for repetitions of 1-step commands, decreased activity tolerance, decreased balance and strength deficits. Pt will benefit from acute skilled OT services while in house and post acute placement recommended.     Plan    Continue current treatment plan.    DC Equipment Recommendations: Unable to determine at this time  Discharge Recommendations: Recommend post-acute placement for additional occupational therapy services prior to discharge home       Objective       02/04/21 1509   Cognition    Cognition / Consciousness X   Level of Consciousness Alert   Safety Awareness Impaired   Sequencing Impaired   Comments oriented, intermittently repetition of directions required   Balance   Sitting Balance (Static) Fair   Sitting Balance (Dynamic) Fair -   Standing Balance (Static) Fair -   Standing Balance (Dynamic) Poor +   Weight Shift Sitting Fair   Weight Shift Standing Poor   Skilled Intervention Verbal Cuing;Tactile Cuing;Sequencing   Comments w/FWW   Bed Mobility    Supine to Sit Minimal Assist   Sit to Supine Minimal Assist   Scooting Minimal Assist   Rolling Minimal Assist to Rt.;Minimum Assist to Lt.   Skilled Intervention Verbal Cuing;Tactile Cuing;Sequencing   Comments cues for techniques and sequencing for log rolling   Activities of Daily Living   Eating   (NT)   Grooming Minimal Assist;Seated   Bathing   (NT)   Upper Body Dressing Minimal Assist  (for donning/doffing hospital gown)   Lower Body Dressing Maximal Assist   Toileting Maximal Assist   Skilled Intervention Verbal  Cuing;Tactile Cuing;Sequencing   Comments pt recieved soiled in bed, required max for pericare, able to tolerated ~2 min of standing for pericare and linen change   Functional Mobility   Sit to Stand Minimal Assist   Bed, Chair, Wheelchair Transfer Unable to Participate   Toilet Transfers Refused   Mobility bed mobility, STS eob x3 trials and sidestepping eob with FWW   Skilled Intervention Verbal Cuing;Tactile Cuing;Sequencing   Comments w/FWW   Short Term Goals   Short Term Goal # 1 pt will demo functional transfer with SPV   Goal Outcome # 1 Progressing as expected   Short Term Goal # 2 pt will demo UB dress with SPV   Goal Outcome # 2 Progressing as expected   Short Term Goal # 3 pt will tolerate >5min stand for grooming ADLs   Goal Outcome # 3 Progressing as expected   Short Term Goal # 4 pt will complete toileting ADL at SPV level   Goal Outcome # 4 Progressing slower than expected   Anticipated Discharge Equipment and Recommendations   DC Equipment Recommendations Unable to determine at this time

## 2021-02-05 NOTE — PROGRESS NOTES
Nephrology Daily Progress Note    Date of Service  2/5/2021    Chief Complaint  55 y.o. male who presented 1/14/2021 with abdominal pain and emesis, found to have acute pancreatitis with ONOFRE prompting renal consult.     Upon admission, CT abdomen showing inflammatory stranding involving pancreas with fluid within the anterior pararenal space consistent with pancreatitis.  Cr 1.1 on admission, worsened to 3.5 this AM. Oliguric overnight and worsening hyperkalemia despite NS at 75cc/hr. ~200cc UOP after lasix 20mg. Ongoing severe abd pain and worsening abd distention. Worsening shortness or breath and subjective fevers. Being transferred to ICU.     DAILY NEPHROLOGY SUMMARY:  1/16: consult done  1/17: 390cc UOP documented, found to have SBO-NG tube placed, cr stable, abd pain improving, no fluids running   1/18: Hypertensive this AM, improving UOP, cr downtrending   1/19: Transferred out of the ICU, Cr downtrending, main complaint is back pain, NG tube  1/20: off oxygen, NG tube remains, has not passed gas yet, cr down trending   1/21: worsening abd distention and resp status. NG tube repositioned with 6L output, given dose of lasix, cxr with BL infiltrates, slight bump in cr  1/22: started TPN, had an episode of flatus, feel stronger, tachycardic, 6l NG output   1/23: Tolerated liquids this morning.  Complaining of gas pain.  1/24: Weaning off TPN. No longer w/ NGT. C/O diarrhea.   1/25: ongoing diarhea and abdominal swelling, CT scan today  1/26: intubated 1/25 trasnferred to unit  1/27: improved mentation, NG output decreased, BP soft  1/28: extubated 1/27, abdoman still distended, increased U/O  1/29:  Transferred out of ICU, more confused today  1/30: trasnferred up to T8, worsening renal function  1/31:  Decreased U/O, creat worse  2/1: Tolerated HD yesterday.  Denies SOB.  + back pain  2/2: Denies pain or SOB, + 4.5L yesterday.  Tolerated HD  2/3: Denies pain or SOB but fatigued.  Minimal UOP.  Pulled out jorgensen  catheter.  2/4: Notes more UOP, I/Os not accurate.  Denies pain or SOB  2/5: Hudson in place, UOP increasing. Awaiting labs.  Complains of back pain    Review of Systems  Review of Systems   All other systems reviewed and are negative.       Physical Exam  Temp:  [36 °C (96.8 °F)-36.5 °C (97.7 °F)] 36.5 °C (97.7 °F)  Pulse:  [77-84] 84  Resp:  [17-18] 17  BP: (140-161)/(71-86) 140/71  SpO2:  [94 %-96 %] 96 %    Physical Exam  Vitals signs and nursing note reviewed.   Constitutional:       Comments: Obese male   HENT:      Head: Normocephalic.   Neck:      Musculoskeletal: Normal range of motion and neck supple.   Cardiovascular:      Rate and Rhythm: Normal rate.   Abdominal:      General: There is distension.   Musculoskeletal: Normal range of motion.         General: No swelling.   Skin:     Coloration: Skin is not jaundiced.         Fluids    Intake/Output Summary (Last 24 hours) at 2/5/2021 0745  Last data filed at 2/5/2021 0500  Gross per 24 hour   Intake 240 ml   Output 800 ml   Net -560 ml       Laboratory  Recent Labs     02/03/21  0808 02/04/21  1107   WBC 4.2* 4.3*   RBC 2.99* 2.77*   HEMOGLOBIN 8.8* 8.1*   HEMATOCRIT 27.4* 26.0*   MCV 91.6 93.9   MCH 29.4 29.2   MCHC 32.1* 31.2*   RDW 47.2 48.3   PLATELETCT 74* 88*   MPV 13.7* 13.5*     Recent Labs     02/03/21  0808 02/04/21  1107   SODIUM 136 133*   POTASSIUM 4.0 4.0   CHLORIDE 99 96   CO2 24 24   GLUCOSE 138* 140*   BUN 64* 67*   CREATININE 4.95* 6.09*   CALCIUM 8.1* 8.3*         No results for input(s): NTPROBNP in the last 72 hours.        Imaging      Assessment/Plan     # ONOFRE: multifactorial, it had been improved, but 2 days ago event changed trajectory (hypotensive episode sustolic less than 100) and now with signficant worsening.  Possibly ATN from hemodynamic changes.   - HD started 1/31   - Minimal UOP  # Sepsis   - MSSA bacteremia   - ABx per primary team   - Followed by ID  # Pancreatitis  # Encephalpathy intermittant  # Ileus: NG tube, no  role for surgical intervention per surgery  # HX  Non-ischemic cardiomyopathy  # Thrombocytopenia  # Hypocalcemia    - Corrected calcium WNL  # Crohns   # Anemia  # hyperantremia second to GI losses/ not able to tolerate PO   - Resolved  # hyponatremia   - Mild, continue to monitor likely volume related     PLAN:  -Hold HD today  -Will hold lasix today  -Follow up labs  -Accurate I/Os  -Daily labs  -Dose all medications for eGFR < 15    Thank you

## 2021-02-05 NOTE — CARE PLAN
Problem: Safety  Goal: Will remain free from falls  Outcome: PROGRESSING AS EXPECTED  Note: Bed locked in lowest position. Bed alarm on. Treaded socks in use. Call light and belongings within reach. Pt educated to call for assistance. Pt verbalized understanding. Hourly rounding in place.

## 2021-02-05 NOTE — DISCHARGE PLANNING
Received Transport Form @ 0714  Spoke to Darlene  @ [facility]     Transport is scheduled for Today 2/5/2021 @1800 going to [facility].

## 2021-02-05 NOTE — THERAPY
"Physical Therapy   Daily Treatment     Patient Name: Shaka Riley  Age:  55 y.o., Sex:  male  Medical Record #: [id number]  Today's Date: 2/4/2021     Precautions: Fall Risk    Assessment    Pt progressing slowly w/ therapy. Pt was more limited by back pain and indigestion today. He was able to perform bed mobility w/ improved ease today however had decreased tolerance to standing. He was only able to tolerate standing for short amounts of time and would perform forward flexion to offset the pain. Pt able to take a couple of small steps but began to burp causing LOB.    Plan    Continue current treatment plan.    DC Equipment Recommendations: Unable to determine at this time  Discharge Recommendations: Recommend post-acute placement for additional physical therapy services prior to discharge home      Subjective    \"I just wake up confused.\"     Objective       02/04/21 1506   Precautions   Precautions Fall Risk   Gait Analysis   Gait Level Of Assist Minimal Assist   Assistive Device Front Wheel Walker   Distance (Feet) 3   # of Times Distance was Traveled 1   Deviation Increased Base Of Support;Bradykinetic   Comments Pt only able to tolerate side steps along the bed d/t high back pain.   Bed Mobility    Supine to Sit Minimal Assist   Sit to Supine Minimal Assist   Scooting Minimal Assist   Rolling Minimum Assist to Lt.   Comments Pt able to recall log roll. Pt mainly needing assist w/ trunk.   Functional Mobility   Sit to Stand Minimal Assist   Bed, Chair, Wheelchair Transfer Unable to Participate   Mobility STS w/ FWW   Short Term Goals    Short Term Goal # 1 Pt will be SPV for supine<>sit in 6 txs to improve functional mobility.   Goal Outcome # 1 goal not met   Short Term Goal # 2 Pt will be SPV for all transfers with LRAD in 6 txs to improve OOB activity.   Goal Outcome # 2 Goal not met   Short Term Goal # 3 Pt will be SPV for gait >250' with LRAD in 6 txs to improve functional mobility.   Goal Outcome # 3 " Goal not met   Short Term Goal # 4 Pt will be SPV for up/down FOS to be able to safely access his home.   Goal Outcome # 4 Goal not met

## 2021-02-05 NOTE — DISCHARGE PLANNING
Anticipated Discharge Disposition: [facility]    Action: received notification from [facility] with [facility] that they can take the pt at 1800. Transport communication and [facility] forms faxed to Carl[facility]. Dr. Rodriguez and BRIGID Martinez notified by [employer].    Barriers to Discharge: none    Plan: transfer to [facility] at 1800 by [facility].

## 2021-02-05 NOTE — DISCHARGE INSTRUCTIONS
Discharge Instructions    Discharged to other by ambulance with self. Discharged via ambulance, hospital escort: Yes.  Special equipment needed: Oxygen    Be sure to schedule a follow-up appointment with your primary care doctor or any specialists as instructed.     Discharge Plan:   Diet Plan: Discussed  Activity Level: Discussed  Confirmed Follow up Appointment: Patient to Call and Schedule Appointment  Confirmed Symptoms Management: Discussed  Medication Reconciliation Updated: Yes  Influenza Vaccine Indication: Not indicated: Previously immunized this influenza season and > 8 years of age  Influenza Vaccine Given - only chart on this line when given: Influenza Vaccine Given (See MAR)    I understand that a diet low in cholesterol, fat, and sodium is recommended for good health. Unless I have been given specific instructions below for another diet, I accept this instruction as my diet prescription.   Other diet:     Special Instructions: None    · Is patient discharged on Warfarin / Coumadin?   No     Depression / Suicide Risk    As you are discharged from this [facility] facility, it is important to learn how to keep safe from harming yourself.    Recognize the warning signs:  · Abrupt changes in personality, positive or negative- including increase in energy   · Giving away possessions  · Change in eating patterns- significant weight changes-  positive or negative  · Change in sleeping patterns- unable to sleep or sleeping all the time   · Unwillingness or inability to communicate  · Depression  · Unusual sadness, discouragement and loneliness  · Talk of wanting to die  · Neglect of personal appearance   · Rebelliousness- reckless behavior  · Withdrawal from people/activities they love  · Confusion- inability to concentrate     If you or a loved one observes any of these behaviors or has concerns about self-harm, here's what you can do:  · Talk about it- your feelings and reasons for harming  yourself  · Remove any means that you might use to hurt yourself (examples: pills, rope, extension cords, firearm)  · Get professional help from the community (Mental Health, Substance Abuse, psychological counseling)  · Do not be alone:Call your Safe Contact- someone whom you trust who will be there for you.  · Call your local CRISIS HOTLINE 658-8443 or 487-258-7964  · Call your local Children's Mobile Crisis Response Team [place] (152) 878-5982 or www.Megathread  · Call the toll free National Suicide Prevention Hotlines   · National Suicide Prevention Lifeline 854-663-HIMP (0735)  · National Hope Line Network 800-SUICIDE (607-3835)

## 2021-02-05 NOTE — PROGRESS NOTES
Bedside report received from day shift RN. Assumed care at 1900. Pt is A&Ox4. Pt is in bed. Pt denies pain at this time. Pt was updated on plan of care. Pt has call light, personal belongings, and bedside table within reach. Bed is in the lowest position. Will continue to monitor

## 2021-02-05 NOTE — DISCHARGE SUMMARY
Internal Medicine Discharge Summary     Date of Admission: 1/14/2021  Date of Discharge: 02/05/21  Service: Internal Medicine  Attending Physician: Girish Chnig M.D.  Senior Resident: Luis Rodriguez M.D.    Discharge Diagnosis:   Severe acute pancreatitis  MSSA Bacteremia  Acute Renal Failure  Ileus  Pancytopenia    Hospital problems:  Principal Problem:    Bacteremia due to methicillin susceptible Staphylococcus aureus (MSSA) POA: No  Active Problems:    Acute kidney injury (HCC) POA: Yes    Acute pancreatitis POA: Yes    Acute respiratory failure with hypoxia and hypercapnia (HCC) POA: Yes    Ileus (HCC) POA: Yes    Pancytopenia (HCC) POA: Unknown    Essential hypertension POA: Yes    Hyperglycemia POA: Yes    Delirium POA: Unknown    Crohn disease (HCC) POA: Yes    Obesity (BMI 30-39.9) POA: Yes    Restless leg syndrome POA: Yes    Nonischemic cardiomyopathy (HCC) POA: Yes    Chronic back pain POA: Yes    Hypertriglyceridemia POA: Yes    Hypocalcemia POA: Yes      Overview: - calcium gluconate now      - repeat labs today     Wound of gluteal cleft, subsequent encounter POA: Unknown  Resolved Problems:    SBO (small bowel obstruction) (HCC) POA: Yes    Non-traumatic compartment syndrome of abdomen POA: Yes    PICC line infiltration (HCC) POA: No    Hypotension POA: No    Acute renal failure (HCC) POA: Unknown    Hyponatremia POA: Yes    Hypomagnesemia POA: Yes        Hospital Course:   The patient is a 52-[id number] old male with a PMH of alcohol use disorder, obesity, non-ischemic cardiomyopathy (prior EF 25-30% but improved this admission to 50%), Crohn's disease, hypertension, chronic back pain, cholecystectomy 7/2020. The patient presented on 1/14/2021 for acute pancreatitis.     Acute, severe pancreatitis: The patient presented with acute pancreatitis secondary to alcohol use disorder. The patient's pancreatitis worsened throughout admission and was complicated by ileus as well as acute renal failure  from ATN. The patient was also intubated for severe hypoxic and hypercapnic respiratory failure from 1/25 to 1/27. The patient's pancreatitis has since improved on discharge to [facility].     Ileus: The patient initially had an NG tube and was started on TPN from 1/21 to 1/30. However, the patient was no longer nauseated and vomited and was passing stools. Therefore, he was seen by speech and diet was advanced. The patient is on a Minced and Moist renal diet and is passing stools with no nausea or vomiting. The patient is also on Simethicone for abdominal distension thought to be secondary to gas/bloating.     MSSA bacteremia with acute renal failure due to ATN: The patient spiked a fever and was found to be septic on 1/30. He was hypotensive. He was treated for sepsis and blood cultures on 1/30 grew MSSA that was thought to be from his PICC possibly. His PICC was removed an ID saw the patient who recommended that the patient be on IV Daptomycin until 2/15 for a total 3 week course. Repeat blood cultures have been persistently negative. However, the patient had started requiring dialysis. The patient is being seen by nephrology (Dr. Gtz) who is dialyzing the patient as needed depending on Cr and urine output (last dialysis 2/2). More recently, the patient has been making urine (800cc overnight) and urine output has been improving. The patient will be discharged on 80mg BID PO lasix. [facility] will continue to follow him at [facility]. Temporary dialysis catheter was removed prior to discharge. If necessary, a tunneled one could be placed at [facility].     Acute respiratory failure with hypoxia: Not thought to be ARDS, but possibly related to atelectasis. His oxygen requirements have slowly been decreasing with aggressive pulmonary toilet. He is currently on 4L NC.     Pancytopenia: The patient has pancytopenia that initially started with thrombocytopenia. Workup was done for this and it was attributed that Ancef  likely was the cause. The patient was switched from Ancef (started 1/31) to Daptomycin (Started 2/2) for MSSA bacteremia and should continue until 2/15/2021. Pancytopenia has started improving since Ancef was discontinued.     Delirium: The patient started having delirium on 2/3 at night when he pulled out his jorgensen catheter. Jorgensen was replaced and we discontinued finger sticks, changed blood draws to 7 AM, and started melatonin.     Back pain: The patient has chronic back pain from a history of herniated discs. He states that his back pain was likely exacerbated by sitting in bed all day. We started him on Tylenol 1000mg TID and tried to get him up to the chair every day. However, he requires max assist. Recommend aggressive PT/OT.     Essential hypertension: The patient has a history of hypertension. However, while inpatient he has been persistently hypotensive. He was started on Midodrine 10mg TID. We are holding all of his outpatient hypertension medications (Carvedilol 25mg bid, Lasix 40mg, Entresto (49-51), and Spironolactone).     Consultants:   GI- Dr. Turner (Signed off 1/17)  Nephrology- Actively following patient, Dr. Gtz  General Surgery- Dr. Marx (signed off 1/21)  Critical care (signed off 1/28)    Physical Exam on Day of Discharge:   Vitals:    02/04/21 1552 02/04/21 1936 02/05/21 0411 02/05/21 0823   BP: 154/78 (!) 161/86 140/71 138/72   Pulse: 77 84 84 83   Resp: 18 17 17 18   Temp: 36 °C (96.8 °F) 36.2 °C (97.2 °F) 36.5 °C (97.7 °F) 35.9 °C (96.7 °F)   TempSrc: Temporal Temporal Temporal Temporal   SpO2: 94% 94% 96% 96%   Weight:  110 kg (241 lb 6.5 oz)     Height:         Weight/BMI: Body mass index is 36.71 kg/m².  Pulse Oximetry: 96 %, O2 (LPM): 4, O2 Delivery Device: Nasal Cannula    General: No acute distress, appears comfortable.   CVS: RRR, no MGR.   PULM: Clear to auscultation bilaterally.   ABD: Non-tender, + Abdominal distention. +bowel sounds.   EXTR: 2+ Pitting edema  bilaterally  NEURO: Alert and oriented x4 (sometimes disoriented to time, but was oriented this morning)    Most Recent Labs:    Lab Results   Component Value Date/Time    WBC 4.3 (L) 02/04/2021 11:07 AM    RBC 2.77 (L) 02/04/2021 11:07 AM    HEMOGLOBIN 8.1 (L) 02/04/2021 11:07 AM    HEMATOCRIT 26.0 (L) 02/04/2021 11:07 AM    MCV 93.9 02/04/2021 11:07 AM    MCH 29.2 02/04/2021 11:07 AM    MCHC 31.2 (L) 02/04/2021 11:07 AM    MPV 13.5 (H) 02/04/2021 11:07 AM    NEUTSPOLYS 76.10 (H) 02/04/2021 11:07 AM    LYMPHOCYTES 1.80 (L) 02/04/2021 11:07 AM    MONOCYTES 6.20 02/04/2021 11:07 AM    EOSINOPHILS 1.80 02/04/2021 11:07 AM    BASOPHILS 1.80 02/04/2021 11:07 AM    HYPOCHROMIA 1+ 02/02/2021 02:49 AM    ANISOCYTOSIS 1+ 02/03/2021 08:08 AM      Lab Results   Component Value Date/Time    SODIUM 133 (L) 02/04/2021 11:07 AM    POTASSIUM 4.0 02/04/2021 11:07 AM    CHLORIDE 96 02/04/2021 11:07 AM    CO2 24 02/04/2021 11:07 AM    GLUCOSE 140 (H) 02/04/2021 11:07 AM    BUN 67 (H) 02/04/2021 11:07 AM    CREATININE 6.09 (HH) 02/04/2021 11:07 AM      Lab Results   Component Value Date/Time    ALTSGPT 6 02/04/2021 11:07 AM    ASTSGOT 20 02/04/2021 11:07 AM    ALKPHOSPHAT 86 02/04/2021 11:07 AM    TBILIRUBIN 0.4 02/04/2021 11:07 AM    LIPASE 62 01/20/2021 02:59 AM    ALBUMIN 2.1 (L) 02/04/2021 11:07 AM    GLOBULIN 4.1 (H) 02/04/2021 11:07 AM    PREALBUMIN 5.6 (L) 01/25/2021 10:08 AM    INR 1.11 02/01/2021 02:05 PM    MACROCYTOSIS 1+ 02/03/2021 08:08 AM     Lab Results   Component Value Date/Time    PROTHROMBTM 14.7 (H) 02/01/2021 02:05 PM    INR 1.11 02/01/2021 02:05 PM        Procedures and Imaging:   EC-ECHOCARDIOGRAM COMPLETE W/O CONT   Final Result      [id number] VIEW   Final Result      [Ovary persistent distention of small bowel loops in the mid abdomen.      IR-CVC NON TUNNELED > AGE 5   Final Result      Successful image guided non-tunneled dialysis catheter placement.      Plan: The catheter is available for immediate use.          US-EXTREMITY VENOUS LOWER BILAT   Final Result      DX-CHEST-LIMITED (1 VIEW)   Final Result      1.  Removal of endotracheal tube and right PICC line.   2.  Hypoinflation with mild patchy opacities probably related to atelectasis. Correlate clinically for infection.      DX-CHEST-PORTABLE (1 VIEW)   Final Result         1.  Pulmonary edema and/or infiltrates are identified, which appear somewhat increased since the prior exam.   2.  Cardiomegaly      DX-ABDOMEN FOR TUBE PLACEMENT   Final Result         1.  Air-filled distended loops of bowel are seen with reactive mucosal pattern, appearance suggests ileus or enteritis. Recommend radiographic followup to resolution to exclude progression to obstruction.   2.  Nasogastric tube tip terminates overlying the expected location of the gastric antrum, could be slightly advanced.      DX-CHEST-PORTABLE (1 VIEW)   Final Result         1.  Bilateral basilar atelectasis, no focal infiltrate   2.  Cardiomegaly      DX-CHEST-PORTABLE (1 VIEW)   Final Result      Perihilar opacification could be due to atelectasis or infiltrate versus mild edema.      Possible trace effusions.      Hypoventilatory change.      CT-ABDOMEN-PELVIS W/O   Final Result      1.  Severe acute pancreatitis, worse than on prior exam.   2.  Multiple dilated small bowel loops suggesting distal small bowel obstruction, similar to prior exam.   3.  No evidence of bowel perforation.      DX-CHEST-LIMITED (1 VIEW)   Final Result      Bibasilar atelectasis, with no significant interval change.               INTERPRETING LOCATION: [address] [facility], [facility], [id number]      [id number] VIEW   Final Result         Diffuse gaseous distention of the small bowel, worse than prior, concerning for small bowel obstruction.      IR-PICC LINE PLACEMENT W/ GUIDANCE > AGE 5   Final Result                  Ultrasound-guided PICC placement performed by qualified nursing staff as    above.          DX-CHEST-PORTABLE (1 VIEW)    Final Result         1.  Interstitial pulmonary opacities suggests atelectasis or possible subtle infiltrates.      CT-ABDOMEN-PELVIS W/O   Final Result      1.  Again seen pancreatitis with peripancreatic effusions. This is mildly worsened from comparison.      2.  Again seen dilated loops of small intestine with decompression of the colon and distal small bowel consistent with ileus versus partial small bowel obstruction.      3.  Bibasilar atelectasis and small bilateral pleural effusions.      [id number] VIEW   Final Result      1.  Moderate small bowel dilation with multiple air-fluid levels consistent with small bowel obstruction      2.  Enteric catheter appears appropriately located      CT-ABDOMEN-PELVIS W/O   Final Result         1. Worsening inflammatory change and fluid surrounding the pancreas, in keeping with acute pancreatitis.      2. Diffuse dilatation of the small bowel and proximal colon with decompressed distal colon adjacent to the inflammation in the tail the pancreas. This could relate to colonic obstruction due to adjacent inflammatory change or ileus.      3. Mild wall thickening of the stomach fundus adjacent to the pancreatic tail, likely reactive.      DX-ABDOMEN FOR TUBE PLACEMENT   Final Result         Gastric drainage tube with tip projecting over the expected area of the stomach fundus.      DX-CHEST-FOR LINE PLACEMENT Perform procedure in: Patient's Room   Final Result      Left midlung and bibasilar linear atelectasis.      Dialysis catheter projects over the SVC.      No pneumothorax.         [id number] VIEW   Final Result      Increased bowel gas concerning for early or partial small bowel obstruction.      DX-CHEST-PORTABLE (1 VIEW)   Final Result      Hypoinflation with LEFT mid and lower lung atelectasis, with possible superimposed LEFT basilar pneumonia.      [id number]-I/O   Final Result      1.  Pancreatic edema with significant peripancreatic stranding and ill-defined  fluid, consistent with acute interstitial edematous pancreatitis.      2.  Small amount of ascites.      3.  No choledocholithiasis is identified.      4.  Status post cholecystectomy.      CT-ABDOMEN-PELVIS WITH   Final Result      1.  Inflammatory stranding involving the pancreas with fluid within the anterior pararenal space consistent with pancreatitis.      2.  Fatty liver.      3.  Prior cholecystectomy.      4.  Bibasilar atelectasis/consolidation.      DX-CHEST-PORTABLE (1 VIEW)   Final Result      No evidence of acute cardiopulmonary process.          Condition at Discharge:   Guarded    [facility]    [facility]    Discharge Medications:      Medication List      START taking these medications      Instructions   acetaminophen 500 MG Tabs  Commonly known as: TYLENOL   Take 2 Tabs by mouth every 8 hours.  Dose: 1,000 mg     * heparin 1000 units/mL Soln   2.6 mL by Intracatheter route as needed (Instill 1.3 mL to red port and 1.3 mL to blue port as CVC lock post dialysis).  Dose: 2,600 Units     * heparin 5000 UNIT/ML Soln   Inject 1 mL under the skin every 8 hours.  Dose: 5,000 Units     ipratropium-albuterol 0.5-2.5 (3) MG/3ML nebulizer solution  Commonly known as: DUONEB   Take 3 mL by nebulization every four hours as needed for Shortness of Breath.  Dose: 3 mL     lidocaine 5 % Ptch  Commonly known as: LIDODERM   Place 1 Patch on the skin every 24 hours.  Dose: 1 Patch     melatonin 5 mg Tabs   Take 1 Tab by mouth every day.  Dose: 5 mg     midodrine 10 MG tablet  Commonly known as: PROAMATINE   Take 1 Tab by mouth 3 times a day, with meals.  Dose: 10 mg     NS SOLN 50 mL with DAPTOmycin 350 MG SOLR 1,000 mg  Start taking on: February 7, 2021  Notes to patient: Q48 hours   Infuse 1,000 mg into a venous catheter every 48 hours for 8 days.  Dose: 8 mg/kg         * This list has 2 medication(s) that are the same as other medications prescribed for you. Read the directions carefully, and ask  your doctor or other care provider to review them with you.            CHANGE how you take these medications      Instructions   furosemide 80 MG Tabs  What changed:   · medication strength  · how much to take  · when to take this  Commonly known as: LASIX   Take 1 Tab by mouth 2 (two) times a day.  Dose: 80 mg        STOP taking these medications    carvedilol 25 MG Tabs  Commonly known as: COREG     Entresto 49-51 MG Tabs tablet  Generic drug: sacubitril-valsartan     hydrOXYzine HCl 25 MG Tabs  Commonly known as: ATARAX     ibuprofen 600 MG Tabs  Commonly known as: MOTRIN     ROPINIRole 0.5 MG Tabs  Commonly known as: REQUIP     spironolactone 25 MG Tabs  Commonly known as: ALDACTONE              Instructions:   The patient was instructed to return to the ER in the event of worsening symptoms. I have counseled the patient on the importance of compliance and the patient has agreed to proceed with all medical recommendations and follow up plan indicated above.   The patient understands that all medications come with benefits and risks. Risks may include permanent injury or death and these risks can be minimized with close reassessment and monitoring.        Follow-up:   Discharge to [facility]. [facility]    Time Spent on Discharge: 47 minutes

## 2021-02-05 NOTE — CARE PLAN
Problem: Safety  Goal: Will remain free from falls  Outcome: PROGRESSING AS EXPECTED     Problem: Venous Thromboembolism (VTW)/Deep Vein Thrombosis (DVT) Prevention:  Goal: Patient will participate in Venous Thrombosis (VTE)/Deep Vein Thrombosis (DVT)Prevention Measures  Outcome: PROGRESSING AS EXPECTED     Problem: Communication  Goal: The ability to communicate needs accurately and effectively will improve  Description: Pt's whiteboard is updated, pt has been updated on POC, all questions have been answered at this time      Outcome: PROGRESSING AS EXPECTED

## 2021-02-06 NOTE — PROGRESS NOTES
Belongings gathered, discharge paperwork reviewed and report given to Filomena RAINEY at [facility]. Patient was transported via ambulance safely.

## 2021-02-06 NOTE — PROGRESS NOTES
Spoke with [facility] about keeping temp HD cath in place during patient transfer, they said they would prefer patient come with cath and will d/c if neccessary. MD Smith, charge BRIGID Walker, case management all made aware that patient will leave with cath in place.

## 2021-03-28 PROBLEM — R79.89 TROPONIN LEVEL ELEVATED: Status: ACTIVE | Noted: 2021-01-01

## 2021-03-28 PROBLEM — K92.1 GASTROINTESTINAL HEMORRHAGE WITH MELENA: Status: ACTIVE | Noted: 2021-01-01

## 2021-03-28 PROBLEM — K86.2 PANCREATIC CYST: Status: ACTIVE | Noted: 2021-01-01

## 2021-03-28 PROBLEM — K85.91 NECROTIZING PANCREATITIS: Chronic | Status: ACTIVE | Noted: 2021-01-01

## 2021-03-28 PROBLEM — K85.91 NECROTIZING PANCREATITIS: Status: ACTIVE | Noted: 2021-01-01

## 2021-03-28 PROBLEM — K65.1 ABSCESS OF ABDOMINAL CAVITY (HCC): Status: ACTIVE | Noted: 2021-01-01

## 2021-03-28 NOTE — ED NOTES
Med rec updated and complete. Allergies reviewed. Pt requested that this writer speak to his wife. Placed call to confirm current medications and last doses taken.      Home pharmacy [facility]  [phone]

## 2021-03-28 NOTE — H&P
Hospital Medicine History & Physical Note    Date of Service  3/28/2021    Primary Care Physician  Quinten Howe M.D.    Consultants  GI  IR    Code Status  Full Code    Chief Complaint  Chief Complaint   Patient presents with   • Upper GI Bleed     PT was transfered from another facility for a suspected GI bleed. PT received 2 units of blood PTA. PT is A&O x4  VSS       History of Presenting Illness  55 y.o. male who presented 3/27/2021 with blood in his stool since this AM. Patient was seen at an outside facility and reportedly had hemoglobin of 5 and was transfused 2 units of pRBC prior to transfer, with repeat hemoglobin at 7.4. Patient denies any fever, chills, abdominal pain. He is currently on HD after an ONOFRE as sequela of acute pancreatitis.  Patient also had ascites during previous admission where peritoneal drain was placed for what appears to be for comfort care measures. Patient states that at this time, he wishes for all interventions to be done. Initially the drain was non functioning however started to drain after a flush, appears to be light color brownish liquid. CT abdomen was done which shows multiple loculated fluid collection. Currently patient is not actively bleeding and will be admitted for further monitoring.     Review of Systems  Review of Systems   Constitutional: Negative for chills, fever, malaise/fatigue and weight loss.   HENT: Negative.    Eyes: Negative.    Respiratory: Negative.    Cardiovascular: Negative.    Gastrointestinal: Positive for blood in stool and melena. Negative for abdominal pain, nausea and vomiting.   Genitourinary: Negative.    Musculoskeletal: Negative.    Skin: Negative.    Neurological: Negative.    Psychiatric/Behavioral: Negative.        Past Medical History   has a past medical history of Clotting disorder (HCC), Congestive heart failure (HCC) (7/2015), Crohn disease (HCC), Hypertension, PAF (paroxysmal atrial fibrillation) ([facility]), and Sleep  apnea.    Surgical History   has a past surgical history that includes monico by laparoscopy (2020).     Family History  family history includes Cancer in his father and mother; Heart Disease in his paternal grandfather and paternal grandmother.     Social History   reports that he has never smoked. He has never used smokeless tobacco. He reports current alcohol use of about 1.2 oz of alcohol per week. He reports that he does not use drugs.    Allergies  No Known Allergies    Medications  Prior to Admission Medications   Prescriptions Last Dose Informant Patient Reported? Taking?   ROPINIRole (REQUIP) 0.5 MG Tab 3/26/2021 at 2200 Family Member Yes Yes   Sig: Take 2 mg by mouth every bedtime.   acetaminophen (TYLENOL) 500 MG Tab Not Taking at Unknown time Family Member No No   Sig: Take 2 Tabs by mouth every 8 hours.   Patient not taking: Reported on 3/27/2021   carvedilol (COREG) 25 MG Tab 3/27/2021 at 0930 Family Member Yes Yes   Sig: Take 25 mg by mouth 2 times a day.   furosemide (LASIX) 80 MG Tab Not Taking at Unknown time Family Member No No   Sig: Take 1 Tab by mouth 2 (two) times a day.   Patient not taking: Reported on 3/27/2021   heparin 1000 units/mL Solution Not Taking at Unknown time Family Member No No   Si.6 mL by Intracatheter route as needed (Instill 1.3 mL to red port and 1.3 mL to blue port as CVC lock post dialysis).   Patient not taking: Reported on 3/27/2021   heparin 5000 UNIT/ML Solution Not Taking at Unknown time Family Member No No   Sig: Inject 1 mL under the skin every 8 hours.   Patient not taking: Reported on 3/27/2021   ipratropium-albuterol (DUONEB) 0.5-2.5 (3) MG/3ML nebulizer solution Not Taking at Unknown time Family Member No No   Sig: Take 3 mL by nebulization every four hours as needed for Shortness of Breath.   Patient not taking: Reported on 3/27/2021   lidocaine (LIDODERM) 5 % Patch Not Taking at Unknown time Family Member No No   Sig: Place 1 Patch on the skin every  24 hours.   Patient not taking: Reported on 3/27/2021   melatonin 5 mg Tab Not Taking at Unknown time Family Member No No   Sig: Take 1 Tab by mouth every day.   Patient not taking: Reported on 3/27/2021   midodrine (PROAMATINE) 10 MG tablet Not Taking at Unknown time Family Member No No   Sig: Take 1 Tab by mouth 3 times a day, with meals.   Patient not taking: Reported on 3/27/2021   sacubitril-valsartan (ENTRESTO) 49-51 MG Tab tablet 3/27/2021 at 0930 Family Member Yes Yes   Sig: Take 1 tablet by mouth 2 Times a Day.   spironolactone (ALDACTONE) 25 MG Tab 3/27/2021 at 0930 Family Member Yes Yes   Sig: Take 25 mg by mouth every day.      Facility-Administered Medications: None       Physical Exam  Temp:  [36.7 °C (98 °F)] 36.7 °C (98 °F)  Pulse:  [103-128] 109  Resp:  [16] 16  BP: (125-159)/(57-74) 131/64  SpO2:  [87 %-98 %] 95 %    Physical Exam  Vitals and nursing note reviewed.   Constitutional:       General: He is not in acute distress.     Appearance: Normal appearance. He is obese. He is not ill-appearing.   HENT:      Head: Normocephalic and atraumatic.      Mouth/Throat:      Mouth: Mucous membranes are moist.      Pharynx: Oropharynx is clear.   Eyes:      General: No scleral icterus.     Extraocular Movements: Extraocular movements intact.      Conjunctiva/sclera: Conjunctivae normal.      Pupils: Pupils are equal, round, and reactive to light.   Cardiovascular:      Rate and Rhythm: Regular rhythm. Tachycardia present.      Pulses: Normal pulses.      Heart sounds: Normal heart sounds. No murmur.   Pulmonary:      Effort: Pulmonary effort is normal.      Breath sounds: Normal breath sounds. No rhonchi.   Abdominal:      General: Bowel sounds are normal. There is no distension.      Palpations: Abdomen is soft.      Tenderness: There is no abdominal tenderness. There is no guarding.      Comments: Right side catheter in situ draining milky colored brown fluid   Musculoskeletal:         General: Swelling  present. No tenderness.      Cervical back: Normal range of motion and neck supple. No rigidity.   Skin:     General: Skin is warm and dry.   Neurological:      General: No focal deficit present.      Mental Status: He is alert and oriented to person, place, and time. Mental status is at baseline.   Psychiatric:         Mood and Affect: Mood normal.         Behavior: Behavior normal.         Thought Content: Thought content normal.         Judgment: Judgment normal.         Laboratory:  Recent Labs     03/27/21 2115   WBC 5.4   RBC 2.83*   HEMOGLOBIN 7.8*   HEMATOCRIT 25.1*   MCV 88.7   MCH 27.6   MCHC 31.1*   RDW 52.3*   PLATELETCT 119*   MPV 11.2     Recent Labs     03/27/21 2115   SODIUM 136   POTASSIUM 3.8   CHLORIDE 101   CO2 28   GLUCOSE 113*   BUN 17   CREATININE 2.75*   CALCIUM 8.0*     Recent Labs     03/27/21 2115   ALTSGPT <5   ASTSGOT 5*   ALKPHOSPHAT 87   TBILIRUBIN 0.4   GLUCOSE 113*         No results for input(s): NTPROBNP in the last 72 hours.      Recent Labs     03/27/21 2115   TROPONINT 94*       Imaging:  CT-ABDOMEN-PELVIS WITH   Final Result      1.  Large multiloculated walled off necrotic fluid collection replacing the pancreas extends into the mesentery and pelvis. Pigtail catheter is in the right lower quadrant. Smaller fluid collections are adjacent to the gastric fundus and medial liver.      2.  Heterogeneous enhancement of the spleen is likely related to the adjacent fluid collection and altered vascularity related to splenic vein narrowing.      3.  Marked narrowing of the portal vein, portal confluence, splenic vein, and superior mesenteric vein      4.  Bilateral pleural effusions, left greater than right. Adjacent airspace disease may be secondary to atelectasis.      5.  Atherosclerosis.      CT-HEAD W/O   Final Result      No acute intracranial findings.         DX-CHEST-PORTABLE (1 VIEW)   Final Result      1.  Left PICC line tip projects over the left axilla.      2.  Right  internal jugular catheter projects over the superior vena cava. No pneumothorax is identified.      3.  Left pleural effusion. Adjacent airspace disease may be secondary to compressive atelectasis or pneumonia.      4.  Hazy right lung opacification in prominent interstitial is nonspecific and may represent edema versus an infectious/inflammatory process      IR-CONSULT AND TREAT    (Results Pending)         Assessment/Plan:  I anticipate this patient will require at least two midnights for appropriate medical management, necessitating inpatient admission.    * Gastrointestinal hemorrhage with melena- (present on admission)  Assessment & Plan  -GI consult  -Protonix 40mg BID  -Trend H/H  -No history of cirrhosis, will not cover with antibiotics at this time  -Monitor vitals      Necrotizing pancreatitis- (present on admission)  Assessment & Plan  -This is chronic problem and is walled off at this time, no need for antibiotics   -Will obtain IR consult to assess for drainage placement    Essential hypertension- (present on admission)  Assessment & Plan  -Monitor vitals  -Will hold off BP meds for now and resume in AM if BP is stable    CKD (chronic kidney disease)- (present on admission)  Assessment & Plan  -Will obtain nephro consult  -He's had 4 total sessions thus far and is not on scheduled HD  -Monitor electrolytes    Troponin level elevated- (present on admission)  Assessment & Plan  -Likely demand, will trend  -No active pain or ekg changes

## 2021-03-28 NOTE — ASSESSMENT & PLAN NOTE
-GI consult plan for scope on 3.29  -Protonix drip  -Trend H/H  -No history of cirrhosis, will not cover with antibiotics at this time  -Monitor vitals  - PRBCs as needed

## 2021-03-28 NOTE — ASSESSMENT & PLAN NOTE
Right lower quadrant  Has draining tube with loculated abscess  IR to adjust draining tube  ID on board  On unasyn

## 2021-03-28 NOTE — DISCHARGE PLANNING
Care Transition Team Assessment  Pts preferred pharmacy is [facility] on [address] .  Upon discharge, call pts spouse Melina for ride home. [phone].    Information Source  Orientation : Disoriented to Event, Disoriented to Time  Information Given By: Patient  Who is responsible for making decisions for patient? : Patient    Readmission Evaluation  Is this a readmission?: No    Elopement Risk  Legal Hold: No  Ambulatory or Self Mobile in Wheelchair: No-Not an Elopement Risk  Elopement Risk: Not at Risk for Elopement    Interdisciplinary Discharge Planning  Does Admitting Nurse Feel This Could be a Complex Discharge?: No  Primary Care Physician: (Quinten Howe M.D.)  Lives with - Patient's Self Care Capacity: Spouse  Patient or legal guardian wants to designate a caregiver: No  Support Systems: Children, Spouse / Significant Other  Housing / Facility: [address]  Do You Take your Prescribed Medications Regularly: Yes  Able to Return to Previous ADL's: Yes  Mobility Issues: No  Patient Prefers to be Discharged to:: (Home)  Assistance Needed: Unknown at this Time    Discharge Preparedness  What is your plan after discharge?: (Home)  What are your discharge supports?: Child, Spouse  Prior Functional Level: Ambulatory  Difficulity with ADLs: None  Difficulity with IADLs: None    Functional Assesment  Prior Functional Level: Ambulatory    Finances  Financial Barriers to Discharge: No  Prescription Coverage: Yes    Vision / Hearing Impairment  Vision Impairment : No  Hearing Impairment : No    Advance Directive  Advance Directive?: None  Advance Directive offered?: AD Booklet refused    Domestic Abuse  Have you ever been the victim of abuse or violence?: No  Physical Abuse or Sexual Abuse: No  Verbal Abuse or Emotional Abuse: No  Possible Abuse/Neglect Reported to:: Not Applicable    Psychological Assessment  History of Substance Abuse: Alcohol(2-3 beers daily.)  History of Psychiatric Problems: No  Non-compliant  with Treatment: No  Newly Diagnosed Illness: No    Discharge Risks or Barriers  Discharge risks or barriers?: No    Anticipated Discharge Information  Discharge Disposition: Still a Patient (30)  Discharge Address: ([address] [place] [address])  Discharge Contact Phone Number: (Melina Osvaldo (spouse) [phone])

## 2021-03-28 NOTE — PROGRESS NOTES
Discussed with Ariana AYALA. Patient does NOT have fistula on KATTY but DOES have hemodialysis cath to R chest wall. MD would like no labs to be drawn on right arm, BP to be taken on Left lower arm.

## 2021-03-28 NOTE — PROGRESS NOTES
Patient son present and agreeable with patient for blood transfusion. Transfusion to be started per MD order. Vss. Patient declining pain at this time. Transfusion started with Aspen RAINEY

## 2021-03-28 NOTE — CONSULTS
Gastroenterology Consult Note:    Jorje Forde M.D.  Date & Time note created:    3/28/2021   4:55 PM     Referring MD:      Patient ID:   Name:             Shaka Riley   YOB: 1965  Age:                 55 y.o.  male   MRN:               [id number]                                                             Reason for Consult:      Acute on chronic anemia    History of Present Illness:    55-year-old male with a history of prior alcohol dependence, nonischemic cardiomyopathy EF 50%, reported history of Crohn's disease not supported by recent colonoscopy, recent mission discharge for severe acute necrotizing pancreatitis that obliterated most of his parenchyma and since required CT-guided drainage complicated by acute tubular necrosis resulting in hemodialysis, ileus, acute hypoxic respiratory failure presents with bloody stool    Is visibly slowed mentation and speech.  He is also aware of this.  He states that on day of presentation he had some stool softeners had a runny dark red stool.  This occurred after 3 to 4 days of 0 stools.  He states that his bowel habits vary based now much pain medication he is getting.  Denies nausea vomiting abdominal pain fevers chills dysphagia and odynophagia.  Records reviewed.  2018 colonoscopy with Dr. Hussein found no gross lesions.    Review of Systems:      Constitutional: Denies fevers, Denies weight changes  Eyes: Denies changes in vision, no eye pain  Ears/Nose/Throat/Mouth: Denies nasal congestion or sore throat   Cardiovascular: Denies chest pain or palpitations.  Respiratory: Denies shortness of breath, cough, and wheezing.  Gastrointestinal/Hepatic: Denies abdominal pain, nausea, vomiting, diarrhea, constipation or GI bleeding   Genitourinary: Denies dysuria or frequency  Musculoskeletal/Rheum: Denies  joint pain and swelling, minimal edema  Skin: Denies rash  Neurological: Denies headache, confusion, memory loss or focal  weakness/parasthesias  Psychiatric: denies mood disorder   Endocrine: Pauline thyroid problems  Heme/Oncology/Lymph Nodes: Denies enlarged lymph nodes, denies brusing or known bleeding disorder  All other systems were reviewed and are negative (AMA/CMS criteria)                Past Medical History:   Past Medical History:   Diagnosis Date   • Clotting disorder (HCC)     PE   • Congestive heart failure (HCC) 7/2015    EF 30-35%; Dilated R atrium; LVH; Mild MR; Mild AI; Mild TR   • Crohn disease (HCC)    • Hypertension    • PAF (paroxysmal atrial fibrillation) (HCC)    • Sleep apnea     un-diagnosed and pending sleep study         Past Surgical History:  Past Surgical History:   Procedure Laterality Date   • CRYSTAL BY LAPAROSCOPY  7/29/2020    Procedure: CHOLECYSTECTOMY, LAPAROSCOPIC;  Surgeon: Caroline Skelton M.D.;  Location: SURGERY [facility];  Service: [facility] Medications:    Current Facility-Administered Medications:   •  pantoprazole (PROTONIX) 80 mg in  mL Infusion, 8 mg/hr, Intravenous, Continuous, Gretchen Lane M.D., Last Rate: 25 mL/hr at 03/28/21 1254, 8 mg/hr at 03/28/21 1254  •  ampicillin/sulbactam (UNASYN) 3 g in  mL IVPB, 3 g, Intravenous, Q8HRS, Daniel Wagner D.O.  •  carvedilol (COREG) tablet 25 mg, 25 mg, Oral, BID WITH MEALS, Allison Frye M.D., 25 mg at 03/28/21 0754  •  ROPINIRole (REQUIP) tablet 2 mg, 2 mg, Oral, QHS, Allison Frye M.D., 2 mg at 03/28/21 0103  •  sacubitril-valsartan (ENTRESTO) 49-51 MG tablet 1 tablet, 1 tablet, Oral, BID, Allison Frye M.D., 1 tablet at 03/28/21 0528  •  spironolactone (ALDACTONE) tablet 25 mg, 25 mg, Oral, DAILY, Allison Frye M.D., 25 mg at 03/28/21 0528    Current Outpatient Medications:  Current Facility-Administered Medications   Medication Dose Route Frequency Provider Last Rate Last Admin   • pantoprazole (PROTONIX) 80 mg in  mL Infusion  8 mg/hr Intravenous Continuous Gretchen Lane M.D. 25 mL/hr at 03/28/21 1254 8 mg/hr at  03/28/21 1254   • ampicillin/sulbactam (UNASYN) 3 g in  mL IVPB  3 g Intravenous Q8HRS Daniel Wagner D.O.       • carvedilol (COREG) tablet 25 mg  25 mg Oral BID WITH MEALS Allison Frye M.D.   25 mg at 03/28/21 0754   • ROPINIRole (REQUIP) tablet 2 mg  2 mg Oral QHS Allison Frye M.D.   2 mg at 03/28/21 0103   • sacubitril-valsartan (ENTRESTO) 49-51 MG tablet 1 tablet  1 tablet Oral BID Allison Frye M.D.   1 tablet at 03/28/21 0528   • spironolactone (ALDACTONE) tablet 25 mg  25 mg Oral DAILY Allison Frye M.D.   25 mg at 03/28/21 0528       Medication Allergy:  Allergies   Allergen Reactions   • Fentanyl      Hallucination         Family History:  Family History   Problem Relation Age of Onset   • Cancer Mother    • Heart Disease Paternal Grandmother    • Heart Disease Paternal Grandfather    • Cancer Father         paralysis from accident       Social History:  Social History     Socioeconomic History   • Marital status:      Spouse name: Not on file   • Number of children: Not on file   • Years of education: Not on file   • Highest education level: Not on file   Occupational History   • Not on file   Tobacco Use   • Smoking status: Never Smoker   • Smokeless tobacco: Never Used   Substance and Sexual Activity   • Alcohol use: Yes     Alcohol/week: 1.2 oz     Types: 2 Glasses of wine per week     Comment: Quit in 1994 - previous six beer after work, 2-3 beers per day currently   • Drug use: No     Types: Methamphetamines     Comment: Quit in 2012, used for 5 years   • Sexual activity: Yes     Partners: Female   Other Topics Concern   • Not on file   Social History Narrative   • Not on file     Social Determinants of Health     Financial Resource Strain:    • Difficulty of Paying Living Expenses:    Food Insecurity:    • Worried About Running Out of Food in the Last Year:    • Ran Out of Food in the Last Year:    Transportation Needs:    • Lack of Transportation (Medical):    • Lack of Transportation  "(Non-Medical):    Physical Activity:    • Days of Exercise per Week:    • Minutes of Exercise per Session:    Stress:    • Feeling of Stress :    Social Connections:    • Frequency of Communication with Friends and Family:    • Frequency of Social Gatherings with Friends and Family:    • Attends Confucianist Services:    • Active Member of Clubs or Organizations:    • Attends Club or Organization Meetings:    • Marital Status:    Intimate Partner Violence:    • Fear of Current or Ex-Partner:    • Emotionally Abused:    • Physically Abused:    • Sexually Abused:          Physical Exam:  Vitals/ General Appearance:   Weight/BMI: Body mass index is 30.41 kg/m².  BP (!) 95/50   Pulse 86   Temp 37 °C (98.6 °F) (Tympanic)   Resp 20   Ht 1.753 m (5' 9\")   Wt 93.4 kg (205 lb 14.6 oz)   SpO2 91%   Vitals:    03/28/21 0732 03/28/21 1500 03/28/21 1537 03/28/21 1606   BP: 120/59 (!) 94/50 (!) 96/47 (!) 95/50   Pulse: (!) 114 80 85 86   Resp: 18 17 20 20   Temp: 37.1 °C (98.8 °F) 36.3 °C (97.3 °F) 36.8 °C (98.3 °F) 37 °C (98.6 °F)   TempSrc: Temporal Temporal Tympanic Tympanic   SpO2: 97% 92% 92% 91%   Weight:       Height:         Oxygen Therapy:  Pulse Oximetry: 91 %, O2 (LPM): 2, O2 Delivery Device: Nasal Cannula    Constitutional:   Well developed, Well nourished, No acute distress  HENMT:  Normocephalic, Atraumatic, Oropharynx moist mucous membranes, No oral exudates, Nose normal.  No thyromegaly.  Eyes:  EOMI, Conjunctiva normal, No discharge.  Neck:  Normal range of motion, No cervical tenderness,  no JVD.  Cardiovascular:  Normal heart rate, Normal rhythm, No murmurs, No rubs, No gallops.   Extremitites with intact distal pulses, no cyanosis, or edema.  Lungs:  Normal breath sounds, breath sounds clear to auscultation bilaterally,  no crackles, no wheezing.   Abdomen: Bowel sounds normal, Soft, No tenderness, No guarding, No rebound, No masses, No hepatosplenomegaly.  Right lower quadrant drain in place with gray " fluid  Skin: Warm, Dry, No erythema, No rash, no induration.  Neurologic: Alert & oriented x 3, No focal deficits noted, cranial nerves II through X are grossly intact.  Psychiatric: Affect normal, Judgment normal, Mood normal.      MDM (Data Review):     Records reviewed and summarized in current documentation    Lab Data Review:  Recent Results (from the past 24 hour(s))   CBC WITH DIFFERENTIAL    Collection Time: 03/27/21  9:15 PM   Result Value Ref Range    WBC 5.4 4.8 - 10.8 K/uL    RBC 2.83 (L) 4.70 - 6.10 M/uL    Hemoglobin 7.8 (L) 14.0 - 18.0 g/dL    Hematocrit 25.1 (L) 42.0 - 52.0 %    MCV 88.7 81.4 - 97.8 fL    MCH 27.6 27.0 - 33.0 pg    MCHC 31.1 (L) 33.7 - 35.3 g/dL    RDW 52.3 (H) 35.9 - 50.0 fL    Platelet Count 119 (L) 164 - 446 K/uL    MPV 11.2 9.0 - 12.9 fL    Neutrophils-Polys 75.40 (H) 44.00 - 72.00 %    Lymphocytes 18.40 (L) 22.00 - 41.00 %    Monocytes 0.00 0.00 - 13.40 %    Eosinophils 3.50 0.00 - 6.90 %    Basophils 0.90 0.00 - 1.80 %    Nucleated RBC 0.00 /100 WBC    Neutrophils (Absolute) 4.17 1.82 - 7.42 K/uL    Lymphs (Absolute) 0.99 (L) 1.00 - 4.80 K/uL    Monos (Absolute) 0.00 0.00 - 0.85 K/uL    Eos (Absolute) 0.19 0.00 - 0.51 K/uL    Baso (Absolute) 0.05 0.00 - 0.12 K/uL    NRBC (Absolute) 0.00 K/uL    Hypochromia 1+     Anisocytosis 1+     Macrocytosis 1+     Microcytosis 1+    COMP METABOLIC PANEL    Collection Time: 03/27/21  9:15 PM   Result Value Ref Range    Sodium 136 135 - 145 mmol/L    Potassium 3.8 3.6 - 5.5 mmol/L    Chloride 101 96 - 112 mmol/L    Co2 28 20 - 33 mmol/L    Anion Gap 7.0 7.0 - 16.0    Glucose 113 (H) 65 - 99 mg/dL    Bun 17 8 - 22 mg/dL    Creatinine 2.75 (H) 0.50 - 1.40 mg/dL    Calcium 8.0 (L) 8.5 - 10.5 mg/dL    AST(SGOT) 5 (L) 12 - 45 U/L    ALT(SGPT) <5 2 - 50 U/L    Alkaline Phosphatase 87 30 - 99 U/L    Total Bilirubin 0.4 0.1 - 1.5 mg/dL    Albumin 2.2 (L) 3.2 - 4.9 g/dL    Total Protein 6.9 6.0 - 8.2 g/dL    Globulin 4.7 (H) 1.9 - 3.5 g/dL    A-G  Ratio 0.5 g/dL   LACTIC ACID    Collection Time: 03/27/21  9:15 PM   Result Value Ref Range    Lactic Acid 1.2 0.5 - 2.0 mmol/L   MAGNESIUM    Collection Time: 03/27/21  9:15 PM   Result Value Ref Range    Magnesium 1.6 1.5 - 2.5 mg/dL   TROPONIN    Collection Time: 03/27/21  9:15 PM   Result Value Ref Range    Troponin T 94 (H) 6 - 19 ng/L   BLOOD CULTURE    Collection Time: 03/27/21  9:15 PM    Specimen: Peripheral; Blood   Result Value Ref Range    Significant Indicator NEG     Source BLD     Site PERIPHERAL     Culture Result       No Growth  Note: Blood cultures are incubated for 5 days and  are monitored continuously.Positive blood cultures  are called to the RN and reported as soon as  they are identified.     AMMONIA    Collection Time: 03/27/21  9:15 PM   Result Value Ref Range    Ammonia 36 11 - 45 umol/L   DIAGNOSTIC ALCOHOL    Collection Time: 03/27/21  9:15 PM   Result Value Ref Range    Diagnostic Alcohol <10.1 0.0 - 10.0 mg/dL   DIFFERENTIAL MANUAL    Collection Time: 03/27/21  9:15 PM   Result Value Ref Range    Bands-Stabs 1.80 0.00 - 10.00 %    Manual Diff Status PERFORMED    PERIPHERAL SMEAR REVIEW    Collection Time: 03/27/21  9:15 PM   Result Value Ref Range    Peripheral Smear Review see below    PLATELET ESTIMATE    Collection Time: 03/27/21  9:15 PM   Result Value Ref Range    Plt Estimation Decreased    MORPHOLOGY    Collection Time: 03/27/21  9:15 PM   Result Value Ref Range    RBC Morphology Present     Polychromia 1+    ESTIMATED GFR    Collection Time: 03/27/21  9:15 PM   Result Value Ref Range    GFR If  29 (A) >60 mL/min/1.73 m 2    GFR If Non  24 (A) >60 mL/min/1.73 m 2   ABO Rh Confirm    Collection Time: 03/27/21  9:15 PM   Result Value Ref Range    ABO Rh Confirm A POS    SARS-CoV-2 PCR (24 hour In-House): Collect NP swab in VTM    Collection Time: 03/27/21 10:15 PM    Specimen: Nasopharyngeal; Respirate   Result Value Ref Range    SARS-CoV-2 Source  NP Swab    COD (Adult) - Type and Crossmatch only order if transfusing RBC'S    Collection Time: 03/28/21  1:37 AM   Result Value Ref Range    ABO Grouping Only A     Rh Grouping Only POS     Antibody Screen-Cod NEG     Component R       R99                 Red Cells, LR       [id number]   issued       03/28/21   15:24      Product Type R99     Dispense Status issued     Unit Number (Barcoded) [id number]     Product Code (Barcoded) R6752P08     Blood Type (Barcoded) 6200    LACTIC ACID    Collection Time: 03/28/21  1:38 AM   Result Value Ref Range    Lactic Acid 1.5 0.5 - 2.0 mmol/L   CBC without Differential    Collection Time: 03/28/21  1:38 AM   Result Value Ref Range    WBC 6.9 4.8 - 10.8 K/uL    RBC 2.77 (L) 4.70 - 6.10 M/uL    Hemoglobin 7.6 (L) 14.0 - 18.0 g/dL    Hematocrit 24.9 (L) 42.0 - 52.0 %    MCV 89.9 81.4 - 97.8 fL    MCH 27.4 27.0 - 33.0 pg    MCHC 30.5 (L) 33.7 - 35.3 g/dL    RDW 53.1 (H) 35.9 - 50.0 fL    Platelet Count 107 (L) 164 - 446 K/uL    MPV 11.4 9.0 - 12.9 fL   Comp Metabolic Panel (CMP)    Collection Time: 03/28/21  1:38 AM   Result Value Ref Range    Sodium 134 (L) 135 - 145 mmol/L    Potassium 3.6 3.6 - 5.5 mmol/L    Chloride 97 96 - 112 mmol/L    Co2 27 20 - 33 mmol/L    Anion Gap 10.0 7.0 - 16.0    Glucose 108 (H) 65 - 99 mg/dL    Bun 20 8 - 22 mg/dL    Creatinine 3.16 (H) 0.50 - 1.40 mg/dL    Calcium 8.0 (L) 8.5 - 10.5 mg/dL    AST(SGOT) <5 (L) 12 - 45 U/L    ALT(SGPT) <5 2 - 50 U/L    Alkaline Phosphatase 85 30 - 99 U/L    Total Bilirubin 0.3 0.1 - 1.5 mg/dL    Albumin 2.0 (L) 3.2 - 4.9 g/dL    Total Protein 6.7 6.0 - 8.2 g/dL    Globulin 4.7 (H) 1.9 - 3.5 g/dL    A-G Ratio 0.4 g/dL   TROPONIN    Collection Time: 03/28/21  1:38 AM   Result Value Ref Range    Troponin T 94 (H) 6 - 19 ng/L   ESTIMATED GFR    Collection Time: 03/28/21  1:38 AM   Result Value Ref Range    GFR If African American 25 (A) >60 mL/min/1.73 m 2    GFR If Non African American 20 (A) >60 mL/min/1.73 m 2    Prothrombin Time    Collection Time: 03/28/21  9:38 AM   Result Value Ref Range    PT 20.2 (H) 12.0 - 14.6 sec    INR 1.67 (H) 0.87 - 1.13   HGB    Collection Time: 03/28/21  9:38 AM   Result Value Ref Range    Hemoglobin 7.0 (L) 14.0 - 18.0 g/dL   PERIPHERAL SMEAR REVIEW    Collection Time: 03/28/21  9:38 AM   Result Value Ref Range    Peripheral Smear Review see below    PLATELET ESTIMATE    Collection Time: 03/28/21  9:38 AM   Result Value Ref Range    Plt Estimation Decreased    MORPHOLOGY    Collection Time: 03/28/21  9:38 AM   Result Value Ref Range    RBC Morphology Present     Anisocytosis 1+     Macrocytosis 1+    DIFFERENTIAL MANUAL    Collection Time: 03/28/21  9:38 AM   Result Value Ref Range    Neutrophils-Polys 81.40 (H) 44.00 - 72.00 %    Lymphocytes 1.80 (L) 22.00 - 41.00 %    Monocytes 0.90 0.00 - 13.40 %    Eosinophils 1.80 0.00 - 6.90 %    Basophils 2.60 (H) 0.00 - 1.80 %    Neutrophils (Absolute) 6.04 1.82 - 7.42 K/uL    Lymphs (Absolute) 0.12 (L) 1.00 - 4.80 K/uL    Monos (Absolute) 0.06 0.00 - 0.85 K/uL    Eos (Absolute) 0.12 0.00 - 0.51 K/uL    Baso (Absolute) 0.17 (H) 0.00 - 0.12 K/uL    Bands-Stabs 11.50 (H) 0.00 - 10.00 %    Manual Diff Status PERFORMED        Imaging/Procedures Review:    IMPRESSION:     1.  Large multiloculated walled off necrotic fluid collection replacing the pancreas extends into the mesentery and pelvis. Pigtail catheter is in the right lower quadrant. Smaller fluid collections are adjacent to the gastric fundus and medial liver.     2.  Heterogeneous enhancement of the spleen is likely related to the adjacent fluid collection and altered vascularity related to splenic vein narrowing.     3.  Marked narrowing of the portal vein, portal confluence, splenic vein, and superior mesenteric vein     4.  Bilateral pleural effusions, left greater than right. Adjacent airspace disease may be secondary to atelectasis.     5.  Atherosclerosis.      MDM (Assessment and Plan):      Patient Active Problem List    Diagnosis Date Noted   • Abscess of abdominal cavity ([facility]) 03/28/2021   • Pancytopenia ([facility]) 02/01/2021   • Bacteremia due to methicillin susceptible Staphylococcus aureus (MSSA) 01/30/2021   • Ileus ([facility]) 01/18/2021   • Acute respiratory failure with hypoxia and hypercapnia ([facility]) 01/16/2021   • Pancreatic cyst 01/14/2021   • Acute cholecystitis 07/29/2020   • ESRD (end stage renal disease) on dialysis ([facility]) 05/06/2014   • Delirium 02/03/2021   • Hyperglycemia 07/20/2016   • Paroxysmal atrial fibrillation ([facility]) 08/03/2015   • Snoring 07/07/2015   • Essential hypertension 05/06/2014   • Wound of gluteal cleft, subsequent encounter 02/04/2021   • Hypocalcemia 01/16/2021   • Hypertriglyceridemia 01/15/2021   • Chronic back pain 12/13/2017   • Nonischemic cardiomyopathy ([facility]) 08/01/2017   • Restless leg syndrome 02/01/2017   • Obesity (BMI 30-39.9) 12/06/2016   • Crohn disease ([facility]) 07/23/2015   • Gastrointestinal hemorrhage with melena 03/28/2021   • Troponin level elevated 03/28/2021   • Anxiety 02/21/2020   • Screening for colon cancer 12/13/2017   • AK (actinic keratosis) 12/13/2017   • Stage C chronic systolic congestive heart failure ([facility]) 03/09/2017     Is unlikely to have developed a lower GI lesion in 3 years to explain his bloody stool.  Conceivably could have a stercoral ulcer from his constipation.  We will plan doing an upper endoscopy to out lesions like esophagitis or peptic ulcer disease which can cause more occult forms of anemia from the upper GI tract.     Given the size of his fluid collection and variation in geographic location of expertise in establishing cystgastrostomy for pancreatic necrosectomy, agree with IR consult to discuss positioning of his drain.      -transfuse PRBC for goal Hgb >7  -insert two large bore Ivs  -serial CBCs  N.p.o. after midnight except for sips of meds  Can continue PPI  Limit narcotics  Change as needed bowel regimen to scheduled bowel  regimen with 3 times a day MiraLAX once a day rectal Dulcolax; failing that we will consider injection of Relistor based on kilograms weight    Thank your for the opportunity to assist in the care of your patient.  Please call for any questions or concerns.    I spent more than 55 minutes in assessment, management, and care with the patient and/or family.      Jorje Forde M.D.

## 2021-03-28 NOTE — PROGRESS NOTES
Hospital Medicine Daily Progress Note    Date of Service  3/28/2021    Chief Complaint  55 y.o. male admitted 3/27/2021 with GI bleeding    Hospital Course  No notes on file    Interval Problem Update  Mild confused, poor historian. Was admitted to [facility] for RLQ abdominal abscess for about 2 weeks, on unasyn, had draining tube placed on 3/22(?), developed GI bleeding with Hb 5, transferred to [facility] for GI service. Patient was placed on NPO, IV PPI and plan on scope on 3/29; Patient is a Jehovah Witness but is agreeable to receiving blood transfusion. 3 units blood transfusion and trending Hb.  ID on board, for abscess comanagement  ESRD on HD(MWF)  GI on board for scope, no plan for pancreatic cyst, likely reasonable for IR to drain it.    Per nephrology, patient is oliguria, 100-200ml/day, will have bladder scan prn, straight cath prn.   [facility] had discussion with daughter and wife about his code status, family wants to try for anther week before they can make decsion  Consultants/Specialty  GI  ID  nephro    Code Status  Full Code    Disposition  home    Review of Systems  Review of Systems   Unable to perform ROS: Medical condition        Physical Exam  Temp:  [36.3 °C (97.4 °F)-37.1 °C (98.8 °F)] 37.1 °C (98.8 °F)  Pulse:  [103-128] 114  Resp:  [16-20] 18  BP: (111-159)/(57-74) 120/59  SpO2:  [87 %-98 %] 97 %    Physical Exam  Vitals and nursing note reviewed.   Constitutional:       General: He is not in acute distress.     Appearance: Normal appearance.   HENT:      Head: Normocephalic and atraumatic.      Nose: Nose normal. No congestion or rhinorrhea.      Mouth/Throat:      Pharynx: Oropharynx is clear. No posterior oropharyngeal erythema.   Eyes:      Extraocular Movements: Extraocular movements intact.      Conjunctiva/sclera: Conjunctivae normal.      Pupils: Pupils are equal, round, and reactive to light.   Neck:      Vascular: No carotid bruit.   Cardiovascular:      Rate and Rhythm:  Normal rate and regular rhythm.      Pulses: Normal pulses.      Heart sounds: Normal heart sounds.   Pulmonary:      Effort: Pulmonary effort is normal. No respiratory distress.      Breath sounds: Normal breath sounds. No wheezing.   Chest:      Chest wall: No tenderness.   Abdominal:      General: Abdomen is flat. Bowel sounds are normal. There is distension.      Palpations: Abdomen is soft.      Tenderness: There is no abdominal tenderness. There is no guarding or rebound.      Comments: Right lower abdomen draining tube   Musculoskeletal:         General: Normal range of motion.      Cervical back: Normal range of motion and neck supple.      Left lower leg: No edema.      Comments: Right upper chest  tunneled dialysis catheter    Skin:     General: Skin is warm.   Neurological:      General: No focal deficit present.      Mental Status: He is alert and oriented to person, place, and time.      Cranial Nerves: No cranial nerve deficit.      Motor: No weakness.      Gait: Gait normal.      Deep Tendon Reflexes: Reflexes normal.   Psychiatric:         Mood and Affect: Mood normal.         Behavior: Behavior normal.         Judgment: Judgment normal.         Fluids    Intake/Output Summary (Last 24 hours) at 3/28/2021 1420  Last data filed at 3/28/2021 0552  Gross per 24 hour   Intake 490.3 ml   Output 450 ml   Net 40.3 ml       Laboratory  Recent Labs     03/27/21  2115 03/28/21  0138 03/28/21  0938   WBC 5.4 6.9  --    RBC 2.83* 2.77*  --    HEMOGLOBIN 7.8* 7.6* 7.0*   HEMATOCRIT 25.1* 24.9*  --    MCV 88.7 89.9  --    MCH 27.6 27.4  --    MCHC 31.1* 30.5*  --    RDW 52.3* 53.1*  --    PLATELETCT 119* 107*  --    MPV 11.2 11.4  --      Recent Labs     03/27/21  2115 03/28/21  0138   SODIUM 136 134*   POTASSIUM 3.8 3.6   CHLORIDE 101 97   CO2 28 27   GLUCOSE 113* 108*   BUN 17 20   CREATININE 2.75* 3.16*   CALCIUM 8.0* 8.0*     Recent Labs     03/28/21  0938   INR 1.67*               Imaging  CT-ABDOMEN-PELVIS  WITH   Final Result      1.  Large multiloculated walled off necrotic fluid collection replacing the pancreas extends into the mesentery and pelvis. Pigtail catheter is in the right lower quadrant. Smaller fluid collections are adjacent to the gastric fundus and medial liver.      2.  Heterogeneous enhancement of the spleen is likely related to the adjacent fluid collection and altered vascularity related to splenic vein narrowing.      3.  Marked narrowing of the portal vein, portal confluence, splenic vein, and superior mesenteric vein      4.  Bilateral pleural effusions, left greater than right. Adjacent airspace disease may be secondary to atelectasis.      5.  Atherosclerosis.      CT-HEAD W/O   Final Result      No acute intracranial findings.         DX-CHEST-PORTABLE (1 VIEW)   Final Result      1.  Left PICC line tip projects over the left axilla.      2.  Right internal jugular catheter projects over the superior vena cava. No pneumothorax is identified.      3.  Left pleural effusion. Adjacent airspace disease may be secondary to compressive atelectasis or pneumonia.      4.  Hazy right lung opacification in prominent interstitial is nonspecific and may represent edema versus an infectious/inflammatory process      CT-DRAIN-PERITONEAL    (Results Pending)        Assessment/Plan  * Gastrointestinal hemorrhage with melena- (present on admission)  Assessment & Plan  -GI consult plan for scope on 3.29  -Protonix drip  -Trend H/H  -No history of cirrhosis, will not cover with antibiotics at this time  -Monitor vitals  - PRBCs as needed      Abscess of abdominal cavity (HCC)- (present on admission)  Assessment & Plan  Right lower quadrant  Has draining tube with loculated abscess  IR to adjust draining tube  ID on board  On unasyn      Pancreatic cyst- (present on admission)  Assessment & Plan  -This is chronic problem and is walled off at this time, no need for antibiotics   -may benefit from IR consult to  assess for drainage placement    ESRD (end stage renal disease) on dialysis (HCC)- (present on admission)  Assessment & Plan  MWF  nephro consult ON BOARD  Monitor electrolytes    Per nephrology, patient is oliguria, 100-200ml/day, will have bladder scan prn, straight cath prn.   -intake and output  -renal dose medication      Essential hypertension- (present on admission)  Assessment & Plan  -Monitor vitals  -Will hold off BP meds for now and resume in AM if BP is stable    Troponin level elevated- (present on admission)  Assessment & Plan  -Likely demand, will trend  -No active pain or ekg changes       VTE prophylaxis: scd

## 2021-03-28 NOTE — PROGRESS NOTES
Patient gave this RN permission to speak with daughter Lilibeth regarding care    Daughter returned phone call to this RN.     Phone number left for [facility] ([phone])

## 2021-03-28 NOTE — ED PROVIDER NOTES
ED Provider Note    CHIEF COMPLAINT  Chief Complaint   Patient presents with   • Lower GI Bleed     PT was transfered from another facility for a suspected GI bleed. PT received 2 units of blood PTA. PT is A&O x4  VSS     HPI  Patient is a 55-year-old male with a history of pancreatitis, recent kidney failure currently on dialysis who presents as a transfer from outside facility for suspected GI bleeding.  The patient apparently had some bloody stools prior to arrival at their facility and lab work showed anemia requiring 2 units of blood administration.  At the time my evaluation, the patient has just received morphine and is somewhat of a poor historian based on the recent events.  He has had poor drainage out of a right lower quadrant abdominal drain, notes that he has a clot in his right upper venous structures on the right arm and has missed dialysis on several occasions secondary to not feeling well.    PPE Note: I personally donned full PPE for all patient encounters during this visit, including being clean-shaven with an N95 respirator mask, gloves, and goggles.     Review of the recent hospitalization note from 2/5/2021 shows that the patient has a prior history of alcohol use, nonischemic cardiomyopathy Crohn's disease, hypertension, cholecystectomy presented on 1/14 for pancreatitis.  Have been seen by [facility] nephrology:    Later: Information was obtained from [facility] where it was noted that the patient is DNR/DNI, has had some intermittent ascites and they noted that he had a drain placed for possible peritoneal drainage    REVIEW OF SYSTEMS  See HPI for further details. All other systems are negative.  Patient is a poor historian    PAST MEDICAL HISTORY   has a past medical history of Clotting disorder (HCC), Congestive heart failure (HCC) (7/2015), Crohn disease (HCC), Hypertension, PAF (paroxysmal atrial fibrillation) ([facility]), and Sleep apnea.    SOCIAL HISTORY  Social  "History     Tobacco Use   • Smoking status: Never Smoker   • Smokeless tobacco: Never Used   Substance and Sexual Activity   • Alcohol use: Yes     Alcohol/week: 1.2 oz     Types: 2 Glasses of wine per week     Comment: Quit in 1994 - previous six beer after work, 2-3 beers per day currently   • Drug use: No     Types: Methamphetamines     Comment: Quit in 2012, used for 5 years   • Sexual activity: Yes     Partners: Female     SURGICAL HISTORY   has a past surgical history that includes monico by laparoscopy (7/29/2020).    CURRENT MEDICATIONS  Home Medications     Reviewed by Barbara Aggarwal (Pharmacy Tech) on 03/27/21 at 2242  Med List Status: Complete   Medication Last Dose Status   acetaminophen (TYLENOL) 500 MG Tab Not Taking Active   carvedilol (COREG) 25 MG Tab 3/27/2021 Active   furosemide (LASIX) 80 MG Tab Not Taking Active   heparin 1000 units/mL Solution Not Taking Active   heparin 5000 UNIT/ML Solution Not Taking Active   ipratropium-albuterol (DUONEB) 0.5-2.5 (3) MG/3ML nebulizer solution Not Taking Active   lidocaine (LIDODERM) 5 % Patch Not Taking Active   melatonin 5 mg Tab Not Taking Active   midodrine (PROAMATINE) 10 MG tablet Not Taking Active   ROPINIRole (REQUIP) 0.5 MG Tab 3/26/2021 Active   sacubitril-valsartan (ENTRESTO) 49-51 MG Tab tablet 3/27/2021 Active   spironolactone (ALDACTONE) 25 MG Tab 3/27/2021 Active              ALLERGIES  No Known Allergies    PHYSICAL EXAM  VITAL SIGNS: /74   Pulse (!) 103   Temp 36.7 °C (98 °F) (Temporal)   Resp 16   Ht 1.753 m (5' 9\")   Wt 109 kg (240 lb)   SpO2 98%   BMI 35.44 kg/m²   Pulse ox interpretation: I interpret this pulse ox as normal.  Genl: Chronically ill M, lying in gurney comfortably, speaking slightly slurred, appears in mild distress   Head: NC/AT   ENT: Mucous membranes slightly dry, posterior pharynx clear, uvula midline, nares patent bilaterally   Eyes: Normal sclera, pupils equal round reactive to light  Neck: Supple, " FROM, no LAD appreciated   Pulmonary: Lungs are clear to auscultation bilaterally  Chest: No TTP  CV:  RRR, no murmur appreciated, pulses 2+ in both upper and lower extremities,  Abdomen: Moderate amounts of fullness, increased habitus with a difficult exam.  He has a anterior chest wall port, right lower drain, no localizing tenderness with palpation, mild distention,  no rebound/guarding, unable to assess for hepatosplenomegaly based on habitus.  : no CVA tenderness.  Normal external male genitalia.  Musculoskeletal: Pain free ROM of the neck. Moving upper and lower extremities and spontaneous in coordinated fashion  Neuro: A&Ox2 (person, place), speech fluent, gait not assessed, no focal deficits appreciated, No cerebellar signs. Sensation is grossly intact in the distal upper and lower extremities.  5/5 strength in  and dorsiflexion/plantar flexion of the ankles  Psych: Patient has an appropriate affect and behavior  Skin: No rash or lesions.  No pallor or jaundice.  No cyanosis.  Warm and dry.     DIAGNOSTIC STUDIES / PROCEDURES    EKG  Results for orders placed or performed during the hospital encounter of 21   EKG   Result Value Ref Range    Report       [facility]    Test Date:  2021  Pt Name:    SHEBA MERAZ               [place] [facility]  [place]N:        [id number]                      Room:       R108  Gender:     Male                         Technician: OG  :        [id number]                   Requested By:JEN ATKINSON  Order #:    [id number]                    Reading MD: Jose Underwood MD    Measurements  Intervals                                Axis  Rate:       104                          P:          55  IN:         184                          QRS:        -44  QRSD:       100                          T:          32  QT:         344  QTc:        453    Interpretive Statements  SINUS TACHYCARDIA  LEFT ANTERIOR FASCICULAR BLOCK  ANTERIOR INFARCT, OLD  Compared to ECG  "01/14/2021 18:51:55    First degree AV block no longer present  T-wave abnormality no longer present  Possible ischemia no longer present  Myocardial infarct finding still present  Electronically Signed On 1- 6:37:44 PST by Jose Underwood MD        LABS  Labs Reviewed   CBC WITH DIFFERENTIAL - Abnormal; Notable for the following components:       Result Value    RBC 2.83 (*)     Hemoglobin 7.8 (*)     Hematocrit 25.1 (*)     MCHC 31.1 (*)     RDW 52.3 (*)     Platelet Count 119 (*)     Neutrophils-Polys 75.40 (*)     Lymphocytes 18.40 (*)     Lymphs (Absolute) 0.99 (*)     All other components within normal limits   COMP METABOLIC PANEL - Abnormal; Notable for the following components:    Glucose 113 (*)     Creatinine 2.75 (*)     Calcium 8.0 (*)     AST(SGOT) 5 (*)     Albumin 2.2 (*)     Globulin 4.7 (*)     All other components within normal limits   TROPONIN - Abnormal; Notable for the following components:    Troponin T 94 (*)     All other components within normal limits   ESTIMATED GFR - Abnormal; Notable for the following components:    GFR If  29 (*)     GFR If Non  24 (*)     All other components within normal limits   LACTIC ACID   MAGNESIUM   BLOOD CULTURE    Narrative:     1 of 2 for Blood Culture x 2 sites order. Per Hospital  Policy: Only change Specimen Src: to \"Line\" if specified by  physician order.   BLOOD CULTURE    Narrative:     2 of 2 blood culture x2  Sites order. Per Hospital Policy:  Only change Specimen Src: to \"Line\" if specified by physician  order.   AMMONIA   DIAGNOSTIC ALCOHOL   SARS-COV-2, PCR (IN-HOUSE)    Narrative:     Have you been in close contact with a person who is suspected  or known to be positive for COVID-19 within the last 30 days  (e.g. last seen that person < 30 days ago)->No   DIFFERENTIAL MANUAL   PERIPHERAL SMEAR REVIEW   PLATELET ESTIMATE   MORPHOLOGY   LACTIC ACID   LACTIC ACID   URINALYSIS   CULTURE WOUND W/ GRAM STAIN "     RADIOLOGY  CT-ABDOMEN-PELVIS WITH   Final Result      1.  Large multiloculated walled off necrotic fluid collection replacing the pancreas extends into the mesentery and pelvis. Pigtail catheter is in the right lower quadrant. Smaller fluid collections are adjacent to the gastric fundus and medial liver.      2.  Heterogeneous enhancement of the spleen is likely related to the adjacent fluid collection and altered vascularity related to splenic vein narrowing.      3.  Marked narrowing of the portal vein, portal confluence, splenic vein, and superior mesenteric vein      4.  Bilateral pleural effusions, left greater than right. Adjacent airspace disease may be secondary to atelectasis.      5.  Atherosclerosis.      CT-HEAD W/O   Final Result      No acute intracranial findings.         DX-CHEST-PORTABLE (1 VIEW)   Final Result      1.  Left PICC line tip projects over the left axilla.      2.  Right internal jugular catheter projects over the superior vena cava. No pneumothorax is identified.      3.  Left pleural effusion. Adjacent airspace disease may be secondary to compressive atelectasis or pneumonia.      4.  Hazy right lung opacification in prominent interstitial is nonspecific and may represent edema versus an infectious/inflammatory process          COURSE & MEDICAL DECISION MAKING  Pertinent Labs & Imaging studies reviewed. (See chart for details)    DDX: Pancreatitis, SBP, anemia, r/o hemorrhoid, rectal fissure, diverticulosis, AVM, infectious diarrhea, colitis, Meckles diverticulosis, colon cancer, UTI,      MDM  Number of Diagnoses or Management Options    Initial evaluation at 2055:  Seen and evaluated for the symptoms as described above.  The patient has an extensive past medical history as described in the HPI and throughout the time that he was evaluated more information was obtained from the outside facility confirming that the patient was DNI/DNR and and had placement of some sort of  peritoneal drain though there is limited overall information about the onset that initiated the placement of this drain.  He does appear very chronically ill and sick and is only slightly tachycardic but afebrile.  He had received some pain medications prior to arrival but was only alert and oriented to person and place.  Was difficult to assess the overall mental status of this patient and CT head is emergently obtained and shows no evidence of bleeds or ischemic changes.  Additionally a CT a of the abdomen to assess for possible infectious etiology or complication/malformations that would lead to GI bleeding was also obtained.  Broad medical work-up for the differential as described above.  And no point does he have profound peritonitis, he does not have any further examples of melanotic stools during his time here in the emergency department.  Repeat H&H was stable at 7.8/25.1 which apparently was improved from last H&H of 7.4 at the outside facility.    Additionally, the patient was on ampicillin, daptomycin and rifaximin.    CT of the abdomen pelvis was obtained as the patient has a drain in fullness in the right lower quadrant that is concerning for possible infectious etiology.  CT shows a large amount of fluid that is collected likely from the area where his pancreas used to be and there are multiple areas of loculations throughout.  Drain currently appears patent.    No dynamic EKG changes, the patient's potassium is in a safe range 3.8 despite missing multiple dialysis sessions.  Chest x-ray shows hazy right lung opacification could be edema versus infectious or inflammatory process and I did initiate a single dose of Rocephin    Had discussion with the on-call GI physician, Dr. Forde, regarding the nonemergent GI bleed and concurrent necrotizing pancreatic changes and he would recommend discussing with IR for any possible further drainage.    BLOOD CONSENT:  I have explained to the patient the risks and  benefits of transfusion of blood products.  This includes, as appropriate, the risk of mild allergic reaction, hemolytic reaction, transfusion-associated lung injury, febrile reactions, circulatory or iron overload, and infection.    We discussed possible alternatives and their risks, including directed donation, autologous transfusion, and no transfusion, including IV or oral iron supplementation, as appropriate.  I believe the patient understands the risks and benefits and was able to express understanding.    HYDRATION: Based on the patient's presentation of Eldery ALOC, Sepsis and Tachycardia the patient was given IV fluids. IV Hydration was used because oral hydration was not adequate alone. Upon recheck following hydration, the patient was improved.    CRITICAL CARE:  I saw and evaluated this patient. I personally provided 45 minutes of critical care time to the patient excluding billable procedures and directly and personally provided the following treatment and critical care management:  Critical Care Interventions  Multispecialty coordination, Multiple bedside assessments, coordination of care with family and other historical sources, Continuous hemodynamic and respiratory monitoring, Serial neurologic exams, Fluid resuscitation and Accompany patient to the CT scan     FINAL IMPRESSION  Visit Diagnoses     ICD-10-CM   1. Gastrointestinal hemorrhage, unspecified gastrointestinal hemorrhage type  K92.2   2. Pneumonia of right lower lobe due to infectious organism  J18.9   3.  Abdominal abscess  4.  Chronic kidney disease     Electronically signed by: Davide Jolley M.D., 3/27/2021 8:53 PM

## 2021-03-28 NOTE — ED TRIAGE NOTES
Shaka Riley    Chief Complaint   Patient presents with   • Lower GI Bleed     PT was transfered from another facility for a suspected GI bleed. PT received 2 units of blood PTA. PT is A&O x4  VSS       Vitals:    03/27/21 2049   BP: 159/74   Pulse: (!) 103   Resp: 16   Temp: 36.7 °C (98 °F)   SpO2: 98%

## 2021-03-28 NOTE — DISCHARGE PLANNING
Anticipated Discharge Disposition: TBD    Action: Reviewed with BRIGID Lyons for [facility] that pt is on their list for  Potential admission once pt is med cleared.     Barriers to Discharge: not med cleared    Plan: care coordination to follow for dc planning needs.

## 2021-03-28 NOTE — ASSESSMENT & PLAN NOTE
-This is chronic problem and is walled off at this time, no need for antibiotics   -may benefit from IR consult to assess for drainage placement

## 2021-03-28 NOTE — PROGRESS NOTES
[facility] -  PROGRESS NOTE               Author: MELANY Flynn Date & Time: 3/28/2021  11:06 AM     HPI:  Mr. Riley is a 55 year old male who was transferred from [facility] last night for a suspected lower GI bleed. He had previously been admitted to [facility] on 01/01/21 with abdominal pain and emesis. He was found to have acute pancreatitis and acute kidney injury. He has a history of alcohol abuse disorder, obesity nonischemic cardiomyopathy with an EF 25-30%, chronic hypertension, chronic low back pain and acute pancreatitis secondary to alcohol abuse. Pancreatitis had worsened through out his admission at [facility]. It was complicated  By an ileus as well as acute hypoxic hypercapnic respiratory failure on 1/25/21. He was intubated . On 2/25/21 he was transferred  To [facility] an required hemodialysis intermittently via temporary IJ catheter. Blood cultures were positive for MSSA therefore temporary dialysis catheter was removed for a line holiday. Dr Landon placed a new tunneled dialysis catheter on 2/18/21. After receiving dialysis on 2/19/21 his renal function began to I,mprove. Dialysis catheter was removed and nephrology signed off. On /05/32 his creatinine began to rise and nephrology was re-consulted. Hemodialysis was reinitated on 3/14/21. He had become oligoanuric and jorgensen catheter was discontinued. He went to the OR on 3/18/21 for repositioning of his tunneled dialysis catheter by Dr. Landon. He has undergone several CT scans with IV contrast of the abd/pelvis. He was found to have pseudocyst in the RLQ. On 3/22/21, a drain was placed and fluid sent for culture.  We have been following for ONOFRE, monitoring for improvement in renal function as well as hemodialysis management. Patient is a Jehovah Witness but is agreeable to receiving blood transfusion. He has received several intermittently however these past few days we have had difficulty  "keeping his hgb above 7.0.     DAILY NEPHROLOGY SUMMARY:  03/27--Transferred from [facility] for suspected lower GI bleed  03/28--Blood cxs ordered. Plans for pt to go to IR for evaluation of RLQ drain.    REVIEW OF SYSTEMS:    GENERAL: He denies fever, chills. He complains of weakness and fatigue  HEENT: No vision changes, hearing loss. No sore throat or nasal drainage. He C/O dry mouth  NECK: Denies masses or rigidity  CARDIOVASCULAR: Denies chest pain, heart palpitations.  RESPIRATORY: He denies shortness of breath, coughing or sputum production  GASTROINTESTINAL: He denies nausea, vomiting but complains of diarrhea  GENITOURINARY: He denies dysuria, frequency or urgency  MUSCLULOSKELETAL:  He C/O lower extremity edema that is improving Denies joint pain, muscle pain.  NEUROLOGIC: He denies focal deficits, seizures or headaches  SKIN: Denies rashes or lesions    PMH/PSH/SH/FH: Reviewed and unchanged since admission note  CURRENT MEDICATIONS: Reviewed from admission to present day    VS:  /59   Pulse (!) 114 Comment: nurse aware  Temp 37.1 °C (98.8 °F) (Temporal)   Resp 18   Ht 1.753 m (5' 9\")   Wt 93.4 kg (205 lb 14.6 oz)   SpO2 97%   BMI 30.41 kg/m²   Physical Exam   Constitutional: He appears well-developed and well-nourished.   HENT:   Head: Normocephalic and atraumatic.   Eyes: Pupils are equal, round, and reactive to light. EOM are normal.   Cardiovascular: Normal rate, regular rhythm and normal heart sounds.   Pulmonary/Chest: Effort normal and breath sounds normal.   Right tunneled dialysis catheter   Abdominal: Bowel sounds are normal. He exhibits distension. There is no abdominal tenderness.   RLQ drain   Musculoskeletal:         General: Edema present. Normal range of motion.      Cervical back: Normal range of motion and neck supple.   Neurological: He is alert.   Skin: Skin is warm and dry.   Psychiatric: He has a normal mood and affect. His behavior is normal.       Fluids:  In: " 490.3 [P.O.:50; I.V.:340.3]  Out: 450     LABS:  Recent Labs     03/27/21  2115 03/28/21  0138   SODIUM 136 134*   POTASSIUM 3.8 3.6   CHLORIDE 101 97   CO2 28 27   GLUCOSE 113* 108*   BUN 17 20   CREATININE 2.75* 3.16*   CALCIUM 8.0* 8.0*       IMPRESSION:  # Acute kidney injury, oligo-anuric, multifactorial   - Previously regained renal function but complicated by infection, sepsis and hypotension   - Pt has had several contrast CT scans   - Restarted on hemodialysis on 03/14/21 via RIJ tunneled PC   - Has been receiving hemodialysis on [facility]   - Last HD treatment Friday evening 3/26/21  # Sepsis   - Secondary to abdominal abscess   - Culture grew E. Coli and Staph aureus   - Blood cultures    - [facility] following   # Pseudocyst of the abd/pelvis   - IR guided drainage and drain placement 03/22/21    - Culture grew E. Coli, CX was negative for fungus  #History of MSSA bacteremia   - S/P treatment   - [facility] following  # Pancreastitis   --Secondary to alcohol abuse  # Hepatic Encephalopathy   --Improved with daily lactulose  # History of Ileus, resolved  # History of nonischemic cardiomyopathy   - EF 25-30%   # History of hypertension   - Controlled   # Thrombocytopenia  # Hyperphosphatemia   --Was on sevelemer   --Check PO4  # History of Crohn's disease  # Anemia, multifactorial   - ELISE not indicated in ONOFRE    - Continue folic acid   - Check iron stores/ferritin   - s/p multiple blood transfusion  # Hyponatremia   - Correct with dialysis  # Hypoalbuminemia   - Appetite poor   - No dietary protein restrictions  # Hypokalemia   - High K+ bath with dialysis   - Previously with hyperkalemia  # History of thrombus R basilic vein   - Previously on blood thinners   - Blood thinners were discontinued due to anemia  # History of alcohol use disorder  # Weakness and Debility        PLAN:  - No urgent need for hemodialysis today  - Plan for hemodialysis tomorrow   - Continue HD qMWF unless  there is significant evidence of renal recovery  - Accurate I/Os to aid in monitoring for renal recovery  - Maintain MAP >65mmHg  - No dietary protein restrictions, encourage PO intake  - Low sodium, low phosphorus diet  - Hold sevelemer, check PO4 level   - Check iron stores and ferritin in am  - Transfuse pRBC PRN for hgb < 7.0  - DC IVFs.   - Avoid IVFs, OK for PRN fluid bolus for hypotension  - Continue antibiotics per [facility]  - AM labs with further recommendations to follow  * Discussed with Dr Gonzalez, Dr BRADLEY Cordero  Thank you,

## 2021-03-28 NOTE — ASSESSMENT & PLAN NOTE
MWF  nephro consult ON BOARD  Monitor electrolytes    Per nephrology, patient is oliguria, 100-200ml/day, will have bladder scan prn, straight cath prn.   -intake and output  -renal dose medication

## 2021-03-28 NOTE — PROGRESS NOTES
Patient received at start of shift. Patient AxOx4 with some intermittent forgetfulness. Patient denying pain this shift. Fall and safety precautions in place. Patient on bed rest and makes no attempt to ambulate without assistance. Patient compliant with call light use. Will continue to monitor     Patient h/h 6.9/22.7. Ariana AYALA notified and orders for transfusion placed. Patient PT 20.2 and INR 1.67. Ariana AYALA aware and following.

## 2021-03-28 NOTE — PROGRESS NOTES
Arrived from ED via gurney around 0030.  C/o thirst.  Explained to pt re: NPO status.  Admission profile completed.  POC review.  Left arm midline dressing changed.  Right PC dressing was already changed yesterday.  Abdominal drain has 400 ml output.  Bed alarm initiated.

## 2021-03-28 NOTE — PROGRESS NOTES
2 RN skin assessment completed with BRIGID Sun.  Pt has excoriated sacrum/buttocks.  Mid scrum has purple area that might be pressure injury.  Picture taken.  Will order wound consult.  Barrier paste applied to excoriated area.

## 2021-03-29 PROBLEM — R65.21 SEPTIC SHOCK (HCC): Status: ACTIVE | Noted: 2021-01-01

## 2021-03-29 PROBLEM — D62 ACUTE BLOOD LOSS ANEMIA: Status: ACTIVE | Noted: 2021-01-01

## 2021-03-29 PROBLEM — A41.9 SEPTIC SHOCK (HCC): Status: ACTIVE | Noted: 2021-01-01

## 2021-03-29 NOTE — PROCEDURES
"Arterial Line Insertion    Date/Time: 3/29/2021 10:10 AM  Performed by: Brandon Campa M.D.  Authorized by: Brandon Campa M.D.   Consent: Written consent obtained.  Risks and benefits: risks, benefits and alternatives were discussed  Consent given by: patient  Patient understanding: patient states understanding of the procedure being performed  Patient consent: the patient's understanding of the procedure matches consent given  Procedure consent: procedure consent matches procedure scheduled  Relevant documents: relevant documents present and verified  Test results: test results available and properly labeled  Site marked: the operative site was not marked  Imaging studies: imaging studies not available  Required items: required blood products, implants, devices, and special equipment available  Patient identity confirmed: verbally with patient and hospital-assigned identification number  Time out: Immediately prior to procedure a \"time out\" was called to verify the correct patient, procedure, equipment, support staff and site/side marked as required.  Preparation: Patient was prepped and draped in the usual sterile fashion.  Indications: hemodynamic monitoring  Location: left radial  Anesthesia: local infiltration    Anesthesia:  Local Anesthetic: lidocaine 1% without epinephrine  Anesthetic total: 2 mL    Sedation:  Patient sedated: no    Jayme's test normal: yes  Needle gauge: 20  Seldinger technique: Seldinger technique used  Number of attempts: 1  Post-procedure: line sutured and dressing applied  Post-procedure CMS: unchanged  Patient tolerance: patient tolerated the procedure well with no immediate complications              "

## 2021-03-29 NOTE — PROGRESS NOTES
2 RN skin check complete with BRIGID Mei.   Devices in place O2 via nasal cannula, RLQ drain.  Skin assessed under devices intact.  Confirmed pressure ulcers found on sacrum- to be staged per wound consult.  New potential pressure ulcers noted on sacrum.  Wound consult placed 3/28 by admitting RN.  The following interventions in place foam ear pads, pillows with Q 2 turn and repositioning, heels elevated, barrier cream     Ears: red and blanching but intact, foam placed on cannula   Chest: perm cath in place, scar from previous cath clean dry and covered with band-aid, RLQ dressing clean, dry and intact  Groin: intact  Sacrum: open area covered with barrier cream, pillows and Q 2 turn and repositioning   Legs: intact  Heels: intact, red, blanching, boggy heels, edema noted to ankles,, flaky skin.  Abdomen: bruising  Bilateral upper extremities: bruising, fragile skin.  Left upper extremity: Midline

## 2021-03-29 NOTE — CARE PLAN
Problem: Safety  Goal: Will remain free from falls  Outcome: PROGRESSING AS EXPECTED  Patient free from falls this shift. Patient AxOx3-4, can be forgetful of time. Patient makes no attempts to ambulate without assistance. Fall and safety precautions in place. Patient compliant with call light use. Will continue to monitor      Problem: Respiratory:  Goal: Respiratory status will improve  Outcome: PROGRESSING AS EXPECTED  Patient on 2L O2, 0 at baseline. Patient titrated from 4L O2 without complication. No s/s of respiratory distress, patient resting comfortably. Will continue to monitor and wean prn

## 2021-03-29 NOTE — PROGRESS NOTES
IV abx moved to later time related to blood transfusion, pharmacist Nneka aware and future doses re-timed.

## 2021-03-29 NOTE — PROGRESS NOTES
Patient is alert to himself and why he is here, disoriented to time. Patient reports discomfort to back and buttocks. Patient was repositioned. HCG completed with CNA. Contacted provider on call to get orders for NPO diet for surgery, Clarify the surgery and dialysis conflict. Will inform dialysis of surgery and have them reschedule. Patient may take medications in the AM with a sip of water. Also requested provider to clarify flushing tube with fluid, how often and how much. Provider on call stated that the day shift provider should clarify. Bed is in locked ad low position and call light and belongings are within reach. Will continue to monitor patient and report changes.

## 2021-03-29 NOTE — OR SURGEON
Immediate Post OP Note    PreOp Diagnosis: GI bleed, hypovolemic shock    PostOp Diagnosis: same, procedure stopped due to inability to visualize site of bleed and massive amounts of blood and clot in stomach and duodenum    Procedure(s):  GASTROSCOPY    Surgeon(s):  Tracy Villalta M.D.    Anesthesiologist/Type of Anesthesia:  Anesthesiologist: (Unknown)/* No anesthesia type entered *    Surgical Staff:  Endoscopy Technician: (Unknown)  Endoscopy Team: (Unknown)  Endoscopy Nurse: (Unknown)    Specimens removed if any:  * No specimens in log *    Estimated Blood Loss: 500cc minimum    Findings:   Immediately prior to intubation of endoscope, patient vomited large amount of blood and clot.  Gastroscope introduced into pharynx and blood suctioned.  Able to advance scope into esophagus and red blood lavaged away.  No evidence of esophageal varices.  Large amount of red and dark blood clot in proximal stomach with bright red blood accumulating.  No lesions in antrum noted.  Large fresh red clot in duodenal bulb and unable to lavage away.  Hemodynamically deteriorating and procedure would take >1 hour to change endoscope, remove clot and hope of trying to find source to control.  Patient too unstable to transfer to IR.  Discussed with Dr. Campa and we elected to stop procedure.    Complications: none        3/29/2021 1:47 PM Tracy Villalta M.D.

## 2021-03-29 NOTE — PROGRESS NOTES
Patient received in report at change of shift from Candace RAINEY. Hand off RN stated patient had 3 bloody bowel movements overnight. Upon receiving patient, patient actively had moderately sized bloody BM x2. This RN cleaned patient and took vitals. Patient hypotensive at 62/34, all other VSS. Charge RN Codi at bedside with this RN. Patient AxOx3, with some confusion to time. Patient states he never knows what time it is and can get intermittently forgetful about it. Patient with complaints of severe pain to lower back, pain medications not administered per Ariana AYALA. Patient more comfortable with repositioning but pain not resolved. LR bolus started per orders.     ICU RN to bedside, levophed started by CIC RN per orders.     Per Ariana AYALA, ICU doc at bedside to evaluate. Patient to be transferred to [facility]. Ariana AYALA notified, and GI JANET Alexander notified. Patient transferred with all belongings. This RN called report for Rufina AMAYA RN, waiting callback.

## 2021-03-29 NOTE — PROGRESS NOTES
Blood transfusion complete without s/s of reaction. Patient tolerated well, no complaints throughout. MD Lane aware of vitals and comfortable. Orders placed for post transfusion lab draws by MD

## 2021-03-29 NOTE — PROGRESS NOTES
GI paged at 1150 to alert of hypotension and bleeding requiring transfusion. No new orders at this time.

## 2021-03-29 NOTE — PROGRESS NOTES
[facility] -  PROGRESS NOTE               Author: Wing Gtz M.D. Date & Time: 3/29/2021  11:18 AM     HPI:  Mr. Riley is a 55 year old male who was transferred from [facility] last night for a suspected lower GI bleed. He had previously been admitted to [facility] on 01/01/21 with abdominal pain and emesis. He was found to have acute pancreatitis and acute kidney injury. He has a history of alcohol abuse disorder, obesity nonischemic cardiomyopathy with an EF 25-30%, chronic hypertension, chronic low back pain and acute pancreatitis secondary to alcohol abuse. Pancreatitis had worsened through out his admission at [facility]. It was complicated  By an ileus as well as acute hypoxic hypercapnic respiratory failure on 1/25/21. He was intubated . On 2/25/21 he was transferred  To [facility] an required hemodialysis intermittently via temporary IJ catheter. Blood cultures were positive for MSSA therefore temporary dialysis catheter was removed for a line holiday. Dr Landon placed a new tunneled dialysis catheter on 2/18/21. After receiving dialysis on 2/19/21 his renal function began to I,mprove. Dialysis catheter was removed and nephrology signed off. On /05/32 his creatinine began to rise and nephrology was re-consulted. Hemodialysis was reinitated on 3/14/21. He had become oligoanuric and jorgensen catheter was discontinued. He went to the OR on 3/18/21 for repositioning of his tunneled dialysis catheter by Dr. Landon. He has undergone several CT scans with IV contrast of the abd/pelvis. He was found to have pseudocyst in the RLQ. On 3/22/21, a drain was placed and fluid sent for culture.  We have been following for ONOFRE, monitoring for improvement in renal function as well as hemodialysis management. Patient is a Jehovah Witness but is agreeable to receiving blood transfusion. He has received several intermittently however these past few days we have had difficulty keeping his  "hgb above 7.0.     DAILY NEPHROLOGY SUMMARY:  03/27--Transferred from [facility] for suspected lower GI bleed  03/28--Blood cxs ordered. Plans for pt to go to IR for evaluation of RLQ drain.  03/29 -- Awaiting EGD.  Getting PRBCs.  Patient has diffuse abdominal pain.      REVIEW OF SYSTEMS:    GENERAL: He denies fever, chills. He complains of weakness and fatigue  HEENT: No vision changes, hearing loss. No sore throat or nasal drainage. He C/O dry mouth  NECK: Denies masses or rigidity  CARDIOVASCULAR: Denies chest pain, heart palpitations.  RESPIRATORY: He denies shortness of breath, coughing or sputum production  GASTROINTESTINAL: He denies nausea, vomiting but complains of diarrhea  GENITOURINARY: He denies dysuria, frequency or urgency  MUSCLULOSKELETAL:  He C/O lower extremity edema that is improving Denies joint pain, muscle pain.  NEUROLOGIC: He denies focal deficits, seizures or headaches  SKIN: Denies rashes or lesions    PMH/PSH/SH/FH: Reviewed and unchanged since admission note  CURRENT MEDICATIONS: Reviewed from admission to present day    VS:  BP (!) 88/33   Pulse 72   Temp 36.5 °C (97.7 °F)   Resp 15   Ht 1.753 m (5' 9\")   Wt 93.4 kg (205 lb 14.6 oz)   SpO2 100%   BMI 30.41 kg/m²   Physical Exam   Constitutional: He appears well-developed and well-nourished.   HENT:   Head: Normocephalic and atraumatic.   Eyes: Pupils are equal, round, and reactive to light. EOM are normal.   Cardiovascular: Normal rate, regular rhythm and normal heart sounds.   Pulmonary/Chest: Effort normal and breath sounds normal.   Right tunneled dialysis catheter   Abdominal: Bowel sounds are normal. He exhibits distension. There is no abdominal tenderness.   RLQ drain   Musculoskeletal:         General: Edema present. Normal range of motion.      Cervical back: Normal range of motion and neck supple.   Neurological: He is alert.   Skin: Skin is warm and dry.   Psychiatric: He has a normal mood and affect. His " behavior is normal.       Fluids:  In: 631.3 [Blood:448.3]  Out: -     LABS:  Recent Labs     03/28/21  0138 03/29/21  0404 03/29/21  0823   SODIUM 134* 135 134*   POTASSIUM 3.6 4.2 4.5   CHLORIDE 97 99 100   CO2 27 25 24   GLUCOSE 108* 120* 136*   BUN 20 30* 31*   CREATININE 3.16* 4.17* 4.16*   CALCIUM 8.0* 8.0* 7.8*       IMPRESSION:  # Acute kidney injury, oligo-anuric, multifactorial   - Previously regained renal function but complicated by infection, sepsis and hypotension   - Pt has had several contrast CT scans   - Restarted on hemodialysis on 03/14/21 via RIJ tunneled PC   - Has been receiving hemodialysis on [facility]  # GI Bleed   - Getting transfusions  # Sepsis   - Secondary to abdominal abscess   - Culture grew E. Coli and Staph aureus   - Blood cultures    - [facility] following   # Pseudocyst of the abd/pelvis   - IR guided drainage and drain placement 03/22/21    - Culture grew E. Coli, CX was negative for fungus  #History of MSSA bacteremia   - S/P treatment   - [facility] following  # Pancreastitis   --Secondary to alcohol abuse  # Hepatic Encephalopathy   --Improved with daily lactulose  # History of Ileus, resolved  # History of nonischemic cardiomyopathy   - EF 25-30%   # History of hypertension   - Controlled   # Thrombocytopenia  # Hyperphosphatemia   --Hold sevelamer  # History of Crohn's disease  # Anemia, multifactorial   - ELISE not indicated in ONOFRE    - Continue folic acid   - Ferritin 636   - s/p multiple blood transfusion  # Hyponatremia   - Correct with dialysis  # Hypoalbuminemia   - Appetite poor   - No dietary protein restrictions  # Hypokalemia   - Previously with hyperkalemia  # History of thrombus R basilic vein   - Previously on blood thinners   - Blood thinners were discontinued due to anemia  # History of alcohol use disorder  # Weakness and Debility        PLAN:  - Plan for HD today  - Continue HD qMWF unless there is significant evidence of renal  recovery  - Awaiting EGD  - Accurate I/Os to aid in monitoring for renal recovery  - Maintain MAP >65mmHg  - No dietary protein restrictions, encourage PO intake  - Low sodium, low phosphorus diet  - Hold sevelemer  - Transfuse pRBC PRN for hgb < 7.0  - Avoid IVFs, OK for PRN fluid bolus for hypotension  - Continue antibiotics per ID    Thank you    Spent 31 minutes in person and not in person care time

## 2021-03-29 NOTE — CODE DOCUMENTATION
Patient wife brought to bedside. MD Campa discussed plan of care with wife. No more medications ordered at this time.

## 2021-03-29 NOTE — PROGRESS NOTES
Dr. Olmos notified on behalf of primary RN, Rufina, that patient is up to 25mcg/min of Levophed. Dr. Olmos at bedside. Orders received from Dr. Olmos for a red box. Blood bank notified and they are preparing the red box.

## 2021-03-29 NOTE — PROGRESS NOTES
RCC    Called to bedside for low Bp and escalation in NE dose    Case reviewed with Dr. Campa who did initial consult, see his initial full note  EHR reviewed  Patient seen and examined    Patient alert and uncomfortable, following well but weak  Patient denies history of peptic ulcer disease, retching/vomiting, chronic NSAID use positive history for melena  Main complaint is abdominal discomfort and nausea, no vomiting yet, Zofran administered IV  Initial hemoglobin 5 and transfused 2 units prior to transfer, repeat hemoglobin 7.4, our last hemoglobin here at 823 AM 6.0 and 2 units infusing now for this ordered earlier.  Last plt count 67  Heart rate in the 80s with subtle ST changes in 1-lead  Systolic BP labile 80-120s, response to fluids with higher blood pressure  Norepinephrine titrated up to 25  No urine output, ESRD, has right anterior chest dialysis catheter  Work of breathing low, patient on low-flow O2 with saturations 100%  No murmur on cardiac exam  Abdomen protuberant and diffusely tender but no rigidity, guarding or rebound and there is no CVAT  Extremities reveal trace edema  Right lower quadrant percutaneous IR drain in place  Left IJ and left upper extremity PICC lines noted in addition to arterial line      Shock, presumed hypovolemic  GI bleeding, unknown source, melena noted, suspect upper GI  Subtle EKG changes on monitor  Sepsis syndrome/abdominal abscess S/p IR drain, ID following, drainage growing MSSA/E. coli  Pancreatic pseudocyst?  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 strict, antiemetics as needed  Protonix IV  DNAR/DNI noted    Addendum  Blood pressure remains low, norepinephrine titrated further and patient remains responsive to fluids and when stop BP drops, will check CVP and call for a red box, will start with 2 packed cells, 2 FFP and 1 platelet-patient may need further blood products, serial labs requested also    Addendum  Dr Villalta & Dr Campa now at bedside, endoscopy to follow      The patient remains critically ill.  Critical care time = 45 minutes in directly providing and coordinating critical care and extensive data review.  No time overlap and excludes procedures.   Case reviewed with RN, charge RN, pharmacy, respiratory therapy, GI, patient and Dr. Campa

## 2021-03-29 NOTE — CONSULTS
DATE OF SERVICE:  03/28/2021     INFECTIOUS DISEASE CONSULTATION     REFERRING PROVIDER:  Gretchen Reardon MD     REASON FOR CONSULTATION:  Intra-abdominal paracolic gutter abscess due to E.   coli and MSSA, and MSSA bacteremia previously.     CHIEF COMPLAINT:  GI bleed.     HISTORY OF PRESENT ILLNESS:  The patient is a 55-year-old gentleman well known   to our infectious disease service who we have been actively managing and   managing his antibiotic therapy at [facility] for the past 2   months.  The patient was originally hospitalized at [facility] from   01/14 until 02/05 for acute pancreatitis initially.  He has a history of   nonischemic cardiomyopathy with EF of 25-30%, Crohn's disease, hypertension,   and chronic back pain.  The patient presented with acute pancreatitis due to   his alcohol abuse. Throughout his hospitalization, this was complicated by   ileus as well as acute renal failure from AT.  He was also intubated for   severe hypoxic, hypercapnic respiratory failure from 01/25 to 01/27.  During   that time, he was found to be septic and hypotensive with blood cultures on   01/30 positive for MSSA, thought secondary to be catheter-related bloodstream   infection from his PICC line.  This was removed and he was placed on   daptomycin recommended by [facility] to treat for a 3-week   course until 02/15.  During that time, he had been on dialysis intermittently,   being managed by [facility].  During that time, he had been on   cefazolin initially, but developed pancytopenia, thought that was contributing   to his pancytopenia and then switched to daptomycin on 02/02 by [facility]. He   was then discharged to \[facility]\"" for further antibiotic care. At that time, we were   then consulted to continue transitioning his infectious disease care on   daptomycin.  Unfortunately, the patient developed bacteremia again due to   MSSA.  It was found that on CT abdomen and pelvis,   he had a large right   paracolic gutter abscess requiring IR drain placement.  Additionally, he was   found to have a large rim-enhancing pseudocyst as well measuring 15x13.5 cm,   this was not drained.  His culture from his IR drain grew E. coli as well as   MSSA.  Meanwhile, for his MSSA bacteremia, his dialysis catheter was removed   and replaced after his repeat blood culture on 02/13 was no growth.  He had   been on a myriad of different antibiotics including meropenem briefly and then   cefuroxime, but because of his worsening abscess, this was transitioned to   Unasyn on 03/23.  Unfortunately, the patient developed acute GI bleed,   prompting his transfer to [facility] again on 03/27, where we were   consulted again to continue his care. At this time, he has a pending GI   consult, a pending IR aspiration of his right paracolic gutter as is evident   on his repeat CAT scan that was found to be measuring 15x9.8x25 cm.  His   pancreatic fluid collection was measuring 7.6x20.8x26.5 cm.     REVIEW OF SYSTEMS:  A 10-point review of systems was obtained and is negative   except for HPI.     PAST MEDICAL HISTORY:  1.  Catheter line infection due to MSSA, intra-abdominal abscess due to E.   coli and MSSA.  2.  CHF with EF 25-30%.  3.  Crohn's disease.  4.  Alcohol abuse.  5.  Alcoholic pancreatitis.  6.  Sleep apnea.  7.  Paroxysmal AFib.     PAST SURGICAL HISTORY:  Laparoscopic cholecystectomy.     FAMILY HISTORY:  Cancer in his father and mother, heart disease in his   paternal grandfather.     SOCIAL HISTORY:  Nonsmoker.  He used to drink 1.2 ounces of alcohol per week.     ALLERGIES:  None, although CEFAZOLIN CAUSED PANCYTOPENIA.     PHYSICAL EXAMINATION:    VITAL SIGNS:  Temperature 37.1; pulse 114; respirations 18; oxygen 97% on 3.5   liters; blood pressure 120/59.  GENERAL:  The patient is pleasant, calm without acute distress.  HEENT:  Normocephalic, atraumatic.  Oral mucosal membranes moist.  Eyes:     PERRLA.  No scleral icterus or conjunctival injection ____.  CARDIOVASCULAR:  Regular rate and rhythm; S1, S2; no murmurs.  RESPIRATORY:  Lungs are diminished in the bases.  GASTROINTESTINAL:  Abdomen is soft, nondistended.  He has a right lower   quadrant drain with thick brown purulent fluid, but no rebound or guarding.  INTEGUMENTARY:  No obvious skin lesions or rash.  LINES:  He has a right subclavian Perm-A-Cath.  He has a left brachial PICC   line.  MUSCULOSKELETAL:  No peripheral edema, no joint swelling.  PSYCHIATRIC:  He is alert and oriented x3.  NEUROLOGIC:  No focal deficits appreciated.     LABORATORY DATA:  WBC 6.9, hemoglobin 7.6, platelet count 107.  Sodium 134,   potassium 3.6, creatinine 3.16.     IMAGING:  CT abdomen and pelvis shows a large multilocular walled-off necrotic   fluid collection replacing the pancreas extending into the mesentery and   pelvis.  He has a right hemipelvis fluid collection measuring 15.7x9.8x25 cm   with a pigtail catheter.     IMPRESSION:    1.  Persistent right lower quadrant abscess 15.7x9.8x25 cm with previous IR   drain placement growing E. coli and MSSA.  2.  Recurrent MSSA bacteremia due to intra-abdominal abscess with first   negative blood culture on 02/13.  3.  Large necrotic pancreatic, presumably infected, pseudocyst.  4.  Chronic renal failure, on hemodialysis.  5.  Congestive heart failure with EF of 25-30%.  6.  Acute gastrointestinal bleed.  7.  Paroxysmal atrial fibrillation.  8.  Previous cefazolin-induced pancytopenia and Zosyn-induced   thrombocytopenia.     RECOMMENDATIONS:  We will resume his antibiotic of Unasyn for his E. coli,   MSSA, right paracolic gutter abscess.  His initial target date was 04/12 for 3   weeks from his last IR drain placement, but due to his persistent abscess, we   will need to adjust his antibiotic target date.     I spoke with Dr. Lane and she will have IR reposition the drain and attempt   another aspiration of this  abscess.     In regards to his pancreatic pseudocyst, which appeared to be necrotic, I have   asked Dr. Lane to reach out to GI who she is already consulting for GI bleed to   make recommendation whether this needs to be drained by interventional   radiology versus an endoscopic approach to address this.     Repeat blood cultures.     I spoke with pharmacy and we will adjust Unasyn to 3 grams q.8 hours.     Nephrology is also following for hemodialysis.     Sixty-five minutes were spent with 50% face-to-face care.     Thank you for allowing me to participate in the care of this patient.  I will   continue to follow with you.     ANTIBIOTICS:  Unasyn 03/23 - present.     Previous antibiotics:  1.  Cefuroxime 03/09-03/22.  2.  Meropenem 03/08-03/09.  3.  Daptomycin  02/02-03-08.  4.  Cefazolin 01/28-02/02.        ______________________________  DO SARA Jeffrey/DEE DEE/OBDULIO    DD:  03/28/2021 12:07  DT:  03/28/2021 16:56    Job#:  [id number]

## 2021-03-29 NOTE — PROGRESS NOTES
Patient with h/h 6.9/22.7 per paper report. Verified patient name/ with paper results. Ariana AYALA aware and 1 unit PRBC order to transfuse

## 2021-03-29 NOTE — PROGRESS NOTES
Gastroenterology Progress Note     Author: Tracy Villalta M.D.   Date & Time Created: 3/29/2021 12:55 PM    Chief Complaint:  GI follow up for melena    Interval History:  HPI:  55-year-old [employer] male with a history of prior alcohol dependence, nonischemic cardiomyopathy EF 50%, reported history of Crohn's disease not supported by recent colonoscopy, recent [facility] discharge for severe acute necrotizing pancreatitis that obliterated most of his parenchyma and since required CT-guided drainage complicated by acute tubular necrosis resulting in hemodialysis, ileus, acute hypoxic respiratory failure presents with bloody stool     Is visibly slowed mentation and speech.  He is also aware of this.  He states that on day of presentation he had some stool softeners had a runny dark red stool.  This occurred after 3 to 4 days of 0 stools.  He states that his bowel habits vary based now much pain medication he is getting.  Denies nausea vomiting abdominal pain fevers chills dysphagia and odynophagia.  Records reviewed.  2018 colonoscopy with Dr. Colon found no gross lesions.    3/29/21:  Transferred to ICU today for hypotension SBP 60s, Hgb 6.  Has been started on norepinephrine gtt.  Passing dark red bloody stool, no hematemesis    Review of Systems:  Review of Systems   Unable to perform ROS: Medical condition       Physical Exam:  Physical Exam  Constitutional:       General: He is in acute distress.   Eyes:      Pupils: Pupils are equal, round, and reactive to light.   Cardiovascular:      Rate and Rhythm: Normal rate and regular rhythm.   Pulmonary:      Comments: diminshed breath sounds, mild tachypnea, wearing O2 mask with SpO2 100%  Abdominal:      General: There is distension.      Tenderness: There is abdominal tenderness. There is rebound. There is no guarding.      Comments: Protuberant, unable to appreciate bowel sounds, some tympany throughout   Skin:     General: Skin is warm and dry.       Coloration: Skin is pale.   Neurological:      Mental Status: He is alert. He is disoriented.         Labs:  Recent Labs     03/29/21  1223   ISTATAPH 7.265*   ISTATAPCO2 54.2*   ISTATAPO2 119*   ISTATATCO2 26   [id number] 98   ISTATARTHCO3 24.6   ISTATARTBE -3   ISTATTEMP 97.4 F   ISTATFIO2 36   ISTATSPEC Arterial   ISTATAPHTC 7.274*   [id number] 115*         Recent Labs     03/27/21 2115 03/27/21 2115 03/28/21 0138 03/29/21  0404 03/29/21  0823   SODIUM 136   < > 134* 135 134*   POTASSIUM 3.8   < > 3.6 4.2 4.5   CHLORIDE 101   < > 97 99 100   CO2 28   < > 27 25 24   BUN 17   < > 20 30* 31*   CREATININE 2.75*   < > 3.16* 4.17* 4.16*   MAGNESIUM 1.6  --   --   --   --    PHOSPHORUS  --   --   --  3.2  --    CALCIUM 8.0*   < > 8.0* 8.0* 7.8*    < > = values in this interval not displayed.     Recent Labs     03/28/21 0138 03/29/21  0404 03/29/21  0823   ALTSGPT <5 <5 <5   ASTSGOT <5* <5* 15   ALKPHOSPHAT 85 82 72   TBILIRUBIN 0.3 0.3 0.5   GLUCOSE 108* 120* 136*     Recent Labs     03/28/21 0138 03/28/21  0938 03/28/21 2022 03/28/21 2022 03/29/21  0404 03/29/21  0823 03/29/21  1115 03/29/21  1215   RBC   < >  --  2.86*  --  2.83* 2.22*  --   --    HEMOGLOBIN   < > 7.0* 7.7*   < > 7.6* 6.0*  --  8.2*   HEMATOCRIT   < >  --  25.3*   < > 25.2* 19.5*  --  26.5*   PLATELETCT   < >  --  101*  --  92* 67*  --   --    PROTHROMBTM  --  20.2*  --   --   --   --  25.3*  --    INR  --  1.67*  --   --   --   --  2.22*  --    IRON  --   --   --   --  18*  --   --   --    FERRITIN  --   --   --   --  636.0*  --   --   --    TOTIRONBC  --   --   --   --  67*  --   --   --     < > = values in this interval not displayed.     Recent Labs     03/28/21  0138 03/28/21  0938 03/28/21 2022 03/29/21  0404 03/29/21  0823   WBC 6.9  --  7.6 7.5 9.7   NEUTSPOLYS  --    < > 71.80 83.50* 82.80*   LYMPHOCYTES  --    < > 16.30* 8.70* 6.00*   MONOCYTES  --    < > 9.10 3.50 1.70   EOSINOPHILS  --    < > 1.70 4.30 0.90   BASOPHILS  --    <  > 0.70 0.00 0.00   ASTSGOT <5*  --   --  <5* 15   ALTSGPT <5  --   --  <5 <5   ALKPHOSPHAT 85  --   --  82 72   TBILIRUBIN 0.3  --   --  0.3 0.5    < > = values in this interval not displayed.     Hemodynamics:  Temp (24hrs), Av.6 °C (97.8 °F), Min:36.2 °C (97.1 °F), Max:37 °C (98.6 °F)  Temperature: 36.5 °C (97.7 °F)  Pulse  Av.6  Min: 69  Max: 128   Blood Pressure: (!) 128/24, Arterial BP: 102/40     Respiratory:    Respiration: 20, Pulse Oximetry: 100 %        RUL Breath Sounds: Clear, RML Breath Sounds: Diminished, RLL Breath Sounds: Diminished, NELL Breath Sounds: Clear, LLL Breath Sounds: Diminished  Fluids:    Intake/Output Summary (Last 24 hours) at 3/29/2021 1255  Last data filed at 3/29/2021 1200  Gross per 24 hour   Intake 2008.07 ml   Output 350 ml   Net 1658.07 ml        GI/Nutrition:  Orders Placed This Encounter   Procedures   • Diet NPO     Standing Status:   Standing     Number of Occurrences:   1     Order Specific Question:   Restrict to:     Answer:   Sips with Medications [3]     Medical Decision Making, by Problem:  Active Hospital Problems    Diagnosis    • *Septic shock (HCC) [A41.9, R65.21]    • Acute blood loss anemia [D62]    • Pancreatic cyst [K86.2]    • Gastrointestinal hemorrhage with melena [K92.1]    • Abscess of abdominal cavity (HCC) [K65.1]    • ESRD (end stage renal disease) on dialysis (HCC) [N18.6, Z99.2]    • Essential hypertension [I10]    • Acute cholecystitis [K81.0]    • Troponin level elevated [R77.8]      Impression:  1. GI Bleed  2.  Hypotension  3.  Acute blood loss anemia  4.  Coagulopathy  5.  Thrombocytopenia  6.  ESRD on hemodialysis  7.  Hypoalbumenia  8.  Lg multiloculated walled off necrotic fluid collection replacing pancreas, pigtail cath RLQ  9.  Splenic vein narrowing, ?thrombosis.  Will eval for gastric varices  10.  Marked narrowing portal vein and confluence, splenic vein and SMV  11.  Bilateral pleural effusions    Plan:  I have spoken with  intensivists and will plan for emergent EGD at bedside with intensivist providing sedation.  2 physician consent since patient confused    He is very ill but at this point the benefit to control his bleeding will outweigh the risk.  He will remain DNR/DNI and I have observed Dr. Mcallister speaking with the family via phone    Receiving PRBC and FFP    Quality-Core Measures

## 2021-03-29 NOTE — DISCHARGE SUMMARY
Death Summary    Cause of Death  Hemorrhagic shock due to upper gastrointestinal bleed in the setting of septic shock, intra-abdominal abscess, ESRD, metabolic encephalopathy secondary to sepsis    Comorbid Conditions at the Time of Death  Principal Problem:    Septic shock (HCC) POA: Yes  Active Problems:    Pancreatic cyst POA: Yes    Acute blood loss anemia POA: Yes    ESRD (end stage renal disease) on dialysis (HCC) POA: Yes    Essential hypertension POA: Yes    Gastrointestinal hemorrhage with melena POA: Yes    Abscess of abdominal cavity ([facility]) POA: Yes    Troponin level elevated POA: Yes    Acute cholecystitis POA: Yes  Resolved Problems:    ONOFRE (acute kidney injury) ([facility]) POA: Yes      History of Presenting Illness and Hospital Course  This is a 55 y.o. male admitted 3/27/2021 in transfer from outside hospital with GI bleed, intra-abdominal abscesses, pancreatic pseudocyst, septic shock, end-stage renal disease, metabolic encephalopathy who was admitted to the floor with blood transfusions and early this morning developed hypotension and worsening encephalopathy requiring transfer to the ICU.  Prior to transfer to the floor the hospitalist spoke with the family who transitioned CODE STATUS to DNAR/DNI.  He had some melanotic stools, was transfused multiple units PRBCs, and started on a norepinephrine infusion.  After extensive discussion with the GI team and discussions with the patient's wife we determined that despite the significant risk of performing EGD with sedation his only chance to stop bleeding was to attempt this.  Patient was given hydromorphone, ketamine, and propofol for EGD and very abruptly had an episode of hemoptysis and then subsequent worsening hypoxia, bradycardia, and hypotension.  The patient was given multiple doses of epinephrine and calcium chloride, however he continued to decline.  Given his DNAR/DNI status brought the patient's wife to the bedside and explained the situation to  her and she was able to hold the patient's hand as he .    Death Date: 21   Death Time: 140         Pronounced By (RN1): Rufina Rees RN  Pronounced By (RN2): Johnson Deal RN

## 2021-03-29 NOTE — ASSESSMENT & PLAN NOTE
This is Septic shock Present on admission  SIRS criteria identified on my evaluation include: Fever, with temperature greater than 101 deg F and Tachypnea, with respirations greater than 20 per minute  Source is intra-abdominal abcess  Presentation includes: Severe sepsis present and persistent hypotension after 30 ml/kg completed.   Despite appropriate fluid resuscitation with crystalloid given per sepsis guidelines, the patient remains hypotensive with systolic blood pressure less than 90 or MAP less than 65  Hemodynamic support with additional fluids and IV vasopressors as needed to maintain a SBP of 90 or MAP of 65  IV antibiotics as appropriate for source of sepsis  Reassessment: I have reassessed the patient's hemodynamic status    Intra-abdominal abscess s/p OSH RLQ IR drain  CT from 3/27 shows pancreatic pseudocyst and RLQ abscess  Titrate norepinephrine to maintain MAP > 65  Fluid resuscitation  Trend lactates  Antimicrobials

## 2021-03-29 NOTE — PROGRESS NOTES
Upon completing post-mortem care, patient's cell phone and glasses were found bedside. Wife called to notify and stated someone would be able to come tomorrow to pick them up. Belongings signed out to security at 1645.

## 2021-03-29 NOTE — CONSULTS
Critical Care Consultation    Date of consult: 3/29/2021    Referring Physician  Gretchen Lane M.D.    Reason for Consultation  Septic shock    History of Presenting Illness  55 y.o. male who presented 3/27/2021 as a transfer from an outside facility for a GI bleed. He was found to have a Hgb of 5 and was transfused 2 U pRBCs prior to transfer, repeat Hgb was noted to be 7.4. He has a medical history significant for alcohol abuse disorder, obesity nonischemic cardiomyopathy with an EF 25-30%, chronic hypertension, Crohn's disease, chronic low back pain and acute pancreatitis secondary to alcohol abuse. He was admitted to [facility] in 1/2021 for acute pancreatitis with acute renal failure from AT complicated by ileus and acute hypoxic, hypercapnic respiratory failure requiring intubation at the time. He was then [facility] hemodialysis in 2/2021. Blood cultures on 1/30/2021 were noted to be positive for MSSA, thought to be catheter related; treated with 3 week course of Daptomycin, per ID recs (was cefazolin which was thought to be contributing to his pancytopenia). He had been on dialysis intermittently at the time and is on a MWF schedule now via a   . CT abd/pelvis showed a large right paracolic gutter abscess requiring IR drain placement.  Also found to have a large rim-enhancing pseudocyst as well measuring 15x13.5 cm which was not drained.  Culture from his IR drain grew E. coli as well as MSSA; developed MSSA bacteremia again. Patient was treated with meropenem briefly and then cefuroxime, but because of his worsening abscess, this was transitioned to Unasyn on 03/23. He then developed a GI bleed and was transferred to [facility]. ICU was consulted for hypotension secondary to septic shock requiring pressors.      Code Status  DNAR/DNI    Review of Systems  Review of Systems   Constitutional: Positive for malaise/fatigue. Negative for chills and fever.   HENT: Negative for  congestion and sore throat.    Eyes: Negative for blurred vision and double vision.   Respiratory: Negative for cough and sputum production.    Cardiovascular: Negative for chest pain and palpitations.   Gastrointestinal: Positive for abdominal pain, blood in stool and melena. Negative for nausea and vomiting.   Genitourinary: Negative for dysuria, frequency and urgency.   Musculoskeletal: Positive for back pain. Negative for falls.   Skin: Negative for itching and rash.   Neurological: Negative for dizziness and headaches.       Past Medical History   has a past medical history of Clotting disorder ([facility]), Congestive heart failure (HCC) (7/2015), Crohn disease ([facility]), Hypertension, PAF (paroxysmal atrial fibrillation) ([facility]), and Sleep apnea.    Surgical History   has a past surgical history that includes monico by laparoscopy (7/29/2020).    Family History  family history includes Cancer in his father and mother; Heart Disease in his paternal grandfather and paternal grandmother.    Social History   reports that he has never smoked. He has never used smokeless tobacco. He reports current alcohol use of about 1.2 oz of alcohol per week. He reports that he does not use drugs.    Medications  Home Medications     Reviewed by Barbara Aggarwal (Pharmacy Tech) on 03/27/21 at 2242  Med List Status: Complete   Medication Last Dose Status   acetaminophen (TYLENOL) 500 MG Tab Not Taking Active   carvedilol (COREG) 25 MG Tab 3/27/2021 Active   furosemide (LASIX) 80 MG Tab Not Taking Active   heparin 1000 units/mL Solution Not Taking Active   heparin 5000 UNIT/ML Solution Not Taking Active   ipratropium-albuterol (DUONEB) 0.5-2.5 (3) MG/3ML nebulizer solution Not Taking Active   lidocaine (LIDODERM) 5 % Patch Not Taking Active   melatonin 5 mg Tab Not Taking Active   midodrine (PROAMATINE) 10 MG tablet Not Taking Active   ROPINIRole (REQUIP) 0.5 MG Tab 3/26/2021 Active   sacubitril-valsartan (ENTRESTO) 49-51 MG Tab tablet  3/27/2021 Active   spironolactone (ALDACTONE) 25 MG Tab 3/27/2021 Active              Current Facility-Administered Medications   Medication Dose Route Frequency Provider Last Rate Last Admin   • morphine (pf) 4 mg/mL injection 2 mg  2 mg Intravenous Q3HRS PRN Denise Sanchez M.D.   2 mg at 03/29/21 0550   • norepinephrine (Levophed) infusion 8 mg/250 mL (premix)  0-30 mcg/min Intravenous Continuous Brandon Campa M.D. 18.8 mL/hr at 03/29/21 1015 10 mcg/min at 03/29/21 1015   • ONDANSETRON HCL 4 MG/2ML INJ SOLN            • ondansetron (ZOFRAN) syringe/vial injection 4 mg  4 mg Intravenous Q4HRS PRN Bertin Alexander A.P.R.NLucho   4 mg at 03/29/21 1042   • pantoprazole (PROTONIX) 80 mg in  mL Infusion  8 mg/hr Intravenous Continuous Gretchen Lane M.D. 25 mL/hr at 03/29/21 0627 8 mg/hr at 03/29/21 0627   • ampicillin/sulbactam (UNASYN) 3 g in  mL IVPB  3 g Intravenous Q8HRS Daniel Wagner D.O.   Stopped at 03/28/21 2117   • ROPINIRole (REQUIP) tablet 2 mg  2 mg Oral QHS Allison Frye M.D.   2 mg at 03/28/21 2047       Allergies  Allergies   Allergen Reactions   • Fentanyl      Hallucination         Vital Signs last 24 hours  Temp:  [36.2 °C (97.1 °F)-37 °C (98.6 °F)] 36.2 °C (97.1 °F)  Pulse:  [69-87] 76  Resp:  [10-24] 20  BP: ()/(28-62) 131/35  SpO2:  [89 %-100 %] 100 %    Physical Exam  Physical Exam  Constitutional:       Appearance: He is obese. He is ill-appearing.   HENT:      Head: Normocephalic and atraumatic.      Nose: Nose normal. No congestion.      Mouth/Throat:      Mouth: Mucous membranes are dry.      Pharynx: No posterior oropharyngeal erythema.   Eyes:      General: No scleral icterus.     Pupils: Pupils are equal, round, and reactive to light.   Neck:      Comments: Left IJ  Cardiovascular:      Rate and Rhythm: Normal rate and regular rhythm.      Heart sounds: Normal heart sounds.   Pulmonary:      Effort: Pulmonary effort is normal. No respiratory distress.      Comments: Reduced  breath sounds over the lung bases bilaterally.  Right subclavian Perm-A-Cath in place over chest.  Abdominal:      General: Bowel sounds are normal. There is no distension.      Palpations: Abdomen is soft.      Tenderness: There is no abdominal tenderness. There is no guarding.      Comments:  Right lower quadrant drain with thick brown purulent fluid.   Musculoskeletal:      Cervical back: No rigidity or tenderness.      Right lower leg: No edema.      Left lower leg: No edema.      Comments: Left art line   Skin:     General: Skin is warm.      Coloration: Skin is pale.      Findings: No rash.   Neurological:      General: No focal deficit present.      Mental Status: He is alert and oriented to person, place, and time.         Fluids    Intake/Output Summary (Last 24 hours) at 3/29/2021 1103  Last data filed at 3/29/2021 1051  Gross per 24 hour   Intake 931.33 ml   Output --   Net 931.33 ml       Laboratory  Recent Results (from the past 24 hour(s))   BLOOD CULTURE    Collection Time: 03/28/21 12:49 PM    Specimen: Peripheral; Blood   Result Value Ref Range    Significant Indicator NEG     Source BLD     Site PERIPHERAL     Culture Result       No Growth  Note: Blood cultures are incubated for 5 days and  are monitored continuously.Positive blood cultures  are called to the RN and reported as soon as  they are identified.     BLOOD CULTURE    Collection Time: 03/28/21 12:49 PM    Specimen: Peripheral; Blood   Result Value Ref Range    Significant Indicator NEG     Source BLD     Site PERIPHERAL     Culture Result       No Growth  Note: Blood cultures are incubated for 5 days and  are monitored continuously.Positive blood cultures  are called to the RN and reported as soon as  they are identified.     CBC WITH DIFFERENTIAL    Collection Time: 03/28/21  8:22 PM   Result Value Ref Range    WBC 7.6 4.8 - 10.8 K/uL    RBC 2.86 (L) 4.70 - 6.10 M/uL    Hemoglobin 7.7 (L) 14.0 - 18.0 g/dL    Hematocrit 25.3 (L) 42.0 -  52.0 %    MCV 88.5 81.4 - 97.8 fL    MCH 26.9 (L) 27.0 - 33.0 pg    MCHC 30.4 (L) 33.7 - 35.3 g/dL    RDW 55.1 (H) 35.9 - 50.0 fL    Platelet Count 101 (L) 164 - 446 K/uL    MPV 11.9 9.0 - 12.9 fL    Neutrophils-Polys 71.80 44.00 - 72.00 %    Lymphocytes 16.30 (L) 22.00 - 41.00 %    Monocytes 9.10 0.00 - 13.40 %    Eosinophils 1.70 0.00 - 6.90 %    Basophils 0.70 0.00 - 1.80 %    Immature Granulocytes 0.40 0.00 - 0.90 %    Nucleated RBC 0.00 /100 WBC    Neutrophils (Absolute) 5.48 1.82 - 7.42 K/uL    Lymphs (Absolute) 1.24 1.00 - 4.80 K/uL    Monos (Absolute) 0.69 0.00 - 0.85 K/uL    Eos (Absolute) 0.13 0.00 - 0.51 K/uL    Baso (Absolute) 0.05 0.00 - 0.12 K/uL    Immature Granulocytes (abs) 0.03 0.00 - 0.11 K/uL    NRBC (Absolute) 0.00 K/uL   CBC WITH DIFFERENTIAL    Collection Time: 03/29/21  4:04 AM   Result Value Ref Range    WBC 7.5 4.8 - 10.8 K/uL    RBC 2.83 (L) 4.70 - 6.10 M/uL    Hemoglobin 7.6 (L) 14.0 - 18.0 g/dL    Hematocrit 25.2 (L) 42.0 - 52.0 %    MCV 89.0 81.4 - 97.8 fL    MCH 26.9 (L) 27.0 - 33.0 pg    MCHC 30.2 (L) 33.7 - 35.3 g/dL    RDW 57.1 (H) 35.9 - 50.0 fL    Platelet Count 92 (L) 164 - 446 K/uL    MPV 12.4 9.0 - 12.9 fL    Neutrophils-Polys 83.50 (H) 44.00 - 72.00 %    Lymphocytes 8.70 (L) 22.00 - 41.00 %    Monocytes 3.50 0.00 - 13.40 %    Eosinophils 4.30 0.00 - 6.90 %    Basophils 0.00 0.00 - 1.80 %    Nucleated RBC 0.00 /100 WBC    Neutrophils (Absolute) 6.26 1.82 - 7.42 K/uL    Lymphs (Absolute) 0.65 (L) 1.00 - 4.80 K/uL    Monos (Absolute) 0.26 0.00 - 0.85 K/uL    Eos (Absolute) 0.32 0.00 - 0.51 K/uL    Baso (Absolute) 0.00 0.00 - 0.12 K/uL    NRBC (Absolute) 0.00 K/uL    Hypochromia 1+     Anisocytosis 1+     Macrocytosis 1+     Microcytosis 1+    Comp Metabolic Panel    Collection Time: 03/29/21  4:04 AM   Result Value Ref Range    Sodium 135 135 - 145 mmol/L    Potassium 4.2 3.6 - 5.5 mmol/L    Chloride 99 96 - 112 mmol/L    Co2 25 20 - 33 mmol/L    Anion Gap 11.0 7.0 - 16.0     Glucose 120 (H) 65 - 99 mg/dL    Bun 30 (H) 8 - 22 mg/dL    Creatinine 4.17 (H) 0.50 - 1.40 mg/dL    Calcium 8.0 (L) 8.5 - 10.5 mg/dL    AST(SGOT) <5 (L) 12 - 45 U/L    ALT(SGPT) <5 2 - 50 U/L    Alkaline Phosphatase 82 30 - 99 U/L    Total Bilirubin 0.3 0.1 - 1.5 mg/dL    Albumin 1.7 (L) 3.2 - 4.9 g/dL    Total Protein 6.3 6.0 - 8.2 g/dL    Globulin 4.6 (H) 1.9 - 3.5 g/dL    A-G Ratio 0.4 g/dL   PHOSPHORUS    Collection Time: 03/29/21  4:04 AM   Result Value Ref Range    Phosphorus 3.2 2.5 - 4.5 mg/dL   IRON/TOTAL IRON BIND    Collection Time: 03/29/21  4:04 AM   Result Value Ref Range    Iron 18 (L) 50 - 180 ug/dL    Total Iron Binding 67 (L) 250 - 450 ug/dL    Unsat Iron Binding 49 (L) 110 - 370 ug/dL    % Saturation see below 15 - 55 %   FERRITIN    Collection Time: 03/29/21  4:04 AM   Result Value Ref Range    Ferritin 636.0 (H) 22.0 - 322.0 ng/mL   MORPHOLOGY    Collection Time: 03/29/21  4:04 AM   Result Value Ref Range    RBC Morphology Present     Polychromia 1+    PERIPHERAL SMEAR REVIEW    Collection Time: 03/29/21  4:04 AM   Result Value Ref Range    Peripheral Smear Review see below    DIFFERENTIAL MANUAL    Collection Time: 03/29/21  4:04 AM   Result Value Ref Range    Manual Diff Status PERFORMED    PLATELET ESTIMATE    Collection Time: 03/29/21  4:04 AM   Result Value Ref Range    Plt Estimation Decreased    ESTIMATED GFR    Collection Time: 03/29/21  4:04 AM   Result Value Ref Range    GFR If  18 (A) >60 mL/min/1.73 m 2    GFR If Non African American 15 (A) >60 mL/min/1.73 m 2   CBC WITH DIFFERENTIAL    Collection Time: 03/29/21  8:23 AM   Result Value Ref Range    WBC 9.7 4.8 - 10.8 K/uL    RBC 2.22 (L) 4.70 - 6.10 M/uL    Hemoglobin 6.0 (L) 14.0 - 18.0 g/dL    Hematocrit 19.5 (L) 42.0 - 52.0 %    MCV 88.7 81.4 - 97.8 fL    MCH 26.6 (L) 27.0 - 33.0 pg    MCHC 29.9 (L) 33.7 - 35.3 g/dL    RDW 56.6 (H) 35.9 - 50.0 fL    Platelet Count 67 (L) 164 - 446 K/uL    MPV 12.3 9.0 - 12.9 fL     Neutrophils-Polys 82.80 (H) 44.00 - 72.00 %    Lymphocytes 6.00 (L) 22.00 - 41.00 %    Monocytes 1.70 0.00 - 13.40 %    Eosinophils 0.90 0.00 - 6.90 %    Basophils 0.00 0.00 - 1.80 %    Nucleated RBC 0.30 /100 WBC    Neutrophils (Absolute) 8.87 (H) 1.82 - 7.42 K/uL    Lymphs (Absolute) 0.58 (L) 1.00 - 4.80 K/uL    Monos (Absolute) 0.16 0.00 - 0.85 K/uL    Eos (Absolute) 0.09 0.00 - 0.51 K/uL    Baso (Absolute) 0.00 0.00 - 0.12 K/uL    NRBC (Absolute) 0.03 K/uL    Anisocytosis 1+     Macrocytosis 1+     Microcytosis 1+    Comp Metabolic Panel    Collection Time: 03/29/21  8:23 AM   Result Value Ref Range    Sodium 134 (L) 135 - 145 mmol/L    Potassium 4.5 3.6 - 5.5 mmol/L    Chloride 100 96 - 112 mmol/L    Co2 24 20 - 33 mmol/L    Anion Gap 10.0 7.0 - 16.0    Glucose 136 (H) 65 - 99 mg/dL    Bun 31 (H) 8 - 22 mg/dL    Creatinine 4.16 (H) 0.50 - 1.40 mg/dL    Calcium 7.8 (L) 8.5 - 10.5 mg/dL    AST(SGOT) 15 12 - 45 U/L    ALT(SGPT) <5 2 - 50 U/L    Alkaline Phosphatase 72 30 - 99 U/L    Total Bilirubin 0.5 0.1 - 1.5 mg/dL    Albumin 1.5 (L) 3.2 - 4.9 g/dL    Total Protein 5.5 (L) 6.0 - 8.2 g/dL    Globulin 4.0 (H) 1.9 - 3.5 g/dL    A-G Ratio 0.4 g/dL   DIFFERENTIAL MANUAL    Collection Time: 03/29/21  8:23 AM   Result Value Ref Range    Bands-Stabs 8.60 0.00 - 10.00 %    Manual Diff Status PERFORMED    PERIPHERAL SMEAR REVIEW    Collection Time: 03/29/21  8:23 AM   Result Value Ref Range    Peripheral Smear Review see below    PLATELET ESTIMATE    Collection Time: 03/29/21  8:23 AM   Result Value Ref Range    Plt Estimation Decreased    MORPHOLOGY    Collection Time: 03/29/21  8:23 AM   Result Value Ref Range    RBC Morphology Present     Smudge Cells Few     Dohle Bodies Few    ESTIMATED GFR    Collection Time: 03/29/21  8:23 AM   Result Value Ref Range    GFR If  18 (A) >60 mL/min/1.73 m 2    GFR If Non African American 15 (A) >60 mL/min/1.73 m 2       Imaging  DX-CHEST-PORTABLE (1 VIEW)   Final  Result      1.  Interval insertion of left-sided central venous catheter which terminates with the tip projecting over the expected region of the brachiocephalic junction.   2.  Increased moderate left effusion with associated basilar atelectasis and/or consolidation. Underlying infection is possible.      CT-ABDOMEN-PELVIS WITH   Final Result      1.  Large multiloculated walled off necrotic fluid collection replacing the pancreas extends into the mesentery and pelvis. Pigtail catheter is in the right lower quadrant. Smaller fluid collections are adjacent to the gastric fundus and medial liver.      2.  Heterogeneous enhancement of the spleen is likely related to the adjacent fluid collection and altered vascularity related to splenic vein narrowing.      3.  Marked narrowing of the portal vein, portal confluence, splenic vein, and superior mesenteric vein      4.  Bilateral pleural effusions, left greater than right. Adjacent airspace disease may be secondary to atelectasis.      5.  Atherosclerosis.      CT-HEAD W/O   Final Result      No acute intracranial findings.         DX-CHEST-PORTABLE (1 VIEW)   Final Result      1.  Left PICC line tip projects over the left axilla.      2.  Right internal jugular catheter projects over the superior vena cava. No pneumothorax is identified.      3.  Left pleural effusion. Adjacent airspace disease may be secondary to compressive atelectasis or pneumonia.      4.  Hazy right lung opacification in prominent interstitial is nonspecific and may represent edema versus an infectious/inflammatory process      CT-DRAIN-PERITONEAL    (Results Pending)       Assessment/Plan  * Septic shock (HCC)- (present on admission)  Assessment & Plan  This is Septic shock Present on admission  SIRS criteria identified on my evaluation include: Fever, with temperature greater than 101 deg F and Tachypnea, with respirations greater than 20 per minute  Source is intra-abdominal  abcess  Presentation includes: Severe sepsis present and persistent hypotension after 30 ml/kg completed.   Despite appropriate fluid resuscitation with crystalloid given per sepsis guidelines, the patient remains hypotensive with systolic blood pressure less than 90 or MAP less than 65  Hemodynamic support with additional fluids and IV vasopressors as needed to maintain a SBP of 90 or MAP of 65  IV antibiotics as appropriate for source of sepsis  Reassessment: I have reassessed the patient's hemodynamic status    Intra-abdominal abscess s/p OSH RLQ IR drain  CT from 3/27 shows pancreatic pseudocyst and RLQ abscess  Titrate norepinephrine to maintain MAP > 65  Fluid resuscitation  Trend lactates  Antimicrobials    Acute blood loss anemia- (present on admission)  Assessment & Plan  Hgb 5 at OSH, received 2 U pRBC prior to transfer to [facility]  Received 1 U 3/28  Hgb 3/29 noted to be  6, transfusing 2U  Monitor H&H and transfuse as needed  GI on board; pending EGD 3/29    Abscess of abdominal cavity (HCC)- (present on admission)  Assessment & Plan  RLQ drain in place with thick brown purulent fluid  ID on board  -Continue Unasyn    Gastrointestinal hemorrhage with melena- (present on admission)  Assessment & Plan  GI on board, EGD pending 3/29  -Continue PPI drip  -Trend H&H and transfuse as needed  -Monitor vitals    Essential hypertension- (present on admission)  Assessment & Plan  Hold blood pressure medications    ESRD (end stage renal disease) on dialysis (HCC)- (present on admission)  Assessment & Plan  On HD MWF  Nephrology on board  Renally dose medications    Acute cholecystitis- (present on admission)  Assessment & Plan  S/p cholecystectomy in 7/2020

## 2021-03-29 NOTE — ASSESSMENT & PLAN NOTE
GI on board, EGD pending 3/29  -Continue PPI drip  -Trend H&H and transfuse as needed  -Monitor vitals

## 2021-03-29 NOTE — PROGRESS NOTES
"Wife able to be bedside as patient . Given \"Difficult Time\" packet including mortuary options in the area. Instructed patient's wife to call when a mortuary is selected.   Patient's wife walked out of hospital by RN where family picked her up.   "

## 2021-03-29 NOTE — PROCEDURES
Central Line Insertion    Date/Time: 3/29/2021 10:12 AM  Performed by: Brandon Campa M.D.  Authorized by: Brandon Campa M.D.     Consent:     Consent obtained:  Written    Consent given by:  Patient    Risks discussed:  Incorrect placement and bleeding    Alternatives discussed:  No treatment  Pre-procedure details:     Hand hygiene: Hand hygiene performed prior to insertion      Sterile barrier technique: All elements of maximal sterile technique followed      Skin preparation:  2% chlorhexidine    Skin preparation agent: Skin preparation agent completely dried prior to procedure    Sedation:     Sedation type:  None  Anesthesia:     Anesthesia method:  Local infiltration    Local anesthetic:  Lidocaine 1% w/o epi  Procedure details:     Location:  L internal jugular    Patient position:  Trendelenburg    Procedural supplies:  Triple lumen    Catheter size:  7.5 Fr    Landmarks identified: yes      Ultrasound guidance: yes      Sterile ultrasound techniques: Sterile gel and sterile probe covers were used      Number of attempts:  1    Successful placement: yes    Post-procedure details:     Post-procedure:  Dressing applied and line sutured    Assessment:  Blood return through all ports    Patient tolerance of procedure:  Tolerated well, no immediate complications

## 2021-03-29 NOTE — ASSESSMENT & PLAN NOTE
Hgb 5 at OSH, received 2 U pRBC prior to transfer to [facility]  Received 1 U 3/28  Hgb 3/29 noted to be  6, transfusing 2U  Monitor H&H and transfuse as needed  GI on board; pending EGD 3/29

## 2021-03-29 NOTE — CARE PLAN
Problem: Communication  Goal: The ability to communicate needs accurately and effectively will improve  Outcome: PROGRESSING SLOWER THAN EXPECTED  Note: Patient does not always express his needs. Staff to anticipate needs of patient and provide care.     Problem: Skin Integrity  Goal: Risk for impaired skin integrity will decrease  Outcome: PROGRESSING SLOWER THAN EXPECTED  Note: Changing patient's position every two hours and doing 2 RN skin assessments.

## 2021-03-29 NOTE — PROGRESS NOTES
Infectious Disease Progress Note    Author: Maikel Shin M.D. Date & Time created: 3/29/2021  2:47 PM    Interim Note:  At [facility] for the past 2 months.  The patient was originally hospitalized at [facility] from 01/14 until 02/05 for acute pancreatitis initially.      He has a history of   nonischemic cardiomyopathy with EF of 25-30%, Crohn's disease, hypertension,   and chronic back pain.  The patient presented with acute pancreatitis due to   his alcohol abuse. Throughout his hospitalization, this was complicated by   ileus as well as acute renal failure from ATN.  He was also intubated for   severe hypoxic, hypercapnic respiratory failure from 01/25 to 01/27.  During   that time, he was found to be septic and hypotensive with blood cultures on   01/30 positive for MSSA, thought secondary to be catheter-related bloodstream   infection from his PICC line.  This was removed and he was placed on   daptomycin recommended by [facility] to treat for a 3-week   course until 02/15.  During that time, he had been on dialysis intermittently,   being managed by [facility].  During that time, he had been on   cefazolin initially, but developed pancytopenia, thought that was contributing   to his pancytopenia and then switched to daptomycin on 02/02 by [facility]. He   was then discharged to hospitals for further antibiotic care. At that time, we were   then consulted to continue transitioning his infectious disease care on   daptomycin.  Unfortunately, the patient developed bacteremia again due to   MSSA.  It was found that on CT abdomen and pelvis,  he had a large right   paracolic gutter abscess requiring IR drain placement.  Additionally, he was   found to have a large rim-enhancing pseudocyst as well measuring 15x13.5 cm,   this was not drained.  His culture from his IR drain grew E. coli as well as   MSSA.  Meanwhile, for his MSSA bacteremia, his dialysis catheter was  removed   and replaced after his repeat blood culture on 02/13 was no growth.  He had   been on a myriad of different antibiotics including meropenem briefly and then   cefuroxime, but because of his worsening abscess, this was transitioned to   Unasyn on 03/23.  Unfortunately, the patient developed acute GI bleed,   prompting his transfer to [facility] again on 03/27, where we were   consulted again to continue his care. At this time, he has a pending GI   consult, a pending IR aspiration of his right paracolic gutter as is evident   on his repeat CAT scan that was found to be measuring 15x9.8x25 cm.  His   pancreatic fluid collection was measuring 7.6x20.8x26.5 cm    Antibiotics:    Unasyn 03/23 Day #6  Ceftriaxone 3/28 Day #1     Previous antibiotics:  1.  Cefuroxime 03/09-03/22.  2.  Meropenem 03/08-03/09.  3.  Daptomycin  02/02-03-08.  4.  Cefazolin 01/28-02/02.      Interval History:  PAST 24 HRS REVIEWED WITH RN.    Labs Reviewed, Medications Reviewed, Radiology Reviewed, Wound Reviewed, Fluids Reviewed and GI Nutrition Reviewed    Review of Systems:  ROS    Physical Exam:  Physical Exam    Labs:  Recent Results (from the past 24 hour(s))   CBC WITH DIFFERENTIAL    Collection Time: 03/28/21  8:22 PM   Result Value Ref Range    WBC 7.6 4.8 - 10.8 K/uL    RBC 2.86 (L) 4.70 - 6.10 M/uL    Hemoglobin 7.7 (L) 14.0 - 18.0 g/dL    Hematocrit 25.3 (L) 42.0 - 52.0 %    MCV 88.5 81.4 - 97.8 fL    MCH 26.9 (L) 27.0 - 33.0 pg    MCHC 30.4 (L) 33.7 - 35.3 g/dL    RDW 55.1 (H) 35.9 - 50.0 fL    Platelet Count 101 (L) 164 - 446 K/uL    MPV 11.9 9.0 - 12.9 fL    Neutrophils-Polys 71.80 44.00 - 72.00 %    Lymphocytes 16.30 (L) 22.00 - 41.00 %    Monocytes 9.10 0.00 - 13.40 %    Eosinophils 1.70 0.00 - 6.90 %    Basophils 0.70 0.00 - 1.80 %    Immature Granulocytes 0.40 0.00 - 0.90 %    Nucleated RBC 0.00 /100 WBC    Neutrophils (Absolute) 5.48 1.82 - 7.42 K/uL    Lymphs (Absolute) 1.24 1.00 - 4.80 K/uL    Monos  (Absolute) 0.69 0.00 - 0.85 K/uL    Eos (Absolute) 0.13 0.00 - 0.51 K/uL    Baso (Absolute) 0.05 0.00 - 0.12 K/uL    Immature Granulocytes (abs) 0.03 0.00 - 0.11 K/uL    NRBC (Absolute) 0.00 K/uL   CBC WITH DIFFERENTIAL    Collection Time: 03/29/21  4:04 AM   Result Value Ref Range    WBC 7.5 4.8 - 10.8 K/uL    RBC 2.83 (L) 4.70 - 6.10 M/uL    Hemoglobin 7.6 (L) 14.0 - 18.0 g/dL    Hematocrit 25.2 (L) 42.0 - 52.0 %    MCV 89.0 81.4 - 97.8 fL    MCH 26.9 (L) 27.0 - 33.0 pg    MCHC 30.2 (L) 33.7 - 35.3 g/dL    RDW 57.1 (H) 35.9 - 50.0 fL    Platelet Count 92 (L) 164 - 446 K/uL    MPV 12.4 9.0 - 12.9 fL    Neutrophils-Polys 83.50 (H) 44.00 - 72.00 %    Lymphocytes 8.70 (L) 22.00 - 41.00 %    Monocytes 3.50 0.00 - 13.40 %    Eosinophils 4.30 0.00 - 6.90 %    Basophils 0.00 0.00 - 1.80 %    Nucleated RBC 0.00 /100 WBC    Neutrophils (Absolute) 6.26 1.82 - 7.42 K/uL    Lymphs (Absolute) 0.65 (L) 1.00 - 4.80 K/uL    Monos (Absolute) 0.26 0.00 - 0.85 K/uL    Eos (Absolute) 0.32 0.00 - 0.51 K/uL    Baso (Absolute) 0.00 0.00 - 0.12 K/uL    NRBC (Absolute) 0.00 K/uL    Hypochromia 1+     Anisocytosis 1+     Macrocytosis 1+     Microcytosis 1+    Comp Metabolic Panel    Collection Time: 03/29/21  4:04 AM   Result Value Ref Range    Sodium 135 135 - 145 mmol/L    Potassium 4.2 3.6 - 5.5 mmol/L    Chloride 99 96 - 112 mmol/L    Co2 25 20 - 33 mmol/L    Anion Gap 11.0 7.0 - 16.0    Glucose 120 (H) 65 - 99 mg/dL    Bun 30 (H) 8 - 22 mg/dL    Creatinine 4.17 (H) 0.50 - 1.40 mg/dL    Calcium 8.0 (L) 8.5 - 10.5 mg/dL    AST(SGOT) <5 (L) 12 - 45 U/L    ALT(SGPT) <5 2 - 50 U/L    Alkaline Phosphatase 82 30 - 99 U/L    Total Bilirubin 0.3 0.1 - 1.5 mg/dL    Albumin 1.7 (L) 3.2 - 4.9 g/dL    Total Protein 6.3 6.0 - 8.2 g/dL    Globulin 4.6 (H) 1.9 - 3.5 g/dL    A-G Ratio 0.4 g/dL   PHOSPHORUS    Collection Time: 03/29/21  4:04 AM   Result Value Ref Range    Phosphorus 3.2 2.5 - 4.5 mg/dL   IRON/TOTAL IRON BIND    Collection Time: 03/29/21   4:04 AM   Result Value Ref Range    Iron 18 (L) 50 - 180 ug/dL    Total Iron Binding 67 (L) 250 - 450 ug/dL    Unsat Iron Binding 49 (L) 110 - 370 ug/dL    % Saturation see below 15 - 55 %   FERRITIN    Collection Time: 03/29/21  4:04 AM   Result Value Ref Range    Ferritin 636.0 (H) 22.0 - 322.0 ng/mL   MORPHOLOGY    Collection Time: 03/29/21  4:04 AM   Result Value Ref Range    RBC Morphology Present     Polychromia 1+    PERIPHERAL SMEAR REVIEW    Collection Time: 03/29/21  4:04 AM   Result Value Ref Range    Peripheral Smear Review see below    DIFFERENTIAL MANUAL    Collection Time: 03/29/21  4:04 AM   Result Value Ref Range    Manual Diff Status PERFORMED    PLATELET ESTIMATE    Collection Time: 03/29/21  4:04 AM   Result Value Ref Range    Plt Estimation Decreased    ESTIMATED GFR    Collection Time: 03/29/21  4:04 AM   Result Value Ref Range    GFR If  18 (A) >60 mL/min/1.73 m 2    GFR If Non African American 15 (A) >60 mL/min/1.73 m 2   CBC WITH DIFFERENTIAL    Collection Time: 03/29/21  8:23 AM   Result Value Ref Range    WBC 9.7 4.8 - 10.8 K/uL    RBC 2.22 (L) 4.70 - 6.10 M/uL    Hemoglobin 6.0 (L) 14.0 - 18.0 g/dL    Hematocrit 19.5 (L) 42.0 - 52.0 %    MCV 88.7 81.4 - 97.8 fL    MCH 26.6 (L) 27.0 - 33.0 pg    MCHC 29.9 (L) 33.7 - 35.3 g/dL    RDW 56.6 (H) 35.9 - 50.0 fL    Platelet Count 67 (L) 164 - 446 K/uL    MPV 12.3 9.0 - 12.9 fL    Neutrophils-Polys 82.80 (H) 44.00 - 72.00 %    Lymphocytes 6.00 (L) 22.00 - 41.00 %    Monocytes 1.70 0.00 - 13.40 %    Eosinophils 0.90 0.00 - 6.90 %    Basophils 0.00 0.00 - 1.80 %    Nucleated RBC 0.30 /100 WBC    Neutrophils (Absolute) 8.87 (H) 1.82 - 7.42 K/uL    Lymphs (Absolute) 0.58 (L) 1.00 - 4.80 K/uL    Monos (Absolute) 0.16 0.00 - 0.85 K/uL    Eos (Absolute) 0.09 0.00 - 0.51 K/uL    Baso (Absolute) 0.00 0.00 - 0.12 K/uL    NRBC (Absolute) 0.03 K/uL    Anisocytosis 1+     Macrocytosis 1+     Microcytosis 1+    Comp Metabolic Panel     Collection Time: 03/29/21  8:23 AM   Result Value Ref Range    Sodium 134 (L) 135 - 145 mmol/L    Potassium 4.5 3.6 - 5.5 mmol/L    Chloride 100 96 - 112 mmol/L    Co2 24 20 - 33 mmol/L    Anion Gap 10.0 7.0 - 16.0    Glucose 136 (H) 65 - 99 mg/dL    Bun 31 (H) 8 - 22 mg/dL    Creatinine 4.16 (H) 0.50 - 1.40 mg/dL    Calcium 7.8 (L) 8.5 - 10.5 mg/dL    AST(SGOT) 15 12 - 45 U/L    ALT(SGPT) <5 2 - 50 U/L    Alkaline Phosphatase 72 30 - 99 U/L    Total Bilirubin 0.5 0.1 - 1.5 mg/dL    Albumin 1.5 (L) 3.2 - 4.9 g/dL    Total Protein 5.5 (L) 6.0 - 8.2 g/dL    Globulin 4.0 (H) 1.9 - 3.5 g/dL    A-G Ratio 0.4 g/dL   DIFFERENTIAL MANUAL    Collection Time: 03/29/21  8:23 AM   Result Value Ref Range    Bands-Stabs 8.60 0.00 - 10.00 %    Manual Diff Status PERFORMED    PERIPHERAL SMEAR REVIEW    Collection Time: 03/29/21  8:23 AM   Result Value Ref Range    Peripheral Smear Review see below    PLATELET ESTIMATE    Collection Time: 03/29/21  8:23 AM   Result Value Ref Range    Plt Estimation Decreased    MORPHOLOGY    Collection Time: 03/29/21  8:23 AM   Result Value Ref Range    RBC Morphology Present     Smudge Cells Few     Dohle Bodies Few    ESTIMATED GFR    Collection Time: 03/29/21  8:23 AM   Result Value Ref Range    GFR If  18 (A) >60 mL/min/1.73 m 2    GFR If Non African American 15 (A) >60 mL/min/1.73 m 2   Lactic Acid -STAT Once    Collection Time: 03/29/21 11:15 AM   Result Value Ref Range    Lactic Acid 2.4 (H) 0.5 - 2.0 mmol/L   Prothrombin time (INR)    Collection Time: 03/29/21 11:15 AM   Result Value Ref Range    PT 25.3 (H) 12.0 - 14.6 sec    INR 2.22 (H) 0.87 - 1.13   MASSIVE TRANSFUSION    Collection Time: 03/29/21 12:04 PM   Result Value Ref Range    Component R       R99                 Red Cells, LR       [id number]   issued       03/29/21   12:07      Product Type R99     Dispense Status issued     Unit Number (Barcoded) [id number]     Product Code (Barcoded) G3128A33     Blood  Type (Barcoded) 5100     Component R       R99                 Red Cells, LR       [id number]   issued       03/29/21   12:07      Product Type R99     Dispense Status issued     Unit Number (Barcoded) [id number]     Product Code (Barcoded) L4780N72     Blood Type (Barcoded) 5100     Component R       R99                 Red Cells, LR       [id number]   issued       03/29/21   12:07      Product Type R99     Dispense Status issued     Unit Number (Barcoded) [id number]     Product Code (Barcoded) Z9979W27     Blood Type (Barcoded) 5100     Component R       R99                 Red Cells, LR       [id number]   issued       03/29/21   12:07      Product Type R99     Dispense Status issued     Unit Number (Barcoded) [id number]     Product Code (Barcoded) F4512X31     Blood Type (Barcoded) 5100     Component F       TA3                 Thawed Plasma 3     [id number]   issued       03/29/21   12:07      Product Type Thawed Apheresis Plasma 3rd Cont     Dispense Status issued     Unit Number (Barcoded) [id number]     Product Code (Barcoded) Q7192Y24     Blood Type (Barcoded) 8400     Component F       TA2                 Thawed Plasma 2     [id number]   issued       03/29/21   12:07      Product Type Thawed Apheresis Plasma 2nd Cont     Dispense Status issued     Unit Number (Barcoded) [id number]     Product Code (Barcoded) K1618S32     Blood Type (Barcoded) 8400     Component F       TA2                 Thawed Plasma 2     [id number]   issued       03/29/21   12:07      Product Type Thawed Apheresis Plasma 2nd Cont     Dispense Status issued     Unit Number (Barcoded) [id number]     Product Code (Barcoded) Q6005I16     Blood Type (Barcoded) 8400     Component F       TA1                 Thawed Plasma 1     [id number]   issued       03/29/21   12:07      Product Type Thawed Apheresis Plasma 1st Cont     Dispense Status issued     Unit Number (Barcoded) [id number]     Product Code  (Barcoded) I5447F12     Blood Type (Barcoded) 8400     Component P       P2                  Plts,Pheresis       [id number]   issued       21   12:07      Product Type Platelets  Pheresis LR     Dispense Status issued     Unit Number (Barcoded) [id number]     Product Code (Barcoded) A1876T46     Blood Type (Barcoded) 6200    PLATELET MAPPING WITH BASIC TEG    Collection Time: 21 12:10 PM   Result Value Ref Range    Reaction Time Initial-R 4.5 (L) 5.0 - 10.0 min    Clot Kinetics-K 2.7 1.0 - 3.0 min    Clot Angle-Angle 58.7 53.0 - 72.0 degrees    Maximum Clot Strength-MA 57.6 50.0 - 70.0 mm    Lysis 30 minutes-LY30 0.0 0.0 - 8.0 %    % Inhibition ADP 44.9 %    % Inhibition AA 19.9 %    TEG Algorithm Link Algorithm    HEMOGLOBIN AND HEMATOCRIT    Collection Time: 21 12:15 PM   Result Value Ref Range    Hemoglobin 8.2 (L) 14.0 - 18.0 g/dL    Hematocrit 26.5 (L) 42.0 - 52.0 %   EKG    Collection Time: 21 12:18 PM   Result Value Ref Range    Report       [facility]    Test Date:  2021  Pt Name:    SHEBA MERAZ               Department: 161  MRN:        [id number]                      Room:       [facility]  Gender:     Male                         Technician: MXW  :        [id number]                   Requested By:STEPHANY EDWARDS  Order #:    [id number]                    Reading MD:    Measurements  Intervals                                Axis  Rate:       74                           P:          248  MN:         91                           QRS:        -38  QRSD:       102                          T:          37  QT:         402  QTc:        446    Interpretive Statements  SINUS OR ECTOPIC ATRIAL RHYTHM  SHORT MN INTERVAL  INFERIOR INFARCT, OLD  CONSIDER ANTERIOR INFARCT  Compared to ECG 2021 21:28:49  Ectopic atrial rhythm now present  Short MN interval now present  Sinus tachycardia no longer present  Left anterior fascicular block no longer present  Myocardial infarct  "finding still present     ISTAT ARTERIAL BLOOD GAS    Collection Time: 03/29/21 12:23 PM   Result Value Ref Range    Ph 7.265 (LL) 7.400 - 7.500    Pco2 54.2 (HH) 26.0 - 37.0 mmHg    Po2 119 (H) 64 - 87 mmHg    Tco2 26 20 - 33 mmol/L    S02 98 93 - 99 %    Hco3 24.6 17.0 - 25.0 mmol/L    BE -3 -4 - 3 mmol/L    Body Temp 97.4 F degrees    O2 Therapy 36 %    iPF Ratio 331     Ph Temp Kevin 7.274 (LL) 7.400 - 7.500    Pco2 Temp Co 52.6 (HH) 26.0 - 37.0 mmHg    Po2 Temp Cor 115 (H) 64 - 87 mmHg    Specimen Arterial     Action Range Triggered YES     Inst. Qualified Patient YES    ISTAT SODIUM    Collection Time: 03/29/21 12:23 PM   Result Value Ref Range    Istat Sodium 138 135 - 145 mmol/L   ISTAT POTASSIUM    Collection Time: 03/29/21 12:23 PM   Result Value Ref Range    Istat Potassium 4.9 3.6 - 5.5 mmol/L   ISTAT IONIZED CA    Collection Time: 03/29/21 12:23 PM   Result Value Ref Range    Istat Ionized Calcium 1.18 1.10 - 1.30 mmol/L   ISTAT HEMATOCRIT AND HEMOGLOBIN    Collection Time: 03/29/21 12:23 PM   Result Value Ref Range    Istat Hematocrit 26 (L) 42 - 52 %    Istat Hemoglobin 8.8 (L) 14.0 - 18.0 g/dL     Results     Procedure Component Value Units Date/Time    BLOOD CULTURE [[id number]] Collected: 03/29/21 1000    Order Status: Completed Specimen: Blood from Peripheral Updated: 03/29/21 1123    Narrative:      Collected By:[id number] [employer]  Per Hospital Policy: Only change Specimen Src: to \"Line\" if  specified by physician order.    BLOOD CULTURE [[id number]] Collected: 03/29/21 1030    Order Status: Completed Specimen: Blood from Peripheral Updated: 03/29/21 1121    Narrative:      Collected By:[id number] [employer]  Per Hospital Policy: Only change Specimen Src: to \"Line\" if  specified by physician order.    URINALYSIS [[id number]]     Order Status: No result Specimen: Urine, Cath     Blood Culture [[id number]] Collected: 03/29/21 1000    Order Status: Canceled Specimen: Other from " "Peripheral     GRAM STAIN [[id number]] Collected: 03/27/21 2323    Order Status: Completed Specimen: Wound Updated: 03/29/21 0847     Significant Indicator .     Source WND     Site Abdominal Abscess     Gram Stain Result Many Gram positive cocci.    CULTURE WOUND W/ GRAM STAIN [[id number]] Collected: 03/27/21 2323    Order Status: Completed Specimen: Wound from Abscess Updated: 03/29/21 0847     Significant Indicator NEG     Source WND     Site Abdominal Abscess     Culture Result -     Gram Stain Result Many Gram positive cocci.    Blood Culture [[id number]]     Order Status: No result Specimen: Blood from Peripheral     BLOOD CULTURE [[id number]] Collected: 03/28/21 1249    Order Status: Completed Specimen: Blood from Peripheral Updated: 03/29/21 0732     Significant Indicator NEG     Source BLD     Site PERIPHERAL     Culture Result No Growth  Note: Blood cultures are incubated for 5 days and  are monitored continuously.Positive blood cultures  are called to the RN and reported as soon as  they are identified.      Narrative:      Per Hospital Policy: Only change Specimen Src: to \"Line\" if  specified by physician order.  Right AC    BLOOD CULTURE [[id number]] Collected: 03/28/21 1249    Order Status: Completed Specimen: Blood from Peripheral Updated: 03/29/21 0732     Significant Indicator NEG     Source BLD     Site PERIPHERAL     Culture Result No Growth  Note: Blood cultures are incubated for 5 days and  are monitored continuously.Positive blood cultures  are called to the RN and reported as soon as  they are identified.      Narrative:      Per Hospital Policy: Only change Specimen Src: to \"Line\" if  specified by physician order.  Right Hand    SARS-CoV-2 PCR (24 hour In-House): Collect NP swab in [facility] [[id number]] Collected: 03/27/21 2215    Order Status: Completed Specimen: Respirate from Nasopharyngeal Updated: 03/28/21 2125     SARS-CoV-2 Source NP Swab     SARS-CoV-2 by PCR NotDetected     Comment: PATIENTS: " "Important information regarding your results and instructions can  be found at [email or web address]   \"After your  Covid-19 Test\"  [facility] providers: PLEASE REFER TO DE-ESCALATION AND RETESTING PROTOCOL  on [email or web address]  **The TaqPath COVID-19 SARS-CoV-2 RT-PCR test has been made available for  use under the Emergency Use Authorization (EUA) only.         Narrative:      Have you been in close contact with a person who is suspected  or known to be positive for COVID-19 within the last 30 days  (e.g. last seen that person < 30 days ago)->No    BLOOD CULTURE [[id number]] Collected: 21    Order Status: Completed Specimen: Blood from Peripheral Updated: 21 1230     Significant Indicator NEG     Source BLD     Site PERIPHERAL     Culture Result No Growth  Note: Blood cultures are incubated for 5 days and  are monitored continuously.Positive blood cultures  are called to the RN and reported as soon as  they are identified.      Narrative:      1 of 2 for Blood Culture x 2 sites order. Per Hospital  Policy: Only change Specimen Src: to \"Line\" if specified by  physician order.  Right Hand    BLOOD CULTURE [[id number]] Collected: 21    Order Status: Completed Specimen: Blood from Peripheral Updated: 21    Narrative:      2 of 2 blood culture x2  Sites order. Per Hospital Policy:  Only change Specimen Src: to \"Line\" if specified by physician  order.    URINALYSIS [[id number]]     Order Status: Canceled Specimen: Urine, Cath         Hemodynamics:  Temp (24hrs), Av.6 °C (97.8 °F), Min:36.2 °C (97.1 °F), Max:37 °C (98.6 °F)  Temperature: 36.5 °C (97.7 °F)  Pulse  Av.4  Min: 0  Max: 128   Blood Pressure: (!) 116/33, Arterial BP: (!) 0/0  CVP (mm Hg): (!) 9 MM HG  Midline IV (Active)   Site Assessment Clean;Dry;Intact 21   Dressing Type Biopatch;Transparent 21 08   Line Status No blood return;Scrubbed the hub prior to access;Saline " locked 03/29/21 0800   Dressing Status Clean;Dry;Intact 03/29/21 0800   Dressing Intervention N/A 03/28/21 2047   Dressing Change Due 04/03/21 03/28/21 0100   Date Primary Tubing Changed 03/28/21 03/28/21 0100   Date Secondary Tubing Changed 03/28/21 03/28/21 0100   NEXT Primary Tubing Change  03/30/21 03/28/21 0100   NEXT Secondary Tubing Change  03/29/21 03/28/21 0100   Infiltration Grading ([facility], CV) 0 03/28/21 2047   Phlebitis Scale ([facility] Only) 0 03/28/21 2047       CVC Triple Lumen 03/29/21 (Active)     Wound:  @WOUNDLDA(4)@     Fluids:  Intake/Output       03/27/21 0700 - 03/28/21 0659 03/28/21 0700 - 03/29/21 0659 03/29/21 0700 - 03/30/21 0659      9029-2027 2433-2053 Total 6111-7282 2232-5553 Total 2247-3001 5177-1636 Total       Intake    P.O.  --  50 50  --  -- --  --  -- --    P.O. -- 50 50 -- -- -- -- -- --    I.V.  --  340.3 340.3  --  -- --  377  -- 377    Albumin Volume -- -- -- -- -- -- 200 -- 200    Norepinephrine Volume -- -- -- -- -- -- 177 -- 177    Volume (mL) (lactated ringers infusion) -- 340.3 340.3 -- -- -- -- -- --    Blood  --  -- --  448.3  -- 448.3  600  -- 600    Volume (RELEASE RED BLOOD CELLS) -- -- -- 448.3 -- 448.3 -- -- --    Volume (RELEASE RED BLOOD CELLS-ACTIVE BLEEDING) -- -- -- -- -- -- 300 -- 300    Volume (RELEASE RED BLOOD CELLS-ACTIVE BLEEDING) -- -- -- -- -- -- 300 -- 300    IV Piggyback  --  100 100  --  183 183  1275  -- 1275    Volume (mL) (lactated ringer BOLUS infusion) -- -- -- -- -- -- 1000 -- 1000    Volume (mL) (pantoprazole (PROTONIX) 80 mg in  mL Infusion) -- -- -- -- 183 183 225 -- 225    Volume (mL) (desmopressin (DDAVP) 28 mcg in NS 50 mL ivpb) -- -- -- -- -- -- 50 -- 50    Volume (mL) (cefTRIAXone (ROCEPHIN) 1 g in  mL IVPB) -- 100 100 -- -- -- -- -- --    Total Intake -- 490.3 490.3 448.3 183 631.3 2252 -- 2252       Output    Urine  --  0 0  --  -- --  --  -- --    Number of Times Incontinent of Urine -- 1 x 1 x -- -- -- -- -- --     Urine Void (mL) -- 0 0 -- -- -- -- -- --    Drains  --  450 450  --  -- --  350  -- 350    Output (mL) (Closed/Suction Drain Right RLQ) -- 450 450 -- -- -- 350 -- 350    Stool  --  -- --  --  -- --  --  -- --    Number of Times Stooled -- -- -- -- 4 x 4 x 4 x -- 4 x    Total Output -- 450 450 -- -- -- 350 -- 350       Net I/O     -- 40.3 40.3 448.3 183 631.3 1902 -- 1902           GI/Nutrition:  Orders Placed This Encounter   Procedures   • Diet NPO     Standing Status:   Standing     Number of Occurrences:   1     Order Specific Question:   Restrict to:     Answer:   Sips with Medications [3]     Medications:  Current Facility-Administered Medications   Medication Last Admin   • morphine (pf) 4 mg/mL injection 2 mg 2 mg at 03/29/21 0550   • norepinephrine (Levophed) infusion 8 mg/250 mL (premix) Stopped at 03/29/21 1406   • ONDANSETRON HCL 4 MG/2ML INJ SOLN     • ondansetron (ZOFRAN) syringe/vial injection 4 mg 4 mg at 03/29/21 1042   • [START ON 3/30/2021] ampicillin/sulbactam (UNASYN) 3 g in  mL IVPB     • calcium CHLORIDE 2,000 mg in dextrose 5% 100 mL IVPB     • EPINEPHrine (ADRENALIN) 10 mcg/mL inj (ER IV Push Syringe) 10 mcg     • propofol (DIPRIVAN) injection     • CALCIUM CHLORIDE 10 % IV SOLN     • EPINEPHRINE 1 MG/10ML INJ SOSY     • calcium CHLORIDE injection 1 g     • pantoprazole (PROTONIX) 80 mg in  mL Infusion 8 mg/hr at 03/29/21 0627   • ROPINIRole (REQUIP) tablet 2 mg 2 mg at 03/28/21 2047     Medical Decision Making, by Problem:  Active Hospital Problems    Diagnosis    • *Septic shock (HCC) [A41.9, R65.21]    • Acute blood loss anemia [D62]    • Pancreatic cyst [K86.2]    • Gastrointestinal hemorrhage with melena [K92.1]    • Abscess of abdominal cavity (HCC) [K65.1]    • ESRD (end stage renal disease) on dialysis (HCC) [N18.6, Z99.2]    • Essential hypertension [I10]    • Acute cholecystitis [K81.0]    • Troponin level elevated [R77.8]      IMPRESSION:    1.  Persistent right lower  quadrant abscess 15.7x9.8x25 cm with previous IR   drain placement growing E. coli and MSSA.  2.  Recurrent MSSA bacteremia due to intra-abdominal abscess with first   negative blood culture on .  3.  Large necrotic pancreatic, presumably infected, pseudocyst.  4.  Chronic renal failure, on hemodialysis.  5.  Congestive heart failure with EF of 25-30%.  6.  Acute gastrointestinal bleed.  7.  Paroxysmal atrial fibrillation.  8.  Previous cefazolin-induced pancytopenia and Zosyn-induced   Thrombocytopenia.    PLAN:    1. We will resume his antibiotic of Unasyn for his E. coli,   MSSA, right paracolic gutter abscess.  His initial target date was  for 3   weeks from his last IR drain placement, but due to his persistent abscess, we   will need to adjust his antibiotic target date.   2. Dr. Lane and will have IR reposition the drain and attempt   another aspiration of this abscess.  3. Pancreatic pseudocyst, which appeared to be necrotic, I have  asked Dr. Lane to reach out to GI who she is already consulting for GI bleed to  make recommendation whether this needs to be drained by interventional   radiology versus an endoscopic approach to address this.  4. Repeated blood cultures.  5. we will adjust Unasyn to 3 grams q.8 hours.  6. Nephrology is also following for hemodialysis.    ### PATIENT  PRIOR TO MY VISIT AND TIME THE NOTE WAS STARTED. HE WAS UNDERGOING HIS PROCEDURE WHICH DELAYED MY VISIT. ###

## 2021-03-30 LAB
BACTERIA WND AEROBE CULT: NORMAL
GRAM STN SPEC: NORMAL
SIGNIFICANT IND 70042: NORMAL
SITE SITE: NORMAL
SOURCE SOURCE: NORMAL

## 2021-03-30 NOTE — PROCEDURES
DATE OF PROCEDURE:  03/29/2021     PROCEDURE:  Esophagogastroduodenoscopy.     PREOPERATIVE DIAGNOSIS:  A 55-year-old male, critically ill with upper GI   bleed as manifested by melena, anemia and hypotension.  The patient now on   vasopressors.     POSTOPERATIVE DIAGNOSES:  1.  Massive hematemesis.  2.  Large amount of blood and clot in the stomach and duodenum, unable to   remove clot.  3.  Hemodynamic deterioration.     DESCRIPTION OF PROCEDURE:  The risks and benefits of this procedure were   discussed with Dr. Campa and Dr. Olmos.  The patient is critically ill,   who has been DNR, DNI, but it was felt that this procedure may be able to   possibly control GI bleeding.  Dr. Campa also discussed risks and benefits   of procedure with family.   Ketamine, propofol and Dilaudid per Dr. Campa.  The patient was turned to the left lateral decubitus position and   ICU monitoring was in place.  Just prior to placing the endoscope, the patient   had a massive amount of dark red blood and clot.  I immediately put the   endoscope in the posterior pharynx and removed blood.  See photo.  I was able   to then advance the endoscope into the esophagus.  There was blood in the   esophagus, but I was able to suction this away.  I did not appreciate any   esophageal varices.  The endoscope was advanced in the stomach.  Air was   insufflated.  There was a large amount of old and fresh clot with some   accumulation of red blood in the proximal stomach.  There was too much to be   removed by the standard  endoscope.  Endoscope was advanced to the   antrum.  I did not appreciate any antral lesions.  Retroflexion was performed.    Again, there was very large amount of clot in the proximal stomach.  No   distinct bleeding site was noted away from the clot.  Retroflexion was taken   down and the endoscope was advanced through the pylorus into the duodenal bulb   where again there was a large amount of clot that was  bright red in color.    There did seem to be blood accumulating there as well.     The patient was showing hemodynamic deterioration.  Endoscopic control of   bleeding would take more than an hour and would require changing of   endoscopes, aggressive lavage, snare removal of large amounts of clot with   almost futile likelihood of controlling the bleeding.  I suspect a major   artery was involved such as the gastroduodenal artery.  The patient was   critically ill and too unstable to be transferred to Interventional Radiology.    Case was discussed throughout, Dr. Campa was with me at the bedside.        ______________________________  MD MEGA WHITTAKER/FARHAN/JAN    DD:  03/29/2021 13:57  DT:  03/29/2021 16:54    Job#:  [id number]

## 2021-03-31 LAB
BACTERIA BLD CULT: ABNORMAL
BACTERIA BLD CULT: ABNORMAL
SIGNIFICANT IND 70042: ABNORMAL
SITE SITE: ABNORMAL
SOURCE SOURCE: ABNORMAL

## 2021-03-31 NOTE — DOCUMENTATION QUERY
[facility]                                                                       Query Response Note      PATIENT:               SHEBA MERAZ  ACCT #:                  [id number]  MRN:                     [id number]  :                      1965  ADMIT DATE:       3/27/2021 8:35 PM  DISCH DATE:        3/29/2021 2:06 PM  RESPONDING  PROVIDER #:        [id number]           QUERY TEXT:    Septic shock is documented in the Medical Record beginning with the 3/29 Critical Care Consult Note.  Your help is needed.  Please clarify the POA status.    The patient's Clinical Indicators include:  Per 3/29 Critical Care Consult: septic shock present on admission.  Source: intra-abdominal abscess  Admit Vitals (3/27):  159/74, 103, 16, 98.0F, 98%  Admit WBC (3/27) 5.4  Per Vitals Flowsheet: low BP's of 62/34, 64/32, 56/30 noted 3/29 beginning @ 0730  3/29 Lactic acid 2.4  Risk Factors: intra-abdominal abscess  Treatment: rocephin, unasyn, IVF, levophed     Thank You,  Patricia Hung RN, BSN  Clinical    Connect via [employer] Messenger  Options provided:   -- Sepsis without septic shock was present on admission   -- Sepsis with septic shock was present on admission   -- Sepsis/ septic shock were not present on admission   -- Unable to determine      Query created by: Patricia Hung on 3/30/2021 8:14 AM    RESPONSE TEXT:    Sepsis/ septic shock were not present on admission          Electronically signed by:  CHERELLE ALEXANDER MD 3/30/2021 7:07 PM

## 2021-04-02 LAB
BACTERIA BLD CULT: NORMAL
SIGNIFICANT IND 70042: NORMAL
SITE SITE: NORMAL
SOURCE SOURCE: NORMAL

## 2021-04-03 LAB
BACTERIA BLD CULT: NORMAL
SIGNIFICANT IND 70042: NORMAL
SITE SITE: NORMAL
SOURCE SOURCE: NORMAL

## 2021-06-28 NOTE — PROGRESS NOTES
Ok. Cut in half.    This medical record contains text that has been entered with the assistance of computer voice recognition and dictation software.  Therefore, it may contain unintended errors in text, spelling, punctuation, or grammar    Chief Complaint   Patient presents with   • Other     see reason for visit       Shaka Riley is a 52 y.o. male here evaluation and management of: Low back pain      HPI:     Chronic back pain  The patient is a 52-year-old male who returns to clinic with a chief complaint of low back pain which radiates down his right gluteus jameson. The pain is sharp 5 out of 10 radiates down to his gluteus jameson. He was given prednisone in the past Norco 10 mg which barely this to pain. The pain is worse when sitting down or bending, specifically when he is walking uphill he notices worse. 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 He denies any loss of bladder or bowel function and he has seen a chiropractor for this for over a year and a half now with little benefit.    Current medicines (including changes today)  Current Outpatient Prescriptions   Medication Sig Dispense Refill   • alprazolam (XANAX) 0.5 MG Tab Take 1 Tab by mouth at bedtime as needed for Sleep for up to 30 days. 30 Tab 0   • tramadol (ULTRAM-ER) 100 MG tablet TAKE ONE TAB PO BID PRN 60 Tab 0   • zolpidem (AMBIEN) 5 MG Tab Take 1 Tab by mouth at bedtime as needed for Sleep (1 to 3 po qhs prn insomnia/sleep study. Bring to sleep study.). 3 Tab 0   • furosemide (LASIX) 40 MG Tab TAKE ONE TABLET BY MOUTH TWICE DAILY 90 Tab 3   • ropinirole (REQUIP) 0.5 MG Tab Take 2 Tabs by mouth every evening. 60 Tab 11   • spironolactone (ALDACTONE) 25 MG Tab Take 1 Tab by mouth every day. 30 Tab 11   • aspirin (ASA) 325 MG Tab Take 325 mg by mouth every day.     • albuterol 108 (90 BASE) MCG/ACT Aero Soln inhalation aerosol Inhale 2 Puffs by mouth every four hours as needed for Shortness of Breath. 1 Inhaler 1   •  "carvedilol (COREG) 25 MG Tab Take 1 Tab by mouth 2 times a day, with meals. 180 Tab 0   • hydrocodone/acetaminophen (NORCO)  MG Tab Take 1 Tab by mouth every 24 hours as needed for up to 60 days. 30 Tab 0   • MethylPREDNISolone (MEDROL DOSEPAK) 4 MG Tablet Therapy Pack As directed on the packaging label. 21 Tab 0   • digoxin (LANOXIN) 125 MCG Tab Take 1 Tab by mouth every day. 30 Tab 11   • sacubitril-valsartan (ENTRESTO)  MG Tab tablet Take 1 Tab by mouth 2 Times a Day. 60 Tab 0   • rosuvastatin (CRESTOR) 5 MG Tab Take 1 Tab by mouth every evening. (Patient not taking: Reported on 2017) 90 Tab 3     No current facility-administered medications for this visit.      He  has a past medical history of Clotting disorder (CMS-HCC); Congestive heart failure (CMS-HCC) (2015); Crohn disease (CMS-HCC); Hypertension; PAF (paroxysmal atrial fibrillation) (CMS-HCC); and Sleep apnea.  He  has no past surgical history on file.  Social History   Substance Use Topics   • Smoking status: Never Smoker   • Smokeless tobacco: Never Used   • Alcohol use 1.2 oz/week     2 Glasses of wine per week      Comment: Quit in  - previous six beer after work, 2-3 beers per day currently     Social History     Social History Narrative   • No narrative on file     Family History   Problem Relation Age of Onset   • Cancer Mother    • Heart Disease Paternal Grandmother    • Heart Disease Paternal Grandfather    • Cancer Father      paralysis from accident     Family Status   Relation Status   • Mother  at age 20s    Cancer   • Paternal Grandmother    • Paternal Grandfather    • Father Alive   • Brother  at age 40s    complications from DM         ROS    Please see hpi     All other systems reviewed and are negative     Objective:     Blood pressure 140/84, pulse 72, temperature 36.6 °C (97.9 °F), height 1.753 m (5' 9.02\"), weight 105.7 kg (233 lb), SpO2 95 %. Body mass index is 34.39 " kg/m².  Physical Exam:    Constitutional: Alert, no distress.  Skin: Warm, dry, good turgor, no rashes in visible areas.  Eye: Equal, round and reactive, conjunctiva clear, lids normal.  ENMT: Lips without lesions, good dentition, oropharynx clear.  Neck: Trachea midline, no masses, no thyromegaly. No cervical or supraclavicular lymphadenopathy.  Respiratory: Unlabored respiratory effort, lungs clear to auscultation, no wheezes, no ronchi.  Cardiovascular: Normal S1, S2, no murmur, no edema.  Abdomen: Soft, non-tender, no masses, no hepatosplenomegaly.  Psych: Alert and oriented x3, normal affect and mood.    BACK Pain exam Thoracic and Lumbar Spine  Inspection of back and posture -revealed a normal exam  Range of motion = 180 degrees bilaterally  Palpation of the spine =NTT  no spasms noted, no lumbar spine tenderness, no saddle anasthesia, able to dorsiflex bilateral toes, 2+DTRs (patellar) bilaterally,  negative straight leg raise bilaterally both supine and seated,  Nontender bilateral erector spinae, normal gait       A steroid injection was performed at right gluteus maximu using 1% plain Lidocaine and 40 mg of Kenalog. This was well tolerated.      Assessment and Plan:   The following treatment plan was discussed      1. Lumbar radiculopathy  Patient tollerrated procedure well  There were no adverse events  Patient was given post procedure precautions     He will need an MRI and expect the need  For surgical correction      - MR-LUMBAR SPINE-WITH & W/O; Future  - alprazolam (XANAX) 0.5 MG Tab; Take 1 Tab by mouth at bedtime as needed for Sleep for up to 30 days.  Dispense: 30 Tab; Refill: 0  - REFERRAL TO ORTHOPEDICS  - tramadol (ULTRAM-ER) 100 MG tablet; TAKE ONE TAB PO BID PRN  Dispense: 60 Tab; Refill: 0          HEALTH MAINTENANCE:    Instructed to Follow up in clinic or ER for worsening symptoms, difficulty breathing, lack of expected recovery, or should new symptoms or problems arise.    Followup:  Return in about 4 weeks (around 1/30/2018) for Reevaluation.       Once again this medical record contains text that has been entered with the assistance of computer voice recognition and dictation software.  Therefore, it may contain unintended errors in text, spelling, punctuation, or grammar

## 2022-07-21 NOTE — ASSESSMENT & PLAN NOTE
Resolved  Monitor daily BMP   For information on Fall & Injury Prevention, visit: [email or web address] OR  [email or web address] OR  https://www.cdc.gov/steadi/patient.html

## 2023-08-29 NOTE — THERAPY
"Occupational Therapy   Initial Evaluation     Patient Name: Shaka Riley  Age:  55 y.o., Sex:  male  Medical Record #: [id number]  Today's Date: 2/1/2021       Precautions: Fall Risk, Nasogastric Tube, Swallow Precautions, Rectal tube  Comments: NGT to suction, aspiration risk    Assessment  Patient is 55 y.o. male admitted with abdominal pain and emesis, found to have acute pancreatitis with ONOFRE and abdominal distention. Pt with complicated hospital course with worsening renal failure, severe pancreatitis associated ileus and ICU transfer requiring intubation for two days, now extubated and seen for OT eval. Pt agreeable to attempt initial mobilization to edge of bed, motivated to regain independent PLOF. Anticipate rapid improvement in functional independence with improved medical stability and increased OOB activity. Consider PM&R consult, once medically cleared pt will likely be a good candidate for acute rehab.     Plan    Recommend Occupational Therapy 4 times per week until therapy goals are met for the following treatments:  Adaptive Equipment, Self Care/Activities of Daily Living, Therapeutic Activities and Therapeutic Exercises.    DC Equipment Recommendations: Unable to determine at this time  Discharge Recommendations: Recommend post-acute placement for additional occupational therapy services prior to discharge home( consider PM&R consult)     Subjective    \"I'll try but this all really hurts.\"     Objective     02/01/21 0841   Prior Living Situation   Prior Services None   Housing / Facility 2 Story Apartment / Condo   Steps Into Home 16   Bathroom Set up Bathtub / Shower Combination;Grab Bars   Equipment Owned None   Lives with - Patient's Self Care Capacity Spouse   Comments wife has RA and is unable to assist physically   Prior Level of ADL Function   Comments independent   Prior Level of IADL Function   Prior Level Of Mobility Independent Without Device in Community;Independent Without Device " See Physician's progress note. 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    Balance Assessment   Weight Shift Sitting Fair   Weight Shift Standing Poor   Comments HHAx2   Bed Mobility    Supine to Sit Maximal Assist   Sit to Supine Moderate Assist   Scooting Minimal Assist   Rolling Moderate Assist to Rt.   ADL Assessment   Eating   (NPO currently)   Grooming Minimal Assist;Seated   Upper Body Dressing Moderate Assist   Lower Body Dressing Maximal Assist   Toileting Total Assist  (jorgensen & rectal tube)   Functional Mobility   Sit to Stand Minimal Assist   Bed, Chair, Wheelchair Transfer Refused   Toilet Transfers Refused   Transfer Method Stand Step   Mobility standing and side steps at EOB only   Short Term Goals   Short Term Goal # 1 pt will demo functional transfer with SPV   Short Term Goal # 2 pt will demo UB dress with SPV   Short Term Goal # 3 pt will tolerate >5min stand for grooming ADLs   Short Term Goal # 4 pt will complete toileting ADL at SPV level   Problem List   Problem List Decreased Homemaking Skills;Decreased Active Daily Living Skills;Decreased Functional Mobility;Decreased Activity Tolerance;Impaired Postural Control / Balance

## (undated) DEVICE — MASK ANESTHESIA ADULT  - (100/CA)

## (undated) DEVICE — BLADE SURGICAL CLIPPER - (50EA/CA)

## (undated) DEVICE — TROCAR Z THREAD 11 X 100 - BLADED (6/BX)

## (undated) DEVICE — ELECTRODE DUAL RETURN W/ CORD - (50/PK)

## (undated) DEVICE — GLOVE BIOGEL PI ORTHO SZ 6 1/2 SURGICAL PF LF (40PR/BX)

## (undated) DEVICE — GLOVE BIOGEL INDICATOR SZ 6.5 SURGICAL PF LTX - (50PR/BX 4BX/CA)

## (undated) DEVICE — SUCTION INSTRUMENT YANKAUER BULBOUS TIP W/O VENT (50EA/CA)

## (undated) DEVICE — DRESSING TRANSPARENT FILM TEGADERM 2.375 X 2.75"  (100EA/BX)"

## (undated) DEVICE — SENSOR SPO2 NEO LNCS ADHESIVE (20/BX) SEE USER NOTES

## (undated) DEVICE — TUBE CONNECT SUCTION CLEAR 120 X 1/4" (50EA/CA)"

## (undated) DEVICE — KIT CUSTOM PROCEDURE SINGLE FOR ENDO  (15/CA)

## (undated) DEVICE — STERI STRIP COMPOUND BENZOIN - TINCTURE 0.6ML WITH APPLICATOR (40EA/BX)

## (undated) DEVICE — CANISTER SUCTION 3000ML MECHANICAL FILTER AUTO SHUTOFF MEDI-VAC NONSTERILE LF DISP  (40EA/CA)

## (undated) DEVICE — SOD. CHL 10CC SYRINGE PREFILL - W/10 CC (30/BX)

## (undated) DEVICE — GLOVE BIOGEL SZ 6.5 SURGICAL PF LTX (50PR/BX 4BX/CA)

## (undated) DEVICE — TROCARCANN&SEAL 5X55 ZTHREAD - 12/BX

## (undated) DEVICE — GOWN WARMING STANDARD FLEX - (30/CA)

## (undated) DEVICE — KIT ANESTHESIA W/CIRCUIT & 3/LT BAG W/FILTER (20EA/CA)

## (undated) DEVICE — APPLIER ENDO MEDIUM CLIP (3EA/BX)

## (undated) DEVICE — GOWN SURGEONS X-LARGE - DISP. (30/CA)

## (undated) DEVICE — CLOSURE WOUND 1/4 X 4 (STERI - STRIP) (50/BX 4BX/CA)

## (undated) DEVICE — SET SUCTION/IRRIGATION WITH DISPOSABLE TIP (6/CA )PART #0250-070-520 IS A SUB

## (undated) DEVICE — SET LEADWIRE 5 LEAD BEDSIDE DISPOSABLE ECG (1SET OF 5/EA)

## (undated) DEVICE — SUTURE 4-0 VICRYL PLUS FS-2 - 27 INCH (36/BX)

## (undated) DEVICE — SUTURE 0 VICRYL PLUS UR-6 - 27 INCH (36/BX)

## (undated) DEVICE — SODIUM CHL IRRIGATION 0.9% 1000ML (12EA/CA)

## (undated) DEVICE — CHLORAPREP 26 ML APPLICATOR - ORANGE TINT(25/CA)

## (undated) DEVICE — LACTATED RINGERS INJ 1000 ML - (14EA/CA 60CA/PF)

## (undated) DEVICE — SET EXTENSION WITH 2 PORTS (48EA/CA) ***PART #2C8610 IS A SUBSTITUTE*****

## (undated) DEVICE — SPONGE GAUZESTER. 2X2 4-PL - (2/PK 50PK/BX 30BX/CS)

## (undated) DEVICE — ELECTRODE 850 FOAM ADHESIVE - HYDROGEL RADIOTRNSPRNT (50/PK)

## (undated) DEVICE — CANNULA W/SEAL 5X100 Z-THRE - ADED KII (12/BX)

## (undated) DEVICE — HEAD HOLDER JUNIOR/ADULT

## (undated) DEVICE — PACK LAP CHOLE OR - (2EA/CA)

## (undated) DEVICE — TUBE E-T HI-LO CUFF 7.5MM (10EA/PK)

## (undated) DEVICE — FILM CASSETTE ENDO

## (undated) DEVICE — NEEDLE INSFL 120MM 14GA VRRS - (20/BX)

## (undated) DEVICE — NEPTUNE 4 PORT MANIFOLD - (20/PK)

## (undated) DEVICE — SUTURE GENERAL

## (undated) DEVICE — BITE BLOCK ADULT 60FR (100EA/CA)

## (undated) DEVICE — TUBING CLEARLINK DUO-VENT - C-FLO (48EA/CA)

## (undated) DEVICE — DETERGENT RENUZYME PLUS 10 OZ PACKET (50/BX)

## (undated) DEVICE — APPLIER 5MM MED/LARGE CLIP - (3/BX)

## (undated) DEVICE — PROTECTOR ULNA NERVE - (36PR/CA)